# Patient Record
Sex: FEMALE | Race: WHITE | NOT HISPANIC OR LATINO | Employment: PART TIME | URBAN - METROPOLITAN AREA
[De-identification: names, ages, dates, MRNs, and addresses within clinical notes are randomized per-mention and may not be internally consistent; named-entity substitution may affect disease eponyms.]

---

## 2017-02-20 ENCOUNTER — MYC MEDICAL ADVICE (OUTPATIENT)
Dept: FAMILY MEDICINE | Facility: CLINIC | Age: 42
End: 2017-02-20

## 2017-02-21 ENCOUNTER — MYC MEDICAL ADVICE (OUTPATIENT)
Dept: FAMILY MEDICINE | Facility: CLINIC | Age: 42
End: 2017-02-21

## 2017-02-21 NOTE — TELEPHONE ENCOUNTER
Huddled with PN.  Would like patient to send as an E-visit or telephone visit.  Sent message to patient via "[employer] ([email or web address]".  Stephanie Winter RN

## 2017-02-22 ENCOUNTER — VIRTUAL VISIT (OUTPATIENT)
Dept: FAMILY MEDICINE | Facility: CLINIC | Age: 42
End: 2017-02-22
Payer: COMMERCIAL

## 2017-02-22 DIAGNOSIS — F41.1 ANXIETY STATE: Primary | ICD-10-CM

## 2017-02-22 DIAGNOSIS — F43.23 ADJUSTMENT DISORDER WITH MIXED ANXIETY AND DEPRESSED MOOD: ICD-10-CM

## 2017-02-22 PROCEDURE — 99443 ZZC PHYSICIAN TELEPHONE EVALUATION 21-30 MIN: CPT | Performed by: FAMILY MEDICINE

## 2017-02-22 RX ORDER — VENLAFAXINE HYDROCHLORIDE 75 MG/1
225 CAPSULE, EXTENDED RELEASE ORAL DAILY
Qty: 270 CAPSULE | Refills: 1 | Status: SHIPPED | OUTPATIENT
Start: 2017-02-22 | End: 2017-09-01

## 2017-02-22 NOTE — MR AVS SNAPSHOT
After Visit Summary   2/22/2017    Ellyn Mcgee    MRN: [id number]           Patient Information     Date Of Birth          1975        Visit Information        Provider Department      2/22/2017 10:30 AM Dorothy Guaman DO [facility]        Today's Diagnoses     Anxiety state    -  1    Adjustment disorder with mixed anxiety and depressed mood           Follow-ups after your visit        Your next 10 appointments already scheduled     Apr 07, 2017 10:00 AM CDT   MyChart Physical Adult with Dorothy Guaman DO   [facility] ([facility])    [address] [address] [phone]              Who to contact     If you have questions or need follow up information about today's clinic visit or your schedule please contact [facility] directly at [phone].  Normal or non-critical lab and imaging results will be communicated to you by Reviewspot[employer], letter or phone within 4 business days after the clinic has received the results. If you do not hear from us within 7 days, please contact the clinic through [employer] or phone. If you have a critical or abnormal lab result, we will notify you by phone as soon as possible.  Submit refill requests through [employer] or call your pharmacy and they will forward the refill request to us. Please allow 3 business days for your refill to be completed.          Additional Information About Your Visit        MyChart Information     [employer] gives you secure access to your electronic health record. If you see a primary care provider, you can also send messages to your care team and make appointments. If you have questions, please call your primary care clinic.  If you do not have a primary care provider, please call [phone] and they will assist you.        Care EveryWhere ID     This is your Care EveryWhere ID. This could be used by other organizations to access your [facility]  records  [id number]         Blood Pressure from Last 3 Encounters:   04/01/16 110/71   04/29/15 110/62   08/22/14 109/62    Weight from Last 3 Encounters:   04/01/16 176 lb (79.8 kg)   04/29/15 164 lb 3.2 oz (74.5 kg)   08/22/14 159 lb 12.8 oz (72.5 kg)              Today, you had the following     No orders found for display         Today's Medication Changes          These changes are accurate as of: 2/22/17  5:54 PM.  If you have any questions, ask your nurse or doctor.               These medicines have changed or have updated prescriptions.        Dose/Directions    venlafaxine 75 MG 24 hr capsule   Commonly known as:  EFFEXOR-XR   This may have changed:  See the new instructions.   Used for:  Adjustment disorder with mixed anxiety and depressed mood        Dose:  225 mg   Take 3 capsules (225 mg) by mouth daily   Quantity:  270 capsule   Refills:  1            Where to get your medicines      These medications were sent to [place] [address] [place] [address]     Phone:  [phone]     venlafaxine 75 MG 24 hr capsule                Primary Care Provider Office Phone # Fax #    Dorothy RODRÍGUEZ DO [place] [phone]       [place] [id number]        Thank you!     Thank you for choosing [facility]  for your care. Our goal is always to provide you with excellent care. Hearing back from our patients is one way we can continue to improve our services. Please take a few minutes to complete the written survey that you may receive in the mail after your visit with us. Thank you!             Your Updated Medication List - Protect others around you: Learn how to safely use, store and throw away your medicines at www.disposemymeds.org.          This list is accurate as of: 2/22/17  5:54 PM.  Always use your most recent med list.                   Brand Name Dispense Instructions for use     ALPRAZolam 0.5 MG tablet    XANAX    15 tablet    Take 1 tablet by mouth. As needed for anxiety       IBUPROFEN      as needed.       venlafaxine 75 MG 24 hr capsule    EFFEXOR-XR    270 capsule    Take 3 capsules (225 mg) by mouth daily

## 2017-02-22 NOTE — PROGRESS NOTES
"Ellyn Mcgee is a 41 year old female who is being evaluated via a telephone visit.      The patient has been notified of following:     \"This telephone visit will be conducted via a call between you and your physician/provider. We have found that certain health care needs can be provided without the need for a physical exam.  This service lets us provide the care you need with a short phone conversation.  If a prescription is necessary we can send it directly to your pharmacy.  If lab work is needed we can place an order for that and you can then stop by our lab to have the test done at a later time.    We will bill your insurance company for this service.  Please check with your medical insurance if this type of visit is covered. You may be responsible for the cost of this type of visit if insurance coverage is denied.  The typical cost is $30 (10min), $59 (11-20min) and $85 (21-30min).  Most often these visits are shorter than 10 minutes.    If during the course of the call the physician/provider feels a telephone visit is not appropriate, you will not be charged for this service.\"       Consent has been obtained for this service by 2 care team members: yes. See the scanned image in the medical record.    Ellyn Mcgee complains of  No chief complaint on file.      I have reviewed and updated the patient's Past Medical History, Social History, Family History and Medication List.    ALLERGIES  Codeine; Cyclogyl [cyclopentolate hcl]; Hydrocodone; and Seasonal allergies    Cally Rm (MA signature)    Additional provider notes:    Memory issue - trouble completing tasks -   Will forget she was going to do something and will remember a day later   Tried to write more things down but not helping - has big white boards and has to remind herself to do things  Seems easily distracted   Not sure how long this is going on -   Had hectic holidays -   Became more aware of it this past month  Taking effexor -   Dreams more " weird -   Has been snoring but has a cold - 2 months  Having trouble loosing weight -   Slipped and fallen a couple of times this past month   Having more pain and taking more ibuprofen - also taking tumeric - going to chiropractor      Assessment/Plan:  (F41.1) Anxiety state  (primary encounter diagnosis)  Comment: worsening anxiety and concentration - trouble with focus and unsure if this just focus -   Will attempt increasing effexor from 150mg up to 225mg -   Plan: if not improving consider trial of different medication vs more work up of cognitive issues - consider sleep evaluation    (F43.23) Adjustment disorder with mixed anxiety and depressed mood  Comment:    Plan: venlafaxine (EFFEXOR-XR) 75 MG 24 hr capsule             Contraception management -    Pt has question about birth control - does not want to get pregnant  Doesn't like hormones  Discussed options of IUD - including Mirena or Imelda -    Reviewed the risk/benefits of IUD-including but not limiting to bleeding, infection and uterine perforation.  Discussed the differences between the two types of IUDs - Mirena vs paraguard.  Advised to take 3-4 ibuprofen one hour prior to appointment.      I have reviewed the note as documented above.  This accurately captures the substance of my conversation with the patient,   Ellyn Mcgee     Total time of call between patient and provider was 25 minutes

## 2017-02-23 ASSESSMENT — PATIENT HEALTH QUESTIONNAIRE - PHQ9: SUM OF ALL RESPONSES TO PHQ QUESTIONS 1-9: 7

## 2017-04-07 ENCOUNTER — TELEPHONE (OUTPATIENT)
Dept: FAMILY MEDICINE | Facility: CLINIC | Age: 42
End: 2017-04-07

## 2017-04-07 DIAGNOSIS — E28.2 PCOS (POLYCYSTIC OVARIAN SYNDROME): ICD-10-CM

## 2017-04-07 DIAGNOSIS — F43.23 ADJUSTMENT DISORDER WITH MIXED ANXIETY AND DEPRESSED MOOD: ICD-10-CM

## 2017-04-07 DIAGNOSIS — E78.5 HYPERLIPIDEMIA LDL GOAL <130: Primary | ICD-10-CM

## 2017-04-07 LAB
ANION GAP SERPL CALCULATED.3IONS-SCNC: 5 MMOL/L (ref 3–14)
BUN SERPL-MCNC: 16 MG/DL (ref 7–30)
CALCIUM SERPL-MCNC: 9.1 MG/DL (ref 8.5–10.1)
CHLORIDE SERPL-SCNC: 106 MMOL/L (ref 94–109)
CHOLEST SERPL-MCNC: 231 MG/DL
CO2 SERPL-SCNC: 28 MMOL/L (ref 20–32)
CREAT SERPL-MCNC: 0.65 MG/DL (ref 0.52–1.04)
GFR SERPL CREATININE-BSD FRML MDRD: NORMAL ML/MIN/1.7M2
GLUCOSE SERPL-MCNC: 99 MG/DL (ref 70–99)
HDLC SERPL-MCNC: 52 MG/DL
LDLC SERPL CALC-MCNC: 159 MG/DL
NONHDLC SERPL-MCNC: 179 MG/DL
POTASSIUM SERPL-SCNC: 4.1 MMOL/L (ref 3.4–5.3)
SODIUM SERPL-SCNC: 139 MMOL/L (ref 133–144)
TRIGL SERPL-MCNC: 99 MG/DL

## 2017-04-07 PROCEDURE — 36415 COLL VENOUS BLD VENIPUNCTURE: CPT | Performed by: FAMILY MEDICINE

## 2017-04-07 PROCEDURE — 80061 LIPID PANEL: CPT | Performed by: FAMILY MEDICINE

## 2017-04-07 PROCEDURE — 80048 BASIC METABOLIC PNL TOTAL CA: CPT | Performed by: FAMILY MEDICINE

## 2017-04-07 NOTE — TELEPHONE ENCOUNTER
Pt here now for labs- fasting (CPE today cancelled due to PN's illness).  Will order BMP and Lipids.  CW

## 2017-04-19 ENCOUNTER — OFFICE VISIT (OUTPATIENT)
Dept: FAMILY MEDICINE | Facility: CLINIC | Age: 42
End: 2017-04-19
Payer: COMMERCIAL

## 2017-04-19 VITALS
HEART RATE: 85 BPM | DIASTOLIC BLOOD PRESSURE: 72 MMHG | TEMPERATURE: 98.6 F | HEIGHT: 64 IN | WEIGHT: 173.8 LBS | SYSTOLIC BLOOD PRESSURE: 114 MMHG | BODY MASS INDEX: 29.67 KG/M2 | OXYGEN SATURATION: 100 %

## 2017-04-19 DIAGNOSIS — F41.1 ANXIETY STATE: ICD-10-CM

## 2017-04-19 DIAGNOSIS — R23.4 SKIN TEXTURE CHANGES: ICD-10-CM

## 2017-04-19 DIAGNOSIS — R33.9 URINARY RETENTION: ICD-10-CM

## 2017-04-19 DIAGNOSIS — Z00.00 ENCOUNTER FOR ROUTINE ADULT HEALTH EXAMINATION WITHOUT ABNORMAL FINDINGS: Primary | ICD-10-CM

## 2017-04-19 DIAGNOSIS — F43.23 ADJUSTMENT DISORDER WITH MIXED ANXIETY AND DEPRESSED MOOD: ICD-10-CM

## 2017-04-19 LAB
ALBUMIN UR-MCNC: NEGATIVE MG/DL
APPEARANCE UR: CLEAR
BACTERIA #/AREA URNS HPF: ABNORMAL /HPF
BILIRUB UR QL STRIP: NEGATIVE
COLOR UR AUTO: YELLOW
GLUCOSE UR STRIP-MCNC: NEGATIVE MG/DL
HGB UR QL STRIP: ABNORMAL
KETONES UR STRIP-MCNC: NEGATIVE MG/DL
LEUKOCYTE ESTERASE UR QL STRIP: ABNORMAL
NITRATE UR QL: NEGATIVE
NON-SQ EPI CELLS #/AREA URNS LPF: ABNORMAL /LPF
PH UR STRIP: 7 PH (ref 5–7)
RBC #/AREA URNS AUTO: ABNORMAL /HPF (ref 0–2)
SP GR UR STRIP: 1.01 (ref 1–1.03)
URN SPEC COLLECT METH UR: ABNORMAL
UROBILINOGEN UR STRIP-ACNC: 0.2 EU/DL (ref 0.2–1)
WBC #/AREA URNS AUTO: ABNORMAL /HPF (ref 0–2)

## 2017-04-19 PROCEDURE — 81001 URINALYSIS AUTO W/SCOPE: CPT | Performed by: FAMILY MEDICINE

## 2017-04-19 PROCEDURE — 99396 PREV VISIT EST AGE 40-64: CPT | Performed by: FAMILY MEDICINE

## 2017-04-19 RX ORDER — PROPRANOLOL HYDROCHLORIDE 10 MG/1
TABLET ORAL
Qty: 20 TABLET | Refills: 3 | Status: SHIPPED | OUTPATIENT
Start: 2017-04-19 | End: 2019-01-16

## 2017-04-19 ASSESSMENT — ANXIETY QUESTIONNAIRES
IF YOU CHECKED OFF ANY PROBLEMS ON THIS QUESTIONNAIRE, HOW DIFFICULT HAVE THESE PROBLEMS MADE IT FOR YOU TO DO YOUR WORK, TAKE CARE OF THINGS AT HOME, OR GET ALONG WITH OTHER PEOPLE: SOMEWHAT DIFFICULT
3. WORRYING TOO MUCH ABOUT DIFFERENT THINGS: NOT AT ALL
GAD7 TOTAL SCORE: 4
6. BECOMING EASILY ANNOYED OR IRRITABLE: SEVERAL DAYS
5. BEING SO RESTLESS THAT IT IS HARD TO SIT STILL: SEVERAL DAYS
1. FEELING NERVOUS, ANXIOUS, OR ON EDGE: SEVERAL DAYS
7. FEELING AFRAID AS IF SOMETHING AWFUL MIGHT HAPPEN: NOT AT ALL
2. NOT BEING ABLE TO STOP OR CONTROL WORRYING: NOT AT ALL

## 2017-04-19 ASSESSMENT — PATIENT HEALTH QUESTIONNAIRE - PHQ9: 5. POOR APPETITE OR OVEREATING: SEVERAL DAYS

## 2017-04-19 NOTE — PROGRESS NOTES
SUBJECTIVE:     CC: Ellyn Mcgee is an 41 year old woman who presents for preventive health visit.     Healthy Habits:    Do you get at least three servings of calcium containing foods daily (dairy, green leafy vegetables, etc.)? no    Amount of exercise or daily activities, outside of work: working on increasing, nothing regular right now    Problems taking medications regularly No    Medication side effects: Yes dreams, constipation/hemorrhoids    Have you had an eye exam in the past two years? yes    Do you see a dentist twice per year? due    Do you have sleep apnea, excessive snoring or daytime drowsiness?no        -------------------------------------    Today's PHQ-2 Score:   PHQ-2 ( 1999 [employer]) 4/4/2017 4/1/2016   Q1: Little interest or pleasure in doing things - 1   Q2: Feeling down, depressed or hopeless - 1   PHQ-2 Score - 2   Little interest or pleasure in doing things Not at all -   Feeling down, depressed or hopeless Not at all -   PHQ-2 Score 0 -       Abuse: Current or Past(Physical, Sexual or Emotional)- NO  Do you feel safe in your environment - YES    Social History   Substance Use Topics     Smoking status: Never Smoker     Smokeless tobacco: Never Used     Alcohol use 0.5 oz/week     1 Standard drinks or equivalent per week     The patient does not drink >3 drinks per day nor >7 drinks per week.    Recent Labs   Lab Test  04/07/17   1021  08/22/14   1112  03/03/11   0814   CHOL  231*  226*  226*   HDL  52  60  40*   LDL  159*  151*  160*   TRIG  99  73  128   CHOLHDLRATIO   --   3.8  5.6*   NHDL  179*   --    --        Reviewed orders with patient.  Reviewed health maintenance and updated orders accordingly - Yes    Mammo Decision Support:  Patient under age 50, mutual decision reflected in health maintenance.      Pertinent mammograms are reviewed under the imaging tab.  History of abnormal Pap smear: NO - age 30-65 PAP every 5 years with negative HPV co-testing recommended    Reviewed  "and updated as needed this visit by clinical staff  Tobacco  Allergies  Meds  Problems  Med Hx         Reviewed and updated as needed this visit by Provider  Allergies  Meds  Problems  Med Hx            ROS:  C: NEGATIVE for fever, chills, change in weight  I: NEGATIVE for worrisome rashes, moles or lesions  E: NEGATIVE for vision changes or irritation  ENT: NEGATIVE for ear, mouth and throat problems  R: NEGATIVE for significant cough or SOB  B: NEGATIVE for masses, tenderness or discharge  CV: NEGATIVE for chest pain, palpitations or peripheral edema  GI: NEGATIVE for nausea, abdominal pain, heartburn, or change in bowel habits   female: irregular menses  Some urinary symptoms  M: NEGATIVE for significant arthralgias or myalgia  N: NEGATIVE for weakness, dizziness or paresthesias  P: NEGATIVE for changes in mood or affect    Problem list, Medication list, Allergies, and Medical/Social/Surgical histories reviewed in EPIC and updated as appropriate.  OBJECTIVE:     /72  Pulse 85  Temp 98.6  F (37  C) (Oral)  Ht 5' 3.75\" (1.619 m)  Wt 173 lb 12.8 oz (78.8 kg)  LMP 04/03/2017  SpO2 100%  BMI 30.07 kg/m2  EXAM:  GENERAL: healthy, alert and no distress  EYES: Eyes grossly normal to inspection, PERRL and conjunctivae and sclerae normal  HENT: ear canals and TM's normal, nose and mouth without ulcers or lesions  NECK: no adenopathy, no asymmetry, masses, or scars and thyroid normal to palpation  RESP: lungs clear to auscultation - no rales, rhonchi or wheezes  BREAST: normal without masses, tenderness or nipple discharge and no palpable axillary masses or adenopathy  CV: regular rate and rhythm, normal S1 S2, no S3 or S4, no murmur, click or rub, no peripheral edema and peripheral pulses strong  ABDOMEN: soft, nontender, no hepatosplenomegaly, no masses and bowel sounds normal  MS: no gross musculoskeletal defects noted, no edema  SKIN: right posterior arm skin lesion  NEURO: Normal strength and " "tone, mentation intact and speech normal  PSYCH: mentation appears normal, affect normal/bright    ASSESSMENT/PLAN:     1. Encounter for routine adult health examination without abnormal findings  Routine screening  - Urine Microscopic    2. Anxiety state  Will try prn propranolol  - propranolol (INDERAL) 10 MG tablet; 1-2 tablets every 12 hours as needed for anxiety  Dispense: 20 tablet; Refill: 3    3. Adjustment disorder with mixed anxiety and depressed mood     - propranolol (INDERAL) 10 MG tablet; 1-2 tablets every 12 hours as needed for anxiety  Dispense: 20 tablet; Refill: 3    4. Skin texture changes  Referral to derm for skin lesions  - DERMATOLOGY REFERRAL    5. Urinary retention  ua pending  - *UA reflex to Microscopic and Culture ([facility] (except [place] and [place])    COUNSELING:   Reviewed preventive health counseling, as reflected in patient instructions         reports that she has never smoked. She has never used smokeless tobacco.    Estimated body mass index is 30.07 kg/(m^2) as calculated from the following:    Height as of this encounter: 5' 3.75\" (1.619 m).    Weight as of this encounter: 173 lb 12.8 oz (78.8 kg).   Weight management plan: Discussed healthy diet and exercise guidelines and patient will follow up in 12 months in clinic to re-evaluate.    Counseling Resources:  ATP IV Guidelines  Pooled Cohorts Equation Calculator  Breast Cancer Risk Calculator  FRAX Risk Assessment  ICSI Preventive Guidelines  Dietary Guidelines for Americans, 2010  USDA's MyPlate  ASA Prophylaxis  Lung CA Screening    Dorothy Guaman, DO  [facility]  "

## 2017-04-19 NOTE — NURSING NOTE
"Chief Complaint   Patient presents with     Physical     /72  Pulse 85  Temp 98.6  F (37  C) (Oral)  Ht 5' 3.75\" (1.619 m)  Wt 173 lb 12.8 oz (78.8 kg)  LMP 04/03/2017  SpO2 100%  BMI 30.07 kg/m2 Estimated body mass index is 30.07 kg/(m^2) as calculated from the following:    Height as of this encounter: 5' 3.75\" (1.619 m).    Weight as of this encounter: 173 lb 12.8 oz (78.8 kg).  BP completed using cuff size: regular       Health Maintenance due pending provider review:  NONE    n/a    Cally Rm CMA  "

## 2017-04-19 NOTE — PATIENT INSTRUCTIONS
PLEASE CALL TO SCHEDULE YOUR MAMMOGRAM  [facility] [phone]  [facility] [phone]      Preventive Health Recommendations  Female Ages 40 to 49    Yearly exam:     See your health care provider every year in order to  1. Review health changes.   2. Discuss preventive care.    3. Review your medicines if your doctor prescribed any.      Get a Pap test every three years (unless you have an abnormal result and your provider advises testing more often).      If you get Pap tests with HPV test, you only need to test every 5 years, unless you have an abnormal result. You do not need a Pap test if your uterus was removed (hysterectomy) and you have not had cancer.      You should be tested each year for STDs (sexually transmitted diseases), if you're at risk.       Ask your doctor if you should have a mammogram.      Have a colonoscopy (test for colon cancer) if someone in your family has had colon cancer or polyps before age 50.       Have a cholesterol test every 5 years.       Have a diabetes test (fasting glucose) after age 45. If you are at risk for diabetes, you should have this test every 3 years.    Shots: Get a flu shot each year. Get a tetanus shot every 10 years.     Nutrition:     Eat at least 5 servings of fruits and vegetables each day.    Eat whole-grain bread, whole-wheat pasta and brown rice instead of white grains and rice.    Talk to your provider about Calcium and Vitamin D.     Lifestyle    Exercise at least 150 minutes a week (an average of 30 minutes a day, 5 days a week). This will help you control your weight and prevent disease.    Limit alcohol to one drink per day.    No smoking.     Wear sunscreen to prevent skin cancer.    See your dentist every six months for an exam and cleaning.

## 2017-04-19 NOTE — PROGRESS NOTES
Dear Ellyn,   Your test results are all back -   Urine test is normal - no signs of infection.  Let us know if you have any questions.  -Dorothy Guaman, DO

## 2017-04-19 NOTE — MR AVS SNAPSHOT
After Visit Summary   4/19/2017    Ellyn Mcgee    MRN: [id number]           Patient Information     Date Of Birth          1975        Visit Information        Provider Department      4/19/2017 9:30 AM Dorothy Guaman DO [facility]        Today's Diagnoses     Encounter for routine adult health examination without abnormal findings    -  1    Anxiety state        Adjustment disorder with mixed anxiety and depressed mood          Care Instructions    PLEASE CALL TO SCHEDULE YOUR MAMMOGRAM  [facility] [phone]  [facility] [phone]      Preventive Health Recommendations  Female Ages 40 to 49    Yearly exam:     See your health care provider every year in order to  1. Review health changes.   2. Discuss preventive care.    3. Review your medicines if your doctor prescribed any.      Get a Pap test every three years (unless you have an abnormal result and your provider advises testing more often).      If you get Pap tests with HPV test, you only need to test every 5 years, unless you have an abnormal result. You do not need a Pap test if your uterus was removed (hysterectomy) and you have not had cancer.      You should be tested each year for STDs (sexually transmitted diseases), if you're at risk.       Ask your doctor if you should have a mammogram.      Have a colonoscopy (test for colon cancer) if someone in your family has had colon cancer or polyps before age 50.       Have a cholesterol test every 5 years.       Have a diabetes test (fasting glucose) after age 45. If you are at risk for diabetes, you should have this test every 3 years.    Shots: Get a flu shot each year. Get a tetanus shot every 10 years.     Nutrition:     Eat at least 5 servings of fruits and vegetables each day.    Eat whole-grain bread, whole-wheat pasta and brown rice instead of white grains and rice.    Talk to your provider about Calcium and  "Vitamin D.     Lifestyle    Exercise at least 150 minutes a week (an average of 30 minutes a day, 5 days a week). This will help you control your weight and prevent disease.    Limit alcohol to one drink per day.    No smoking.     Wear sunscreen to prevent skin cancer.    See your dentist every six months for an exam and cleaning.        Follow-ups after your visit        Who to contact     If you have questions or need follow up information about today's clinic visit or your schedule please contact [facility] directly at [phone].  Normal or non-critical lab and imaging results will be communicated to you by [employer] Technologieshart, letter or phone within 4 business days after the clinic has received the results. If you do not hear from us within 7 days, please contact the clinic through [employer] or phone. If you have a critical or abnormal lab result, we will notify you by phone as soon as possible.  Submit refill requests through [employer] or call your pharmacy and they will forward the refill request to us. Please allow 3 business days for your refill to be completed.          Additional Information About Your Visit        [employer] Information     [employer] gives you secure access to your electronic health record. If you see a primary care provider, you can also send messages to your care team and make appointments. If you have questions, please call your primary care clinic.  If you do not have a primary care provider, please call [phone] and they will assist you.        Care EveryWhere ID     This is your Care EveryWhere ID. This could be used by other organizations to access your [facility] medical records  [id number]        Your Vitals Were     Pulse Temperature Height Last Period Pulse Oximetry BMI (Body Mass Index)    85 98.6  F (37  C) (Oral) 5' 3.75\" (1.619 m) 04/03/2017 100% 30.07 kg/m2       Blood Pressure from Last 3 Encounters:   04/19/17 114/72   04/01/16 110/71   04/29/15 110/62    Weight from Last " 3 Encounters:   04/19/17 173 lb 12.8 oz (78.8 kg)   04/01/16 176 lb (79.8 kg)   04/29/15 164 lb 3.2 oz (74.5 kg)              Today, you had the following     No orders found for display         Today's Medication Changes          These changes are accurate as of: 4/19/17 10:12 AM.  If you have any questions, ask your nurse or doctor.               Start taking these medicines.        Dose/Directions    propranolol 10 MG tablet   Commonly known as:  INDERAL   Used for:  Anxiety state, Adjustment disorder with mixed anxiety and depressed mood   Started by:  Dorothy Guaman DO        1-2 tablets every 12 hours as needed for anxiety   Quantity:  20 tablet   Refills:  3            Where to get your medicines      These medications were sent to [place] [address] [place] [address]     Phone:  [phone]     propranolol 10 MG tablet                Primary Care Provider Office Phone # Fax #    Dorothy Guaman [phone]       [facility] [address] [address]        Thank you!     Thank you for choosing [facility]  for your care. Our goal is always to provide you with excellent care. Hearing back from our patients is one way we can continue to improve our services. Please take a few minutes to complete the written survey that you may receive in the mail after your visit with us. Thank you!             Your Updated Medication List - Protect others around you: Learn how to safely use, store and throw away your medicines at www.disposemymeds.org.          This list is accurate as of: 4/19/17 10:12 AM.  Always use your most recent med list.                   Brand Name Dispense Instructions for use    ALPRAZolam 0.5 MG tablet    XANAX    15 tablet    Take 1 tablet by mouth. As needed for anxiety       IBUPROFEN      as needed.       propranolol 10 MG tablet    INDERAL    20 tablet    1-2 tablets  every 12 hours as needed for anxiety       venlafaxine 75 MG 24 hr capsule    EFFEXOR-XR    270 capsule    Take 3 capsules (225 mg) by mouth daily

## 2017-04-20 ASSESSMENT — PATIENT HEALTH QUESTIONNAIRE - PHQ9: SUM OF ALL RESPONSES TO PHQ QUESTIONS 1-9: 4

## 2017-04-20 ASSESSMENT — ANXIETY QUESTIONNAIRES: GAD7 TOTAL SCORE: 4

## 2017-07-09 ENCOUNTER — MYC MEDICAL ADVICE (OUTPATIENT)
Dept: FAMILY MEDICINE | Facility: CLINIC | Age: 42
End: 2017-07-09

## 2017-07-10 ENCOUNTER — MYC MEDICAL ADVICE (OUTPATIENT)
Dept: FAMILY MEDICINE | Facility: CLINIC | Age: 42
End: 2017-07-10

## 2017-07-10 ENCOUNTER — TELEPHONE (OUTPATIENT)
Dept: DERMATOLOGY | Facility: CLINIC | Age: 42
End: 2017-07-10

## 2017-07-10 NOTE — TELEPHONE ENCOUNTER
Has multiple freckles and moles on body. PCP stated to have them looked at. Large mole on abdomen; biopsied it previously, results benign. Last skin check 6 years ago. Reports father had melanoma at approximately her age (currently living) Denies scaling, itching, pain, bleeding, rapid growth or irregularity. Lesions are all similar. Patient has appt scheduled in October. Patient will monitor and report any changes or symptoms mentioned in note. Will follow up with patient if sooner appointment is available.

## 2017-07-10 NOTE — TELEPHONE ENCOUNTER
Left message for patient to call clinic. Keke Arcos PA-C has availablity in September if she would like to switch.

## 2017-07-10 NOTE — TELEPHONE ENCOUNTER
----- Message from Alexa Moore sent at 7/10/2017 11:44 AM CDT -----  Regarding: Pt requesting an appt, family hx of melanoma and basal cell, moles need to be ...  Contact: [phone]  Checked.  First avail appt was in October.  I schedule pt for 10/2/17, but pt would really like to get in much sooner.    Please call pt at [phone].    Thank you,  Mamadou YOUNGER  Please DO NOT send this message and/or reply back to sender.  Call Center Representatives DO NOT respond to messages.

## 2017-07-12 ENCOUNTER — MYC MEDICAL ADVICE (OUTPATIENT)
Dept: FAMILY MEDICINE | Facility: CLINIC | Age: 42
End: 2017-07-12

## 2017-08-17 ENCOUNTER — TRANSFERRED RECORDS (OUTPATIENT)
Dept: HEALTH INFORMATION MANAGEMENT | Facility: CLINIC | Age: 42
End: 2017-08-17

## 2017-08-31 ENCOUNTER — TRANSFERRED RECORDS (OUTPATIENT)
Dept: HEALTH INFORMATION MANAGEMENT | Facility: CLINIC | Age: 42
End: 2017-08-31

## 2017-09-01 DIAGNOSIS — F43.23 ADJUSTMENT DISORDER WITH MIXED ANXIETY AND DEPRESSED MOOD: ICD-10-CM

## 2017-09-01 RX ORDER — VENLAFAXINE HYDROCHLORIDE 75 MG/1
CAPSULE, EXTENDED RELEASE ORAL
Qty: 180 CAPSULE | Refills: 0 | Status: SHIPPED | OUTPATIENT
Start: 2017-09-01 | End: 2017-11-12

## 2017-09-01 NOTE — TELEPHONE ENCOUNTER
Effexor     Last Written Prescription Date: 02/22/2017  Last Fill Quantity: 270, # refills: 1  Last Office Visit with [facility], [facility] or  Health prescribing provider: 04/19/2017        BP Readings from Last 3 Encounters:   04/19/17 114/72   04/01/16 110/71   04/29/15 110/62     Pulse: (for Fetzima)  Creatinine   Date Value Ref Range Status   04/07/2017 0.65 0.52 - 1.04 mg/dL Final   ]    Last PHQ-9 score on record=   PHQ-9 SCORE 4/19/2017   Total Score -   Total Score 4

## 2017-09-01 NOTE — TELEPHONE ENCOUNTER
Medication is being filled for 1 time refill only due to:  due for pHQ9 in October. Refill sent for 2 month supply   Stephanie Winter RN

## 2017-09-11 ENCOUNTER — OFFICE VISIT (OUTPATIENT)
Dept: DERMATOLOGY | Facility: CLINIC | Age: 42
End: 2017-09-11

## 2017-09-11 DIAGNOSIS — Z12.83 SKIN CANCER SCREENING: ICD-10-CM

## 2017-09-11 DIAGNOSIS — L81.4 SOLAR LENTIGINOSIS: ICD-10-CM

## 2017-09-11 DIAGNOSIS — D22.9 MULTIPLE PIGMENTED NEVI: ICD-10-CM

## 2017-09-11 DIAGNOSIS — Z80.8 FAMILY HISTORY OF MELANOMA: ICD-10-CM

## 2017-09-11 DIAGNOSIS — D48.5 NEOPLASM OF UNCERTAIN BEHAVIOR OF SKIN: Primary | ICD-10-CM

## 2017-09-11 ASSESSMENT — PAIN SCALES - GENERAL
PAINLEVEL: NO PAIN (0)
PAINLEVEL: NO PAIN (0)

## 2017-09-11 NOTE — MR AVS SNAPSHOT
After Visit Summary   9/11/2017    Ellyn Mcgee    MRN: [id number]           Patient Information     Date Of Birth          1975        Visit Information        Provider Department      9/11/2017 1:30 PM Keke Arcos PA-C [facility]        Today's Diagnoses     Neoplasm of uncertain behavior of skin    -  1      Care Instructions    Wound Care After a Biopsy    What is a skin biopsy?  A skin biopsy allows the doctor to examine a very small piece of tissue under the microscope to determine the diagnosis and the best treatment for the skin condition. A local anesthetic (numbing medicine)  is injected with a very small needle into the skin area to be tested. A small piece of skin is taken from the area. Sometimes a suture (stitch) is used.     What are the risks of a skin biopsy?  I will experience scar, bleeding, swelling, pain, crusting and redness. I may experience incomplete removal or recurrence. Risks of this procedure are excessive bleeding, bruising, infection, nerve damage, numbness, thick (hypertrophic or keloidal) scar and non-diagnostic biopsy.    How should I care for my wound for the first 24 hours?    Keep the wound dry and covered for 24 hours    If it bleeds, hold direct pressure on the area for 15 minutes. If bleeding does not stop then go to the emergency room    Avoid strenuous exercise the first 1-2 days or as your doctor instructs you    How should I care for the wound after 24 hours?    After 24 hours, remove the bandage    You may bathe or shower as normal    If you had a scalp biopsy, you can shampoo as usual and can use shower water to clean the biopsy site daily    Clean the wound twice a day with gentle soap and water    Do not scrub, be gentle    Apply white petroleum/Vaseline after cleaning the wound with a cotton swab or a clean finger, and keep the site covered with a Bandaid /bandage. Bandages are not necessary with a scalp biopsy    If you are  unable to cover the site with a Bandaid /bandage, re-apply ointment 2-3 times a day to keep the site moist. Moisture will help with healing    Avoid strenuous activity for first 1-2 days    Avoid lakes, rivers, pools, and oceans until the stitches are removed or the site is healed    How do I clean my wound?    Wash hands thoroughly with soap or use hand  before all wound care    Clean the wound with gentle soap and water    Apply white petroleum/Vaseline  to wound after it is clean    Replace the Bandaid /bandage to keep the wound covered for the first few days or as instructed by your doctor    If you had a scalp biopsy, warm shower water to the area on a daily basis should suffice    What should I use to clean my wound?     Cotton-tipped applicators (Qtips )    White petroleum jelly (Vaseline ). Use a clean new container and use Q-tips to apply.    Bandaids   as needed    Gentle soap     How should I care for my wound long term?    Do not get your wound dirty    Keep up with wound care for one week or until the area is healed.    A small scab will form and fall off by itself when the area is completely healed. The area will be red and will become pink in color as it heals. Sun protection is very important for how your scar will turn out. Sunscreen with an SPF 30 or greater is recommended once the area is healed.    You should have some soreness but it should be mild and slowly go away over several days. Talk to your doctor about using tylenol for pain,    When should I call my doctor?  If you have increased:     Pain or swelling    Pus or drainage (clear or slightly yellow drainage is ok)    Temperature over 100F    Spreading redness or warmth around wound    When will I hear about my results?  The biopsy results can take 2-3 weeks to come back. The clinic will call you with the results, send you a Meddle message, or have you schedule a follow-up clinic or phone time to discuss the results. Contact our  clinics if you do not hear from us in 3 weeks.     Who should I call with questions?    [facility]: [phone]     [facility]: [phone]    For urgent needs outside of business hours call the [facility] at [phone] and ask for the dermatology resident on call              Follow-ups after your visit        Who to contact     Please call your clinic at [phone] to:    Ask questions about your health    Make or cancel appointments    Discuss your medicines    Learn about your test results    Speak to your doctor   If you have compliments or concerns about an experience at your clinic, or if you wish to file a complaint, please contact [facility] Patient Relations at [phone] or email us at [email or web address]         Additional Information About Your Visit        MyChart Information     [employer] gives you secure access to your electronic health record. If you see a primary care provider, you can also send messages to your care team and make appointments. If you have questions, please call your primary care clinic.  If you do not have a primary care provider, please call [phone] and they will assist you.      [employer] is an electronic gateway that provides easy, online access to your medical records. With [employer], you can request a clinic appointment, read your test results, renew a prescription or communicate with your care team.     To access your existing account, please contact your [facility] or call [phone] for assistance.        Care EveryWhere ID     This is your Care EveryWhere ID. This could be used by other organizations to access your [facility] medical records  [id number]         Blood Pressure from Last 3 Encounters:   04/19/17 114/72   04/01/16 110/71   04/29/15 110/62    Weight from Last 3 Encounters:   04/19/17 78.8 kg (173 lb 12.8 oz)    04/01/16 79.8 kg (176 lb)   04/29/15 74.5 kg (164 lb 3.2 oz)              We Performed the Following     BIOPSY SKIN/SUBQ/MUC MEM, SINGLE LESION     Surgical pathology exam        Primary Care Provider Office Phone # Fax #    Dorothy Guaman [phone] [address] [facility]  [address]  [facility] [address]        Equal Access to Services     [facility] Center[facility]ENOBIA : Hadii aad ku hadasho Soomaali, waaxda luqadaha, qaybta kaalmada adeegyada, waxay idiin hayaan adeeg khtalishash la'aan . So [facility] [phone].    ATENCIÓN: Si tram flores, tiene a vivas disposición servicios gratuitos de asistencia lingüística. Llame al [phone].    We comply with applicable federal civil rights laws and [place] laws. We do not discriminate on the basis of race, color, national origin, age, disability sex, sexual orientation or gender identity.            Thank you!     Thank you for choosing [facility]  for your care. Our goal is always to provide you with excellent care. Hearing back from our patients is one way we can continue to improve our services. Please take a few minutes to complete the written survey that you may receive in the mail after your visit with us. Thank you!             Your Updated Medication List - Protect others around you: Learn how to safely use, store and throw away your medicines at www.disposemymeds.org.          This list is accurate as of: 9/11/17  2:01 PM.  Always use your most recent med list.                   Brand Name Dispense Instructions for use Diagnosis    ALPRAZolam 0.5 MG tablet    XANAX    15 tablet    Take 1 tablet by mouth. As needed for anxiety    Anxiety state, unspecified       IBUPROFEN      as needed.        propranolol 10 MG tablet    INDERAL    20 tablet    1-2 tablets every 12 hours as needed for anxiety    Anxiety state, Adjustment disorder with mixed anxiety and depressed mood       venlafaxine 75 MG 24 hr capsule    EFFEXOR-XR    180 capsule    TAKE 3 CAPSULES  (225 MG) BY MOUTH DAILY    Adjustment disorder with mixed anxiety and depressed mood

## 2017-09-11 NOTE — PROGRESS NOTES
[facility] Dermatology Note    Dermatology Problem List:  1. Family history of melanoma in the patient's father.  2. Ink spot lentigines - right shoulder, left lower back  3. Hx of nonscarring alopecia  4. Hx of rosacea, patient uses otc treatments  5. NUB, left mid abdomen s/p partial biopsy 9/11/17  6. Skin cancer screening 9/11/17    CC:   Chief Complaint   Patient presents with     Skin Check     Ellyn is here for a skin check. There are multiple moles of concern.      Date of Service: Sep 11, 2017    History of Present Illness:  Ms. Ellyn Mcgee is a 42 year old female who presents for a skin check. She was last seen by Dr Walls on 7/30/2010. Today the patient reports that she has a few moles of concern. She notes that she had a mole on her abdomen biopsied when she was younger. It is large and many colors, but it was benign. She reports that her rosacea is very mild and never used the MetroCream she was given by Dr. Walls. A nurse recommended some over the counter products and these have been helping a lot. The patient reports no other lesions of concern at this time.    Otherwise, the patient reports no painful, bleeding, nonhealing, or pruritic lesions, and denies new or changing moles.    Past Medical History:   Patient Active Problem List   Diagnosis     Hyperlipidemia LDL goal <130     Anxiety state     PCOS (polycystic ovarian syndrome)     Adjustment disorder with mixed anxiety and depressed mood     External hemorrhoids     Past Medical History:   Diagnosis Date     Abnormal Pap smear     Colposcopy     Anemia     In Past     Chlamydia trachomatis infection of other specified site 1993     Fertility problem      Lyme disease 9/8/2010    ?false positive vs positve CMV - resolved     Moderate dysplasia of cervix 1995     Other and unspecified adverse effect of drug, medicinal and biological substance     insulin resistent     Varicosities      Wounds and injuries     fall down stairs, PT  for back, pain meds     Past Surgical History:   Procedure Laterality Date     CONIZATION CERVIX,KNIFE/LASER  1995     SURGICAL HISTORY OF -       [place] teeth     Social History:  Patient is  and is a mother. She works in [employer]   She is outside when she gardens. Wears sunscreen when she remembers.  Has used a tanning bed when she was 20 years ago.    Family History:  Family history of melanoma in the patient's father. First diagnosed in his early 30s. Had various lymphomas throughout the next years. Passed away from a heart attack.    Medications:  Current Outpatient Prescriptions   Medication Sig Dispense Refill     venlafaxine (EFFEXOR-XR) 75 MG 24 hr capsule TAKE 3 CAPSULES (225 MG) BY MOUTH DAILY 180 capsule 0     ALPRAZolam (XANAX) 0.5 MG tablet Take 1 tablet by mouth. As needed for anxiety 15 tablet 1     IBUPROFEN as needed.       propranolol (INDERAL) 10 MG tablet 1-2 tablets every 12 hours as needed for anxiety 20 tablet 3     Allergies:  Allergies   Allergen Reactions     Codeine Itching     Cyclogyl [Cyclopentolate Hcl] Nausea and Vomiting     Hydrocodone      anxiety     Seasonal Allergies      Review of Systems:  - Skin: As above in HPI. No additional skin concerns.  - Const: No fevers, chills, night sweats, chronic cough, unexpected weight loss    Physical exam:  Vitals: There were no vitals taken for this visit.  GEN: This is a well developed, well-nourished female in no acute distress, in a pleasant mood.      SKIN: Total skin excluding the perineum areas was performed. The exam included the head/face, neck, both arms, chest, back, abdomen, buttocks, both legs, digits and/or nails.   - Regular brown pigmented macules and papules are identified on the trunk, extremities - including right posterior arm.  - Scattered brown macules on sun exposed areas.  - There is a firm tan/flesh colored papule that dimples with lateral pressure on the left upper calf, right thigh, right buttock.  -  1.5 x 1 cm slightly variegated patch on left mid abdomen  - No other lesions of concern on areas examined.     Impression/Plan:  1. Family history of melanoma in the patient's father.  - Discussion that she is at a higher risk.  - Recommend regular skin checks and good sunscreen use.     2. Clinically benign nevi - trunk, extremities - including right posterior arm.  - No further intervention required. Patient to report changes.   - Sun precaution was advised including the use of sun screens of SPF 30 or higher, sun protective clothing, and avoidance of tanning beds.    3. Solar lentigines- sun exposed areas  - No further intervention required. Patient to report changes.     4. Dermatofibromas - left upper calf, right thigh, right buttock  - No further intervention required. Patient to report changes.     5. NUB - left mid abdomen. Neoplasm of uncertain behavior. Differential diagnosis includes recurrent congenital nevus vs dysplastic nevus vs other  - Partial shave biopsy: After discussion of benefits and risks including but not limited to bleeding/bruising, pain/swelling, infection, scar, incomplete removal, nerve damage/numbness, recurrence, and non-diagnostic biopsy, written consent, verbal consent and photographs were obtained. Time-out was performed. The area was cleaned with isopropyl alcohol and was injected to obtain adequate anesthesia of the lesion on the left mid abdomen. 1.5 ml of 1% lidocaine was injected to obtain adequate anesthesia. A shave biopsy was performed. Hemostasis was achieved with aluminium chloride. Vaseline and a sterile dressing were applied. The patient tolerated the procedure and no complications were noted. The patient was provided with verbal and written post care instructions.    Follow-up in 1 year, earlier for new or changing lesions.    Staff Involved:  Staff Only    Scribe Disclosure:   Zia GONZALEZ, am serving as a scribe to document services personally performed by Keke  Josselyn FOSS, based on data collection and the provider's statements to me.    Provider Disclosure:   The documentation recorded by the scribe accurately reflects the services I personally performed and the decisions made by me.    All risks, benefits and alternatives were discussed with patient.  Patient is in agreement and understands the assessment and plan.  All questions were answered.  Sun Screen Education was given.   Return to Clinic 1 yr or sooner as needed.   Keke Arcos PA-C   [facility]

## 2017-09-11 NOTE — LETTER
9/11/2017       RE: Ellyn Mcgee  [address]  [facility] [place] [address]     Dear Colleague,    Thank you for referring your patient, Ellyn Mcgee, to the [facility] at [facility]. Please see a copy of my visit note below.    [facility] Dermat[facility] Note    Dermatology Problem List:  1. Family history of melanoma in the patient's father.  2. Ink spot lentigines - right shoulder, left lower back  3. Hx of nonscarring alopecia  4. Hx of rosacea, patient uses otc treatments  5. NUB, left mid abdomen s/p partial biopsy 9/11/17  6. Skin cancer screening 9/11/17    CC:   Chief Complaint   Patient presents with     Skin Check     Ellyn is here for a skin check. There are multiple moles of concern.      Date of Service: Sep 11, 2017    History of Present Illness:  Ms. Ellyn Mcgee is a 42 year old female who presents for a skin check. She was last seen by Dr Walls on 7/30/2010. Today the patient reports that she has a few moles of concern. She notes that she had a mole on her abdomen biopsied when she was younger. It is large and many colors, but it was benign. She reports that her rosacea is very mild and never used the MetroCream she was given by Dr. Walls. A nurse recommended some over the counter products and these have been helping a lot. The patient reports no other lesions of concern at this time.    Otherwise, the patient reports no painful, bleeding, nonhealing, or pruritic lesions, and denies new or changing moles.    Past Medical History:   Patient Active Problem List   Diagnosis     Hyperlipidemia LDL goal <130     Anxiety state     PCOS (polycystic ovarian syndrome)     Adjustment disorder with mixed anxiety and depressed mood     External hemorrhoids     Past Medical History:   Diagnosis Date     Abnormal Pap smear     Colposcopy     Anemia     In Past     Chlamydia trachomatis infection of other specified site 1993     Fertility problem       Lyme disease 9/8/2010    ?false positive vs positve CMV - resolved     Moderate dysplasia of cervix 1995     Other and unspecified adverse effect of drug, medicinal and biological substance     insulin resistent     Varicosities      Wounds and injuries     fall down stairs, PT for back, pain meds     Past Surgical History:   Procedure Laterality Date     CONIZATION CERVIX,KNIFE/LASER  1995     SURGICAL HISTORY OF -       [place] teeth     Social History:  Patient is  and is a mother. She works in [employer].   She is outside when she gardens. Wears sunscreen when she remembers.  Has used a tanning bed when she was 20 years ago.    Family History:  Family history of melanoma in the patient's father. First diagnosed in his early 30s. Had various lymphomas throughout the next years. Passed away from a heart attack.    Medications:  Current Outpatient Prescriptions   Medication Sig Dispense Refill     venlafaxine (EFFEXOR-XR) 75 MG 24 hr capsule TAKE 3 CAPSULES (225 MG) BY MOUTH DAILY 180 capsule 0     ALPRAZolam (XANAX) 0.5 MG tablet Take 1 tablet by mouth. As needed for anxiety 15 tablet 1     IBUPROFEN as needed.       propranolol (INDERAL) 10 MG tablet 1-2 tablets every 12 hours as needed for anxiety 20 tablet 3     Allergies:  Allergies   Allergen Reactions     Codeine Itching     Cyclogyl [Cyclopentolate Hcl] Nausea and Vomiting     Hydrocodone      anxiety     Seasonal Allergies      Review of Systems:  - Skin: As above in HPI. No additional skin concerns.  - Const: No fevers, chills, night sweats, chronic cough, unexpected weight loss    Physical exam:  Vitals: There were no vitals taken for this visit.  GEN: This is a well developed, well-nourished female in no acute distress, in a pleasant mood.      SKIN: Total skin excluding the perineum areas was performed. The exam included the head/face, neck, both arms, chest, back, abdomen, buttocks, both legs, digits and/or nails.   - Regular brown  pigmented macules and papules are identified on the trunk, extremities - including right posterior arm.  - Scattered brown macules on sun exposed areas.  - There is a firm tan/flesh colored papule that dimples with lateral pressure on the left upper calf, right thigh, right buttock.  - 1.5 x 1 cm slightly variegated patch on left mid abdomen  - No other lesions of concern on areas examined.     Impression/Plan:  1. Family history of melanoma in the patient's father.  - Discussion that she is at a higher risk.  - Recommend regular skin checks and good sunscreen use.     2. Clinically benign nevi - trunk, extremities - including right posterior arm.  - No further intervention required. Patient to report changes.   - Sun precaution was advised including the use of sun screens of SPF 30 or higher, sun protective clothing, and avoidance of tanning beds.    3. Solar lentigines- sun exposed areas  - No further intervention required. Patient to report changes.     4. Dermatofibromas - left upper calf, right thigh, right buttock  - No further intervention required. Patient to report changes.     5. NUB - left mid abdomen. Neoplasm of uncertain behavior. Differential diagnosis includes recurrent congenital nevus vs dysplastic nevus vs other  - Partial shave biopsy: After discussion of benefits and risks including but not limited to bleeding/bruising, pain/swelling, infection, scar, incomplete removal, nerve damage/numbness, recurrence, and non-diagnostic biopsy, written consent, verbal consent and photographs were obtained. Time-out was performed. The area was cleaned with isopropyl alcohol and was injected to obtain adequate anesthesia of the lesion on the left mid abdomen. 1.5 ml of 1% lidocaine was injected to obtain adequate anesthesia. A shave biopsy was performed. Hemostasis was achieved with aluminium chloride. Vaseline and a sterile dressing were applied. The patient tolerated the procedure and no complications were  noted. The patient was provided with verbal and written post care instructions.    Follow-up in 1 year, earlier for new or changing lesions.    Staff Involved:  Staff Only    Scribe Disclosure:   I, Zia Resendiz, am serving as a scribe to document services personally performed by Keke Arcos PA-C, based on data collection and the provider's statements to me.    Provider Disclosure:   The documentation recorded by the scribe accurately reflects the services I personally performed and the decisions made by me.    All risks, benefits and alternatives were discussed with patient.  Patient is in agreement and understands the assessment and plan.  All questions were answered.  Sun Screen Education was given.   Return to Clinic 1 yr or sooner as needed.   Keke Arcos PA-C   [facility]

## 2017-09-11 NOTE — PATIENT INSTRUCTIONS

## 2017-09-11 NOTE — NURSING NOTE
Dermatology Rooming Note    Ellyn Mcgee's goals for this visit include:   Chief Complaint   Patient presents with     Skin Check     Ellyn is here for a skin check. There are multiple moles of concern.      Amy Nino CMA

## 2017-09-14 ENCOUNTER — MYC MEDICAL ADVICE (OUTPATIENT)
Dept: FAMILY MEDICINE | Facility: CLINIC | Age: 42
End: 2017-09-14

## 2017-09-14 LAB — COPATH REPORT: NORMAL

## 2017-09-14 NOTE — TELEPHONE ENCOUNTER
PN,  Please see below MyChart message  Do you want to see pt and daughter?  Not sure that you need to see daughter if mom not wanting to start meds.  Please advise.  Keeley BROWNING RN

## 2017-10-04 ENCOUNTER — OFFICE VISIT (OUTPATIENT)
Dept: FAMILY MEDICINE | Facility: CLINIC | Age: 42
End: 2017-10-04
Payer: COMMERCIAL

## 2017-10-04 VITALS
TEMPERATURE: 97.9 F | BODY MASS INDEX: 29.71 KG/M2 | OXYGEN SATURATION: 100 % | HEIGHT: 64 IN | DIASTOLIC BLOOD PRESSURE: 60 MMHG | RESPIRATION RATE: 16 BRPM | WEIGHT: 174 LBS | HEART RATE: 93 BPM | SYSTOLIC BLOOD PRESSURE: 100 MMHG

## 2017-10-04 DIAGNOSIS — F90.0 ATTENTION DEFICIT HYPERACTIVITY DISORDER (ADHD), PREDOMINANTLY INATTENTIVE TYPE: Primary | ICD-10-CM

## 2017-10-04 PROCEDURE — 99214 OFFICE O/P EST MOD 30 MIN: CPT | Performed by: FAMILY MEDICINE

## 2017-10-04 RX ORDER — DEXTROAMPHETAMINE SACCHARATE, AMPHETAMINE ASPARTATE MONOHYDRATE, DEXTROAMPHETAMINE SULFATE AND AMPHETAMINE SULFATE 2.5; 2.5; 2.5; 2.5 MG/1; MG/1; MG/1; MG/1
10 CAPSULE, EXTENDED RELEASE ORAL DAILY
Qty: 30 CAPSULE | Refills: 0 | Status: SHIPPED | OUTPATIENT
Start: 2017-12-04 | End: 2017-11-14

## 2017-10-04 RX ORDER — DEXTROAMPHETAMINE SACCHARATE, AMPHETAMINE ASPARTATE MONOHYDRATE, DEXTROAMPHETAMINE SULFATE AND AMPHETAMINE SULFATE 2.5; 2.5; 2.5; 2.5 MG/1; MG/1; MG/1; MG/1
10 CAPSULE, EXTENDED RELEASE ORAL DAILY
Qty: 30 CAPSULE | Refills: 0 | Status: SHIPPED | OUTPATIENT
Start: 2017-11-04 | End: 2017-10-04

## 2017-10-04 RX ORDER — DEXTROAMPHETAMINE SACCHARATE, AMPHETAMINE ASPARTATE MONOHYDRATE, DEXTROAMPHETAMINE SULFATE AND AMPHETAMINE SULFATE 2.5; 2.5; 2.5; 2.5 MG/1; MG/1; MG/1; MG/1
10 CAPSULE, EXTENDED RELEASE ORAL DAILY
Qty: 30 CAPSULE | Refills: 0 | Status: SHIPPED | OUTPATIENT
Start: 2017-10-04 | End: 2017-11-14

## 2017-10-04 ASSESSMENT — PATIENT HEALTH QUESTIONNAIRE - PHQ9: SUM OF ALL RESPONSES TO PHQ QUESTIONS 1-9: 7

## 2017-10-04 NOTE — PROGRESS NOTES
SUBJECTIVE:   Ellyn Mcgee is a 42 year old female who presents to clinic today for the following health issues:      ADHD    ADHD - inattentive predominant -      Duration: recently dx - had evaluation done at [facility] - she brings in copy of the records    Description (location/character/radiation): ongoing issue    Intensity:  moderate    Accompanying signs and symptoms: coexisting anxiety and some derpession    History (similar episodes/previous evaluation): yes    Precipitating or alleviating factors: None    Therapies tried and outcome: behavior modifications         Problem list and histories reviewed & adjusted, as indicated.  Additional history: as documented    Patient Active Problem List   Diagnosis     Hyperlipidemia LDL goal <130     Anxiety state     PCOS (polycystic ovarian syndrome)     Adjustment disorder with mixed anxiety and depressed mood     External hemorrhoids     Attention deficit hyperactivity disorder (ADHD), predominantly inattentive type     Past Surgical History:   Procedure Laterality Date     CONIZATION CERVIX,KNIFE/LASER  1995     SURGICAL HISTORY OF -       [place] teeth       Social History   Substance Use Topics     Smoking status: Never Smoker     Smokeless tobacco: Never Used     Alcohol use 0.5 oz/week     1 Standard drinks or equivalent per week     Family History   Problem Relation Age of Onset     CANCER Mother      esophageal      Lipids Mother      CANCER Father      B-cell lymphoma, melanoma     Lipids Father      HEART DISEASE Father      open heart surgery     DIABETES Paternal Grandmother      DIABETES Paternal Uncle      Alcohol/Drug Maternal Grandfather              Reviewed and updated as needed this visit by clinical staffTobacco  Allergies  Meds       Reviewed and updated as needed this visit by Provider         ROS:  Constitutional, HEENT, cardiovascular, pulmonary, gi and gu systems are negative, except as otherwise noted.      OBJECTIVE:  "  /60 (BP Location: Right arm, Cuff Size: Adult Regular)  Pulse 93  Temp 97.9  F (36.6  C) (Oral)  Resp 16  Ht 5' 3.75\" (1.619 m)  Wt 174 lb (78.9 kg)  SpO2 100%  BMI 30.1 kg/m2  Body mass index is 30.1 kg/(m^2).  GENERAL: healthy, alert and no distress  CV: regular rate and rhythm, normal S1 S2, no S3 or S4, no murmur, click or rub, no peripheral edema and peripheral pulses strong    Diagnostic Test Results:  none     ASSESSMENT/PLAN:     1. Attention deficit hyperactivity disorder (ADHD), predominantly inattentive type  New onset dx of ADHD -   Discussed medication options and risk benefits of each  She prefers to start low dose -   Will try 10mg   F/u if not working  Signed her controlled substance agreement  - amphetamine-dextroamphetamine (ADDERALL XR) 10 MG per 24 hr capsule; Take 1 capsule (10 mg) by mouth daily  Dispense: 30 capsule; Refill: 0  - amphetamine-dextroamphetamine (ADDERALL XR) 10 MG per 24 hr capsule; Take 1 capsule (10 mg) by mouth daily  Dispense: 30 capsule; Refill: 0    Pt will call or RTC if symptoms worsen or do not improve.  I spent over 25 minutes with patient and over 50% time in counseling regarding above issues.     Dorothy Guaman, DO  [facility]    "

## 2017-10-04 NOTE — LETTER
[facility]    10/04/17    Patient: Ellyn Mcgee  YOB: 1975  Medical Record Number: [id number]                                                                  Controlled Substance Agreement  I understand that my care provider has prescribed controlled substances (narcotics, tranquilizers, and/or stimulants) to help manage my condition(s).  I am taking this medicine to help me function or work.  I know that this is strong medicine.  It could have serious side effects and even cause a dependency on the drug.  If I stop these medicines suddenly, I could have severe withdrawal symptoms.    The risks, benefits, and side effects of these medication(s) were explained to me.  I agree that:  1. I will take part in other treatments as advised by my provider.  This may be psychiatry or counseling, physical therapy, behavioral therapy, group treatment, or a referral to a pain clinic.  I will reduce or stop my medicine when my provider tells me to do so.   2. I will take my medicines as prescribed.  I will not change the dose or schedule unless my provider tells me to.  There will be no refills if I  run out early.   I may be contacted at any time without warning and asked to complete a drug test or pill count.   3. I will keep all my appointments at the clinic.  If I miss appointments or fail to follow instructions, my provider may stop my medicine.  4. I will not ask other providers to prescribe controlled substances. And I will not accept controlled substances from other people. If I need another prescribed controlled substance for a new reason, I will notify my provider within one business day.  5. If I enroll in the [place] Medical Marijuana program, I will tell my provider.  I will also sign an agreement to share my medical records with my provider.  6. I will use one pharmacy to fill all of my controlled substance prescriptions.  If my prescription is mailed to my pharmacy, it may take 5 to  7 days for my medicine to be ready.  7. I understand that my provider, clinic care team, and pharmacy can track controlled substance prescriptions from other providers through a central database (prescription monitoring program).  8. I will bring in my list of medications (or my medicine bottles) each time I come to the clinic.  REV- 04/2016                                                                                                                                            Page 1 of 2      [facility]    10/04/17    Patient: Ellyn Mcgee  YOB: 1975  Medical Record Number: [id number]    9. Refills of controlled substances will be made only during office hours.  It is up to me to make sure that I do not run out of my medicines on weekends or holidays.    10. I am responsible for my prescriptions.  If the medicine is lost or stolen, it will not be replaced.   I also agree not to share these medicines with anyone.  11. I agree to not use ANY illegal or recreational drugs.  This includes marijuana, cocaine, bath salts or other drugs.  I agree not to use alcohol unless my provider says I may.  I agree to give urine samples whenever asked.  If I fail to give a urine sample, the provider may stop my medicine.     12. I will tell my nurse or provider right away if I become pregnant or have a new medical problem treated outside of [facility].  13. I understand that this medicine can affect my thinking and judgment.  It may be unsafe for me to drive, use machinery and do dangerous tasks.  I will not do any of these things until I know how the medicine affects me.  If my dose changes, I will wait to see how it affects me.  I will contact my provider if I have concerns about medicine side effects.  I understand that if I do not follow any of the conditions above, my prescriptions or treatment may be stopped.    I agree that my provider, clinic care team, and pharmacy may work with any city,  state or federal law enforcement agency that investigates the misuse, sale, or other diversion of my controlled medicine. I will allow my provider to discuss my care with or share a copy of this agreement with any other treating provider, pharmacy or emergency room where I receive care.  I agree to give up (waive) any right of privacy or confidentiality with respect to these authorizations.   I have read this agreement and have asked questions about anything I did not understand.   ___________________________________    ___________________________  Patient Signature                                                           Date and Time  ___________________________________     ____________________________  Witness                                                                            Date and Time  ___________________________________  Dorothy Guaman DO  REV-  04/2016                                                                                                                                                                 Page 2 of 2

## 2017-10-04 NOTE — MR AVS SNAPSHOT
"              After Visit Summary   10/4/2017    Ellyn Mcgee    MRN: [id number]           Patient Information     Date Of Birth          1975        Visit Information        Provider Department      10/4/2017 11:30 AM Dorothy Guaman DO [facility]        Today's Diagnoses     Attention deficit hyperactivity disorder (ADHD), predominantly inattentive type    -  1       Follow-ups after your visit        Who to contact     If you have questions or need follow up information about today's clinic visit or your schedule please contact [facility] directly at [phone].  Normal or non-critical lab and imaging results will be communicated to you by Band[employer], letter or phone within 4 business days after the clinic has received the results. If you do not hear from us within 7 days, please contact the clinic through [employer] or phone. If you have a critical or abnormal lab result, we will notify you by phone as soon as possible.  Submit refill requests through [employer]cloud or call your pharmacy and they will forward the refill request to us. Please allow 3 business days for your refill to be completed.          Additional Information About Your Visit        MyChart Information     [employer]cloud gives you secure access to your electronic health record. If you see a primary care provider, you can also send messages to your care team and make appointments. If you have questions, please call your primary care clinic.  If you do not have a primary care provider, please call [phone] and they will assist you.        Care EveryWhere ID     This is your Care EveryWhere ID. This could be used by other organizations to access your [place] medical records  [id number]        Your Vitals Were     Pulse Temperature Respirations Height Pulse Oximetry BMI (Body Mass Index)    93 97.9  F (36.6  C) (Oral) 16 5' 3.75\" (1.619 m) 100% 30.1 kg/m2       Blood Pressure from Last 3 Encounters:   10/04/17 100/60   04/19/17 " 114/72   04/01/16 110/71    Weight from Last 3 Encounters:   10/04/17 174 lb (78.9 kg)   04/19/17 173 lb 12.8 oz (78.8 kg)   04/01/16 176 lb (79.8 kg)              Today, you had the following     No orders found for display         Today's Medication Changes          These changes are accurate as of: 10/4/17  4:33 PM.  If you have any questions, ask your nurse or doctor.               Start taking these medicines.        Dose/Directions    * amphetamine-dextroamphetamine 10 MG per 24 hr capsule   Commonly known as:  ADDERALL XR   Used for:  Attention deficit hyperactivity disorder (ADHD), predominantly inattentive type   Started by:  Dorothy Guaman DO        Dose:  10 mg   Take 1 capsule (10 mg) by mouth daily   Quantity:  30 capsule   Refills:  0       * amphetamine-dextroamphetamine 10 MG per 24 hr capsule   Commonly known as:  ADDERALL XR   Used for:  Attention deficit hyperactivity disorder (ADHD), predominantly inattentive type   Started by:  Dorothy Guaman DO        Dose:  10 mg   Start taking on:  12/4/2017   Take 1 capsule (10 mg) by mouth daily   Quantity:  30 capsule   Refills:  0       * Notice:  This list has 2 medication(s) that are the same as other medications prescribed for you. Read the directions carefully, and ask your doctor or other care provider to review them with you.         Where to get your medicines      Some of these will need a paper prescription and others can be bought over the counter.  Ask your nurse if you have questions.     Bring a paper prescription for each of these medications     amphetamine-dextroamphetamine 10 MG per 24 hr capsule    amphetamine-dextroamphetamine 10 MG per 24 hr capsule                Primary Care Provider Office Phone # Fax #    Dorothy Guaman [phone] [address] [address]        Equal Access to Services     LISA PATTERSON AH: lior Frank qaybta kaalmada adeegyada, waxay  david davislewis lennon'aan ah. So [facility] [phone].    ATENCIÓN: Si tram flores, tiene a vivas disposición servicios gratuitos de asistencia lingüística. Fred al [phone].    We comply with applicable federal civil rights laws and [place] laws. We do not discriminate on the basis of race, color, national origin, age, disability, sex, sexual orientation, or gender identity.            Thank you!     Thank you for choosing [facility]  for your care. Our goal is always to provide you with excellent care. Hearing back from our patients is one way we can continue to improve our services. Please take a few minutes to complete the written survey that you may receive in the mail after your visit with us. Thank you!             Your Updated Medication List - Protect others around you: Learn how to safely use, store and throw away your medicines at www.disposemymeds.org.          This list is accurate as of: 10/4/17  4:33 PM.  Always use your most recent med list.                   Brand Name Dispense Instructions for use Diagnosis    ALPRAZolam 0.5 MG tablet    XANAX    15 tablet    Take 1 tablet by mouth. As needed for anxiety    Anxiety state, unspecified       * amphetamine-dextroamphetamine 10 MG per 24 hr capsule    ADDERALL XR    30 capsule    Take 1 capsule (10 mg) by mouth daily    Attention deficit hyperactivity disorder (ADHD), predominantly inattentive type       * amphetamine-dextroamphetamine 10 MG per 24 hr capsule   Start taking on:  12/4/2017    ADDERALL XR    30 capsule    Take 1 capsule (10 mg) by mouth daily    Attention deficit hyperactivity disorder (ADHD), predominantly inattentive type       IBUPROFEN      as needed.        propranolol 10 MG tablet    INDERAL    20 tablet    1-2 tablets every 12 hours as needed for anxiety    Anxiety state, Adjustment disorder with mixed anxiety and depressed mood       venlafaxine 75 MG 24 hr capsule    EFFEXOR-XR    180 capsule    TAKE 3  CAPSULES (225 MG) BY MOUTH DAILY    Adjustment disorder with mixed anxiety and depressed mood       * Notice:  This list has 2 medication(s) that are the same as other medications prescribed for you. Read the directions carefully, and ask your doctor or other care provider to review them with you.

## 2017-10-04 NOTE — NURSING NOTE
"Chief Complaint   Patient presents with     Consult     ADHD     initial /60 (BP Location: Right arm, Cuff Size: Adult Regular)  Pulse 93  Temp 97.9  F (36.6  C) (Oral)  Resp 16  Ht 5' 3.75\" (1.619 m)  Wt 174 lb (78.9 kg)  SpO2 100%  BMI 30.1 kg/m2 Estimated body mass index is 30.1 kg/(m^2) as calculated from the following:    Height as of this encounter: 5' 3.75\" (1.619 m).    Weight as of this encounter: 174 lb (78.9 kg).  BP completed using cuff size: regular.   R arm      Health Maintenance that is potentially due pending provider review:  NONE    n/a    Floyd Matias ma    "

## 2017-11-03 ENCOUNTER — MYC MEDICAL ADVICE (OUTPATIENT)
Dept: FAMILY MEDICINE | Facility: CLINIC | Age: 42
End: 2017-11-03

## 2017-11-03 NOTE — TELEPHONE ENCOUNTER
Per PN ok to decrease Effexor to 150 mg.  Should have follow up OV, maybe same day she brings her daughter in. 15 minute would be ok.    Patient informed via [employer]

## 2017-11-03 NOTE — TELEPHONE ENCOUNTER
Huddled with PN.  Will talk to patient more when she brings in her daughter on Tuesday 11/14.  Stephanie Winter RN

## 2017-11-12 DIAGNOSIS — F43.23 ADJUSTMENT DISORDER WITH MIXED ANXIETY AND DEPRESSED MOOD: ICD-10-CM

## 2017-11-14 ENCOUNTER — TELEPHONE (OUTPATIENT)
Dept: FAMILY MEDICINE | Facility: CLINIC | Age: 42
End: 2017-11-14

## 2017-11-14 DIAGNOSIS — F90.0 ATTENTION DEFICIT HYPERACTIVITY DISORDER (ADHD), PREDOMINANTLY INATTENTIVE TYPE: Primary | ICD-10-CM

## 2017-11-14 RX ORDER — DEXTROAMPHETAMINE SACCHARATE, AMPHETAMINE ASPARTATE MONOHYDRATE, DEXTROAMPHETAMINE SULFATE AND AMPHETAMINE SULFATE 3.75; 3.75; 3.75; 3.75 MG/1; MG/1; MG/1; MG/1
15 CAPSULE, EXTENDED RELEASE ORAL DAILY
Qty: 30 CAPSULE | Refills: 0 | Status: SHIPPED | OUTPATIENT
Start: 2017-12-15 | End: 2017-12-19

## 2017-11-14 RX ORDER — DEXTROAMPHETAMINE SACCHARATE, AMPHETAMINE ASPARTATE MONOHYDRATE, DEXTROAMPHETAMINE SULFATE AND AMPHETAMINE SULFATE 3.75; 3.75; 3.75; 3.75 MG/1; MG/1; MG/1; MG/1
15 CAPSULE, EXTENDED RELEASE ORAL DAILY
Qty: 30 CAPSULE | Refills: 0 | Status: SHIPPED | OUTPATIENT
Start: 2018-01-15 | End: 2017-12-19

## 2017-11-14 RX ORDER — DEXTROAMPHETAMINE SACCHARATE, AMPHETAMINE ASPARTATE MONOHYDRATE, DEXTROAMPHETAMINE SULFATE AND AMPHETAMINE SULFATE 3.75; 3.75; 3.75; 3.75 MG/1; MG/1; MG/1; MG/1
15 CAPSULE, EXTENDED RELEASE ORAL DAILY
Qty: 30 CAPSULE | Refills: 0 | Status: SHIPPED | OUTPATIENT
Start: 2017-11-14 | End: 2017-12-14

## 2017-11-14 RX ORDER — VENLAFAXINE HYDROCHLORIDE 75 MG/1
CAPSULE, EXTENDED RELEASE ORAL
Qty: 90 CAPSULE | Refills: 0 | Status: SHIPPED | OUTPATIENT
Start: 2017-11-14 | End: 2017-12-12

## 2017-11-14 NOTE — TELEPHONE ENCOUNTER
Medication is being filled for 1 time refill only due to:  See 11/3 Tevin encounter - pt. will discuss medication/side effects when she brings in her daughter today   Stephanie Winter RN    Effexor 75 mg    Last Written Prescription Date: 9/1/2017  Last Fill Quantity: 180; # refills: 0  Last Office Visit with [facility], P or [facility] prescribing provider:  10/4/2017       Last PHQ-9 score on record=   PHQ-9 SCORE 10/4/2017   Total Score -   Total Score 7       Lab Results   Component Value Date    AST 15 08/22/2014     Lab Results   Component Value Date    ALT 22 08/22/2014

## 2017-12-12 DIAGNOSIS — F43.23 ADJUSTMENT DISORDER WITH MIXED ANXIETY AND DEPRESSED MOOD: ICD-10-CM

## 2017-12-12 NOTE — TELEPHONE ENCOUNTER
Routing refill request to provider for review/approval because:  Per recent Isi/MyChart, pt was going to talk to you about Effexor when she brought daughter in.  Did she? Ok to refill?  Keeley BROWNING RN    Requested Prescriptions   Pending Prescriptions Disp Refills     venlafaxine (EFFEXOR-XR) 75 MG 24 hr capsule [Pharmacy Med Name: VENLAFAXINE HCL ER 75 MG CAP] 90 capsule 0     Sig: TAKE 3 CAPSULES BY MOUTH DAILY    Serotonin-Norepinephrine Reuptake Inhibitors  Passed    12/12/2017  4:42 AM       Passed - Blood pressure under 140/90    BP Readings from Last 3 Encounters:   10/04/17 100/60   04/19/17 114/72   04/01/16 110/71                Passed - Recent or future visit with authorizing provider's specialty    Patient had office visit in the last year or has a visit in the next 30 days with authorizing provider.  See chart review.              Passed - Patient is age 18 or older       Passed - No active pregnancy on record       Passed - Normal serum creatinine on file in past 12 months    Recent Labs   Lab Test  04/07/17   1021   CR  0.65            Passed - No positive pregnancy test in past 12 months

## 2017-12-13 ENCOUNTER — MYC MEDICAL ADVICE (OUTPATIENT)
Dept: FAMILY MEDICINE | Facility: CLINIC | Age: 42
End: 2017-12-13

## 2017-12-13 RX ORDER — VENLAFAXINE HYDROCHLORIDE 75 MG/1
CAPSULE, EXTENDED RELEASE ORAL
Qty: 90 CAPSULE | Refills: 0 | Status: SHIPPED | OUTPATIENT
Start: 2017-12-13 | End: 2017-12-13

## 2017-12-13 RX ORDER — VENLAFAXINE HYDROCHLORIDE 75 MG/1
CAPSULE, EXTENDED RELEASE ORAL
Qty: 90 CAPSULE | Refills: 5 | Status: SHIPPED | OUTPATIENT
Start: 2017-12-13 | End: 2017-12-19

## 2017-12-13 NOTE — TELEPHONE ENCOUNTER
PN,  Please see below MyChart message  Daughter scheduled to see you Tuesday 12/19/2017 at 315pm  You do have a 3pm 15 min opening if you want to see this pt for appt  Please advise  Keeley BROWNING RN

## 2017-12-14 NOTE — TELEPHONE ENCOUNTER
PN  Pt can't come in for a short appointment, will have to pay out of pocket  Ok to do a phone visit instead?   Thank you,  Brittany Rausch RN

## 2017-12-15 ENCOUNTER — MYC MEDICAL ADVICE (OUTPATIENT)
Dept: FAMILY MEDICINE | Facility: CLINIC | Age: 42
End: 2017-12-15

## 2017-12-19 ENCOUNTER — VIRTUAL VISIT (OUTPATIENT)
Dept: FAMILY MEDICINE | Facility: CLINIC | Age: 42
End: 2017-12-19
Payer: COMMERCIAL

## 2017-12-19 DIAGNOSIS — F90.0 ATTENTION DEFICIT HYPERACTIVITY DISORDER (ADHD), PREDOMINANTLY INATTENTIVE TYPE: Primary | ICD-10-CM

## 2017-12-19 DIAGNOSIS — F43.23 ADJUSTMENT DISORDER WITH MIXED ANXIETY AND DEPRESSED MOOD: ICD-10-CM

## 2017-12-19 PROCEDURE — 99442 ZZC PHYSICIAN TELEPHONE EVALUATION 11-20 MIN: CPT | Performed by: FAMILY MEDICINE

## 2017-12-19 RX ORDER — DEXTROAMPHETAMINE SACCHARATE, AMPHETAMINE ASPARTATE MONOHYDRATE, DEXTROAMPHETAMINE SULFATE AND AMPHETAMINE SULFATE 6.25; 6.25; 6.25; 6.25 MG/1; MG/1; MG/1; MG/1
25 CAPSULE, EXTENDED RELEASE ORAL DAILY
Qty: 30 CAPSULE | Refills: 0 | Status: SHIPPED | OUTPATIENT
Start: 2018-01-19 | End: 2018-01-23

## 2017-12-19 RX ORDER — ESCITALOPRAM OXALATE 10 MG/1
15 TABLET ORAL DAILY
Qty: 45 TABLET | Refills: 5 | Status: SHIPPED | OUTPATIENT
Start: 2017-12-19 | End: 2017-12-19

## 2017-12-19 RX ORDER — ESCITALOPRAM OXALATE 10 MG/1
10 TABLET ORAL DAILY
Qty: 90 TABLET | Refills: 1 | Status: SHIPPED | OUTPATIENT
Start: 2017-12-19 | End: 2018-01-23

## 2017-12-19 RX ORDER — ESCITALOPRAM OXALATE 5 MG/1
5 TABLET ORAL DAILY
Qty: 90 TABLET | Refills: 1 | Status: SHIPPED | OUTPATIENT
Start: 2017-12-19 | End: 2018-04-17

## 2017-12-19 RX ORDER — DEXTROAMPHETAMINE SACCHARATE, AMPHETAMINE ASPARTATE MONOHYDRATE, DEXTROAMPHETAMINE SULFATE AND AMPHETAMINE SULFATE 6.25; 6.25; 6.25; 6.25 MG/1; MG/1; MG/1; MG/1
25 CAPSULE, EXTENDED RELEASE ORAL DAILY
Qty: 30 CAPSULE | Refills: 0 | Status: SHIPPED | OUTPATIENT
Start: 2018-02-19 | End: 2018-01-23

## 2017-12-19 RX ORDER — DEXTROAMPHETAMINE SACCHARATE, AMPHETAMINE ASPARTATE MONOHYDRATE, DEXTROAMPHETAMINE SULFATE AND AMPHETAMINE SULFATE 6.25; 6.25; 6.25; 6.25 MG/1; MG/1; MG/1; MG/1
25 CAPSULE, EXTENDED RELEASE ORAL DAILY
Qty: 30 CAPSULE | Refills: 0 | Status: SHIPPED | OUTPATIENT
Start: 2017-12-19 | End: 2018-01-18

## 2017-12-19 NOTE — MR AVS SNAPSHOT
After Visit Summary   12/19/2017    Ellyn Mcgee    MRN: [id number]           Patient Information     Date Of Birth          1975        Visit Information        Provider Department      12/19/2017 7:45 AM Dorothy Guaman DO [facility]        Today's Diagnoses     Attention deficit hyperactivity disorder (ADHD), predominantly inattentive type    -  1    Adjustment disorder with mixed anxiety and depressed mood           Follow-ups after your visit        Who to contact     If you have questions or need follow up information about today's clinic visit or your schedule please contact [facility] directly at [phone].  Normal or non-critical lab and imaging results will be communicated to you by [employer], letter or phone within 4 business days after the clinic has received the results. If you do not hear from us within 7 days, please contact the clinic through [employer] or phone. If you have a critical or abnormal lab result, we will notify you by phone as soon as possible.  Submit refill requests through [employer] or call your pharmacy and they will forward the refill request to us. Please allow 3 business days for your refill to be completed.          Additional Information About Your Visit        MyChart Information     [employer] gives you secure access to your electronic health record. If you see a primary care provider, you can also send messages to your care team and make appointments. If you have questions, please call your primary care clinic.  If you do not have a primary care provider, please call [phone] and they will assist you.        Care EveryWhere ID     This is your Care EveryWhere ID. This could be used by other organizations to access your [place] medical records  [id number]         Blood Pressure from Last 3 Encounters:   10/04/17 100/60   04/19/17 114/72   04/01/16 110/71    Weight from Last 3 Encounters:   10/04/17 174 lb (78.9 kg)   04/19/17 173 lb  12.8 oz (78.8 kg)   04/01/16 176 lb (79.8 kg)              Today, you had the following     No orders found for display         Today's Medication Changes          These changes are accurate as of: 12/19/17  8:09 AM.  If you have any questions, ask your nurse or doctor.               Start taking these medicines.        Dose/Directions    * amphetamine-dextroamphetamine 25 MG 24 hr capsule   Commonly known as:  ADDERALL XR   Used for:  Attention deficit hyperactivity disorder (ADHD), predominantly inattentive type        Dose:  25 mg   Take 1 capsule (25 mg) by mouth daily   Quantity:  30 capsule   Refills:  0       * amphetamine-dextroamphetamine 25 MG 24 hr capsule   Commonly known as:  ADDERALL XR   Used for:  Attention deficit hyperactivity disorder (ADHD), predominantly inattentive type        Dose:  25 mg   Start taking on:  1/19/2018   Take 1 capsule (25 mg) by mouth daily   Quantity:  30 capsule   Refills:  0       * amphetamine-dextroamphetamine 25 MG 24 hr capsule   Commonly known as:  ADDERALL XR   Used for:  Attention deficit hyperactivity disorder (ADHD), predominantly inattentive type        Dose:  25 mg   Start taking on:  2/19/2018   Take 1 capsule (25 mg) by mouth daily   Quantity:  30 capsule   Refills:  0       escitalopram 10 MG tablet   Commonly known as:  LEXAPRO   Used for:  Adjustment disorder with mixed anxiety and depressed mood        Dose:  15 mg   Take 1.5 tablets (15 mg) by mouth daily   Quantity:  45 tablet   Refills:  5       * Notice:  This list has 3 medication(s) that are the same as other medications prescribed for you. Read the directions carefully, and ask your doctor or other care provider to review them with you.         Where to get your medicines      These medications were sent to [facility] [address] [place] [address] [address]     Phone:  [phone]     escitalopram 10 MG tablet         Some of these will need a paper  prescription and others can be bought over the counter.  Ask your nurse if you have questions.     Bring a paper prescription for each of these medications     amphetamine-dextroamphetamine 25 MG 24 hr capsule    amphetamine-dextroamphetamine 25 MG 24 hr capsule    amphetamine-dextroamphetamine 25 MG 24 hr capsule                Primary Care Provider Office Phone # Fax #    Dorothy Guaman [phone] [address] [address]        Equal Access to Services     LISA PATTERSON : Hadii aad ku hadasho Soomaali, waaxda luqadaha, qaybta kaalmada adeegyada, waxay idiin hayaan adeeg kharash la'aan . So [facility] [phone].    ATENCIÓN: Si habla español, tiene a vivas disposición servicios gratuitos de asistencia lingüística. [facility] [phone].    We comply with applicable federal civil rights laws and [place] laws. We do not discriminate on the basis of race, color, national origin, age, disability, sex, sexual orientation, or gender identity.            Thank you!     Thank you for choosing [facility]  for your care. Our goal is always to provide you with excellent care. Hearing back from our patients is one way we can continue to improve our services. Please take a few minutes to complete the written survey that you may receive in the mail after your visit with us. Thank you!             Your Updated Medication List - Protect others around you: Learn how to safely use, store and throw away your medicines at www.disposemymeds.org.          This list is accurate as of: 12/19/17  8:09 AM.  Always use your most recent med list.                   Brand Name Dispense Instructions for use Diagnosis    ALPRAZolam 0.5 MG tablet    XANAX    15 tablet    Take 1 tablet by mouth. As needed for anxiety    Anxiety state, unspecified       * amphetamine-dextroamphetamine 25 MG 24 hr capsule    ADDERALL XR    30 capsule    Take 1 capsule (25 mg) by mouth daily    Attention deficit  hyperactivity disorder (ADHD), predominantly inattentive type       * amphetamine-dextroamphetamine 25 MG 24 hr capsule   Start taking on:  1/19/2018    ADDERALL XR    30 capsule    Take 1 capsule (25 mg) by mouth daily    Attention deficit hyperactivity disorder (ADHD), predominantly inattentive type       * amphetamine-dextroamphetamine 25 MG 24 hr capsule   Start taking on:  2/19/2018    ADDERALL XR    30 capsule    Take 1 capsule (25 mg) by mouth daily    Attention deficit hyperactivity disorder (ADHD), predominantly inattentive type       escitalopram 10 MG tablet    LEXAPRO    45 tablet    Take 1.5 tablets (15 mg) by mouth daily    Adjustment disorder with mixed anxiety and depressed mood       IBUPROFEN      as needed.        propranolol 10 MG tablet    INDERAL    20 tablet    1-2 tablets every 12 hours as needed for anxiety    Anxiety state, Adjustment disorder with mixed anxiety and depressed mood       * Notice:  This list has 3 medication(s) that are the same as other medications prescribed for you. Read the directions carefully, and ask your doctor or other care provider to review them with you.

## 2017-12-19 NOTE — PROGRESS NOTES
"Ellyn Mcgee is a 42 year old female who is being evaluated via a telephone visit.      The patient has been notified of following:     \"This telephone visit will be conducted via a call between you and your physician/provider. We have found that certain health care needs can be provided without the need for a physical exam.  This service lets us provide the care you need with a short phone conversation.  If a prescription is necessary we can send it directly to your pharmacy.  If lab work is needed we can place an order for that and you can then stop by our lab to have the test done at a later time.    We will bill your insurance company for this service.  Please check with your medical insurance if this type of visit is covered. You may be responsible for the cost of this type of visit if insurance coverage is denied.  The typical cost is $30 (10min), $59 (11-20min) and $85 (21-30min).  Most often these visits are shorter than 10 minutes.    If during the course of the call the physician/provider feels a telephone visit is not appropriate, you will not be charged for this service.\"       Consent has been obtained for this service by 2 care team members: yes. See the scanned image in the medical record.    Ellyn Mcgee complains of  Recheck Medication      I have reviewed and updated the patient's Past Medical History, Social History, Family History and Medication List.    ALLERGIES  Codeine; Cyclogyl [cyclopentolate hcl]; Hydrocodone; and Seasonal allergies    Cally Rm CMA   (MA signature)    Additional provider notes:    Did some research about her medications - currently thinking she would like to go back to lexapro and just the adderall -   Had 20mg lexapro   Weaned down on the effexor from 225mg - down to 150mg and then down to the 75mg in the past few weeks.  Started to notice some increase irritability   Added lexapro 5mg (quarter of a 20mg) and then increased to the 10mg for the past 1.5 weeks.  Last " few days the irritability is better  Also increased her adderall to 20mg - not enough   At 25mg of adderall is better      Assessment/Plan:  (F90.0) Attention deficit hyperactivity disorder (ADHD), predominantly inattentive type  (primary encounter diagnosis)  Comment: agree with plan to increase the adderall slightly to 25 when she goes off the effexor to help with focus  Plan: amphetamine-dextroamphetamine (ADDERALL XR) 25         MG 24 hr capsule, amphetamine-dextroamphetamine        (ADDERALL XR) 25 MG 24 hr capsule,         amphetamine-dextroamphetamine (ADDERALL XR) 25         MG 24 hr capsule             (F43.23) Adjustment disorder with mixed anxiety and depressed mood  Comment: will increase lexapro to 15mg daily to help with anxiety and mood -  Plan: escitalopram (LEXAPRO) 10 MG tablet                 I have reviewed the note as documented above.  This accurately captures the substance of my conversation with the patient,  Ellyn Mcgee     Total time of call between patient and provider was 12 minutes

## 2017-12-20 ENCOUNTER — MYC MEDICAL ADVICE (OUTPATIENT)
Dept: FAMILY MEDICINE | Facility: CLINIC | Age: 42
End: 2017-12-20

## 2018-01-03 ENCOUNTER — MYC MEDICAL ADVICE (OUTPATIENT)
Dept: FAMILY MEDICINE | Facility: CLINIC | Age: 43
End: 2018-01-03

## 2018-01-03 ENCOUNTER — MYC REFILL (OUTPATIENT)
Dept: FAMILY MEDICINE | Facility: CLINIC | Age: 43
End: 2018-01-03

## 2018-01-03 DIAGNOSIS — F41.1 ANXIETY STATE: ICD-10-CM

## 2018-01-03 NOTE — TELEPHONE ENCOUNTER
Message from [employer]:  Original authorizing provider: DO Ellyn Garces ANUPMaciel Cohenen would like a refill of the following medications:  ALPRAZolam (XANAX) 0.5 MG tablet [Dorothy Guaman DO]    Preferred pharmacy: [place] [id number] [place] [address]    Comment:  Hello. I have finally got down to my last pill (only took a couple of years) and would appreciate a renewal to have on hand. This works better than the propranolol for the extreme cases of anxiety, including flying (which I will be doing soon-chris). thanks and happy New Year!

## 2018-01-05 RX ORDER — ALPRAZOLAM 0.5 MG
TABLET ORAL
Qty: 15 TABLET | Refills: 1 | Status: SHIPPED | OUTPATIENT
Start: 2018-01-05 | End: 2018-12-13

## 2018-01-23 ENCOUNTER — MYC MEDICAL ADVICE (OUTPATIENT)
Dept: FAMILY MEDICINE | Facility: CLINIC | Age: 43
End: 2018-01-23

## 2018-01-23 DIAGNOSIS — F90.0 ATTENTION DEFICIT HYPERACTIVITY DISORDER (ADHD), PREDOMINANTLY INATTENTIVE TYPE: ICD-10-CM

## 2018-01-23 DIAGNOSIS — F43.23 ADJUSTMENT DISORDER WITH MIXED ANXIETY AND DEPRESSED MOOD: ICD-10-CM

## 2018-01-23 RX ORDER — ESCITALOPRAM OXALATE 20 MG/1
20 TABLET ORAL DAILY
Qty: 90 TABLET | Refills: 1 | Status: SHIPPED | OUTPATIENT
Start: 2018-01-23 | End: 2018-01-23

## 2018-01-23 RX ORDER — DEXTROAMPHETAMINE SACCHARATE, AMPHETAMINE ASPARTATE MONOHYDRATE, DEXTROAMPHETAMINE SULFATE AND AMPHETAMINE SULFATE 5; 5; 5; 5 MG/1; MG/1; MG/1; MG/1
20 CAPSULE, EXTENDED RELEASE ORAL DAILY
Qty: 30 CAPSULE | Refills: 0 | Status: SHIPPED | OUTPATIENT
Start: 2018-02-23 | End: 2018-04-17

## 2018-01-23 RX ORDER — DEXTROAMPHETAMINE SACCHARATE, AMPHETAMINE ASPARTATE MONOHYDRATE, DEXTROAMPHETAMINE SULFATE AND AMPHETAMINE SULFATE 5; 5; 5; 5 MG/1; MG/1; MG/1; MG/1
20 CAPSULE, EXTENDED RELEASE ORAL DAILY
Qty: 30 CAPSULE | Refills: 0 | Status: SHIPPED | OUTPATIENT
Start: 2018-01-23 | End: 2018-04-17

## 2018-01-23 RX ORDER — ESCITALOPRAM OXALATE 20 MG/1
20 TABLET ORAL DAILY
Qty: 90 TABLET | Refills: 1 | Status: SHIPPED | OUTPATIENT
Start: 2018-01-23 | End: 2018-04-23

## 2018-01-23 NOTE — TELEPHONE ENCOUNTER
PN  See MyChart message  Last and only Rx for Adderall XR 25mg was 12/21/17 #30  Pended Adderall 20mg for January and February for Lexapro 20mg for future fills  Please approve if appropriate  Brittany Rausch RN

## 2018-01-23 NOTE — TELEPHONE ENCOUNTER
Printed Rx   Please let her know so she can exchange old Rx for new one and let  know she should bring in 2 months worth  Thanks  PN

## 2018-01-23 NOTE — TELEPHONE ENCOUNTER
[employer] message sent to pt  2 Rx's at [facility] for pt to exchange with 2 Rx's she has  Keeley BROWNING RN

## 2018-01-24 ENCOUNTER — MYC MEDICAL ADVICE (OUTPATIENT)
Dept: FAMILY MEDICINE | Facility: CLINIC | Age: 43
End: 2018-01-24

## 2018-01-24 NOTE — TELEPHONE ENCOUNTER
Pt exchanged two Rx for January and February of the 25mg XR for the new January and February XR 20 mg.     25mg Rxs destroyed and witnessed by ABY Solis RN

## 2018-02-26 ENCOUNTER — MYC MEDICAL ADVICE (OUTPATIENT)
Dept: FAMILY MEDICINE | Facility: CLINIC | Age: 43
End: 2018-02-26

## 2018-03-05 ENCOUNTER — MYC MEDICAL ADVICE (OUTPATIENT)
Dept: FAMILY MEDICINE | Facility: CLINIC | Age: 43
End: 2018-03-05

## 2018-03-06 NOTE — TELEPHONE ENCOUNTER
Brooks-  Have you seen this PA or should Triage route these inquiries to the new centralized Pa team?    Thanks,   Brittany

## 2018-04-13 ENCOUNTER — HOSPITAL ENCOUNTER (EMERGENCY)
Facility: CLINIC | Age: 43
Discharge: HOME | End: 2018-04-13
Attending: EMERGENCY MEDICINE | Admitting: EMERGENCY MEDICINE
Payer: COMMERCIAL

## 2018-04-13 ENCOUNTER — APPOINTMENT (OUTPATIENT)
Dept: GENERAL RADIOLOGY | Facility: CLINIC | Age: 43
End: 2018-04-13
Attending: EMERGENCY MEDICINE
Payer: COMMERCIAL

## 2018-04-13 VITALS
SYSTOLIC BLOOD PRESSURE: 118 MMHG | BODY MASS INDEX: 29.69 KG/M2 | HEART RATE: 85 BPM | TEMPERATURE: 97.9 F | DIASTOLIC BLOOD PRESSURE: 76 MMHG | WEIGHT: 173.9 LBS | HEIGHT: 64 IN | OXYGEN SATURATION: 100 % | RESPIRATION RATE: 18 BRPM

## 2018-04-13 DIAGNOSIS — S13.9XXA NECK SPRAIN, INITIAL ENCOUNTER: ICD-10-CM

## 2018-04-13 PROCEDURE — 25000132 ZZH RX MED GY IP 250 OP 250 PS 637: Performed by: EMERGENCY MEDICINE

## 2018-04-13 PROCEDURE — 99283 EMERGENCY DEPT VISIT LOW MDM: CPT | Mod: Z6 | Performed by: EMERGENCY MEDICINE

## 2018-04-13 PROCEDURE — 99284 EMERGENCY DEPT VISIT MOD MDM: CPT | Mod: 25 | Performed by: EMERGENCY MEDICINE

## 2018-04-13 PROCEDURE — 71046 X-RAY EXAM CHEST 2 VIEWS: CPT

## 2018-04-13 PROCEDURE — 72040 X-RAY EXAM NECK SPINE 2-3 VW: CPT

## 2018-04-13 RX ORDER — IBUPROFEN 600 MG/1
600 TABLET, FILM COATED ORAL ONCE
Status: COMPLETED | OUTPATIENT
Start: 2018-04-13 | End: 2018-04-13

## 2018-04-13 RX ADMIN — IBUPROFEN 600 MG: 600 TABLET ORAL at 18:27

## 2018-04-13 ASSESSMENT — ENCOUNTER SYMPTOMS
CHEST TIGHTNESS: 0
NUMBNESS: 0
NECK PAIN: 1
DYSURIA: 0
ABDOMINAL PAIN: 0
VOMITING: 0
BACK PAIN: 0
NECK STIFFNESS: 0
LIGHT-HEADEDNESS: 0
NAUSEA: 0
SHORTNESS OF BREATH: 0
WEAKNESS: 0
ARTHRALGIAS: 0
HEADACHES: 0
HEMATURIA: 0
DIARRHEA: 0
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
## 2018-04-13 NOTE — ED AVS SNAPSHOT
[facility], Emergency Department    [address] [place] [address]    Phone:  [phone]    Fax:  [phone]                                       Ellyn Mcgee   MRN: [id number]    Department:  [facility], Emergency Department   Date of Visit:  4/13/2018           Patient Information     Date Of Birth          1975        Your diagnoses for this visit were:     Neck sprain, initial encounter        You were seen by Raghav Barnes MD.      Follow-up Information     Follow up with Dorothy Guaman DO In 1 week.    Specialty:  Family Practice    Contact information:    [address]  [facility] [id number] [phone]          Discharge Instructions         Treating Strains and Sprains  Strains and sprains happen when muscles or other soft tissues near your bones stretch or tear. These injuries can cause bruising, swelling, and pain. To ease your discomfort and speed the healing of your strain or sprain, follow the tips below. Remember, a strain or sprain can take 6 to 8 weeks to heal.     Important Note: Do not give aspirin to children or teens without discussing it with your healthcare provider first.        Ice first, heat later    Use ice for the first 24 to 48 hours after injury. Ice helps prevent swelling and reduce pain. Ice the injury for no more than 20 minutes at a time and allow at least  20 minutes between icing sessions.    Apply heat after the first 72 hours, once the swelling has gone down. Heat relaxes muscles and increases blood flow. Soak the injured area in warm water or use a heating pad set on low for no more than 15 minutes at a time.  Wrap and elevate    Wrap an injured limb firmly with an elastic bandage. This provides support and helps prevent swelling. Don t wear an elastic bandage overnight. Watch for tingling, numbness, or increased pain, and remove the bandage immediately if any of these occurs.    Elevate the injured area to help reduce  swelling and throbbing. It s best to raise an injured limb above the level of your heart.     Medicines    Over-the-counter medicines such as acetaminophen or ibuprofen can help reduce pain. Some also help reduce swelling.    Take medicine only as directed.    Rest the area even if medicines are controlling the pain.  Rest    Rest the injured area by not using it for 24 hours.    When you re ready, return slowly to your normal activities. Rest the injured area often.    Don t use or walk on an injured limb if it hurts.  Date Last Reviewed: 9/3/2015    3099-2180 [employer]. [address] [place] [address]. All rights reserved. This information is not intended as a substitute for professional medical care. Always follow your healthcare professional's instructions.          Your next 10 appointments already scheduled     Apr 17, 2018  8:00 AM CDT   [facility] with Dorothy Guaman,    [facility] ([facility])    [address] [place] [address] [phone]              24 Hour Appointment Hotline       To make an appointment at any [facility], call [phone] ([phone]). If you don't have a family doctor or clinic, we will help you find one. [facility] are conveniently located to serve the needs of you and your family.             Review of your medicines      Our records show that you are taking the medicines listed below. If these are incorrect, please call your family doctor or clinic.        Dose / Directions Last dose taken    ALPRAZolam 0.5 MG tablet   Commonly known as:  XANAX   Quantity:  15 tablet        Take 1 tablet by mouth. As needed for anxiety   Refills:  1        * amphetamine-dextroamphetamine 20 MG per 24 hr capsule   Commonly known as:  ADDERALL XR   Dose:  20 mg   Quantity:  30 capsule        Take 1 capsule (20 mg) by mouth daily   Refills:  0        * amphetamine-dextroamphetamine 20 MG per 24 hr  capsule   Commonly known as:  ADDERALL XR   Dose:  20 mg   Quantity:  30 capsule        Take 1 capsule (20 mg) by mouth daily   Refills:  0        * escitalopram 5 MG tablet   Commonly known as:  LEXAPRO   Dose:  5 mg   Quantity:  90 tablet        Take 1 tablet (5 mg) by mouth daily In addition to the 10mg for total dose of 15mg daily   Refills:  1        * escitalopram 20 MG tablet   Commonly known as:  LEXAPRO   Dose:  20 mg   Quantity:  90 tablet        Take 1 tablet (20 mg) by mouth daily   Refills:  1        IBUPROFEN        as needed.   Refills:  0        propranolol 10 MG tablet   Commonly known as:  INDERAL   Quantity:  20 tablet        1-2 tablets every 12 hours as needed for anxiety   Refills:  3        * Notice:  This list has 4 medication(s) that are the same as other medications prescribed for you. Read the directions carefully, and ask your doctor or other care provider to review them with you.            Procedures and tests performed during your visit     Cervical spine XR, 2-3 views    XR Chest 2 Views      Orders Needing Specimen Collection     None      Pending Results     Date and Time Order Name Status Description    4/13/2018 1815 Cervical spine XR, 2-3 views Preliminary     4/13/2018 1815 XR Chest 2 Views Preliminary             Pending Culture Results     No orders found from 4/11/2018 to 4/14/2018.            Pending Results Instructions     If you had any lab results that were not finalized at the time of your Discharge, you can call the ED Lab Result RN at [phone]. You will be contacted by this team for any positive Lab results or changes in treatment. The nurses are available 7 days a week from 10A to 6:30P.  You can leave a message 24 hours per day and they will return your call.        Thank you for choosing [facility]       Thank you for choosing [facility] for your care. Our goal is always to provide you with excellent care. Hearing back from our patients is one way we can continue  to improve our services. Please take a few minutes to complete the written survey that you may receive in the mail after you visit with us. Thank you!        [employer] Information     [employer] gives you secure access to your electronic health record. If you see a primary care provider, you can also send messages to your care team and make appointments. If you have questions, please call your primary care clinic.  If you do not have a primary care provider, please call [phone] and they will assist you.        Care EveryWhere ID     This is your Care EveryWhere ID. This could be used by other organizations to access your [place] medical records  [id number]        Equal Access to Services     LISA PATTERSON : Kavin Murphy, loir lopez, tammy morales, kat dunlap. So [facility] [phone].    ATENCIÓN: Si habla español, tiene a vivas disposición servicios gratuitos de asistencia lingüística. Fred al [phone].    We comply with applicable federal civil rights laws and [place] laws. We do not discriminate on the basis of race, color, national origin, age, disability, sex, sexual orientation, or gender identity.            After Visit Summary       This is your record. Keep this with you and show to your community pharmacist(s) and doctor(s) at your next visit.

## 2018-04-13 NOTE — ED AVS SNAPSHOT
[facility], [place], Emergency Department    [address]    [facility] [address]    Phone:  [phone]    Fax:  [phone]                                       Ellyn Mcgee   MRN: [id number]    Department:  [facility], Emergency Department   Date of Visit:  4/13/2018           After Visit Summary Signature Page     I have received my discharge instructions, and my questions have been answered. I have discussed any challenges I see with this plan with the nurse or doctor.    ..........................................................................................................................................  Patient/Patient Representative Signature      ..........................................................................................................................................  Patient Representative Print Name and Relationship to Patient    ..................................................               ................................................  Date                                            Time    ..........................................................................................................................................  Reviewed by Signature/Title    ...................................................              ..............................................  Date                                                            Time

## 2018-04-14 NOTE — DISCHARGE INSTRUCTIONS
Treating Strains and Sprains  Strains and sprains happen when muscles or other soft tissues near your bones stretch or tear. These injuries can cause bruising, swelling, and pain. To ease your discomfort and speed the healing of your strain or sprain, follow the tips below. Remember, a strain or sprain can take 6 to 8 weeks to heal.     Important Note: Do not give aspirin to children or teens without discussing it with your healthcare provider first.        Ice first, heat later    Use ice for the first 24 to 48 hours after injury. Ice helps prevent swelling and reduce pain. Ice the injury for no more than 20 minutes at a time and allow at least  20 minutes between icing sessions.    Apply heat after the first 72 hours, once the swelling has gone down. Heat relaxes muscles and increases blood flow. Soak the injured area in warm water or use a heating pad set on low for no more than 15 minutes at a time.  Wrap and elevate    Wrap an injured limb firmly with an elastic bandage. This provides support and helps prevent swelling. Don t wear an elastic bandage overnight. Watch for tingling, numbness, or increased pain, and remove the bandage immediately if any of these occurs.    Elevate the injured area to help reduce swelling and throbbing. It s best to raise an injured limb above the level of your heart.     Medicines    Over-the-counter medicines such as acetaminophen or ibuprofen can help reduce pain. Some also help reduce swelling.    Take medicine only as directed.    Rest the area even if medicines are controlling the pain.  Rest    Rest the injured area by not using it for 24 hours.    When you re ready, return slowly to your normal activities. Rest the injured area often.    Don t use or walk on an injured limb if it hurts.  Date Last Reviewed: 9/3/2015    2847-9742 [employer]. [address], [place] [address]. All rights reserved. This information is not intended as a substitute for  professional medical care. Always follow your healthcare professional's instructions.

## 2018-04-17 ENCOUNTER — OFFICE VISIT (OUTPATIENT)
Dept: FAMILY MEDICINE | Facility: CLINIC | Age: 43
End: 2018-04-17
Payer: COMMERCIAL

## 2018-04-17 VITALS
BODY MASS INDEX: 29.74 KG/M2 | HEART RATE: 83 BPM | TEMPERATURE: 97.2 F | HEIGHT: 64 IN | OXYGEN SATURATION: 100 % | SYSTOLIC BLOOD PRESSURE: 102 MMHG | DIASTOLIC BLOOD PRESSURE: 69 MMHG | WEIGHT: 174.2 LBS | RESPIRATION RATE: 16 BRPM

## 2018-04-17 DIAGNOSIS — F90.0 ATTENTION DEFICIT HYPERACTIVITY DISORDER (ADHD), PREDOMINANTLY INATTENTIVE TYPE: ICD-10-CM

## 2018-04-17 DIAGNOSIS — E78.5 HYPERLIPIDEMIA LDL GOAL <130: ICD-10-CM

## 2018-04-17 DIAGNOSIS — E28.2 PCOS (POLYCYSTIC OVARIAN SYNDROME): ICD-10-CM

## 2018-04-17 DIAGNOSIS — Z00.00 ENCOUNTER FOR ROUTINE ADULT HEALTH EXAMINATION WITHOUT ABNORMAL FINDINGS: Primary | ICD-10-CM

## 2018-04-17 DIAGNOSIS — F43.23 ADJUSTMENT DISORDER WITH MIXED ANXIETY AND DEPRESSED MOOD: ICD-10-CM

## 2018-04-17 LAB
ALBUMIN SERPL-MCNC: 4.2 G/DL (ref 3.4–5)
ALP SERPL-CCNC: 58 U/L (ref 40–150)
ALT SERPL W P-5'-P-CCNC: 36 U/L (ref 0–50)
ANION GAP SERPL CALCULATED.3IONS-SCNC: 10 MMOL/L (ref 3–14)
AST SERPL W P-5'-P-CCNC: 20 U/L (ref 0–45)
BILIRUB SERPL-MCNC: 0.3 MG/DL (ref 0.2–1.3)
BUN SERPL-MCNC: 16 MG/DL (ref 7–30)
CALCIUM SERPL-MCNC: 8.9 MG/DL (ref 8.5–10.1)
CHLORIDE SERPL-SCNC: 108 MMOL/L (ref 94–109)
CHOLEST SERPL-MCNC: 283 MG/DL
CO2 SERPL-SCNC: 23 MMOL/L (ref 20–32)
CREAT SERPL-MCNC: 0.63 MG/DL (ref 0.52–1.04)
GFR SERPL CREATININE-BSD FRML MDRD: >90 ML/MIN/1.7M2
GLUCOSE SERPL-MCNC: 94 MG/DL (ref 70–99)
HBA1C MFR BLD: 5.2 % (ref 0–5.6)
HDLC SERPL-MCNC: 56 MG/DL
LDLC SERPL CALC-MCNC: 201 MG/DL
NONHDLC SERPL-MCNC: 227 MG/DL
POTASSIUM SERPL-SCNC: 4.2 MMOL/L (ref 3.4–5.3)
PROT SERPL-MCNC: 7.2 G/DL (ref 6.8–8.8)
SODIUM SERPL-SCNC: 141 MMOL/L (ref 133–144)
TRIGL SERPL-MCNC: 129 MG/DL

## 2018-04-17 PROCEDURE — 83036 HEMOGLOBIN GLYCOSYLATED A1C: CPT | Performed by: FAMILY MEDICINE

## 2018-04-17 PROCEDURE — 80053 COMPREHEN METABOLIC PANEL: CPT | Performed by: FAMILY MEDICINE

## 2018-04-17 PROCEDURE — 80061 LIPID PANEL: CPT | Performed by: FAMILY MEDICINE

## 2018-04-17 PROCEDURE — 36415 COLL VENOUS BLD VENIPUNCTURE: CPT | Performed by: FAMILY MEDICINE

## 2018-04-17 PROCEDURE — 99396 PREV VISIT EST AGE 40-64: CPT | Performed by: FAMILY MEDICINE

## 2018-04-17 RX ORDER — DEXTROAMPHETAMINE SACCHARATE, AMPHETAMINE ASPARTATE MONOHYDRATE, DEXTROAMPHETAMINE SULFATE AND AMPHETAMINE SULFATE 5; 5; 5; 5 MG/1; MG/1; MG/1; MG/1
20 CAPSULE, EXTENDED RELEASE ORAL DAILY
Qty: 30 CAPSULE | Refills: 0 | Status: SHIPPED | OUTPATIENT
Start: 2018-05-17 | End: 2018-04-17

## 2018-04-17 RX ORDER — DEXTROAMPHETAMINE SACCHARATE, AMPHETAMINE ASPARTATE MONOHYDRATE, DEXTROAMPHETAMINE SULFATE AND AMPHETAMINE SULFATE 5; 5; 5; 5 MG/1; MG/1; MG/1; MG/1
20 CAPSULE, EXTENDED RELEASE ORAL DAILY
Qty: 30 CAPSULE | Refills: 0 | Status: SHIPPED | OUTPATIENT
Start: 2018-04-17 | End: 2018-04-17

## 2018-04-17 RX ORDER — DEXTROAMPHETAMINE SACCHARATE, AMPHETAMINE ASPARTATE MONOHYDRATE, DEXTROAMPHETAMINE SULFATE AND AMPHETAMINE SULFATE 5; 5; 5; 5 MG/1; MG/1; MG/1; MG/1
20 CAPSULE, EXTENDED RELEASE ORAL DAILY
Qty: 30 CAPSULE | Refills: 0 | Status: SHIPPED | OUTPATIENT
Start: 2018-06-17 | End: 2018-06-18

## 2018-04-17 ASSESSMENT — ANXIETY QUESTIONNAIRES
1. FEELING NERVOUS, ANXIOUS, OR ON EDGE: NEARLY EVERY DAY
6. BECOMING EASILY ANNOYED OR IRRITABLE: MORE THAN HALF THE DAYS
5. BEING SO RESTLESS THAT IT IS HARD TO SIT STILL: NEARLY EVERY DAY
7. FEELING AFRAID AS IF SOMETHING AWFUL MIGHT HAPPEN: NOT AT ALL
2. NOT BEING ABLE TO STOP OR CONTROL WORRYING: NOT AT ALL
GAD7 TOTAL SCORE: 9
IF YOU CHECKED OFF ANY PROBLEMS ON THIS QUESTIONNAIRE, HOW DIFFICULT HAVE THESE PROBLEMS MADE IT FOR YOU TO DO YOUR WORK, TAKE CARE OF THINGS AT HOME, OR GET ALONG WITH OTHER PEOPLE: NOT DIFFICULT AT ALL
3. WORRYING TOO MUCH ABOUT DIFFERENT THINGS: NOT AT ALL

## 2018-04-17 ASSESSMENT — PATIENT HEALTH QUESTIONNAIRE - PHQ9: 5. POOR APPETITE OR OVEREATING: SEVERAL DAYS

## 2018-04-17 NOTE — NURSING NOTE
"Chief Complaint   Patient presents with     Physical     Initial /69  Pulse 83  Temp 97.2  F (36.2  C) (Tympanic)  Resp 16  Ht 5' 3.78\" (1.62 m)  Wt 174 lb 3.2 oz (79 kg)  LMP  (LMP Unknown)  SpO2 100%  BMI 30.11 kg/m2 Estimated body mass index is 30.11 kg/(m^2) as calculated from the following:    Height as of this encounter: 5' 3.78\" (1.62 m).    Weight as of this encounter: 174 lb 3.2 oz (79 kg).  BP completed using cuff size: regular. L  arm       Health Maintenance that is potentially due pending provider review:   PHQ9    PHQ/ACT/TEE--Gave pt questionnare       Pauly Robledo CMA     "

## 2018-04-17 NOTE — MR AVS SNAPSHOT
After Visit Summary   4/17/2018    Ellyn Mcgee    MRN: [id number]           Patient Information     Date Of Birth          1975        Visit Information        Provider Department      4/17/2018 8:00 AM Dorothy Guaman DO [facility]        Today's Diagnoses     Encounter for routine adult health examination without abnormal findings    -  1    PCOS (polycystic ovarian syndrome)        Hyperlipidemia LDL goal <130        Adjustment disorder with mixed anxiety and depressed mood        Attention deficit hyperactivity disorder (ADHD), predominantly inattentive type          Care Instructions      Preventive Health Recommendations  Female Ages 40 to 49    Yearly exam:     See your health care provider every year in order to  1. Review health changes.   2. Discuss preventive care.    3. Review your medicines if your doctor prescribed any.      Get a Pap test every three years (unless you have an abnormal result and your provider advises testing more often).      If you get Pap tests with HPV test, you only need to test every 5 years, unless you have an abnormal result. You do not need a Pap test if your uterus was removed (hysterectomy) and you have not had cancer.      You should be tested each year for STDs (sexually transmitted diseases), if you're at risk.       Ask your doctor if you should have a mammogram.      Have a colonoscopy (test for colon cancer) if someone in your family has had colon cancer or polyps before age 50.       Have a cholesterol test every 5 years.       Have a diabetes test (fasting glucose) after age 45. If you are at risk for diabetes, you should have this test every 3 years.    Shots: Get a flu shot each year. Get a tetanus shot every 10 years.     Nutrition:     Eat at least 5 servings of fruits and vegetables each day.    Eat whole-grain bread, whole-wheat pasta and brown rice instead of white grains and rice.    Talk to your provider about Calcium and  "Vitamin D.     Lifestyle    Exercise at least 150 minutes a week (an average of 30 minutes a day, 5 days a week). This will help you control your weight and prevent disease.    Limit alcohol to one drink per day.    No smoking.     Wear sunscreen to prevent skin cancer.    See your dentist every six months for an exam and cleaning.          Follow-ups after your visit        Who to contact     If you have questions or need follow up information about today's clinic visit or your schedule please contact [facility] directly at [phone].  Normal or non-critical lab and imaging results will be communicated to you by Caringohart, letter or phone within 4 business days after the clinic has received the results. If you do not hear from us within 7 days, please contact the clinic through Marqui or phone. If you have a critical or abnormal lab result, we will notify you by phone as soon as possible.  Submit refill requests through Marqui or call your pharmacy and they will forward the refill request to us. Please allow 3 business days for your refill to be completed.          Additional Information About Your Visit        CaringoharAzingo Information     Marqui gives you secure access to your electronic health record. If you see a primary care provider, you can also send messages to your care team and make appointments. If you have questions, please call your primary care clinic.  If you do not have a primary care provider, please call [phone] and they will assist you.        Care EveryWhere ID     This is your Care EveryWhere ID. This could be used by other organizations to access your [place] medical records  [id number]        Your Vitals Were     Pulse Temperature Respirations Height Last Period Pulse Oximetry    83 97.2  F (36.2  C) (Tympanic) 16 5' 3.78\" (1.62 m) (LMP Unknown) 100%    BMI (Body Mass Index)                   30.11 kg/m2            Blood Pressure from Last 3 Encounters:   04/17/18 102/69 "   04/13/18 118/76   10/04/17 100/60    Weight from Last 3 Encounters:   04/17/18 174 lb 3.2 oz (79 kg)   04/13/18 173 lb 14.4 oz (78.9 kg)   10/04/17 174 lb (78.9 kg)              We Performed the Following     Comprehensive metabolic panel (BMP + Alb, Alk Phos, ALT, AST, Total. Bili, TP)     Hemoglobin A1c     Lipid panel reflex to direct LDL Fasting          Today's Medication Changes          These changes are accurate as of 4/17/18  5:34 PM.  If you have any questions, ask your nurse or doctor.               Start taking these medicines.        Dose/Directions    amphetamine-dextroamphetamine 20 MG per 24 hr capsule   Commonly known as:  ADDERALL XR   Used for:  Attention deficit hyperactivity disorder (ADHD), predominantly inattentive type   Started by:  Dorothy Guaman DO        Dose:  20 mg   Start taking on:  6/17/2018   Take 1 capsule (20 mg) by mouth daily   Quantity:  30 capsule   Refills:  0            Where to get your medicines      Some of these will need a paper prescription and others can be bought over the counter.  Ask your nurse if you have questions.     Bring a paper prescription for each of these medications     amphetamine-dextroamphetamine 20 MG per 24 hr capsule                Primary Care Provider Office Phone # Fax #    Dorothy Guaman [phone]       [address] [address]        Equal Access to Services     LISA PATTERSON AH: Hadii francy vinson hadasho Soomaali, waaxda luqadaha, qaybta kaalmada adeegyada, kat dunlap. So [facility] [phone].    ATENCIÓN: Si habla español, tiene a vivas disposición servicios gratuitos de asistencia lingüística. Llame al [phone].    We comply with applicable federal civil rights laws and [place] laws. We do not discriminate on the basis of race, color, national origin, age, disability, sex, sexual orientation, or gender identity.            Thank you!     Thank you for choosing [facility]  for your care. Our goal is always to provide you with excellent care. Hearing back from our patients is one way we can continue to improve our services. Please take a few minutes to complete the written survey that you may receive in the mail after your visit with us. Thank you!             Your Updated Medication List - Protect others around you: Learn how to safely use, store and throw away your medicines at www.disposemymeds.org.          This list is accurate as of 4/17/18  5:34 PM.  Always use your most recent med list.                   Brand Name Dispense Instructions for use Diagnosis    ALPRAZolam 0.5 MG tablet    XANAX    15 tablet    Take 1 tablet by mouth. As needed for anxiety    Anxiety state       amphetamine-dextroamphetamine 20 MG per 24 hr capsule   Start taking on:  6/17/2018    ADDERALL XR    30 capsule    Take 1 capsule (20 mg) by mouth daily    Attention deficit hyperactivity disorder (ADHD), predominantly inattentive type       escitalopram 20 MG tablet    LEXAPRO    90 tablet    Take 1 tablet (20 mg) by mouth daily    Adjustment disorder with mixed anxiety and depressed mood       IBUPROFEN      as needed.        propranolol 10 MG tablet    INDERAL    20 tablet    1-2 tablets every 12 hours as needed for anxiety    Anxiety state, Adjustment disorder with mixed anxiety and depressed mood

## 2018-04-17 NOTE — PROGRESS NOTES
SUBJECTIVE:   CC: Ellyn Mcgee is an 42 year old woman who presents for preventive health visit.     Physical   Annual:     Getting at least 3 servings of Calcium per day::  Yes    Bi-annual eye exam::  Yes    Dental care twice a year::  NO    Sleep apnea or symptoms of sleep apnea::  Daytime drowsiness    Diet::  Diabetic    Frequency of exercise::  None    Taking medications regularly::  Yes    Medication side effects::  Other    Additional concerns today::  No                -------------------------------------    Today's PHQ-2 Score:   PHQ-2 ( 1999 [employer]) 4/17/2018   Q1: Little interest or pleasure in doing things 0   Q2: Feeling down, depressed or hopeless 1   PHQ-2 Score 1   Q1: Little interest or pleasure in doing things Not at all   Q2: Feeling down, depressed or hopeless Several days   PHQ-2 Score 1       Abuse: Current or Past(Physical, Sexual or Emotional)- No  Do you feel safe in your environment - Yes    Social History   Substance Use Topics     Smoking status: Never Smoker     Smokeless tobacco: Never Used     Alcohol use 0.5 oz/week     1 Standard drinks or equivalent per week      Comment: social     Alcohol Use 4/17/2018   If you drink alcohol do you typically have greater than 3 drinks per day OR greater than 7 drinks per week? No       Reviewed orders with patient.  Reviewed health maintenance and updated orders accordingly - Yes  Patient Active Problem List   Diagnosis     Hyperlipidemia LDL goal <130     Anxiety state     PCOS (polycystic ovarian syndrome)     Adjustment disorder with mixed anxiety and depressed mood     External hemorrhoids     Attention deficit hyperactivity disorder (ADHD), predominantly inattentive type     Past Surgical History:   Procedure Laterality Date     CONIZATION CERVIX,KNIFE/LASER  1995     SURGICAL HISTORY OF -       [place] teeth       Social History   Substance Use Topics     Smoking status: Never Smoker     Smokeless tobacco: Never Used     Alcohol use  "0.5 oz/week     1 Standard drinks or equivalent per week      Comment: social     Family History   Problem Relation Age of Onset     CANCER Mother      vocal cord cancer     Lipids Mother      CANCER Father      B-cell lymphoma, melanoma     Lipids Father      HEART DISEASE Father      open heart surgery     DIABETES Paternal Grandmother      DIABETES Paternal Uncle      Alcohol/Drug Maternal Grandfather            Patient under age 50, mutual decision reflected in health maintenance.      Pertinent mammograms are reviewed under the imaging tab.  History of abnormal Pap smear: NO - age 30-65 PAP every 5 years with negative HPV co-testing recommended    Reviewed and updated as needed this visit by clinical staff  Tobacco  Allergies  Meds  Problems  Med Hx  Surg Hx  Fam Hx  Soc Hx          Reviewed and updated as needed this visit by Provider  Allergies  Meds  Problems            Review of Systems  C: NEGATIVE for fever, chills, change in weight  I: NEGATIVE for worrisome rashes, moles or lesions  E: NEGATIVE for vision changes or irritation  ENT: NEGATIVE for ear, mouth and throat problems  R: NEGATIVE for significant cough or SOB  B: NEGATIVE for masses, tenderness or discharge  CV: NEGATIVE for chest pain, palpitations or peripheral edema  GI: NEGATIVE for nausea, abdominal pain, heartburn, or change in bowel habits  : NEGATIVE for unusual urinary or vaginal symptoms. Periods are regular.  M: NEGATIVE for significant arthralgias or myalgia  N: NEGATIVE for weakness, dizziness or paresthesias  P: NEGATIVE for changes in mood or affect     OBJECTIVE:   /69  Pulse 83  Temp 97.2  F (36.2  C) (Tympanic)  Resp 16  Ht 5' 3.78\" (1.62 m)  Wt 174 lb 3.2 oz (79 kg)  LMP  (LMP Unknown)  SpO2 100%  BMI 30.11 kg/m2  Physical Exam  GENERAL:, alert and no distress  EYES: Eyes grossly normal to inspection, PERRL and conjunctivae and sclerae normal  HENT: ear canals and TM's normal, nose and mouth without " "ulcers or lesions  NECK: no adenopathy, no asymmetry, masses, or scars and thyroid normal to palpation  RESP: lungs clear to auscultation - no rales, rhonchi or wheezes  BREAST: normal without masses, tenderness or nipple discharge and no palpable axillary masses or adenopathy  CV: regular rate and rhythm, normal S1 S2, no S3 or S4, no murmur, click or rub, no peripheral edema and peripheral pulses strong  ABDOMEN: soft, nontender, no hepatosplenomegaly, no masses and bowel sounds normal  MS: no gross musculoskeletal defects noted, no edema  SKIN: no suspicious lesions or rashes  NEURO: Normal strength and tone, mentation intact and speech normal  PSYCH: mentation appears normal, affect normal/bright    ASSESSMENT/PLAN:   1. Encounter for routine adult health examination without abnormal findings  Routine screening    2. PCOS (polycystic ovarian syndrome)     - Comprehensive metabolic panel (BMP + Alb, Alk Phos, ALT, AST, Total. Bili, TP)  - Hemoglobin A1c    3. Hyperlipidemia LDL goal <130     - Lipid panel reflex to direct LDL Fasting  - Comprehensive metabolic panel (BMP + Alb, Alk Phos, ALT, AST, Total. Bili, TP)    4. Adjustment disorder with mixed anxiety and depressed mood   taking lexapro - currently on 20mg daily    5. Attention deficit hyperactivity disorder (ADHD), predominantly inattentive type     - Comprehensive metabolic panel (BMP + Alb, Alk Phos, ALT, AST, Total. Bili, TP)  - amphetamine-dextroamphetamine (ADDERALL XR) 20 MG per 24 hr capsule; Take 1 capsule (20 mg) by mouth daily  Dispense: 30 capsule; Refill: 0    COUNSELING:  Reviewed preventive health counseling, as reflected in patient instructions         reports that she has never smoked. She has never used smokeless tobacco.    Estimated body mass index is 30.11 kg/(m^2) as calculated from the following:    Height as of this encounter: 5' 3.78\" (1.62 m).    Weight as of this encounter: 174 lb 3.2 oz (79 kg).   Weight management plan: " Discussed healthy diet and exercise guidelines and patient will follow up in 12 months in clinic to re-evaluate.    Counseling Resources:  ATP IV Guidelines  Pooled Cohorts Equation Calculator  Breast Cancer Risk Calculator  FRAX Risk Assessment  ICSI Preventive Guidelines  Dietary Guidelines for Americans, 2010  USDA's MyPlate  ASA Prophylaxis  Lung CA Screening    Dorothy Guaman DO  [facility] CLINICAnswers for HPI/ROS submitted by the patient on 4/17/2018   PHQ-2 Score: 1

## 2018-04-18 ENCOUNTER — MYC MEDICAL ADVICE (OUTPATIENT)
Dept: FAMILY MEDICINE | Facility: CLINIC | Age: 43
End: 2018-04-18

## 2018-04-18 DIAGNOSIS — E28.2 PCOS (POLYCYSTIC OVARIAN SYNDROME): Primary | ICD-10-CM

## 2018-04-18 ASSESSMENT — ANXIETY QUESTIONNAIRES: GAD7 TOTAL SCORE: 9

## 2018-04-18 ASSESSMENT — PATIENT HEALTH QUESTIONNAIRE - PHQ9: SUM OF ALL RESPONSES TO PHQ QUESTIONS 1-9: 5

## 2018-04-18 NOTE — PROGRESS NOTES
Dear Ellyn,   Your test results are all back -   -Liver and gallbladder tests are normal. (ALT,AST, Alk phos, bilirubin), kidney function is normal (Cr, GFR), Sodium is normal, Potassium is normal, Calcium is normal, Glucose is normal (diabetes screening test).   The cholesterol is up quite a bit.  Lets have you work on diet and exercise and plan to recheck in one year.  Let us know if you have any questions.  -Dorothy Guaman, DO

## 2018-04-18 NOTE — TELEPHONE ENCOUNTER
PN  Please see [employer] message below.  Gave patient Consumer Price Line number to call and check on how much Magnesium test would cost.  Thanks, Stephanie Winter RN

## 2018-04-23 ENCOUNTER — MYC MEDICAL ADVICE (OUTPATIENT)
Dept: FAMILY MEDICINE | Facility: CLINIC | Age: 43
End: 2018-04-23

## 2018-04-23 DIAGNOSIS — F43.23 ADJUSTMENT DISORDER WITH MIXED ANXIETY AND DEPRESSED MOOD: ICD-10-CM

## 2018-04-23 NOTE — TELEPHONE ENCOUNTER
PN,  Please see below MyChart message and advise  Don't see documentation of this in recent OV notes  Pended 15mg dose Rx   Please authorize if appropriate.  Thanks,  Keeley BROWNING RN

## 2018-04-24 ENCOUNTER — MYC MEDICAL ADVICE (OUTPATIENT)
Dept: FAMILY MEDICINE | Facility: CLINIC | Age: 43
End: 2018-04-24

## 2018-04-24 RX ORDER — ESCITALOPRAM OXALATE 5 MG/1
15 TABLET ORAL DAILY
Qty: 270 TABLET | Refills: 1 | Status: SHIPPED | OUTPATIENT
Start: 2018-04-24 | End: 2018-05-30

## 2018-04-25 ENCOUNTER — MYC MEDICAL ADVICE (OUTPATIENT)
Dept: FAMILY MEDICINE | Facility: CLINIC | Age: 43
End: 2018-04-25

## 2018-05-30 ENCOUNTER — MYC MEDICAL ADVICE (OUTPATIENT)
Dept: FAMILY MEDICINE | Facility: CLINIC | Age: 43
End: 2018-05-30

## 2018-05-30 DIAGNOSIS — F43.23 ADJUSTMENT DISORDER WITH MIXED ANXIETY AND DEPRESSED MOOD: ICD-10-CM

## 2018-05-30 RX ORDER — ESCITALOPRAM OXALATE 20 MG/1
20 TABLET ORAL DAILY
Qty: 90 TABLET | Refills: 1 | Status: SHIPPED | OUTPATIENT
Start: 2018-05-30 | End: 2018-09-05

## 2018-05-30 NOTE — TELEPHONE ENCOUNTER
PN,  Please see below and advise.  New order and pharm pended.  Please review/auth if appropriate.  Thanks,  Diane Ortega RN

## 2018-06-18 ENCOUNTER — MYC MEDICAL ADVICE (OUTPATIENT)
Dept: FAMILY MEDICINE | Facility: CLINIC | Age: 43
End: 2018-06-18

## 2018-06-18 DIAGNOSIS — F90.0 ATTENTION DEFICIT HYPERACTIVITY DISORDER (ADHD), PREDOMINANTLY INATTENTIVE TYPE: ICD-10-CM

## 2018-06-19 RX ORDER — DEXTROAMPHETAMINE SACCHARATE, AMPHETAMINE ASPARTATE MONOHYDRATE, DEXTROAMPHETAMINE SULFATE AND AMPHETAMINE SULFATE 5; 5; 5; 5 MG/1; MG/1; MG/1; MG/1
20 CAPSULE, EXTENDED RELEASE ORAL DAILY
Qty: 30 CAPSULE | Refills: 0 | Status: SHIPPED | OUTPATIENT
Start: 2018-08-17 | End: 2018-10-10

## 2018-06-19 RX ORDER — DEXTROAMPHETAMINE SACCHARATE, AMPHETAMINE ASPARTATE MONOHYDRATE, DEXTROAMPHETAMINE SULFATE AND AMPHETAMINE SULFATE 5; 5; 5; 5 MG/1; MG/1; MG/1; MG/1
20 CAPSULE, EXTENDED RELEASE ORAL DAILY
Qty: 30 CAPSULE | Refills: 0 | Status: SHIPPED | OUTPATIENT
Start: 2018-07-17 | End: 2018-10-10

## 2018-06-19 RX ORDER — DEXTROAMPHETAMINE SACCHARATE, AMPHETAMINE ASPARTATE MONOHYDRATE, DEXTROAMPHETAMINE SULFATE AND AMPHETAMINE SULFATE 5; 5; 5; 5 MG/1; MG/1; MG/1; MG/1
20 CAPSULE, EXTENDED RELEASE ORAL DAILY
Qty: 30 CAPSULE | Refills: 0 | Status: SHIPPED | OUTPATIENT
Start: 2018-09-17 | End: 2018-10-10

## 2018-06-19 NOTE — TELEPHONE ENCOUNTER
PN,     Controlled Substance Refill Request for Adderall    Last refill: 6/17/2018 #30     Last clinic visit: 4/17/2018     Clinic visit frequency required: Q 6  months  Next appt: none    Controlled substance agreement on file: Yes:  Date 10/4/2017.    Documentation in problem list reviewed:  Yes    Processing:  Patient will  in clinic    [place] reviewed:  reviewed- no concerns  [place]  [email or web address]    Please authorize if appropriate.  Thanks,  Keeley BROWNING RN

## 2018-08-10 ENCOUNTER — MYC MEDICAL ADVICE (OUTPATIENT)
Dept: FAMILY MEDICINE | Facility: CLINIC | Age: 43
End: 2018-08-10

## 2018-08-13 NOTE — TELEPHONE ENCOUNTER
PN  Please see [email or web address] message below.  Do you want to see patient or do a telephone visit to discuss Lexapro issue?  Stephanie Winter RN

## 2018-09-04 ENCOUNTER — MYC MEDICAL ADVICE (OUTPATIENT)
Dept: FAMILY MEDICINE | Facility: CLINIC | Age: 43
End: 2018-09-04

## 2018-09-05 ENCOUNTER — VIRTUAL VISIT (OUTPATIENT)
Dept: FAMILY MEDICINE | Facility: CLINIC | Age: 43
End: 2018-09-05
Payer: COMMERCIAL

## 2018-09-05 DIAGNOSIS — F41.1 ANXIETY STATE: Primary | ICD-10-CM

## 2018-09-05 PROCEDURE — 99442 ZZC PHYSICIAN TELEPHONE EVALUATION 11-20 MIN: CPT | Performed by: FAMILY MEDICINE

## 2018-09-05 RX ORDER — PAROXETINE 10 MG/1
TABLET, FILM COATED ORAL
Qty: 60 TABLET | Refills: 1 | Status: SHIPPED | OUTPATIENT
Start: 2018-09-05 | End: 2018-10-10

## 2018-09-05 ASSESSMENT — ANXIETY QUESTIONNAIRES
3. WORRYING TOO MUCH ABOUT DIFFERENT THINGS: NOT AT ALL
1. FEELING NERVOUS, ANXIOUS, OR ON EDGE: NEARLY EVERY DAY
7. FEELING AFRAID AS IF SOMETHING AWFUL MIGHT HAPPEN: MORE THAN HALF THE DAYS
2. NOT BEING ABLE TO STOP OR CONTROL WORRYING: MORE THAN HALF THE DAYS
IF YOU CHECKED OFF ANY PROBLEMS ON THIS QUESTIONNAIRE, HOW DIFFICULT HAVE THESE PROBLEMS MADE IT FOR YOU TO DO YOUR WORK, TAKE CARE OF THINGS AT HOME, OR GET ALONG WITH OTHER PEOPLE: VERY DIFFICULT
6. BECOMING EASILY ANNOYED OR IRRITABLE: NEARLY EVERY DAY
5. BEING SO RESTLESS THAT IT IS HARD TO SIT STILL: SEVERAL DAYS
GAD7 TOTAL SCORE: 14

## 2018-09-05 ASSESSMENT — PATIENT HEALTH QUESTIONNAIRE - PHQ9: 5. POOR APPETITE OR OVEREATING: NEARLY EVERY DAY

## 2018-09-05 NOTE — MR AVS SNAPSHOT
After Visit Summary   9/5/2018    Ellyn Mcgee    MRN: [id number]           Patient Information     Date Of Birth          1975        Visit Information        Provider Department      9/5/2018 4:00 PM Dorothy Guaman DO [facility]        Today's Diagnoses     Anxiety state    -  1       Follow-ups after your visit        Your next 10 appointments already scheduled     Oct 10, 2018 12:15 PM CDT   Shahram Muro with Dorothy Guaman DO   [facility] ([facility])    [address] [facility] [id number] [phone]              Who to contact     If you have questions or need follow up information about today's clinic visit or your schedule please contact [facility] directly at [phone].  Normal or non-critical lab and imaging results will be communicated to you by MyChart, letter or phone within 4 business days after the clinic has received the results. If you do not hear from us within 7 days, please contact the clinic through [email or web address] or phone. If you have a critical or abnormal lab result, we will notify you by phone as soon as possible.  Submit refill requests through Wevebob or call your pharmacy and they will forward the refill request to us. Please allow 3 business days for your refill to be completed.          Additional Information About Your Visit        MyChart Information     Wevebob gives you secure access to your electronic health record. If you see a primary care provider, you can also send messages to your care team and make appointments. If you have questions, please call your primary care clinic.  If you do not have a primary care provider, please call [phone] and they will assist you.        Care EveryWhere ID     This is your Care EveryWhere ID. This could be used by other organizations to access your [facility] medical records  [id number]         Blood Pressure from Last 3 Encounters:   04/17/18 102/69    04/13/18 118/76   10/04/17 100/60    Weight from Last 3 Encounters:   04/17/18 174 lb 3.2 oz (79 kg)   04/13/18 173 lb 14.4 oz (78.9 kg)   10/04/17 174 lb (78.9 kg)              Today, you had the following     No orders found for display         Today's Medication Changes          These changes are accurate as of 9/5/18  4:34 PM.  If you have any questions, ask your nurse or doctor.               Start taking these medicines.        Dose/Directions    PARoxetine 10 MG tablet   Commonly known as:  PAXIL   Used for:  Anxiety state   Started by:  Dorothy Guaman DO        Start one tablet daily for 1-2 weeks then increase to 2 tablets daily   Quantity:  60 tablet   Refills:  1            Where to get your medicines      These medications were sent to [facility] [address] [place] [address] [address] AT [place] [address] [address], [facility] [address]     Phone:  [phone]     PARoxetine 10 MG tablet                Primary Care Provider Office Phone # Fax #    Dorothy Guaman [phone] [address] [address]        Equal Access to Services     LISA PATTERSON AH: Hadii francy ku hadasho Soomaali, waaxda luqadaha, qaybta kaalmada adeegyada, waxay idiin haygrace dunlap. So [facility] [phone].    ATENCIÓN: Si habla español, tiene a vivas disposición servicios gratuitos de asistencia lingüística. Llame al [phone].    We comply with applicable federal civil rights laws and [place] laws. We do not discriminate on the basis of race, color, national origin, age, disability, sex, sexual orientation, or gender identity.            Thank you!     Thank you for choosing [facility]  for your care. Our goal is always to provide you with excellent care. Hearing back from our patients is one way we can continue to improve our services. Please take a few minutes to complete the written survey that you may receive in the mail  after your visit with us. Thank you!             Your Updated Medication List - Protect others around you: Learn how to safely use, store and throw away your medicines at www.disposemymeds.org.          This list is accurate as of 9/5/18  4:34 PM.  Always use your most recent med list.                   Brand Name Dispense Instructions for use Diagnosis    ALPRAZolam 0.5 MG tablet    XANAX    15 tablet    Take 1 tablet by mouth. As needed for anxiety    Anxiety state       * amphetamine-dextroamphetamine 20 MG per 24 hr capsule    ADDERALL XR    30 capsule    Take 1 capsule (20 mg) by mouth daily    Attention deficit hyperactivity disorder (ADHD), predominantly inattentive type       * amphetamine-dextroamphetamine 20 MG per 24 hr capsule    ADDERALL XR    30 capsule    Take 1 capsule (20 mg) by mouth daily    Attention deficit hyperactivity disorder (ADHD), predominantly inattentive type       * amphetamine-dextroamphetamine 20 MG per 24 hr capsule   Start taking on:  9/17/2018    ADDERALL XR    30 capsule    Take 1 capsule (20 mg) by mouth daily    Attention deficit hyperactivity disorder (ADHD), predominantly inattentive type       IBUPROFEN      as needed.        PARoxetine 10 MG tablet    PAXIL    60 tablet    Start one tablet daily for 1-2 weeks then increase to 2 tablets daily    Anxiety state       propranolol 10 MG tablet    INDERAL    20 tablet    1-2 tablets every 12 hours as needed for anxiety    Anxiety state, Adjustment disorder with mixed anxiety and depressed mood       * Notice:  This list has 3 medication(s) that are the same as other medications prescribed for you. Read the directions carefully, and ask your doctor or other care provider to review them with you.

## 2018-09-05 NOTE — PROGRESS NOTES
"Ellyn Mcgee is a 43 year old female who is being evaluated via a telephone visit.      The patient has been notified of following:     \"This telephone visit will be conducted via a call between you and your physician/provider. We have found that certain health care needs can be provided without the need for a physical exam.  This service lets us provide the care you need with a short phone conversation.  If a prescription is necessary we can send it directly to your pharmacy.  If lab work is needed we can place an order for that and you can then stop by our lab to have the test done at a later time.    We will bill your insurance company for this service.  Please check with your medical insurance if this type of visit is covered. You may be responsible for the cost of this type of visit if insurance coverage is denied.  The typical cost is $30 (10min), $59 (11-20min) and $85 (21-30min).  Most often these visits are shorter than 10 minutes.    If during the course of the call the physician/provider feels a telephone visit is not appropriate, you will not be charged for this service.\"       Consent has been obtained for this service by care team member: yes.   See the scanned image in the medical record.    Ellyn Mcgee complains of No Follow-up on file. up anxiety  I have reviewed and updated the patient's Past Medical History, Social History, Family History and Medication List.    ALLERGIES  Codeine; Cyclogyl [cyclopentolate hcl]; Hydrocodone; and Seasonal allergies    FAN Lomas CMA   (MA signature)    Additional provider notes:      Over the summer - got behind on activities   By July was feeling so overwhelmed   Sleeping now but wasn't before    Taking the lexapro -   Had some left over 5mg tablets and was taking with the 20mg tablets  Worried because the dose was higher than the FDA recommended dose  Is taking the adderall currently - not sure that it is helping    Assessment/Plan:  (F41.1) Anxiety state  " (primary encounter diagnosis)  Comment: plan to wean off the lexapro and try paxil - will titrate dose up over the next few weeks and see back in 4-6 weeks  Has appt already scheduled  Plan: PARoxetine (PAXIL) 10 MG tablet        Pt will call or RTC if symptoms worsen or do not improve.         I have reviewed the note as documented above.  This accurately captures the substance of my conversation with the patient,  Ellyn Mcgee     Total time of call between patient and provider was 19 minutes

## 2018-09-06 ASSESSMENT — ANXIETY QUESTIONNAIRES: GAD7 TOTAL SCORE: 14

## 2018-09-19 ENCOUNTER — MYC MEDICAL ADVICE (OUTPATIENT)
Dept: FAMILY MEDICINE | Facility: CLINIC | Age: 43
End: 2018-09-19

## 2018-09-27 ENCOUNTER — MYC MEDICAL ADVICE (OUTPATIENT)
Dept: FAMILY MEDICINE | Facility: CLINIC | Age: 43
End: 2018-09-27

## 2018-09-27 DIAGNOSIS — F41.1 ANXIETY STATE: ICD-10-CM

## 2018-10-02 RX ORDER — PAROXETINE 10 MG/1
20 TABLET, FILM COATED ORAL EVERY MORNING
Qty: 60 TABLET | Refills: 0 | Status: CANCELLED | OUTPATIENT
Start: 2018-10-02

## 2018-10-10 ENCOUNTER — OFFICE VISIT (OUTPATIENT)
Dept: FAMILY MEDICINE | Facility: CLINIC | Age: 43
End: 2018-10-10
Payer: COMMERCIAL

## 2018-10-10 VITALS
DIASTOLIC BLOOD PRESSURE: 79 MMHG | HEIGHT: 64 IN | SYSTOLIC BLOOD PRESSURE: 119 MMHG | BODY MASS INDEX: 28.34 KG/M2 | OXYGEN SATURATION: 99 % | HEART RATE: 72 BPM | WEIGHT: 166 LBS

## 2018-10-10 DIAGNOSIS — F41.1 GAD (GENERALIZED ANXIETY DISORDER): ICD-10-CM

## 2018-10-10 DIAGNOSIS — F33.41 RECURRENT MAJOR DEPRESSIVE DISORDER, IN PARTIAL REMISSION (H): Primary | ICD-10-CM

## 2018-10-10 DIAGNOSIS — F90.0 ATTENTION DEFICIT HYPERACTIVITY DISORDER (ADHD), PREDOMINANTLY INATTENTIVE TYPE: ICD-10-CM

## 2018-10-10 PROCEDURE — 99213 OFFICE O/P EST LOW 20 MIN: CPT | Performed by: FAMILY MEDICINE

## 2018-10-10 RX ORDER — DEXTROAMPHETAMINE SACCHARATE, AMPHETAMINE ASPARTATE MONOHYDRATE, DEXTROAMPHETAMINE SULFATE AND AMPHETAMINE SULFATE 5; 5; 5; 5 MG/1; MG/1; MG/1; MG/1
20 CAPSULE, EXTENDED RELEASE ORAL DAILY
Qty: 30 CAPSULE | Refills: 0 | Status: SHIPPED | OUTPATIENT
Start: 2018-12-15 | End: 2019-01-08

## 2018-10-10 RX ORDER — DEXTROAMPHETAMINE SACCHARATE, AMPHETAMINE ASPARTATE MONOHYDRATE, DEXTROAMPHETAMINE SULFATE AND AMPHETAMINE SULFATE 5; 5; 5; 5 MG/1; MG/1; MG/1; MG/1
20 CAPSULE, EXTENDED RELEASE ORAL DAILY
Qty: 30 CAPSULE | Refills: 0 | Status: SHIPPED | OUTPATIENT
Start: 2018-11-15 | End: 2018-10-10

## 2018-10-10 RX ORDER — DEXTROAMPHETAMINE SACCHARATE, AMPHETAMINE ASPARTATE MONOHYDRATE, DEXTROAMPHETAMINE SULFATE AND AMPHETAMINE SULFATE 5; 5; 5; 5 MG/1; MG/1; MG/1; MG/1
20 CAPSULE, EXTENDED RELEASE ORAL DAILY
Qty: 30 CAPSULE | Refills: 0 | Status: SHIPPED | OUTPATIENT
Start: 2018-10-15 | End: 2018-10-10

## 2018-10-10 RX ORDER — PAROXETINE 20 MG/1
20 TABLET, FILM COATED ORAL EVERY MORNING
Qty: 90 TABLET | Refills: 1 | Status: SHIPPED | OUTPATIENT
Start: 2018-10-10 | End: 2019-01-16 | Stop reason: DRUGHIGH

## 2018-10-10 ASSESSMENT — ANXIETY QUESTIONNAIRES
5. BEING SO RESTLESS THAT IT IS HARD TO SIT STILL: NOT AT ALL
3. WORRYING TOO MUCH ABOUT DIFFERENT THINGS: NOT AT ALL
2. NOT BEING ABLE TO STOP OR CONTROL WORRYING: NOT AT ALL
6. BECOMING EASILY ANNOYED OR IRRITABLE: SEVERAL DAYS
1. FEELING NERVOUS, ANXIOUS, OR ON EDGE: SEVERAL DAYS
GAD7 TOTAL SCORE: 3
7. FEELING AFRAID AS IF SOMETHING AWFUL MIGHT HAPPEN: NOT AT ALL
IF YOU CHECKED OFF ANY PROBLEMS ON THIS QUESTIONNAIRE, HOW DIFFICULT HAVE THESE PROBLEMS MADE IT FOR YOU TO DO YOUR WORK, TAKE CARE OF THINGS AT HOME, OR GET ALONG WITH OTHER PEOPLE: SOMEWHAT DIFFICULT

## 2018-10-10 ASSESSMENT — PATIENT HEALTH QUESTIONNAIRE - PHQ9: 5. POOR APPETITE OR OVEREATING: SEVERAL DAYS

## 2018-10-10 NOTE — NURSING NOTE
"Chief Complaint   Patient presents with     Recheck Medication       Initial /79  Pulse 72  Ht 5' 3.78\" (1.62 m)  Wt 166 lb (75.3 kg)  SpO2 99%  Breastfeeding? No  BMI 28.69 kg/m2 Estimated body mass index is 28.69 kg/(m^2) as calculated from the following:    Height as of this encounter: 5' 3.78\" (1.62 m).    Weight as of this encounter: 166 lb (75.3 kg).  BP completed using cuff size: large    Health Maintenance Due   Topic Date Due     DEPRESSION ACTION PLAN Q1 YR  06/17/1992     INFLUENZA VACCINE (1) 09/01/2018     PHQ-9 Q6 MONTHS  10/17/2018         Mari Diaz MA 10/10/18        "

## 2018-10-10 NOTE — PROGRESS NOTES
"  SUBJECTIVE:   Ellyn Mcgee is a 43 year old female who presents to clinic today for the following health issues:      Medication Followup of Adderall    Taking Medication as prescribed: yes    Side Effects:  None    Medication Helping Symptoms:  yes    General anxiety and depression  Over phone visit last month she was switched from lexapro to paxil   Started paroxetine and titrated dose up to 20mg   Seemed like lexapro had stopped working -   No side effects  Feels so much better.  No weight gain.    adderall - taking 20mg daily  Doesn't notice it \"kick in\" like when down on the lexapro (prior to going up on paxil)  Feels like medication is very helpful  Helps mood and feel less depressed when taking      -------------------------------------    Problem list and histories reviewed & adjusted, as indicated.  Additional history: as documented    Patient Active Problem List   Diagnosis     Hyperlipidemia LDL goal <130     Anxiety state     PCOS (polycystic ovarian syndrome)     Adjustment disorder with mixed anxiety and depressed mood     External hemorrhoids     Attention deficit hyperactivity disorder (ADHD), predominantly inattentive type     TEE (generalized anxiety disorder)     Past Surgical History:   Procedure Laterality Date     CONIZATION CERVIX,KNIFE/LASER  1995     SURGICAL HISTORY OF -       [place] teeth       Social History   Substance Use Topics     Smoking status: Never Smoker     Smokeless tobacco: Never Used     Alcohol use 0.5 oz/week     1 Standard drinks or equivalent per week      Comment: social     Family History   Problem Relation Age of Onset     Cancer Mother      vocal cord cancer     Lipids Mother      Cancer Father      B-cell lymphoma, melanoma     Lipids Father      HEART DISEASE Father      open heart surgery     Diabetes Paternal Grandmother      Diabetes Paternal Uncle      Alcohol/Drug Maternal Grandfather            Reviewed and updated as needed this visit by clinical " "staff  Tobacco  Allergies  Meds  Problems  Med Hx  Surg Hx  Fam Hx  Soc Hx        Reviewed and updated as needed this visit by Provider  Allergies  Meds  Problems         ROS:  Constitutional, HEENT, cardiovascular, pulmonary, gi and gu systems are negative, except as otherwise noted.    OBJECTIVE:     /79  Pulse 72  Ht 5' 3.78\" (1.62 m)  Wt 166 lb (75.3 kg)  SpO2 99%  Breastfeeding? No  BMI 28.69 kg/m2  Body mass index is 28.69 kg/(m^2).  GENERAL: healthy, alert and no distress  CV: regular rate and rhythm, normal S1 S2, no S3 or S4, no murmur, click or rub, no peripheral edema and peripheral pulses strong  PSYCH: mentation appears normal, affect normal/bright    Diagnostic Test Results:  none     ASSESSMENT/PLAN:       1. Attention deficit hyperactivity disorder (ADHD), predominantly inattentive type  Printed 3 months of medication and given to patient today  Will have her call in 3 months for 3 additional scripts and needs a visit in 6 months  - amphetamine-dextroamphetamine (ADDERALL XR) 20 MG per 24 hr capsule; Take 1 capsule (20 mg) by mouth daily  Dispense: 30 capsule; Refill: 0    2. Recurrent major depressive disorder, in partial remission (H)  improved    3. TEE (generalized anxiety disorder)  Improved on paxil -   Refilled the 20m tablets for 6 months  - PARoxetine (PAXIL) 20 MG tablet; Take 1 tablet (20 mg) by mouth every morning  Dispense: 90 tablet; Refill: 1    Pt will call or RTC if symptoms worsen or do not improve.      Dorothy Guaman, DO  [facility]  "

## 2018-10-10 NOTE — MR AVS SNAPSHOT
"              After Visit Summary   10/10/2018    Ellyn Mcgee    MRN: [id number]           Patient Information     Date Of Birth          1975        Visit Information        Provider Department      10/10/2018 12:15 PM Dorothy Guaman DO [facility] [place] [facility]        Today's Diagnoses     Recurrent major depressive disorder, in partial remission (H)    -  1    Attention deficit hyperactivity disorder (ADHD), predominantly inattentive type        TEE (generalized anxiety disorder)           Follow-ups after your visit        Who to contact     If you have questions or need follow up information about today's clinic visit or your schedule please contact [facility] directly at [phone].  Normal or non-critical lab and imaging results will be communicated to you by Z[employer] Bancorporationhart, letter or phone within 4 business days after the clinic has received the results. If you do not hear from us within 7 days, please contact the clinic through [employer]hart or phone. If you have a critical or abnormal lab result, we will notify you by phone as soon as possible.  Submit refill requests through Gigalo or call your pharmacy and they will forward the refill request to us. Please allow 3 business days for your refill to be completed.          Additional Information About Your Visit        MyChart Information     Gigalo gives you secure access to your electronic health record. If you see a primary care provider, you can also send messages to your care team and make appointments. If you have questions, please call your primary care clinic.  If you do not have a primary care provider, please call [phone] and they will assist you.        Care EveryWhere ID     This is your Care EveryWhere ID. This could be used by other organizations to access your [facility] medical records  [id number]        Your Vitals Were     Pulse Height Pulse Oximetry Breastfeeding? BMI (Body Mass Index)       72 5' 3.78\" (1.62 m) 99% No 28.69 " kg/m2        Blood Pressure from Last 3 Encounters:   10/10/18 119/79   04/17/18 102/69   04/13/18 118/76    Weight from Last 3 Encounters:   10/10/18 166 lb (75.3 kg)   04/17/18 174 lb 3.2 oz (79 kg)   04/13/18 173 lb 14.4 oz (78.9 kg)              Today, you had the following     No orders found for display         Today's Medication Changes          These changes are accurate as of 10/10/18  1:36 PM.  If you have any questions, ask your nurse or doctor.               Start taking these medicines.        Dose/Directions    amphetamine-dextroamphetamine 20 MG per 24 hr capsule   Commonly known as:  ADDERALL XR   Used for:  Attention deficit hyperactivity disorder (ADHD), predominantly inattentive type   Started by:  Dorothy Guaman DO        Dose:  20 mg   Start taking on:  12/15/2018   Take 1 capsule (20 mg) by mouth daily   Quantity:  30 capsule   Refills:  0         These medicines have changed or have updated prescriptions.        Dose/Directions    PARoxetine 20 MG tablet   Commonly known as:  PAXIL   This may have changed:    - medication strength  - how much to take  - how to take this  - when to take this  - additional instructions   Used for:  TEE (generalized anxiety disorder)   Changed by:  Dorothy Guaman DO        Dose:  20 mg   Take 1 tablet (20 mg) by mouth every morning   Quantity:  90 tablet   Refills:  1            Where to get your medicines      These medications were sent to [facility] [place] [address] [facility] [address]     Phone:  [phone]     PARoxetine 20 MG tablet         Some of these will need a paper prescription and others can be bought over the counter.  Ask your nurse if you have questions.     Bring a paper prescription for each of these medications     amphetamine-dextroamphetamine 20 MG per 24 hr capsule                Primary Care Provider Office Phone # Fax #    Dorothy Guaman -[phone]        [address] [facility]  [address]  [facility] [address]        Equal Access to Services     DONNYISREAL YESENIA : Hadii francy ku hadrowano Soalyssaali, waaxda luqadaha, qaybta kaalmada tamikoangelessanaz, kat hernandez francoismike saeedtalishajorge dunlap. So [facility] [phone].    ATENCIÓN: Si habla español, tiene a vivas disposición servicios gratuitos de asistencia lingüística. Llame al [phone].    We comply with applicable federal civil rights laws and [place] laws. We do not discriminate on the basis of race, color, national origin, age, disability, sex, sexual orientation, or gender identity.            Thank you!     Thank you for choosing [facility]  for your care. Our goal is always to provide you with excellent care. Hearing back from our patients is one way we can continue to improve our services. Please take a few minutes to complete the written survey that you may receive in the mail after your visit with us. Thank you!             Your Updated Medication List - Protect others around you: Learn how to safely use, store and throw away your medicines at www.disposemymeds.org.          This list is accurate as of 10/10/18  1:36 PM.  Always use your most recent med list.                   Brand Name Dispense Instructions for use Diagnosis    ALPRAZolam 0.5 MG tablet    XANAX    15 tablet    Take 1 tablet by mouth. As needed for anxiety    Anxiety state       amphetamine-dextroamphetamine 20 MG per 24 hr capsule   Start taking on:  12/15/2018    ADDERALL XR    30 capsule    Take 1 capsule (20 mg) by mouth daily    Attention deficit hyperactivity disorder (ADHD), predominantly inattentive type       IBUPROFEN      as needed.        PARoxetine 20 MG tablet    PAXIL    90 tablet    Take 1 tablet (20 mg) by mouth every morning    TEE (generalized anxiety disorder)       propranolol 10 MG tablet    INDERAL    20 tablet    1-2 tablets every 12 hours as needed for anxiety    Anxiety state, Adjustment disorder with mixed anxiety and  depressed mood

## 2018-10-11 ASSESSMENT — PATIENT HEALTH QUESTIONNAIRE - PHQ9: SUM OF ALL RESPONSES TO PHQ QUESTIONS 1-9: 6

## 2018-10-11 ASSESSMENT — ANXIETY QUESTIONNAIRES: GAD7 TOTAL SCORE: 3

## 2018-11-26 ENCOUNTER — MYC MEDICAL ADVICE (OUTPATIENT)
Dept: FAMILY MEDICINE | Facility: CLINIC | Age: 43
End: 2018-11-26

## 2018-12-13 ENCOUNTER — MYC REFILL (OUTPATIENT)
Dept: FAMILY MEDICINE | Facility: CLINIC | Age: 43
End: 2018-12-13

## 2018-12-13 DIAGNOSIS — F41.1 ANXIETY STATE: ICD-10-CM

## 2018-12-17 NOTE — TELEPHONE ENCOUNTER
PN,     Controlled Substance Refill Request for Xanax    Last refill: 1/5/2018 #15    Last clinic visit: 10/10/2018     Clinic visit frequency required: not documented  Next appt: none    Controlled substance agreement on file: Yes:  Date 10/4/2017.    Documentation in problem list reviewed:  started, needs to be completed    Processing:  Fax Rx to [facility] pharmacy    [place] reviewed:  reviewed- no concerns  [place]  [email or web address]    Please authorize if appropriate.  Thanks,  Keeley BROWNING RN

## 2018-12-18 RX ORDER — ALPRAZOLAM 0.5 MG
TABLET ORAL
Qty: 15 TABLET | Refills: 1 | Status: SHIPPED | OUTPATIENT
Start: 2018-12-18 | End: 2020-05-15

## 2019-01-08 ENCOUNTER — MYC MEDICAL ADVICE (OUTPATIENT)
Dept: FAMILY MEDICINE | Facility: CLINIC | Age: 44
End: 2019-01-08

## 2019-01-08 DIAGNOSIS — F90.0 ATTENTION DEFICIT HYPERACTIVITY DISORDER (ADHD), PREDOMINANTLY INATTENTIVE TYPE: ICD-10-CM

## 2019-01-08 RX ORDER — DEXTROAMPHETAMINE SACCHARATE, AMPHETAMINE ASPARTATE MONOHYDRATE, DEXTROAMPHETAMINE SULFATE AND AMPHETAMINE SULFATE 5; 5; 5; 5 MG/1; MG/1; MG/1; MG/1
20 CAPSULE, EXTENDED RELEASE ORAL DAILY
Qty: 90 CAPSULE | Refills: 0 | Status: SHIPPED | OUTPATIENT
Start: 2019-01-11 | End: 2019-01-11

## 2019-01-08 NOTE — TELEPHONE ENCOUNTER
PN,   See below   Pt wants 90 day to mail order  Just recently did this for  too    Controlled Substance Refill Request for Adderall     Last refill: 12/21/2018 #30     Last clinic visit: 10/10/2018     Clinic visit frequency required: Q 6  months  Next appt: none    Controlled substance agreement on file: Yes:  Date 10/4/2017.    Documentation in problem list reviewed:  Yes    Processing:  Mail to [facility] pharmacy    [place] reviewed:  reviewed- no concerns  [place]  [email or web address]    Please authorize if appropriate.  Thanks,  Keeley BROWNING RN

## 2019-01-16 ENCOUNTER — VIRTUAL VISIT (OUTPATIENT)
Dept: FAMILY MEDICINE | Facility: CLINIC | Age: 44
End: 2019-01-16
Payer: COMMERCIAL

## 2019-01-16 DIAGNOSIS — F41.1 ANXIETY STATE: Primary | ICD-10-CM

## 2019-01-16 PROCEDURE — 99442 ZZC PHYSICIAN TELEPHONE EVALUATION 11-20 MIN: CPT | Performed by: FAMILY MEDICINE

## 2019-01-16 RX ORDER — PAROXETINE HYDROCHLORIDE HEMIHYDRATE 25 MG/1
25 TABLET, FILM COATED, EXTENDED RELEASE ORAL EVERY MORNING
Qty: 30 TABLET | Refills: 1 | Status: SHIPPED | OUTPATIENT
Start: 2019-01-16 | End: 2019-01-30

## 2019-01-16 ASSESSMENT — ANXIETY QUESTIONNAIRES
6. BECOMING EASILY ANNOYED OR IRRITABLE: NEARLY EVERY DAY
IF YOU CHECKED OFF ANY PROBLEMS ON THIS QUESTIONNAIRE, HOW DIFFICULT HAVE THESE PROBLEMS MADE IT FOR YOU TO DO YOUR WORK, TAKE CARE OF THINGS AT HOME, OR GET ALONG WITH OTHER PEOPLE: VERY DIFFICULT
1. FEELING NERVOUS, ANXIOUS, OR ON EDGE: NEARLY EVERY DAY
7. FEELING AFRAID AS IF SOMETHING AWFUL MIGHT HAPPEN: SEVERAL DAYS
2. NOT BEING ABLE TO STOP OR CONTROL WORRYING: SEVERAL DAYS
3. WORRYING TOO MUCH ABOUT DIFFERENT THINGS: SEVERAL DAYS
GAD7 TOTAL SCORE: 14
5. BEING SO RESTLESS THAT IT IS HARD TO SIT STILL: MORE THAN HALF THE DAYS

## 2019-01-16 ASSESSMENT — PATIENT HEALTH QUESTIONNAIRE - PHQ9: 5. POOR APPETITE OR OVEREATING: NEARLY EVERY DAY

## 2019-01-16 NOTE — PROGRESS NOTES
"Ellyn Mcgee is a 43 year old female who is being evaluated via a telephone visit.      The patient has been notified of following (by JALIL Smith     \"We have found that certain health care needs can be provided without the need for a physical exam.  This service lets us provide the care you need with a short phone conversation.  If a prescription is necessary we can send it directly to your pharmacy.  If lab work is needed we can place an order for that and you can then stop by our lab to have the test done at a later time.    This telephone visit will be conducted via 3 way call with the you (the patient) , the physician/provider, and a me all on the line at the same time.  This allows your physician/provider to have the phone conversation with you while I will be taking notes for your medical record.  We will have full access to your [place] medical record during this entire phone call.    Since this is like an office visit,  will bill your insurance company for this service.  Please check with your medical insurance if this type of telephone/virtual is covered . You may be responsible for the cost of this service if insurance coverage is denied.  The typical cost is $30 (10min), $59(11-20min) and $85 (21-30min)     If during the course of the call the physician/provider feels a telephone visit is not appropriate, you will not be charged for this service\"    Consent has been obtained for this service by care team member: yes.  See the scanned image in the medical record.    S:     Total time of call between patient and provider was 14 minutes     Paxil - called about med  Was taking the 20mg daily - taking serotonin specific reuptake inhibitor with tumeric - had run out of the tumeric.  Feels like the tumeric helps the paxil and works better when taking together so she restarted both medications.  Possible medication side effect each afternoon - about 3-5pm would be irritable and anxious.    Feeling " physical anxiety - associated with trouble breathing, head feels different, increase heart rate.  Everything feels intense.   Tried adjusting adderall but would get even if she didn't take the med yet  Feels like it is the paxil - for the past 3-4 days is taking part of dose in AM   Taking 10mg in AM and 10mg 3-4 hours later.  Thinks maybe it could be helping but not sure     Dorothy Guaman (MD signature)  ===================================================    I have reviewed the note as documented above.  This accurately captures the substance of my conversation with the patient,    Additional provider notes:see above    Assessment/Plan:  (F41.1) Anxiety state  (primary encounter diagnosis)  Comment: worsening anxiety on the paxil vs possible side effect  Feel like it could be related to pharmocokinetics of the drug.  Will try the CR formula and see if she does any better.   Plan: PARoxetine (PAXIL-CR) 25 MG 24 hr tablet        Call for refills if doing well.  Pt will call or RTC if symptoms worsen or do not improve.

## 2019-01-17 ASSESSMENT — ANXIETY QUESTIONNAIRES: GAD7 TOTAL SCORE: 14

## 2019-01-29 ENCOUNTER — MYC MEDICAL ADVICE (OUTPATIENT)
Dept: FAMILY MEDICINE | Facility: CLINIC | Age: 44
End: 2019-01-29

## 2019-01-29 DIAGNOSIS — F41.1 ANXIETY STATE: ICD-10-CM

## 2019-01-30 NOTE — TELEPHONE ENCOUNTER
PN  Please see Fantom message below.  Do you want to do telephone visit?  OV?  Stephanie Winter RN

## 2019-01-30 NOTE — TELEPHONE ENCOUNTER
PN  Pt would like to try higher dose of paroxetine, pended 37.5 mg and has phone visit scheduled.    Next 5 appointments (look out 90 days)    Feb 05, 2019  2:30 PM CST  Telephone Visit with Dorothy Guaman DO  [facility] ([facility] [address]  [facility] [id number] [phone]        Please approve if appropriate  Brittany Rausch RN

## 2019-02-01 RX ORDER — PAROXETINE HYDROCHLORIDE HEMIHYDRATE 37.5 MG/1
37.5 TABLET, FILM COATED, EXTENDED RELEASE ORAL EVERY MORNING
Qty: 30 TABLET | Refills: 1 | Status: SHIPPED | OUTPATIENT
Start: 2019-02-01 | End: 2019-04-06

## 2019-02-05 ENCOUNTER — VIRTUAL VISIT (OUTPATIENT)
Dept: FAMILY MEDICINE | Facility: CLINIC | Age: 44
End: 2019-02-05
Payer: COMMERCIAL

## 2019-02-05 DIAGNOSIS — F41.1 GAD (GENERALIZED ANXIETY DISORDER): Primary | ICD-10-CM

## 2019-02-05 PROCEDURE — 99442 ZZC PHYSICIAN TELEPHONE EVALUATION 11-20 MIN: CPT | Performed by: FAMILY MEDICINE

## 2019-02-05 ASSESSMENT — ANXIETY QUESTIONNAIRES
2. NOT BEING ABLE TO STOP OR CONTROL WORRYING: NEARLY EVERY DAY
GAD7 TOTAL SCORE: 10
3. WORRYING TOO MUCH ABOUT DIFFERENT THINGS: NOT AT ALL
7. FEELING AFRAID AS IF SOMETHING AWFUL MIGHT HAPPEN: SEVERAL DAYS
6. BECOMING EASILY ANNOYED OR IRRITABLE: SEVERAL DAYS
1. FEELING NERVOUS, ANXIOUS, OR ON EDGE: MORE THAN HALF THE DAYS
IF YOU CHECKED OFF ANY PROBLEMS ON THIS QUESTIONNAIRE, HOW DIFFICULT HAVE THESE PROBLEMS MADE IT FOR YOU TO DO YOUR WORK, TAKE CARE OF THINGS AT HOME, OR GET ALONG WITH OTHER PEOPLE: SOMEWHAT DIFFICULT
5. BEING SO RESTLESS THAT IT IS HARD TO SIT STILL: SEVERAL DAYS

## 2019-02-05 ASSESSMENT — PATIENT HEALTH QUESTIONNAIRE - PHQ9: 5. POOR APPETITE OR OVEREATING: MORE THAN HALF THE DAYS

## 2019-02-05 NOTE — PROGRESS NOTES
"Ellyn Mcgee is a 43 year old female who is being evaluated via a telephone visit.      The patient has been notified of following (by JALIL Regan MA      \"We have found that certain health care needs can be provided without the need for a physical exam.  This service lets us provide the care you need with a short phone conversation.  If a prescription is necessary we can send it directly to your pharmacy.  If lab work is needed we can place an order for that and you can then stop by our lab to have the test done at a later time.    This telephone visit will be conducted via 3 way call with the you (the patient) , the physician/provider, and a me all on the line at the same time.  This allows your physician/provider to have the phone conversation with you while I will be taking notes for your medical record.  We will have full access to your [facility] medical record during this entire phone call.    Since this is like an office visit,  will bill your insurance company for this service.  Please check with your medical insurance if this type of telephone/virtual is covered . You may be responsible for the cost of this service if insurance coverage is denied.  The typical cost is $30 (10min), $59(11-20min) and $85 (21-30min)     If during the course of the call the physician/provider feels a telephone visit is not appropriate, you will not be charged for this service\"    Consent has been obtained for this service by care team member: yes.  See the scanned image in the medical record.    S: The history as provided by the patient to the provider during this  call include :    Pertinent parts of the the patient's medical history reviewed and confirmed by the provider included : PMH and Med list      Total time of call between patient and provider was 14 minutes     Dorothy blackwell (MD signature)  ===================================================    I have reviewed the note as documented above.  This accurately " captures the substance of my conversation with the patient,    Additional provider notes:     Feels like some of they typical symptoms have come back a little more -   Worrying more about some things that she hasn't had in the past   Panic attacks - notices she will have to breath through them     Some issue with norepinephrine she wonder ? Possible imbalance because now having more leg bouncing.  Noticed and thinks started around 1/26/18    Switched her to the CR formula and feels like the ups and downs are better.      buspar - never tried in the past    Assessment/Plan:  (F41.1) TEE (generalized anxiety disorder)  (primary encounter diagnosis)  Comment: will increase her Paxil CR from 25mg up to 37.5mg  Plan: if doing well in 3-4 weeks can stay on this dose or possibly go up to 50mg   Can call for refills  Pt will call or RTC if symptoms worsen or do not improve.

## 2019-02-06 ASSESSMENT — ANXIETY QUESTIONNAIRES: GAD7 TOTAL SCORE: 10

## 2019-03-18 ENCOUNTER — TRANSFERRED RECORDS (OUTPATIENT)
Dept: HEALTH INFORMATION MANAGEMENT | Facility: CLINIC | Age: 44
End: 2019-03-18

## 2019-04-04 ENCOUNTER — MYC MEDICAL ADVICE (OUTPATIENT)
Dept: FAMILY MEDICINE | Facility: CLINIC | Age: 44
End: 2019-04-04

## 2019-04-04 DIAGNOSIS — F41.1 ANXIETY STATE: Primary | ICD-10-CM

## 2019-04-06 DIAGNOSIS — F41.1 ANXIETY STATE: ICD-10-CM

## 2019-04-08 RX ORDER — PAROXETINE HYDROCHLORIDE HEMIHYDRATE 37.5 MG/1
TABLET, FILM COATED, EXTENDED RELEASE ORAL
Qty: 30 TABLET | Refills: 0 | Status: SHIPPED | OUTPATIENT
Start: 2019-04-08 | End: 2019-04-30

## 2019-04-08 NOTE — TELEPHONE ENCOUNTER
PN,  Please see below and advise.  Did you want OV or Evisit?  Please advise.  Thanks,  Diane Ortega RN

## 2019-04-08 NOTE — TELEPHONE ENCOUNTER
"Prescription approved per [facility] Refill Protocol.  Keeley BROWNING RN    Last Written Prescription Date:  2/1/2019  Last Fill Quantity: 30,  # refills: 1   Last office visit: 2/5/2019 with prescribing provider:     Future Office Visit:   Next 5 appointments (look out 90 days)    Apr 30, 2019  1:30 PM CDT  MyChart Physical Adult with Dorothy Guaman DO  [facility] ([facility]) [address] [facility] [address] [phone]         Requested Prescriptions   Pending Prescriptions Disp Refills     PARoxetine (PAXIL-CR) 37.5 MG 24 hr tablet [Pharmacy Med Name: PAROXETINE ER 37.5MG TABLETS] 30 tablet 0     Sig: TAKE 1 TABLET(37.5 MG) BY MOUTH EVERY MORNING       SSRIs Protocol Passed - 4/6/2019  7:34 PM        Passed - Recent (12 mo) or future (30 days) visit within the authorizing provider's specialty     Patient had office visit in the last 12 months or has a visit in the next 30 days with authorizing provider or within the authorizing provider's specialty.  See \"Patient Info\" tab in inbasket, or \"Choose Columns\" in Meds & Orders section of the refill encounter.              Passed - Medication is active on med list        Passed - Patient is age 18 or older        Passed - No active pregnancy on record        Passed - No positive pregnancy test in last 12 months          "

## 2019-04-10 RX ORDER — PAROXETINE HYDROCHLORIDE HEMIHYDRATE 12.5 MG/1
12.5 TABLET, FILM COATED, EXTENDED RELEASE ORAL EVERY MORNING
Qty: 30 TABLET | Refills: 1 | Status: SHIPPED | OUTPATIENT
Start: 2019-04-10 | End: 2019-04-30

## 2019-04-10 NOTE — TELEPHONE ENCOUNTER
PN  Please see MyChart message  Not sure if just sending in 15 of the 20 mg Paroxetine to prevent withdrawal until her appointment on the 30th would be a cheaper option??  Thank you,  Brittany Rausch RN

## 2019-04-18 ENCOUNTER — MYC REFILL (OUTPATIENT)
Dept: FAMILY MEDICINE | Facility: CLINIC | Age: 44
End: 2019-04-18

## 2019-04-18 DIAGNOSIS — F90.0 ATTENTION DEFICIT HYPERACTIVITY DISORDER (ADHD), PREDOMINANTLY INATTENTIVE TYPE: ICD-10-CM

## 2019-04-18 NOTE — TELEPHONE ENCOUNTER
PN  Did you want to send in 30 day supply and give rest of Rx at OV or ok to send in 90 day supply?     Controlled Substance Refill Request for Adderall 20 mg     Last refill: 1/18/19 #90    Last clinic visit: 10/10/18     Clinic visit frequency required: Q 6  months  Next appt:   Next 5 appointments (look out 90 days)    Apr 30, 2019  1:30 PM CDT  MyChart Physical Adult with Dorothy Guaman DO  [facility] ([facility]) [address]  [facility] [address]  [phone]        Controlled substance agreement on file: Yes:  Date 10/4/17.    Documentation in problem list reviewed:  Yes    Processing:  Mail to [facility] pharmacy    [place] reviewed:  reviewed- no concerns  [place]  [email or web address]    Thank you,  Brittany Rausch RN

## 2019-04-19 ENCOUNTER — MYC MEDICAL ADVICE (OUTPATIENT)
Dept: FAMILY MEDICINE | Facility: CLINIC | Age: 44
End: 2019-04-19

## 2019-04-19 DIAGNOSIS — F90.0 ATTENTION DEFICIT HYPERACTIVITY DISORDER (ADHD), PREDOMINANTLY INATTENTIVE TYPE: Primary | ICD-10-CM

## 2019-04-19 DIAGNOSIS — F43.23 ADJUSTMENT DISORDER WITH MIXED ANXIETY AND DEPRESSED MOOD: ICD-10-CM

## 2019-04-19 RX ORDER — DEXTROAMPHETAMINE SACCHARATE, AMPHETAMINE ASPARTATE MONOHYDRATE, DEXTROAMPHETAMINE SULFATE AND AMPHETAMINE SULFATE 5; 5; 5; 5 MG/1; MG/1; MG/1; MG/1
20 CAPSULE, EXTENDED RELEASE ORAL DAILY
Qty: 90 CAPSULE | Refills: 0 | Status: SHIPPED | OUTPATIENT
Start: 2019-04-19 | End: 2019-04-30

## 2019-04-22 ENCOUNTER — MYC MEDICAL ADVICE (OUTPATIENT)
Dept: FAMILY MEDICINE | Facility: CLINIC | Age: 44
End: 2019-04-22

## 2019-04-23 NOTE — TELEPHONE ENCOUNTER
Can you see if [facility] can get fax copy of the Rx to fill so they can send out, letting them know the written Rx is in the mail?  Thanks  PN

## 2019-04-23 NOTE — TELEPHONE ENCOUNTER
PN  Called [facility]. ([facility])  States they would expect to receive Rx on Thursday and patient will received next Monday or Tuesday.  Can only mail or E-prescribe Adderall.    Per patient Friday 4/26 is last pill  States patient can get a short-term refill for up to 14 days at  Local pharmacy.  Do you want to sign Rx for #4 which will get her through Tuesday 4/30    Thanks,  Stephanie Winter, RN

## 2019-04-24 ENCOUNTER — MYC MEDICAL ADVICE (OUTPATIENT)
Dept: FAMILY MEDICINE | Facility: CLINIC | Age: 44
End: 2019-04-24

## 2019-04-24 RX ORDER — DEXTROAMPHETAMINE SACCHARATE, AMPHETAMINE ASPARTATE MONOHYDRATE, DEXTROAMPHETAMINE SULFATE AND AMPHETAMINE SULFATE 5; 5; 5; 5 MG/1; MG/1; MG/1; MG/1
20 CAPSULE, EXTENDED RELEASE ORAL DAILY
Qty: 5 CAPSULE | Refills: 0 | Status: SHIPPED | OUTPATIENT
Start: 2019-04-26 | End: 2019-10-16

## 2019-04-25 ENCOUNTER — MYC MEDICAL ADVICE (OUTPATIENT)
Dept: FAMILY MEDICINE | Facility: CLINIC | Age: 44
End: 2019-04-25

## 2019-04-26 ENCOUNTER — MYC MEDICAL ADVICE (OUTPATIENT)
Dept: FAMILY MEDICINE | Facility: CLINIC | Age: 44
End: 2019-04-26

## 2019-04-30 ENCOUNTER — OFFICE VISIT (OUTPATIENT)
Dept: FAMILY MEDICINE | Facility: CLINIC | Age: 44
End: 2019-04-30
Payer: COMMERCIAL

## 2019-04-30 VITALS
BODY MASS INDEX: 29.65 KG/M2 | TEMPERATURE: 97.8 F | HEIGHT: 64 IN | HEART RATE: 90 BPM | WEIGHT: 173.7 LBS | OXYGEN SATURATION: 99 % | DIASTOLIC BLOOD PRESSURE: 85 MMHG | SYSTOLIC BLOOD PRESSURE: 124 MMHG

## 2019-04-30 DIAGNOSIS — F41.1 ANXIETY STATE: ICD-10-CM

## 2019-04-30 DIAGNOSIS — F90.0 ATTENTION DEFICIT HYPERACTIVITY DISORDER (ADHD), PREDOMINANTLY INATTENTIVE TYPE: ICD-10-CM

## 2019-04-30 DIAGNOSIS — E78.5 HYPERLIPIDEMIA LDL GOAL <130: ICD-10-CM

## 2019-04-30 DIAGNOSIS — F41.1 GAD (GENERALIZED ANXIETY DISORDER): ICD-10-CM

## 2019-04-30 DIAGNOSIS — Z00.00 ENCOUNTER FOR ROUTINE ADULT HEALTH EXAMINATION WITHOUT ABNORMAL FINDINGS: Primary | ICD-10-CM

## 2019-04-30 LAB — HBA1C MFR BLD: 5.3 % (ref 0–5.6)

## 2019-04-30 PROCEDURE — 80061 LIPID PANEL: CPT | Performed by: FAMILY MEDICINE

## 2019-04-30 PROCEDURE — 99213 OFFICE O/P EST LOW 20 MIN: CPT | Mod: 25 | Performed by: FAMILY MEDICINE

## 2019-04-30 PROCEDURE — 83036 HEMOGLOBIN GLYCOSYLATED A1C: CPT | Performed by: FAMILY MEDICINE

## 2019-04-30 PROCEDURE — 99396 PREV VISIT EST AGE 40-64: CPT | Performed by: FAMILY MEDICINE

## 2019-04-30 PROCEDURE — 80053 COMPREHEN METABOLIC PANEL: CPT | Performed by: FAMILY MEDICINE

## 2019-04-30 PROCEDURE — 36415 COLL VENOUS BLD VENIPUNCTURE: CPT | Performed by: FAMILY MEDICINE

## 2019-04-30 RX ORDER — PAROXETINE HYDROCHLORIDE HEMIHYDRATE 25 MG/1
TABLET, FILM COATED, EXTENDED RELEASE ORAL
Qty: 180 TABLET | Refills: 1 | Status: SHIPPED | OUTPATIENT
Start: 2019-04-30 | End: 2019-10-16

## 2019-04-30 RX ORDER — METHYLPHENIDATE HYDROCHLORIDE 27 MG/1
27 TABLET ORAL DAILY
Qty: 30 TABLET | Refills: 0 | Status: SHIPPED | OUTPATIENT
Start: 2019-04-30 | End: 2019-10-16

## 2019-04-30 RX ORDER — PROPRANOLOL HYDROCHLORIDE 10 MG/1
TABLET ORAL
COMMUNITY
End: 2019-06-11

## 2019-04-30 ASSESSMENT — ANXIETY QUESTIONNAIRES
3. WORRYING TOO MUCH ABOUT DIFFERENT THINGS: SEVERAL DAYS
6. BECOMING EASILY ANNOYED OR IRRITABLE: NEARLY EVERY DAY
1. FEELING NERVOUS, ANXIOUS, OR ON EDGE: MORE THAN HALF THE DAYS
IF YOU CHECKED OFF ANY PROBLEMS ON THIS QUESTIONNAIRE, HOW DIFFICULT HAVE THESE PROBLEMS MADE IT FOR YOU TO DO YOUR WORK, TAKE CARE OF THINGS AT HOME, OR GET ALONG WITH OTHER PEOPLE: SOMEWHAT DIFFICULT
GAD7 TOTAL SCORE: 11
2. NOT BEING ABLE TO STOP OR CONTROL WORRYING: SEVERAL DAYS
7. FEELING AFRAID AS IF SOMETHING AWFUL MIGHT HAPPEN: SEVERAL DAYS
5. BEING SO RESTLESS THAT IT IS HARD TO SIT STILL: SEVERAL DAYS

## 2019-04-30 ASSESSMENT — PATIENT HEALTH QUESTIONNAIRE - PHQ9
SUM OF ALL RESPONSES TO PHQ QUESTIONS 1-9: 9
5. POOR APPETITE OR OVEREATING: MORE THAN HALF THE DAYS

## 2019-04-30 ASSESSMENT — MIFFLIN-ST. JEOR: SCORE: 1414.96

## 2019-04-30 NOTE — PROGRESS NOTES
SUBJECTIVE:   CC: Ellyn Mcgee is an 43 year old woman who presents for preventive health visit.     Healthy Habits:     Getting at least 3 servings of Calcium per day:  NO    Bi-annual eye exam:  Yes    Dental care twice a year:  NO    Sleep apnea or symptoms of sleep apnea:  Daytime drowsiness    Diet:  Diabetic and Gluten-free/reduced    Frequency of exercise:  None    Taking medications regularly:  Yes    Medication side effects:  Other    PHQ-2 Total Score: 2    Additional concerns today:  Yes          PROBLEMS TO ADD ON...    Pt has been struggling with more anxiety.  paxil taking 37.5mg tablet   Anxiety has been through the roof  Started couples therapy with spouse  Less panic attacks but worried that why is she having panic attacks now when she didn't in the past.  States she went to apply for a job but that caused her to have a panic attack.    Unclear if the adderall is contributing      Today's PHQ-2 Score:   PHQ-2 ( 1999 [employer]) 4/29/2019   Q1: Little interest or pleasure in doing things 1   Q2: Feeling down, depressed or hopeless 1   PHQ-2 Score 2   Q1: Little interest or pleasure in doing things Several days   Q2: Feeling down, depressed or hopeless Several days   PHQ-2 Score 2       Abuse: Current or Past(Physical, Sexual or Emotional)- NO  Do you feel safe in your environment? YES    Social History     Tobacco Use     Smoking status: Never Smoker     Smokeless tobacco: Never Used   Substance Use Topics     Alcohol use: Yes     Alcohol/week: 0.5 oz     Types: 1 Standard drinks or equivalent per week     Comment: social         No flowsheet data found.    Reviewed orders with patient.  Reviewed health maintenance and updated orders accordingly - Yes  Patient Active Problem List   Diagnosis     Hyperlipidemia LDL goal <130     Anxiety state     PCOS (polycystic ovarian syndrome)     Adjustment disorder with mixed anxiety and depressed mood     External hemorrhoids     Attention deficit  hyperactivity disorder (ADHD), predominantly inattentive type     TEE (generalized anxiety disorder)     Past Surgical History:   Procedure Laterality Date     CONIZATION CERVIX,KNIFE/LASER  1995     SURGICAL HISTORY OF -       [place] teeth       Social History     Tobacco Use     Smoking status: Never Smoker     Smokeless tobacco: Never Used   Substance Use Topics     Alcohol use: Yes     Alcohol/week: 0.5 oz     Types: 1 Standard drinks or equivalent per week     Comment: social     Family History   Problem Relation Age of Onset     Cancer Mother         vocal cord cancer     Lipids Mother      Cancer Father         B-cell lymphoma, melanoma     Lipids Father      Heart Disease Father         open heart surgery     Diabetes Paternal Grandmother      Diabetes Paternal Uncle      Alcohol/Drug Maternal Grandfather            Mammogram Screening: Patient under age 50, mutual decision reflected in health maintenance.      Pertinent mammograms are reviewed under the imaging tab.  History of abnormal Pap smear: NO - age 30-65 PAP every 5 years with negative HPV co-testing recommended  PAP / HPV Latest Ref Rng & Units 4/1/2016 6/25/2013 6/22/2012   PAP - NIL NIL NIL   HPV 16 DNA NEG Negative - -   HPV 18 DNA NEG Negative - -   OTHER HR HPV NEG Negative - -     Reviewed and updated as needed this visit by clinical staff  Tobacco  Allergies  Meds  Problems  Med Hx  Surg Hx  Fam Hx         Reviewed and updated as needed this visit by Provider  Tobacco  Allergies  Meds  Problems  Med Hx  Surg Hx  Fam Hx            Review of Systems  CONSTITUTIONAL: NEGATIVE for fever, chills, change in weight  INTEGUMENTARU/SKIN: NEGATIVE for worrisome rashes, moles or lesions  EYES: NEGATIVE for vision changes or irritation  ENT: NEGATIVE for ear, mouth and throat problems  RESP: NEGATIVE for significant cough or SOB  BREAST: NEGATIVE for masses, tenderness or discharge  CV: NEGATIVE for chest pain, palpitations or peripheral  "edema  GI: NEGATIVE for nausea, abdominal pain, heartburn, or change in bowel habits  : NEGATIVE for unusual urinary or vaginal symptoms. Periods are regular.  MUSCULOSKELETAL: NEGATIVE for significant arthralgias or myalgia  NEURO: NEGATIVE for weakness, dizziness or paresthesias  PSYCHIATRIC: as above     OBJECTIVE:   /85   Pulse 90   Temp 97.8  F (36.6  C) (Oral)   Ht 1.613 m (5' 3.5\")   Wt 78.8 kg (173 lb 11.2 oz)   LMP 04/26/2019   SpO2 99%   BMI 30.29 kg/m    Physical Exam  GENERAL: alert and no distress  EYES: Eyes grossly normal to inspection, PERRL and conjunctivae and sclerae normal  HENT: ear canals and TM's normal, nose and mouth without ulcers or lesions  NECK: no adenopathy, no asymmetry, masses, or scars and thyroid normal to palpation  RESP: lungs clear to auscultation - no rales, rhonchi or wheezes  BREAST: normal without masses, tenderness or nipple discharge and no palpable axillary masses or adenopathy  CV: regular rate and rhythm, normal S1 S2, no S3 or S4, no murmur, click or rub, no peripheral edema and peripheral pulses strong  ABDOMEN: soft, nontender, no hepatosplenomegaly, no masses and bowel sounds normal  MS: no gross musculoskeletal defects noted, no edema  SKIN: no suspicious lesions or rashes  NEURO: Normal strength and tone, mentation intact and speech normal  PSYCH: mentation appears normal, affect flat, tearful, anxious and judgement and insight intact    Diagnostic Test Results:  Results for orders placed or performed in visit on 04/30/19 (from the past 24 hour(s))   Hemoglobin A1c   Result Value Ref Range    Hemoglobin A1C 5.3 0 - 5.6 %     Additional labs pending    ASSESSMENT/PLAN:   1. Encounter for routine adult health examination without abnormal findings  Routine screening   - Hemoglobin A1c  - Comprehensive metabolic panel (BMP + Alb, Alk Phos, ALT, AST, Total. Bili, TP)    2. Anxiety state  Worsening anxiety with panic attacks  Unclear underlying etiology " "but will increase dose of her paxil from 47.5mg daily up to 50mg daily   - PARoxetine (PAXIL-CR) 25 MG 24 hr tablet; Take 2 tablets daily  Dispense: 180 tablet; Refill: 1    3. TEE (generalized anxiety disorder)     - PARoxetine (PAXIL-CR) 25 MG 24 hr tablet; Take 2 tablets daily  Dispense: 180 tablet; Refill: 1  - Comprehensive metabolic panel (BMP + Alb, Alk Phos, ALT, AST, Total. Bili, TP)    4. Hyperlipidemia LDL goal <130  Pending labs  - Lipid panel reflex to direct LDL Fasting  - Comprehensive metabolic panel (BMP + Alb, Alk Phos, ALT, AST, Total. Bili, TP)    5. Attention deficit hyperactivity disorder (ADHD), predominantly inattentive type  Pt taking adderall and has not tried any other meds   Wonder if this is contributing to her anxiety so will print one month of the Concerta and see how she does on that.  If better then we will switch  - methylphenidate (CONCERTA) 27 MG CR tablet; Take 1 tablet (27 mg) by mouth daily  Dispense: 30 tablet; Refill: 0    COUNSELING:  Reviewed preventive health counseling, as reflected in patient instructions    BP Readings from Last 1 Encounters:   04/30/19 124/85     Estimated body mass index is 30.29 kg/m  as calculated from the following:    Height as of this encounter: 1.613 m (5' 3.5\").    Weight as of this encounter: 78.8 kg (173 lb 11.2 oz).    BP Screening:   Last 3 BP Readings:    BP Readings from Last 3 Encounters:   04/30/19 124/85   10/10/18 119/79   04/17/18 102/69       The following was recommended to the patient:  Re-screen BP within a year and recommended lifestyle modifications  Weight management plan: Discussed healthy diet and exercise guidelines     reports that she has never smoked. She has never used smokeless tobacco.      Counseling Resources:  ATP IV Guidelines  Pooled Cohorts Equation Calculator  Breast Cancer Risk Calculator  FRAX Risk Assessment  ICSI Preventive Guidelines  Dietary Guidelines for Americans, 2010  USDA's MyPlate  ASA " Prophylaxis  Lung CA Screening    Dorothy Guaman, [facility]

## 2019-04-30 NOTE — NURSING NOTE
"Chief Complaint   Patient presents with     Physical     /85   Pulse 90   Temp 97.8  F (36.6  C) (Oral)   Ht 1.613 m (5' 3.5\")   Wt 78.8 kg (173 lb 11.2 oz)   LMP 04/26/2019   SpO2 99%   BMI 30.29 kg/m   Estimated body mass index is 30.29 kg/m  as calculated from the following:    Height as of this encounter: 1.613 m (5' 3.5\").    Weight as of this encounter: 78.8 kg (173 lb 11.2 oz).        Health Maintenance due pending provider review:  PHQ9    PHQ/ACT/TEE--Gave pt questionnare    Cally Rm CMA  "

## 2019-05-01 LAB
ALBUMIN SERPL-MCNC: 4.4 G/DL (ref 3.4–5)
ALP SERPL-CCNC: 54 U/L (ref 40–150)
ALT SERPL W P-5'-P-CCNC: 41 U/L (ref 0–50)
ANION GAP SERPL CALCULATED.3IONS-SCNC: 6 MMOL/L (ref 3–14)
AST SERPL W P-5'-P-CCNC: 32 U/L (ref 0–45)
BILIRUB SERPL-MCNC: 0.3 MG/DL (ref 0.2–1.3)
BUN SERPL-MCNC: 9 MG/DL (ref 7–30)
CALCIUM SERPL-MCNC: 9.2 MG/DL (ref 8.5–10.1)
CHLORIDE SERPL-SCNC: 106 MMOL/L (ref 94–109)
CHOLEST SERPL-MCNC: 301 MG/DL
CO2 SERPL-SCNC: 28 MMOL/L (ref 20–32)
CREAT SERPL-MCNC: 0.7 MG/DL (ref 0.52–1.04)
GFR SERPL CREATININE-BSD FRML MDRD: >90 ML/MIN/{1.73_M2}
GLUCOSE SERPL-MCNC: 89 MG/DL (ref 70–99)
HDLC SERPL-MCNC: 70 MG/DL
LDLC SERPL CALC-MCNC: 216 MG/DL
NONHDLC SERPL-MCNC: 231 MG/DL
POTASSIUM SERPL-SCNC: 3.7 MMOL/L (ref 3.4–5.3)
PROT SERPL-MCNC: 7.7 G/DL (ref 6.8–8.8)
SODIUM SERPL-SCNC: 140 MMOL/L (ref 133–144)
TRIGL SERPL-MCNC: 75 MG/DL

## 2019-05-01 ASSESSMENT — ANXIETY QUESTIONNAIRES: GAD7 TOTAL SCORE: 11

## 2019-05-01 NOTE — RESULT ENCOUNTER NOTE
Dear Ellyn,   Your test results are all back -   Your kidney, liver and glucose are all normal.  The cholesterol is very high.  It is up from last year and you are in the range we recommend a medication like lipitor or other statin.  I always prefer to have patients work on healthy diet and exercise but let me know if you prefer to try the medication.  Let us know if you have any questions.  -Dorothy Guaman, DO

## 2019-05-02 ENCOUNTER — MYC MEDICAL ADVICE (OUTPATIENT)
Dept: FAMILY MEDICINE | Facility: CLINIC | Age: 44
End: 2019-05-02

## 2019-05-10 ENCOUNTER — OFFICE VISIT (OUTPATIENT)
Dept: FAMILY MEDICINE | Facility: CLINIC | Age: 44
End: 2019-05-10
Payer: COMMERCIAL

## 2019-05-10 VITALS
WEIGHT: 172.7 LBS | OXYGEN SATURATION: 99 % | HEIGHT: 64 IN | BODY MASS INDEX: 29.49 KG/M2 | HEART RATE: 101 BPM | SYSTOLIC BLOOD PRESSURE: 125 MMHG | DIASTOLIC BLOOD PRESSURE: 82 MMHG

## 2019-05-10 DIAGNOSIS — M54.12 CERVICAL RADICULOPATHY: Primary | ICD-10-CM

## 2019-05-10 PROCEDURE — 99214 OFFICE O/P EST MOD 30 MIN: CPT | Performed by: FAMILY MEDICINE

## 2019-05-10 ASSESSMENT — MIFFLIN-ST. JEOR: SCORE: 1410.42
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
## 2019-05-10 NOTE — NURSING NOTE
"Chief Complaint   Patient presents with     Referral     per independent medical examiner for auto ins recommended that pt gets MRI      /82   Pulse 101   Ht 1.613 m (5' 3.5\")   Wt 78.3 kg (172 lb 11.2 oz)   LMP 04/26/2019   SpO2 99%   BMI 30.11 kg/m   Estimated body mass index is 30.11 kg/m  as calculated from the following:    Height as of this encounter: 1.613 m (5' 3.5\").    Weight as of this encounter: 78.3 kg (172 lb 11.2 oz).  Medication Reconciliation: complete      Health Maintenance that is potentially due pending provider review:  NONE    n/a    TD Irizarry  "

## 2019-05-28 ENCOUNTER — ANCILLARY PROCEDURE (OUTPATIENT)
Dept: MRI IMAGING | Facility: CLINIC | Age: 44
End: 2019-05-28
Attending: FAMILY MEDICINE
Payer: COMMERCIAL

## 2019-05-28 DIAGNOSIS — M54.12 CERVICAL RADICULOPATHY: ICD-10-CM

## 2019-06-02 ENCOUNTER — MYC MEDICAL ADVICE (OUTPATIENT)
Dept: FAMILY MEDICINE | Facility: CLINIC | Age: 44
End: 2019-06-02

## 2019-06-03 ENCOUNTER — OFFICE VISIT (OUTPATIENT)
Dept: FAMILY MEDICINE | Facility: CLINIC | Age: 44
End: 2019-06-03
Payer: COMMERCIAL

## 2019-06-03 VITALS
WEIGHT: 171.1 LBS | HEIGHT: 64 IN | BODY MASS INDEX: 29.21 KG/M2 | HEART RATE: 93 BPM | TEMPERATURE: 98.9 F | SYSTOLIC BLOOD PRESSURE: 115 MMHG | OXYGEN SATURATION: 100 % | DIASTOLIC BLOOD PRESSURE: 79 MMHG

## 2019-06-03 DIAGNOSIS — R07.9 CHEST PAIN, UNSPECIFIED TYPE: Primary | ICD-10-CM

## 2019-06-03 PROCEDURE — 99213 OFFICE O/P EST LOW 20 MIN: CPT | Performed by: PHYSICIAN ASSISTANT

## 2019-06-03 ASSESSMENT — MIFFLIN-ST. JEOR: SCORE: 1403.16

## 2019-06-03 NOTE — TELEPHONE ENCOUNTER
Discussed with pt    Next 5 appointments (look out 90 days)    Jun 03, 2019  3:20 PM CDT  Office Visit with Giuliano Rico PA-C  [facility] ([facility]) [address]  [facility] [address]  [phone]        Keeley BROWNING RN

## 2019-06-03 NOTE — TELEPHONE ENCOUNTER
Huddled with PN on this pt   Should be seen  Put 320pm spot with JS on hold  Will discuss with pt  Left message for patient to callback triage  Keeley BROWNING RN

## 2019-06-03 NOTE — PROGRESS NOTES
"Subjective     Ellyn Heaven Mcgee is a 43 year old female who presents to clinic today for the following health issues:    HPI   ED/UC Followup:    Facility:  [facility]   Date of visit: 5/31  Reason for visit: chest pain   Current Status: chest pain has subsided, stress level is still up and having anxiety/SOB        43 y/o new to me female here for  follow up.  On 5/31 she was having acute onset chest pain.  She presented to .  The pain had improved by the time she got there.  Did have normal EKG.  They had discussion about possible causes.  Sounds like both muscular and anxiety were most likely.  Since then, the chest pain has pretty much gone away.  She does admit her stress and anxiety have been very high.  She does wonder about further testing to fully rule out cardiac as the cause of her symptoms.  She does have a family history of heart disease.    BP Readings from Last 3 Encounters:   06/03/19 115/79   05/10/19 125/82   04/30/19 124/85    Wt Readings from Last 3 Encounters:   06/03/19 77.6 kg (171 lb 1.6 oz)   05/10/19 78.3 kg (172 lb 11.2 oz)   04/30/19 78.8 kg (173 lb 11.2 oz)                      Reviewed and updated as needed this visit by Provider         Review of Systems   ROS COMP: Constitutional, HEENT, cardiovascular, pulmonary, gi and gu systems are negative, except as otherwise noted.      Objective    /79   Pulse 93   Temp 98.9  F (37.2  C) (Oral)   Ht 1.613 m (5' 3.5\")   Wt 77.6 kg (171 lb 1.6 oz)   LMP 05/28/2019 (Approximate)   SpO2 100%   BMI 29.83 kg/m    Body mass index is 29.83 kg/m .  Physical Exam   GENERAL: alert and no distress  EYES: Eyes grossly normal to inspection  RESP: lungs clear to auscultation - no rales, rhonchi or wheezes  CV: regular rate and rhythm, normal S1 S2, no S3 or S4, no murmur, click or rub, no peripheral edema and peripheral pulses strong  PSYCH: mentation appears normal, affect normal/bright    Diagnostic Test " "Results:  Labs reviewed in Epic        Assessment & Plan     1. Chest pain, unspecified type  I think it does make sense to further investigate to rule out cardiac as a cause.  She plans to follow up with PCP to further discuss her anxiety and stress.  - Echocardiogram Exercise Stress; Future     BMI:   Estimated body mass index is 29.83 kg/m  as calculated from the following:    Height as of this encounter: 1.613 m (5' 3.5\").    Weight as of this encounter: 77.6 kg (171 lb 1.6 oz).               No follow-ups on file.    Giuliano Rico PA-C  [facility]      "

## 2019-06-03 NOTE — NURSING NOTE
"Chief Complaint   Patient presents with     Urgent Care     Follow Up     /79   Pulse 93   Temp 98.9  F (37.2  C) (Oral)   Ht 1.613 m (5' 3.5\")   Wt 77.6 kg (171 lb 1.6 oz)   LMP 05/28/2019 (Approximate)   SpO2 100%   BMI 29.83 kg/m   Estimated body mass index is 29.83 kg/m  as calculated from the following:    Height as of this encounter: 1.613 m (5' 3.5\").    Weight as of this encounter: 77.6 kg (171 lb 1.6 oz).  Medication Reconciliation: complete      Health Maintenance that is potentially due pending provider review:  NONE    n/a    TD Irizarry  "

## 2019-06-06 ENCOUNTER — MYC MEDICAL ADVICE (OUTPATIENT)
Dept: FAMILY MEDICINE | Facility: CLINIC | Age: 44
End: 2019-06-06

## 2019-06-06 DIAGNOSIS — F41.1 ANXIETY STATE: Primary | ICD-10-CM

## 2019-06-06 NOTE — TELEPHONE ENCOUNTER
PN  Please see [employer]ant message below.  Do you want to see patient or telephone visit after your return? Evisit?  Stephanie Winter RN

## 2019-06-11 RX ORDER — PROPRANOLOL HYDROCHLORIDE 10 MG/1
10 TABLET ORAL 2 TIMES DAILY
Qty: 60 TABLET | Refills: 0 | Status: SHIPPED | OUTPATIENT
Start: 2019-06-11 | End: 2019-10-16

## 2019-06-11 NOTE — TELEPHONE ENCOUNTER
Sent Rx for qty 60 so she can take one twice a day or even 2 tablets at once.   She should schedule a visit with me in 3-4 weeks so we can discuss this an other medications.  Thanks  PN

## 2019-06-26 ENCOUNTER — MYC MEDICAL ADVICE (OUTPATIENT)
Dept: FAMILY MEDICINE | Facility: CLINIC | Age: 44
End: 2019-06-26

## 2019-07-20 ENCOUNTER — MYC REFILL (OUTPATIENT)
Dept: FAMILY MEDICINE | Facility: CLINIC | Age: 44
End: 2019-07-20

## 2019-07-20 DIAGNOSIS — F90.0 ATTENTION DEFICIT HYPERACTIVITY DISORDER (ADHD), PREDOMINANTLY INATTENTIVE TYPE: ICD-10-CM

## 2019-07-20 DIAGNOSIS — F43.23 ADJUSTMENT DISORDER WITH MIXED ANXIETY AND DEPRESSED MOOD: ICD-10-CM

## 2019-07-20 NOTE — TELEPHONE ENCOUNTER
"Lexapro 5MG       Last Written Prescription Date:  04/24/2018  Last Fill Quantity: 270,   # refills: 1  Last Office Visit: 05/10/2019  Future Office visit:       Routing refill request to provider for review/approval because:  Drug not active on patient's medication list     Requested Prescriptions   Pending Prescriptions Disp Refills     escitalopram (LEXAPRO) 5 MG tablet [Pharmacy Med Name: ESCITALOPRAM 5MG TABLETS] 270 tablet 0     Sig: TAKE 3 TABLETS(15 MG) BY MOUTH DAILY       SSRIs Protocol Failed - 7/20/2019  1:33 PM        Failed - PHQ-9 score less than 5 in past 6 months     Please review last PHQ-9 score.           Failed - Medication is active on med list        Passed - Patient is age 18 or older        Passed - No active pregnancy on record        Passed - No positive pregnancy test in last 12 months        Passed - Recent (6 mo) or future (30 days) visit within the authorizing provider's specialty     Patient had office visit in the last 6 months or has a visit in the next 30 days with authorizing provider or within the authorizing provider's specialty.  See \"Patient Info\" tab in inbasket, or \"Choose Columns\" in Meds & Orders section of the refill encounter.              "

## 2019-07-22 RX ORDER — DEXTROAMPHETAMINE SACCHARATE, AMPHETAMINE ASPARTATE MONOHYDRATE, DEXTROAMPHETAMINE SULFATE AND AMPHETAMINE SULFATE 5; 5; 5; 5 MG/1; MG/1; MG/1; MG/1
20 CAPSULE, EXTENDED RELEASE ORAL DAILY
Qty: 5 CAPSULE | Refills: 0 | OUTPATIENT
Start: 2019-07-22

## 2019-07-22 RX ORDER — ESCITALOPRAM OXALATE 5 MG/1
TABLET ORAL
Qty: 270 TABLET | Refills: 0 | Status: SHIPPED | OUTPATIENT
Start: 2019-07-22 | End: 2019-10-16

## 2019-07-22 NOTE — TELEPHONE ENCOUNTER
JF.  Please advise as PN is out of the office.  Routing refill request to provider for review/approval because:  Drug not active on patient's medication list  Please authorize if appropriate.  Thanks,  Diane Ortega RN

## 2019-07-22 NOTE — TELEPHONE ENCOUNTER
There was a change to concerta, and the plan of going back to adderall is not clear, and this was a while ago.  Probably needs Rowena's approval

## 2019-07-22 NOTE — TELEPHONE ENCOUNTER
JF,  Please advise/authorize if appropriate in PN's absence  Thanks,  Diane Ortega RN        Controlled Substance Refill Request for adderall    Last refill: 4/19/19 #90 Adderall Xr  20 mg. 4/24/19  #5 Dextroamp-Amphet Er 20 Mg     Last clinic visit: 4/30/19 for this     Clinic visit frequency required: Q 6  months  Next appt:    Controlled substance agreement on file: Yes:  Date 10/14/17.    Documentation in problem list reviewed:  Yes    Processing:  Mail to   pharmacy    [place] reviewed:  reviewed- no concerns  [place] profile:  [email or web address]

## 2019-07-26 ENCOUNTER — MYC MEDICAL ADVICE (OUTPATIENT)
Dept: FAMILY MEDICINE | Facility: CLINIC | Age: 44
End: 2019-07-26

## 2019-07-26 DIAGNOSIS — F90.0 ATTENTION DEFICIT HYPERACTIVITY DISORDER (ADHD), PREDOMINANTLY INATTENTIVE TYPE: ICD-10-CM

## 2019-07-29 RX ORDER — DEXTROAMPHETAMINE SACCHARATE, AMPHETAMINE ASPARTATE MONOHYDRATE, DEXTROAMPHETAMINE SULFATE AND AMPHETAMINE SULFATE 5; 5; 5; 5 MG/1; MG/1; MG/1; MG/1
20 CAPSULE, EXTENDED RELEASE ORAL DAILY
Qty: 90 CAPSULE | Refills: 0 | Status: SHIPPED | OUTPATIENT
Start: 2019-07-29 | End: 2019-10-16

## 2019-10-16 ENCOUNTER — OFFICE VISIT (OUTPATIENT)
Dept: FAMILY MEDICINE | Facility: CLINIC | Age: 44
End: 2019-10-16
Payer: COMMERCIAL

## 2019-10-16 VITALS
WEIGHT: 169.5 LBS | TEMPERATURE: 98.2 F | SYSTOLIC BLOOD PRESSURE: 119 MMHG | OXYGEN SATURATION: 99 % | BODY MASS INDEX: 28.94 KG/M2 | RESPIRATION RATE: 16 BRPM | HEIGHT: 64 IN | DIASTOLIC BLOOD PRESSURE: 82 MMHG | HEART RATE: 89 BPM

## 2019-10-16 DIAGNOSIS — F41.1 GAD (GENERALIZED ANXIETY DISORDER): ICD-10-CM

## 2019-10-16 DIAGNOSIS — F41.1 ANXIETY STATE: ICD-10-CM

## 2019-10-16 DIAGNOSIS — N39.498 OTHER URINARY INCONTINENCE: Primary | ICD-10-CM

## 2019-10-16 DIAGNOSIS — F90.0 ATTENTION DEFICIT HYPERACTIVITY DISORDER (ADHD), PREDOMINANTLY INATTENTIVE TYPE: ICD-10-CM

## 2019-10-16 LAB
ALBUMIN UR-MCNC: NEGATIVE MG/DL
APPEARANCE UR: CLEAR
BILIRUB UR QL STRIP: NEGATIVE
COLOR UR AUTO: YELLOW
GLUCOSE UR STRIP-MCNC: NEGATIVE MG/DL
HGB UR QL STRIP: ABNORMAL
KETONES UR STRIP-MCNC: NEGATIVE MG/DL
LEUKOCYTE ESTERASE UR QL STRIP: ABNORMAL
NITRATE UR QL: NEGATIVE
PH UR STRIP: 7 PH (ref 5–7)
RBC #/AREA URNS AUTO: NORMAL /HPF
SOURCE: ABNORMAL
SP GR UR STRIP: 1.01 (ref 1–1.03)
UROBILINOGEN UR STRIP-ACNC: 0.2 EU/DL (ref 0.2–1)
WBC #/AREA URNS AUTO: NORMAL /HPF

## 2019-10-16 PROCEDURE — 81001 URINALYSIS AUTO W/SCOPE: CPT | Performed by: FAMILY MEDICINE

## 2019-10-16 PROCEDURE — 99214 OFFICE O/P EST MOD 30 MIN: CPT | Performed by: FAMILY MEDICINE

## 2019-10-16 RX ORDER — METHYLPHENIDATE HYDROCHLORIDE 27 MG/1
27 TABLET ORAL EVERY MORNING
Qty: 30 TABLET | Refills: 0 | Status: SHIPPED | OUTPATIENT
Start: 2019-10-16 | End: 2019-11-30

## 2019-10-16 RX ORDER — PROPRANOLOL HYDROCHLORIDE 10 MG/1
10 TABLET ORAL 2 TIMES DAILY
Qty: 60 TABLET | Refills: 0 | Status: SHIPPED | OUTPATIENT
Start: 2019-10-16 | End: 2020-02-26

## 2019-10-16 RX ORDER — PAROXETINE HYDROCHLORIDE HEMIHYDRATE 25 MG/1
TABLET, FILM COATED, EXTENDED RELEASE ORAL
Qty: 180 TABLET | Refills: 1 | Status: SHIPPED | OUTPATIENT
Start: 2019-10-16 | End: 2020-05-15

## 2019-10-16 ASSESSMENT — MIFFLIN-ST. JEOR: SCORE: 1390.91

## 2019-10-16 ASSESSMENT — PAIN SCALES - GENERAL: PAINLEVEL: NO PAIN (0)

## 2019-10-16 NOTE — RESULT ENCOUNTER NOTE
Dear Ellyn,   Your test results are all back -   -Urine is normal.  Let us know if you have any questions.  -Dorothy Guaman, DO

## 2019-10-16 NOTE — PROGRESS NOTES
Subjective     Ellyn Mcgee is a 44 year old female who presents to clinic today for the following health issues:    HPI   URINARY TRACT SYMPTOMS      Duration: on going issue    Description: patient has noticed leaking of urine more since last time discussed   incontinence    Intensity:  moderate    Accompanying signs and symptoms:  Fever/chills: no   Flank pain no   Nausea and vomiting: no   Vaginal symptoms: none  Abdominal/Pelvic Pain: no     History  History of frequent UTI's: no   History of kidney stones: no   Sexually Active: no   Possibility of pregnancy: No    Precipitating or alleviating factors: None    Therapies tried and outcome: NOne   Outcome: none    ADD  Taking the adderall XR 20mg   Feels the adderall kick in - gets hyper-focus for 1-2 hours.  Some irritability of that focus got disrupted.    Has not tried the methylphenidate -    Urine -   Incontinence -   Not emptying the bladder completely and also has stress  - with cough or sneezing  Getting worse - will have to go through multiple underwear      Cardiac symptoms -   Had chest pain this past summer  EKG was normal   No troponins drawn  Mom had similar symptoms -   Pt had ordered for the stress test but never completed it.      -------------------------------------    Patient Active Problem List   Diagnosis     Hyperlipidemia LDL goal <130     Anxiety state     PCOS (polycystic ovarian syndrome)     Adjustment disorder with mixed anxiety and depressed mood     External hemorrhoids     Attention deficit hyperactivity disorder (ADHD), predominantly inattentive type     TEE (generalized anxiety disorder)     Past Surgical History:   Procedure Laterality Date     CONIZATION CERVIX,KNIFE/LASER  1995     SURGICAL HISTORY OF -       [place] teeth       Social History     Tobacco Use     Smoking status: Never Smoker     Smokeless tobacco: Never Used   Substance Use Topics     Alcohol use: Yes     Alcohol/week: 0.8 standard drinks     Types: 1  "Standard drinks or equivalent per week     Comment: social     Family History   Problem Relation Age of Onset     Cancer Mother         vocal cord cancer     Lipids Mother      Cancer Father         B-cell lymphoma, melanoma     Lipids Father      Heart Disease Father         open heart surgery     Diabetes Paternal Grandmother      Diabetes Paternal Uncle      Alcohol/Drug Maternal Grandfather            -------------------------------------  Reviewed and updated as needed this visit by Provider         Review of Systems   ROS COMP: Constitutional, HEENT, cardiovascular, pulmonary, GI, , musculoskeletal, neuro, skin, endocrine and psych systems are negative, except as otherwise noted.      Objective    Ht 1.613 m (5' 3.5\")   Wt 76.9 kg (169 lb 8 oz)   LMP  (Approximate)   Breastfeeding? No   BMI 29.55 kg/m    Body mass index is 29.55 kg/m .  Physical Exam   GENERAL: healthy, alert and no distress  CV: regular rate and rhythm, normal S1 S2, no S3 or S4, no murmur, click or rub, no peripheral edema and peripheral pulses strong    Diagnostic Test Results:  Labs reviewed in Epic  Results for orders placed or performed in visit on 10/16/19 (from the past 24 hour(s))   *UA reflex to Microscopic and Culture ([place] [facility] (except [place] and [place])   Result Value Ref Range    Color Urine Yellow     Appearance Urine Clear     Glucose Urine Negative NEG^Negative mg/dL    Bilirubin Urine Negative NEG^Negative    Ketones Urine Negative NEG^Negative mg/dL    Specific Gravity Urine 1.010 1.003 - 1.035    Blood Urine Trace (A) NEG^Negative    pH Urine 7.0 5.0 - 7.0 pH    Protein Albumin Urine Negative NEG^Negative mg/dL    Urobilinogen Urine 0.2 0.2 - 1.0 EU/dL    Nitrite Urine Negative NEG^Negative    Leukocyte Esterase Urine Trace (A) NEG^Negative    Source Midstream Urine    Urine Microscopic   Result Value Ref Range    WBC Urine 0 - 5 OTO5^0 - 5 /HPF    RBC Urine O - 2 OTO2^O - 2 /HPF       " "    Assessment & Plan     1. Anxiety state  Anxiety and depression -   Controlled with paxil and the propranolol prn   Did refill meds   - propranolol (INDERAL) 10 MG tablet; Take 1 tablet (10 mg) by mouth 2 times daily  Dispense: 60 tablet; Refill: 0  - PARoxetine (PAXIL-CR) 25 MG 24 hr tablet; Take 2 tablets daily  Dispense: 180 tablet; Refill: 1    2. TEE (generalized anxiety disorder)  As olga  - PARoxetine (PAXIL-CR) 25 MG 24 hr tablet; Take 2 tablets daily  Dispense: 180 tablet; Refill: 1    3. Attention deficit hyperactivity disorder (ADHD), predominantly inattentive type  ADD no well controlled with adderall - getting boost but wonders if making her irritable  Will try concerta to see if she tolerates it any better  Call for refills if this dose is working  - methylphenidate (CONCERTA) 27 MG CR tablet; Take 1 tablet (27 mg) by mouth every morning  Dispense: 30 tablet; Refill: 0    4. Other urinary incontinence  UA negative for infection  Suspect both overflow and stress incontinence   Wonder about having her see urology for further evaluation  - *UA reflex to Microscopic and Culture ([facility] [facility] (except [place] and [place])  - UROLOGY ADULT REFERRAL  - Urine Microscopic     BMI:   Estimated body mass index is 29.55 kg/m  as calculated from the following:    Height as of this encounter: 1.613 m (5' 3.5\").    Weight as of this encounter: 76.9 kg (169 lb 8 oz).           Pt will call or RTC if symptoms worsen or do not improve.      No follow-ups on file.    Dorothy Guaman, DO  [facility]      "

## 2019-10-17 ENCOUNTER — TELEPHONE (OUTPATIENT)
Dept: FAMILY MEDICINE | Facility: CLINIC | Age: 44
End: 2019-10-17

## 2019-10-17 NOTE — TELEPHONE ENCOUNTER
Prior Authorization Retail Medication Request    Medication/Dose: Concerta 27MG ER tabs   ICD code (if different than what is on RX):  F90.0  Previously Tried and Failed:    Rationale:      Insurance Name:  [facility]  Insurance ID:  [id number]

## 2019-10-23 ENCOUNTER — MYC MEDICAL ADVICE (OUTPATIENT)
Dept: FAMILY MEDICINE | Facility: CLINIC | Age: 44
End: 2019-10-23

## 2019-10-23 DIAGNOSIS — F41.1 ANXIETY STATE: ICD-10-CM

## 2019-10-23 DIAGNOSIS — F41.1 GAD (GENERALIZED ANXIETY DISORDER): ICD-10-CM

## 2019-10-24 RX ORDER — PAROXETINE HYDROCHLORIDE HEMIHYDRATE 25 MG/1
TABLET, FILM COATED, EXTENDED RELEASE ORAL
Qty: 180 TABLET | Refills: 1 | Status: SHIPPED | OUTPATIENT
Start: 2019-10-24 | End: 2020-05-15

## 2019-10-24 NOTE — TELEPHONE ENCOUNTER
MEDICAL RECORDS REQUEST   [facility]  Urology Clinic  [address] [place] [address]  PHONE: [phone]  Fax: [phone]        FUTURE VISIT INFORMATION                                                   Ellyn Mcgee, : 1975 scheduled for future visit at [facility]    APPOINTMENT INFORMATION:    Date: 19 8:30AM     Provider:  Gissel Rebollar MD     Reason for Visit/Diagnosis: Urinary incontinence     REFERRAL INFORMATION:    Referring provider:  PREMA MARIEE    Specialty: DO    Referring providers clinic:  [facility]     Clinic contact number:  [phone]    RECORDS REQUESTED FOR VISIT                                                     NOTES  STATUS/DETAILS   OFFICE NOTE from referring provider  yes   OFFICE NOTE from other specialist  no   DISCHARGE SUMMARY from hospital  no   DISCHARGE REPORT from the ER  no   OPERATIVE REPORT  no   MEDICATION LIST  no   LABS     URINALYSIS (UA)  yes     PRE-VISIT CHECKLIST      Record collection complete Yes- All recs in epic    Appointment appropriately scheduled           (right time/right provider) Yes   MyChart activation Yes   Questionnaire complete If no, please explain: In process      Completed by: Giovanna Astorga

## 2019-10-24 NOTE — TELEPHONE ENCOUNTER
Note sent by patient via [facility] :  Hi!   When I was in there last week, all of my RX's were sent to [facility], but the Paroxetine and propanolol should have been sent [facility] for 3 month supply .  Propanolol co pay was only $7 so i picked that up, but the Paroxetine....well, I didn't pick that up.    I have submitted a request to [facility] to renew the Paroxetine 25mg RX (2 tabs per day) so your office should be receiving that soon.  Please renew that one so I can have my meds when I return to [place].  I have enough for 11 days...  Thank you!    Stephanie Winter RN

## 2019-10-24 NOTE — TELEPHONE ENCOUNTER
"Resent Rx to mail order instead  Keeley BROWNING RN    Last Written Prescription Date:  10/16/2019  Last Fill Quantity: 180,  # refills: 1   Last office visit: 10/16/2019 with prescribing provider:     Future Office Visit:    Requested Prescriptions   Pending Prescriptions Disp Refills     PARoxetine (PAXIL-CR) 25 MG 24 hr tablet [Pharmacy Med Name: PAROXETINE HCL CR TABS 25MG] 180 tablet 4     Sig: TAKE 2 TABLETS DAILY       SSRIs Protocol Passed - 10/24/2019  7:52 AM        Passed - Recent (12 mo) or future (30 days) visit within the authorizing provider's specialty     Patient has had an office visit with the authorizing provider or a provider within the authorizing providers department within the previous 12 mos or has a future within next 30 days. See \"Patient Info\" tab in inbasket, or \"Choose Columns\" in Meds & Orders section of the refill encounter.              Passed - Medication is active on med list        Passed - Patient is age 18 or older        Passed - No active pregnancy on record        Passed - No positive pregnancy test in last 12 months        "

## 2019-10-28 ENCOUNTER — MYC MEDICAL ADVICE (OUTPATIENT)
Dept: FAMILY MEDICINE | Facility: CLINIC | Age: 44
End: 2019-10-28

## 2019-10-31 ENCOUNTER — MYC MEDICAL ADVICE (OUTPATIENT)
Dept: FAMILY MEDICINE | Facility: CLINIC | Age: 44
End: 2019-10-31

## 2019-11-05 ENCOUNTER — HEALTH MAINTENANCE LETTER (OUTPATIENT)
Age: 44
End: 2019-11-05

## 2019-11-05 ENCOUNTER — ANCILLARY PROCEDURE (OUTPATIENT)
Dept: CARDIOLOGY | Facility: CLINIC | Age: 44
End: 2019-11-05
Attending: PHYSICIAN ASSISTANT
Payer: COMMERCIAL

## 2019-11-05 DIAGNOSIS — R07.9 CHEST PAIN, UNSPECIFIED TYPE: ICD-10-CM

## 2019-11-05 RX ADMIN — Medication 5 ML: at 10:15

## 2019-11-05 NOTE — RESULT ENCOUNTER NOTE
Dear Ellyn    Your test results are attached, feel free to contact me via [employer]     Everything looks good on your stress echo.  Let me or Dr. Guaman know if you continue to have any chest pain.    Jesus Manuel Rico PA-C

## 2019-11-12 ENCOUNTER — PRE VISIT (OUTPATIENT)
Dept: UROLOGY | Facility: CLINIC | Age: 44
End: 2019-11-12

## 2019-11-12 NOTE — TELEPHONE ENCOUNTER
Reason for Visit: Consult    Diagnosis: urinary incontinence    Orders/Procedures/Records: in system    Contact Patient: n/a    Rooming Requirements: Assess which type of incontinence patient is experiencing.   If urge or mixed incontinence- UA dip / PVR.  If stress incontinence- No urine.        Nayeli Sims LPN  11/12/19  10:11 AM

## 2019-11-20 ENCOUNTER — PRE VISIT (OUTPATIENT)
Dept: UROLOGY | Facility: CLINIC | Age: 44
End: 2019-11-20

## 2019-11-20 ENCOUNTER — OFFICE VISIT (OUTPATIENT)
Dept: UROLOGY | Facility: CLINIC | Age: 44
End: 2019-11-20
Payer: COMMERCIAL

## 2019-11-20 VITALS
HEART RATE: 101 BPM | DIASTOLIC BLOOD PRESSURE: 63 MMHG | HEIGHT: 64 IN | SYSTOLIC BLOOD PRESSURE: 97 MMHG | WEIGHT: 169 LBS | BODY MASS INDEX: 28.85 KG/M2

## 2019-11-20 DIAGNOSIS — N39.3 STRESS INCONTINENCE: ICD-10-CM

## 2019-11-20 DIAGNOSIS — N39.46 MIXED STRESS AND URGE URINARY INCONTINENCE: Primary | ICD-10-CM

## 2019-11-20 PROBLEM — M54.12 CERVICAL RADICULOPATHY: Status: ACTIVE | Noted: 2018-04-16

## 2019-11-20 LAB
APPEARANCE UR: CLEAR
BILIRUB UR QL: NORMAL
COLOR UR: YELLOW
GLUCOSE URINE: NORMAL MG/DL
HGB UR QL: NORMAL
KETONES UR QL: NORMAL MG/DL
LEUKOCYTE ESTERASE URINE: NORMAL
NITRITE UR QL STRIP: NORMAL
PH UR STRIP: 6.5 PH (ref 5–7)
PROTEIN ALBUMIN URINE: 30 MG/DL
SOURCE: NORMAL
SP GR UR STRIP: 1.02 (ref 1–1.03)
UROBILINOGEN UR QL STRIP: 0.2 EU/DL (ref 0.2–1)

## 2019-11-20 RX ORDER — DEXTROAMPHETAMINE SULFATE, DEXTROAMPHETAMINE SACCHARATE, AMPHETAMINE SULFATE AND AMPHETAMINE ASPARTATE 5; 5; 5; 5 MG/1; MG/1; MG/1; MG/1
CAPSULE, EXTENDED RELEASE ORAL
COMMUNITY
Start: 2019-08-02 | End: 2020-02-26 | Stop reason: SINTOL

## 2019-11-20 ASSESSMENT — ENCOUNTER SYMPTOMS
ABDOMINAL PAIN: 1
PANIC: 1
NERVOUS/ANXIOUS: 1
NECK PAIN: 1
MUSCLE WEAKNESS: 0
DEPRESSION: 1
DYSURIA: 0
JAUNDICE: 0
DIARRHEA: 0
DIFFICULTY URINATING: 1
INSOMNIA: 1
JOINT SWELLING: 0
RECTAL PAIN: 0
BACK PAIN: 1
BOWEL INCONTINENCE: 0
STIFFNESS: 1
NAUSEA: 1
MUSCLE CRAMPS: 1
ARTHRALGIAS: 0
VOMITING: 0
FLANK PAIN: 0
CONSTIPATION: 1
BLOOD IN STOOL: 0
HEARTBURN: 1
MYALGIAS: 1
HEMATURIA: 0
DECREASED CONCENTRATION: 1
BLOATING: 0

## 2019-11-20 ASSESSMENT — PAIN SCALES - GENERAL: PAINLEVEL: NO PAIN (0)

## 2019-11-20 ASSESSMENT — PATIENT HEALTH QUESTIONNAIRE - PHQ9
SUM OF ALL RESPONSES TO PHQ QUESTIONS 1-9: 3
10. IF YOU CHECKED OFF ANY PROBLEMS, HOW DIFFICULT HAVE THESE PROBLEMS MADE IT FOR YOU TO DO YOUR WORK, TAKE CARE OF THINGS AT HOME, OR GET ALONG WITH OTHER PEOPLE: NOT DIFFICULT AT ALL
SUM OF ALL RESPONSES TO PHQ QUESTIONS 1-9: 3

## 2019-11-20 ASSESSMENT — MIFFLIN-ST. JEOR: SCORE: 1388.64

## 2019-11-20 NOTE — NURSING NOTE
"Chief Complaint   Patient presents with     Consult     Urinary incontinence       Blood pressure 97/63, pulse 101, height 1.613 m (5' 3.5\"), weight 76.7 kg (169 lb), not currently breastfeeding. Body mass index is 29.47 kg/m .    Patient Active Problem List   Diagnosis     Hyperlipidemia LDL goal <130     Anxiety state     PCOS (polycystic ovarian syndrome)     Adjustment disorder with mixed anxiety and depressed mood     External hemorrhoids     Attention deficit hyperactivity disorder (ADHD), predominantly inattentive type     TEE (generalized anxiety disorder)     Cervical radiculopathy       Allergies   Allergen Reactions     Codeine Itching     Codeine      itching       Cyclogyl [Cyclopentolate Hcl] Nausea and Vomiting     Cyclopentolate      Hydrocodone      anxiety  itching     Hydrocodone-Acetaminophen      Seasonal Allergies        Current Outpatient Medications   Medication Sig Dispense Refill     IBUPROFEN as needed.       methylphenidate (CONCERTA) 27 MG CR tablet Take 1 tablet (27 mg) by mouth every morning 30 tablet 0     PARoxetine (PAXIL-CR) 25 MG 24 hr tablet Take 2 tablets daily 180 tablet 1     propranolol (INDERAL) 10 MG tablet Take 1 tablet (10 mg) by mouth 2 times daily 60 tablet 0     ADDERALL XR 20 MG 24 hr capsule        ALPRAZolam (XANAX) 0.5 MG tablet Take 1 tablet by mouth. As needed for anxiety (Patient not taking: Reported on 11/20/2019) 15 tablet 1     PARoxetine (PAXIL-CR) 25 MG 24 hr tablet TAKE 2 TABLETS DAILY (Patient not taking: Reported on 11/20/2019) 180 tablet 1       Social History     Tobacco Use     Smoking status: Never Smoker     Smokeless tobacco: Never Used   Substance Use Topics     Alcohol use: Yes     Alcohol/week: 0.8 standard drinks     Types: 1 Standard drinks or equivalent per week     Comment: social     Drug use: No       Dayan Klein, EMT  11/20/2019  8:37 AM     "

## 2019-11-20 NOTE — PROGRESS NOTES
CC:  Urinary incontinence and feeling of incomplete emptying.     HPI:  Ellyn Mcgee is a 44 year old female asked to be seen in consultation by Dr. Guaman for the above.  This problem has been going on for many years since giving birth to her children and has been getting worse.  It is associated with stress maneuvers.  She has a couple episodes of incontinence per day and has been using some panty liners, but when she screams and has some incontinence.  She has  had no previous treatment for her condition except for some kegel exercises.  The patient voids qfew hours, nocturia X 0.  She drinks mainly water.  She denies any dysuria,  hematuria, hesitancy, intermittency, feeling of incomplete emptying, or any recent hx of UTI's or stones.  When she is under stress she has some urinary hesitancy.    The patient has no constipation or splinting.  She is sexually active and denies any dyspareunia or pelvic pain.   She denies any vaginal bulge.  She has no neurological or balance problems.     Obstetric Hx:  She is .  The weight of her largest baby was 7 lbs 8oz.  Babies were delivered vaginally.  Periods are regular.  She does have hx of PCOS.    Past Medical History:   Diagnosis Date     Abnormal Pap smear     Colposcopy     Anemia     In Past     Chlamydia trachomatis infection of other specified site      Fertility problem      Lyme disease 2010    ?false positive vs positve CMV - resolved     Moderate dysplasia of cervix      Other and unspecified adverse effect of drug, medicinal and biological substance     insulin resistent     Varicosities      Wounds and injuries     fall down stairs, PT for back, pain meds       Past Surgical History:   Procedure Laterality Date     CONIZATION CERVIX,KNIFE/LASER       SURGICAL HISTORY OF -       [place] teeth       Meds, Allergies, FHx and SHx reviewed per nurse's intake note.    ROS is negative on a 14 point scale except for some fatigue.  All other  "positive and pertinent information is mentioned in the HPI.    PEx:   Blood pressure 97/63, pulse 101, height 1.613 m (5' 3.5\"), weight 76.7 kg (169 lb), not currently breastfeeding.  5' 3.5\", Body mass index is 29.47 kg/m ., 169 lbs 0 oz  Gen appearance:  Well groomed  HEENT:  EOMI, AT NC  Psych:  Normal Affect  Neuro:  A/O X 3  Skin:  Warm to touch  Resp:  No increased respiratory effort  Vasc:  RRR  lymph:  No LE edema  Abd:  Soft/NT, ND, no palpable masses  Musk:  Full ROM in extremities  :  deferred    RU: 0cc on bladder scan by nursing.    UA: UA RESULTS:  Recent Labs   Lab Test 10/16/19  1204   COLOR Yellow   APPEARANCE Clear   URINEGLC Negative   URINEBILI Negative   URINEKETONE Negative   SG 1.010   UBLD Trace*   URINEPH 7.0   PROTEIN Negative   UROBILINOGEN 0.2   NITRITE Negative   LEUKEST Trace*   RBCU O - 2   WBCU 0 - 5       Office Visit on 10/16/2019   Component Date Value Ref Range Status     Color Urine 10/16/2019 Yellow   Final     Appearance Urine 10/16/2019 Clear   Final     Glucose Urine 10/16/2019 Negative  NEG^Negative mg/dL Final     Bilirubin Urine 10/16/2019 Negative  NEG^Negative Final     Ketones Urine 10/16/2019 Negative  NEG^Negative mg/dL Final     Specific Gravity Urine 10/16/2019 1.010  1.003 - 1.035 Final     Blood Urine 10/16/2019 Trace* NEG^Negative Final     pH Urine 10/16/2019 7.0  5.0 - 7.0 pH Final     Protein Albumin Urine 10/16/2019 Negative  NEG^Negative mg/dL Final     Urobilinogen Urine 10/16/2019 0.2  0.2 - 1.0 EU/dL Final     Nitrite Urine 10/16/2019 Negative  NEG^Negative Final     Leukocyte Esterase Urine 10/16/2019 Trace* NEG^Negative Final     Source 10/16/2019 Midstream Urine   Final     WBC Urine 10/16/2019 0 - 5  OTO5^0 - 5 /HPF Final     RBC Urine 10/16/2019 O - 2  OTO2^O - 2 /HPF Final     ASSESSMENT and PLAN:  This is a 44 year old female with stress urinary incontinence.  Different management options were discussed with the patient including observation, PT, " and surgery.  Pt. Would like to try pelvic floor PT as a first option.  And if that doesn't work she will think about surgery.    -referral to PT  -f/u in 3-4 months    Thank you for allowing me to participate in Ms. Mcgee's care.  I will keep you updated on her progress.    Gissel Rebollar MD

## 2019-11-20 NOTE — LETTER
2019       RE: Ellyn Mcgee  [address]  [facility] [address]     Dear Colleague,    Thank you for referring your patient, Ellyn Mcgee, to the [facility] UROLOGY AND INST FOR PROSTATE AND UROLOGIC CANCERS at [facility]. Please see a copy of my visit note below.    CC:  Urinary incontinence and feeling of incomplete emptying.     HPI:  Ellyn Mcgee is a 44 year old female asked to be seen in consultation by Dr. Guaman for the above.  This problem has been going on for many years since giving birth to her children and has been getting worse.  It is associated with stress maneuvers.  She has a couple episodes of incontinence per day and has been using some panty liners, but when she screams and has some incontinence.  She has  had no previous treatment for her condition except for some kegel exercises.  The patient voids qfew hours, nocturia X 0.  She drinks mainly water.  She denies any dysuria,  hematuria, hesitancy, intermittency, feeling of incomplete emptying, or any recent hx of UTI's or stones.  When she is under stress she has some urinary hesitancy.    The patient has no constipation or splinting.  She is sexually active and denies any dyspareunia or pelvic pain.   She denies any vaginal bulge.  She has no neurological or balance problems.     Obstetric Hx:  She is .  The weight of her largest baby was 7 lbs 8oz.  Babies were delivered vaginally.  Periods are regular.  She does have hx of PCOS.    Past Medical History:   Diagnosis Date     Abnormal Pap smear     Colposcopy     Anemia     In Past     Chlamydia trachomatis infection of other specified site      Fertility problem      Lyme disease 2010    ?false positive vs positve CMV - resolved     Moderate dysplasia of cervix      Other and unspecified adverse effect of drug, medicinal and biological substance     insulin resistent     Varicosities      Wounds and injuries     fall down  "stairs, PT for back, pain meds       Past Surgical History:   Procedure Laterality Date     CONIZATION CERVIX,KNIFE/LASER  1995     SURGICAL HISTORY OF -       [place] teeth       Meds, Allergies, FHx and SHx reviewed per nurse's intake note.    ROS is negative on a 14 point scale except for some fatigue.  All other positive and pertinent information is mentioned in the HPI.    PEx:   Blood pressure 97/63, pulse 101, height 1.613 m (5' 3.5\"), weight 76.7 kg (169 lb), not currently breastfeeding.  5' 3.5\", Body mass index is 29.47 kg/m ., 169 lbs 0 oz  Gen appearance:  Well groomed  HEENT:  EOMI, AT NC  Psych:  Normal Affect  Neuro:  A/O X 3  Skin:  Warm to touch  Resp:  No increased respiratory effort  Vasc:  RRR  lymph:  No LE edema  Abd:  Soft/NT, ND, no palpable masses  Musk:  Full ROM in extremities  :  deferred    RU: 0cc on bladder scan by nursing.    UA: UA RESULTS:  Recent Labs   Lab Test 10/16/19  1204   COLOR Yellow   APPEARANCE Clear   URINEGLC Negative   URINEBILI Negative   URINEKETONE Negative   SG 1.010   UBLD Trace*   URINEPH 7.0   PROTEIN Negative   UROBILINOGEN 0.2   NITRITE Negative   LEUKEST Trace*   RBCU O - 2   WBCU 0 - 5       Office Visit on 10/16/2019   Component Date Value Ref Range Status     Color Urine 10/16/2019 Yellow   Final     Appearance Urine 10/16/2019 Clear   Final     Glucose Urine 10/16/2019 Negative  NEG^Negative mg/dL Final     Bilirubin Urine 10/16/2019 Negative  NEG^Negative Final     Ketones Urine 10/16/2019 Negative  NEG^Negative mg/dL Final     Specific Gravity Urine 10/16/2019 1.010  1.003 - 1.035 Final     Blood Urine 10/16/2019 Trace* NEG^Negative Final     pH Urine 10/16/2019 7.0  5.0 - 7.0 pH Final     Protein Albumin Urine 10/16/2019 Negative  NEG^Negative mg/dL Final     Urobilinogen Urine 10/16/2019 0.2  0.2 - 1.0 EU/dL Final     Nitrite Urine 10/16/2019 Negative  NEG^Negative Final     Leukocyte Esterase Urine 10/16/2019 Trace* NEG^Negative Final     Source " 10/16/2019 Midstream Urine   Final     WBC Urine 10/16/2019 0 - 5  OTO5^0 - 5 /HPF Final     RBC Urine 10/16/2019 O - 2  OTO2^O - 2 /HPF Final     ASSESSMENT and PLAN:  This is a 44 year old female with stress urinary incontinence.  Different management options were discussed with the patient including observation, PT, and surgery.  Pt. Would like to try pelvic floor PT as a first option.  And if that doesn't work she will think about surgery.    -referral to PT  -f/u in 3-4 months    Thank you for allowing me to participate in Ms. Mcgee's care.  I will keep you updated on her progress.    Gissel Rebollar MD

## 2019-11-30 ENCOUNTER — MYC REFILL (OUTPATIENT)
Dept: FAMILY MEDICINE | Facility: CLINIC | Age: 44
End: 2019-11-30

## 2019-11-30 DIAGNOSIS — F41.1 ANXIETY STATE: ICD-10-CM

## 2019-11-30 DIAGNOSIS — F90.0 ATTENTION DEFICIT HYPERACTIVITY DISORDER (ADHD), PREDOMINANTLY INATTENTIVE TYPE: ICD-10-CM

## 2019-11-30 RX ORDER — PROPRANOLOL HYDROCHLORIDE 10 MG/1
10 TABLET ORAL 2 TIMES DAILY
Qty: 60 TABLET | Refills: 0 | Status: CANCELLED | OUTPATIENT
Start: 2019-11-30

## 2019-12-02 NOTE — TELEPHONE ENCOUNTER
Note from 10/16 OV:  3. Attention deficit hyperactivity disorder (ADHD), predominantly inattentive type  ADD no well controlled with adderall - getting boost but wonders if making her irritable  Will try concerta to see if she tolerates it any better  Call for refills if this dose is working  - methylphenidate (CONCERTA) 27 MG CR tablet; Take 1 tablet (27 mg) by mouth every morning  Dispense: 30 tablet; Refill: 0    M[employer]ule message sent to patient via [employer].  Stephanie Winter RN

## 2019-12-03 RX ORDER — METHYLPHENIDATE HYDROCHLORIDE 27 MG/1
27 TABLET ORAL EVERY MORNING
Qty: 30 TABLET | Refills: 0 | Status: SHIPPED | OUTPATIENT
Start: 2019-12-03 | End: 2020-02-26

## 2019-12-03 NOTE — TELEPHONE ENCOUNTER
PN - Please see [employer] message below    Controlled Substance Refill Request for Concerta 27 mg    Last refill: 10/21/2019 - #30    Last clinic visit: 10/16/2019     Clinic visit frequency required: Q 6  months  Next appt: None    Controlled substance agreement on file: Yes:  Date 10/4/2017.    Documentation in problem list reviewed:  Yes    Processing:  Rx to be electronically transmitted to pharmacy by provider     [place] reviewed:  reviewed- no concerns  [place]:  [email or web address]    Thanks,  Stephanie Winter RN

## 2019-12-07 ENCOUNTER — MYC MEDICAL ADVICE (OUTPATIENT)
Dept: FAMILY MEDICINE | Facility: CLINIC | Age: 44
End: 2019-12-07

## 2019-12-16 ENCOUNTER — OFFICE VISIT (OUTPATIENT)
Dept: DERMATOLOGY | Facility: CLINIC | Age: 44
End: 2019-12-16
Payer: COMMERCIAL

## 2019-12-16 DIAGNOSIS — Z80.8 FAMILY HISTORY OF MELANOMA: ICD-10-CM

## 2019-12-16 DIAGNOSIS — D22.9 MULTIPLE BENIGN NEVI: ICD-10-CM

## 2019-12-16 DIAGNOSIS — D18.01 CHERRY ANGIOMA: ICD-10-CM

## 2019-12-16 DIAGNOSIS — L81.4 SOLAR LENTIGINOSIS: ICD-10-CM

## 2019-12-16 DIAGNOSIS — Z12.83 SKIN CANCER SCREENING: Primary | ICD-10-CM

## 2019-12-16 ASSESSMENT — PAIN SCALES - GENERAL: PAINLEVEL: NO PAIN (0)

## 2019-12-16 NOTE — LETTER
"12/16/2019       RE: Ellyn Mcgee  [address]  [facility] [address]     Dear Colleague,    Thank you for referring your patient, Ellyn Mcgee, to the [facility] DERMATOLOGY at [facility]. Please see a copy of my visit note below.    [facility] Dermatology Note      Dermatology Problem List:  1. Family history of melanoma in the patient's father.  2. Ink spot lentigines - right shoulder, left lower back  3. Hx of nonscarring alopecia  4. Hx of rosacea, patient uses otc treatments  5. 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 Skin cancer screening 12/16/19, unremarkable    Encounter Date: Dec 16, 2019    CC:  Chief Complaint   Patient presents with     Skin Check     Skin check, Ellyn states \" I have some moles that are new in the past six months.\"         History of Present Illness:  Ms. Ellyn Mcgee is a 44 year old female with a family history of melanoma in her father who presents for a skin cancer screening. She was last seen in the dermatology clinic on 9/11/17 for a skin cancer screening when a neoplasm of uncertain behavior was biopsied from the left mid abdomen that later returned as a compound nevus with congenital features and scar    Today she reports that there are a few new lesions that she would like to have evaluated on her face.    Otherwise she is feeling well, without additional skin concerns at this time.      Past Medical History:   Patient Active Problem List   Diagnosis     Hyperlipidemia LDL goal <130     Anxiety state     PCOS (polycystic ovarian syndrome)     Adjustment disorder with mixed anxiety and depressed mood     External hemorrhoids     Attention deficit hyperactivity disorder (ADHD), predominantly inattentive type     TEE (generalized anxiety disorder)     Cervical radiculopathy     Past Medical History:   Diagnosis Date     Abnormal Pap smear     Colposcopy     Anemia     In Past     " Chlamydia trachomatis infection of other specified site 1993     Fertility problem      Lyme disease 9/8/2010    ?false positive vs positve CMV - resolved     Moderate dysplasia of cervix 1995     Other and unspecified adverse effect of drug, medicinal and biological substance     insulin resistent     Varicosities      Wounds and injuries     fall down stairs, PT for back, pain meds     Past Surgical History:   Procedure Laterality Date     CONIZATION CERVIX,KNIFE/LASER  1995     SURGICAL HISTORY OF -       [place] teeth       Social History:  Patient is  and is a mother. She works in [employer].   She is outside when she gardens. Wears sunscreen when she remembers.  Has used a tanning bed when she was 20 years ago.   reports that she has never smoked. She has never used smokeless tobacco. She reports current alcohol use of about 0.8 standard drinks of alcohol per week. She reports that she does not use drugs.    Family History:  Family history of melanoma in the patient's father. First diagnosed in his early 30s. Had various lymphomas throughout the next years. Passed away from a heart attack.  Family History   Problem Relation Age of Onset     Cancer Mother         vocal cord cancer     Lipids Mother      Cancer Father         B-cell lymphoma, melanoma     Lipids Father      Heart Disease Father         open heart surgery     Diabetes Paternal Grandmother      Diabetes Paternal Uncle      Alcohol/Drug Maternal Grandfather        Medications:  Current Outpatient Medications   Medication Sig Dispense Refill     ADDERALL XR 20 MG 24 hr capsule        ALPRAZolam (XANAX) 0.5 MG tablet Take 1 tablet by mouth. As needed for anxiety (Patient not taking: Reported on 11/20/2019) 15 tablet 1     IBUPROFEN as needed.       methylphenidate (CONCERTA) 27 MG CR tablet Take 1 tablet (27 mg) by mouth every morning 30 tablet 0     PARoxetine (PAXIL-CR) 25 MG 24 hr tablet TAKE 2 TABLETS DAILY (Patient not taking:  Reported on 11/20/2019) 180 tablet 1     PARoxetine (PAXIL-CR) 25 MG 24 hr tablet Take 2 tablets daily 180 tablet 1     propranolol (INDERAL) 10 MG tablet Take 1 tablet (10 mg) by mouth 2 times daily 60 tablet 0       Allergies   Allergen Reactions     Codeine Itching     Codeine      itching       Cyclogyl [Cyclopentolate Hcl] Nausea and Vomiting     Cyclopentolate      Hydrocodone      anxiety  itching     Hydrocodone-Acetaminophen      Seasonal Allergies        Review of Systems:  -As per HPI  -Constitutional: The patient denies fatigue, fevers, chills, unintended weight loss, and night sweats.  -HEENT: Patient denies nonhealing oral sores.  -Skin: As above in HPI. No additional skin concerns.    Physical exam:  Vitals: There were no vitals taken for this visit.  GEN: This is a well developed, well-nourished female in no acute distress, in a pleasant mood.    SKIN: Full skin, which includes the head/face, both arms, chest, back, abdomen,both legs, genitalia and/or groin buttocks, digits and/or nails, was examined.    - Firm tan/flesh colored papule that dimples with lateral pressure on the right upper thigh.  - Multiple regular brown pigmented macules and papules are identified on the face, trunk, and extremities.   - Scattered brown macules on sun exposed areas including the face.   - Dome shaped bright red papules on the trunk.   - No other lesions of concern on areas examined.       Impression/Plan:  1. Family history of melanoma in the patient's father    Continue regular skin checks and good sun protection practices    2. Dermatofibroma, right upper thigh    Benign nature reassured, no further intervention required at this time.     3. Multiple clinically benign nevi on the face, trunk, and extremities, including the congenital nevus on the left mid abdomen    Recommend sunscreens SPF #30 or greater, protective clothing and avoidance of tanning beds.    ABCD's of melanoma were reviewed with patient and  handout provided.     Benign nature reassured, no further intervention required at this time.     4. Solar lentigines, sun exposed skin    Benign nature reassured, no further intervention required at this time.     5. Cherry angiomas, trunk    Benign nature reassured, no further intervention required at this time.       Follow-up in 1 year, earlier for new or changing lesions.       Staff Involved:  Staff/Scribe    Scribe Disclosure:  IBarry am serving as a scribe to document services personally performed by Keke Arcos PA-C based on data collection and the provider's statements to me.     Provider Disclosure:   The documentation recorded by the scribe accurately reflects the services I personally performed and the decisions made by me.    All risks, benefits and alternatives were discussed with patient.  Patient is in agreement and understands the assessment and plan.  All questions were answered.  Sun Screen Education was given.   Return to Clinic annually or sooner as needed.   Keke Arcos PA-C   [facility]

## 2019-12-17 NOTE — PROGRESS NOTES
"[facility] Dermatology Note      Dermatology Problem List:  1. Family history of melanoma in the patient's father.  2. Ink spot lentigines - right shoulder, left lower back  3. Hx of nonscarring alopecia  4. Hx of rosacea, patient uses otc treatments  5. 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 Skin cancer screening 12/16/19, unremarkable    Encounter Date: Dec 16, 2019    CC:  Chief Complaint   Patient presents with     Skin Check     Skin check, Ellyn states \" I have some moles that are new in the past six months.\"         History of Present Illness:  Ms. Ellyn Mcgee is a 44 year old female with a family history of melanoma in her father who presents for a skin cancer screening. She was last seen in the dermatology clinic on 9/11/17 for a skin cancer screening when a neoplasm of uncertain behavior was biopsied from the left mid abdomen that later returned as a compound nevus with congenital features and scar    Today she reports that there are a few new lesions that she would like to have evaluated on her face.    Otherwise she is feeling well, without additional skin concerns at this time.      Past Medical History:   Patient Active Problem List   Diagnosis     Hyperlipidemia LDL goal <130     Anxiety state     PCOS (polycystic ovarian syndrome)     Adjustment disorder with mixed anxiety and depressed mood     External hemorrhoids     Attention deficit hyperactivity disorder (ADHD), predominantly inattentive type     TEE (generalized anxiety disorder)     Cervical radiculopathy     Past Medical History:   Diagnosis Date     Abnormal Pap smear     Colposcopy     Anemia     In Past     Chlamydia trachomatis infection of other specified site 1993     Fertility problem      Lyme disease 9/8/2010    ?false positive vs positve CMV - resolved     Moderate dysplasia of cervix 1995     Other and unspecified adverse effect of drug, medicinal and biological substance     insulin " resistent     Varicosities      Wounds and injuries     fall down stairs, PT for back, pain meds     Past Surgical History:   Procedure Laterality Date     CONIZATION CERVIX,KNIFE/LASER  1995     SURGICAL HISTORY OF -       [place] teeth       Social History:  Patient is  and is a mother. She works in [employer].   She is outside when she gardens. Wears sunscreen when she remembers.  Has used a tanning bed when she was 20 years ago.   reports that she has never smoked. She has never used smokeless tobacco. She reports current alcohol use of about 0.8 standard drinks of alcohol per week. She reports that she does not use drugs.    Family History:  Family history of melanoma in the patient's father. First diagnosed in his early 30s. Had various lymphomas throughout the next years. Passed away from a heart attack.  Family History   Problem Relation Age of Onset     Cancer Mother         vocal cord cancer     Lipids Mother      Cancer Father         B-cell lymphoma, melanoma     Lipids Father      Heart Disease Father         open heart surgery     Diabetes Paternal Grandmother      Diabetes Paternal Uncle      Alcohol/Drug Maternal Grandfather        Medications:  Current Outpatient Medications   Medication Sig Dispense Refill     ADDERALL XR 20 MG 24 hr capsule        ALPRAZolam (XANAX) 0.5 MG tablet Take 1 tablet by mouth. As needed for anxiety (Patient not taking: Reported on 11/20/2019) 15 tablet 1     IBUPROFEN as needed.       methylphenidate (CONCERTA) 27 MG CR tablet Take 1 tablet (27 mg) by mouth every morning 30 tablet 0     PARoxetine (PAXIL-CR) 25 MG 24 hr tablet TAKE 2 TABLETS DAILY (Patient not taking: Reported on 11/20/2019) 180 tablet 1     PARoxetine (PAXIL-CR) 25 MG 24 hr tablet Take 2 tablets daily 180 tablet 1     propranolol (INDERAL) 10 MG tablet Take 1 tablet (10 mg) by mouth 2 times daily 60 tablet 0       Allergies   Allergen Reactions     Codeine Itching     Codeine      itching        Cyclogyl [Cyclopentolate Hcl] Nausea and Vomiting     Cyclopentolate      Hydrocodone      anxiety  itching     Hydrocodone-Acetaminophen      Seasonal Allergies        Review of Systems:  -As per HPI  -Constitutional: The patient denies fatigue, fevers, chills, unintended weight loss, and night sweats.  -HEENT: Patient denies nonhealing oral sores.  -Skin: As above in HPI. No additional skin concerns.    Physical exam:  Vitals: There were no vitals taken for this visit.  GEN: This is a well developed, well-nourished female in no acute distress, in a pleasant mood.    SKIN: Full skin, which includes the head/face, both arms, chest, back, abdomen,both legs, genitalia and/or groin buttocks, digits and/or nails, was examined.    - Firm tan/flesh colored papule that dimples with lateral pressure on the right upper thigh.  - Multiple regular brown pigmented macules and papules are identified on the face, trunk, and extremities.   - Scattered brown macules on sun exposed areas including the face.   - Dome shaped bright red papules on the trunk.   - No other lesions of concern on areas examined.       Impression/Plan:  1. Family history of melanoma in the patient's father    Continue regular skin checks and good sun protection practices    2. Dermatofibroma, right upper thigh    Benign nature reassured, no further intervention required at this time.     3. Multiple clinically benign nevi on the face, trunk, and extremities, including the congenital nevus on the left mid abdomen    Recommend sunscreens SPF #30 or greater, protective clothing and avoidance of tanning beds.    ABCD's of melanoma were reviewed with patient and handout provided.     Benign nature reassured, no further intervention required at this time.     4. Solar lentigines, sun exposed skin    Benign nature reassured, no further intervention required at this time.     5. Cherry angiomas, trunk    Benign nature reassured, no further intervention required  at this time.       Follow-up in 1 year, earlier for new or changing lesions.       Staff Involved:  Staff/Scribe    Scribe Disclosure:  I, Barry Bryan am serving as a scribe to document services personally performed by Kkee Arcos PA-C based on data collection and the provider's statements to me.     Provider Disclosure:   The documentation recorded by the scribe accurately reflects the services I personally performed and the decisions made by me.    All risks, benefits and alternatives were discussed with patient.  Patient is in agreement and understands the assessment and plan.  All questions were answered.  Sun Screen Education was given.   Return to Clinic annually or sooner as needed.   Keke Arcos PA-C   [facility]

## 2019-12-17 NOTE — NURSING NOTE
"Dermatology Rooming Note    Ellyn Mcgee's goals for this visit include:   Chief Complaint   Patient presents with     Skin Check     Skin check, Ellyn states \" I have some moles that are new in the past six months.\"     Debby Noland LPN     "

## 2019-12-18 ENCOUNTER — THERAPY VISIT (OUTPATIENT)
Dept: PHYSICAL THERAPY | Facility: CLINIC | Age: 44
End: 2019-12-18
Payer: COMMERCIAL

## 2019-12-18 DIAGNOSIS — N39.3 FEMALE STRESS INCONTINENCE: ICD-10-CM

## 2019-12-18 DIAGNOSIS — N39.3 STRESS INCONTINENCE: ICD-10-CM

## 2019-12-18 DIAGNOSIS — M99.05 SOMATIC DYSFUNCTION OF PELVIC REGION: ICD-10-CM

## 2019-12-18 PROCEDURE — 97530 THERAPEUTIC ACTIVITIES: CPT | Mod: GP | Performed by: PHYSICAL THERAPIST

## 2019-12-18 PROCEDURE — 97110 THERAPEUTIC EXERCISES: CPT | Mod: GP | Performed by: PHYSICAL THERAPIST

## 2019-12-18 PROCEDURE — 97161 PT EVAL LOW COMPLEX 20 MIN: CPT | Mod: GP | Performed by: PHYSICAL THERAPIST

## 2019-12-18 PROCEDURE — 97112 NEUROMUSCULAR REEDUCATION: CPT | Mod: GP | Performed by: PHYSICAL THERAPIST

## 2019-12-18 NOTE — PROGRESS NOTES
[facility]  System  Physical Exam  General   ROS     Physical Therapy Initial Examination/Evaluation December 18, 2019   Ellyn Mcgee is a 44 year old female referred to physical therapy by Dr. Rebollar for treatment of Stress incontinence  with Precautions/Restrictions/MD instructions E&T   Therapist Assessment:   Clinical Impression: Pt presents to [facility]  with primary complaint of urinary incontinence.  Per clinical examination, pt with low tone and decreased control and awareness of pelvic floor muscles. Pt will benefit from skilled physical therapy for strengthening program as well as education on proper voiding technique and bladder health to address the above impairments and functional limitations.      Subjective: Pt noticed that she had a hard time urinating in public places in her late 20s. After the birth of her first daughter 14 years ago,  started with stress incontinence and has remained present.  She also feels decrease control with gas. She has had a lot of stress in her life recently with marriage issues and deaths and health complications in parents.    DOI/onset: MD 11/20/2019   Mechanism of injury: unsure; worse after childbirth    DOS NA   Related PMH: inability to empty bladder, stress incontinence  Previous treatment: NA   Imaging: US to make sure she empties bladder-normal   Chief Complaint: Stress incontinence    Pain: rest 0 /10, activity 3-4/10  Described as: sharp, muscle throb/ache Alleviated by: sometimes with intercourse Exacerbated by: unsure  Progression of symptoms since initial onset: improving  Time of day when pain is worse: none noted    Sleeping: typically does  Not get up   Social history: 14 year old daughter, 7 year old daughter; mom had a heart attack in the past year  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 Yes    Do you leak with cough,sneeze, jumping, running?Yes   Any other activities that cause leaking? Yes ; lifting, yelling, laughing  Do you have triggers that make you feel you can't wait to go to the bathroom? No    Type of pad and number used per day? 1-3 pantyliners; pad if she knows she will be lifting  When you leak what is the amount? Small-Medium    How long can you delay the need to urinate? 15-20 minutes.   How many times do you get up to urinate at night? 0    Can you stop the flow of urine when on the toilet? Usually   Is the volume of urine passed usually: Usually medium but if she gets the urge to urinate soon after, will go small amount. (8sec rule=  250ml with average bladder storing  400-600ml)    Do you strain to pass urine? Sometimes when fully trying to empty bladder  Do you have a slow or hesitant urinary stream? sometimes  Do you have difficulty initiating the urine stream? sometimes  Is urination painful?  No    How many bladder infections have you had in last 12 months? 0    Fluid intake(one glass is 8oz or one cup) 8-12 glasses/day,  1-3 cups caffinated glasses/day  Occasional  alcohol glasses/day.    Bowel habits:  Frequency of bowel movements? 0-3 times a day   Consistancy of stool? Soft formed-hard  Do you ignore the urge to defecate? No  Do you strain to pass stool? Sometimes    Pelvic Pain:  Do you have any pelvic pain with intercourse, exams, use of tampons? Not usually   Is initial penetration during intercourse painful? No  Is deeper penetration painful? Occasionally   Do you use lubricant?   No      Given birth? Yes Any complications? Umbilical cord around  neck, water breaking with no contraction ; second daughter came very fast   # of vaginal delieveries? 2   # of C-sections? 0  # of episiotomies? Possibly 1.  Are you sexually active?Yes  Have you ever been worried for your physical safety? No  Any abdominal or pelvic surgeries?  no  Are you having any regular exercise? Not formal since August   Have you practiced the PF(kegel) exercises for 4 or more weeks? No      OBSERVATION  Decreased lumbar lordosis     LUMBAR ROM  Flexion: Hyper  Extension: WNL  Sidebending: WNL  Rotation: 75% bilat   Single leg standins bilat; functional weakness noted on the L     MUSCLE PERFORMANCE    Baseline PF tone:hypo  PF Tone with cough: Slight bulge  Valsalva: not tested  PF Response quality: sluggish  PF Power: Center: 1-2/5   Endurance: Maximum contraction in seconds: < 3 seconds  # of endurance contractions before fatigue: NT  Quick contraction repetitions prior to fatigue: Unable to coordinate consecutive contractions .  Specificity/accessory muscles: Abdominals, adductors  Prolapse: Grade I cystocele     Hip MMT 2019 Left Right   Hip Flexion  5/5 5/5   Hip Abduction  4/5 4+/5   Hip Extension  4/5 4+/5         PALPATION: Non-tender all superficial structures with digital evaluation  Small cystocele present with cough     NMR (15 mins)  Biofeedback unit used to provide visualization of PF activation in supine position.   Education provided on tone of pelvic floor and how this can affect symptoms. Pt having difficulty isolating and controlling pelvic floor muscles. Discussed activation of TA and performed contraction in both supine and sitting working up to 20-30s holds.  Advanced exercises as able.  Pt provided with HEP.    Therapeutic Activity (20 min): Today's session consisted of education regarding pelvic floor muscle anatomy, normal bladder function, urge suppression techniques and/or relaxation techniques as indicated.   Pt was instructed in the pathoanatomy of the  pelvic floor utilizing pelvic model.  We discussed what pelvic floor physical therapy is, components of exam, and typical patient progression.  Pt was told that she was in control of exam progression, and if at any time was uncomfortable and wished to discontinue we could.       Assessment/Plan:    Patient is a 44 year old female with pelvic complaints.    Patient has the following significant findings with corresponding treatment plan.                Diagnosis 1:  Stress Incontinence  Decreased strength - therapeutic exercise, therapeutic activities and home program  Impaired muscle performance - biofeedback, electric stimulation, neuro re-education and home program  Decreased function - therapeutic activities and home program    Therapy Evaluation Codes:   1) History comprised of:   Personal factors that impact the plan of care:      Past/current experiences and Time since onset of symptoms.    Comorbidity factors that impact the plan of care are:      None noted.     Medications impacting care: None.  2) Examination of Body Systems comprised of:   Body structures and functions that impact the plan of care:      Pelvis.   Activity limitations that impact the plan of care are:      Stress incontinence, Urge incontinence and Urinary incontinence.  3) Clinical presentation characteristics are:   Stable/Uncomplicated.  4) Decision-Making    Low complexity using standardized patient assessment instrument and/or measureable assessment of functional outcome.  Cumulative Therapy Evaluation is: Low complexity.    Previous and current functional limitations:  (See Goal Flow Sheet for this information)    Short term and Long term goals: (See Goal Flow Sheet for this information)     Communication ability:  Patient appears to be able to clearly communicate and understand verbal and written communication and follow directions correctly.  Treatment Explanation - The following has been discussed with the patient:   RX  ordered/plan of care  Anticipated outcomes  Possible risks and side effects  This patient would benefit from PT intervention to resume normal activities.   Rehab potential is good.    Frequency:  1 X week, once daily  Duration:  for 6 weeks  Discharge Plan:  Achieve all LTG.  Independent in home treatment program.  Reach maximal therapeutic benefit.    Please refer to the daily flowsheet for treatment today, total treatment time and time spent performing 1:1 timed codes.

## 2019-12-23 ENCOUNTER — THERAPY VISIT (OUTPATIENT)
Dept: PHYSICAL THERAPY | Facility: CLINIC | Age: 44
End: 2019-12-23
Payer: COMMERCIAL

## 2019-12-23 DIAGNOSIS — N39.3 FEMALE STRESS INCONTINENCE: ICD-10-CM

## 2019-12-23 DIAGNOSIS — M99.05 SOMATIC DYSFUNCTION OF PELVIC REGION: ICD-10-CM

## 2019-12-23 PROCEDURE — 97112 NEUROMUSCULAR REEDUCATION: CPT | Mod: GP | Performed by: PHYSICAL THERAPIST

## 2019-12-23 PROCEDURE — 97530 THERAPEUTIC ACTIVITIES: CPT | Mod: GP | Performed by: PHYSICAL THERAPIST

## 2019-12-30 ENCOUNTER — TRANSFERRED RECORDS (OUTPATIENT)
Dept: HEALTH INFORMATION MANAGEMENT | Facility: CLINIC | Age: 44
End: 2019-12-30

## 2020-01-10 ENCOUNTER — MYC MEDICAL ADVICE (OUTPATIENT)
Dept: FAMILY MEDICINE | Facility: CLINIC | Age: 45
End: 2020-01-10

## 2020-01-10 DIAGNOSIS — F90.0 ATTENTION DEFICIT HYPERACTIVITY DISORDER (ADHD), PREDOMINANTLY INATTENTIVE TYPE: Primary | ICD-10-CM

## 2020-01-10 RX ORDER — METHYLPHENIDATE HYDROCHLORIDE 36 MG/1
36 TABLET ORAL EVERY MORNING
Qty: 30 TABLET | Refills: 0 | Status: SHIPPED | OUTPATIENT
Start: 2020-01-10 | End: 2020-02-26

## 2020-01-10 NOTE — TELEPHONE ENCOUNTER
PN    Please see [employer] message below.  Last OV 10/16/19    Do you want to see patient?  Telephone visit?    Stephanie Winter RN

## 2020-01-16 ENCOUNTER — TELEPHONE (OUTPATIENT)
Dept: FAMILY MEDICINE | Facility: CLINIC | Age: 45
End: 2020-01-16

## 2020-01-16 NOTE — TELEPHONE ENCOUNTER
Prior Authorization Retail Medication Request    Medication/Dose: methylphenidate (CONCERTA) 36 MG CR tablet  ICD code (if different than what is on RX):    Previously Tried and Failed:  methylphenidate (CONCERTA) 27 MG CR tablet, Adderall XR 10mg cap, Adderall XR 20mg cap, Adderall Xr 25mg cap  Rationale:      Insurance Name:  [phone]  Insurance ID:  [id number]      Pharmacy Information (if different than what is on RX)  Name:  [address]  Phone:  [phone]

## 2020-01-17 NOTE — TELEPHONE ENCOUNTER
Central Prior Authorization Team   Phone: [phone]    PA Initiation    Medication: methylphenidate (CONCERTA) 36 MG CR tablet  Insurance Company: [employer] - Phone [phone] Fax [phone]  [id number] [address] [facility]  Filling Pharmacy Phone: [phone]  Filling Pharmacy Fax: [phone]  Start Date: 1/17/2020

## 2020-01-21 NOTE — TELEPHONE ENCOUNTER
PA not needed.  There is an approval on file good through 10/17/2020.  Pharmacy notified.  Pharmacy processed and patient picked up.

## 2020-02-05 ENCOUNTER — MYC MEDICAL ADVICE (OUTPATIENT)
Dept: FAMILY MEDICINE | Facility: CLINIC | Age: 45
End: 2020-02-05

## 2020-02-05 NOTE — TELEPHONE ENCOUNTER
PN    Please see Mychart message below.  Patient informed you are out of the office    Thanks,  Stephanie Winter RN

## 2020-02-26 ENCOUNTER — OFFICE VISIT (OUTPATIENT)
Dept: FAMILY MEDICINE | Facility: CLINIC | Age: 45
End: 2020-02-26

## 2020-02-26 ENCOUNTER — MYC REFILL (OUTPATIENT)
Dept: FAMILY MEDICINE | Facility: CLINIC | Age: 45
End: 2020-02-26

## 2020-02-26 VITALS
HEIGHT: 64 IN | BODY MASS INDEX: 30.29 KG/M2 | HEART RATE: 77 BPM | DIASTOLIC BLOOD PRESSURE: 88 MMHG | WEIGHT: 177.44 LBS | RESPIRATION RATE: 16 BRPM | OXYGEN SATURATION: 99 % | SYSTOLIC BLOOD PRESSURE: 135 MMHG

## 2020-02-26 DIAGNOSIS — F41.1 ANXIETY STATE: ICD-10-CM

## 2020-02-26 DIAGNOSIS — F90.0 ATTENTION DEFICIT HYPERACTIVITY DISORDER (ADHD), PREDOMINANTLY INATTENTIVE TYPE: ICD-10-CM

## 2020-02-26 PROCEDURE — 99214 OFFICE O/P EST MOD 30 MIN: CPT | Performed by: FAMILY MEDICINE

## 2020-02-26 RX ORDER — METHYLPHENIDATE HYDROCHLORIDE 36 MG/1
TABLET ORAL
Qty: 65 TABLET | Refills: 0 | Status: SHIPPED | OUTPATIENT
Start: 2020-02-26 | End: 2020-05-15

## 2020-02-26 RX ORDER — METHYLPHENIDATE HYDROCHLORIDE 27 MG/1
TABLET ORAL
Qty: 25 TABLET | Refills: 0 | Status: SHIPPED | OUTPATIENT
Start: 2020-02-26 | End: 2020-05-15

## 2020-02-26 ASSESSMENT — MIFFLIN-ST. JEOR: SCORE: 1426.91

## 2020-02-26 ASSESSMENT — PAIN SCALES - GENERAL: PAINLEVEL: NO PAIN (0)

## 2020-02-26 NOTE — PROGRESS NOTES
Subjective     Ellyn Mcgee is a 44 year old female who presents to clinic today for the following health issues:    HPI     ADD  Switched to the concerta from the adderall  Takes the 36 and feels like some days it is too much   Family is all on the concerta - getting 27mg    If getting too much feels like agitated - more reactive  Working part-time at [employer] - needs higher dose when she is at work  At home needs lower dose so she doesn't     paxil - taking 25mg - two a day    -------------------------------------    Patient Active Problem List   Diagnosis     Hyperlipidemia LDL goal <130     Anxiety state     PCOS (polycystic ovarian syndrome)     Adjustment disorder with mixed anxiety and depressed mood     External hemorrhoids     Attention deficit hyperactivity disorder (ADHD), predominantly inattentive type     TEE (generalized anxiety disorder)     Cervical radiculopathy     Somatic dysfunction of pelvic region     Female stress incontinence     Past Surgical History:   Procedure Laterality Date     CONIZATION CERVIX,KNIFE/LASER  1995     SURGICAL HISTORY OF -       [place] teeth       Social History     Tobacco Use     Smoking status: Never Smoker     Smokeless tobacco: Never Used   Substance Use Topics     Alcohol use: Yes     Alcohol/week: 0.8 standard drinks     Types: 1 Standard drinks or equivalent per week     Comment: social     Family History   Problem Relation Age of Onset     Cancer Mother         vocal cord cancer     Lipids Mother      Cancer Father         B-cell lymphoma, melanoma     Lipids Father      Heart Disease Father         open heart surgery     Diabetes Paternal Grandmother      Diabetes Paternal Uncle      Alcohol/Drug Maternal Grandfather            -------------------------------------  Reviewed and updated as needed this visit by Provider  Tobacco  Allergies  Meds  Problems  Med Hx  Surg Hx  Fam Hx         Review of Systems   ROS COMP: Constitutional, HEENT, cardiovascular,  "pulmonary, gi and gu systems are negative, except as otherwise noted.      Objective    /88 (BP Location: Right arm, Patient Position: Sitting, Cuff Size: Adult Regular)   Pulse 77   Resp 16   Ht 1.613 m (5' 3.5\")   Wt 80.5 kg (177 lb 7 oz)   LMP 02/25/2020 (Exact Date)   SpO2 99%   Breastfeeding No   BMI 30.94 kg/m    Body mass index is 30.94 kg/m .  Physical Exam   GENERAL: healthy, alert and no distress  CV: regular rate and rhythm, normal S1 S2, no S3 or S4, no murmur, click or rub, no peripheral edema and peripheral pulses strong    Diagnostic Test Results:  Labs reviewed in Epic        Assessment & Plan     1. Attention deficit hyperactivity disorder (ADHD), predominantly inattentive type  ADD -   Not well controlled while at work  She did try the higher dose of concerta and helps but too much on days she is at home - just getting more irritable  Will write for two doses so she can take less on weekends and more during the week.  Call for refills for next 6 months  - methylphenidate (CONCERTA) 36 MG CR tablet; Take one tablet daily on M, Tue, Wed, Thur, Friday (other dose rest of week)  Dispense: 65 tablet; Refill: 0  - methylphenidate (CONCERTA) 27 MG CR tablet; Take one tablet on Sat & Sun (other dose rest of week)  Dispense: 25 tablet; Refill: 0     BMI:   Estimated body mass index is 30.94 kg/m  as calculated from the following:    Height as of this encounter: 1.613 m (5' 3.5\").    Weight as of this encounter: 80.5 kg (177 lb 7 oz).           Return in about 6 months (around 8/26/2020) for ADD.    Dorothy Guaman, DO  [facility]        "

## 2020-02-27 RX ORDER — PROPRANOLOL HYDROCHLORIDE 10 MG/1
10 TABLET ORAL 2 TIMES DAILY
Qty: 180 TABLET | Refills: 1 | Status: SHIPPED | OUTPATIENT
Start: 2020-02-27 | End: 2020-07-15

## 2020-02-27 NOTE — TELEPHONE ENCOUNTER
"Prescription approved per [facility] Refill Protocol.  MyChart sent to pt  Keeley BROWNING RN    Last Written Prescription Date:  10/16/2019  Last Fill Quantity: 60,  # refills: 0   Last office visit: No previous visit found with prescribing provider:     Future Office Visit:   Next 5 appointments (look out 90 days)    Apr 15, 2020  1:30 PM CDT  Office Visit with Dorothy Guaman DO  [facility] ([facility]) [address] [address] [phone]          Requested Prescriptions   Pending Prescriptions Disp Refills     propranolol (INDERAL) 10 MG tablet 60 tablet 0     Sig: Take 1 tablet (10 mg) by mouth 2 times daily       Beta-Blockers Protocol Passed - 2/26/2020  9:18 PM        Passed - Blood pressure under 140/90 in past 12 months     BP Readings from Last 3 Encounters:   02/26/20 135/88   11/20/19 97/63   10/16/19 119/82                 Passed - Patient is age 6 or older        Passed - Recent (12 mo) or future (30 days) visit within the authorizing provider's specialty     Patient has had an office visit with the authorizing provider or a provider within the authorizing providers department within the previous 12 mos or has a future within next 30 days. See \"Patient Info\" tab in inbasket, or \"Choose Columns\" in Meds & Orders section of the refill encounter.              Passed - Medication is active on med list          "

## 2020-04-02 ENCOUNTER — VIRTUAL VISIT (OUTPATIENT)
Dept: FAMILY MEDICINE | Facility: OTHER | Age: 45
End: 2020-04-02

## 2020-04-02 NOTE — PROGRESS NOTES
"Date: 2020 16:33:07  Clinician: Nasra Hall  Clinician NPI: [id number]  Patient: Ellyn Mcgee  Patient : 1975  Patient Address: [address] [place] [address]  Patient Phone: [phone]  Visit Protocol: URI  Patient Summary:  Ellyn is a 44 year old ( : 1975 ) female who initiated a Visit for COVID-19 (Coronavirus) evaluation and screening. When asked the question \"Please sign me up to receive news, health information and promotions. \", Ellyn responded \"No\".    Ellyn states her symptoms started suddenly 10-13 days ago. After her symptoms started, they improved and then got worse again.   Her symptoms consist of a headache, myalgia, a cough, malaise, and ear pain. She is experiencing mild difficulty breathing with activities but can speak normally in full sentences. Ellyn also feels feverish.   Symptom details     Cough: Ellyn coughs a few times an hour and her cough is more bothersome at night. Phlegm does not come into her throat when she coughs. She does not believe her cough is caused by post-nasal drip.     Temperature: Her current temperature is 98.5 degrees Fahrenheit.     Headache: She states the headache is mild (1-3 on a 10 point pain scale).      Ellyn denies having teeth pain, facial pain or pressure, chills, wheezing, sore throat, nasal congestion, and rhinitis. She also denies having a sinus infection within the past year, taking antibiotic medication for the symptoms, and having recent facial or sinus surgery in the past 60 days.   Precipitating events  She has recently been exposed to someone with influenza. Ellyn has been in close contact with the following high risk individuals: people with asthma, heart disease or diabetes.   Pertinent COVID-19 (Coronavirus) information  Ellyn has not traveled internationally or to the areas where COVID-19 (Coronavirus) is widespread, including cruise ship travel in the last 14 days before the start of her symptoms.   Ellyn has not " "had a close contact with a laboratory-confirmed COVID-19 patient within 14 days of symptom onset. She has had a close contact with a suspected COVID-19 patient within 14 days of symptom onset. 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  I worked both jobs within 5 days of start of symptoms, this was as social distancing was just starting/wasn't being taken seriously by many yet.  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   Ellyn is not a healthcare worker or a  and does not work in a healthcare facility. She does not live with a healthcare worker.   Pertinent medical history  Ellyn typically gets a yeast infection when she takes antibiotics. She is not sure if she has used fluconazole (Diflucan) to treat previous yeast infections.   Ellyn needs a return to work/school note.   Weight: 175 lbs   Ellyn does not smoke or use smokeless tobacco.   She denies pregnancy and denies breastfeeding. She has menstruated in the past month.   Additional information as reported by the patient (free text): in last week, my temp has ranged 96.5 to 98.6(my \"normal\" is 97.6). No official fever, but enough of variation to make me feel worse when it gets up to 98.  Main symptom is exhaustion, especially if on feet for 60 minutes+, 2nd main sympt is lungs hurt (worse when tired). I mostly cough when I take a deep breath. also experiencing some allergy sympts (mostly sneezing) so may have combo diagnosis... Have gotten all work shifts covered since 3/20, HR wondering if self-quarantine is dr. recommended   Weight: 175 lbs    MEDICATIONS: ibuprofen oral, " propranolol oral, Concerta oral, paroxetine oral, ALLERGIES: codeine, Cyclogyl, hydrocodone  Clinician Response:  Dear Ellyn,   Based on the information you have provided, you do have symptoms that are consistent with Coronavirus (COVID-19).  The coronavirus causes mild to severe respiratory illness with the most common symptoms including fever, cough and difficulty breathing. Unfortunately, many viruses cause similar symptoms and it can be difficult to distinguish between viruses, especially in mild cases, so we are presuming that anyone with cough or fever has coronavirus at this time.  Coronavirus/COVID-19 has reached the point of community spread in [place], meaning that we are finding the virus in people with no known exposure risk for khushbu the virus. Given the increasing commonness of coronavirus in the community we are no longer testing patients who are not critically ill.  If you are a health care worker, you should refer to your employee health office for instructions about testing and returning to work.  For everyone else who has cough or fever, you should assume you are infected with coronavirus. Since you will not be tested but have symptoms that may be consistent with coronavirus, the CDC recommends you stay in self-isolation until these three things have happened:    You have had no fever for at least 72 hours (that is three full days of no fever without the use of medicine that reduces fevers)    AND   Other symptoms have improved (for example, when your cough or shortness of breath have improved)   AND   At least 7 days have passed since your symptoms first appeared.   How to Isolate:   Isolate yourself at home.  Do Not allow any visitors  Do Not go to work or school  Do Not go to Y[employer],  centers, shopping, or other public places.  Do Not shake hands.  Avoid close contact with others (hugging, kissing).   Protect Others:   Cover Your Mouth and Nose with a mask, disposable tissue or  wash cloth to avoid spreading germs to others.  Wash your hands and face frequently with soap and water.   We know it can be scary to hear that you might have COVID-19. Our team can help track your symptoms and make sure you are doing ok over the next two weeks using a program called [employer] to keep in touch. When you receive an email from [employer], please consider enrolling in our monitoring program. There is no cost to you for monitoring. Here is a URL where you can learn more: [email or web address]  Managing Symptoms:   At this time, we primarily recommend Tylenol (Acetaminophen) for fever or pain. If you have liver or kidney problems, contact your primary care provider for instructions on use of tylenol. Adults can take 650 mg (two 325 mg pills) by mouth every 4-6 hours as needed OR 1,000 mg (two 500 mg pills) every 8 hours as needed. MAXIMUM DAILY DOSE: 3,000mg. For children, refer to dosing on bottle based on age or weight.   If you develop significant shortness of breath that prevents you from doing normal activities, please call 911 or proceed to the nearest emergency room and alert them immediately that you have been in self-isolation for possible coronavirus.  If you have a higher risk medical condition such as cancer, heart failure, end stage renal disease on dialysis or have a transplant, please reach out to your specialist's clinic to advise them of your [facility] visit should you not improve within the next two days.   For more information about COVID19 and options for caring for yourself at home, please visit the CDC website at https://www.cdc.gov/coronavirus/2019-ncov/about/steps-when-sick.htmlFor more options for care at [facility], please visit our website at [email or web address]    Diagnosis: Cough  Diagnosis ICD: R05

## 2020-04-16 PROBLEM — N39.3 FEMALE STRESS INCONTINENCE: Status: RESOLVED | Noted: 2019-12-18 | Resolved: 2020-04-16

## 2020-04-16 PROBLEM — M99.05 SOMATIC DYSFUNCTION OF PELVIC REGION: Status: RESOLVED | Noted: 2019-12-18 | Resolved: 2020-04-16

## 2020-04-16 NOTE — PROGRESS NOTES
Discharge Note    Progress reporting period is from Dec 18, 2019 to Dec 23, 2019.     Ellyn failed to return for next follow up visit and current status is unknown.  Please see information below for last relevant information on current status.  Patient seen for Rxs Used: 2 visits.  SUBJECTIVE  Subjective changes noted by patient:  Subjective: Pt reports that she was only able to get to exericses 1x and she lost her exercise sheet so was unsure if she was completing them correctly.   .  Current pain level is  .     Previous pain level was   .   Changes in function:  Yes (See Goal flowsheet attached for changes in current functional level)  Adverse reaction to treatment or activity: None    OBJECTIVE  Changes noted in objective findings: Objective: Assisted pt with getting exercises on phone. Reviewed HEP. Discussed bladder diary and instructed pt to bring to next session.      ASSESSMENT/PLAN  Diagnosis: Stress incontinence    Updated problem list and treatment plan:   Decreased function - HEP  STG/LTGs have been met or progress has been made towards goals:  Yes, please see goal flowsheet for most current information  Assessment of Progress: current status is unknown.  Last current status:     Self Management Plans:  HEP  I have re-evaluated this patient and find that the nature, scope, duration and intensity of the therapy is appropriate for the medical condition of the patient.  Ellyn continues to require the following intervention to meet STG and LTG's:  HEP.    Recommendations:  Discharge with current home program.  Patient to follow up with MD as needed.    Please refer to the daily flowsheet for treatment today, total treatment time and time spent performing 1:1 timed codes.

## 2020-04-17 ENCOUNTER — VIRTUAL VISIT (OUTPATIENT)
Dept: FAMILY MEDICINE | Facility: CLINIC | Age: 45
End: 2020-04-17
Payer: COMMERCIAL

## 2020-04-17 DIAGNOSIS — Z20.822 SUSPECTED COVID-19 VIRUS INFECTION: Primary | ICD-10-CM

## 2020-04-17 PROCEDURE — 99213 OFFICE O/P EST LOW 20 MIN: CPT | Mod: 95 | Performed by: FAMILY MEDICINE

## 2020-04-17 RX ORDER — ALBUTEROL SULFATE 90 UG/1
2 AEROSOL, METERED RESPIRATORY (INHALATION) EVERY 6 HOURS
Qty: 1 INHALER | Refills: 0 | Status: SHIPPED | OUTPATIENT
Start: 2020-04-17 | End: 2020-10-19

## 2020-04-17 NOTE — PROGRESS NOTES
"Ellyn Mcgee is a 44 year old female who is being evaluated via a billable video visit.      The patient has been notified of following:     \"This video visit will be conducted via a call between you and your physician/provider. We have found that certain health care needs can be provided without the need for an in-person physical exam.  This service lets us provide the care you need with a video conversation.  If a prescription is necessary we can send it directly to your pharmacy.  If lab work is needed we can place an order for that and you can then stop by our lab to have the test done at a later time.    Video visits are billed at different rates depending on your insurance coverage.  Please reach out to your insurance provider with any questions.    If during the course of the call the physician/provider feels a video visit is not appropriate, you will not be charged for this service.\"    Patient has given verbal consent for Video visit? Yes    How would you like to obtain your AVS? Shahram    Patient would like the video invitation sent by: Text to cell phone: [phone]     Subjective     Ellyn Mcgee is a 44 year old female who presents to clinic today for the following health issues:    RESPIRATORY SYMPTOMS      Duration: 1 month    Description  fever    Severity: moderate    Accompanying signs and symptoms: None    History (predisposing factors):  none    Precipitating or alleviating factors: None    Therapies tried and outcome:  rest and fluids    Has had feelings of fatigue and lungs hurting  Symptoms actually started about 3-4 weeks ago   Did oncare visit - see note - she was told it could be COVID suspected.  Lungs are hurting -   Tried daughters Albuterol inhaler - thinks it did help after a little.  Fever not in the >100.4F range- temp 98.5F in the afternoon (pt states she runs low)  Entire body hurts now      Looking for a note that still struggling with symptoms     email  Video Start Time: " "11:05am       -------------------------------------    Patient Active Problem List   Diagnosis     Hyperlipidemia LDL goal <130     Anxiety state     PCOS (polycystic ovarian syndrome)     Adjustment disorder with mixed anxiety and depressed mood     External hemorrhoids     Attention deficit hyperactivity disorder (ADHD), predominantly inattentive type     TEE (generalized anxiety disorder)     Cervical radiculopathy     Past Surgical History:   Procedure Laterality Date     CONIZATION CERVIX,KNIFE/LASER  1995     SURGICAL HISTORY OF -       [place] teeth       Social History     Tobacco Use     Smoking status: Never Smoker     Smokeless tobacco: Never Used   Substance Use Topics     Alcohol use: Yes     Alcohol/week: 0.8 standard drinks     Types: 1 Standard drinks or equivalent per week     Comment: social     Family History   Problem Relation Age of Onset     Cancer Mother         vocal cord cancer     Lipids Mother      Cancer Father         B-cell lymphoma, melanoma     Lipids Father      Heart Disease Father         open heart surgery     Diabetes Paternal Grandmother      Diabetes Paternal Uncle      Alcohol/Drug Maternal Grandfather            Reviewed and updated as needed this visit by Provider  Tobacco  Allergies  Meds  Problems  Med Hx  Surg Hx  Fam Hx         Review of Systems   ROS COMP: Constitutional, HEENT, cardiovascular, pulmonary, GI, , musculoskeletal, neuro, skin, endocrine and psych systems are negative, except as otherwise noted.      Objective    There were no vitals taken for this visit.  Estimated body mass index is 30.94 kg/m  as calculated from the following:    Height as of 2/26/20: 1.613 m (5' 3.5\").    Weight as of 2/26/20: 80.5 kg (177 lb 7 oz).  Physical Exam   GENERAL: healthy, alert and no distress    Diagnostic Test Results:  Labs reviewed in Epic        Assessment & Plan     1. Suspected Covid-19 Virus Infection  Suspected COVID just based on symptoms but do not have " "test to verify  Discussed using albuterol inhaler prn chest tightness  Will also have her start an allergy med - wonder about spring allergies triggering reactive airway symptoms?  Will have her continue to rest and isolate.  Pt will call or RTC if symptoms worsen or do not improve.        BMI:   Estimated body mass index is 30.94 kg/m  as calculated from the following:    Height as of 2/26/20: 1.613 m (5' 3.5\").    Weight as of 2/26/20: 80.5 kg (177 lb 7 oz).           Advised of warning signs and symptoms.    Note give for patient for work that she will be out another week.    Return in about 1 week (around 4/24/2020) for If symptoms worsen or do not improve.    Dorothy Guaman DO  [facility]      Video-Visit Details    Type of service:  Video Visit    Video End Time (time video stopped): 11:24am    Originating Location (pt. Location): Home    Distant Location (provider location):  [facility]     Mode of Communication:  Video Conference via [employer]    Return in about 1 week (around 4/24/2020) for If symptoms worsen or do not improve.       Dorothy Guaman,     "

## 2020-04-17 NOTE — LETTER
April 17, 2020      RE:  Ellyn Mcgee  [address]  [place] [address]    ATTN:  [employer]  Summer Richard        To Whom It May Concern,      Ellyn Mcgee is under my professional medical care.  She has continued to struggle with fatigue and chest pain associated with suspected COVID-19 infection.  Please excuse her from work until Thursday, April 23.      Please let us know if you have any questions.          Sincerely,        Dorothy Guaman, DO

## 2020-05-07 ENCOUNTER — MYC MEDICAL ADVICE (OUTPATIENT)
Dept: FAMILY MEDICINE | Facility: CLINIC | Age: 45
End: 2020-05-07

## 2020-05-10 ASSESSMENT — ANXIETY QUESTIONNAIRES
4. TROUBLE RELAXING: SEVERAL DAYS
6. BECOMING EASILY ANNOYED OR IRRITABLE: SEVERAL DAYS
7. FEELING AFRAID AS IF SOMETHING AWFUL MIGHT HAPPEN: SEVERAL DAYS
1. FEELING NERVOUS, ANXIOUS, OR ON EDGE: MORE THAN HALF THE DAYS
2. NOT BEING ABLE TO STOP OR CONTROL WORRYING: NOT AT ALL
5. BEING SO RESTLESS THAT IT IS HARD TO SIT STILL: NOT AT ALL
3. WORRYING TOO MUCH ABOUT DIFFERENT THINGS: NOT AT ALL
7. FEELING AFRAID AS IF SOMETHING AWFUL MIGHT HAPPEN: SEVERAL DAYS
GAD7 TOTAL SCORE: 5
GAD7 TOTAL SCORE: 5

## 2020-05-10 ASSESSMENT — PATIENT HEALTH QUESTIONNAIRE - PHQ9
10. IF YOU CHECKED OFF ANY PROBLEMS, HOW DIFFICULT HAVE THESE PROBLEMS MADE IT FOR YOU TO DO YOUR WORK, TAKE CARE OF THINGS AT HOME, OR GET ALONG WITH OTHER PEOPLE: SOMEWHAT DIFFICULT
SUM OF ALL RESPONSES TO PHQ QUESTIONS 1-9: 4
SUM OF ALL RESPONSES TO PHQ QUESTIONS 1-9: 4

## 2020-05-11 ASSESSMENT — PATIENT HEALTH QUESTIONNAIRE - PHQ9: SUM OF ALL RESPONSES TO PHQ QUESTIONS 1-9: 4

## 2020-05-11 ASSESSMENT — ANXIETY QUESTIONNAIRES: GAD7 TOTAL SCORE: 5

## 2020-05-12 ENCOUNTER — MYC MEDICAL ADVICE (OUTPATIENT)
Dept: FAMILY MEDICINE | Facility: CLINIC | Age: 45
End: 2020-05-12

## 2020-05-12 NOTE — TELEPHONE ENCOUNTER
PN,    Please see [employer] message below.  Patient has video visit with you Friday 5/15 at 10:00 am.    Do you want to do a videovisit tomorrow? See patient?  You have 15 minute slot at 10:30 and 3:00    Stephanie Winter RN

## 2020-05-15 ENCOUNTER — VIRTUAL VISIT (OUTPATIENT)
Dept: FAMILY MEDICINE | Facility: CLINIC | Age: 45
End: 2020-05-15
Payer: COMMERCIAL

## 2020-05-15 ENCOUNTER — TRANSCRIBE ORDERS (OUTPATIENT)
Dept: MATERNAL FETAL MEDICINE | Facility: CLINIC | Age: 45
End: 2020-05-15

## 2020-05-15 DIAGNOSIS — F41.1 GAD (GENERALIZED ANXIETY DISORDER): ICD-10-CM

## 2020-05-15 DIAGNOSIS — Z33.1 PREGNANCY, INCIDENTAL: Primary | ICD-10-CM

## 2020-05-15 DIAGNOSIS — O26.90 PREGNANCY RELATED CONDITION, ANTEPARTUM: Primary | ICD-10-CM

## 2020-05-15 PROCEDURE — 99213 OFFICE O/P EST LOW 20 MIN: CPT | Mod: 95 | Performed by: FAMILY MEDICINE

## 2020-05-15 RX ORDER — ESCITALOPRAM OXALATE 10 MG/1
10 TABLET ORAL DAILY
Qty: 90 TABLET | Refills: 1 | Status: SHIPPED | OUTPATIENT
Start: 2020-05-15 | End: 2020-05-19

## 2020-05-15 NOTE — PROGRESS NOTES
"Ellyn Mcgee is a 44 year old female who is being evaluated via a billable video visit.      The patient has been notified of following:     \"This video visit will be conducted via a call between you and your physician/provider. We have found that certain health care needs can be provided without the need for an in-person physical exam.  This service lets us provide the care you need with a video conversation.  If a prescription is necessary we can send it directly to your pharmacy.  If lab work is needed we can place an order for that and you can then stop by our lab to have the test done at a later time.    Video visits are billed at different rates depending on your insurance coverage.  Please reach out to your insurance provider with any questions.    If during the course of the call the physician/provider feels a video visit is not appropriate, you will not be charged for this service.\"    Patient has given verbal consent for Video visit? Yes    How would you like to obtain your AVS? [facility]    Patient would like the video invitation sent by: Text to cell phone: [phone]    Will anyone else be joining your video visit? No     Subjective     Ellyn Mcgee is a 44 year old female who presents today via video visit for the following health issues:    HPI  Patient has positive pregnancy test    Just had a feeling - took a pregnancy test -  Periods -Last 4-5 months every 32 days   Thinks her LMP - March 23rd -   Pregnancy test taken May 7th     Started teen vitamin - has folic acid 75% daily amount - diet getting additional  Adding vitamin B6    Medications -   Stopped the adderall/concerta  Weaned off the paroxitine -   Had some old escitalopram - has been taking 10mg daily           Video Start Time: 9:54 AM    -------------------------------------    Patient Active Problem List   Diagnosis     Hyperlipidemia LDL goal <130     Anxiety state     PCOS (polycystic ovarian syndrome)     Adjustment disorder with " "mixed anxiety and depressed mood     External hemorrhoids     Attention deficit hyperactivity disorder (ADHD), predominantly inattentive type     TEE (generalized anxiety disorder)     Cervical radiculopathy     Past Surgical History:   Procedure Laterality Date     CONIZATION CERVIX,KNIFE/LASER  1995     SURGICAL HISTORY OF -       Montreal teeth       Social History     Tobacco Use     Smoking status: Never Smoker     Smokeless tobacco: Never Used   Substance Use Topics     Alcohol use: Yes     Alcohol/week: 0.8 standard drinks     Types: 1 Standard drinks or equivalent per week     Comment: social     Family History   Problem Relation Age of Onset     Cancer Mother         vocal cord cancer     Lipids Mother      Cancer Father         B-cell lymphoma, melanoma     Lipids Father      Heart Disease Father         open heart surgery     Diabetes Paternal Grandmother      Diabetes Paternal Uncle      Alcohol/Drug Maternal Grandfather            Reviewed and updated as needed this visit by Provider         Review of Systems   Constitutional, HEENT, cardiovascular, pulmonary, gi and gu systems are negative, except as otherwise noted.      Objective    There were no vitals taken for this visit.  Estimated body mass index is 30.94 kg/m  as calculated from the following:    Height as of 2/26/20: 1.613 m (5' 3.5\").    Weight as of 2/26/20: 80.5 kg (177 lb 7 oz).  Physical Exam     GENERAL: Healthy, alert and no distress  EYES: Eyes grossly normal to inspection.  No discharge or erythema, or obvious scleral/conjunctival abnormalities.  RESP: No audible wheeze, cough, or visible cyanosis.  No visible retractions or increased work of breathing.    SKIN: Visible skin clear. No significant rash, abnormal pigmentation or lesions.  NEURO: Cranial nerves grossly intact.  Mentation and speech appropriate for age.  PSYCH: Mentation appears normal, affect normal/bright, judgement and insight intact, normal speech and appearance " well-groomed.      Diagnostic Test Results:  Labs reviewed in Epic        Assessment & Plan     1. Pregnancy, incidental  Pt has had positive pregnancy test at home -   Dating is a little unclear at this point due to some irregularity of her menses  Pt surprised but excited  Discussed checking early ultrasound to look for sure dating  Once we figure out dates - will schedule for 10 week first OB visit  Also discussed her advanced maternal age and high risk pregnancy - will have her meet with MFM around 11 weeks to discuss options for screening for trisomy, etc  - US OB < 14 Weeks Single; Future  - [facility] REFERRAL-PREGNANCY    2. TEE (generalized anxiety disorder)  Pt was on paxil - weaned off but did have some escitalopram around  Started and is feeling good  Will keep her on the 10mg lower dose  Pt will call or RTC if symptoms worsen or do not improve.   - escitalopram (LEXAPRO) 10 MG tablet; Take 1 tablet (10 mg) by mouth daily  Dispense: 90 tablet; Refill: 1       No follow-ups on file.    Dorothy Guaman DO  [facility]      Video-Visit Details    Type of service:  Video Visit    Video End Time:10:23 AM    Originating Location (pt. Location): Home    Distant Location (provider location):  [facility]     Platform used for Video Visit: [employer]    No follow-ups on file.       Dorothy Guaman DO

## 2020-05-18 ENCOUNTER — MYC MEDICAL ADVICE (OUTPATIENT)
Dept: FAMILY MEDICINE | Facility: CLINIC | Age: 45
End: 2020-05-18

## 2020-05-18 DIAGNOSIS — F41.1 GAD (GENERALIZED ANXIETY DISORDER): ICD-10-CM

## 2020-05-18 NOTE — TELEPHONE ENCOUNTER
PN,    Please see [employer] message below.  Patient did virtual visit with you Friday 5/15.    Thanks,  Stephanie Winter RN

## 2020-05-19 RX ORDER — ESCITALOPRAM OXALATE 20 MG/1
20 TABLET ORAL DAILY
Qty: 90 TABLET | Refills: 0 | Status: SHIPPED | OUTPATIENT
Start: 2020-05-19 | End: 2020-09-08 | Stop reason: DRUGHIGH

## 2020-05-19 NOTE — TELEPHONE ENCOUNTER
Let her know we can increase to 20mg  I will send off higher dose to her [facility] for the 20mg tablet.  Thanks  PN

## 2020-05-20 ENCOUNTER — ANCILLARY PROCEDURE (OUTPATIENT)
Dept: ULTRASOUND IMAGING | Facility: CLINIC | Age: 45
End: 2020-05-20
Attending: FAMILY MEDICINE
Payer: COMMERCIAL

## 2020-05-20 DIAGNOSIS — Z33.1 PREGNANCY, INCIDENTAL: ICD-10-CM

## 2020-05-20 PROCEDURE — 76801 OB US < 14 WKS SINGLE FETUS: CPT | Performed by: OBSTETRICS & GYNECOLOGY

## 2020-05-22 ENCOUNTER — MYC MEDICAL ADVICE (OUTPATIENT)
Dept: FAMILY MEDICINE | Facility: CLINIC | Age: 45
End: 2020-05-22

## 2020-05-22 NOTE — RESULT ENCOUNTER NOTE
Dear Ellyn,   Your test results are all back -   Looks like dates correlate with your last period.  We typically use the LMP so your due date will be 12/28/20. Currently you are 8 weeks and a few days.  Your first OB appointment should be in 2 weeks and your perinatology appointment should be between 11-13 weeks.  Let us know if you have any questions.  -Dorothy Guaman, DO

## 2020-06-03 ENCOUNTER — MYC MEDICAL ADVICE (OUTPATIENT)
Dept: FAMILY MEDICINE | Facility: CLINIC | Age: 45
End: 2020-06-03

## 2020-06-04 NOTE — TELEPHONE ENCOUNTER
PN,    Please see [employer] message below.  Patient has face to face visit scheduled with you tomorrow.    Stephanie Winter RN

## 2020-06-05 ENCOUNTER — PRENATAL OFFICE VISIT (OUTPATIENT)
Dept: FAMILY MEDICINE | Facility: CLINIC | Age: 45
End: 2020-06-05
Payer: COMMERCIAL

## 2020-06-05 ENCOUNTER — VIRTUAL VISIT (OUTPATIENT)
Dept: MATERNAL FETAL MEDICINE | Facility: CLINIC | Age: 45
End: 2020-06-05
Attending: FAMILY MEDICINE
Payer: COMMERCIAL

## 2020-06-05 VITALS
SYSTOLIC BLOOD PRESSURE: 124 MMHG | TEMPERATURE: 98.4 F | WEIGHT: 184 LBS | RESPIRATION RATE: 16 BRPM | HEIGHT: 64 IN | HEART RATE: 64 BPM | BODY MASS INDEX: 31.41 KG/M2 | DIASTOLIC BLOOD PRESSURE: 75 MMHG | OXYGEN SATURATION: 99 %

## 2020-06-05 DIAGNOSIS — O09.529 AMA (ADVANCED MATERNAL AGE) MULTIGRAVIDA 35+: ICD-10-CM

## 2020-06-05 DIAGNOSIS — O09.529 HIGH-RISK PREGNANCY, ELDERLY MULTIGRAVIDA, UNSPECIFIED TRIMESTER: Primary | ICD-10-CM

## 2020-06-05 DIAGNOSIS — Z36.9 ENCOUNTER FOR ANTENATAL SCREENING OF MOTHER: Primary | ICD-10-CM

## 2020-06-05 DIAGNOSIS — O26.90 PREGNANCY RELATED CONDITION, ANTEPARTUM: ICD-10-CM

## 2020-06-05 LAB
ABO + RH BLD: NORMAL
ABO + RH BLD: NORMAL
ALBUMIN UR-MCNC: NEGATIVE MG/DL
APPEARANCE UR: CLEAR
BACTERIA #/AREA URNS HPF: ABNORMAL /HPF
BILIRUB UR QL STRIP: NEGATIVE
BLD GP AB SCN SERPL QL: NORMAL
BLOOD BANK CMNT PATIENT-IMP: NORMAL
COLOR UR AUTO: YELLOW
ERYTHROCYTE [DISTWIDTH] IN BLOOD BY AUTOMATED COUNT: 12.7 % (ref 10–15)
GLUCOSE UR STRIP-MCNC: NEGATIVE MG/DL
HCT VFR BLD AUTO: 37.6 % (ref 35–47)
HGB BLD-MCNC: 12.7 G/DL (ref 11.7–15.7)
HGB UR QL STRIP: ABNORMAL
KETONES UR STRIP-MCNC: NEGATIVE MG/DL
LEUKOCYTE ESTERASE UR QL STRIP: ABNORMAL
MCH RBC QN AUTO: 30 PG (ref 26.5–33)
MCHC RBC AUTO-ENTMCNC: 33.8 G/DL (ref 31.5–36.5)
MCV RBC AUTO: 89 FL (ref 78–100)
NITRATE UR QL: NEGATIVE
NON-SQ EPI CELLS #/AREA URNS LPF: ABNORMAL /LPF
PH UR STRIP: 7 PH (ref 5–7)
PLATELET # BLD AUTO: 287 10E9/L (ref 150–450)
RBC # BLD AUTO: 4.23 10E12/L (ref 3.8–5.2)
RBC #/AREA URNS AUTO: ABNORMAL /HPF
SOURCE: ABNORMAL
SP GR UR STRIP: 1.01 (ref 1–1.03)
SPECIMEN EXP DATE BLD: NORMAL
UROBILINOGEN UR STRIP-ACNC: 0.2 EU/DL (ref 0.2–1)
WBC # BLD AUTO: 7 10E9/L (ref 4–11)
WBC #/AREA URNS AUTO: ABNORMAL /HPF

## 2020-06-05 PROCEDURE — 87591 N.GONORRHOEAE DNA AMP PROB: CPT | Performed by: FAMILY MEDICINE

## 2020-06-05 PROCEDURE — 87389 HIV-1 AG W/HIV-1&-2 AB AG IA: CPT | Performed by: FAMILY MEDICINE

## 2020-06-05 PROCEDURE — 36415 COLL VENOUS BLD VENIPUNCTURE: CPT | Performed by: FAMILY MEDICINE

## 2020-06-05 PROCEDURE — 86780 TREPONEMA PALLIDUM: CPT | Performed by: FAMILY MEDICINE

## 2020-06-05 PROCEDURE — 87086 URINE CULTURE/COLONY COUNT: CPT | Performed by: FAMILY MEDICINE

## 2020-06-05 PROCEDURE — 96040 ZZH GENETIC COUNSELING, EACH 30 MINUTES: CPT | Mod: 95,ZF | Performed by: GENETIC COUNSELOR, MS

## 2020-06-05 PROCEDURE — 86850 RBC ANTIBODY SCREEN: CPT | Performed by: FAMILY MEDICINE

## 2020-06-05 PROCEDURE — 86900 BLOOD TYPING SEROLOGIC ABO: CPT | Performed by: FAMILY MEDICINE

## 2020-06-05 PROCEDURE — 86762 RUBELLA ANTIBODY: CPT | Performed by: FAMILY MEDICINE

## 2020-06-05 PROCEDURE — 87491 CHLMYD TRACH DNA AMP PROBE: CPT | Performed by: FAMILY MEDICINE

## 2020-06-05 PROCEDURE — 99207 ZZC FIRST OB VISIT: CPT | Performed by: FAMILY MEDICINE

## 2020-06-05 PROCEDURE — 87340 HEPATITIS B SURFACE AG IA: CPT | Performed by: FAMILY MEDICINE

## 2020-06-05 PROCEDURE — 86901 BLOOD TYPING SEROLOGIC RH(D): CPT | Performed by: FAMILY MEDICINE

## 2020-06-05 PROCEDURE — 81001 URINALYSIS AUTO W/SCOPE: CPT | Performed by: FAMILY MEDICINE

## 2020-06-05 PROCEDURE — 85027 COMPLETE CBC AUTOMATED: CPT | Performed by: FAMILY MEDICINE

## 2020-06-05 ASSESSMENT — MIFFLIN-ST. JEOR: SCORE: 1460.65

## 2020-06-05 NOTE — PROGRESS NOTES
SUBJECTIVE:     HPI:    This is a 44 year old female patient,  who presents for her first obstetrical visit.    RAJENDRA: Not found..  She is Unknown weeks.  Her cycles are slightly iregular.  Her last menstrual period was normal.   Since her LMP, she has experienced  nausea, fatigue, headache and pelvic pain).   She denies vaginal discharge, dysuria, urinary urgency and vaginal bleeding.    Additional History: pt is concerned about her age    Have you travelled during the pregnancy?No  Have your sexual partner(s) travelled during the pregnancy?No       HISTORY:   Planned Pregnancy: No  Marital Status:   Occupation: [employer]  Living in Household: Spouse and Children    Past History:  Her past medical history   Past Medical History:   Diagnosis Date     Abnormal Pap smear     Colposcopy     Anemia     In Past     Chlamydia trachomatis infection of other specified site      Fertility problem      Lyme disease 2010    ?false positive vs positve CMV - resolved     Moderate dysplasia of cervix      Other and unspecified adverse effect of drug, medicinal and biological substance     insulin resistent     Varicosities      Wounds and injuries     fall down stairs, PT for back, pain meds   .      She has a history of  none    Since her last LMP she admits to the use of ETOH but was initially .    Past medical, surgical, social and family history were reviewed and updated in [facility].  OB History    Para Term  AB Living   4 2 2 0 1 2   SAB TAB Ectopic Multiple Live Births   1 0 0 0 1      # Outcome Date GA Lbr Chris/2nd Weight Sex Delivery Anes PTL Lv   4 Current            3 Term 12 41w2d 03:15 / 00:19 3.487 kg (7 lb 11 oz) F Vag-Spont None N CRISTOFER      Name: DAYANA,G1 SAGE      Apgar1: 9  Apgar5: 9   2 SAB 05/13/10     AB, MISSED      1 Term 06 40w0d  3.459 kg (7 lb 10 oz) F          Name: Barb      Obstetric Comments   Required D&C            Current Outpatient Medications  "  Medication     albuterol (PROAIR HFA/PROVENTIL HFA/VENTOLIN HFA) 108 (90 Base) MCG/ACT inhaler     escitalopram (LEXAPRO) 20 MG tablet     IBUPROFEN     propranolol (INDERAL) 10 MG tablet     No current facility-administered medications for this visit.      Facility-Administered Medications Ordered in Other Visits   Medication     sodium chloride (PF) 0.9% PF flush 10 mL       ROS:   12 point review of systems negative other than symptoms noted below or in the HPI.      OBJECTIVE:     EXAM:  /75 (BP Location: Left arm, Cuff Size: Adult Regular)   Pulse 64   Temp 98.4  F (36.9  C) (Oral)   Resp 16   Ht 1.619 m (5' 3.75\")   Wt 83.5 kg (184 lb)   LMP 02/25/2020 (Exact Date)   SpO2 99%   BMI 31.83 kg/m   Body mass index is 31.83 kg/m .    GENERAL: healthy, alert and no distress  EYES: Eyes grossly normal to inspection, PERRL and conjunctivae and sclerae normal  HENT: ear canals and TM's normal, nose and mouth without ulcers or lesions  NECK: no adenopathy, no asymmetry, masses, or scars and thyroid normal to palpation  RESP: lungs clear to auscultation - no rales, rhonchi or wheezes  BREAST: normal without masses, tenderness or nipple discharge and no palpable axillary masses or adenopathy  CV: regular rate and rhythm, normal S1 S2, no S3 or S4, no murmur, click or rub, no peripheral edema and peripheral pulses strong  ABDOMEN: soft, nontender, no hepatosplenomegaly, no masses and bowel sounds normal  MS: no gross musculoskeletal defects noted, no edema  SKIN: no suspicious lesions or rashes  NEURO: Normal strength and tone, mentation intact and speech normal  PSYCH: mentation appears normal, affect normal/bright    ASSESSMENT/PLAN:       ICD-10-CM    1. High-risk pregnancy, elderly multigravida, unspecified trimester  O09.529 Hepatitis B surface antigen     ABO/Rh type and screen     CBC with platelets     HIV Antigen Antibody Combo     Rubella Antibody IgG Quantitative     Treponema Abs w Reflex to RPR " and Titer     Urine Culture Aerobic Bacterial     NEISSERIA GONORRHOEA PCR     CHLAMYDIA TRACHOMATIS PCR     UA reflex to Microscopic     Urine Microscopic       44 year old , Unknown weeks of pregnancy with RAJENDRA of Not found.    Discussed as follows: early screening with MFM    Counseling given:   - Follow up in 4-6 weeks for return OB visit.  - Recommended weight gain for pregnancy: < 15 lbs.         PLAN/PATIENT INSTRUCTIONS:         Dorothy Guaman DO

## 2020-06-05 NOTE — NURSING NOTE
"Chief Complaint   Patient presents with     Prenatal Care     initial /75 (BP Location: Left arm, Cuff Size: Adult Regular)   Pulse 64   Temp 98.4  F (36.9  C) (Oral)   Resp 16   Ht 1.619 m (5' 3.75\")   Wt 83.5 kg (184 lb)   LMP 02/25/2020 (Exact Date)   SpO2 99%   BMI 31.83 kg/m   Estimated body mass index is 31.83 kg/m  as calculated from the following:    Height as of this encounter: 1.619 m (5' 3.75\").    Weight as of this encounter: 83.5 kg (184 lb).  BP completed using cuff size: regular.  L  R arm      Health Maintenance that is potentially due pending provider review:  NONE    n/a    Floyd Matias ma  "

## 2020-06-05 NOTE — PROGRESS NOTES
[facility] [place]  Genetic Counseling Consult    Patient: Ellyn Mcgee YOB: 1975   Date of Service: 6/05/20      Ellyn Mcgee was evaluated via a billable telephone visit at [facility] for genetic consultation to discuss the options for screening and testing for fetal chromosome abnormalities. The indication for genetic counseling is advanced maternal age. The patient was unaccompanied for today's virtual visit.    The patient has been notified of following:    This telephone visit will be conducted via a call between you and your physician/provider. We have found that certain health care needs can be provided without the need for a physical exam. This service lets us provide the care you need with a short phone conversation. If a prescription is necessary we can send it directly to your pharmacy. If lab work is needed we can place an order for that and you can then stop by our lab to have the test done at a later time.     If during the course of the call the provider feels a telephone visit is not appropriate, you will not be charged for this service.    Impression/Plan:   1.  Ellyn was consented for a blood draw for NIPT (Innatal test through [facility]). She plans to have her blood drawn at our outpatient laboratory at our [place] location on 6/9/2020. Results are expected within 7-10 days, and will be available in [employer]. We will contact her to discuss the results, and a copy will be forwarded to the office of the referring OB provider.  In the event we are unable to contact the patient once results become available, she has requested we leave a detailed voicemail fully disclosing the results on her cell phone. The patient wishes to know the predicted genetic sex of the pregnancy.    2.  Maternal serum AFP (single marker screen) is recommended after 15 weeks to screen for open neural tube defects. A quad screen should  not be performed.    3.  An 18-20 week comprehensive ultrasound is standard of care for all women 35 or older at delivery.    Pregnancy History:   /Parity:      Age at Delivery: 45 year old  RAJENDRA: 2020, by Last Menstrual Period  Gestational Age: 10w2d    No significant complications or exposures were reported in the current pregnancy.    Ellyn landry pregnancy history is significant for:   o : MAB at approximately 10 weeks gestation. The patient underwent a D&C.   o : term, female, . Alive and well today.  o : term, female, . Alive and well today.    Medical History:   Ellyn landry reported medical history is not expected to impact pregnancy management or risks to fetal development.       Family History:   A three-generation pedigree was obtained, and is scanned under the  Media  tab.     The following significant findings were reported by Ellyn:    Father of the baby: Jimmie is 49 and in good health. He has taken up cigarette smoking again in the past few months.     Father: the patient reports he was diagnosed with 5-6 different cancers, all before he passed away. He also had a history of multiple strokes and heart disease. He is of full Ashkenazi Denominational descent.     Mother: vocal cord cancer    Maternal uncle: diagnosed with an unknown cancer in his late 60s, he is alive today in his early 70s.     Paternal aunts and uncles: most are reported to have a personal history of multiple different kinds of cancer. Some are reported to be .     Jimmie's half brother and half sister both have schizophrenia. His half brother passed away from complications of the condition.     Otherwise, the reported family history is negative for multiple miscarriages, stillbirths, birth defects, intellectual disabilities, known genetic conditions, and consanguinity.    We briefly discussed the family history of cancer. Cancer most often occurs by chance, however some families seem to develop cancer more  "frequently than expected. Everyone has a risk to develop cancer, but individuals may be at an increased risk to develop cancer based on their family history. We discussed that individuals with multiple primary cancer diagnoses can be associated with inherited cancer predisposition syndromes. Genetic counseling is available for cancer syndromes. Cancer family history, even without genetic testing, can change cancer screening recommendations for family members and aid in insurance coverage for access to them as well. The most informative individuals to complete cancer genetic counseling and genetic testing are those with a personal history of cancer or those closely related to the affected individuals. We reviewed that if the family wants more information they can contact the [facility] Cancer Risk Management Program ([phone]). Physicians can also make referrals at [email or web address] or, if within the [employer] system, through [facility] referral for \"Cancer Risk Mgmt/Cancer Genetic Counseling\".     We reviewed the multifactorial inheritance associated with mental health conditions and that schizophrenia is likely caused by the combination of several genetic factors. The risk for mental health disorders is highest among first degree relatives of an individual with schizophrenia. These risks decrease for second and third degree relatives.       Carrier Screening:   The patient reports that she and the father of the pregnancy have  ancestry:        Cystic fibrosis is an autosomal recessive genetic condition that occurs with increased frequency in individuals of  ancestry and carrier screening for this condition is available.  In addition,  screening in the [facility] includes cystic fibrosis.    The patient also reports that she has Ashkenazi Advent ancestry from her father's side of the family.      Expanded carrier screening for " mutations in a large panel of genes associated with autosomal recessive conditions including cystic fibrosis, spinal muscular atrophy, and others, is now available. We also reviewed conditions that we can see more frequently in the Ashkenazi Sikhism population. The patient reports no known family history of known genetic conditions.      The patient has declined the carrier screening options reviewed today.       Risk Assessment for Chromosome Conditions:   We explained that the risk for fetal chromosome abnormalities increases with maternal age. We discussed specific features of common chromosome abnormalities, including Down syndrome, trisomy 13, trisomy 18, and sex chromosome trisomies.      - At age 44 at midtrimester, the risk to have a baby with Down syndrome is 1 in 19.     - At age 44 at midtrimester, the risk to have a baby with any chromosome abnormality is 1 in 12.     - At age 45 at delivery, the risk to have a baby with Down syndrome is 1 in 23.     - At age 45 at delivery, the risk to have a baby with any chromosome abnormality is 1 in 16.        Testing Options:   We discussed the following options:     First trimester screening    First trimester ultrasound with nuchal translucency and nasal bone assessments, maternal plasma hCG, FABIOLA-A, and AFP measurement    Screens for fetal trisomy 21, trisomy 13, and trisomy 18    Cannot screen for open neural tube defects; maternal serum AFP after 15 weeks is recommended       Non-invasive Prenatal Testing (NIPT)    Maternal plasma cell-free DNA testing; first trimester ultrasound with nuchal translucency and nasal bone assessment is recommended, when appropriate    Screens for fetal trisomy 21, trisomy 13, trisomy 18, and sex chromosome aneuploidy    Cannot screen for open neural tube defects; maternal serum AFP after 15 weeks is recommended       Chorionic villus sampling (CVS)    Invasive procedure typically performed in the first trimester by which placental  villi are obtained for the purpose of chromosome analysis and/or other prenatal genetic analysis    Diagnostic results; >99% sensitivity for fetal chromosome abnormalities    Cannot test for open neural tube defects; maternal serum AFP after 15 weeks is recommended       Genetic Amniocentesis    Invasive procedure typically performed in the second trimester by which amniotic fluid is obtained for the purpose of chromosome analysis and/or other prenatal genetic analysis    Diagnostic results; >99% sensitivity for fetal chromosome abnormalities    AFAFP measurement tests for open neural tube defects       Comprehensive (Level II) ultrasound: Detailed ultrasound performed between 18-22 weeks gestation to screen for major birth defects and markers for aneuploidy.        We reviewed the benefits and limitations of this testing.  Screening tests provide a risk assessment specific to the pregnancy for certain fetal chromosome abnormalities, but cannot definitively diagnose or exclude a fetal chromosome abnormality.  Follow-up genetic counseling and consideration of diagnostic testing is recommended with any abnormal screening result.     Diagnostic tests carry inherent risks- including risk of miscarriage- that require careful consideration.  These tests can detect fetal chromosome abnormalities with greater than 99% certainty.  Results can be compromised by maternal cell contamination or mosaicism, and are limited by the resolution of cytogenetic G-banding technology.  There is no screening nor diagnostic test that can detect all forms of birth defects or mental disability.     It was a pleasure to be involved with Ellyn landry [facility].    Total telephone call contact time  Call started at 2:30PM   Call ended at 3:41PM    Helena Rodriguez MS, [facility]  Genetic Counselor  [facility]  Maternal Fetal Medicine  [email or web address]  Ph: [phone]  Pager: [phone]

## 2020-06-06 ENCOUNTER — MYC MEDICAL ADVICE (OUTPATIENT)
Dept: FAMILY MEDICINE | Facility: CLINIC | Age: 45
End: 2020-06-06

## 2020-06-06 LAB
BACTERIA SPEC CULT: NORMAL
HBV SURFACE AG SERPL QL IA: NONREACTIVE
HIV 1+2 AB+HIV1 P24 AG SERPL QL IA: NONREACTIVE
RUBV IGG SERPL IA-ACNC: 38 IU/ML
SPECIMEN SOURCE: NORMAL
T PALLIDUM AB SER QL: NONREACTIVE

## 2020-06-07 LAB
C TRACH DNA SPEC QL NAA+PROBE: NEGATIVE
N GONORRHOEA DNA SPEC QL NAA+PROBE: NEGATIVE
SPECIMEN SOURCE: NORMAL
SPECIMEN SOURCE: NORMAL

## 2020-06-09 ENCOUNTER — TRANSFERRED RECORDS (OUTPATIENT)
Dept: HEALTH INFORMATION MANAGEMENT | Facility: CLINIC | Age: 45
End: 2020-06-09

## 2020-06-09 DIAGNOSIS — O09.529 AMA (ADVANCED MATERNAL AGE) MULTIGRAVIDA 35+: ICD-10-CM

## 2020-06-09 DIAGNOSIS — Z36.9 ENCOUNTER FOR ANTENATAL SCREENING OF MOTHER: ICD-10-CM

## 2020-06-09 PROCEDURE — 36415 COLL VENOUS BLD VENIPUNCTURE: CPT | Performed by: OBSTETRICS & GYNECOLOGY

## 2020-06-09 PROCEDURE — 40000791 ZZHCL STATISTIC VERIFI PRENATAL TRISOMY 21,18,13: Performed by: OBSTETRICS & GYNECOLOGY

## 2020-06-15 ENCOUNTER — TELEPHONE (OUTPATIENT)
Dept: MATERNAL FETAL MEDICINE | Facility: CLINIC | Age: 45
End: 2020-06-15

## 2020-06-15 LAB — LAB SCANNED RESULT: NORMAL

## 2020-06-16 ENCOUNTER — TELEPHONE (OUTPATIENT)
Dept: MATERNAL FETAL MEDICINE | Facility: CLINIC | Age: 45
End: 2020-06-16

## 2020-06-16 NOTE — TELEPHONE ENCOUNTER
June 16, 2020    I spoke with Ellyn regarding her NIPT results.     Innatal NIPT screening results indicate NO ANEUPLOIDY DETECTED for chromosomes 21, 18, 13, or sex chromosomes (XY). Per the patient's request, the predicted genetic sex of the pregnancy was shared during today's telephone call.    These results put the patient's current pregnancy at low risk for Down syndrome, trisomy 18, trisomy 13 and sex chromosome abnormalities.This test is reported to have the following sensitivities: Down syndrome- 99%, trisomy 18- 98%, and trisomy 13- 98%. Although these results are reassuring, this does not replace a standard chromosome analysis from a chorionic villus sampling or amniocentesis. The patient has declined proceeding with diagnostic genetic testing during today's conversation.    Level II ultrasound is recommended, given AMA.     MSAFP is the appropriate second trimester screening test for open neural tube defects; the maternal quad screen is not recommended.     Her results are available in her Epic chart for review and will be forwarded to her primary OB.       Helena Rodriguez MS, [facility]  Genetic Counselor  [facility]  Maternal Fetal Medicine  Ph: [phone]  [email or web address]

## 2020-06-19 ENCOUNTER — MYC MEDICAL ADVICE (OUTPATIENT)
Dept: FAMILY MEDICINE | Facility: CLINIC | Age: 45
End: 2020-06-19

## 2020-06-22 NOTE — TELEPHONE ENCOUNTER
PN,    Please see [employer] message below.  Result in chart.    Thanks,  Stephanie Winter RN

## 2020-06-24 ENCOUNTER — MYC MEDICAL ADVICE (OUTPATIENT)
Dept: FAMILY MEDICINE | Facility: CLINIC | Age: 45
End: 2020-06-24

## 2020-06-25 NOTE — TELEPHONE ENCOUNTER
PN,    Please see [employer] message below.  Do you want to do a virtual visit?  Looks like she sends lots of Mycharts      Stephanie Winter RN

## 2020-06-29 ENCOUNTER — TRANSFERRED RECORDS (OUTPATIENT)
Dept: HEALTH INFORMATION MANAGEMENT | Facility: CLINIC | Age: 45
End: 2020-06-29

## 2020-07-15 ENCOUNTER — PRENATAL OFFICE VISIT (OUTPATIENT)
Dept: FAMILY MEDICINE | Facility: CLINIC | Age: 45
End: 2020-07-15
Payer: COMMERCIAL

## 2020-07-15 VITALS
HEART RATE: 70 BPM | WEIGHT: 183.1 LBS | HEIGHT: 64 IN | OXYGEN SATURATION: 99 % | SYSTOLIC BLOOD PRESSURE: 98 MMHG | DIASTOLIC BLOOD PRESSURE: 67 MMHG | TEMPERATURE: 97.9 F | BODY MASS INDEX: 31.26 KG/M2

## 2020-07-15 DIAGNOSIS — O09.529 HIGH-RISK PREGNANCY, ELDERLY MULTIGRAVIDA, UNSPECIFIED TRIMESTER: Primary | ICD-10-CM

## 2020-07-15 PROCEDURE — 99000 SPECIMEN HANDLING OFFICE-LAB: CPT | Performed by: FAMILY MEDICINE

## 2020-07-15 PROCEDURE — 81511 FTL CGEN ABNOR FOUR ANAL: CPT | Mod: 90 | Performed by: FAMILY MEDICINE

## 2020-07-15 PROCEDURE — 36415 COLL VENOUS BLD VENIPUNCTURE: CPT | Performed by: FAMILY MEDICINE

## 2020-07-15 PROCEDURE — 99207 ZZC PRENATAL VISIT: CPT | Performed by: FAMILY MEDICINE

## 2020-07-15 ASSESSMENT — MIFFLIN-ST. JEOR: SCORE: 1451.57

## 2020-07-15 NOTE — PROGRESS NOTES
Concerns: none  Doing well.  No concerns today.  Will schedule anatomy ultrasound.  RTC 4 weeks.      Dorothy Guaman, DO

## 2020-07-17 LAB
# FETUSES US: NORMAL
# FETUSES: 1
AFP ADJ MOM AMN: 0.74
AFP SERPL-MCNC: 21 NG/ML
AGE - REPORTED: 46.5 YR
CURRENT SMOKER: NO
CURRENT SMOKER: NO
DIABETES STATUS PATIENT: NO
FAMILY MEMBER DISEASES HX: NO
FAMILY MEMBER DISEASES HX: NO
GA METHOD: NORMAL
GA METHOD: NORMAL
GA: NORMAL WK
HCG MOM SERPL: 1.41
HCG SERPL-ACNC: NORMAL IU/L
HX OF HEREDITARY DISORDERS: NO
IDDM PATIENT QL: NO
INHIBIN A MOM SERPL: 1.22
INHIBIN A SERPL-MCNC: 191 PG/ML
INTEGRATED SCN PATIENT-IMP: NORMAL
IVF PREGNANCY: NO
LMP START DATE: NORMAL
MONOCHORIONIC TWINS: NO
PATHOLOGY STUDY: NORMAL
PREV FETUS DEFECT: NO
SERVICE CMNT-IMP: NO
SPECIMEN DRAWN SERPL: NORMAL
U ESTRIOL MOM SERPL: 0.68
U ESTRIOL SERPL-MCNC: 0.62 NG/ML
VALPROIC/CARBAMAZEPINE STATUS: NO
WEIGHT UNITS: NORMAL

## 2020-07-21 NOTE — RESULT ENCOUNTER NOTE
Here are the results we discuss   Again sorry for confusion - ordered incorrect test.  Only needed the MS-AFP  Discussed with genetics at [facility] - No concerns since she had cell free DNA and negative for trisomy.  PN

## 2020-07-27 NOTE — PROGRESS NOTES
"Subjective     Ellyn Heaven Mcgee is a 44 year old female who presents to clinic today for the following health issues:    HPI   Patient is here today to review medications regarding concerta    {additonal problems for provider to add (Optional):213295}    {HIST REVIEW/ LINKS 2 (Optional):822791}    {Additional problems for the provider to add (optional):144757}  Reviewed and updated as needed this visit by Provider         Review of Systems   {ROS COMP (Optional):597119}      Objective    /88 (BP Location: Right arm, Patient Position: Sitting, Cuff Size: Adult Regular)   Pulse 77   Resp 16   Ht 1.613 m (5' 3.5\")   Wt 80.5 kg (177 lb 7 oz)   LMP 02/25/2020 (Exact Date)   SpO2 99%   Breastfeeding No   BMI 30.94 kg/m    Body mass index is 30.94 kg/m .  Physical Exam   {Exam List (Optional):289266}    {Diagnostic Test Results (Optional):348933::\"Diagnostic Test Results:\",\"Labs reviewed in Epic\"}        {PROVIDER CHARTING PREFERENCE:840299}      " May start hep B and IPV.

## 2020-08-03 ENCOUNTER — MYC MEDICAL ADVICE (OUTPATIENT)
Dept: FAMILY MEDICINE | Facility: CLINIC | Age: 45
End: 2020-08-03

## 2020-08-04 ENCOUNTER — PRE VISIT (OUTPATIENT)
Dept: MATERNAL FETAL MEDICINE | Facility: CLINIC | Age: 45
End: 2020-08-04

## 2020-08-05 ENCOUNTER — TELEPHONE (OUTPATIENT)
Dept: MIDWIFE SERVICES | Facility: CLINIC | Age: 45
End: 2020-08-05

## 2020-08-05 ASSESSMENT — PATIENT HEALTH QUESTIONNAIRE - PHQ9
SUM OF ALL RESPONSES TO PHQ QUESTIONS 1-9: 16
SUM OF ALL RESPONSES TO PHQ QUESTIONS 1-9: 16
10. IF YOU CHECKED OFF ANY PROBLEMS, HOW DIFFICULT HAVE THESE PROBLEMS MADE IT FOR YOU TO DO YOUR WORK, TAKE CARE OF THINGS AT HOME, OR GET ALONG WITH OTHER PEOPLE: VERY DIFFICULT

## 2020-08-06 ENCOUNTER — OFFICE VISIT (OUTPATIENT)
Dept: MATERNAL FETAL MEDICINE | Facility: CLINIC | Age: 45
End: 2020-08-06
Attending: FAMILY MEDICINE
Payer: COMMERCIAL

## 2020-08-06 ENCOUNTER — PRENATAL OFFICE VISIT (OUTPATIENT)
Dept: MIDWIFE SERVICES | Facility: CLINIC | Age: 45
End: 2020-08-06
Payer: COMMERCIAL

## 2020-08-06 ENCOUNTER — MYC MEDICAL ADVICE (OUTPATIENT)
Dept: FAMILY MEDICINE | Facility: CLINIC | Age: 45
End: 2020-08-06

## 2020-08-06 ENCOUNTER — HOSPITAL ENCOUNTER (OUTPATIENT)
Dept: ULTRASOUND IMAGING | Facility: CLINIC | Age: 45
End: 2020-08-06
Attending: FAMILY MEDICINE
Payer: COMMERCIAL

## 2020-08-06 VITALS
OXYGEN SATURATION: 97 % | BODY MASS INDEX: 31.57 KG/M2 | DIASTOLIC BLOOD PRESSURE: 67 MMHG | WEIGHT: 184.9 LBS | HEIGHT: 64 IN | SYSTOLIC BLOOD PRESSURE: 101 MMHG | HEART RATE: 74 BPM | TEMPERATURE: 99 F

## 2020-08-06 DIAGNOSIS — O99.342 DEPRESSION DURING PREGNANCY IN SECOND TRIMESTER: ICD-10-CM

## 2020-08-06 DIAGNOSIS — O09.529 ANTEPARTUM MULTIGRAVIDA OF ADVANCED MATERNAL AGE: Primary | ICD-10-CM

## 2020-08-06 DIAGNOSIS — O34.12 UTERINE FIBROIDS AFFECTING PREGNANCY IN SECOND TRIMESTER: ICD-10-CM

## 2020-08-06 DIAGNOSIS — F32.A DEPRESSION DURING PREGNANCY IN SECOND TRIMESTER: ICD-10-CM

## 2020-08-06 DIAGNOSIS — O09.529 HIGH-RISK PREGNANCY, ELDERLY MULTIGRAVIDA, UNSPECIFIED TRIMESTER: Primary | ICD-10-CM

## 2020-08-06 DIAGNOSIS — D25.9 UTERINE FIBROIDS AFFECTING PREGNANCY IN SECOND TRIMESTER: ICD-10-CM

## 2020-08-06 DIAGNOSIS — O26.90 PREGNANCY RELATED CONDITION, ANTEPARTUM: ICD-10-CM

## 2020-08-06 PROCEDURE — 76811 OB US DETAILED SNGL FETUS: CPT

## 2020-08-06 PROCEDURE — 99203 OFFICE O/P NEW LOW 30 MIN: CPT | Performed by: ADVANCED PRACTICE MIDWIFE

## 2020-08-06 RX ORDER — VENLAFAXINE HYDROCHLORIDE 37.5 MG/1
37.5 CAPSULE, EXTENDED RELEASE ORAL DAILY
Qty: 45 CAPSULE | Refills: 0 | Status: SHIPPED | OUTPATIENT
Start: 2020-08-06 | End: 2020-09-08 | Stop reason: DRUGHIGH

## 2020-08-06 SDOH — ECONOMIC STABILITY: FOOD INSECURITY: WITHIN THE PAST 12 MONTHS, THE FOOD YOU BOUGHT JUST DIDN'T LAST AND YOU DIDN'T HAVE MONEY TO GET MORE.: NEVER TRUE

## 2020-08-06 SDOH — ECONOMIC STABILITY: FOOD INSECURITY: WITHIN THE PAST 12 MONTHS, YOU WORRIED THAT YOUR FOOD WOULD RUN OUT BEFORE YOU GOT MONEY TO BUY MORE.: NEVER TRUE

## 2020-08-06 SDOH — ECONOMIC STABILITY: TRANSPORTATION INSECURITY
IN THE PAST 12 MONTHS, HAS THE LACK OF TRANSPORTATION KEPT YOU FROM MEDICAL APPOINTMENTS OR FROM GETTING MEDICATIONS?: NO

## 2020-08-06 SDOH — ECONOMIC STABILITY: TRANSPORTATION INSECURITY
IN THE PAST 12 MONTHS, HAS LACK OF TRANSPORTATION KEPT YOU FROM MEETINGS, WORK, OR FROM GETTING THINGS NEEDED FOR DAILY LIVING?: NO

## 2020-08-06 SDOH — ECONOMIC STABILITY: INCOME INSECURITY: HOW HARD IS IT FOR YOU TO PAY FOR THE VERY BASICS LIKE FOOD, HOUSING, MEDICAL CARE, AND HEATING?: HARD

## 2020-08-06 ASSESSMENT — ANXIETY QUESTIONNAIRES
7. FEELING AFRAID AS IF SOMETHING AWFUL MIGHT HAPPEN: NOT AT ALL
2. NOT BEING ABLE TO STOP OR CONTROL WORRYING: SEVERAL DAYS
GAD7 TOTAL SCORE: 13
6. BECOMING EASILY ANNOYED OR IRRITABLE: NEARLY EVERY DAY
1. FEELING NERVOUS, ANXIOUS, OR ON EDGE: NEARLY EVERY DAY
3. WORRYING TOO MUCH ABOUT DIFFERENT THINGS: MORE THAN HALF THE DAYS
5. BEING SO RESTLESS THAT IT IS HARD TO SIT STILL: SEVERAL DAYS

## 2020-08-06 ASSESSMENT — PATIENT HEALTH QUESTIONNAIRE - PHQ9
5. POOR APPETITE OR OVEREATING: NEARLY EVERY DAY
SUM OF ALL RESPONSES TO PHQ QUESTIONS 1-9: 16

## 2020-08-06 ASSESSMENT — MIFFLIN-ST. JEOR: SCORE: 1459.73

## 2020-08-06 NOTE — PROGRESS NOTES
"  S: Ellyn is  here for transfer visit, has been seeing Dr. Guaman at [facility]. She is 19w1d by unsure LMP, which correlated to 8w1d U/S. This was an accidental pregnancy, but she is accepting and excited now. Has a long history of mental health concerns. Was taking Paxil before pregnancy, stopped it when she found out she was pregnant around 5 weeks. Was also taking Concerta for her ADHD until this time. Under Dr. Guaman's supervision, she switched to Lexapro. Has taken Celexa and Zoloft in the past, and they did not have an effect on her symptoms. She describes significant history of anxiety and depression. Often feels \"trapped\" in her life. Endorses some thoughts of self harm, but states \"I would never want to leave my mother and my daughters\". States that she is safe, and has no plans of hurting herself or suicidal ideation. She describes feeling desperate for help with her mental health, she has no energy, fights often with her spouse (whom is also struggling with his mental health at this time), is irritable and exhausted. She states the Paxil was the first medication in years that she feels managed her anxiety/depression well. She understands the risks to her fetus, and therefore stopped taking it. She has taken Effexor XR 225mg in the past, and felt that she had good control of her symptoms on that medication, but stopped it when she started taking Adderall (before she was taking the Concerta). She has a couples therapist with her , and he has his own therapist, but she does not have someone to see for her own mental health concerns. She is interested in pursing therapy individually, and is hopeful that now that she has MA in pregnancy, that she can get services. She is open to whatever assistance we can provide. As far as her pregnancy goes, Ellyn just had her fetal survey with [facility], states that a fibroid was noted. Feels baby move some, not consistent yet. Denies bleeding, leaking of fluid, headaches, " "vision changes. Her previous pregnancies were normal/healthy and resulted in term vaginal births. Her second baby was induced for postdates.    Past Medical History:   Diagnosis Date     Abnormal Pap smear     Colposcopy     Anemia     In Past     Anxiety      Chlamydia trachomatis infection of other specified site 1993     Depression      Fertility problem      Lyme disease 9/8/2010    ?false positive vs positve CMV - resolved     Moderate dysplasia of cervix 1995     Other and unspecified adverse effect of drug, medicinal and biological substance     insulin resistent     Varicosities      Wounds and injuries     fall down stairs, PT for back, pain meds       Past Surgical History:   Procedure Laterality Date     CONIZATION CERVIX,KNIFE/LASER  1995     SURGICAL HISTORY OF -       [place] teeth       Family History   Problem Relation Age of Onset     Cancer Mother         vocal cord cancer     Lipids Mother      Myocardial Infarction Mother      Cancer Father         B-cell lymphoma, melanoma     Lipids Father      Heart Disease Father         open heart surgery     Cerebrovascular Disease Father      Crohn's Disease Sister      Diabetes Paternal Grandmother      Diabetes Paternal Uncle      Alcohol/Drug Maternal Grandfather        Social History     Tobacco Use     Smoking status: Never Smoker     Smokeless tobacco: Never Used   Substance Use Topics     Alcohol use: Not Currently     Alcohol/week: 0.8 standard drinks     Types: 1 Standard drinks or equivalent per week     Comment: social     O:/67   Pulse 74   Temp 99  F (37.2  C) (Oral)   Ht 1.619 m (5' 3.75\")   Wt 83.9 kg (184 lb 14.4 oz)   LMP 03/25/2020   SpO2 97%   BMI 31.99 kg/m    Exam:  Constitutional: healthy, alert and no distress  Psychiatric: mentation appears normal and occasionally teary   PHQ 11/20/2019 5/10/2020 8/5/2020   PHQ-9 Total Score 3 4 16   Q9: Thoughts of better off dead/self-harm past 2 weeks Not at all Not at all Several " days   F/U: Thoughts of suicide or self-harm - - No   F/U: Safety concerns - - No     TEE-7 SCORE 4/30/2019 5/10/2020 8/6/2020   Total Score - - -   Total Score - 5 (mild anxiety) -   Total Score 11 5 13     A/P:  (O09.529) High-risk pregnancy, elderly multigravida, unspecified trimester  (primary encounter diagnosis)  Plan: Glucose tolerance gest screen 1 hour, OB         hemoglobin    (O99.342,  F32.9) Depression during pregnancy in second trimester  Plan: MENTAL HEALTH REFERRAL  - Adult; Psychiatry;         Psychiatry and Psychotherapy         (Individual/Couple/Family Therapy);         [facility] Psychiatry Service and [facility] [phone]; Yes;        Chronic Mental Health without improvement;         Ye..., CARE COORDINATION REFERRAL, venlafaxine         (EFFEXOR-XR) 37.5 MG 24 hr capsule  Message sent to SHELLY Parker, CHOLO. She has an appt opening tomorrow at 11am, discussed this with Ellyn and her . Ellyn agrees to work with Elena. Discussed how important it is to get a good handle on her mental health during pregnancy, and to get a support system in place to help her get through pregnancy and postpartum. She agrees, and verbalizes relief.  Discussed restarting her Effexor-XR. Consulted with Dr. Richard about this. Will plan to taper down on her Lexapro over the next few weeks, take 15mg for 1 week, then 10mg, then 5mg, then stop Lexapro. At the same time, start 37.5mg Effexor x1 week, then 75mg, then 150mg. Will plan to have a phone visit in a week to see how she is doing. Also, would like psychiatry involved ASAP to help manage medications during this transition, as well as long term. She is nervous about slowly introducing the Effexor, as she feels that it will be the thing that helps manage her mood better, and she doesn't want to wait a long time. She would prefer to just start at 75mg and increase to 150mg. Discussed that she will need to taper off the Lexapro  as well, so best to start slow with the Effexor.     Discussed GCT/hgb at next in-person prenatal visit at 24 weeks. Recommend check-in phone visit in 1 week to see how she is doing with the medication transition.  Given phone numbers and information about how to contact de YOON for any concerns. Handout provided.     Loco Cheng CNM

## 2020-08-06 NOTE — TELEPHONE ENCOUNTER
Left VM for Ellyn to call back if she needs any assistance this evening, after getting her PHQ-9 result of 16, also indicating some risk of suicidal thoughts. Indicated that she could reach out this evening if she needs anything prior to her 1pm appointment tomorrow.   Loco Cheng CNM

## 2020-08-06 NOTE — PROGRESS NOTES
"Please see \"Imaging\" tab under \"Chart Review\" for details of today's visit.    Joshua Mcclure    "

## 2020-08-07 ENCOUNTER — PATIENT OUTREACH (OUTPATIENT)
Dept: CARE COORDINATION | Facility: CLINIC | Age: 45
End: 2020-08-07

## 2020-08-07 ENCOUNTER — VIRTUAL VISIT (OUTPATIENT)
Dept: PSYCHOLOGY | Facility: CLINIC | Age: 45
End: 2020-08-07
Payer: COMMERCIAL

## 2020-08-07 DIAGNOSIS — O99.342 DEPRESSION DURING PREGNANCY IN SECOND TRIMESTER: Primary | ICD-10-CM

## 2020-08-07 DIAGNOSIS — F32.A DEPRESSION DURING PREGNANCY IN SECOND TRIMESTER: Primary | ICD-10-CM

## 2020-08-07 DIAGNOSIS — F43.23 ADJUSTMENT DISORDER WITH MIXED ANXIETY AND DEPRESSED MOOD: Primary | ICD-10-CM

## 2020-08-07 PROCEDURE — 90834 PSYTX W PT 45 MINUTES: CPT | Mod: 95 | Performed by: SOCIAL WORKER

## 2020-08-07 ASSESSMENT — ANXIETY QUESTIONNAIRES: GAD7 TOTAL SCORE: 13

## 2020-08-07 NOTE — PROGRESS NOTES
Clinic Care Coordination Contact  Community Health Worker Initial Outreach    CHW Initial Information Gathering:  Referral Source: Other, specify(Midwife)       Patient accepts CC: No. Pt said she spoke with [facility] Elena Hill and is looking forward to working with her. Pt appreciated the outreach. I gave her examples of what CC assists with and encouraged her to reach out to me in the future. She asked me to send my contact information via [employer].

## 2020-08-09 ENCOUNTER — DOCUMENTATION ONLY (OUTPATIENT)
Dept: PSYCHOLOGY | Facility: CLINIC | Age: 45
End: 2020-08-09

## 2020-08-09 ASSESSMENT — COLUMBIA-SUICIDE SEVERITY RATING SCALE - C-SSRS
4. HAVE YOU HAD THESE THOUGHTS AND HAD SOME INTENTION OF ACTING ON THEM?: NO
1. IN THE PAST MONTH, HAVE YOU WISHED YOU WERE DEAD OR WISHED YOU COULD GO TO SLEEP AND NOT WAKE UP?: YES
TOTAL  NUMBER OF INTERRUPTED ATTEMPTS PAST 3 MONTHS: NO
2. HAVE YOU ACTUALLY HAD ANY THOUGHTS OF KILLING YOURSELF?: NO
5. HAVE YOU STARTED TO WORK OUT OR WORKED OUT THE DETAILS OF HOW TO KILL YOURSELF? DO YOU INTEND TO CARRY OUT THIS PLAN?: NO
TOTAL  NUMBER OF ABORTED OR SELF INTERRUPTED ATTEMPTS PAST LIFETIME: NO
TOTAL  NUMBER OF INTERRUPTED ATTEMPTS LIFETIME: NO
TOTAL  NUMBER OF ABORTED OR SELF INTERRUPTED ATTEMPTS PAST 3 MONTHS: NO
2. HAVE YOU ACTUALLY HAD ANY THOUGHTS OF KILLING YOURSELF LIFETIME?: NO
5. HAVE YOU STARTED TO WORK OUT OR WORKED OUT THE DETAILS OF HOW TO KILL YOURSELF? DO YOU INTEND TO CARRY OUT THIS PLAN?: NO
1. IN THE PAST MONTH, HAVE YOU WISHED YOU WERE DEAD OR WISHED YOU COULD GO TO SLEEP AND NOT WAKE UP?: NO
4. HAVE YOU HAD THESE THOUGHTS AND HAD SOME INTENTION OF ACTING ON THEM?: NO
6. HAVE YOU EVER DONE ANYTHING, STARTED TO DO ANYTHING, OR PREPARED TO DO ANYTHING TO END YOUR LIFE?: NO
ATTEMPT LIFETIME: NO
6. HAVE YOU EVER DONE ANYTHING, STARTED TO DO ANYTHING, OR PREPARED TO DO ANYTHING TO END YOUR LIFE?: NO
3. HAVE YOU BEEN THINKING ABOUT HOW YOU MIGHT KILL YOURSELF?: NO
ATTEMPT PAST THREE MONTHS: NO

## 2020-08-10 ENCOUNTER — VIRTUAL VISIT (OUTPATIENT)
Dept: PSYCHOLOGY | Facility: CLINIC | Age: 45
End: 2020-08-10
Payer: COMMERCIAL

## 2020-08-10 ENCOUNTER — TELEPHONE (OUTPATIENT)
Dept: MIDWIFE SERVICES | Facility: CLINIC | Age: 45
End: 2020-08-10

## 2020-08-10 DIAGNOSIS — F41.1 GAD (GENERALIZED ANXIETY DISORDER): Primary | ICD-10-CM

## 2020-08-10 DIAGNOSIS — F33.1 MODERATE EPISODE OF RECURRENT MAJOR DEPRESSIVE DISORDER (H): ICD-10-CM

## 2020-08-10 PROCEDURE — 90791 PSYCH DIAGNOSTIC EVALUATION: CPT | Mod: 95 | Performed by: SOCIAL WORKER

## 2020-08-10 NOTE — PROGRESS NOTES
[facility]  August 6, 2020    Behavioral Health Clinician Progress Note    Patient Name: Ellyn Mcgee      Telemedicine Visit: The patient's condition can be safely assessed and treated via synchronous audio and visual telemedicine encounter.      Reason for Telemedicine Visit: Services only offered telehealth due to COVID-19    Originating Site (Patient Location): Patient's home    Distant Site (Provider Location): Provider Remote Setting    Consent:  The patient/guardian has verbally consented to: the potential risks and benefits of telemedicine (video visit) versus in person care; bill my insurance or make self-payment for services provided; and responsibility for payment of non-covered services.     Mode of Communication:  Video Conference via [employer]    As the provider I attest to compliance with applicable laws and regulations related to telemedicine.         Service Type:  Individual     Session Start Time: 11:07 am  Session End Time: 11: 59 am      Session Length: 38 - 52      Attendees: Patient    Visit Activities (Refresh list every visit): [facility] and [facility] Only    Diagnostic Assessment Date: In process. Insufficient time to complete at end of first visit  Treatment Plan Review Date: To be completed after diagnostic assessment  See Flowsheets for today's PHQ-9 and TEE-7 results  Previous PHQ-9:   PHQ-9 SCORE 11/20/2019 5/10/2020 8/5/2020   PHQ-9 Total Score - - -   PHQ-9 Total Score MyChart 3 (Minimal depression) 4 (Minimal depression) 16 (Moderately severe depression)   PHQ-9 Total Score 3 4 16     Previous TEE-7:   TEE-7 SCORE 4/30/2019 5/10/2020 8/6/2020   Total Score - - -   Total Score - 5 (mild anxiety) -   Total Score 11 5 13       AMY LEVEL:  AMY Score (Last Two) 8/24/2010   AMY Raw Score 46   Activation Score 75.3   AMY Level 4       DATA  Extended Session (60+ minutes): No  Interactive Complexity: No  Crisis: No  [facility] Patient:  "No    Treatment Objective(s) Addressed in This Session:  Target Behavior(s): disease management/lifestyle changes , mental health management    Depressed Mood: Increase interest, engagement, and pleasure in doing things  Decrease frequency and intensity of feeling down, depressed, hopeless  Anxiety: will experience a reduction in anxiety and will develop more effective coping skills to manage anxiety symptoms    Current Stressors / Issues:  Patient reported that she has a history of anxiety, depression, and ADHD. She stated that her anxiety frequently causes her depressive symptoms. Patient stated that in the past 5 years, she noticed worsening symptoms, and has been tried on numerous medications. Patient stated that the last couple of months she has noticed a change in her mental health, and reported that due to pregnancy, she cannot be on the medication that she knows can be the most effective for her.     Patient stated that contributing factors for symptoms include: Patient stated that is currently pregnant. She stated that she has a history of miscarriage, which causes her to worry more. Patient stated that she worries if there is too much time between appointments, and can often worry about worst case scenarios happening with the pregnancy. Patient stated there is also additional financial stress. 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 Due to the pandemic and being pregnant, she is currently not working at [employer].     Patient reported thoughts of wanting to die as a child. She stated that she has never acted on thoughts. Patient stated that when it becomes \"too much\" she has thoughts of \"I want this to stop\" and \"I'm tired of feeling this way\". Patient denied thoughts of wanting to die, denied thoughts of wanting to kill herself. Patient stated that she could never act on thoughts because of her mother and her daughters.     Progress on Treatment Objective(s) / Homework:  New " Objective established this session - PREPARATION (Decided to change - considering how); Intervened by negotiating a change plan and determining options / strategies for behavior change, identifying triggers, exploring social supports, and working towards setting a date to begin behavior change    Motivational Interviewing    MI Intervention: Expressed Empathy/Understanding, Supported Autonomy, Collaboration, Evocation, Open-ended questions and Reframe     Change Talk Expressed by the Patient: Reasons to change Need to change Committment to change    Provider Response to Change Talk: E - Evoked more info from patient about behavior change, A - Affirmed patient's thoughts, decisions, or attempts at behavior change, R - Reflected patient's change talk and S - Summarized patient's change talk statements    Also provided psychoeducation about behavioral health condition, symptoms, and treatment options    Care Plan review completed: No    Medication Review:  No changes to current psychiatric medication(s)    Medication Compliance:  Yes    Changes in Health Issues:   None reported    Chemical Use Review:   Substance Use: Chemical use reviewed, no active concerns identified      Tobacco Use: No current tobacco use.      Assessment: Current Emotional / Mental Status (status of significant symptoms):  Risk status (Self / Other harm or suicidal ideation)  Patient has had a history of suicidal ideation: as a small child and denies a history of suicide attempts, self-injurious behavior, homicidal ideation, homicidal behavior and and other safety concerns  Patient denies current fears or concerns for personal safety.  Patient reports the following current or recent suicidal ideation or behaviors: denies SI, but has thoughts of wishing she could take a break/vacation and get away from stress and symptoms.  Patient denies current or recent homicidal ideation or behaviors.  Patient denies current or recent self injurious behavior  or ideation.  Patient denies other safety concerns.  A safety and risk management plan has not been developed at this time, however patient was encouraged to call [place] [phone] should there be a change in any of these risk factors.    Appearance:   Appropriate   Eye Contact:   Good   Psychomotor Behavior: Normal   Attitude:   Cooperative   Orientation:   All  Speech   Rate / Production: Normal    Volume:  Normal   Mood:    Normal  Affect:    Appropriate   Thought Content:  Clear   Thought Form:  Coherent  Logical   Insight:    Good     Diagnoses:  1. Adjustment disorder with mixed anxiety and depressed mood        Collateral Reports Completed:  Communicated with: Loco Finley CNM    Plan: (Homework, other):  Patient was given information about behavioral services and encouraged to schedule a follow up appointment with the clinic [facility] in 1 week to complete the diagnostic assessment.  She was also given given strategies to assist with increasing interest and motivation to engage in mental health care.  CD Recommendations: No indications of CD issues.  Elena Hill, CHOLO      ______________________________________________________________________      CHOLO Lewis  August 6, 2020

## 2020-08-10 NOTE — TELEPHONE ENCOUNTER
Responded to [employer] message x2 regarding this issue.  Waiting for pt's response on appt time.  Elvira Barger RN

## 2020-08-10 NOTE — TELEPHONE ENCOUNTER
Reason for call:  Other   Patient called regarding (reason for call): call back  Additional comments: pt was told to schedule a week from 8/6 with Loco but Loco isn't available until 8/20, she would like to know if that's okay as it is a check up on her medications.    Phone number to reach patient:  Home number on file [phone] (home)    Best Time:  n/a    Can we leave a detailed message on this number?  YES    Travel screening: Not Applicable
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
## 2020-08-12 ENCOUNTER — VIRTUAL VISIT (OUTPATIENT)
Dept: MIDWIFE SERVICES | Facility: CLINIC | Age: 45
End: 2020-08-12
Payer: COMMERCIAL

## 2020-08-12 DIAGNOSIS — O09.522 HIGH-RISK PREGNANCY, ELDERLY MULTIGRAVIDA, SECOND TRIMESTER: Primary | ICD-10-CM

## 2020-08-12 DIAGNOSIS — F32.A DEPRESSION DURING PREGNANCY IN SECOND TRIMESTER: ICD-10-CM

## 2020-08-12 DIAGNOSIS — O99.342 DEPRESSION DURING PREGNANCY IN SECOND TRIMESTER: ICD-10-CM

## 2020-08-12 PROCEDURE — 99213 OFFICE O/P EST LOW 20 MIN: CPT | Mod: 95 | Performed by: ADVANCED PRACTICE MIDWIFE
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
## 2020-08-14 ENCOUNTER — MYC MEDICAL ADVICE (OUTPATIENT)
Dept: FAMILY MEDICINE | Facility: CLINIC | Age: 45
End: 2020-08-14

## 2020-08-19 ENCOUNTER — VIRTUAL VISIT (OUTPATIENT)
Dept: PSYCHOLOGY | Facility: CLINIC | Age: 45
End: 2020-08-19
Payer: COMMERCIAL

## 2020-08-19 DIAGNOSIS — F33.1 MODERATE EPISODE OF RECURRENT MAJOR DEPRESSIVE DISORDER (H): ICD-10-CM

## 2020-08-19 DIAGNOSIS — F41.1 GAD (GENERALIZED ANXIETY DISORDER): Primary | ICD-10-CM

## 2020-08-19 PROCEDURE — 90834 PSYTX W PT 45 MINUTES: CPT | Mod: 95 | Performed by: SOCIAL WORKER

## 2020-08-20 ENCOUNTER — VIRTUAL VISIT (OUTPATIENT)
Dept: MIDWIFE SERVICES | Facility: CLINIC | Age: 45
End: 2020-08-20
Payer: COMMERCIAL

## 2020-08-20 DIAGNOSIS — O99.342 DEPRESSION DURING PREGNANCY IN SECOND TRIMESTER: ICD-10-CM

## 2020-08-20 DIAGNOSIS — O09.522 HIGH-RISK PREGNANCY, ELDERLY MULTIGRAVIDA, SECOND TRIMESTER: Primary | ICD-10-CM

## 2020-08-20 DIAGNOSIS — F32.A DEPRESSION DURING PREGNANCY IN SECOND TRIMESTER: ICD-10-CM

## 2020-08-20 PROCEDURE — 99207 ZZC PRENATAL VISIT: CPT | Performed by: ADVANCED PRACTICE MIDWIFE

## 2020-08-20 NOTE — PROGRESS NOTES
[facility]  August 19, 2020    Behavioral Health Clinician Progress Note    Patient Name: Ellyn Mcgee      Telemedicine Visit: The patient's condition can be safely assessed and treated via synchronous audio and visual telemedicine encounter.      Reason for Telemedicine Visit: Services only offered telehealth    Originating Site (Patient Location): Patient's home    Distant Site (Provider Location): Provider Remote Setting    Consent:  The patient/guardian has verbally consented to: the potential risks and benefits of telemedicine (video visit) versus in person care; bill my insurance or make self-payment for services provided; and responsibility for payment of non-covered services.     Mode of Communication:  Video Conference via [employer]    As the provider I attest to compliance with applicable laws and regulations related to telemedicine.         Service Type:  Individual     Session Start Time: 12:03 pm  Session End Time: 12:55 pm      Session Length: 38 - 52      Attendees: Patient    Visit Activities (Refresh list every visit): [facility] Only    Diagnostic Assessment Date: 8/10/20  Treatment Plan Review Date: 8/19/20  See Flowsheets for today's PHQ-9 and TEE-7 results  Previous PHQ-9:   PHQ-9 SCORE 5/10/2020 8/5/2020 8/12/2020   PHQ-9 Total Score - - -   PHQ-9 Total Score MyChart 4 (Minimal depression) 16 (Moderately severe depression) 7 (Mild depression)   PHQ-9 Total Score 4 16 7     Previous TEE-7:   TEE-7 SCORE 5/10/2020 8/6/2020 8/12/2020   Total Score - - -   Total Score 5 (mild anxiety) - 14 (moderate anxiety)   Total Score 5 13 14       AMY LEVEL:  AMY Score (Last Two) 8/24/2010   AMY Raw Score 46   Activation Score 75.3   AMY Level 4       DATA  Extended Session (60+ minutes): No  Interactive Complexity: No  Crisis: No  [facility] Patient: No    Treatment Objective(s) Addressed in This Session:  Target Behavior(s): depression and anxiety    Depressed Mood: Increase interest,  engagement, and pleasure in doing things  Decrease frequency and intensity of feeling down, depressed, hopeless  Anxiety: will experience a reduction in anxiety and will develop more effective coping skills to manage anxiety symptoms    Current Stressors / Issues:  Patient reported heightened stress associated with the friend/roommate that had been staying with them. She stated that she is currently in detention, but she is not sure when she will return. Patient stated that she calls everyday asking her to post bail, and then the friend's family members are encouraging everyone to not post bail. Patient shared feeling overwhelmed with the lack of knowing when she will get released and wanting her to friend move out, but due to the COVID eviction moratorium, feeling/knowing that she can't kick her out. She stated that she is trying to learn more about her rights as a landlord even though she does not have an official lease with this friend. Patient stated that she has some resources that she is pursuing. Patient expressed concern that this stress may be negatively impacting the baby, and she is wondering if it can be documented that stress is harmful to the baby in order to use that as evidence to support the need for her friend to move out.     Patient stated that due to the stress, she is more irritable with her  and her children. When this occurs, she expressed fear of being able to manage a third child, including if they had behaviors that may be difficult to handle from time to time. Patient recognized the feeling of stress often triggers subsequent anxiety, but that she is having a difficult time staying present on the here and now.     Patient and [facility] began to review and explore grounding and mindfulness techniques and practiced in session.     Progress on Treatment Objective(s) / Homework:  New Objective established this session - CONTEMPLATION (Considering change and yet undecided); Intervened by assessing  the negative and positive thinking (ambivalence) about behavior change    Motivational Interviewing    MI Intervention: Expressed Empathy/Understanding, Permission to raise concern or advise, Open-ended questions, Reflections: simple and complex, Change talk (evoked) and Reframe     Change Talk Expressed by the Patient: Reasons to change Need to change Committment to change    Provider Response to Change Talk: E - Evoked more info from patient about behavior change, A - Affirmed patient's thoughts, decisions, or attempts at behavior change, R - Reflected patient's change talk and S - Summarized patient's change talk statements     Mindfulness-Based Strategies: Discussed skills based on development and application of mindfulness    Also provided psychoeducation about behavioral health condition, symptoms, and treatment options    Care Plan review completed: Yes    Medication Review:  No changes to current psychiatric medication(s)    Medication Compliance:  Yes    Changes in Health Issues:   None reported    Chemical Use Review:   Substance Use: Chemical use reviewed, no active concerns identified      Tobacco Use: No current tobacco use.      Assessment: Current Emotional / Mental Status (status of significant symptoms):  Risk status (Self / Other harm or suicidal ideation)  Patient has had a history of suicidal ideation: as a child, unable to recall details of it and denies a history of suicide attempts, self-injurious behavior, homicidal ideation, homicidal behavior and and other safety concerns  Patient denies current fears or concerns for personal safety.  Patient denies current or recent suicidal ideation or behaviors.  Patient denies current or recent homicidal ideation or behaviors.  Patient denies current or recent self injurious behavior or ideation.  Patient denies other safety concerns.  A safety and risk management plan has not been developed at this time, however patient was encouraged to call [place] / 911 should there be a change in any of these risk factors.    Appearance:   Appropriate   Eye Contact:   Good   Psychomotor Behavior: Normal   Attitude:   Cooperative   Orientation:   All  Speech   Rate / Production: Normal    Volume:  Normal   Mood:    Anxious   Affect:    Appropriate   Thought Content:  Clear   Thought Form:  Coherent  Logical   Insight:    Good     Diagnoses:  1. TEE (generalized anxiety disorder)    2. Moderate episode of recurrent major depressive disorder (H)        Collateral Reports Completed:  Not Applicable    Plan: (Homework, other):  Patient was given information about behavioral services and encouraged to schedule a follow up appointment with the clinic [facility] in 1 week.  She was also given mindfulness and grounding techniques.  CD Recommendations: No indications of CD issues.  Elena Hill, [facility]      ______________________________________________________________________    Integrated Primary Care Behavioral Health Treatment Plan    Patient's Name: Ellyn Mcgee  YOB: 1975    Date: 8/19/20    DSM-V Diagnoses: 296.32 (F33.1) Major Depressive Disorder, Recurrent Episode, Moderate _ or 300.02 (F41.1) Generalized Anxiety Disorder  Psychosocial / Contextual Factors: currently pregnant, work impacted by COVID, stress related to roommate   WHODAS:   WHODAS 2.0 Total Score 8/12/2020   Total Score 23   Total Score MyChart 23         Referral / Collaboration:  Patient has been referred to [facility] by SANAZ    [facility] to begin with episode of care with [facility], with eventual transfer to [facility] for ongoing trauma therapy.     Anticipated number of session or this episode of care: 8-10      MeasurableTreatment Goal(s) related to diagnosis / functional impairment(s)  Goal 1: Patient will reduce symptoms of depression as evidenced by decreased score on PHQ9 from 16 to 10.    I will know I've met my goal when I have more interested and motivation to complete  daily activites.      Objective #A (Patient Action)    Patient will Decrease frequency and intensity of feeling down, depressed, hopeless.  Status: New - Date: 8/19/20     Intervention(s)  [facility] will assist patient process through stressors impacting mood.    Objective #B  Patient will Increase interest, engagement, and pleasure in doing things.  Status: New - Date: 8/19/20     Intervention(s)  [facility] will assign homework to assist with activity scheduling and behavioral activation.      Goal 2: Patient will reduce symptoms of anxiety as evidenced by decreased score on TEE 7 from 13 to 7.     I will know I've met my goal when I ruminate less and feel less irritable.      Objective #A (Patient Action)    Status: New - Date: 8/19/20     Patient will identify three distraction and diversion activities and use those activities to decrease level of anxiety  .    Intervention(s)  [facility] will teach distress tolerance, mindfulness, and relaxation techniques.    Objective #B  Patient will use cognitive strategies identified in therapy to challenge anxious thoughts.    Status: New - Date: 8/19/20     Intervention(s)  [facility] will assign homework to practice cognitive restructuring and defusion techniques.      Patient has reviewed and agreed to the above plan.      Elena Hill, [facility]  August 19, 2020

## 2020-08-20 NOTE — PROGRESS NOTES
"Ellyn Mcgee is a 45 year old female who is being evaluated via a billable telephone visit.      The patient has been notified of following:     \"This telephone visit will be conducted via a call between you and your physician/provider. We have found that certain health care needs can be provided without the need for a physical exam.  This service lets us provide the care you need with a short phone conversation.  If a prescription is necessary we can send it directly to your pharmacy.  If lab work is needed we can place an order for that and you can then stop by our lab to have the test done at a later time.    Telephone visits are billed at different rates depending on your insurance coverage. During this emergency period, for some insurers they may be billed the same as an in-person visit.  Please reach out to your insurance provider with any questions.    If during the course of the call the physician/provider feels a telephone visit is not appropriate, you will not be charged for this service.\"    Patient has given verbal consent for Telephone visit?     What phone number would you like to be contacted at? [phone]    How would you like to obtain your AVS? Shahram    Feels like her medication adjustment is going well. Currently taking 10mg lexapro and 75mg effexor. Felt a little \"twitchy\" for one day, so she went back to lower dose of effexor for one day, then increased again. Has been at this dose for about 3 days. Discussed staying on this dose for another two weeks, and not adjusting any further right now.     Working with Elena Hill. Has a lot of anxiety and crying spells. Working on focusing. The situation with her roommate is causing her a lot of stress. Asking if it is \"endangering the life of her unborn child\" by having all of this stress. Discussed that chronic stress can cause hypertension or  labor, but in the absence of symptoms, I would not feel comfortable writing a letter to the " court stating that it is an immediate risk. Discussed that her body will likely protect this pregnancy, and that my bigger concern relates to how this situation is interfering with her ability to find a better mental health state before her baby comes.    Has had some lower abdominal cramping last week, no bleeding. More often in the morning. Discussed good hydration and rest if this happens again. She talked with a friend who was a , who told her the same thing, so she didn't call. Doesn't think it was round ligament pain.    Ellyn's speech is very fast today. She sounds distressed and anxious on the phone. Encouraged her to continue the mindful habits discussed with Elena, and to call if she has any concerns about her medications. She has an appointment with psychiatry on Sept 8.    Return to clinic in-person Sept 3.    Phone call duration: 17 minutes    Loco Cheng CNM

## 2020-08-28 ENCOUNTER — VIRTUAL VISIT (OUTPATIENT)
Dept: PSYCHOLOGY | Facility: CLINIC | Age: 45
End: 2020-08-28
Payer: COMMERCIAL

## 2020-08-28 DIAGNOSIS — F33.1 MODERATE EPISODE OF RECURRENT MAJOR DEPRESSIVE DISORDER (H): ICD-10-CM

## 2020-08-28 DIAGNOSIS — F41.1 GAD (GENERALIZED ANXIETY DISORDER): Primary | ICD-10-CM

## 2020-08-28 PROCEDURE — 90834 PSYTX W PT 45 MINUTES: CPT | Mod: 95 | Performed by: SOCIAL WORKER

## 2020-08-28 PROCEDURE — 90785 PSYTX COMPLEX INTERACTIVE: CPT | Mod: 95 | Performed by: SOCIAL WORKER

## 2020-08-28 NOTE — PROGRESS NOTES
[facility]  August 28, 2020    Behavioral Health Clinician Progress Note    Patient Name: Ellyn Mcgee      Telemedicine Visit: The patient's condition can be safely assessed and treated via synchronous audio and visual telemedicine encounter.      Reason for Telemedicine Visit: Services only offered telehealth    Originating Site (Patient Location): Patient's home    Distant Site (Provider Location): Provider Remote Setting    Consent:  The patient/guardian has verbally consented to: the potential risks and benefits of telemedicine (video visit) versus in person care; bill my insurance or make self-payment for services provided; and responsibility for payment of non-covered services.     Mode of Communication:  Video Conference via [employer]    As the provider I attest to compliance with applicable laws and regulations related to telemedicine.         Service Type:  Individual     Session Start Time: 12:06 pm  Session End Time: 12:58 pm      Session Length: 38 - 52      Attendees: Patient    Visit Activities (Refresh list every visit): [facility] Only    Diagnostic Assessment Date: 8/10/20  Treatment Plan Review Date: 8/19/20  See Flowsheets for today's PHQ-9 and TEE-7 results  Previous PHQ-9:   PHQ-9 SCORE 5/10/2020 8/5/2020 8/12/2020   PHQ-9 Total Score - - -   PHQ-9 Total Score MyChart 4 (Minimal depression) 16 (Moderately severe depression) 7 (Mild depression)   PHQ-9 Total Score 4 16 7     Previous TEE-7:   TEE-7 SCORE 5/10/2020 8/6/2020 8/12/2020   Total Score - - -   Total Score 5 (mild anxiety) - 14 (moderate anxiety)   Total Score 5 13 14       AMY LEVEL:  AMY Score (Last Two) 8/24/2010   AMY Raw Score 46   Activation Score 75.3   AMY Level 4       DATA  Extended Session (60+ minutes): No  Interactive Complexity: Yes, visit entailed Interactive Complexity evidenced by:  -Caregiver emotions/behavior that interfere with implementation of the treatment plan  Crisis: No  [facility] Patient:  "No    Treatment Objective(s) Addressed in This Session:  Target Behavior(s): depression and anxiety    Depressed Mood: Increase interest, engagement, and pleasure in doing things  Decrease frequency and intensity of feeling down, depressed, hopeless  Anxiety: will experience a reduction in anxiety and will develop more effective coping skills to manage anxiety symptoms    Current Stressors / Issues:  Patient reported that this week has included an increase in relational stress with her . She stated that she is aware that the conflict is a result of her 's anxiety and his childhood experiences and the impact that her childhood experiences with her father impacts her sense of safety and security in her marriage. Patient stated that they trigger one another, and most recently, has felt that his anger and their arguments have intensified. Patient stated that she feels physically safe, but she wonders how much longer she will be able to take/cope with his behaviors. She feels frustrated by his lack of progress in therapy. Patient stated that she feels overwhelmed and dysregulated by him, which then in result makes it difficult for her to complete daily activities. She stated that she can feel like a failure and never enough, both for her  and her daughters. Patient requesting transfer to trauma therapist (as previously discussed) as she hopes to heal from her past, with the hopes that it will allow her to better support her . Reviewed strategies to help her with this coming weekend, including activity scheduling.     Patient denied SI, but stated that she has thoughts of wanting to \"leave\". She stated that she wants to leave her home and be away from her . Patient stated that she feels isolated and trapped because of COVID and how that impacts her ability to get out of her home.     Progress on Treatment Objective(s) / Homework:  Worsening - CONTEMPLATION (Considering change and yet " undecided); Intervened by assessing the negative and positive thinking (ambivalence) about behavior change    Motivational Interviewing    MI Intervention: Expressed Empathy/Understanding, Permission to raise concern or advise, Open-ended questions, Reflections: simple and complex, Change talk (evoked) and Reframe     Change Talk Expressed by the Patient: Reasons to change Need to change Committment to change    Provider Response to Change Talk: E - Evoked more info from patient about behavior change, A - Affirmed patient's thoughts, decisions, or attempts at behavior change, R - Reflected patient's change talk and S - Summarized patient's change talk statements     Mindfulness-Based Strategies: Discussed skills based on development and application of mindfulness    Also provided psychoeducation about behavioral health condition, symptoms, and treatment options    Care Plan review completed: Yes    Medication Review:  No changes to current psychiatric medication(s)    Medication Compliance:  Yes    Changes in Health Issues:   None reported    Chemical Use Review:   Substance Use: Chemical use reviewed, no active concerns identified      Tobacco Use: No current tobacco use.      Assessment: Current Emotional / Mental Status (status of significant symptoms):  Risk status (Self / Other harm or suicidal ideation)  Patient has had a history of suicidal ideation: as a child, unable to recall details of it and denies a history of suicide attempts, self-injurious behavior, homicidal ideation, homicidal behavior and and other safety concerns  Patient denies current fears or concerns for personal safety.  Patient denies current or recent suicidal ideation or behaviors.  Patient denies current or recent homicidal ideation or behaviors.  Patient denies current or recent self injurious behavior or ideation.  Patient denies other safety concerns.  A safety and risk management plan has not been developed at this time, however  patient was encouraged to call [place] [phone] should there be a change in any of these risk factors.    Appearance:   Appropriate   Eye Contact:   Good   Psychomotor Behavior: Normal   Attitude:   Cooperative   Orientation:   All  Speech   Rate / Production: Normal    Volume:  Normal   Mood:    Anxious  Depressed   Affect:    Tearful  Thought Content:  Clear   Thought Form:  Coherent  Logical   Insight:    Good     Diagnoses:  1. TEE (generalized anxiety disorder)    2. Moderate episode of recurrent major depressive disorder (H)        Collateral Reports Completed:  Not Applicable    Plan: (Homework, other):  Patient was given information about behavioral services and encouraged to schedule a follow up appointment with the clinic [facility] in 1 week.  She was also given behavioral activation strategies and referrals for [facility].  CD Recommendations: No indications of CD issues.  CHOLO Lewis      ______________________________________________________________________    Integrated Primary Care Behavioral Health Treatment Plan    Patient's Name: Ellyn Mcgee  YOB: 1975    Date: 8/19/20    DSM-V Diagnoses: 296.32 (F33.1) Major Depressive Disorder, Recurrent Episode, Moderate _ or 300.02 (F41.1) Generalized Anxiety Disorder  Psychosocial / Contextual Factors: currently pregnant, work impacted by COVID, stress related to roommate   WHODAS:   WHODAS 2.0 Total Score 8/12/2020   Total Score 23   Total Score MyChart 23         Referral / Collaboration:  Patient has been referred to [facility] by SANAZ    [facility] to begin with episode of care with [facility], with eventual transfer to [facility] for ongoing trauma therapy.     Anticipated number of session or this episode of care: 8-10      MeasurableTreatment Goal(s) related to diagnosis / functional impairment(s)  Goal 1: Patient will reduce symptoms of depression as evidenced by decreased score on PHQ9 from 16 to  10.    I will know I've met my goal when I have more interested and motivation to complete daily activites.      Objective #A (Patient Action)    Patient will Decrease frequency and intensity of feeling down, depressed, hopeless.  Status: New - Date: 8/19/20     Intervention(s)  [facility] will assist patient process through stressors impacting mood.    Objective #B  Patient will Increase interest, engagement, and pleasure in doing things.  Status: New - Date: 8/19/20     Intervention(s)  [facility] will assign homework to assist with activity scheduling and behavioral activation.      Goal 2: Patient will reduce symptoms of anxiety as evidenced by decreased score on TEE 7 from 13 to 7.     I will know I've met my goal when I ruminate less and feel less irritable.      Objective #A (Patient Action)    Status: New - Date: 8/19/20     Patient will identify three distraction and diversion activities and use those activities to decrease level of anxiety  .    Intervention(s)  [facility] will teach distress tolerance, mindfulness, and relaxation techniques.    Objective #B  Patient will use cognitive strategies identified in therapy to challenge anxious thoughts.    Status: New - Date: 8/19/20     Intervention(s)  [facility] will assign homework to practice cognitive restructuring and defusion techniques.      Patient has reviewed and agreed to the above plan.      CHOLO Lewis  August 19, 2020

## 2020-09-03 ENCOUNTER — VIRTUAL VISIT (OUTPATIENT)
Dept: PSYCHOLOGY | Facility: CLINIC | Age: 45
End: 2020-09-03
Payer: COMMERCIAL

## 2020-09-03 ENCOUNTER — PRENATAL OFFICE VISIT (OUTPATIENT)
Dept: MIDWIFE SERVICES | Facility: CLINIC | Age: 45
End: 2020-09-03
Payer: COMMERCIAL

## 2020-09-03 VITALS
DIASTOLIC BLOOD PRESSURE: 64 MMHG | HEART RATE: 83 BPM | BODY MASS INDEX: 32.96 KG/M2 | HEIGHT: 63 IN | WEIGHT: 186 LBS | SYSTOLIC BLOOD PRESSURE: 100 MMHG

## 2020-09-03 DIAGNOSIS — O09.522 HIGH-RISK PREGNANCY, ELDERLY MULTIGRAVIDA, SECOND TRIMESTER: Primary | ICD-10-CM

## 2020-09-03 DIAGNOSIS — F41.1 GAD (GENERALIZED ANXIETY DISORDER): Primary | ICD-10-CM

## 2020-09-03 DIAGNOSIS — F33.1 MODERATE EPISODE OF RECURRENT MAJOR DEPRESSIVE DISORDER (H): ICD-10-CM

## 2020-09-03 PROCEDURE — 99207 ZZC PRENATAL VISIT: CPT | Performed by: ADVANCED PRACTICE MIDWIFE

## 2020-09-03 PROCEDURE — 90834 PSYTX W PT 45 MINUTES: CPT | Mod: 95 | Performed by: SOCIAL WORKER

## 2020-09-03 ASSESSMENT — MIFFLIN-ST. JEOR: SCORE: 1456.78

## 2020-09-03 NOTE — PROGRESS NOTES
23w1d  Ellyn is here for prenatal visit. Her social situation has not changed, continues to be very stressful related to her unwanted roommate. She continues to take 10mg lexapro and 75mg effexor. She continues to be reluctant to decrease the lexapro any further, feels that this dosage has been helpful, and she doesn't want to change anything at this time. She is seeing Elena Hill today, and transitions to her trauma-focused therapist next week. She also has a video visit next week with psychiatry. Will send chart to Dr. Wang to review and make sure it is safe to stay at this dosage of these two medications, and see if she has any further recommendatios prior to her visit with Ellyn next week. Baby is active, denies bleeding, leaking of fluid, contractions. MFM U/S for growth due to AMA in two weeks. Suggested CNM appt that day as well for her GCT/hgb check.   Loco Cheng CNM

## 2020-09-03 NOTE — PROGRESS NOTES
[facility]  September 3, 2020    Behavioral Health Clinician Progress Note    Patient Name: Ellyn Mcgee      Telemedicine Visit: The patient's condition can be safely assessed and treated via synchronous audio and visual telemedicine encounter.      Reason for Telemedicine Visit: Services only offered telehealth    Originating Site (Patient Location): Patient's home    Distant Site (Provider Location): Provider Remote Setting    Consent:  The patient/guardian has verbally consented to: the potential risks and benefits of telemedicine (video visit) versus in person care; bill my insurance or make self-payment for services provided; and responsibility for payment of non-covered services.     Mode of Communication:  Video Conference via [employer]    As the provider I attest to compliance with applicable laws and regulations related to telemedicine.         Service Type:  Individual     Session Start Time: 4:34 pm  Session End Time: 5:23 pm      Session Length: 38 - 52      Attendees: Patient    Visit Activities (Refresh list every visit): [facility] Only    Diagnostic Assessment Date: 8/10/20  Treatment Plan Review Date: 8/19/20  See Flowsheets for today's PHQ-9 and TEE-7 results  Previous PHQ-9:   PHQ-9 SCORE 5/10/2020 8/5/2020 8/12/2020   PHQ-9 Total Score - - -   PHQ-9 Total Score MyChart 4 (Minimal depression) 16 (Moderately severe depression) 7 (Mild depression)   PHQ-9 Total Score 4 16 7     Previous TEE-7:   TEE-7 SCORE 5/10/2020 8/6/2020 8/12/2020   Total Score - - -   Total Score 5 (mild anxiety) - 14 (moderate anxiety)   Total Score 5 13 14       AMY LEVEL:  AMY Score (Last Two) 8/24/2010   AMY Raw Score 46   Activation Score 75.3   AMY Level 4       DATA  Extended Session (60+ minutes): No  Interactive Complexity: No  Crisis: No  State mental health facility Patient: No    Treatment Objective(s) Addressed in This Session:  Target Behavior(s): depression and anxiety    Depressed Mood: Increase interest,  engagement, and pleasure in doing things  Decrease frequency and intensity of feeling down, depressed, hopeless  Anxiety: will experience a reduction in anxiety and will develop more effective coping skills to manage anxiety symptoms    Current Stressors / Issues:  Patient reported that her anxiety has improved in comparison to the previous week. She stated that she and her  attended a couples therapy appointment and have had a couple of productive but difficult conversations. Patient stated that she does note how he will make a comment (which she identifies as a comment where there is nothing wrong), but because of how it triggers thoughts/feelings from her past, she starts to notice her anxiety and racing heart.  Patient stated that she is eager to begin to work on her past trauma and experiences with her father to try to reduce this initial reaction.     Patient discussed recent stress with her roommate. She stated that she continues to feel like she is walking on egg shells now that she is home from prison. Patient voiced frustration with her attempts to go out of her way to support this roommate/friend, but her friend having minimal appreciation and gratitude. Patient stated that she is able to empathize with her friend's situation, but also is feeling the need to care for herself. Patient stated that her roommate has called her selfish, which she knows logically is not true, but because of her emotions, she can sometimes question how she feels about herself and her decisions.     Reviewed patient's mental health plan of care. Patient confirmed upcoming appointment with psychiatry for next week and transfer to [facility] therapist, Keesha Eller.  Patient expressed appreciation for [facility] visits, and acknowledged ongoing availability of [facility] as needed in the future, including if there are potential concerns about her mental health specific to the pregnancy.       Progress on Treatment Objective(s) /  Homework:  Satisfactory progress - ACTION (Actively working towards change); Intervened by reinforcing change plan / affirming steps taken    Motivational Interviewing    MI Intervention: Expressed Empathy/Understanding, Permission to raise concern or advise, Open-ended questions, Reflections: simple and complex, Change talk (evoked) and Reframe     Change Talk Expressed by the Patient: Reasons to change Need to change Committment to change    Provider Response to Change Talk: E - Evoked more info from patient about behavior change, A - Affirmed patient's thoughts, decisions, or attempts at behavior change, R - Reflected patient's change talk and S - Summarized patient's change talk statements     Mindfulness-Based Strategies: Discussed skills based on development and application of mindfulness    Also provided psychoeducation about behavioral health condition, symptoms, and treatment options    Care Plan review completed: Yes    Medication Review:  No changes to current psychiatric medication(s)    Medication Compliance:  Yes    Changes in Health Issues:   None reported    Chemical Use Review:   Substance Use: Chemical use reviewed, no active concerns identified      Tobacco Use: No current tobacco use.      Assessment: Current Emotional / Mental Status (status of significant symptoms):  Risk status (Self / Other harm or suicidal ideation)  Patient has had a history of suicidal ideation: as a child, unable to recall details of it and denies a history of suicide attempts, self-injurious behavior, homicidal ideation, homicidal behavior and and other safety concerns  Patient denies current fears or concerns for personal safety.  Patient denies current or recent suicidal ideation or behaviors.  Patient denies current or recent homicidal ideation or behaviors.  Patient denies current or recent self injurious behavior or ideation.  Patient denies other safety concerns.  A safety and risk management plan has not been  developed at this time, however patient was encouraged to call [place] [phone] should there be a change in any of these risk factors.    Appearance:   Appropriate   Eye Contact:   Good   Psychomotor Behavior: Normal   Attitude:   Cooperative   Orientation:   All  Speech   Rate / Production: Normal    Volume:  Normal   Mood:    Anxious  Depressed   Affect:    Tearful  Thought Content:  Clear   Thought Form:  Coherent  Logical   Insight:    Good     Diagnoses:  1. TEE (generalized anxiety disorder)    2. Moderate episode of recurrent major depressive disorder (H)        Collateral Reports Completed:  Communicated with: Keesha Eller [facility] therapist    Plan: (Homework, other):  Patient was given information about behavioral services and encouraged to schedule a follow up appointment with the clinic [facility] as needed. Patient transferring to Keesha Englishby on 9/11.     CD Recommendations: No indications of CD issues.  CHOLO Lewis      ______________________________________________________________________    Integrated Primary Care Behavioral Health Treatment Plan    Patient's Name: Ellyn Mcgee  YOB: 1975    Date: 8/19/20    DSM-V Diagnoses: 296.32 (F33.1) Major Depressive Disorder, Recurrent Episode, Moderate _ or 300.02 (F41.1) Generalized Anxiety Disorder  Psychosocial / Contextual Factors: currently pregnant, work impacted by COVID, stress related to roommate   WHODAS:   WHODAS 2.0 Total Score 8/12/2020   Total Score 23   Total Score MyChart 23         Referral / Collaboration:  Patient has been referred to [facility] by [facility] to begin with episode of care with [facility], with eventual transfer to [facility] for ongoing trauma therapy.     Anticipated number of session or this episode of care: 8-10      MeasurableTreatment Goal(s) related to diagnosis / functional impairment(s)  Goal 1: Patient will reduce symptoms of depression as evidenced by decreased score on PHQ9 from  16 to 10.    I will know I've met my goal when I have more interested and motivation to complete daily activites.      Objective #A (Patient Action)    Patient will Decrease frequency and intensity of feeling down, depressed, hopeless.  Status: New - Date: 8/19/20     Intervention(s)  [facility] will assist patient process through stressors impacting mood.    Objective #B  Patient will Increase interest, engagement, and pleasure in doing things.  Status: New - Date: 8/19/20     Intervention(s)  [facility] will assign homework to assist with activity scheduling and behavioral activation.      Goal 2: Patient will reduce symptoms of anxiety as evidenced by decreased score on TEE 7 from 13 to 7.     I will know I've met my goal when I ruminate less and feel less irritable.      Objective #A (Patient Action)    Status: New - Date: 8/19/20     Patient will identify three distraction and diversion activities and use those activities to decrease level of anxiety  .    Intervention(s)  [facility] will teach distress tolerance, mindfulness, and relaxation techniques.    Objective #B  Patient will use cognitive strategies identified in therapy to challenge anxious thoughts.    Status: New - Date: 8/19/20     Intervention(s)  [facility] will assign homework to practice cognitive restructuring and defusion techniques.      Patient has reviewed and agreed to the above plan.      Elena Hill, [facility]  August 19, 2020

## 2020-09-08 ENCOUNTER — VIRTUAL VISIT (OUTPATIENT)
Dept: PSYCHIATRY | Facility: CLINIC | Age: 45
End: 2020-09-08
Attending: ADVANCED PRACTICE MIDWIFE
Payer: COMMERCIAL

## 2020-09-08 ENCOUNTER — VIRTUAL VISIT (OUTPATIENT)
Dept: PSYCHOLOGY | Facility: CLINIC | Age: 45
End: 2020-09-08
Payer: COMMERCIAL

## 2020-09-08 DIAGNOSIS — F33.1 MODERATE RECURRENT MAJOR DEPRESSION (H): ICD-10-CM

## 2020-09-08 DIAGNOSIS — F41.1 GAD (GENERALIZED ANXIETY DISORDER): Primary | ICD-10-CM

## 2020-09-08 DIAGNOSIS — F41.1 GAD (GENERALIZED ANXIETY DISORDER): ICD-10-CM

## 2020-09-08 DIAGNOSIS — F33.1 MODERATE EPISODE OF RECURRENT MAJOR DEPRESSIVE DISORDER (H): Primary | ICD-10-CM

## 2020-09-08 DIAGNOSIS — F90.0 ATTENTION DEFICIT HYPERACTIVITY DISORDER (ADHD), PREDOMINANTLY INATTENTIVE TYPE: ICD-10-CM

## 2020-09-08 PROCEDURE — 90832 PSYTX W PT 30 MINUTES: CPT | Mod: 95 | Performed by: PSYCHOLOGIST

## 2020-09-08 PROCEDURE — 90792 PSYCH DIAG EVAL W/MED SRVCS: CPT | Mod: 95 | Performed by: PSYCHIATRY & NEUROLOGY

## 2020-09-08 RX ORDER — ESCITALOPRAM OXALATE 10 MG/1
10 TABLET ORAL DAILY
COMMUNITY
Start: 2020-08-25 | End: 2020-12-02

## 2020-09-08 RX ORDER — VENLAFAXINE HYDROCHLORIDE 37.5 MG/1
37.5 CAPSULE, EXTENDED RELEASE ORAL DAILY
Qty: 90 CAPSULE | Refills: 0 | Status: SHIPPED | OUTPATIENT
Start: 2020-09-08 | End: 2020-12-09 | Stop reason: DRUGHIGH

## 2020-09-08 RX ORDER — VENLAFAXINE HYDROCHLORIDE 75 MG/1
75 CAPSULE, EXTENDED RELEASE ORAL DAILY
Qty: 90 CAPSULE | Refills: 0 | Status: SHIPPED | OUTPATIENT
Start: 2020-09-08 | End: 2020-09-08

## 2020-09-08 RX ORDER — VENLAFAXINE HYDROCHLORIDE 37.5 MG/1
37.5 CAPSULE, EXTENDED RELEASE ORAL DAILY
Qty: 90 CAPSULE | Refills: 0 | Status: SHIPPED | OUTPATIENT
Start: 2020-09-08 | End: 2020-09-08

## 2020-09-08 RX ORDER — VENLAFAXINE HYDROCHLORIDE 75 MG/1
75 CAPSULE, EXTENDED RELEASE ORAL DAILY
Qty: 90 CAPSULE | Refills: 0 | Status: SHIPPED | OUTPATIENT
Start: 2020-09-08 | End: 2020-12-09 | Stop reason: DRUGHIGH

## 2020-09-08 ASSESSMENT — ANXIETY QUESTIONNAIRES
GAD7 TOTAL SCORE: 12
6. BECOMING EASILY ANNOYED OR IRRITABLE: MORE THAN HALF THE DAYS
1. FEELING NERVOUS, ANXIOUS, OR ON EDGE: NEARLY EVERY DAY
7. FEELING AFRAID AS IF SOMETHING AWFUL MIGHT HAPPEN: MORE THAN HALF THE DAYS
3. WORRYING TOO MUCH ABOUT DIFFERENT THINGS: SEVERAL DAYS
GAD7 TOTAL SCORE: 12
GAD7 TOTAL SCORE: 12
5. BEING SO RESTLESS THAT IT IS HARD TO SIT STILL: NOT AT ALL
4. TROUBLE RELAXING: MORE THAN HALF THE DAYS
2. NOT BEING ABLE TO STOP OR CONTROL WORRYING: MORE THAN HALF THE DAYS
7. FEELING AFRAID AS IF SOMETHING AWFUL MIGHT HAPPEN: MORE THAN HALF THE DAYS

## 2020-09-08 ASSESSMENT — PATIENT HEALTH QUESTIONNAIRE - PHQ9
SUM OF ALL RESPONSES TO PHQ QUESTIONS 1-9: 8
SUM OF ALL RESPONSES TO PHQ QUESTIONS 1-9: 8
10. IF YOU CHECKED OFF ANY PROBLEMS, HOW DIFFICULT HAVE THESE PROBLEMS MADE IT FOR YOU TO DO YOUR WORK, TAKE CARE OF THINGS AT HOME, OR GET ALONG WITH OTHER PEOPLE: SOMEWHAT DIFFICULT

## 2020-09-08 NOTE — PROGRESS NOTES
[facility] ([facility])  September 8, 2020    Behavioral Health Clinician Progress Note    Patient Name: Ellyn Mcgee      Telemedicine Visit: The patient's condition can be safely assessed and treated via synchronous audio and visual telemedicine encounter.      Reason for Telemedicine Visit: Services only offered telehealth    Originating Site (Patient Location): Patient's home    Distant Site (Provider Location): Provider Remote Setting    Consent:  The patient/guardian has verbally consented to: the potential risks and benefits of telemedicine (video visit) versus in person care; bill my insurance or make self-payment for services provided; and responsibility for payment of non-covered services.     Mode of Communication:  Video Conference via [facility]    As the provider I attest to compliance with applicable laws and regulations related to telemedicine.         Service Type:  Individual      Session Start Time: 09:22am  Session End Time: 09:52am      Session Length: 16 - 37      Attendees: Patient    Visit Activities (Refresh list every visit): [facility] Only    Diagnostic Assessment Date: 08/10/2020 by CHOLO Lewis  See Flowsheets for today's PHQ-9 and TEE-7 results  Previous PHQ-9:   PHQ-9 SCORE 8/5/2020 8/12/2020 9/8/2020   PHQ-9 Total Score - - -   PHQ-9 Total Score MyChart 16 (Moderately severe depression) 7 (Mild depression) 8 (Mild depression)   PHQ-9 Total Score 16 7 8       Previous TEE-7:   TEE-7 SCORE 8/6/2020 8/12/2020 9/8/2020   Total Score - - -   Total Score - 14 (moderate anxiety) 12 (moderate anxiety)   Total Score 13 14 12       WHODAS 2.0 Total Score 8/12/2020 9/8/2020   Total Score 23 30   Total Score MyChart 23 30        DATA  Extended Session (60+ minutes): No  Interactive Complexity: No  Crisis: No    Medication Compliance:  Yes      Chemical Use Review:   Substance Use: Chemical use reviewed, no active concerns identified      Tobacco Use: No current  "tobacco use.      Current Stressors / Issues:  Patient reported she has had difficulty with anxiety and depression for many years. She has been on a number of medications but is now pregnant and wants to ensure she's is on a health combination of medication. She spoke of her anxiety \"triggering my depression.\" She feels that depression is better controlled when her anxiety is well controlled. She spoke of some external stressors including COVID-19, being partially self-employed, and subletting a room in her home to someone who she would like to leave. Patient has a therapist and is being transferred to someone who specializes in trauma.       Review of Symptoms per patient report:  Depression: Lack of interest, Feelings of hopelessness, Feelings of helplessness, Low self-worth, Irritability, Feeling sad, down, or depressed and Anger outbursts  Isabel:  No Symptoms  Psychosis: No Symptoms  Anxiety: Excessive worry, Nervousness, Physical complaints, such as headaches, stomachaches, muscle tension, Ruminations, Poor concentration, Irritability and Anger outbursts  Panic:  No symptoms  Post Traumatic Stress Disorder:  No Symptoms  \"Emotional trauma\" no symptoms  Eating Disorder: No Symptoms  ADD / ADHD:  Inattentive, Difficulties listening, Poor task completion, Poor organizational skills, Distractibility and Forgetful  Conduct Disorder: No symptoms  Autism Spectrum Disorder: No symptoms  Obsessive Compulsive Disorder: No Symptoms      Changes in Health Issues:   None reported    Assessment: Current Emotional / Mental Status (status of significant symptoms):  Risk status (Self / Other harm or suicidal ideation)  Patient has had a history of suicidal ideation: at 14yo; thought/wondered what it would be like but no attempt/plan/intent  Patient denies current fears or concerns for personal safety.  Patient denies current or recent suicidal ideation or behaviors.  Patient denies current or recent homicidal ideation or " behaviors.  Patient denies current or recent self injurious behavior or ideation.  Patient denies other safety concerns.  A safety and risk management plan has not been developed at this time, however patient was encouraged to call [place] [phone] should there be a change in any of these risk factors.    Appearance:   Appropriate   Eye Contact:   Good   Psychomotor Behavior: Normal   Attitude:   Cooperative   Orientation:   All  Speech   Rate / Production: Normal    Volume:  Normal   Mood:    Anxious  Normal  Affect:    Appropriate   Thought Content:  Clear   Thought Form:  Coherent  Logical   Insight:    Good     Diagnoses:  1. TEE (generalized anxiety disorder)    2. Moderate recurrent major depression (H)      WHODAS 2.0 Total Score 8/12/2020 9/8/2020   Total Score 23 30   Total Score MyChart 23 30     TEE-7 SCORE 8/6/2020 8/12/2020 9/8/2020   Total Score - - -   Total Score - 14 (moderate anxiety) 12 (moderate anxiety)   Total Score 13 14 12     PHQ 8/5/2020 8/12/2020 9/8/2020   PHQ-9 Total Score 16 7 8   Q9: Thoughts of better off dead/self-harm past 2 weeks Several days Not at all Not at all   F/U: Thoughts of suicide or self-harm No - -   F/U: Safety concerns No - -     CAGE-AID Total Score 9/8/2020   Total Score 0   Total Score MyChart 0 (A total score of 2 or greater is considered clinically significant)       CAGE-AID score  > 1 is a positive screen, suggesting further discussion is needed to determine if evaluation for alcohol or substance abuse is appropriate.  A score > 2 is considered clinically significant, suggesting further evaluation of alcohol or substance-related problems is indicated.      Collateral Reports Completed:  Communicated with: Dr. Wang    Plan: (Homework, other):  Patient was given information about behavioral services and encouraged to schedule a follow up appointment with the clinic [facility] in conjunction with next [facility] appointment.  She was also given information about mental  health symptoms and treatment options .  CD Recommendations: No indications of CD issues.      Vincenzo Edward PsyD, LP      Kendall Edward PsyD  September 8, 2020

## 2020-09-08 NOTE — PROGRESS NOTES
"Ellyn Mcgee is a 45 year old female who is being evaluated via a billable video visit.      The patient has been notified of following:     \"This video visit will be conducted via a call between you and your physician/provider. We have found that certain health care needs can be provided without the need for an in-person physical exam.  This service lets us provide the care you need with a video conversation.  If a prescription is necessary we can send it directly to your pharmacy.  If lab work is needed we can place an order for that and you can then stop by our lab to have the test done at a later time.    Video visits are billed at different rates depending on your insurance coverage.  Please reach out to your insurance provider with any questions.    If during the course of the call the physician/provider feels a video visit is not appropriate, you will not be charged for this service.\"    Patient has given verbal consent for Video visit? Yes  How would you like to obtain your AVS? MyChart  If you are dropped from the video visit, the video invite should be resent to: Text to cell phone: see Epic  Will anyone else be joining your video visit? No      Telemedicine Visit: The patient's condition can be safely assessed and treated via synchronous audio and visual telemedicine encounter.      Reason for Telemedicine Visit: Covid-19 Pandemic    Originating Site (Patient Location): Patient's home     Distant Site (Provider Location): Provider Remote Setting    Consent:  The patient/guardian has verbally consented to: the potential risks and benefits of telemedicine (video visit) versus in person care; bill my insurance or make self-payment for services provided; and responsibility for payment of non-covered services.     Mode of Communication:  Video Conference via Rezolve    As the provider I attest to compliance with applicable laws and regulations related to telemedicine.                                     " "                        Outpatient Psychiatric Evaluation- Standard  Adult    Name:  Ellyn Mcgee  : 1975    Source of Referral:  Primary Care Provider: Dorothy Guaman DO   Current Psychotherapist: CHOLO Lewis    Identifying Data:  Patient is a 45 year old,   White American female  who presents for initial visit with me.  Patient is currently employed part time. Patient attended the phone/viseo session alone. Patient prefers to be called: \"Ellyn\"    Chief Complaint:  Patient presents with:  Consult      HPI:  Ellyn Mcgee is a 45 year old female with past history including depression, anxiety, and ADHD who presents today for psychiatric evaluation. She is currently 23 weeks pregnant.     Pt has a long hx of depression and anxiety. She has had postpartum depression once (after her now 7 yo was born) but not with her first (now 15 yo). She had been effectively maintained on Paxil-CR but started to wean herself off when she found out she was pregnant. She also stopped Concerta. In their place, she was started on Lexapro and Effexor-XR. Started lexapro first but knew after full titration \"this is isn't going to do it.\" Effexor was added and she is currently taking Lexapro 10 mg daily with Effexor-XR 75 mg daily. Wanted to go back on Paxil at beg of 2nd trimester, but pcp preferred she was not on it due to higher risk of birth defects when compared to other SSRIs. Since being on her current regimen she has felt a lot better but feels there is still room for improvement. Still struggling some with mood and anxiety. Anxiety is biggest complaint currently. Anxiety often triggers her depression and worsening mood.     Currently under a lot of stress. Subletting their home to someone they would like to have move out. Working part time. Pregnant and having a lot of random pains. ADHD under poor control although she thoughts she would be struggling even more than she is. She was diagnosed a couple " "years ago. 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 She has been able to do a lot online but things remain a little stressful. Also trying to help her kids with school some.     Currently 3-4/10 (10 being worst) regarding her mental health status. The change in medication has been pretty good except for when stressors are very bad or situations get worse. The past 7 days have been better/easier than prior two weeks.     Pt's daughter Barb is currently 13 yo. The pt had no postpartum mood/anxiety sxs with Barb (thnings were really good for first couple years), however, she did get depressed 2-3 years later and started meds. Her Mom was \"falsely\" dx with lung cancer which added to her anxiety and depressed mood at that time.     After her second child, Tiffanie, was born, she did experience significant postpartum MH sxs. She also had a lot of psychosocial stressors. She started sertraline during her pregnancy.  When she 3-4 months pregnant she moved in to her current house with her father. There was a lot of past trauma related to her father that surfaced and a lot of arguing with her father. He kept getting sick and did end up dying two weeks after she gave birth. She reports she struggled for awhile after that. Tiffanie was a difficult baby and didn't sleep very well adding to her worsening MH symptoms.       Per Elena Hill, [facility] on 8/10/20:   Patient reports that these problem(s) began: Patient reported diagnosis of depression and anxiety in 2004. She stated that she experienced worsening of symptoms 5 years ago, and most recently noticed worsening symptoms in the past 2-3 months. Patient stated that she has a history of multiple medication trials, and most recently had to switch medications that were known to be effective due to pregnancy.  Patient stated that she feels like most recent medication change may already be helping, as she has " "noticed how the medication may feel like the edge of her anxiety has lessened.  Patient has attempted to resolve these concerns in the past through: counseling and medication(s) from physician / PCP. Patient reports that other professional(s) are involved in providing support / services.     Per [facility], Dr. Vincenzo Edward, during today's team-based visit:  Patient reported she has had difficulty with anxiety and depression for many years. She has been on a number of medications but is now pregnant and wants to ensure she's is on a health combination of medication. She spoke of her anxiety \"triggering my depression.\" She feels that depression is better controlled when her anxiety is well controlled. She spoke of some external stressors including COVID-19, being partially self-employed, and subletting a room in her home to someone who she would like to leave. Patient has a therapist and is being transferred to someone who specializes in trauma.     Past diagnoses include: Depression, Anxiety, ADHD  Current medications include: has a current medication list which includes the following prescription(s): albuterol, escitalopram, ibuprofen, and venlafaxine.   Medication side effects: Highest dose of Effexor-XR-->leg shaking  Current stressors include: see HPI  Coping mechanisms and supports include: Family, Hobbies and Friends    Psychiatric Review of Symptoms Per [facility], Dr. Vincenzo Edward, during today's team-based visit:  Depression:     Lack of interest, Feelings of hopelessness, Feelings of helplessness, Low self-worth, Irritability, Feeling sad, down, or depressed and Anger outbursts  Isabel:             No Symptoms  Psychosis:       No Symptoms  Anxiety:           Excessive worry, Nervousness, Physical complaints, such as headaches, stomachaches, muscle tension, Ruminations, Poor concentration, Irritability and Anger outbursts  Panic:              No symptoms  Post Traumatic Stress Disorder:  \"Emotional trauma\" no symptoms  Eating " Disorder:          No Symptoms  ADD / ADHD:              Inattentive, Difficulties listening, Poor task completion, Poor organizational skills, Distractibility and Forgetful  Conduct Disorder:       No symptoms  Autism Spectrum Disorder:     No symptoms  Obsessive Compulsive Disorder:       No Symptoms    All other ROS negative.     PHQ 8/5/2020 8/12/2020 9/8/2020   PHQ-9 Total Score 16 7 8   Q9: Thoughts of better off dead/self-harm past 2 weeks Several days Not at all Not at all   F/U: Thoughts of suicide or self-harm No - -   F/U: Safety concerns No - -     TEE-7 SCORE 8/6/2020 8/12/2020 9/8/2020   Total Score - - -   Total Score - 14 (moderate anxiety) 12 (moderate anxiety)   Total Score 13 14 12     Medical Review of Systems:  10 systems (general, cardiovascular, respiratory, eyes, ENT, endocrine, GI, , M/S, neurological) were reviewed. Most pertinent finding(s) is/are: aches and pains of pregnancy. The remaining systems are all unremarkable.    A 12-item WHODAS 2.0 assessment was not completed.    Psychiatric History:   Hospitalizations: None  History of Commitment? No   Past Treatment: counseling and medication(s) from physician / PCP  Suicide Attempts: No   Current Suicide Risk: Suicide Assessment Completed Today.  Self-injurious Behavior: Denies  Electroconvulsive Therapy (ECT) or Transcranial Magnetic Stimulation (TMS): No   GeneSight Genetic Testing: No     Past medication trials include but are not limited to:   Tried citalopram, escitalopram, sertraline, venlafaxine (caused leg bouncing), paroxetine (best she had been on for years).     Xanax  Adderall XR  Wellbutrin XL  Concerta    Substance Use History:  Current Use of Drugs/Alcohol: Denies   Past Use of Drugs/Alcohol: Denies hx of problematic drug and alcohol use  Patient reports no problems as a result of their drinking / drug use.   Patient has not received chemical dependency treatment in the past  Recovery Programming Involvement: Not  Applicable    Tobacco use: No    Based on the clinical interview, there  are not indications of drug or alcohol abuse. Continue to monitor.   Discussed effect of substance use on overall health.     Past Medical History:  Past Medical History:   Diagnosis Date     Abnormal Pap smear     Colposcopy     Anemia     In Past     Anxiety      Chlamydia trachomatis infection of other specified site 1993     Depression      Fertility problem      Lyme disease 9/8/2010    ?false positive vs positve CMV - resolved     Moderate dysplasia of cervix 1995     Other and unspecified adverse effect of drug, medicinal and biological substance     insulin resistent     Varicosities      Wounds and injuries     fall down stairs, PT for back, pain meds      Surgery:   Past Surgical History:   Procedure Laterality Date     CONIZATION CERVIX,KNIFE/LASER  1995     SURGICAL HISTORY OF -       [place] teeth     Food and Medicine Allergies:     Allergies   Allergen Reactions     Codeine Itching     Codeine      itching       Cyclogyl [Cyclopentolate Hcl] Nausea and Vomiting     Cyclopentolate      Hydrocodone      anxiety  itching     Hydrocodone-Acetaminophen      Seasonal Allergies      Seizures or Head Injury: No  Diet: No Restrictions  Exercise: not discussed   Supplements: Reviewed per Electronic Medical Record Today    Current Medications:    Current Outpatient Medications:      albuterol (PROAIR HFA/PROVENTIL HFA/VENTOLIN HFA) 108 (90 Base) MCG/ACT inhaler, Inhale 2 puffs into the lungs every 6 hours, Disp: 1 Inhaler, Rfl: 0     escitalopram (LEXAPRO) 20 MG tablet, Take 1 tablet (20 mg) by mouth daily, Disp: 90 tablet, Rfl: 0     IBUPROFEN, as needed., Disp: , Rfl:      venlafaxine (EFFEXOR-XR) 37.5 MG 24 hr capsule, Take 1 capsule (37.5 mg) by mouth daily, Disp: 45 capsule, Rfl: 0    Vital Signs:  None since this is a phone/video visit.     Labs:  Most recent laboratory results reviewed and the pertinent results include:   Prenatal  Office Visit on 07/15/2020   Component Date Value Ref Range Status     Pt Date of Birth 07/15/2020 6,171,974   Final     Patient Weight 07/15/2020 183   Final     Weight Units 07/15/2020 Pounds   Final     Due Date 07/15/2020 12,302,020   Final     Dating Method 07/15/2020 LMP   Final     Last Mens Period 07/15/2020 3,252,020   Final     Number of Fetuses 07/15/2020 1   Final     Monochorionic Twins 07/15/2020 No   Final     Race of Mother 07/15/2020    Final     Diabetic at Conception 07/15/2020 No   Final     Smoking Status 07/15/2020 No   Final     Valproic/Carbamazepine Status 07/15/2020 No   Final     Previous Trisomy Pregnancy 07/15/2020 No   Final     Fam History Of NTD 07/15/2020 No   Final     In Vitro Fertilization Egg Donor A* 07/15/2020 No   Final     Repeat Specimen 07/15/2020 No   Final     Alpha Feto Protein 07/15/2020 21  ng/mL Final     MoM for AFP 07/15/2020 0.74   Final     Estriol 07/15/2020 0.62  ng/mL Final     MoM uE3 07/15/2020 0.68   Final     hCG 2nd Trimester 07/15/2020 48,566  IU/L Final     MoM hCG 2nd Trimester 07/15/2020 1.41   Final     Dimeric Inhibin A 07/15/2020 191  pg/mL Final     MoM Dimeric Inhibin A 07/15/2020 1.22   Final     AFP Interpretation 07/15/2020 Screen Pos   Final    Comment: (Note)  INTERPRETATION: SCREEN POSITIVE   Follow-up for risk of  Down syndrome is suggested                                Neural Tube Defects (NTD)   Negative       Down syndrome (DS)          Positive       Trisomy 18 (T18)            Negative                                                               Pre-Test     Post-Test      Cutoff                                      Neural Tube Defects Risks   1:1030       < 1:18678    1:250           Down Syndrome Risks         1:25         1:18         1:150           Trisomy 18 Risks            1:97         1:631        1:100                                        Comments:                                                  The risk of an  open neural tube defect is less than the  screening cut-off.                                         The risk of Down syndrome is greater than the screening  cut-off. Other outcomes of positive screens include normal  pregnancy, over-estimated gestational age, and fetal demise.  Genetic counseling regardin                           g the risks and benefits of  cell-free DNA (NIPT) and fetal diagnostic testing is  suggested. If you have questions regarding this screen,  please call Genetics at [phone] ext [phone].                               The risk of trisomy 18 is less than the screening cut-off.  Test developed and characteristics determined by [facility]. See Compliance Statement B: Post-i/CS       Maternal Age at Delivery 07/15/2020 46.5  yr Final     Patient Weight 07/15/2020 183.0 lbs.   Final     Estimated Due Date 07/15/2020 SEE NOTE   Final    Comment: (Note)  Results for Estimated Due Date: 12-30-20        Gestational Age Calculated 07/15/2020 16 wks, 0 days   Final     Dating 07/15/2020 Other   Final     Num of Fetuses 07/15/2020 Tripathi   Final     Maternal Race 07/15/2020 Nonblack   Final     Insulin Diabetic 07/15/2020 No   Final     Smoking Status 07/15/2020 No   Final     Fam History of NTD 07/15/2020 No   Final     Family History of Aneuploidy 07/15/2020 No   Final     Specimen Iteration 07/15/2020 SEE NOTE   Final    Comment: (Note)  Initial sample       Electronic Enhanced Report 07/15/2020 SEE NOTE   Final    Comment: (Note)  Access [employer] Enhanced Report using either link below:  -Direct access:   [email or web address]  -Enter Username, Password: [email or web address]  Username: [email or web address]  Password: [email or web address]  Performed By: [facility]  [address] [place] [id number]  : Joss Jha MD, MS     Orders Only on 06/09/2020   Component Date Value Ref Range Status     Lab Scanned Result 06/09/2020 NON INVAS PRENATAL  DNA-Scanned   Final   Prenatal Office Visit on 06/05/2020   Component Date Value Ref Range Status     Hep B Surface Agn 06/05/2020 Nonreactive  NR^Nonreactive Final     ABO 06/05/2020 O   Final     RH(D) 06/05/2020 Pos   Final     Antibody Screen 06/05/2020 Neg   Final     Test Valid Only At 06/05/2020 [facility]      Final     Specimen Expires 06/05/2020 06/08/2020   Final     WBC 06/05/2020 7.0  4.0 - 11.0 10e9/L Final     RBC Count 06/05/2020 4.23  3.8 - 5.2 10e12/L Final     Hemoglobin 06/05/2020 12.7  11.7 - 15.7 g/dL Final     Hematocrit 06/05/2020 37.6  35.0 - 47.0 % Final     MCV 06/05/2020 89  78 - 100 fl Final     MCH 06/05/2020 30.0  26.5 - 33.0 pg Final     MCHC 06/05/2020 33.8  31.5 - 36.5 g/dL Final     RDW 06/05/2020 12.7  10.0 - 15.0 % Final     Platelet Count 06/05/2020 287  150 - 450 10e9/L Final     HIV Antigen Antibody Combo 06/05/2020 Nonreactive  NR^Nonreactive     Final    HIV-1 p24 Ag & HIV-1/HIV-2 Ab Not Detected     Rubella Antibody IgG Quantitative 06/05/2020 38  IU/mL Final    Comment: Positive.  Suggests previous exposure or immunization and probable immunity  Reference Range:    Unvaccinated Negative 0-7 IU/mL  Vaccinated or previous exposure Positive 10 IU/ml or greater       Treponema Antibodies 06/05/2020 Nonreactive  NR^Nonreactive Final    Comment: Methodology Change: Test performed on the [employer]iaison XL by Treponema   pallidum Total Antibodies Assay as of 3.17.2020.       Specimen Description 06/05/2020 Midstream Urine   Final     Culture Micro 06/05/2020    Final                    Value:10,000 to 50,000 colonies/mL  mixed urogenital nell  Susceptibility testing not routinely done       Specimen Descrip 06/05/2020 Cervix   Final     N Gonorrhea PCR 06/05/2020 Negative  NEG^Negative Final    Comment: Negative for N. gonorrhoeae rRNA by transcription mediated amplification.  A negative result by transcription mediated amplification does not preclude   the  presence of N. gonorrhoeae infection because results are dependent on   proper and adequate collection, absence of inhibitors, and sufficient rRNA to   be detected.       Specimen Description 06/05/2020 Cervix   Final     Chlamydia Trachomatis PCR 06/05/2020 Negative  NEG^Negative Final    Comment: Negative for C. trachomatis rRNA by transcription mediated amplification.  A negative result by transcription mediated amplification does not preclude   the presence of C. trachomatis infection because results are dependent on   proper and adequate collection, absence of inhibitors, and sufficient rRNA to   be detected.       Color Urine 06/05/2020 Yellow   Final     Appearance Urine 06/05/2020 Clear   Final     Glucose Urine 06/05/2020 Negative  NEG^Negative mg/dL Final     Bilirubin Urine 06/05/2020 Negative  NEG^Negative Final     Ketones Urine 06/05/2020 Negative  NEG^Negative mg/dL Final     Specific Gravity Urine 06/05/2020 1.015  1.003 - 1.035 Final     Blood Urine 06/05/2020 Trace* NEG^Negative Final     pH Urine 06/05/2020 7.0  5.0 - 7.0 pH Final     Protein Albumin Urine 06/05/2020 Negative  NEG^Negative mg/dL Final     Urobilinogen Urine 06/05/2020 0.2  0.2 - 1.0 EU/dL Final     Nitrite Urine 06/05/2020 Negative  NEG^Negative Final     Leukocyte Esterase Urine 06/05/2020 Moderate* NEG^Negative Final     Source 06/05/2020 Midstream Urine   Final     WBC Urine 06/05/2020 5-10* OTO5^0 - 5 /HPF Final     RBC Urine 06/05/2020 O - 2  OTO2^O - 2 /HPF Final     Squamous Epithelial /LPF Urine 06/05/2020 Few  FEW^Few /LPF Final     Bacteria Urine 06/05/2020 Moderate* NEG^Negative /HPF Final   Office Visit on 11/20/2019   Component Date Value Ref Range Status     Color Urine 11/20/2019 Yellow   Final     Appearance Urine 11/20/2019 Clear   Final     Glucose Urine 11/20/2019 Neg  neg mg/dL Final     Bilirubin Urine 11/20/2019 Small  neg Final     Ketones Urine 11/20/2019 Neg  neg mg/dL Final     Specific Gravity Urine  11/20/2019 1.025  1.003 - 1.035 Final     Blood Urine 11/20/2019 Trace  neg Final     pH Urine 11/20/2019 6.5  5.0 - 7.0 pH Final     Protein Albumin Urine 11/20/2019 30   neg mg/dL Final     Urobilinogen Urine 11/20/2019 0.2  0.2 - 1.0 EU/dL Final     Nitrite Urine 11/20/2019 Neg  NEG Final     Leukocyte Esterase Urine 11/20/2019 Small  NEG Final     Source 11/20/2019 Mid Stream   Final   Office Visit on 10/16/2019   Component Date Value Ref Range Status     Color Urine 10/16/2019 Yellow   Final     Appearance Urine 10/16/2019 Clear   Final     Glucose Urine 10/16/2019 Negative  NEG^Negative mg/dL Final     Bilirubin Urine 10/16/2019 Negative  NEG^Negative Final     Ketones Urine 10/16/2019 Negative  NEG^Negative mg/dL Final     Specific Gravity Urine 10/16/2019 1.010  1.003 - 1.035 Final     Blood Urine 10/16/2019 Trace* NEG^Negative Final     pH Urine 10/16/2019 7.0  5.0 - 7.0 pH Final     Protein Albumin Urine 10/16/2019 Negative  NEG^Negative mg/dL Final     Urobilinogen Urine 10/16/2019 0.2  0.2 - 1.0 EU/dL Final     Nitrite Urine 10/16/2019 Negative  NEG^Negative Final     Leukocyte Esterase Urine 10/16/2019 Trace* NEG^Negative Final     Source 10/16/2019 Midstream Urine   Final     WBC Urine 10/16/2019 0 - 5  OTO5^0 - 5 /HPF Final     RBC Urine 10/16/2019 O - 2  OTO2^O - 2 /HPF Final      Most recent EKG report from 10/06/2012 reviewed. QTc interval 383.      Family History:   Patient reported family history includes:   Family History   Problem Relation Age of Onset     Cancer Mother         vocal cord cancer     Lipids Mother      Myocardial Infarction Mother      Cancer Father         B-cell lymphoma, melanoma     Lipids Father      Heart Disease Father         open heart surgery     Cerebrovascular Disease Father      Crohn's Disease Sister      Diabetes Paternal Grandmother      Diabetes Paternal Uncle      Alcohol/Drug Maternal Grandfather      Mental Illness History: Yes: Father-anxiety/depression;  "Mother-anxiety/depression; daughter-anxiety  Substance Abuse History: Yes: MGF-alcohol  Suicide History: Unknown  Medications: Unknown     Social History per Elena Hill [facility] on 8/10/20:   Patient reported she grew up in [place]. Patient stated that she has zero full siblings, and has two half brothers, two half sisters, and 1 step brother. Patient reported that \"there were a lot of issues with my dad\". She stated that she felt neglected by him and never felt loved by him. She stated that her parents  when she was 8 years old, but recalls their fighting starting when she was 4 years old. Patient reported a history of 1-2 committed relationships or marriages. Patient has been  for 16 years. Patient stated that their marriage is like a \"rollercoaster\". She stated that their mental health often is the contributing factor, and have a history of being in couples therapy which has helped. Patient reported having 2 daughters, ages 14 and 8. Patient is currently pregnant, and is currently 19 weeks pregnant. Patient identified few stable and meaningful social connections.  Patient reported having many people that she knows, but few that are meaningful and close friendships.      Patient lives with her  and daughters.  Patient is currently employed part time and reports they are able to function appropriately at work..  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 Patient stated that she used to work part time at [employer], but has not been during the pandemic due to pregnancy.      Patient reported that she has not been involved with the legal system.  Patient's highest education level was some college. Patient did not identify any learning problems. There are no ethnic, cultural or Muslim factors that may be relevant for therapy.  Patient did not serve in the .    Legal History:  No: Patient denies any legal history    Significant Losses / Trauma / Abuse / " Neglect Issues:  There are indications or report of significant loss, trauma, abuse or neglect issues related to: see HPI and social hx.   Issues of possible neglect are not present.   Recommended that patient call 911 or go to the local ED should there be a change in any of these risk factors.    Comprehensive Examination (limited due to virtual visit format, phone/video):  Vital Signs:  Vitals: There were no vitals taken for this visit.  General/Constitutional:  Appearance: awake, alert, adequately groomed, appeared stated age and no apparent distress  Attitude:  Cooperative, pleasant   Eye Contact:  good  Musculoskeletal:  Muscle Strength and Tone: no gross abnormalities by observation  Psychomotor Behavior:  no evidence of tardive dyskinesia, dystonia, or tics  Gait and Station: normal, no gross abnormalities noted by observation  Psychiatric:  Speech:  clear, coherent, regular rate, rhythm, and volume  Associations:  no loose associations  Thought Process:  logical, linear and goal oriented  Thought Content:  no evidence of suicidal ideation or homicidal ideation, no evidence of psychotic thought, no auditory hallucinations present and no visual hallucinations present  Mood:  anxious  Affect:  appropriate and in normal range and mood congruent  Insight:  good  Judgment:  intact, adequate for safety  Impulse Control:  intact  Neurological:  Oriented to:  person, place, time, and situation  Attention Span and Concentration:  normal  Language: intact  Recent and Remote Memory:  Intact to interview. Not formally assessed. No amnesia.  Fund of Knowledge: appropriate    Strengths and Opportunities:   Ellyn Mcgee identified the following strengths or resources that may help she succeed in counseling: commitment to health and well being, friends / good social support, family support, insight and motivation. Things that may interfere with the patient's success include:  none noted at this time.    There are no  language or communication issues or need for modification in treatment.   There are no ethnic, cultural or Jehovah's witness factors that may be relevant for therapy.  Client identified their preferred language to be English.  Client does not need the assistance of an  or other support involved in therapy.    Suicide Risk Assessment:  Today Ellyn Mcgee reports no suicidal ideation. Based on all available evidence including the factors cited above, Ellyn Mcgee does not appear to be at imminent risk for self-harm, does not meet criteria for a 72-hr hold, and therefore remains appropriate for ongoing outpatient level of care.  A thorough assessment of risk factors related to suicide and self-harm have been reviewed and are noted above. The patient convincingly denies acute suicidality on several occasions. Local community safety resources reviewed and printed for patient to use if needed. There was no deceit detected, and the patient presented in a manner that was believable.     DSM5  Diagnosis:  296.32 (F33.1) Major Depressive Disorder, Recurrent Episode, Moderate _  300.02 (F41.1) Generalized Anxiety Disorder   ADHD, Unspecified    Medical Comorbidities Include:   Patient Active Problem List    Diagnosis Date Noted     High-risk pregnancy, elderly multigravida, unspecified trimester 06/05/2020     Priority: Medium     TEE (generalized anxiety disorder) 10/10/2018     Priority: Medium     Cervical radiculopathy 04/16/2018     Priority: Medium     Attention deficit hyperactivity disorder (ADHD), predominantly inattentive type 10/04/2017     Priority: Medium     Patient is followed by [facility] for ongoing prescription of stimulants.  All refills should be approved by this provider, or covering partner.    Medication(s): Concerta 27mg on weekend days and 36mg other days of the week  Maximum quantity per month: 30  Clinic visit frequency required: Q 6  months     Controlled substance agreement on file:  Yes       Date(s): 10/4/17  Neuropsych evaluation for ADD completed:  Yes, completed 8/17/17, on file and diagnosis confirmed    Last [facility] website verification: 12/3/2019   [email or web address]           External hemorrhoids 04/25/2012     Priority: Medium     Adjustment disorder with mixed anxiety and depressed mood 04/04/2012     Priority: Medium     PCOS (polycystic ovarian syndrome) 02/22/2011     Priority: Medium     Hyperlipidemia LDL goal <130 09/05/2008     Priority: Medium     Anxiety state 09/05/2008     Priority: Medium     Problem list name updated by automated process. Provider to review    Patient is followed by [facility] for ongoing prescription of benzodiazepines.  All refills should be approved by this provider, or covering partner.    Medication(s): Xanax.   Maximum quantity per month:   Clinic visit frequency required:      Controlled substance agreement on file: Yes       Date(s): 10/4/2017  Benzodiazepine use reviewed by psychiatry:      Last [facility] website verification:  done on 12/17/2018   [email or web address]             Impression:  Ellyn Mcgee is a 45-year-old female who is 23 weeks pregnant with a past psychiatric history including depression, anxiety, and ADHD who presents today for psychiatric evaluation.  Her depression and anxiety date back several years and she has also had postpartum depression/anxiety with her now 8-year-old (she also has a 14-year-old but did not experience postpartum mental health issues at that time).  She started sertraline during her second pregnancy and then ended up being maintained on Paxil-CR for several years.  She is also more recently diagnosed with ADHD and maintained on Concerta.  She weaned off both Paxil and Concerta when she found out she was pregnant and replaced these medications with her current regimen of Lexapro and Effexor-XR to decrease risk of fetal defects.  For the most part, she is feeling a little bit better on  her current doses of Lexapro 10 mg and Effexor-XR 75 mg.  It is an unconventional combination as we discussed, but since she is doing quite well, I am hesitant to make many changes.  She feels as though her anxiety could be a little bit better controlled and so we will further increase Effexor-XR to 112.5 mg daily to be taken with her Lexapro 10 mg daily.  If she does a lot better, we can consider decreasing Lexapro to 5 mg daily. We discussed the risk of serotonin syndrome and she will continue to be vigilant for any signs or symptoms of this condition.  We did discuss the possibility of restarting a stimulant medication if necessary.  She does not feel as though her ADHD symptoms are too severe at this time.    See below regarding some of our discussion of risks and benefits of various medication choices during pregnancy (primarily risks to developing fetus and also risks during lactation/breast-feeding).    Medication side effects and alternatives reviewed. Health promotion activities recommended and reviewed today. All questions addressed. Education and counseling completed regarding risks and benefits of medications and psychotherapy options. Recommend continued therapy for additional support.     Treatment Plan:    Continue Lexapro 10 mg daily for mood and anxiety    Increase Effexor-XR to 112.5 mg daily for mood and anxiety    Recommend therapy for additional support.     Continue all other medications as reviewed per electronic medical record today.     Safety plan reviewed. To the Emergency Department as needed or call after hours crisis line at [phone] or [phone]. [place] Crisis Text Line: Text [place] to 503597  or  Suicide LifeLine Chat: suicidepreventionlifeline.org/chat    Schedule an appointment with me in 4-6 weeks or sooner as needed.  Call [facility] at [phone] to schedule.    Follow up with primary care provider as planned or sooner if needed for acute medical  concerns.    Call the psychiatric nurse line with medication questions or concerns at [phone].    MyChart may be used to communicate with your provider, but this is not intended to be used for emergencies.    Patient Education:  Serotonin syndrome (SS) has occurred with serotonergic antidepressants (eg, SSRIs, SNRIs), particularly when used in combination with other serotonergic agents (eg, triptans, TCAs, fentanyl, lithium, tramadol, buspirone, Liudmila's wort, tryptophan) or agents that impair metabolism of serotonin (eg, MAO inhibitors intended to treat psychiatric disorders, other MAO inhibitors [ie, linezolid and intravenous methylene blue]). Antipsychotic Agents may also enhance the serotonergic effect of Serotonin Modulators.Signs and symptoms include: agitation or restlessness, confusion, rapid heart rate and high blood pressure, dilated pupils, loss of muscle coordination or twitching muscles, heavy sweating, diarrhea, headaches, shivering and/or goose bumps.  Patient was educated on signs and symptoms of serotonin syndrome.  Symptoms typically occur within several hours of taking a new drug or increasing the dose.  Patient was notified to report symptoms immediately.    [facility]- Psychiatric Disorders During Pregnancy  https://womensmentalhealth.org/specialty-clinics/psychiatric-disorders-during-pregnancy    MothertoBaby  [email or web address]    Risks and Benefits of use of medications during pregnancy and breastfeeding were reviewed.   Most common adverse effects of medications that were discussed include but are not limited to: fetal malformations, persistent pulmonary hypertension (PPHN), low birth weight, and poor  adaptation syndrome (PNAS).     Community Resources:    National Suicide Prevention Lifeline: 417.236.6381 (TTY: 645.574.6775). Call anytime for help.  (www.suicidepreventionlifeline.org)  National [place] on Mental Illness (www.errol.org):  401-875-9752 or 137-796-6244.   Mental Health Association (www.mentalhealth.org): 929.808.9426 or 191-608-6511.  [place] Crisis Text Line: Text [place] to 720189  Suicide LifeLine Chat: suicidepreMT DIGITAL MEDIAline.org/chat    Administrative Billing:   Phone Call/Video Duration: 56 Minutes  Start: 10:28a  Stop: 11:24a    Patient Status:  Patient will continue to be seen for ongoing consultation and stabilization.    Signed:   Sherlyn Wang DO  [facility]

## 2020-09-08 NOTE — Clinical Note
Just FYI. No action needed. See my impression for further details. It's a pleasure working with Ellyn! Thanks for the referral. I really enjoy women's psychiatry and reproductive psychiatry.    [facility]

## 2020-09-08 NOTE — PATIENT INSTRUCTIONS
Treatment Plan:    Continue Lexapro 10 mg daily for mood and anxiety    Increase Effexor-XR to 112.5 mg daily for mood and anxiety    Recommend therapy for additional support.     Continue all other medications as reviewed per electronic medical record today.     Safety plan reviewed. To the Emergency Department as needed or call after hours crisis line at [phone] or [phone]. [place] Crisis Text [place]: Text [place] to 317809  or  Suicide LifeLine Chat: suicidepreventionNMT Medicalline.org/chat    Schedule an appointment with me in 4-6 weeks or sooner as needed.  Call [facility] at [phone] to schedule.    Follow up with primary care provider as planned or sooner if needed for acute medical concerns.    Call the psychiatric nurse line with medication questions or concerns at [phone].    [employer] may be used to communicate with your provider, but this is not intended to be used for emergencies.    Patient Education:  Serotonin syndrome (SS) has occurred with serotonergic antidepressants (eg, SSRIs, SNRIs), particularly when used in combination with other serotonergic agents (eg, triptans, TCAs, fentanyl, lithium, tramadol, buspirone, Liudmila's wort, tryptophan) or agents that impair metabolism of serotonin (eg, MAO inhibitors intended to treat psychiatric disorders, other MAO inhibitors [ie, linezolid and intravenous methylene blue]). Antipsychotic Agents may also enhance the serotonergic effect of Serotonin Modulators.Signs and symptoms include: agitation or restlessness, confusion, rapid heart rate and high blood pressure, dilated pupils, loss of muscle coordination or twitching muscles, heavy sweating, diarrhea, headaches, shivering and/or goose bumps.  Patient was educated on signs and symptoms of serotonin syndrome.  Symptoms typically occur within several hours of taking a new drug or increasing the dose.  Patient was notified to report symptoms immediately.    [facility]  [facility]- Psychiatric Disorders During Pregnancy  https://womensmentalhealth.org/specialty-clinics/psychiatric-disorders-during-pregnancy    MothertoBaby  [email or web address]    Risks and Benefits of use of medications during pregnancy and breastfeeding were reviewed.   Most common adverse effects of medications that were discussed include but are not limited to: fetal malformations, persistent pulmonary hypertension (PPHN), low birth weight, and poor  adaptation syndrome (PNAS).     Community Resources:    National Suicide Prevention Lifeline: 502.164.2328 (TTY: 255.145.6261). Call anytime for help.  (www.suicidepreventionlifeline.org)  National [place] on Mental Illness (www.errol.org): 634.220.4646 or 049-222-1576.   Mental Health Association (www.mentalhealth.org): 337.321.9010 or 048-398-2936.  [place] Crisis Text [place]: Text [place] to 996214  Suicide LifeLine Chat: suicideprePhthisis Diagnosticsline.org/chat

## 2020-09-08 NOTE — Clinical Note
Please call this patient to get them scheduled for a follow-up visit in 5 weeks. Please schedule with me and the [facility]. Thanks!

## 2020-09-09 ASSESSMENT — ANXIETY QUESTIONNAIRES: GAD7 TOTAL SCORE: 12

## 2020-09-11 ENCOUNTER — VIRTUAL VISIT (OUTPATIENT)
Dept: PSYCHOLOGY | Facility: CLINIC | Age: 45
End: 2020-09-11
Payer: COMMERCIAL

## 2020-09-11 DIAGNOSIS — F41.1 GAD (GENERALIZED ANXIETY DISORDER): Primary | ICD-10-CM

## 2020-09-11 DIAGNOSIS — F33.1 MAJOR DEPRESSIVE DISORDER, RECURRENT EPISODE, MODERATE (H): ICD-10-CM

## 2020-09-11 PROCEDURE — 90837 PSYTX W PT 60 MINUTES: CPT | Mod: 95 | Performed by: MARRIAGE & FAMILY THERAPIST

## 2020-09-14 NOTE — PATIENT INSTRUCTIONS
Patient states that her goals for the next week and a half include:    1. Continue to connect with people in support system  2. Focus on good/positive moments in relationship with

## 2020-09-14 NOTE — PROGRESS NOTES
Progress Note    Patient Name: Ellyn Mcgee  Date: 9/11/2020         Service Type: Individual      Session Start Time: 4:05 p.m.  Session End Time: 5:03 p.m.     Session Length: 58 min.    Session #: 1 (with this writer; patient met previously for DA with [facility] and psychiatry)    Attendees: Client attended alone    Service Modality:  Video Visit:    Telemedicine Visit: The patient's condition can be safely assessed and treated via synchronous audio and visual telemedicine encounter.      Reason for Telemedicine Visit: Services only offered telehealth    Originating Site (Patient Location): Patient's home    Distant Site (Provider Location): [facility]    Consent:  The patient/guardian has verbally consented to: the potential risks and benefits of telemedicine (video visit) versus in person care; bill my insurance or make self-payment for services provided; and responsibility for payment of non-covered services.     Patient would like the video invitation sent by: Send to e-mail at: [email or web address]    Mode of Communication:  Video Conference via well    As the provider I attest to compliance with applicable laws and regulations related to telemedicine.     Treatment Plan Last Reviewed: in development; will review 9/22/2020  PHQ-9 / TEE-7: 9/8/2020     DATA  Interactive Complexity: No  Crisis: No       Progress Since Last Session (Related to Symptoms / Goals / Homework):   Symptoms: No change as patient reports continued triggering due to past trauma (primarily related to her relationship with her father)    Homework: Achieved / completed to satisfaction - patient transitioned to this writer from [facility] and reviewed trauma handout provided by [facility]      Episode of Care Goals: To be determined, as treatment plan is in development     Current / Ongoing Stressors and Concerns:  History of anxious attachment stemming from relationship with  father (he  8 years ago); is currently pregnant (high-risk due to advanced maternal age); is in couples therapy with  due to frequent arguments and his emotional affair earlier in the year; financial stress; history of ADHD (hyperactive type).     (from [facility]'s report): Patient reported that her anxiety has improved in comparison to the previous week. She stated that she and her  attended a couples therapy appointment and have had a couple of productive but difficult conversations. Patient stated that she does note how he will make a comment (which she identifies as a comment where there is nothing wrong), but because of how it triggers thoughts/feelings from her past, she starts to notice her anxiety and racing heart.  Patient stated that she is eager to begin to work on her past trauma and experiences with her father to try to reduce this initial reaction.     Patient discussed recent stress with her roommate. She stated that she continues to feel like she is walking on egg shells now that she is home from shelter. Patient voiced frustration with her attempts to go out of her way to support this roommate/friend, but her friend having minimal appreciation and gratitude. Patient stated that she is able to empathize with her friend's situation, but also is feeling the need to care for herself. Patient stated that her roommate has called her selfish, which she knows logically is not true, but because of her emotions, she can sometimes question how she feels about herself and her decisions.     Treatment Objective(s) Addressed in This Session:   use cognitive strategies identified in therapy to challenge anxious thoughts  tell full story of past trauma related to her relationship with her father     Intervention:   Psychodynamic: Family systems and attachment style  Narrative Therapy: separate problem from person and find the bright spots        ASSESSMENT: Current Emotional / Mental Status  (status of significant symptoms):   Risk status (Self / Other harm or suicidal ideation)   Patient denies current fears or concerns for personal safety.   Patient denies current or recent suicidal ideation or behaviors.   Patient denies current or recent homicidal ideation or behaviors.   Patient denies current or recent self injurious behavior or ideation.   Patient denies other safety concerns.   Patient reports there has been no change in risk factors since their last session.     Patient reports there has been no change in protective factors since their last session.     Recommended that patient call 911 or go to the local ED should there be a change in any of these risk factors.     Appearance:   Appropriate    Eye Contact:   Good    Psychomotor Behavior: Hyperactive    Attitude:   Cooperative  Interested Friendly   Orientation:   All   Speech    Rate / Production: Hyperverbal     Volume:  Normal    Mood:    Anxious  Fearful Agitated   Affect:    Appropriate  Expansive    Thought Content:  Paranoia  Rumination    Thought Form:  Flight of Ideas    Insight:    Good  and Fair      Medication Review:   No changes to current psychiatric medication(s)     Medication Compliance:   Yes     Changes in Health Issues:   None reported - patient is currently 25 weeks pregnant     Chemical Use Review:   Substance Use: Chemical use reviewed, no active concerns identified      Tobacco Use: No current tobacco use.      Diagnosis:  1. TEE (generalized anxiety disorder)    2. Major depressive disorder, recurrent episode, moderate (H)        Collateral Reports Completed:   Communicated with: [facility] (referrant)    PLAN: (Patient Tasks / Therapist Tasks / Other)  Patient states that her goals for the next week and a half include:    1. Continue to connect with people in support system  2. Focus on good/positive moments in relationship with       Keesha Eller                                                          ______________________________________________________________________    Treatment Plan    Patient's Name: Ellyn Mcgee  YOB: 1975    Date: 2020    DSM5 Diagnoses: 296.32 (F33.1) Major Depressive Disorder, Recurrent Episode, Moderate _ or 300.02 (F41.1) Generalized Anxiety Disorder (patient has previously been diagnosed with ADHD, hyperactive type)  Psychosocial / Contextual Factors: History of anxious attachment stemming from relationship with father (he  8 years ago); is currently pregnant (high-risk due to advanced maternal age); is in couples therapy with  due to frequent arguments and his emotional affair earlier in the year; financial stress; history of ADHD (hyperactive type).    WHODAS 2.0 Total Score 2020   Total Score 23 30   Total Score MyChart 23 30       PHQ 2020   PHQ-9 Total Score 16 7 8   Q9: Thoughts of better off dead/self-harm past 2 weeks Several days Not at all Not at all   F/U: Thoughts of suicide or self-harm No - -   F/U: Safety concerns No - -     TEE-7 SCORE 2020   Total Score - - -   Total Score - 14 (moderate anxiety) 12 (moderate anxiety)   Total Score 13 14 12       Referral / Collaboration:  Was/were discussed and client will pursue: [facility] if mental health concerns around maternity/pregnancy develop    Anticipated number of sessions for this episode of care: 20-24    Measurable Treatment Goal(s) related to diagnosis / functional impairment(s)    Patient is asking to focus treatment on her relationship with her father and past trauma.    Goal 1: Patient will tell full story of past trauma related to her relationship with her father and will identify how past feelings are impacting current relationships.    Objective #A (Patient Action)    Patient will tell full story of past trauma related to her relationship with her father.  Status: New - Date: 2020  "    Intervention(s)  Therapist will collaborate in session to determine attachment wounds and inner child work.    Objective #B  Patient will will identify how past feelings are impacting current relationships.  Status: New - Date: 9/22/2020     Intervention(s)  Therapist will role-play conflict management.    Objective #C  Patient will identify strengths and weaknesses within her family system of origin.  Status: New - Date: 9/22/2020     Intervention(s)  Therapist will assign homework for patient to create list.      Goal 2: Patient will learn about resilience, trauma responses, and Javon Servin's \"the story I'm making up\" (cognitive strategy to manage anxious thoughts).    Objective #A (Patient Action)    Status: New - Date: 9/22/2020     Patient will will learn about resilience.    Intervention(s)  Therapist will teach about protective factors.    Objective #B  Patient will learn about trauma responses.    Status: New - Date: 9/22/2020     Intervention(s)  Therapist will provide educational materials on trauma responses.    Objective #C  Patient will use cognitive strategies identified in therapy to challenge anxious thoughts.  Status: New - Date: 9/22/2020     Intervention(s)  Therapist will teach about Javon Servin's power of vulnerability and \"the story I'm making up\".      Goal 3: Patient will learn about ACE scores and attachment styles and will identify how her attachment style drives her behavior.     Objective #A (Patient Action)    Status: New - Date: 9/22/2020     Patient will learn about ACE scores and will take assessment.    Intervention(s)  Therapist will complete with patient in-session.    Objective #B  Patient will learn about attachment styles and will take Attachment Style assessment.    Status: New - Date: 9/22/2020     Intervention(s)  Therapist will complete with patient in-session.    Objective #C  Patient will identify how her attachment style drives her behavior.  Status: New - Date: " 9/22/2020     Intervention(s)  Therapist will teach emotional regulation skills.        Patient has not reviewed nor agreed to the above plan.      Keesha Eller  September 14, 2020

## 2020-09-17 ENCOUNTER — PRENATAL OFFICE VISIT (OUTPATIENT)
Dept: MIDWIFE SERVICES | Facility: CLINIC | Age: 45
End: 2020-09-17
Payer: COMMERCIAL

## 2020-09-17 ENCOUNTER — HOSPITAL ENCOUNTER (OUTPATIENT)
Dept: ULTRASOUND IMAGING | Facility: CLINIC | Age: 45
End: 2020-09-17
Attending: OBSTETRICS & GYNECOLOGY
Payer: COMMERCIAL

## 2020-09-17 ENCOUNTER — OFFICE VISIT (OUTPATIENT)
Dept: MATERNAL FETAL MEDICINE | Facility: CLINIC | Age: 45
End: 2020-09-17
Attending: OBSTETRICS & GYNECOLOGY
Payer: COMMERCIAL

## 2020-09-17 ENCOUNTER — TRANSFERRED RECORDS (OUTPATIENT)
Dept: HEALTH INFORMATION MANAGEMENT | Facility: CLINIC | Age: 45
End: 2020-09-17

## 2020-09-17 VITALS
TEMPERATURE: 98.1 F | HEIGHT: 63 IN | BODY MASS INDEX: 32.96 KG/M2 | WEIGHT: 186 LBS | HEART RATE: 76 BPM | SYSTOLIC BLOOD PRESSURE: 109 MMHG | DIASTOLIC BLOOD PRESSURE: 75 MMHG

## 2020-09-17 DIAGNOSIS — O09.529 ANTEPARTUM MULTIGRAVIDA OF ADVANCED MATERNAL AGE: ICD-10-CM

## 2020-09-17 DIAGNOSIS — O09.522 MULTIGRAVIDA OF ADVANCED MATERNAL AGE IN SECOND TRIMESTER: Primary | ICD-10-CM

## 2020-09-17 DIAGNOSIS — O09.529 ANTEPARTUM MULTIGRAVIDA OF ADVANCED MATERNAL AGE: Primary | ICD-10-CM

## 2020-09-17 LAB
GLUCOSE 1H P 50 G GLC PO SERPL-MCNC: 105 MG/DL (ref 60–129)
HGB BLD-MCNC: 11.7 G/DL (ref 11.7–15.7)

## 2020-09-17 PROCEDURE — 82950 GLUCOSE TEST: CPT | Performed by: ADVANCED PRACTICE MIDWIFE

## 2020-09-17 PROCEDURE — 36415 COLL VENOUS BLD VENIPUNCTURE: CPT | Performed by: ADVANCED PRACTICE MIDWIFE

## 2020-09-17 PROCEDURE — 76816 OB US FOLLOW-UP PER FETUS: CPT

## 2020-09-17 PROCEDURE — 00000218 ZZHCL STATISTIC OBHBG - HEMOGLOBIN: Performed by: ADVANCED PRACTICE MIDWIFE

## 2020-09-17 PROCEDURE — 99207 ZZC PRENATAL VISIT: CPT | Performed by: ADVANCED PRACTICE MIDWIFE

## 2020-09-17 ASSESSMENT — MIFFLIN-ST. JEOR: SCORE: 1456.78

## 2020-09-17 NOTE — PROGRESS NOTES
25w1d  Patient feeling well. Positive fetal movement. Denies water leaking, vaginal bleeding, decreased fetal movement, contraction pain, or headaches.   Doing really well. Was able to meet with the psychiatrist. Was happy to see them and confirm her medications. She increased her Effexor by 37.5 mg daily and staying on Lexapro 10 mg. She also is seeing a therapist that Elena referred her to. Elena is there if she needs anything further but for now she is working with her therapist. She had her MFM appointment today which went well. She is taking her GCT and HGB. We discussed IOL at 39 weeks. She otherwise has no questions or concerns today.   Danger signs reviewed, pre-eclampsia signs and symptoms discussed.   Knows when to call triage and has phone numbers.   Follow up in 4 weeks.   BENJY Avendaño CNM

## 2020-09-17 NOTE — PROGRESS NOTES
Please see the imaging tab for details of the ultrasound performed today.    Carmen Arciniega MD  Specialist in Maternal-Fetal Medicine

## 2020-09-22 ENCOUNTER — VIRTUAL VISIT (OUTPATIENT)
Dept: PSYCHOLOGY | Facility: CLINIC | Age: 45
End: 2020-09-22
Payer: COMMERCIAL

## 2020-09-22 DIAGNOSIS — F41.1 GAD (GENERALIZED ANXIETY DISORDER): Primary | ICD-10-CM

## 2020-09-22 DIAGNOSIS — F33.1 MAJOR DEPRESSIVE DISORDER, RECURRENT EPISODE, MODERATE (H): ICD-10-CM

## 2020-09-22 PROCEDURE — 99207 ZZC CDG-CODE INCORRECT PER BILLING BASED ON TIME: CPT | Performed by: MARRIAGE & FAMILY THERAPIST

## 2020-09-22 PROCEDURE — 90837 PSYTX W PT 60 MINUTES: CPT | Mod: 95 | Performed by: MARRIAGE & FAMILY THERAPIST

## 2020-09-23 NOTE — PATIENT INSTRUCTIONS
"Patient states that her goals for the next week and a half include:    1. Think more about reason for decline in need for physical affection  2. \"Break through things\" (don't get totally shut down and allow anxiety to fill in information gaps)  3. Watch Javon Servin's Netflix special \"A Call to Courage\" with .  "

## 2020-09-23 NOTE — PROGRESS NOTES
Progress Note    Patient Name: Ellyn Mcgee  Date: 9/22/2020         Service Type: Individual      Session Start Time: 2:30 p.m.  Session End Time: 3:27 p.m.     Session Length: 57 min.    Session #: 2 (with this writer; patient met previously for DA with [facility] Elena [facility] and psychiatry)    Attendees: Client attended alone    Service Modality:  Video Visit:    Telemedicine Visit: The patient's condition can be safely assessed and treated via synchronous audio and visual telemedicine encounter.      Reason for Telemedicine Visit: Services only offered telehealth    Originating Site (Patient Location): Patient's home    Distant Site (Provider Location): [facility]    Consent:  The patient/guardian has verbally consented to: the potential risks and benefits of telemedicine (video visit) versus in person care; bill my insurance or make self-payment for services provided; and responsibility for payment of non-covered services.     Patient would like the video invitation sent by: Send to e-mail at: [email or web address]    Mode of Communication:  Video Conference via well    As the provider I attest to compliance with applicable laws and regulations related to telemedicine.     Treatment Plan Last Reviewed: 9/22/2020  PHQ-9 / TEE-7: 9/8/2020     DATA  Interactive Complexity: No  Crisis: No       Progress Since Last Session (Related to Symptoms / Goals / Homework):   Symptoms: Improving as patient states that she has been more aware of her emotional state and has been identifying why she is feeling triggered in arguments with her      Homework: Achieved / completed to satisfaction  Completed in session - patient took Attachment Style Assessment in session on this date (Anxious Attachment Style)      Episode of Care Goals: Satisfactory progress - ACTION (Actively working towards change); Intervened by reinforcing change plan / affirming steps  taken     Current / Ongoing Stressors and Concerns:  History of anxious attachment stemming from relationship with father (he  8 years ago); is currently pregnant (high-risk due to advanced maternal age); is in couples therapy with  due to frequent arguments and his emotional affair earlier in the year; financial stress; history of ADHD (hyperactive type); has roommate who was in jail.     Treatment Objective(s) Addressed in This Session:   use cognitive strategies identified in therapy to challenge anxious thoughts  tell full story of past trauma related to her relationship with her father     Intervention:   Psychodynamic: Family systems and attachment style  Narrative Therapy: separate problem from person and find the bright spots        ASSESSMENT: Current Emotional / Mental Status (status of significant symptoms):   Risk status (Self / Other harm or suicidal ideation)   Patient denies current fears or concerns for personal safety.   Patient denies current or recent suicidal ideation or behaviors.   Patient denies current or recent homicidal ideation or behaviors.   Patient denies current or recent self injurious behavior or ideation.   Patient denies other safety concerns.   Patient reports there has been no change in risk factors since their last session.     Patient reports there has been no change in protective factors since their last session.     Recommended that patient call 911 or go to the local ED should there be a change in any of these risk factors.     Appearance:   Appropriate    Eye Contact:   Good    Psychomotor Behavior: Restless    Attitude:   Cooperative  Interested Friendly   Orientation:   All   Speech    Rate / Production: Hyperverbal     Volume:  Normal    Mood:    Anxious  Expansive   Affect:    Appropriate  Expansive    Thought Content:  Paranoia  Rumination    Thought Form:  Coherent  Flight of Ideas    Insight:    Good  and Fair      Medication Review:   No changes to  "current psychiatric medication(s)     Medication Compliance:   Yes     Changes in Health Issues:   None reported - patient is currently 26 weeks pregnant     Chemical Use Review:   Substance Use: Chemical use reviewed, no active concerns identified      Tobacco Use: No current tobacco use.      Diagnosis:  1. TEE (generalized anxiety disorder)    2. Major depressive disorder, recurrent episode, moderate (H)        Collateral Reports Completed:   Not Applicable    PLAN: (Patient Tasks / Therapist Tasks / Other)    Patient states that her goals for the next week and a half include:    1. Think more about reason for decline in need for physical affection  2. \"Break through things\" (don't get totally shut down and allow anxiety to fill in information gaps)  3. Watch wutabout's [employer] special \"A Call to Courage\" with .    Keesha Eller                                                         ______________________________________________________________________    Treatment Plan    Patient's Name: Ellyn Mcgee  YOB: 1975    Date: 2020    DSM5 Diagnoses: 296.32 (F33.1) Major Depressive Disorder, Recurrent Episode, Moderate _ or 300.02 (F41.1) Generalized Anxiety Disorder (patient has previously been diagnosed with ADHD, hyperactive type)  Psychosocial / Contextual Factors: History of anxious attachment stemming from relationship with father (he  8 years ago); is currently pregnant (high-risk due to advanced maternal age); is in couples therapy with  due to frequent arguments and his emotional affair earlier in the year; financial stress; history of ADHD (hyperactive type).    WHODAS 2.0 Total Score 2020   Total Score 23 30   Total Score MyChart 23 30       PHQ 2020   PHQ-9 Total Score 16 7 8   Q9: Thoughts of better off dead/self-harm past 2 weeks Several days Not at all Not at all   F/U: Thoughts of suicide or self-harm No - -   F/U: " "Safety concerns No - -     TEE-7 SCORE 8/6/2020 8/12/2020 9/8/2020   Total Score - - -   Total Score - 14 (moderate anxiety) 12 (moderate anxiety)   Total Score 13 14 12       Referral / Collaboration:  Was/were discussed and client will pursue: [facility] if mental health concerns around maternity/pregnancy develop    Anticipated number of sessions for this episode of care: 20-24    Measurable Treatment Goal(s) related to diagnosis / functional impairment(s)    Patient is asking to focus treatment on her relationship with her father and past trauma.    Goal 1: Patient will tell full story of past trauma related to her relationship with her father and will identify how past feelings are impacting current relationships.    Objective #A (Patient Action)    Patient will tell full story of past trauma related to her relationship with her father.  Status: New - Date: 9/22/2020     Intervention(s)  Therapist will collaborate in session to determine attachment wounds and inner child work.    Objective #B  Patient will will identify how past feelings are impacting current relationships.  Status: New - Date: 9/22/2020     Intervention(s)  Therapist will role-play conflict management.    Objective #C  Patient will identify strengths and weaknesses within her family system of origin.  Status: New - Date: 9/22/2020     Intervention(s)  Therapist will assign homework for patient to create list.      Goal 2: Patient will learn about resilience, trauma responses, and Javon Servin's \"the story I'm making up\" (cognitive strategy to manage anxious thoughts).    Objective #A (Patient Action)    Status: New - Date: 9/22/2020     Patient will will learn about resilience.    Intervention(s)  Therapist will teach about protective factors.    Objective #B  Patient will learn about trauma responses.    Status: New - Date: 9/22/2020     Intervention(s)  Therapist will provide educational materials on trauma responses.    Objective " "#C  Patient will use cognitive strategies identified in therapy to challenge anxious thoughts.  Status: New - Date: 9/22/2020     Intervention(s)  Therapist will teach about Javon Servin's power of vulnerability and \"the story I'm making up\".      Goal 3: Patient will learn about ACE scores and attachment styles and will identify how her attachment style drives her behavior.     Objective #A (Patient Action)    Status: New - Date: 9/22/2020     Patient will learn about ACE scores and will take assessment.    Intervention(s)  Therapist will complete with patient in-session.    Objective #B  Patient will learn about attachment styles and will take Attachment Style assessment.    Status: New - Date: 9/22/2020     Intervention(s)  Therapist will complete with patient in-session.    Objective #C  Patient will identify how her attachment style drives her behavior.  Status: New - Date: 9/22/2020     Intervention(s)  Therapist will teach emotional regulation skills.        Patient has reviewed and agreed to the above plan.      Keesha Eller  September 14, 2020  "

## 2020-09-24 ENCOUNTER — MYC MEDICAL ADVICE (OUTPATIENT)
Dept: MIDWIFE SERVICES | Facility: CLINIC | Age: 45
End: 2020-09-24

## 2020-10-01 ENCOUNTER — MYC MEDICAL ADVICE (OUTPATIENT)
Dept: MIDWIFE SERVICES | Facility: CLINIC | Age: 45
End: 2020-10-01

## 2020-10-01 NOTE — TELEPHONE ENCOUNTER
TC to Ellyn. Left VM to call clinic with concerns of cramping, bleeding, decreased fetal movement or LOF.  Loco Cheng CNM

## 2020-10-02 ENCOUNTER — VIRTUAL VISIT (OUTPATIENT)
Dept: PSYCHOLOGY | Facility: CLINIC | Age: 45
End: 2020-10-02
Payer: COMMERCIAL

## 2020-10-02 DIAGNOSIS — F41.1 GAD (GENERALIZED ANXIETY DISORDER): Primary | ICD-10-CM

## 2020-10-02 DIAGNOSIS — F33.1 MAJOR DEPRESSIVE DISORDER, RECURRENT EPISODE, MODERATE (H): ICD-10-CM

## 2020-10-02 PROCEDURE — 90837 PSYTX W PT 60 MINUTES: CPT | Mod: 95 | Performed by: MARRIAGE & FAMILY THERAPIST

## 2020-10-05 NOTE — PROGRESS NOTES
"                                             Progress Note    Patient Name: Ellyn Mcgee  Date: 10/2/2020         Service Type: Individual      Session Start Time: 11:03 a.m.  Session End Time: 11:56 a.m.     Session Length: 53 min.    Session #: 3 (with this writer; patient met previously for DA with [facility] and psychiatry)    Attendees: Client attended alone    Service Modality:  Video Visit:    Telemedicine Visit: The patient's condition can be safely assessed and treated via synchronous audio and visual telemedicine encounter.      Reason for Telemedicine Visit: Services only offered telehealth    Originating Site (Patient Location): Patient's home    Distant Site (Provider Location): [facility]    Consent:  The patient/guardian has verbally consented to: the potential risks and benefits of telemedicine (video visit) versus in person care; bill my insurance or make self-payment for services provided; and responsibility for payment of non-covered services.     Patient would like the video invitation sent by: Send to e-mail at: [email or web address]    Mode of Communication:  Video Conference via well    As the provider I attest to compliance with applicable laws and regulations related to telemedicine.     Treatment Plan Last Reviewed: 9/22/2020  PHQ-9 / TEE-7: 9/8/2020     DATA  Interactive Complexity: No  Crisis: No       Progress Since Last Session (Related to Symptoms / Goals / Homework):   Symptoms: Improving slightly per patient's report that she has been better at montoring her emotional responses to her  this week; patient also asked this writer for more frequent sessions, as she feels better able to manage her emotions when she has a consistent way to work on them.    Homework: Partially completed - patient did not say whether she watched the Spyra special but states that she has been identifying her needs and has tried to \"break through\" instead of " shutting down      Episode of Care Goals: Satisfactory progress - ACTION (Actively working towards change); Intervened by reinforcing change plan / affirming steps taken     Current / Ongoing Stressors and Concerns:  Daughter (age 10) just started ADHD medication; history of anxious attachment stemming from relationship with father (he  8 years ago); is currently pregnant (high-risk due to advanced maternal age); is in couples therapy with  due to frequent arguments and his emotional affair earlier in the year; financial stress; history of ADHD (hyperactive type); has roommate who was in group home.     Treatment Objective(s) Addressed in This Session:   use cognitive strategies identified in therapy to challenge anxious thoughts  will identify how her attachment style drives her behavior  will identify how past feelings are impacting current relationships.    Past: tell full story of past trauma related to her relationship with her father     Intervention:   Psychodynamic: Family systems and attachment style  Narrative Therapy: separate problem from person and find the bright spots        ASSESSMENT: Current Emotional / Mental Status (status of significant symptoms):   Risk status (Self / Other harm or suicidal ideation)   Patient denies current fears or concerns for personal safety.   Patient denies current or recent suicidal ideation or behaviors. Patient last reported feeling hopeless/passive ideation on 2020.   Patient denies current or recent homicidal ideation or behaviors.   Patient denies current or recent self injurious behavior or ideation.   Patient denies other safety concerns.   Patient reports there has been no change in risk factors since their last session.     Patient reports there has been no change in protective factors since their last session.     Recommended that patient call 911 or go to the local ED should there be a change in any of these risk  factors.     Appearance:   Appropriate    Eye Contact:   Good    Psychomotor Behavior: Restless    Attitude:   Cooperative  Interested Friendly   Orientation:   All   Speech    Rate / Production: Hyperverbal     Volume:  Normal    Mood:    Anxious  Expansive   Affect:    Appropriate  Expansive  Animated    Thought Content:  Paranoia  Rumination    Thought Form:  Coherent  Flight of Ideas    Insight:    Good  and Fair      Medication Review:   No changes to current psychiatric medication(s)     Medication Compliance:   Yes     Changes in Health Issues:   None reported - patient is currently 28 weeks pregnant     Chemical Use Review:   Substance Use: Chemical use reviewed, no active concerns identified      Tobacco Use: No current tobacco use.      Diagnosis:  1. TEE (generalized anxiety disorder)    2. Major depressive disorder, recurrent episode, moderate (H)        Collateral Reports Completed:   Not Applicable    PLAN: (Patient Tasks / Therapist Tasks / Other)    Patient states that her goals for the next week  include:    1. Continue to fight the urge to shut down when in conflict (especially w/)  2. Try to identify moments of insecurity and the root cause of those feelings  3. Continue to savor moments with daughters      Keesha Eller                                                         ______________________________________________________________________    Treatment Plan    Patient's Name: Ellyn Mcgee  YOB: 1975    Date: 2020    DSM5 Diagnoses: 296.32 (F33.1) Major Depressive Disorder, Recurrent Episode, Moderate _ or 300.02 (F41.1) Generalized Anxiety Disorder (patient has previously been diagnosed with ADHD, hyperactive type)  Psychosocial / Contextual Factors: History of anxious attachment stemming from relationship with father (he  8 years ago); is currently pregnant (high-risk due to advanced maternal age); is in couples therapy with  due to frequent  "arguments and his emotional affair earlier in the year; financial stress; history of ADHD (hyperactive type).    WHODAS 2.0 Total Score 8/12/2020 9/8/2020   Total Score 23 30   Total Score MyChart 23 30       PHQ 8/5/2020 8/12/2020 9/8/2020   PHQ-9 Total Score 16 7 8   Q9: Thoughts of better off dead/self-harm past 2 weeks Several days Not at all Not at all   F/U: Thoughts of suicide or self-harm No - -   F/U: Safety concerns No - -     TEE-7 SCORE 8/6/2020 8/12/2020 9/8/2020   Total Score - - -   Total Score - 14 (moderate anxiety) 12 (moderate anxiety)   Total Score 13 14 12       Referral / Collaboration:  Was/were discussed and client will pursue: [facility] if mental health concerns around maternity/pregnancy develop    Anticipated number of sessions for this episode of care: 20-24    Measurable Treatment Goal(s) related to diagnosis / functional impairment(s)    Patient is asking to focus treatment on her relationship with her father and past trauma.    Goal 1: Patient will tell full story of past trauma related to her relationship with her father and will identify how past feelings are impacting current relationships.    Objective #A (Patient Action)    Patient will tell full story of past trauma related to her relationship with her father.  Status: New - Date: 9/22/2020     Intervention(s)  Therapist will collaborate in session to determine attachment wounds and inner child work.    Objective #B  Patient will identify how past feelings are impacting current relationships.  Status: New - Date: 9/22/2020     Intervention(s)  Therapist will role-play conflict management.    Objective #C  Patient will identify strengths and weaknesses within her family system of origin.  Status: New - Date: 9/22/2020     Intervention(s)  Therapist will assign homework for patient to create list.      Goal 2: Patient will learn about resilience, trauma responses, and Javon Servin's \"the story I'm making up\" (cognitive " "strategy to manage anxious thoughts).    Objective #A (Patient Action)    Status: New - Date: 9/22/2020     Patient will will learn about resilience.    Intervention(s)  Therapist will teach about protective factors.    Objective #B  Patient will learn about trauma responses.    Status: New - Date: 9/22/2020     Intervention(s)  Therapist will provide educational materials on trauma responses.    Objective #C  Patient will use cognitive strategies identified in therapy to challenge anxious thoughts.  Status: New - Date: 9/22/2020     Intervention(s)  Therapist will teach about Javon Servin's power of vulnerability and \"the story I'm making up\".      Goal 3: Patient will learn about ACE scores and attachment styles and will identify how her attachment style drives her behavior.     Objective #A (Patient Action)    Status: New - Date: 9/22/2020     Patient will learn about ACE scores and will take assessment.    Intervention(s)  Therapist will complete with patient in-session.    Objective #B  Patient will learn about attachment styles and will take Attachment Style assessment.    Status: Completed - Date: 9/2020     Intervention(s)  Therapist will complete with patient in-session.    Objective #C  Patient will identify how her attachment style drives her behavior.  Status: New - Date: 9/22/2020     Intervention(s)  Therapist will teach emotional regulation skills.        Patient has reviewed and agreed to the above plan.      Keesha Eller  September 14, 2020  "

## 2020-10-05 NOTE — PATIENT INSTRUCTIONS
Patient states that her goals for the next week  include:    1. Continue to fight the urge to shut down when in conflict (especially w/)  2. Try to identify moments of insecurity and the root cause of those feelings  3. Continue to savor moments with daughters

## 2020-10-06 ENCOUNTER — PRENATAL OFFICE VISIT (OUTPATIENT)
Dept: MIDWIFE SERVICES | Facility: CLINIC | Age: 45
End: 2020-10-06
Payer: COMMERCIAL

## 2020-10-06 VITALS
HEIGHT: 63 IN | WEIGHT: 187.6 LBS | DIASTOLIC BLOOD PRESSURE: 70 MMHG | BODY MASS INDEX: 33.24 KG/M2 | HEART RATE: 76 BPM | SYSTOLIC BLOOD PRESSURE: 122 MMHG

## 2020-10-06 DIAGNOSIS — N76.0 BACTERIAL VAGINOSIS: ICD-10-CM

## 2020-10-06 DIAGNOSIS — O09.522 MULTIGRAVIDA OF ADVANCED MATERNAL AGE IN SECOND TRIMESTER: Primary | ICD-10-CM

## 2020-10-06 DIAGNOSIS — N94.9 VAGINAL SYMPTOM: ICD-10-CM

## 2020-10-06 DIAGNOSIS — Z23 NEED FOR PROPHYLACTIC VACCINATION AND INOCULATION AGAINST INFLUENZA: ICD-10-CM

## 2020-10-06 DIAGNOSIS — B37.31 YEAST INFECTION OF THE VAGINA: ICD-10-CM

## 2020-10-06 DIAGNOSIS — B96.89 BACTERIAL VAGINOSIS: ICD-10-CM

## 2020-10-06 DIAGNOSIS — O36.8121 DECREASED FETAL MOVEMENTS IN SECOND TRIMESTER, FETUS 1 OF MULTIPLE GESTATION: ICD-10-CM

## 2020-10-06 LAB
ALBUMIN UR-MCNC: NEGATIVE MG/DL
APPEARANCE UR: CLEAR
BILIRUB UR QL STRIP: NEGATIVE
COLOR UR AUTO: YELLOW
GLUCOSE UR STRIP-MCNC: NEGATIVE MG/DL
HGB UR QL STRIP: NEGATIVE
KETONES UR STRIP-MCNC: NEGATIVE MG/DL
LEUKOCYTE ESTERASE UR QL STRIP: NEGATIVE
NITRATE UR QL: NEGATIVE
PH UR STRIP: 6.5 PH (ref 5–7)
RBC #/AREA URNS AUTO: NORMAL /HPF
SOURCE: NORMAL
SP GR UR STRIP: <=1.005 (ref 1–1.03)
SPECIMEN SOURCE: ABNORMAL
UROBILINOGEN UR STRIP-ACNC: 0.2 EU/DL (ref 0.2–1)
WBC #/AREA URNS AUTO: NORMAL /HPF
WET PREP SPEC: ABNORMAL

## 2020-10-06 PROCEDURE — 99213 OFFICE O/P EST LOW 20 MIN: CPT | Mod: 25 | Performed by: ADVANCED PRACTICE MIDWIFE

## 2020-10-06 PROCEDURE — 90471 IMMUNIZATION ADMIN: CPT | Performed by: ADVANCED PRACTICE MIDWIFE

## 2020-10-06 PROCEDURE — 81001 URINALYSIS AUTO W/SCOPE: CPT | Performed by: ADVANCED PRACTICE MIDWIFE

## 2020-10-06 PROCEDURE — 87210 SMEAR WET MOUNT SALINE/INK: CPT | Performed by: ADVANCED PRACTICE MIDWIFE

## 2020-10-06 PROCEDURE — 90686 IIV4 VACC NO PRSV 0.5 ML IM: CPT | Performed by: ADVANCED PRACTICE MIDWIFE

## 2020-10-06 PROCEDURE — 59025 FETAL NON-STRESS TEST: CPT | Performed by: ADVANCED PRACTICE MIDWIFE

## 2020-10-06 RX ORDER — METRONIDAZOLE 500 MG/1
500 TABLET ORAL 2 TIMES DAILY
Qty: 14 TABLET | Refills: 0 | Status: SHIPPED | OUTPATIENT
Start: 2020-10-06 | End: 2020-11-10

## 2020-10-06 RX ORDER — METRONIDAZOLE 500 MG/1
500 TABLET ORAL 2 TIMES DAILY
Qty: 14 TABLET | Refills: 0 | Status: SHIPPED | OUTPATIENT
Start: 2020-10-06 | End: 2020-10-06

## 2020-10-06 RX ORDER — MICONAZOLE NITRATE 2 %
1 CREAM WITH APPLICATOR VAGINAL AT BEDTIME
Qty: 0.7 G | Refills: 0 | Status: SHIPPED | OUTPATIENT
Start: 2020-10-06 | End: 2020-11-10

## 2020-10-06 RX ORDER — MICONAZOLE NITRATE 2 %
1 CREAM WITH APPLICATOR VAGINAL AT BEDTIME
Qty: 0.7 G | Refills: 0 | Status: SHIPPED | OUTPATIENT
Start: 2020-10-06 | End: 2020-10-06

## 2020-10-06 ASSESSMENT — PAIN SCALES - GENERAL: PAINLEVEL: NO PAIN (0)

## 2020-10-06 ASSESSMENT — MIFFLIN-ST. JEOR: SCORE: 1464.04

## 2020-10-06 NOTE — PROGRESS NOTES
27w6d  Ellyn is here in clinic for routine PNC. She immediately states that she is very anxious because baby has not been moving much for the last few days. Dopplers show -180's when listening for several minutes. She also had some bloody mucous discharge on Thursday last week, after IC, and has also noted some low abdominal cramping/heaviness. UA and wet prep collected. Recommend NST today. Will ask de YOON to see her in clinic with possible prolonged monitoring in L&D if needed.  Loco Cheng CNM

## 2020-10-06 NOTE — NURSING NOTE
Clinic Administered Medication Documentation      Injectable Medication Documentation    Patient was given Fly shot. Prior to medication administration, verified patients identity using patient s name and date of birth. Please see MAR and medication order for additional information. Patient instructed to remain in clinic for 15 minutes and report any adverse reaction to staff immediately .      Was entire vial of medication used? Yes  Vial/Syringe: Syringe  Expiration Date:  [id number]  Was this medication supplied by the patient? No     Carrie TAVERA

## 2020-10-06 NOTE — PROGRESS NOTES
Asked by ANUP Cheng, KARRIE asked me to do an NST for Ellyn Mcgee after FHTs ausculated by leon showed a baseline between 160 and 180. The patient had been reporting decreased fetal movement today. A Nst was done and initially the baseline was 160 with prolonged accels to 180. During this time there was a lot of fetal movement felt by Ellyn and also heard on the EFM.  After 30 minutes the baseline settled to 150s with some accels. No uterine activity was seen during the NST. No decels. NST interpretation was reactive. Rx was given for treatment for bacterial vaginosis and vaginal yeast infection. WEt prep was collected by Loco for increased discharge after intercourse.  Patient was instructed about course of treatment.   Judith Lowe, KARRIE, BENJY YOON

## 2020-10-06 NOTE — PROGRESS NOTES
"Chief Complaint   Patient presents with     Prenatal Care     27+6       Initial LMP 2020  Estimated body mass index is 32.69 kg/m  as calculated from the following:    Height as of 20: 1.607 m (5' 3.25\").    Weight as of 20: 84.4 kg (186 lb).  BP completed using cuff size: large    Questioned patient about current smoking habits.  Pt. has never smoked.          The following HM Due: Vaccinations: Flu shot      The following patient reported/Care Every where data was sent to:  P ABSTRACT QUALITY INITIATIVES [[id number]]  n/a      patient has appointment for today and orders have been placed              "

## 2020-10-08 ENCOUNTER — VIRTUAL VISIT (OUTPATIENT)
Dept: PSYCHOLOGY | Facility: CLINIC | Age: 45
End: 2020-10-08
Payer: COMMERCIAL

## 2020-10-08 DIAGNOSIS — F41.1 GAD (GENERALIZED ANXIETY DISORDER): Primary | ICD-10-CM

## 2020-10-08 DIAGNOSIS — F33.1 MAJOR DEPRESSIVE DISORDER, RECURRENT EPISODE, MODERATE (H): ICD-10-CM

## 2020-10-08 PROCEDURE — 99207 PR CDG-CODE INCORRECT PER BILLING BASED ON TIME: CPT | Performed by: MARRIAGE & FAMILY THERAPIST

## 2020-10-08 PROCEDURE — 90837 PSYTX W PT 60 MINUTES: CPT | Mod: 95 | Performed by: MARRIAGE & FAMILY THERAPIST

## 2020-10-08 NOTE — PROGRESS NOTES
Progress Note    Patient Name: Ellyn Mcgee  Date: 10/8/2020         Service Type: Individual      Session Start Time: 2:30 p.m.  Session End Time: 3:33 p.m.     Session Length: 63 min.    Session #: 4 (with this writer; patient met previously for DA with [facility] and psychiatry)    Attendees: Client attended alone    Service Modality:  Video Visit:    Telemedicine Visit: The patient's condition can be safely assessed and treated via synchronous audio and visual telemedicine encounter.      Reason for Telemedicine Visit: Services only offered telehealth    Originating Site (Patient Location): Patient's home    Distant Site (Provider Location): [facility]    Consent:  The patient/guardian has verbally consented to: the potential risks and benefits of telemedicine (video visit) versus in person care; bill my insurance or make self-payment for services provided; and responsibility for payment of non-covered services.     Patient would like the video invitation sent by: Send to e-mail at: [email or web address]    Mode of Communication:  Video Conference via [employer]well    As the provider I attest to compliance with applicable laws and regulations related to telemedicine.     Treatment Plan Last Reviewed: 9/22/2020  PHQ-9 / TEE-7: 9/8/2020     DATA  Interactive Complexity: No  Crisis: No       Progress Since Last Session (Related to Symptoms / Goals / Homework):   Symptoms: Improving slightly (continued) per patient's report that she has been better at montoring her emotional responses to her  this week.    Homework: Partially completed - patient has been savoring moments and has been engaging with  instead of shutting down      Episode of Care Goals: Satisfactory progress - ACTION (Actively working towards change); Intervened by reinforcing change plan / affirming steps taken     Current / Ongoing Stressors and Concerns:  Daughter (age  10) just started ADHD medication; history of anxious attachment stemming from relationship with father (he  8 years ago); is currently pregnant (high-risk due to advanced maternal age); is in couples therapy with  due to frequent arguments and his emotional affair earlier in the year; financial stress; history of ADHD (hyperactive type); has roommate who was in [employer].     Treatment Objective(s) Addressed in This Session:   use cognitive strategies identified in therapy to challenge anxious thoughts  will identify how past feelings are impacting current relationships.  tell full story of past trauma related to her relationship with her father    Past:  will identify how her attachment style drives her behavior     Intervention:   Psychodynamic: Family systems and attachment style  Narrative Therapy: separate problem from person and find the bright spots        ASSESSMENT: Current Emotional / Mental Status (status of significant symptoms):   Risk status (Self / Other harm or suicidal ideation)   Patient denies current fears or concerns for personal safety.   Patient denies current or recent suicidal ideation or behaviors. Patient last reported feeling hopeless/passive ideation on 2020.   Patient denies current or recent homicidal ideation or behaviors.   Patient denies current or recent self injurious behavior or ideation.   Patient denies other safety concerns.   Patient reports there has been no change in risk factors since their last session.     Patient reports there has been no change in protective factors since their last session.     Recommended that patient call 911 or go to the local ED should there be a change in any of these risk factors.     Appearance:   Appropriate    Eye Contact:   Good    Psychomotor Behavior: Restless    Attitude:   Cooperative  Interested Friendly   Orientation:   All   Speech    Rate / Production: Hyperverbal     Volume:  Normal    Mood:    Anxious   "Expansive   Affect:    Appropriate  Expansive  Animated    Thought Content:  Paranoia  Rumination    Thought Form:  Coherent  Flight of Ideas    Insight:    Good  and Fair      Medication Review:   No changes to current psychiatric medication(s)     Medication Compliance:   Yes     Changes in Health Issues:   None reported - patient is currently 28 weeks pregnant     Chemical Use Review:   Substance Use: Chemical use reviewed, no active concerns identified      Tobacco Use: No current tobacco use.      Diagnosis:  1. TEE (generalized anxiety disorder)        Collateral Reports Completed:   Not Applicable    PLAN: (Patient Tasks / Therapist Tasks / Other)    Patient states that her goals for the next week include:    1. Continue to fight the urge to shut down when in conflict (especially w/) (continued)  2. Purge household items and clothes    Homework: Write a letter to father with two parts: \"thank you for\": ________ and \"here's what I would like to say to you\": ___________.      Keesha SON Rafita                                                         ______________________________________________________________________    Treatment Plan    Patient's Name: Ellyn Mcgee  YOB: 1975    Date: 2020    DSM5 Diagnoses: 296.32 (F33.1) Major Depressive Disorder, Recurrent Episode, Moderate _ or 300.02 (F41.1) Generalized Anxiety Disorder (patient has previously been diagnosed with ADHD, hyperactive type)  Psychosocial / Contextual Factors: History of anxious attachment stemming from relationship with father (he  8 years ago); is currently pregnant (high-risk due to advanced maternal age); is in couples therapy with  due to frequent arguments and his emotional affair earlier in the year; financial stress; history of ADHD (hyperactive type).    WHODAS 2.0 Total Score 2020   Total Score 23 30   Total Score MyChart 23 30       PHQ 2020   PHQ-9 Total " "Score 16 7 8   Q9: Thoughts of better off dead/self-harm past 2 weeks Several days Not at all Not at all   F/U: Thoughts of suicide or self-harm No - -   F/U: Safety concerns No - -     TEE-7 SCORE 8/6/2020 8/12/2020 9/8/2020   Total Score - - -   Total Score - 14 (moderate anxiety) 12 (moderate anxiety)   Total Score 13 14 12       Referral / Collaboration:  Was/were discussed and client will pursue: [facility] if mental health concerns around maternity/pregnancy develop    Anticipated number of sessions for this episode of care: 20-24    Measurable Treatment Goal(s) related to diagnosis / functional impairment(s)    Patient is asking to focus treatment on her relationship with her father and past trauma.    Goal 1: Patient will tell full story of past trauma related to her relationship with her father and will identify how past feelings are impacting current relationships.    Objective #A (Patient Action)    Patient will tell full story of past trauma related to her relationship with her father.  Status: New - Date: 9/22/2020     Intervention(s)  Therapist will collaborate in session to determine attachment wounds and inner child work.    Objective #B  Patient will identify how past feelings are impacting current relationships.  Status: New - Date: 9/22/2020     Intervention(s)  Therapist will role-play conflict management.    Objective #C  Patient will identify strengths and weaknesses within her family system of origin.  Status: New - Date: 9/22/2020     Intervention(s)  Therapist will assign homework for patient to create list.      Goal 2: Patient will learn about resilience, trauma responses, and Javon Servin's \"the story I'm making up\" (cognitive strategy to manage anxious thoughts).    Objective #A (Patient Action)    Status: New - Date: 9/22/2020     Patient will will learn about resilience.    Intervention(s)  Therapist will teach about protective factors.    Objective #B  Patient will learn about " "trauma responses.    Status: New - Date: 9/22/2020     Intervention(s)  Therapist will provide educational materials on trauma responses.    Objective #C  Patient will use cognitive strategies identified in therapy to challenge anxious thoughts.  Status: New - Date: 9/22/2020     Intervention(s)  Therapist will teach about Javon Servin's power of vulnerability and \"the story I'm making up\".      Goal 3: Patient will learn about ACE scores and attachment styles and will identify how her attachment style drives her behavior.     Objective #A (Patient Action)    Status: New - Date: 9/22/2020     Patient will learn about ACE scores and will take assessment.    Intervention(s)  Therapist will complete with patient in-session.    Objective #B  Patient will learn about attachment styles and will take Attachment Style assessment.    Status: Completed - Date: 9/2020     Intervention(s)  Therapist will complete with patient in-session.    Objective #C  Patient will identify how her attachment style drives her behavior.  Status: New - Date: 9/22/2020     Intervention(s)  Therapist will teach emotional regulation skills.        Patient has reviewed and agreed to the above plan.      Keesha Eller  September 14, 2020  "

## 2020-10-08 NOTE — PATIENT INSTRUCTIONS
"Patient states that her goals for the next week include:    1. Continue to fight the urge to shut down when in conflict (especially w/) (continued)  2. Purge household items and clothes    Homework: Write a letter to father with two parts: \"thank you for\": ________ and \"here's what I would like to say to you\": ___________.  "

## 2020-10-14 ENCOUNTER — VIRTUAL VISIT (OUTPATIENT)
Dept: PSYCHOLOGY | Facility: CLINIC | Age: 45
End: 2020-10-14
Payer: COMMERCIAL

## 2020-10-14 DIAGNOSIS — F33.1 MAJOR DEPRESSIVE DISORDER, RECURRENT EPISODE, MODERATE (H): ICD-10-CM

## 2020-10-14 DIAGNOSIS — F41.1 GAD (GENERALIZED ANXIETY DISORDER): Primary | ICD-10-CM

## 2020-10-14 PROCEDURE — 90837 PSYTX W PT 60 MINUTES: CPT | Mod: 95 | Performed by: MARRIAGE & FAMILY THERAPIST

## 2020-10-14 NOTE — PROGRESS NOTES
Progress Note    Patient Name: Ellyn Mcgee  Date: 10/14/2020         Service Type: Individual      Session Start Time: 1 p.m.  Session End Time: 2:11 p.m.     Session Length: 71 min.    Session #: 5 (with this writer; patient met previously for DA with [facility] and psychiatry)    Attendees: Client attended alone    Service Modality:  Video Visit:    Telemedicine Visit: The patient's condition can be safely assessed and treated via synchronous audio and visual telemedicine encounter.      Reason for Telemedicine Visit: Services only offered telehealth    Originating Site (Patient Location): Patient's home    Distant Site (Provider Location): [facility]    Consent:  The patient/guardian has verbally consented to: the potential risks and benefits of telemedicine (video visit) versus in person care; bill my insurance or make self-payment for services provided; and responsibility for payment of non-covered services.     Patient would like the video invitation sent by: Send to e-mail at: [email or web address]    Mode of Communication:  Video Conference via Amwell    As the provider I attest to compliance with applicable laws and regulations related to telemedicine.    Audio cut out after 40 minutes; remainder of session continued by phone.     Treatment Plan Last Reviewed: 9/22/2020  PHQ-9 / TEE-7: 9/8/2020     DATA  Interactive Complexity: No  Crisis: No       Progress Since Last Session (Related to Symptoms / Goals / Homework):   Symptoms: Improving slightly (continued) per patient's report that she has been trying to pay attention to the present moment instead of always living in the past or future    Homework: Achieved / completed to satisfaction - patient has been caring for new chickens and states that she has been thinking about the letter she's planning to write to her father      Episode of Care Goals: Satisfactory progress - ACTION  (Actively working towards change); Intervened by reinforcing change plan / affirming steps taken     Current / Ongoing Stressors and Concerns:  Daughter (age 10) just started ADHD medication; history of anxious attachment stemming from relationship with father (he  8 years ago); is currently pregnant (high-risk due to advanced maternal age); is in couples therapy with  due to frequent arguments and his emotional affair earlier in the year; financial stress; history of ADHD (hyperactive type); has roommate who was in [employer] (per documentation from [facility]).     Treatment Objective(s) Addressed in This Session:   use cognitive strategies identified in therapy to challenge anxious thoughts  will identify how past feelings are impacting current relationships.  tell full story of past trauma related to her relationship with her father  identify how her attachment style drives her behavior     Intervention:   Psychodynamic: Family systems and attachment style  Narrative Therapy: separate problem from person and find the bright spots        ASSESSMENT: Current Emotional / Mental Status (status of significant symptoms):   Risk status (Self / Other harm or suicidal ideation)   Patient denies current fears or concerns for personal safety.   Patient denies current or recent suicidal ideation or behaviors. Patient last reported feeling hopeless/passive ideation on 2020.   Patient denies current or recent homicidal ideation or behaviors.   Patient denies current or recent self injurious behavior or ideation.   Patient denies other safety concerns.   Patient reports there has been no change in risk factors since their last session.     Patient reports there has been no change in protective factors since their last session.     Recommended that patient call 911 or go to the local ED should there be a change in any of these risk factors.     Appearance:   Appropriate    Eye Contact:   Fair to Good    Psychomotor  "Behavior: Normal    Attitude:   Cooperative  Interested Friendly Engaged    Orientation:   All   Speech    Rate / Production: Talkative    Volume:  Normal    Mood:    Anxious  Expansive   Affect:    Appropriate  Expansive  Animated    Thought Content:  Paranoia  Rumination    Thought Form:  Coherent  Flight of Ideas    Insight:    Good  and Fair      Medication Review:   No changes to current psychiatric medication(s)     Medication Compliance:   Yes     Changes in Health Issues:   None reported - patient is currently 29 weeks pregnant     Chemical Use Review:   Substance Use: Chemical use reviewed, no active concerns identified      Tobacco Use: No current tobacco use.      Diagnosis:  1. TEE (generalized anxiety disorder)    2. Major depressive disorder, recurrent episode, moderate (H)        Collateral Reports Completed:   Not Applicable    PLAN: (Patient Tasks / Therapist Tasks / Other)    Patient states that her goals for the next week include:    1. Purge household items and clothes  2. Think more about and discuss insecurity related to body image and moments of self-loathing    (Continued)  Homework: Write a letter to father with two parts: \"thank you for\": ________ and \"here's what I would like to say to you\": ___________.      Keesha Eller                                                         ______________________________________________________________________    Treatment Plan    Patient's Name: Ellyn Mcgee  YOB: 1975    Date: 2020    DSM5 Diagnoses: 296.32 (F33.1) Major Depressive Disorder, Recurrent Episode, Moderate _ or 300.02 (F41.1) Generalized Anxiety Disorder (patient has previously been diagnosed with ADHD, hyperactive type)  Psychosocial / Contextual Factors: History of anxious attachment stemming from relationship with father (he  8 years ago); is currently pregnant (high-risk due to advanced maternal age); is in couples therapy with  due to frequent " "arguments and his emotional affair earlier in the year; financial stress; history of ADHD (hyperactive type).    WHODAS 2.0 Total Score 8/12/2020 9/8/2020   Total Score 23 30   Total Score MyChart 23 30       PHQ 8/5/2020 8/12/2020 9/8/2020   PHQ-9 Total Score 16 7 8   Q9: Thoughts of better off dead/self-harm past 2 weeks Several days Not at all Not at all   F/U: Thoughts of suicide or self-harm No - -   F/U: Safety concerns No - -     TEE-7 SCORE 8/6/2020 8/12/2020 9/8/2020   Total Score - - -   Total Score - 14 (moderate anxiety) 12 (moderate anxiety)   Total Score 13 14 12       Referral / Collaboration:  Was/were discussed and client will pursue: [facility] if mental health concerns around maternity/pregnancy develop    Anticipated number of sessions for this episode of care: 20-24    Measurable Treatment Goal(s) related to diagnosis / functional impairment(s)    Patient is asking to focus treatment on her relationship with her father and past trauma.    Goal 1: Patient will tell full story of past trauma related to her relationship with her father and will identify how past feelings are impacting current relationships.    Objective #A (Patient Action)    Patient will tell full story of past trauma related to her relationship with her father.  Status: New - Date: 9/22/2020     Intervention(s)  Therapist will collaborate in session to determine attachment wounds and inner child work.    Objective #B  Patient will identify how past feelings are impacting current relationships.  Status: New - Date: 9/22/2020     Intervention(s)  Therapist will role-play conflict management.    Objective #C  Patient will identify strengths and weaknesses within her family system of origin.  Status: New - Date: 9/22/2020     Intervention(s)  Therapist will assign homework for patient to create list.      Goal 2: Patient will learn about resilience, trauma responses, and aJvon Servin's \"the story I'm making up\" (cognitive " "strategy to manage anxious thoughts).    Objective #A (Patient Action)    Status: New - Date: 9/22/2020     Patient will will learn about resilience.    Intervention(s)  Therapist will teach about protective factors.    Objective #B  Patient will learn about trauma responses.    Status: New - Date: 9/22/2020     Intervention(s)  Therapist will provide educational materials on trauma responses.    Objective #C  Patient will use cognitive strategies identified in therapy to challenge anxious thoughts.  Status: New - Date: 9/22/2020     Intervention(s)  Therapist will teach about Javon Servin's power of vulnerability and \"the story I'm making up\".      Goal 3: Patient will learn about ACE scores and attachment styles and will identify how her attachment style drives her behavior.     Objective #A (Patient Action)    Status: New - Date: 9/22/2020     Patient will learn about ACE scores and will take assessment.    Intervention(s)  Therapist will complete with patient in-session.    Objective #B  Patient will learn about attachment styles and will take Attachment Style assessment.    Status: Completed - Date: 9/2020     Intervention(s)  Therapist will complete with patient in-session.    Objective #C  Patient will identify how her attachment style drives her behavior.  Status: New - Date: 9/22/2020     Intervention(s)  Therapist will teach emotional regulation skills.        Patient has reviewed and agreed to the above plan.      Keesha Eller  September 14, 2020  "

## 2020-10-14 NOTE — PATIENT INSTRUCTIONS
"Patient states that her goals for the next week include:    1. Purge household items and clothes  2. Think more about and discuss insecurity related to body image and moments of self-loathing    (Continued)  Homework: Write a letter to father with two parts: \"thank you for\": ________ and \"here's what I would like to say to you\": ___________.  "

## 2020-10-19 ENCOUNTER — VIRTUAL VISIT (OUTPATIENT)
Dept: PSYCHOLOGY | Facility: CLINIC | Age: 45
End: 2020-10-19
Payer: COMMERCIAL

## 2020-10-19 ENCOUNTER — VIRTUAL VISIT (OUTPATIENT)
Dept: PSYCHIATRY | Facility: OTHER | Age: 45
End: 2020-10-19
Payer: COMMERCIAL

## 2020-10-19 DIAGNOSIS — F90.0 ATTENTION DEFICIT HYPERACTIVITY DISORDER (ADHD), PREDOMINANTLY INATTENTIVE TYPE: ICD-10-CM

## 2020-10-19 DIAGNOSIS — F33.1 MAJOR DEPRESSIVE DISORDER, RECURRENT EPISODE, MODERATE (H): ICD-10-CM

## 2020-10-19 DIAGNOSIS — F41.1 GAD (GENERALIZED ANXIETY DISORDER): Primary | ICD-10-CM

## 2020-10-19 DIAGNOSIS — F33.41 RECURRENT MAJOR DEPRESSIVE DISORDER, IN PARTIAL REMISSION (H): Primary | ICD-10-CM

## 2020-10-19 DIAGNOSIS — F41.1 GAD (GENERALIZED ANXIETY DISORDER): ICD-10-CM

## 2020-10-19 PROCEDURE — 90832 PSYTX W PT 30 MINUTES: CPT | Mod: 95 | Performed by: PSYCHOLOGIST

## 2020-10-19 PROCEDURE — 99214 OFFICE O/P EST MOD 30 MIN: CPT | Mod: 95 | Performed by: PSYCHIATRY & NEUROLOGY

## 2020-10-19 NOTE — PROGRESS NOTES
[facility] ([facility])  October 19, 2020      Behavioral Health Clinician Progress Note    Patient Name: Ellyn Mcgee      Telemedicine Visit: The patient's condition can be safely assessed and treated via synchronous audio and visual telemedicine encounter.      Reason for Telemedicine Visit: Services only offered telehealth    Originating Site (Patient Location): Patient's home    Distant Site (Provider Location): Provider Remote Setting    Consent:  The patient/guardian has verbally consented to: the potential risks and benefits of telemedicine (video visit) versus in person care; bill my insurance or make self-payment for services provided; and responsibility for payment of non-covered services.     Mode of Communication:  Video Conference via Shustir    As the provider I attest to compliance with applicable laws and regulations related to telemedicine.         Service Type:  Individual      Session Start Time: 12:45pm  Session End Time: 01:05pm      Session Length: 16 - 37      Attendees: Patient    Visit Activities (Refresh list every visit): [facility] and [facility] [place] Only    Diagnostic Assessment Date: 08/10/2020 by CHOLO Lewis  See Flowsheets for today's PHQ-9 and TEE-7 results  Previous PHQ-9:   PHQ-9 SCORE 8/5/2020 8/12/2020 9/8/2020   PHQ-9 Total Score - - -   PHQ-9 Total Score MyChart 16 (Moderately severe depression) 7 (Mild depression) 8 (Mild depression)   PHQ-9 Total Score 16 7 8       Previous TEE-7:   TEE-7 SCORE 8/6/2020 8/12/2020 9/8/2020   Total Score - - -   Total Score - 14 (moderate anxiety) 12 (moderate anxiety)   Total Score 13 14 12       WHODAS 2.0 Total Score 8/12/2020 9/8/2020   Total Score 23 30   Total Score MyChart 23 30        DATA  Extended Session (60+ minutes): No  Interactive Complexity: No  Crisis: No    Medication Compliance:  Yes      Chemical Use Review:   Substance Use: Chemical use reviewed, no active concerns identified      Tobacco Use: No current  "tobacco use.      Current Stressors / Issues:  Patient reported that she is feeling \"fine\" overall. She noted continuing to have moments of feeling reactive to situations. She said that she is able to maintain for a little longer than in the past but still has these moments. She has been working with a new therapist and noted that \"it's still early\" to determine how helpful it will be. She described these moments as smaller moments of \"panic\" but they are less frequent and less intense. \"I have more tolerance toward them now.\" She added that she does not believe more medications will help her depression and is putting her efforts into therapy to obtain some help with those symptoms. She spoke about the challenges with medication for anxiety while pregnant as she wants to be able to safely breastfeed.       Review of Symptoms per patient report from 09/08/2020:  Depression: Lack of interest, Feelings of hopelessness, Feelings of helplessness, Low self-worth, Irritability, Feeling sad, down, or depressed and Anger outbursts  Isabel:  No Symptoms  Psychosis: No Symptoms  Anxiety: Excessive worry, Nervousness, Physical complaints, such as headaches, stomachaches, muscle tension, Ruminations, Poor concentration, Irritability and Anger outbursts  Panic:  No symptoms  Post Traumatic Stress Disorder:  No Symptoms  \"Emotional trauma\" no symptoms  Eating Disorder: No Symptoms  ADD / ADHD:  Inattentive, Difficulties listening, Poor task completion, Poor organizational skills, Distractibility and Forgetful  Conduct Disorder: No symptoms  Autism Spectrum Disorder: No symptoms  Obsessive Compulsive Disorder: No Symptoms      Changes in Health Issues:   None reported    Assessment: Current Emotional / Mental Status (status of significant symptoms):  Risk status (Self / Other harm or suicidal ideation)  Patient has had a history of suicidal ideation: at 16yo; thought/wondered what it would be like but no attempt/plan/intent  Patient " denies current fears or concerns for personal safety.  Patient denies current or recent suicidal ideation or behaviors.  Patient denies current or recent homicidal ideation or behaviors.  Patient denies current or recent self injurious behavior or ideation.  Patient denies other safety concerns.  A safety and risk management plan has not been developed at this time, however patient was encouraged to call [place] [phone] should there be a change in any of these risk factors.    Appearance:   Appropriate   Eye Contact:   Good   Psychomotor Behavior: Normal   Attitude:   Cooperative   Orientation:   All  Speech   Rate / Production: Normal    Volume:  Normal   Mood:    Anxious  Normal  Affect:    Appropriate   Thought Content:  Clear   Thought Form:  Coherent  Logical   Insight:    Good     Diagnoses:  1. TEE (generalized anxiety disorder)    2. Major depressive disorder, recurrent episode, moderate (H)      WHODAS 2.0 Total Score 8/12/2020 9/8/2020   Total Score 23 30   Total Score MyChart 23 30     TEE-7 SCORE 8/6/2020 8/12/2020 9/8/2020   Total Score - - -   Total Score - 14 (moderate anxiety) 12 (moderate anxiety)   Total Score 13 14 12     PHQ 8/5/2020 8/12/2020 9/8/2020   PHQ-9 Total Score 16 7 8   Q9: Thoughts of better off dead/self-harm past 2 weeks Several days Not at all Not at all   F/U: Thoughts of suicide or self-harm No - -   F/U: Safety concerns No - -     CAGE-AID Total Score 9/8/2020   Total Score 0   Total Score MyChart 0 (A total score of 2 or greater is considered clinically significant)       CAGE-AID score  > 1 is a positive screen, suggesting further discussion is needed to determine if evaluation for alcohol or substance abuse is appropriate.  A score > 2 is considered clinically significant, suggesting further evaluation of alcohol or substance-related problems is indicated.      Collateral Reports Completed:  Communicated with: Dr. Wang    Plan: (Homework, other):  Patient was given  information about behavioral services and encouraged to schedule a follow up appointment with the clinic [facility] in conjunction with next [facility] appointment.  She was also given information about mental health symptoms and treatment options .  CD Recommendations: No indications of CD issues.      Vincenzo Edward PsyD, LP      Kendall Edward PsyD  October 19, 2020

## 2020-10-19 NOTE — Clinical Note
Please call this patient to get them scheduled for a follow-up visit in 8 weeks. Please schedule with me and the [facility]. Thanks!

## 2020-10-19 NOTE — PATIENT INSTRUCTIONS
Treatment Plan:    Continue Lexapro/escitalopram 10 mg daily for mood and anxiety    Continue venlafaxine 112.5 mg daily for mood and anxiety.  You can trial just 75 mg on some days to see if that helps with some of the increased anxiety and irritability after you take your dose.  Could also try taking 37.5 mg at one time of the day and 75 mg at another time.    Continue all other medications as reviewed per electronic medical record today.     Safety plan reviewed. To the Emergency Department as needed or call after hours crisis line at [phone] or [phone]. [place] Crisis Text Line. Text [place] to 745267 or Suicide LifeLine Chat: suicidepreAuthorBeeline.org/chat    Continue therapy as planned.     Schedule an appointment with me in 8 weeks or sooner as needed. Call [facility] at [phone] to schedule if someone does not reach out to you within 2-3 days.     Follow up with primary care provider as planned or for acute medical concerns.    Call the psychiatric nurse line with medication questions or concerns at [phone].    [email or web address] may be used to communicate with your provider, but this is not intended to be used for emergencies.    [facility]- Psychiatric Disorders During Pregnancy  [email or web address]/[email or web address]-[email or web address]-[email or web address]-[email or web address]    [facility]  [email or web address]

## 2020-10-23 ENCOUNTER — VIRTUAL VISIT (OUTPATIENT)
Dept: PSYCHOLOGY | Facility: CLINIC | Age: 45
End: 2020-10-23
Payer: COMMERCIAL

## 2020-10-23 DIAGNOSIS — F33.1 MAJOR DEPRESSIVE DISORDER, RECURRENT EPISODE, MODERATE (H): ICD-10-CM

## 2020-10-23 DIAGNOSIS — F41.1 GAD (GENERALIZED ANXIETY DISORDER): Primary | ICD-10-CM

## 2020-10-23 PROCEDURE — 90837 PSYTX W PT 60 MINUTES: CPT | Mod: 95 | Performed by: MARRIAGE & FAMILY THERAPIST

## 2020-10-24 NOTE — PROGRESS NOTES
Progress Note    Patient Name: Ellyn Mcgee  Date: 10/23/2020         Service Type: Individual      Session Start Time: 1 p.m.  Session End Time: 2:04 p.m.     Session Length: 64 min.    Session #: 6 (with this writer; patient met previously for DA with [facility] and psychiatry)    Attendees: Client attended alone    Service Modality:  Video Visit:    Telemedicine Visit: The patient's condition can be safely assessed and treated via synchronous audio and visual telemedicine encounter.      Reason for Telemedicine Visit: Services only offered telehealth    Originating Site (Patient Location): Patient's home    Distant Site (Provider Location): [facility]    Consent:  The patient/guardian has verbally consented to: the potential risks and benefits of telemedicine (video visit) versus in person care; bill my insurance or make self-payment for services provided; and responsibility for payment of non-covered services.     Patient would like the video invitation sent by: Send to e-mail at: [email or web address]    Mode of Communication:  Video Conference via Amwell    As the provider I attest to compliance with applicable laws and regulations related to telemedicine.     Treatment Plan Last Reviewed: 9/22/2020  PHQ-9 / TEE-7: 9/8/2020     DATA  Interactive Complexity: No  Crisis: No       Progress Since Last Session (Related to Symptoms / Goals / Homework):   Symptoms: Improving slightly (continued) per patient's report that she has been trying to pay attention to her triggers and responses    Homework: Partially completed - patient has been caring for new chickens and has been cleaning (but admits to feeling overwhelmed by some tasks)      Episode of Care Goals: Satisfactory progress - ACTION (Actively working towards change); Intervened by reinforcing change plan / affirming steps taken     Current / Ongoing Stressors and Concerns:  Daughter (age  10) just started ADHD medication; history of anxious attachment stemming from relationship with father (he  8 years ago); is currently pregnant (high-risk due to advanced maternal age); is in couples therapy with  due to frequent arguments and his emotional affair earlier in the year; financial stress; history of ADHD (hyperactive type); has roommate who was in custodial (per [facility]).     Treatment Objective(s) Addressed in This Session:   use cognitive strategies identified in therapy to challenge anxious thoughts  will identify how past feelings are impacting current relationships.  tell full story of past trauma related to her relationship with her father  identify how her attachment style drives her behavior  tell full story of past relational issues/trust/infidelity      Intervention:   Psychodynamic: Family systems and attachment style  Narrative Therapy: separate problem from person and find the bright spots        ASSESSMENT: Current Emotional / Mental Status (status of significant symptoms):   Risk status (Self / Other harm or suicidal ideation)   Patient denies current fears or concerns for personal safety.   Patient denies current or recent suicidal ideation or behaviors. Patient last reported feeling hopeless/passive ideation on 2020.   Patient denies current or recent homicidal ideation or behaviors.   Patient denies current or recent self injurious behavior or ideation.   Patient denies other safety concerns.   Patient reports there has been no change in risk factors since their last session.     Patient reports there has been no change in protective factors since their last session.     Recommended that patient call 911 or go to the local ED should there be a change in any of these risk factors.     Appearance:   Appropriate    Eye Contact:   Good    Psychomotor Behavior: Normal  Restless    Attitude:   Cooperative  Interested Friendly Engaged  "   Orientation:   All   Speech    Rate / Production: Talkative    Volume:  Normal    Mood:    Anxious  Expansive Agitated   Affect:    Appropriate  Expansive  Animated    Thought Content:  Paranoia  Rumination    Thought Form:  Coherent  Neurosis   Insight:    Good      Medication Review:   No changes to current psychiatric medication(s)     Medication Compliance:   Yes     Changes in Health Issues:   None reported - patient is currently 31 weeks pregnant     Chemical Use Review:   Substance Use: Chemical use reviewed, no active concerns identified      Tobacco Use: No current tobacco use.      Diagnosis:  1. TEE (generalized anxiety disorder)    2. Major depressive disorder, recurrent episode, moderate (H)        Collateral Reports Completed:   Not Applicable - pt had psychiatry appt. 10/19/20    PLAN: (Patient Tasks / Therapist Tasks / Other)    Patient states that her goals for the next week include:    1. Purge household items and clothes (continued)  2. Work on letter to father  3. Make fried chicken    (Continued)  Homework: Write a letter to father with two parts: \"thank you for\": ________ and \"here's what I would like to say to you\": ___________.          Keesha Eller                                                         ______________________________________________________________________    Treatment Plan    Patient's Name: Ellyn Mcgee  YOB: 1975    Date: 2020    DSM5 Diagnoses: 296.32 (F33.1) Major Depressive Disorder, Recurrent Episode, Moderate _ or 300.02 (F41.1) Generalized Anxiety Disorder (patient has previously been diagnosed with ADHD, hyperactive type)  Psychosocial / Contextual Factors: History of anxious attachment stemming from relationship with father (he  8 years ago); is currently pregnant (high-risk due to advanced maternal age); is in couples therapy with  due to frequent arguments and his emotional affair earlier in the year; financial stress; " "history of ADHD (hyperactive type).    WHODAS 2.0 Total Score 8/12/2020 9/8/2020   Total Score 23 30   Total Score MyChart 23 30       PHQ 8/5/2020 8/12/2020 9/8/2020   PHQ-9 Total Score 16 7 8   Q9: Thoughts of better off dead/self-harm past 2 weeks Several days Not at all Not at all   F/U: Thoughts of suicide or self-harm No - -   F/U: Safety concerns No - -     TEE-7 SCORE 8/6/2020 8/12/2020 9/8/2020   Total Score - - -   Total Score - 14 (moderate anxiety) 12 (moderate anxiety)   Total Score 13 14 12       Referral / Collaboration:  Was/were discussed and client will pursue: [facility] if mental health concerns around maternity/pregnancy develop    Anticipated number of sessions for this episode of care: 20-24    Measurable Treatment Goal(s) related to diagnosis / functional impairment(s)    Patient is asking to focus treatment on her relationship with her father and past trauma.    Goal 1: Patient will tell full story of past trauma related to her relationship with her father and will identify how past feelings are impacting current relationships.    Objective #A (Patient Action)    Patient will tell full story of past trauma related to her relationship with her father.  Status: New - Date: 9/22/2020     Intervention(s)  Therapist will collaborate in session to determine attachment wounds and inner child work.    Objective #B  Patient will identify how past feelings are impacting current relationships.  Status: New - Date: 9/22/2020     Intervention(s)  Therapist will role-play conflict management.    Objective #C  Patient will identify strengths and weaknesses within her family system of origin.  Status: New - Date: 9/22/2020     Intervention(s)  Therapist will assign homework for patient to create list.      Goal 2: Patient will learn about resilience, trauma responses, and Javon Servin's \"the story I'm making up\" (cognitive strategy to manage anxious thoughts).    Objective #A (Patient " "Action)    Status: New - Date: 9/22/2020     Patient will will learn about resilience.    Intervention(s)  Therapist will teach about protective factors.    Objective #B  Patient will learn about trauma responses.    Status: New - Date: 9/22/2020     Intervention(s)  Therapist will provide educational materials on trauma responses.    Objective #C  Patient will use cognitive strategies identified in therapy to challenge anxious thoughts.  Status: New - Date: 9/22/2020     Intervention(s)  Therapist will teach about Javon Servin's power of vulnerability and \"the story I'm making up\".      Goal 3: Patient will learn about ACE scores and attachment styles and will identify how her attachment style drives her behavior.     Objective #A (Patient Action)    Status: New - Date: 9/22/2020     Patient will learn about ACE scores and will take assessment.    Intervention(s)  Therapist will complete with patient in-session.    Objective #B  Patient will learn about attachment styles and will take Attachment Style assessment.    Status: Completed - Date: 9/2020     Intervention(s)  Therapist will complete with patient in-session.    Objective #C  Patient will identify how her attachment style drives her behavior.  Status: New - Date: 9/22/2020     Intervention(s)  Therapist will teach emotional regulation skills.        Patient has reviewed and agreed to the above plan.      Keesha Eller  September 14, 2020  "

## 2020-10-24 NOTE — PATIENT INSTRUCTIONS
"Patient states that her goals for the next week include:    1. Purge household items and clothes (continued)  2. Work on letter to father  3. Make fried chicken    (Continued)  Homework: Write a letter to father with two parts: \"thank you for\": ________ and \"here's what I would like to say to you\": ___________.  "

## 2020-10-30 ENCOUNTER — VIRTUAL VISIT (OUTPATIENT)
Dept: PSYCHOLOGY | Facility: CLINIC | Age: 45
End: 2020-10-30
Payer: COMMERCIAL

## 2020-10-30 DIAGNOSIS — F41.1 GAD (GENERALIZED ANXIETY DISORDER): ICD-10-CM

## 2020-10-30 DIAGNOSIS — F33.1 MAJOR DEPRESSIVE DISORDER, RECURRENT EPISODE, MODERATE (H): Primary | ICD-10-CM

## 2020-10-30 PROCEDURE — 90837 PSYTX W PT 60 MINUTES: CPT | Mod: 95 | Performed by: MARRIAGE & FAMILY THERAPIST

## 2020-10-31 NOTE — PATIENT INSTRUCTIONS
"Patient states that her goals for the next week include:    1. Create plan to transition roommate out and baby items in  2. Focus on self-care - consider taking a bath    (Continued)  Homework: Write a letter to father with two parts: \"thank you for\": ________ and \"here's what I would like to say to you\": ___________.    "

## 2020-10-31 NOTE — PROGRESS NOTES
Progress Note    Patient Name: Ellyn Mcgee  Date: 10/30/2020         Service Type: Individual      Session Start Time: 10:02 a.m.  Session End Time: 10:56 a.m.     Session Length: 54 min.    Session #: 7 (with this writer; patient met previously for DA with [facility] and psychiatry)    Attendees: Client attended alone    Service Modality:  Video Visit:    Telemedicine Visit: The patient's condition can be safely assessed and treated via synchronous audio and visual telemedicine encounter.      Reason for Telemedicine Visit: Services only offered telehealth    Originating Site (Patient Location): Patient's home    Distant Site (Provider Location): [facility]    Consent:  The patient/guardian has verbally consented to: the potential risks and benefits of telemedicine (video visit) versus in person care; bill my insurance or make self-payment for services provided; and responsibility for payment of non-covered services.     Patient would like the video invitation sent by: Send to e-mail at: [email or web address]    Mode of Communication:  Video Conference via [employer]    As the provider I attest to compliance with applicable laws and regulations related to telemedicine.     Treatment Plan Last Reviewed: 9/22/2020  PHQ-9 / TEE-7: 9/8/2020     DATA  Interactive Complexity: No  Crisis: No       Progress Since Last Session (Related to Symptoms / Goals / Homework):   Symptoms: Improving slightly (continued) per patient's report that she has been trying to pay attention to her triggers and responses and recently resolved some conflict with spouse    Homework: Partially completed - patient admits to feeling overwhelmed by some tasks      Episode of Care Goals: Satisfactory progress - ACTION (Actively working towards change); Intervened by reinforcing change plan / affirming steps taken     Current / Ongoing Stressors and Concerns:  Daughter (age 10) just  started ADHD medication; history of anxious attachment stemming from relationship with father (he  8 years ago); is currently pregnant (high-risk due to advanced maternal age); is in couples therapy with  due to frequent arguments and his emotional affair earlier in the year; financial stress; history of ADHD (hyperactive type); has roommate who was in [facility] (per documentation from [facility]).     Treatment Objective(s) Addressed in This Session:   use cognitive strategies identified in therapy to challenge anxious thoughts  will identify how past feelings are impacting current relationships.  Discussed ambiguous loss related to relationship with father  tell full story of past trauma related to her relationship with her father  identify how her attachment style drives her behavior  tell full story of past relational issues/trust/infidelity      Intervention:   Psychodynamic: Family systems and attachment style  Narrative Therapy: separate problem from person and find the bright spots   CBT: connect thoughts, feelings, and actions        ASSESSMENT: Current Emotional / Mental Status (status of significant symptoms):   Risk status (Self / Other harm or suicidal ideation)   Patient denies current fears or concerns for personal safety.   Patient denies current or recent suicidal ideation or behaviors. Patient last reported feeling hopeless/passive ideation on 2020.   Patient denies current or recent homicidal ideation or behaviors.   Patient denies current or recent self injurious behavior or ideation.   Patient denies other safety concerns.   Patient reports there has been no change in risk factors since their last session.     Patient reports there has been no change in protective factors since their last session.     Recommended that patient call 911 or go to the local ED should there be a change in any of these risk factors.     Appearance:   Appropriate    Eye Contact:   Good    Psychomotor  "Behavior: Normal  Restless    Attitude:   Cooperative  Interested Friendly Engaged    Orientation:   All   Speech    Rate / Production: Talkative    Volume:  Normal    Mood:    Anxious  Expansive Agitated   Affect:    Appropriate  Expansive  Animated    Thought Content:  Paranoia  Rumination    Thought Form:  Coherent  Neurosis   Insight:    Good      Medication Review:   No changes to current psychiatric medication(s)     Medication Compliance:   Yes     Changes in Health Issues:   None reported - patient is currently 32 weeks pregnant     Chemical Use Review:   Substance Use: Chemical use reviewed, no active concerns identified      Tobacco Use: No current tobacco use.      Diagnosis:  1. Major depressive disorder, recurrent episode, moderate (H)    2. TEE (generalized anxiety disorder)        Collateral Reports Completed:   Not Applicable - pt had psychiatry appt. 10/19/20    PLAN: (Patient Tasks / Therapist Tasks / Other)    Patient states that her goals for the next week include:    1. Create plan to transition roommate out and baby items in  2. Focus on self-care - consider taking a bath    (Continued)  Homework: Write a letter to father with two parts: \"thank you for\": ________ and \"here's what I would like to say to you\": ___________.          Keesha Eller                                                         ______________________________________________________________________    Treatment Plan    Patient's Name: Ellyn Mcgee  YOB: 1975    Date: 2020    DSM5 Diagnoses: 296.32 (F33.1) Major Depressive Disorder, Recurrent Episode, Moderate _ or 300.02 (F41.1) Generalized Anxiety Disorder (patient has previously been diagnosed with ADHD, hyperactive type)  Psychosocial / Contextual Factors: History of anxious attachment stemming from relationship with father (he  8 years ago); is currently pregnant (high-risk due to advanced maternal age); is in couples therapy with  due " "to frequent arguments and his emotional affair earlier in the year; financial stress; history of ADHD (hyperactive type).    WHODAS 2.0 Total Score 8/12/2020 9/8/2020   Total Score 23 30   Total Score MyChart 23 30       PHQ 8/5/2020 8/12/2020 9/8/2020   PHQ-9 Total Score 16 7 8   Q9: Thoughts of better off dead/self-harm past 2 weeks Several days Not at all Not at all   F/U: Thoughts of suicide or self-harm No - -   F/U: Safety concerns No - -     TEE-7 SCORE 8/6/2020 8/12/2020 9/8/2020   Total Score - - -   Total Score - 14 (moderate anxiety) 12 (moderate anxiety)   Total Score 13 14 12       Referral / Collaboration:  Was/were discussed and client will pursue: [facility] if mental health concerns around maternity/pregnancy develop    Anticipated number of sessions for this episode of care: 20-24    Measurable Treatment Goal(s) related to diagnosis / functional impairment(s)    Patient is asking to focus treatment on her relationship with her father and past trauma.    Goal 1: Patient will tell full story of past trauma related to her relationship with her father and will identify how past feelings are impacting current relationships.    Objective #A (Patient Action)    Patient will tell full story of past trauma related to her relationship with her father.  Status: New - Date: 9/22/2020     Intervention(s)  Therapist will collaborate in session to determine attachment wounds and inner child work.    Objective #B  Patient will identify how past feelings are impacting current relationships.  Status: New - Date: 9/22/2020     Intervention(s)  Therapist will role-play conflict management.    Objective #C  Patient will identify strengths and weaknesses within her family system of origin.  Status: New - Date: 9/22/2020     Intervention(s)  Therapist will assign homework for patient to create list.      Goal 2: Patient will learn about resilience, trauma responses, and Javon Servin's \"the story I'm making up\" " "(cognitive strategy to manage anxious thoughts).    Objective #A (Patient Action)    Status: New - Date: 9/22/2020     Patient will will learn about resilience.    Intervention(s)  Therapist will teach about protective factors.    Objective #B  Patient will learn about trauma responses.    Status: New - Date: 9/22/2020     Intervention(s)  Therapist will provide educational materials on trauma responses.    Objective #C  Patient will use cognitive strategies identified in therapy to challenge anxious thoughts.  Status: New - Date: 9/22/2020     Intervention(s)  Therapist will teach about Javon Servin's power of vulnerability and \"the story I'm making up\".      Goal 3: Patient will learn about ACE scores and attachment styles and will identify how her attachment style drives her behavior.     Objective #A (Patient Action)    Status: New - Date: 9/22/2020     Patient will learn about ACE scores and will take assessment.    Intervention(s)  Therapist will complete with patient in-session.    Objective #B  Patient will learn about attachment styles and will take Attachment Style assessment.    Status: Completed - Date: 9/2020     Intervention(s)  Therapist will complete with patient in-session.    Objective #C  Patient will identify how her attachment style drives her behavior.  Status: New - Date: 9/22/2020     Intervention(s)  Therapist will teach emotional regulation skills.        Patient has reviewed and agreed to the above plan.      Keesha Eller  September 14, 2020  "

## 2020-11-04 ENCOUNTER — HOSPITAL ENCOUNTER (OUTPATIENT)
Dept: ULTRASOUND IMAGING | Facility: CLINIC | Age: 45
End: 2020-11-04
Attending: OBSTETRICS & GYNECOLOGY
Payer: COMMERCIAL

## 2020-11-04 ENCOUNTER — OFFICE VISIT (OUTPATIENT)
Dept: MATERNAL FETAL MEDICINE | Facility: CLINIC | Age: 45
End: 2020-11-04
Attending: OBSTETRICS & GYNECOLOGY
Payer: COMMERCIAL

## 2020-11-04 ENCOUNTER — PRENATAL OFFICE VISIT (OUTPATIENT)
Dept: MIDWIFE SERVICES | Facility: CLINIC | Age: 45
End: 2020-11-04
Payer: COMMERCIAL

## 2020-11-04 VITALS
TEMPERATURE: 97.9 F | SYSTOLIC BLOOD PRESSURE: 128 MMHG | HEART RATE: 75 BPM | WEIGHT: 188 LBS | BODY MASS INDEX: 33.04 KG/M2 | DIASTOLIC BLOOD PRESSURE: 79 MMHG

## 2020-11-04 DIAGNOSIS — O36.8390 FETAL TACHYCARDIA AFFECTING MANAGEMENT OF MOTHER: ICD-10-CM

## 2020-11-04 DIAGNOSIS — O09.523 MULTIGRAVIDA OF ADVANCED MATERNAL AGE IN THIRD TRIMESTER: Primary | ICD-10-CM

## 2020-11-04 DIAGNOSIS — Z23 NEED FOR TDAP VACCINATION: ICD-10-CM

## 2020-11-04 DIAGNOSIS — O09.529 ANTEPARTUM MULTIGRAVIDA OF ADVANCED MATERNAL AGE: ICD-10-CM

## 2020-11-04 DIAGNOSIS — O09.529 ANTEPARTUM MULTIGRAVIDA OF ADVANCED MATERNAL AGE: Primary | ICD-10-CM

## 2020-11-04 PROCEDURE — 59025 FETAL NON-STRESS TEST: CPT | Mod: 26 | Performed by: OBSTETRICS & GYNECOLOGY

## 2020-11-04 PROCEDURE — 90471 IMMUNIZATION ADMIN: CPT | Performed by: ADVANCED PRACTICE MIDWIFE

## 2020-11-04 PROCEDURE — 76816 OB US FOLLOW-UP PER FETUS: CPT

## 2020-11-04 PROCEDURE — 99207 PR PRENATAL VISIT: CPT | Performed by: ADVANCED PRACTICE MIDWIFE

## 2020-11-04 PROCEDURE — 59025 FETAL NON-STRESS TEST: CPT | Performed by: OBSTETRICS & GYNECOLOGY

## 2020-11-04 PROCEDURE — 59025 FETAL NON-STRESS TEST: CPT | Mod: 59

## 2020-11-04 PROCEDURE — 90715 TDAP VACCINE 7 YRS/> IM: CPT | Performed by: ADVANCED PRACTICE MIDWIFE

## 2020-11-04 PROCEDURE — 76816 OB US FOLLOW-UP PER FETUS: CPT | Mod: 26 | Performed by: OBSTETRICS & GYNECOLOGY

## 2020-11-04 NOTE — NURSING NOTE
NST Performed due to fetal tachycardia on ultrasound.   reviewed efm tracing. See NST/BPP Doc Flowsheet tab.

## 2020-11-04 NOTE — PROGRESS NOTES
32w0d  Patient feeling well. Positive fetal movement. Denies water leaking, vaginal bleeding, decreased fetal movement, contraction pain, or headaches.   Doing well. Had MFM growth today which was WNL. They did an NST because heart beat was over 160 during a good portion of the ultrasound. NST was normal. Feeling normal movements, she feels like he moves a lot. Has hemorrhoids, going to the store today so will get witch hazel, steroid cream, and stool softener. Has had them in the past so knows how to care for them. Having a harder time sleeping, recommended unisom, will try that. She otherwise has normal discomforts, feeling ready to be done. Planning IOL at 39 weeks. Hoping a little sooner though if she is able bc she wants to be home for [place]. Plans to come at midnight at 39 weeks. Knows we cannot induce sooner unless medically indicated. Thinking epidural for labor but open to not having it. Plans to breastfeed.   Tdap vaccination today.    Danger signs reviewed, pre-eclampsia signs and symptoms discussed.   Knows when to call triage and has phone numbers.   Follow up in 2 weeks.   BENJY Avendaño CNM

## 2020-11-04 NOTE — PROGRESS NOTES
The patient was seen for an ultrasound in the [facility] at the [facility] today.  For a detailed report of the ultrasound examination, please see the ultrasound report which can be found under the imaging tab.      Estela Dominguez MD  , OB/GYN  Maternal-Fetal Medicine  [phone] (Pager)

## 2020-11-06 ENCOUNTER — VIRTUAL VISIT (OUTPATIENT)
Dept: PSYCHOLOGY | Facility: CLINIC | Age: 45
End: 2020-11-06
Payer: COMMERCIAL

## 2020-11-06 DIAGNOSIS — F41.1 GAD (GENERALIZED ANXIETY DISORDER): Primary | ICD-10-CM

## 2020-11-06 DIAGNOSIS — F33.1 MAJOR DEPRESSIVE DISORDER, RECURRENT EPISODE, MODERATE (H): ICD-10-CM

## 2020-11-06 PROCEDURE — 90834 PSYTX W PT 45 MINUTES: CPT | Mod: 95 | Performed by: MARRIAGE & FAMILY THERAPIST

## 2020-11-06 NOTE — PATIENT INSTRUCTIONS
"Patient states that her goals for the next week include:    1. Check on chickens/chicken coop  2. Make some forward motion to complete tasks (even small ones)    (Continued) - will discuss letter 11/20/2020  Homework: Write a letter to father with two parts: \"thank you for\": ________ and \"here's what I would like to say to you\": ___________.    "

## 2020-11-06 NOTE — PROGRESS NOTES
Progress Note    Patient Name: Ellyn Mcgee  Date: 11/6/2020         Service Type: Individual      Session Start Time: 10 a.m.  Session End Time: 10:55 a.m.     Session Length: 55 min.    Session #: 8 (with this writer; patient met previously for DA with [facility] and psychiatry)    Attendees: Client attended alone    Service Modality:  Video Visit:    Telemedicine Visit: The patient's condition can be safely assessed and treated via synchronous audio and visual telemedicine encounter.      Reason for Telemedicine Visit: Services only offered telehealth    Originating Site (Patient Location): Patient's home    Distant Site (Provider Location): [facility]    Consent:  The patient/guardian has verbally consented to: the potential risks and benefits of telemedicine (video visit) versus in person care; bill my insurance or make self-payment for services provided; and responsibility for payment of non-covered services.     Patient would like the video invitation sent by: Send to e-mail at: [email or web address]    Mode of Communication:  Video Conference via [employer]    As the provider I attest to compliance with applicable laws and regulations related to telemedicine.     Treatment Plan Last Reviewed: 9/22/2020  PHQ-9 / TEE-7: 9/8/2020     DATA  Interactive Complexity: No  Crisis: No       Progress Since Last Session (Related to Symptoms / Goals / Homework):   Symptoms: patient reports that she did not feel physically well this week, which had some affect on her mental health; patient states that she tried to be kind to herself but has also felt moments of panic.    Homework: Deferred - patient was not able to complete tasks due to her health this week      Episode of Care Goals: Satisfactory progress - ACTION (Actively working towards change); Intervened by reinforcing change plan / affirming steps taken     Current / Ongoing Stressors and  Concerns:  Has been experiencing mild panic attacks; daughter (age 10) just started ADHD medication; history of anxious attachment stemming from relationship with father (he  8 years ago); is currently pregnant (high-risk due to advanced maternal age); is in couples therapy with  due to frequent arguments and his emotional affair earlier in the year; financial stress; history of ADHD (hyperactive type); has roommate who was in long term (per documentation from [facility]).     Treatment Objective(s) Addressed in This Session:   use cognitive strategies identified in therapy to challenge anxious thoughts   identify how attachment style drives patient's and 's behaviors  tell full story of past relational issues/trust/infidelity  will identify how past feelings are impacting current relationships.    Past/future:  Discussed ambiguous loss related to relationship with father  tell full story of past trauma related to her relationship with her father        Intervention:   Psychodynamic: Family systems and attachment style  Narrative Therapy: separate problem from person and find the bright spots   CBT: connect thoughts, feelings, and actions        ASSESSMENT: Current Emotional / Mental Status (status of significant symptoms):   Risk status (Self / Other harm or suicidal ideation)   Patient denies current fears or concerns for personal safety.   Patient denies current or recent suicidal ideation or behaviors. Patient last reported feeling hopeless/passive ideation on 2020.   Patient denies current or recent homicidal ideation or behaviors.   Patient denies current or recent self injurious behavior or ideation.   Patient denies other safety concerns.   Patient reports there has been no change in risk factors since their last session.     Patient reports there has been no change in protective factors since their last session.     Recommended that patient call 911 or go to the local ED should there be a change  "in any of these risk factors.     Appearance:   Appropriate    Eye Contact:   Good    Psychomotor Behavior: Normal  Restless    Attitude:   Cooperative Exhausted    Orientation:   All   Speech    Rate / Production: Normal/ Responsive Talkative    Volume:  Normal    Mood:    Anxious  Expansive Tired   Affect:    Appropriate  Expansive  Animated    Thought Content:  Paranoia  Rumination    Thought Form:  Coherent  Neurosis   Insight:    Good      Medication Review:   No changes to current psychiatric medication(s)     Medication Compliance:   Yes     Changes in Health Issues:   None reported - patient is currently 33 weeks pregnant     Chemical Use Review:   Substance Use: Chemical use reviewed, no active concerns identified      Tobacco Use: No current tobacco use.      Diagnosis:  1. TEE (generalized anxiety disorder)    2. Major depressive disorder, recurrent episode, moderate (H)        Collateral Reports Completed:   Not Applicable - pt had psychiatry appt. 10/19/20    PLAN: (Patient Tasks / Therapist Tasks / Other)    Patient states that her goals for the next week include:    1. Check on chickens/chicken coop  2. Make some forward motion to complete tasks (even small ones)    (Continued) - will discuss letter 2020  Homework: Write a letter to father with two parts: \"thank you for\": ________ and \"here's what I would like to say to you\": ___________.        Keesha Eller                                                         ______________________________________________________________________    Treatment Plan    Patient's Name: Ellyn Mcgee  YOB: 1975    Date: 2020    DSM5 Diagnoses: 296.32 (F33.1) Major Depressive Disorder, Recurrent Episode, Moderate _ or 300.02 (F41.1) Generalized Anxiety Disorder (patient has previously been diagnosed with ADHD, hyperactive type)  Psychosocial / Contextual Factors: History of anxious attachment stemming from relationship with father (he  " 8 years ago); is currently pregnant (high-risk due to advanced maternal age); is in couples therapy with  due to frequent arguments and his emotional affair earlier in the year; financial stress; history of ADHD (hyperactive type).    WHODAS 2.0 Total Score 8/12/2020 9/8/2020   Total Score 23 30   Total Score MyChart 23 30       PHQ 8/5/2020 8/12/2020 9/8/2020   PHQ-9 Total Score 16 7 8   Q9: Thoughts of better off dead/self-harm past 2 weeks Several days Not at all Not at all   F/U: Thoughts of suicide or self-harm No - -   F/U: Safety concerns No - -     TEE-7 SCORE 8/6/2020 8/12/2020 9/8/2020   Total Score - - -   Total Score - 14 (moderate anxiety) 12 (moderate anxiety)   Total Score 13 14 12       Referral / Collaboration:  Was/were discussed and client will pursue: [facility] if mental health concerns around maternity/pregnancy develop    Anticipated number of sessions for this episode of care: 20-24    Measurable Treatment Goal(s) related to diagnosis / functional impairment(s)    Patient is asking to focus treatment on her relationship with her father and past trauma.    Goal 1: Patient will tell full story of past trauma related to her relationship with her father and will identify how past feelings are impacting current relationships.    Objective #A (Patient Action)    Patient will tell full story of past trauma related to her relationship with her father.  Status: New - Date: 9/22/2020     Intervention(s)  Therapist will collaborate in session to determine attachment wounds and inner child work.    Objective #B  Patient will identify how past feelings are impacting current relationships.  Status: New - Date: 9/22/2020     Intervention(s)  Therapist will role-play conflict management.    Objective #C  Patient will identify strengths and weaknesses within her family system of origin.  Status: New - Date: 9/22/2020     Intervention(s)  Therapist will assign homework for patient to create  "list.      Goal 2: Patient will learn about resilience, trauma responses, and Javon Servin's \"the story I'm making up\" (cognitive strategy to manage anxious thoughts).    Objective #A (Patient Action)    Status: New - Date: 9/22/2020     Patient will will learn about resilience.    Intervention(s)  Therapist will teach about protective factors.    Objective #B  Patient will learn about trauma responses.    Status: New - Date: 9/22/2020     Intervention(s)  Therapist will provide educational materials on trauma responses.    Objective #C  Patient will use cognitive strategies identified in therapy to challenge anxious thoughts.  Status: New - Date: 9/22/2020     Intervention(s)  Therapist will teach about Javon Servin's power of vulnerability and \"the story I'm making up\".      Goal 3: Patient will learn about ACE scores and attachment styles and will identify how her attachment style drives her behavior.     Objective #A (Patient Action)    Status: New - Date: 9/22/2020     Patient will learn about ACE scores and will take assessment.    Intervention(s)  Therapist will complete with patient in-session.    Objective #B  Patient will learn about attachment styles and will take Attachment Style assessment.    Status: Completed - Date: 9/2020     Intervention(s)  Therapist will complete with patient in-session.    Objective #C  Patient will identify how her attachment style drives her behavior.  Status: New - Date: 9/22/2020     Intervention(s)  Therapist will teach emotional regulation skills.        Patient has reviewed and agreed to the above plan.      Keesha Eller  September 14, 2020  "

## 2020-11-09 ENCOUNTER — MYC MEDICAL ADVICE (OUTPATIENT)
Dept: FAMILY MEDICINE | Facility: CLINIC | Age: 45
End: 2020-11-09

## 2020-11-13 ENCOUNTER — MYC MEDICAL ADVICE (OUTPATIENT)
Dept: MIDWIFE SERVICES | Facility: CLINIC | Age: 45
End: 2020-11-13

## 2020-11-13 ENCOUNTER — MYC MEDICAL ADVICE (OUTPATIENT)
Dept: FAMILY MEDICINE | Facility: CLINIC | Age: 45
End: 2020-11-13

## 2020-11-13 ENCOUNTER — VIRTUAL VISIT (OUTPATIENT)
Dept: PSYCHOLOGY | Facility: CLINIC | Age: 45
End: 2020-11-13
Payer: COMMERCIAL

## 2020-11-13 DIAGNOSIS — F41.1 GAD (GENERALIZED ANXIETY DISORDER): Primary | ICD-10-CM

## 2020-11-13 DIAGNOSIS — F33.1 MAJOR DEPRESSIVE DISORDER, RECURRENT EPISODE, MODERATE (H): ICD-10-CM

## 2020-11-13 PROCEDURE — 90834 PSYTX W PT 45 MINUTES: CPT | Mod: 95 | Performed by: MARRIAGE & FAMILY THERAPIST

## 2020-11-13 NOTE — TELEPHONE ENCOUNTER
Rita, If Dr. Guaman is okay removing them I would recommend that. Otherwise we can send her to GI to see if they are okay removing them. She is already doing all the other comfort measures so unfortunately I have no further recommendations for her at this time. Let me know if she wants the GI referral and I can put that in! Thanks, Carmen Morejon, BENJY YOON

## 2020-11-13 NOTE — TELEPHONE ENCOUNTER
Patient had bad hemorrhoids with her last pregnancy, so bad that she had to begin her maternity leave 2 weeks early and her FP was considering having them removed prior to delivery (which they were not). Now, she is has them again. She is trying tub soaks, cold compresses, witch hazel pads, a steroid cream, and colace. They are very painful. Works at [employer] so is on her feet all shift and is wants to avoid them getting any worse. She is looking for any recommendations. Marisabel Marshall RN

## 2020-11-14 NOTE — PROGRESS NOTES
Progress Note    Patient Name: Ellyn Mcgee  Date: 11/13/2020         Service Type: Individual      Session Start Time: 11 a.m.  Session End Time: 10:58 a.m.     Session Length: 58 min.    Session #: 9 (with this writer; patient met previously for DA with [facility] and psychiatry)    Attendees: Client attended alone    Service Modality:  Video Visit:    Telemedicine Visit: The patient's condition can be safely assessed and treated via synchronous audio and visual telemedicine encounter.      Reason for Telemedicine Visit: Services only offered telehealth    Originating Site (Patient Location): Patient's home    Distant Site (Provider Location): [facility]    Consent:  The patient/guardian has verbally consented to: the potential risks and benefits of telemedicine (video visit) versus in person care; bill my insurance or make self-payment for services provided; and responsibility for payment of non-covered services.     Patient would like the video invitation sent by: Send to e-mail at: [email or web address]    Mode of Communication:  Video Conference via Amwell    As the provider I attest to compliance with applicable laws and regulations related to telemedicine.     Treatment Plan Last Reviewed: 9/22/2020  PHQ-9 / TEE-7: 9/8/2020     DATA  Interactive Complexity: No  Crisis: No       Progress Since Last Session (Related to Symptoms / Goals / Homework):  Symptoms: patient reports some ongoing concerns about her pregnancy, worsening hemorrhoids, and work duties; patient reports that she has been trying to seek answers and advocate for herself.  Patient also reports the family will be adopting a new kitten soon.    Homework: Partially completed - patient is trying to pay attention to her health and reduce activities as needed      Episode of Care Goals: Satisfactory progress - ACTION (Actively working towards change); Intervened by reinforcing  change plan / affirming steps taken     Current / Ongoing Stressors and Concerns:  Has been experiencing mild panic attacks; daughter (age 10) just started ADHD medication; history of anxious attachment stemming from relationship with father (he  8 years ago); is currently pregnant (high-risk due to advanced maternal age); is in couples therapy with  due to frequent arguments and his emotional affair earlier in the year; financial stress; history of ADHD (hyperactive type); has roommate who was in nursing home (per documentation from [facility]).     Treatment Objective(s) Addressed in This Session:   use cognitive strategies identified in therapy to challenge anxious thoughts   identify how attachment style drives patient's and 's behaviors  tell full story of past relational issues/trust/infidelity  will identify how past feelings are impacting current relationships.    Past/future:  Discussed ambiguous loss related to relationship with father  tell full story of past trauma related to her relationship with her father        Intervention:   Psychodynamic: Family systems and attachment style  Narrative Therapy: separate problem from person and find the bright spots   CBT: connect thoughts, feelings, and actions        ASSESSMENT: Current Emotional / Mental Status (status of significant symptoms):   Risk status (Self / Other harm or suicidal ideation)   Patient denies current fears or concerns for personal safety.   Patient denies current or recent suicidal ideation or behaviors. Patient last reported feeling hopeless/passive ideation on 2020.   Patient denies current or recent homicidal ideation or behaviors.   Patient denies current or recent self injurious behavior or ideation.   Patient denies other safety concerns.   Patient reports there has been no change in risk factors since their last session.     Patient reports there has been no change in protective factors since their last session.   "   Recommended that patient call 911 or go to the local ED should there be a change in any of these risk factors.     Appearance:   Appropriate    Eye Contact:   Good    Psychomotor Behavior: Normal  Restless    Attitude:   Cooperative Exhausted    Orientation:   All   Speech    Rate / Production: Normal/ Responsive Talkative    Volume:  Normal    Mood:    Anxious  Expansive   Affect:    Appropriate  Expansive  Animated    Thought Content:  Paranoia  Rumination    Thought Form:  Coherent  Neurosis   Insight:    Good      Medication Review:   No changes to current psychiatric medication(s)     Medication Compliance:   Yes     Changes in Health Issues:   None reported - patient is currently 33/34 weeks pregnant     Chemical Use Review:   Substance Use: Chemical use reviewed, no active concerns identified      Tobacco Use: No current tobacco use.      Diagnosis:  1. TEE (generalized anxiety disorder)    2. Major depressive disorder, recurrent episode, moderate (H)        Collateral Reports Completed:   Not Applicable - pt had psychiatry appt. 10/19/20    PLAN: (Patient Tasks / Therapist Tasks / Other)    Patient states that her goals for the next week include:    1. Be kind to self even when not feeling well  2. Continue to ask questions of medical professionals related to pregnancy and work restrictions (if any)  3. Visit new kitten    (Continued) - will discuss letter 11/20/2020  Homework: Write a letter to father with two parts: \"thank you for\": ________ and \"here's what I would like to say to you\": ___________.      Keesha Eller                                                         ______________________________________________________________________    Treatment Plan    Patient's Name: Ellyn Mcgee  YOB: 1975    Date: 9/11/2020    DSM5 Diagnoses: 296.32 (F33.1) Major Depressive Disorder, Recurrent Episode, Moderate _ or 300.02 (F41.1) Generalized Anxiety Disorder (patient has previously " been diagnosed with ADHD, hyperactive type)  Psychosocial / Contextual Factors: History of anxious attachment stemming from relationship with father (he  8 years ago); is currently pregnant (high-risk due to advanced maternal age); is in couples therapy with  due to frequent arguments and his emotional affair earlier in the year; financial stress; history of ADHD (hyperactive type).    WHODAS 2.0 Total Score 2020   Total Score 23 30   Total Score MyChart 23 30       PHQ 2020   PHQ-9 Total Score 16 7 8   Q9: Thoughts of better off dead/self-harm past 2 weeks Several days Not at all Not at all   F/U: Thoughts of suicide or self-harm No - -   F/U: Safety concerns No - -     TEE-7 SCORE 2020   Total Score - - -   Total Score - 14 (moderate anxiety) 12 (moderate anxiety)   Total Score 13 14 12       Referral / Collaboration:  Was/were discussed and client will pursue: [facility] if mental health concerns around maternity/pregnancy develop    Anticipated number of sessions for this episode of care: 20-24    Measurable Treatment Goal(s) related to diagnosis / functional impairment(s)    Patient is asking to focus treatment on her relationship with her father and past trauma.    Goal 1: Patient will tell full story of past trauma related to her relationship with her father and will identify how past feelings are impacting current relationships.    Objective #A (Patient Action)    Patient will tell full story of past trauma related to her relationship with her father.  Status: New - Date: 2020     Intervention(s)  Therapist will collaborate in session to determine attachment wounds and inner child work.    Objective #B  Patient will identify how past feelings are impacting current relationships.  Status: New - Date: 2020     Intervention(s)  Therapist will role-play conflict management.    Objective #C  Patient will identify strengths and  "weaknesses within her family system of origin.  Status: New - Date: 9/22/2020     Intervention(s)  Therapist will assign homework for patient to create list.      Goal 2: Patient will learn about resilience, trauma responses, and Javon Servin's \"the story I'm making up\" (cognitive strategy to manage anxious thoughts).    Objective #A (Patient Action)    Status: New - Date: 9/22/2020     Patient will will learn about resilience.    Intervention(s)  Therapist will teach about protective factors.    Objective #B  Patient will learn about trauma responses.    Status: New - Date: 9/22/2020     Intervention(s)  Therapist will provide educational materials on trauma responses.    Objective #C  Patient will use cognitive strategies identified in therapy to challenge anxious thoughts.  Status: New - Date: 9/22/2020     Intervention(s)  Therapist will teach about Javon Servin's power of vulnerability and \"the story I'm making up\".      Goal 3: Patient will learn about ACE scores and attachment styles and will identify how her attachment style drives her behavior.     Objective #A (Patient Action)    Status: New - Date: 9/22/2020     Patient will learn about ACE scores and will take assessment.    Intervention(s)  Therapist will complete with patient in-session.    Objective #B  Patient will learn about attachment styles and will take Attachment Style assessment.    Status: Completed - Date: 9/2020     Intervention(s)  Therapist will complete with patient in-session.    Objective #C  Patient will identify how her attachment style drives her behavior.  Status: New - Date: 9/22/2020     Intervention(s)  Therapist will teach emotional regulation skills.        Patient has reviewed and agreed to the above plan.      Keesha Eller  September 14, 2020  "

## 2020-11-14 NOTE — PATIENT INSTRUCTIONS
"Patient states that her goals for the next week include:    1. Be kind to self even when not feeling well  2. Continue to ask questions of medical professionals related to pregnancy and work restrictions (if any)  3. Visit new kitten    (Continued) - will discuss letter 11/20/2020  Homework: Write a letter to father with two parts: \"thank you for\": ________ and \"here's what I would like to say to you\": ___________.    "

## 2020-11-16 ENCOUNTER — PRENATAL OFFICE VISIT (OUTPATIENT)
Dept: MIDWIFE SERVICES | Facility: CLINIC | Age: 45
End: 2020-11-16
Payer: COMMERCIAL

## 2020-11-16 VITALS
HEART RATE: 84 BPM | DIASTOLIC BLOOD PRESSURE: 80 MMHG | WEIGHT: 191.4 LBS | BODY MASS INDEX: 33.64 KG/M2 | SYSTOLIC BLOOD PRESSURE: 123 MMHG

## 2020-11-16 DIAGNOSIS — O09.523 MULTIGRAVIDA OF ADVANCED MATERNAL AGE IN THIRD TRIMESTER: Primary | ICD-10-CM

## 2020-11-16 DIAGNOSIS — K64.4 EXTERNAL HEMORRHOIDS: ICD-10-CM

## 2020-11-16 PROCEDURE — 99207 PR PRENATAL VISIT: CPT | Performed by: ADVANCED PRACTICE MIDWIFE

## 2020-11-16 NOTE — PROGRESS NOTES
33w5d  S: Ellyn is struggling with her hemorrhoids. Difficult to sit or stand for long periods of time. A bit better today, but still cannot sit directly on them. Has tried witch hazel, hemorrhoid cream, warm baths with baking soda or epsom salt. Thinks they are thrombosed, as they are hard and painful. Also struggling with bilateral sciatica, which can be an issue for her outside of pregnancy as well. She is debating when to stop working her job at [employer], as she feels that her hemorrhoids and sciatica are worse when she is on her feet for prolonged periods. Baby is active, denies vaginal bleeding, leaking of fluid, headaches. Has growth with [facility] 12/2. Wondering about how certain we are about recommendation for induction of labor at 39w.    O:/80   Pulse 84   Wt 86.8 kg (191 lb 6.4 oz)   LMP 03/25/2020   BMI 33.64 kg/m    Exam:  Constitutional: healthy, alert and no distress  Psychiatric: mentation appears normal  Rectal: hemorrhoid around anus, with firm, purple area 3cmx1.5cm on left of anus, tender to touch    A/P:  (O09.523) Multigravida of advanced maternal age in third trimester  (primary encounter diagnosis)    (K64.4) External hemorrhoids  Plan: [facility]OR[facility] SURGERY REFERRAL  Will send chart to RN's to facilitate scheduling.  Discussed that recommendation for 39w induction of labor will not change unless there becomes an indication to induce sooner.   Offered physical therapy referral. She is not sure if she wants another appointment to go to at this time. Will let us know if she would like physical therapy referral in the future.    Loco Cheng CNM

## 2020-11-17 ENCOUNTER — MYC MEDICAL ADVICE (OUTPATIENT)
Dept: FAMILY MEDICINE | Facility: CLINIC | Age: 45
End: 2020-11-17

## 2020-11-17 NOTE — TELEPHONE ENCOUNTER
PN  Please see mychart message regarding mychart from 11/13/20    Thank you,  Brittany Rausch RN

## 2020-11-18 ENCOUNTER — MYC MEDICAL ADVICE (OUTPATIENT)
Dept: MIDWIFE SERVICES | Facility: CLINIC | Age: 45
End: 2020-11-18

## 2020-11-18 ENCOUNTER — TELEPHONE (OUTPATIENT)
Dept: SURGERY | Facility: CLINIC | Age: 45
End: 2020-11-18

## 2020-11-18 NOTE — TELEPHONE ENCOUNTER
LVM x1 stated that we do not do any hemorrhoid procedures when patients are pregnant as internal hemorrhoid procedures can induce labor and external hemorrhoid procedures are only done in a 72 hour window of the external hemorrhoid procedure but we still do not do this on pregnant women as the hemorrhoid can continue to fill do to the pressure. Left call back number for questions

## 2020-11-18 NOTE — TELEPHONE ENCOUNTER
Patient called me back for follow up questions. She wanted to know if being on her feet most of the day is causing the hemorrhoids to become worse and contributing to her pain. Discussed with Edie Mcarthur NP. Told pt that both the pregnancy and being on her feet are probably contributing to increased issues with the hemorrhoids. She stated understanding and said that she would most likely be taking her leave early so she is not on her feet as much.

## 2020-11-18 NOTE — TELEPHONE ENCOUNTER
[facility] Call Center    Phone Message    May a detailed message be left on voicemail: yes     Reason for Call: Other: NP referred to [facility] for External [facility]oidsr, Referred by CECILLE ARECHIGA. Needs clinic review per protocols     Action Taken: Message routed to:  [facility]     Travel Screening: Not Applicable

## 2020-11-20 ENCOUNTER — VIRTUAL VISIT (OUTPATIENT)
Dept: PSYCHOLOGY | Facility: CLINIC | Age: 45
End: 2020-11-20
Payer: COMMERCIAL

## 2020-11-20 DIAGNOSIS — F41.1 GAD (GENERALIZED ANXIETY DISORDER): Primary | ICD-10-CM

## 2020-11-20 DIAGNOSIS — F33.1 MAJOR DEPRESSIVE DISORDER, RECURRENT EPISODE, MODERATE (H): ICD-10-CM

## 2020-11-20 PROCEDURE — 90837 PSYTX W PT 60 MINUTES: CPT | Mod: 95 | Performed by: MARRIAGE & FAMILY THERAPIST

## 2020-11-22 ENCOUNTER — HEALTH MAINTENANCE LETTER (OUTPATIENT)
Age: 45
End: 2020-11-22

## 2020-11-22 NOTE — PROGRESS NOTES
"                                           Progress Note    Patient Name: Ellyn Mcgee  Date: 11/20/2020         Service Type: Individual      Session Start Time: 1:02 p.m.  Session End Time: 2:02 p.m.     Session Length: 60 min.    Session #: 10 (with this writer; patient met previously for DA with [facility] and psychiatry)    Attendees: Client attended alone    Service Modality:  Video Visit:    Telemedicine Visit: The patient's condition can be safely assessed and treated via synchronous audio and visual telemedicine encounter.      Reason for Telemedicine Visit: Services only offered telehealth    Originating Site (Patient Location): Patient's home    Distant Site (Provider Location): [facility]    Consent:  The patient/guardian has verbally consented to: the potential risks and benefits of telemedicine (video visit) versus in person care; bill my insurance or make self-payment for services provided; and responsibility for payment of non-covered services.     Patient would like the video invitation sent by: Send to e-mail at: [email or web address]    Mode of Communication:  Video Conference via Amwell    As the provider I attest to compliance with applicable laws and regulations related to telemedicine.     Treatment Plan Last Reviewed: 9/22/2020  PHQ-9 / TEE-7: 9/8/2020     DATA  Interactive Complexity: No  Crisis: No       Progress Since Last Session (Related to Symptoms / Goals / Homework):  Symptoms: patient reports some ongoing physical discomfort (3rd tri pregnancy and hemorrhoids); patient reports some worries about her former friend who is refusing to move out of their home (because baby will be \"moving in\" they are able to evict the person in Dec. 2020).    Homework: Achieved / completed to satisfaction - patient is trying to pay attention to her health and reduce activities as needed; patient's family adopted the kitten      Episode of Care Goals: Satisfactory progress - " ACTION (Actively working towards change); Intervened by reinforcing change plan / affirming steps taken     Current / Ongoing Stressors and Concerns:  Roommate/former friend is refusing to move out of their home (has lived there for three years and has not paid any rent for over a year); pt has been experiencing mild panic attacks; daughter (age 10) just started ADHD medication; history of anxious attachment stemming from relationship with father (he  8 years ago); is currently pregnant (high-risk due to advanced maternal age); is in couples therapy with  due to frequent arguments and his emotional affair earlier in the year; financial stress; history of ADHD (hyperactive type); has roommate who was in [facility] (per documentation from [facility]).     Treatment Objective(s) Addressed in This Session:   use cognitive strategies identified in therapy to challenge anxious thoughts   identify how attachment style drives patient's and 's behaviors  Discussed healthy boundaries and enforcement  tell full story of past relational issues/trust/infidelity  will identify how past feelings are impacting current relationships.    Past/future:  Discussed ambiguous loss related to relationship with father  tell full story of past trauma related to her relationship with her father        Intervention:   Psychodynamic: Family systems and attachment style  Narrative Therapy: separate problem from person and find the bright spots   CBT: connect thoughts, feelings, and actions        ASSESSMENT: Current Emotional / Mental Status (status of significant symptoms):   Risk status (Self / Other harm or suicidal ideation)   Patient denies current fears or concerns for personal safety.   Patient denies current or recent suicidal ideation or behaviors. Patient last reported feeling hopeless/passive ideation on 2020.   Patient denies current or recent homicidal ideation or behaviors.   Patient denies current or recent self  "injurious behavior or ideation.   Patient denies other safety concerns.   Patient reports there has been no change in risk factors since their last session.     Patient reports there has been no change in protective factors since their last session.     Recommended that patient call 911 or go to the local ED should there be a change in any of these risk factors.     Appearance:   Appropriate    Eye Contact:   Good    Psychomotor Behavior: Normal  Restless    Attitude:   Cooperative Exhausted    Orientation:   All   Speech    Rate / Production: Normal/ Responsive Talkative    Volume:  Normal    Mood:    Anxious  Expansive   Affect:    Appropriate  Expansive  Animated    Thought Content:  Paranoia  Rumination    Thought Form:  Coherent  Neurosis   Insight:    Good      Medication Review:   No changes to current psychiatric medication(s)     Medication Compliance:   Yes     Changes in Health Issues:   None reported - patient is currently 34/35 weeks pregnant     Chemical Use Review:   Substance Use: Chemical use reviewed, no active concerns identified      Tobacco Use: No current tobacco use.      Diagnosis:  1. TEE (generalized anxiety disorder)    2. Major depressive disorder, recurrent episode, moderate (H)        Collateral Reports Completed:   Not Applicable - pt had psychiatry appt. 10/19/20    PLAN: (Patient Tasks / Therapist Tasks / Other)    Patient states that her goals for the next week include:    1. Take more naps  2. Try to stay physically comfortable  3. Check some items off list and focus on what is controllable at this point in time    (Continued)  Homework: Write a letter to father with two parts: \"thank you for\": ________ and \"here's what I would like to say to you\": ___________.        Keesha Eller                                                         ______________________________________________________________________    Treatment Plan    Patient's Name: Ellyn Collado Everardo  Date Of " Birth: 1975    Date: 2020    DSM5 Diagnoses: 296.32 (F33.1) Major Depressive Disorder, Recurrent Episode, Moderate _ or 300.02 (F41.1) Generalized Anxiety Disorder (patient has previously been diagnosed with ADHD, hyperactive type)  Psychosocial / Contextual Factors: History of anxious attachment stemming from relationship with father (he  8 years ago); is currently pregnant (high-risk due to advanced maternal age); is in couples therapy with  due to frequent arguments and his emotional affair earlier in the year; financial stress; history of ADHD (hyperactive type).    WHODAS 2.0 Total Score 2020   Total Score 23 30   Total Score MyChart 23 30       PHQ 2020   PHQ-9 Total Score 16 7 8   Q9: Thoughts of better off dead/self-harm past 2 weeks Several days Not at all Not at all   F/U: Thoughts of suicide or self-harm No - -   F/U: Safety concerns No - -     TEE-7 SCORE 2020   Total Score - - -   Total Score - 14 (moderate anxiety) 12 (moderate anxiety)   Total Score 13 14 12       Referral / Collaboration:  Was/were discussed and client will pursue: [facility] if mental health concerns around maternity/pregnancy develop    Anticipated number of sessions for this episode of care: 20-24    Measurable Treatment Goal(s) related to diagnosis / functional impairment(s)    Patient is asking to focus treatment on her relationship with her father and past trauma.    Goal 1: Patient will tell full story of past trauma related to her relationship with her father and will identify how past feelings are impacting current relationships.    Objective #A (Patient Action)    Patient will tell full story of past trauma related to her relationship with her father.  Status: New - Date: 2020     Intervention(s)  Therapist will collaborate in session to determine attachment wounds and inner child work.    Objective #B  Patient will identify how  "past feelings are impacting current relationships.  Status: New - Date: 9/22/2020     Intervention(s)  Therapist will role-play conflict management.    Objective #C  Patient will identify strengths and weaknesses within her family system of origin.  Status: New - Date: 9/22/2020     Intervention(s)  Therapist will assign homework for patient to create list.      Goal 2: Patient will learn about resilience, trauma responses, and Javon Servin's \"the story I'm making up\" (cognitive strategy to manage anxious thoughts).    Objective #A (Patient Action)    Status: New - Date: 9/22/2020     Patient will will learn about resilience.    Intervention(s)  Therapist will teach about protective factors.    Objective #B  Patient will learn about trauma responses.    Status: New - Date: 9/22/2020     Intervention(s)  Therapist will provide educational materials on trauma responses.    Objective #C  Patient will use cognitive strategies identified in therapy to challenge anxious thoughts.  Status: New - Date: 9/22/2020     Intervention(s)  Therapist will teach about Javon Servin's power of vulnerability and \"the story I'm making up\".      Goal 3: Patient will learn about ACE scores and attachment styles and will identify how her attachment style drives her behavior.     Objective #A (Patient Action)    Status: New - Date: 9/22/2020     Patient will learn about ACE scores and will take assessment.    Intervention(s)  Therapist will complete with patient in-session.    Objective #B  Patient will learn about attachment styles and will take Attachment Style assessment.    Status: Completed - Date: 9/2020     Intervention(s)  Therapist will complete with patient in-session.    Objective #C  Patient will identify how her attachment style drives her behavior.  Status: New - Date: 9/22/2020     Intervention(s)  Therapist will teach emotional regulation skills.        Patient has reviewed and agreed to the above plan.      Keesha SON" Rafita  September 14, 2020

## 2020-11-22 NOTE — PATIENT INSTRUCTIONS
"Patient states that her goals for the next week include:    1. Take more naps  2. Try to stay physically comfortable  3. Check some items off list and focus on what is controllable at this point in time    (Continued)  Homework: Write a letter to father with two parts: \"thank you for\": ________ and \"here's what I would like to say to you\": ___________.    "

## 2020-12-01 ENCOUNTER — MYC MEDICAL ADVICE (OUTPATIENT)
Dept: PSYCHIATRY | Facility: OTHER | Age: 45
End: 2020-12-01

## 2020-12-01 DIAGNOSIS — F33.41 RECURRENT MAJOR DEPRESSIVE DISORDER, IN PARTIAL REMISSION (H): Primary | ICD-10-CM

## 2020-12-01 DIAGNOSIS — F41.1 GAD (GENERALIZED ANXIETY DISORDER): ICD-10-CM

## 2020-12-02 ENCOUNTER — PRENATAL OFFICE VISIT (OUTPATIENT)
Dept: MIDWIFE SERVICES | Facility: CLINIC | Age: 45
End: 2020-12-02
Attending: OBSTETRICS & GYNECOLOGY
Payer: COMMERCIAL

## 2020-12-02 ENCOUNTER — OFFICE VISIT (OUTPATIENT)
Dept: MATERNAL FETAL MEDICINE | Facility: CLINIC | Age: 45
End: 2020-12-02
Attending: OBSTETRICS & GYNECOLOGY
Payer: COMMERCIAL

## 2020-12-02 ENCOUNTER — HOSPITAL ENCOUNTER (OUTPATIENT)
Dept: ULTRASOUND IMAGING | Facility: CLINIC | Age: 45
End: 2020-12-02
Attending: OBSTETRICS & GYNECOLOGY
Payer: COMMERCIAL

## 2020-12-02 VITALS
DIASTOLIC BLOOD PRESSURE: 70 MMHG | BODY MASS INDEX: 33.39 KG/M2 | WEIGHT: 190 LBS | HEART RATE: 69 BPM | TEMPERATURE: 97.6 F | SYSTOLIC BLOOD PRESSURE: 114 MMHG

## 2020-12-02 DIAGNOSIS — O09.529 HIGH-RISK PREGNANCY, ELDERLY MULTIGRAVIDA, UNSPECIFIED TRIMESTER: Primary | ICD-10-CM

## 2020-12-02 DIAGNOSIS — O09.529 ANTEPARTUM MULTIGRAVIDA OF ADVANCED MATERNAL AGE: Primary | ICD-10-CM

## 2020-12-02 DIAGNOSIS — O09.529 ANTEPARTUM MULTIGRAVIDA OF ADVANCED MATERNAL AGE: ICD-10-CM

## 2020-12-02 LAB — HGB BLD-MCNC: 12.7 G/DL (ref 11.7–15.7)

## 2020-12-02 PROCEDURE — 87653 STREP B DNA AMP PROBE: CPT | Performed by: ADVANCED PRACTICE MIDWIFE

## 2020-12-02 PROCEDURE — 99N1025 PR STATISTIC OBHBG - HEMOGLOBIN: Performed by: ADVANCED PRACTICE MIDWIFE

## 2020-12-02 PROCEDURE — 76816 OB US FOLLOW-UP PER FETUS: CPT

## 2020-12-02 PROCEDURE — 99207 PR PRENATAL VISIT: CPT | Performed by: ADVANCED PRACTICE MIDWIFE

## 2020-12-02 PROCEDURE — 76816 OB US FOLLOW-UP PER FETUS: CPT | Mod: 26 | Performed by: OBSTETRICS & GYNECOLOGY

## 2020-12-02 PROCEDURE — 36415 COLL VENOUS BLD VENIPUNCTURE: CPT | Performed by: ADVANCED PRACTICE MIDWIFE

## 2020-12-02 RX ORDER — ESCITALOPRAM OXALATE 10 MG/1
10 TABLET ORAL DAILY
Qty: 90 TABLET | Refills: 0 | Status: SHIPPED | OUTPATIENT
Start: 2020-12-02 | End: 2021-02-23

## 2020-12-02 NOTE — TELEPHONE ENCOUNTER
Refill for:       Last Appointment: 10/19/20    Next Appointment: none    No Shows/Cancellations since last appointment: none    Last office visit note reviewed and summarized below:  Treatment Plan:    Continue Lexapro/escitalopram 10 mg daily for mood and anxiety    Continue venlafaxine 112.5 mg daily for mood and anxiety.  You can trial just 75 mg on some days to see if that helps with some of the increased anxiety and irritability after you take your dose.  Could also try taking 37.5 mg at one time of the day and 75 mg at another time.    Continue all other medications as reviewed per electronic medical record today.     Safety plan reviewed. To the Emergency Department as needed or call after hours crisis line at [phone] or [phone]. [place] Crisis Text Line. Text [place] to 809952 or Suicide LifeLine Chat: suicidepreventionEpicPledgeline.org/chat    Continue therapy as planned.     Schedule an appointment with me in 8 weeks or sooner as needed.       Medication pended.  Routing to provider for review.    Routing to [facility] to reach out to patient to assist in scheduling follow up with -- if patient does not wish to schedule a follow up with provider, please inform to follow up with PCP and note account. Please notify nursing of outcome.    Kaylynn Gallo, RN   Nurse Liaison  [facility]

## 2020-12-02 NOTE — PROGRESS NOTES
36w0d  Ellyn is here after appointment with [facility] for BPP due to AMA. BPP is normal and baby is cephalic by US. Ellyn feels like this baby is growing big and hopes baby doesn't grow too much bigger. She is still feeling comfortable with recommendation for IOL at 39w. Initial BP in [facility] is elevated today but when taken with correct cuff in room at the end of appointment it is normal. Ellyn says that her blood pressure has only been elevated once in her life, when she presented to the ED in summer 2019. She believes that she had an MI, but she was diagnosed with a panic attack. She is not convinced that this diagnosis is correct. She had follow up with primary care after this encounter with no additional cardiac concerns and a normal stress echo. No chest pain since this episode. Reports good fetal movement. Denies leaking of fluid, vaginal bleeding, regular uterine contractions, headache, visual changes, or other concerns. GBS and Hgb today. RTC in 1 week.    BENJY Goldsmith CNM

## 2020-12-03 LAB
GP B STREP DNA SPEC QL NAA+PROBE: POSITIVE
SPECIMEN SOURCE: ABNORMAL

## 2020-12-04 ENCOUNTER — VIRTUAL VISIT (OUTPATIENT)
Dept: PSYCHOLOGY | Facility: CLINIC | Age: 45
End: 2020-12-04
Payer: COMMERCIAL

## 2020-12-04 DIAGNOSIS — F33.1 MAJOR DEPRESSIVE DISORDER, RECURRENT EPISODE, MODERATE (H): ICD-10-CM

## 2020-12-04 DIAGNOSIS — F41.1 GAD (GENERALIZED ANXIETY DISORDER): Primary | ICD-10-CM

## 2020-12-04 PROCEDURE — 90837 PSYTX W PT 60 MINUTES: CPT | Mod: 95 | Performed by: MARRIAGE & FAMILY THERAPIST

## 2020-12-04 NOTE — PATIENT INSTRUCTIONS
"Patient states that her goals for the next week include:    1. Continue to make progress on eviction paperwork and \"make a plan on our end\"  2. Finish errands by Wed.; purge and clear excess  cothes  3. Relax by sitting outside as the chickens roam    (Continued - deferred)  Homework: Write a letter to father with two parts: \"thank you for\": ________ and \"here's what I would like to say to you\": ___________.    "

## 2020-12-04 NOTE — PROGRESS NOTES
Progress Note    Patient Name: Ellyn Mcgee  Date: 12/4/2020         Service Type: Individual      Session Start Time: 11:06 a.m.  Session End Time: 12:05 p.m.     Session Length: 59 min.    Session #: 11 (with this writer; patient met previously for DA with [facility] and psychiatry)    Attendees: Client attended alone    Service Modality:  Video Visit:    Telemedicine Visit: The patient's condition can be safely assessed and treated via synchronous audio and visual telemedicine encounter.      Reason for Telemedicine Visit: Services only offered telehealth    Originating Site (Patient Location): Patient's home    Distant Site (Provider Location): [facility] [address]    Consent:  The patient/guardian has verbally consented to: the potential risks and benefits of telemedicine (video visit) versus in person care; bill my insurance or make self-payment for services provided; and responsibility for payment of non-covered services.     Patient would like the video invitation sent by: Send to e-mail at: [email or web address]    Mode of Communication:  Video Conference via [employer]    As the provider I attest to compliance with applicable laws and regulations related to telemedicine.     Treatment Plan Last Reviewed: 9/22/2020  PHQ-9 / TEE-7: 9/8/2020     DATA  Interactive Complexity: No  Crisis: No       Progress Since Last Session (Related to Symptoms / Goals / Homework):  Symptoms: heightened anxiety during past week but decrease in physical discomfort; patient reports anxiety about following through with eviction paperwork and fees in addition to preparing for their baby son's arrival    Homework: Achieved / completed to satisfaction - patient reports that she was able to prioritize her self-care as needed and accomplished some tasks on her list      Episode of Care Goals: Satisfactory progress - ACTION (Actively working towards change); Intervened by  reinforcing change plan / affirming steps taken     Current / Ongoing Stressors and Concerns:  Roommate/former friend is refusing to move out of their home (has lived there for three years and has not paid any rent for over a year); pt has been experiencing mild panic attacks; daughter (age 10) just started ADHD medication; history of anxious attachment stemming from relationship with father (he  8 years ago); is currently pregnant (high-risk due to advanced maternal age); is in couples therapy with  due to frequent arguments and his emotional affair earlier in the year; financial stress; history of ADHD (hyperactive type); has roommate who was in assisted (per documentation from [facility]).     Treatment Objective(s) Addressed in This Session:   use cognitive strategies identified in therapy to challenge anxious thoughts   Discussed healthy boundaries and enforcement  will identify how past feelings are impacting current relationships.    Past/future:  tell full story of past relational issues/trust/infidelity  identify how attachment style drives patient's and 's behaviors  Discussed ambiguous loss related to relationship with father  tell full story of past trauma related to her relationship with her father        Intervention:   Psychodynamic: Family systems and attachment style  Narrative Therapy: separate problem from person and find the bright spots   CBT: connect thoughts, feelings, and actions        ASSESSMENT: Current Emotional / Mental Status (status of significant symptoms):   Risk status (Self / Other harm or suicidal ideation)   Patient denies current fears or concerns for personal safety.   Patient denies current or recent suicidal ideation or behaviors. Patient last reported feeling hopeless/passive ideation on 2020.   Patient denies current or recent homicidal ideation or behaviors.   Patient denies current or recent self injurious behavior or ideation.   Patient denies other safety  "concerns.   Patient reports there has been no change in risk factors since their last session.     Patient reports there has been no change in protective factors since their last session.     Recommended that patient call 911 or go to the local ED should there be a change in any of these risk factors.     Appearance:   Appropriate    Eye Contact:   Good    Psychomotor Behavior: Normal  less restless    Attitude:   Cooperative Frustrated by roommate situation    Orientation:   All   Speech    Rate / Production: Normal/ Responsive Talkative    Volume:  Normal    Mood:    Anxious  Expansive   Affect:    Appropriate  Expansive  Animated    Thought Content:  Paranoia  Rumination    Thought Form:  Coherent  Neurosis   Insight:    Good      Medication Review:   No changes to current psychiatric medication(s)     Medication Compliance:   Yes     Changes in Health Issues:   None reported - patient is currently around 37 weeks pregnant     Chemical Use Review:   Substance Use: Chemical use reviewed, no active concerns identified ; no current alcohol use     Tobacco Use: No current tobacco use.      Diagnosis:  1. TEE (generalized anxiety disorder)    2. Major depressive disorder, recurrent episode, moderate (H)        Collateral Reports Completed:   Not Applicable - pt had psychiatry appt. 10/19/20    PLAN: (Patient Tasks / Therapist Tasks / Other)    Patient states that her goals for the next week include:    1. Continue to make progress on eviction paperwork and \"make a plan on our end\"  2. Finish errands by Wed.; purge and clear excess  cothes  3. Relax by sitting outside as the chickens delonte Eller                                                         ______________________________________________________________________    Treatment Plan    Patient's Name: Ellyn Mcgee  YOB: 1975    Date: 2020    DSM5 Diagnoses: 296.32 (F33.1) Major Depressive Disorder, Recurrent Episode, " Moderate _ or 300.02 (F41.1) Generalized Anxiety Disorder (patient has previously been diagnosed with ADHD, hyperactive type)  Psychosocial / Contextual Factors: History of anxious attachment stemming from relationship with father (he  8 years ago); is currently pregnant (high-risk due to advanced maternal age); is in couples therapy with  due to frequent arguments and his emotional affair earlier in the year; financial stress; history of ADHD (hyperactive type).    WHODAS 2.0 Total Score 2020   Total Score 23 30   Total Score MyChart 23 30       PHQ 2020   PHQ-9 Total Score 16 7 8   Q9: Thoughts of better off dead/self-harm past 2 weeks Several days Not at all Not at all   F/U: Thoughts of suicide or self-harm No - -   F/U: Safety concerns No - -     TEE-7 SCORE 2020   Total Score - - -   Total Score - 14 (moderate anxiety) 12 (moderate anxiety)   Total Score 13 14 12       Referral / Collaboration:  Was/were discussed and client will pursue: [facility] if mental health concerns around maternity/pregnancy develop    Anticipated number of sessions for this episode of care: 20-24    Measurable Treatment Goal(s) related to diagnosis / functional impairment(s)    Patient is asking to focus treatment on her relationship with her father and past trauma.    Goal 1: Patient will tell full story of past trauma related to her relationship with her father and will identify how past feelings are impacting current relationships.    Objective #A (Patient Action)    Patient will tell full story of past trauma related to her relationship with her father.  Status: New - Date: 2020     Intervention(s)  Therapist will collaborate in session to determine attachment wounds and inner child work.    Objective #B  Patient will identify how past feelings are impacting current relationships.  Status: New - Date: 2020     Intervention(s)  Therapist will  "role-play conflict management.    Objective #C  Patient will identify strengths and weaknesses within her family system of origin.  Status: New - Date: 9/22/2020     Intervention(s)  Therapist will assign homework for patient to create list.      Goal 2: Patient will learn about resilience, trauma responses, and Javon Servin's \"the story I'm making up\" (cognitive strategy to manage anxious thoughts).    Objective #A (Patient Action)    Status: New - Date: 9/22/2020     Patient will will learn about resilience.    Intervention(s)  Therapist will teach about protective factors.    Objective #B  Patient will learn about trauma responses.    Status: New - Date: 9/22/2020     Intervention(s)  Therapist will provide educational materials on trauma responses.    Objective #C  Patient will use cognitive strategies identified in therapy to challenge anxious thoughts.  Status: New - Date: 9/22/2020     Intervention(s)  Therapist will teach about Javon Servin's power of vulnerability and \"the story I'm making up\".      Goal 3: Patient will learn about ACE scores and attachment styles and will identify how her attachment style drives her behavior.     Objective #A (Patient Action)    Status: New - Date: 9/22/2020     Patient will learn about ACE scores and will take assessment.    Intervention(s)  Therapist will complete with patient in-session.    Objective #B  Patient will learn about attachment styles and will take Attachment Style assessment.    Status: Completed - Date: 9/2020     Intervention(s)  Therapist will complete with patient in-session.    Objective #C  Patient will identify how her attachment style drives her behavior.  Status: New - Date: 9/22/2020     Intervention(s)  Therapist will teach emotional regulation skills.        Patient has reviewed and agreed to the above plan.      Keesha Eller  September 14, 2020  "

## 2020-12-09 ENCOUNTER — HOSPITAL ENCOUNTER (OUTPATIENT)
Dept: ULTRASOUND IMAGING | Facility: CLINIC | Age: 45
End: 2020-12-09
Attending: OBSTETRICS & GYNECOLOGY
Payer: COMMERCIAL

## 2020-12-09 ENCOUNTER — MYC MEDICAL ADVICE (OUTPATIENT)
Dept: MIDWIFE SERVICES | Facility: CLINIC | Age: 45
End: 2020-12-09

## 2020-12-09 ENCOUNTER — TELEPHONE (OUTPATIENT)
Dept: BEHAVIORAL HEALTH | Facility: CLINIC | Age: 45
End: 2020-12-09

## 2020-12-09 ENCOUNTER — PRENATAL OFFICE VISIT (OUTPATIENT)
Dept: MIDWIFE SERVICES | Facility: CLINIC | Age: 45
End: 2020-12-09
Payer: COMMERCIAL

## 2020-12-09 ENCOUNTER — OFFICE VISIT (OUTPATIENT)
Dept: MATERNAL FETAL MEDICINE | Facility: CLINIC | Age: 45
End: 2020-12-09
Attending: OBSTETRICS & GYNECOLOGY
Payer: COMMERCIAL

## 2020-12-09 ENCOUNTER — MYC MEDICAL ADVICE (OUTPATIENT)
Dept: PSYCHIATRY | Facility: CLINIC | Age: 45
End: 2020-12-09

## 2020-12-09 ENCOUNTER — VIRTUAL VISIT (OUTPATIENT)
Dept: PSYCHIATRY | Facility: CLINIC | Age: 45
End: 2020-12-09
Payer: COMMERCIAL

## 2020-12-09 VITALS
DIASTOLIC BLOOD PRESSURE: 72 MMHG | TEMPERATURE: 97.6 F | SYSTOLIC BLOOD PRESSURE: 120 MMHG | WEIGHT: 189 LBS | BODY MASS INDEX: 33.22 KG/M2 | HEART RATE: 87 BPM

## 2020-12-09 DIAGNOSIS — O09.529 ANTEPARTUM MULTIGRAVIDA OF ADVANCED MATERNAL AGE: ICD-10-CM

## 2020-12-09 DIAGNOSIS — O09.523 AMA (ADVANCED MATERNAL AGE) MULTIGRAVIDA 35+, THIRD TRIMESTER: Primary | ICD-10-CM

## 2020-12-09 DIAGNOSIS — F33.41 RECURRENT MAJOR DEPRESSIVE DISORDER, IN PARTIAL REMISSION (H): Primary | ICD-10-CM

## 2020-12-09 DIAGNOSIS — F90.0 ATTENTION DEFICIT HYPERACTIVITY DISORDER (ADHD), PREDOMINANTLY INATTENTIVE TYPE: ICD-10-CM

## 2020-12-09 DIAGNOSIS — O09.529 ANTEPARTUM MULTIGRAVIDA OF ADVANCED MATERNAL AGE: Primary | ICD-10-CM

## 2020-12-09 DIAGNOSIS — F41.1 GAD (GENERALIZED ANXIETY DISORDER): ICD-10-CM

## 2020-12-09 PROCEDURE — 99207 PR PRENATAL VISIT: CPT | Performed by: ADVANCED PRACTICE MIDWIFE

## 2020-12-09 PROCEDURE — 76819 FETAL BIOPHYS PROFIL W/O NST: CPT

## 2020-12-09 PROCEDURE — 76819 FETAL BIOPHYS PROFIL W/O NST: CPT | Mod: 26 | Performed by: OBSTETRICS & GYNECOLOGY

## 2020-12-09 PROCEDURE — 99215 OFFICE O/P EST HI 40 MIN: CPT | Mod: 95 | Performed by: PSYCHIATRY & NEUROLOGY

## 2020-12-09 RX ORDER — VENLAFAXINE HYDROCHLORIDE 150 MG/1
150 CAPSULE, EXTENDED RELEASE ORAL DAILY
COMMUNITY
End: 2021-01-22

## 2020-12-09 NOTE — PATIENT INSTRUCTIONS
Your induction of labor is scheduled on 12/22/2020 at 11pm. Please call the birthplace 1 hour prior to your appointment at [phone] to confirm.

## 2020-12-09 NOTE — PROGRESS NOTES
Please refer to ultrasound report under 'Imaging' Studies of 'Chart Review' tabs.    Dave Tian M.D.

## 2020-12-09 NOTE — PROGRESS NOTES
"Ellyn Mcgee is a 45 year old female who is being evaluated via a billable video visit.      The patient has been notified of following:     \"This video visit will be conducted via a call between you and your physician/provider. We have found that certain health care needs can be provided without the need for an in-person physical exam.  This service lets us provide the care you need with a video conversation.  If a prescription is necessary we can send it directly to your pharmacy.  If lab work is needed we can place an order for that and you can then stop by our lab to have the test done at a later time.    Video visits are billed at different rates depending on your insurance coverage.  Please reach out to your insurance provider with any questions.    If during the course of the call the physician/provider feels a video visit is not appropriate, you will not be charged for this service.\"    Patient has given verbal consent for Video visit? Yes  How would you like to obtain your AVS? MyChart  If you are dropped from the video visit, the video invite should be resent to: Text to cell phone: see Epic  Will anyone else be joining your video visit? No      Telemedicine Visit: The patient's condition can be safely assessed and treated via synchronous audio and visual telemedicine encounter.      Reason for Telemedicine Visit: Covid-19 Pandemic    Originating Site (Patient Location): Patient's home    Distant Site (Provider Location): Provider Remote Setting    Consent:  The patient/guardian has verbally consented to: the potential risks and benefits of telemedicine (video visit) versus in person care; bill my insurance or make self-payment for services provided; and responsibility for payment of non-covered services.     Mode of Communication:  Video Conference via RUN    As the provider I attest to compliance with applicable laws and regulations related to telemedicine.        Outpatient Psychiatric " "Progress Note    Name: Ellyn Mcgee   : 1975                    Primary Care Provider: Dorothy Guaman DO   Therapist: CHOLO Lewis/Keesha Eller     PHQ-9 scores:  PHQ-9 SCORE 2020   PHQ-9 Total Score - - -   PHQ-9 Total Score MyChart 16 (Moderately severe depression) 7 (Mild depression) 8 (Mild depression)   PHQ-9 Total Score 16 7 8       TEE-7 scores:  TEE-7 SCORE 2020   Total Score - - -   Total Score - 14 (moderate anxiety) 12 (moderate anxiety)   Total Score 13 14 12       Patient Identification:  Patient is a 45 year old,   White American female  who presents for return visit with me.  Patient is currently employed part time. Patient attended the phone/video session alone. Patient prefers to be called: \"Ellyn\".    Interim History:  I last saw Ellyn Mcgee for outpatient psychiatry Return Visit on 10/19/2020. During that appointment, we:      Continue Lexapro/escitalopram 10 mg daily for mood and anxiety    Continue venlafaxine 112.5 mg daily for mood and anxiety.  You can trial just 75 mg on some days to see if that helps with some of the increased anxiety and irritability after you take your dose.  Could also try taking 37.5 mg at one time of the day and 75 mg at another time.    : Pt reports a lot of frustration today. She has been very easily affected by her 's moods. He has been quite labile/reactive. Her due date is about two weeks away. He has been having some med changes and mood has been affected. Was having problems with getting her venlafaxine down. Was gagging on her tab and tried three different times. Everything has felt harder and more difficult. Has pretty bad heartburn. Therapy is going \"ok.\" Denies any thoughts of suicide.  No drug or alcohol use.      Per [facility], CHOLO Barrera, during today's team-based visit:  There were difficulties and so she did not meet with [facility] today.    Psychiatric ROS:  Ellyn " Heaven Mcgee reports mood has been: Up and down  Anxiety has been: Worse  Sleep has been: Difficult due to pregnancy  Isabel sxs: None  Psychosis sxs: None  ADHD/ADD sxs: manageable without meds  PTSD sxs: None  PHQ9 and GAD7 scores were reviewed today if completed.   Medication side effects:  See HPI  Current stressors include: Pregnancy at age 45  Coping mechanisms and supports include: Therapy, Family, Hobbies and Friends    Current medications include:   Current Outpatient Medications   Medication Sig     Acetaminophen (TYLENOL PO) PRN     escitalopram (LEXAPRO) 10 MG tablet Take 1 tablet (10 mg) by mouth daily     Prenatal Vit-Fe Fumarate-FA (PRENATAL PO)      venlafaxine (EFFEXOR-XR) 37.5 MG 24 hr capsule Take 1 capsule (37.5 mg) by mouth daily Take with 75 mg cap for total daily dose 112.5 mg.     venlafaxine (EFFEXOR-XR) 75 MG 24 hr capsule Take 1 capsule (75 mg) by mouth daily Take with 37.5 mg cap for total daily dose 112.5 mg.     No current facility-administered medications for this visit.        Past Medical/Surgical History:  Past Medical History:   Diagnosis Date     Abnormal Pap smear     Colposcopy     Adjustment disorder with mixed anxiety and depressed mood 4/4/2012     Anemia     In Past     Anxiety      Chlamydia trachomatis infection of other specified site 1993     Depression      Fertility problem      Lyme disease 9/8/2010    ?false positive vs positve CMV - resolved     Moderate dysplasia of cervix 1995     Other and unspecified adverse effect of drug, medicinal and biological substance     insulin resistent     Varicosities      Wounds and injuries     fall down stairs, PT for back, pain meds      has a past medical history of Abnormal Pap smear, Adjustment disorder with mixed anxiety and depressed mood (4/4/2012), Anemia, Anxiety, Chlamydia trachomatis infection of other specified site (1993), Depression, Fertility problem, Lyme disease (9/8/2010), Moderate dysplasia of cervix (1995), Other  and unspecified adverse effect of drug, medicinal and biological substance, Varicosities, and Wounds and injuries. She also has no past medical history of Asthma, Asymptomatic human immunodeficiency virus (HIV) infection status (H), Breast disorder, Chronic kidney disease, Complication of anesthesia, Diabetes mellitus (H), Heart disease, Herpes simplex without mention of complication, Hypertension, Liver disease, Postpartum depression, Rh incompatibility, Seizures (H), Sickle cell anemia (H), Systemic lupus erythematosus (H), or Thyroid disease.    Social History:  Reviewed. No changes to social history.     Vital Signs:   None. This is phone/video visit.     Labs:  Most recent laboratory results reviewed and the pertinent results include:   Unremarkable UA 10/6/2020    Review of Systems:  10 systems (general, cardiovascular, respiratory, eyes, ENT, endocrine, GI, , M/S, neurological) were reviewed. Most pertinent finding(s) is/are: Aches and pains due to pregnancy, heartburn. The remaining systems are all unremarkable.    Mental Status Examination (limited as this is by phone/video):  Attitude:  cooperative, pleasant but frustrated  Oriented to:  person, place, time, and situation  Attention Span and Concentration:  normal  Speech:  clear, coherent, regular rate, rhythm, and volume  Language: intact  Mood: Not great  Affect: Anxious, frustrated  Associations:  no loose associations  Thought Process:  logical, linear and goal oriented  Thought Content:  no evidence of suicidal ideation or homicidal ideation, no evidence of psychotic thought, no auditory hallucinations present and no visual hallucinations present  Recent and Remote Memory:  Intact to interview. Not formally assessed. No amnesia.  Fund of Knowledge: appropriate  Insight:  good  Judgment:  intact, adequate for safety  Impulse Control:  intact    Suicide Risk Assessment:  Today Ellyn Mcgee reports no suicidal ideation. Based on all available  evidence including the factors cited above, Ellyn Mcgee does not appear to be at imminent risk for self-harm, does not meet criteria for a 72-hr hold, and therefore remains appropriate for ongoing outpatient level of care.  A thorough assessment of risk factors related to suicide and self-harm have been reviewed and are noted above. The patient convincingly denies suicidality on several occasions. Local community safety resources printed and reviewed for patient to use if needed. There was no deceit detected, and the patient presented in a manner that was believable.     DSM5 Diagnosis:  Major Depressive Disorder, Recurrent Episode, In Partial Remission  300.02 (F41.1) Generalized Anxiety Disorder   ADHD, Unspecified    Medical comorbidities include:   Patient Active Problem List    Diagnosis Date Noted     Major depressive disorder, recurrent episode, moderate (H) 10/08/2020     Priority: Medium     High-risk pregnancy, elderly multigravida, unspecified trimester 06/05/2020     Priority: Medium     TEE (generalized anxiety disorder) 10/10/2018     Priority: Medium     Cervical radiculopathy 04/16/2018     Priority: Medium     Attention deficit hyperactivity disorder (ADHD), predominantly inattentive type 10/04/2017     Priority: Medium     Patient is followed by PREMA MARIEE for ongoing prescription of stimulants.  All refills should be approved by this provider, or covering partner.    Medication(s): Concerta 27mg on weekend days and 36mg other days of the week  Maximum quantity per month: 30  Clinic visit frequency required: Q 6  months     Controlled substance agreement on file: Yes       Date(s): 10/4/17  Neuropsych evaluation for ADD completed:  Yes, completed 8/17/17, on file and diagnosis confirmed    Last [facility] website verification: 12/3/2019   [email or web address]           External hemorrhoids 04/25/2012     Priority: Medium     PCOS (polycystic ovarian syndrome) 02/22/2011     Priority:  Medium     Hyperlipidemia LDL goal <130 09/05/2008     Priority: Medium     Anxiety state 09/05/2008     Priority: Medium     Problem list name updated by automated process. Provider to review    Patient is followed by PREMA MARIEE for ongoing prescription of benzodiazepines.  All refills should be approved by this provider, or covering partner.    Medication(s): Xanax.   Maximum quantity per month:   Clinic visit frequency required:      Controlled substance agreement on file: Yes       Date(s): 10/4/2017  Benzodiazepine use reviewed by psychiatry:      Last [facility] website verification:  done on 12/17/2018   [email or web address]             Psychosocial & Contextual Factors: See HPI above    Assessment:  Per Intake Note with me 9/8/20:  Ellyn Mcgee is a 45-year-old female who is 23 weeks pregnant with a past psychiatric history including depression, anxiety, and ADHD who presents today for psychiatric evaluation.  Her depression and anxiety date back several years and she has also had postpartum depression/anxiety with her now 8-year-old (she also has a 14-year-old but did not experience postpartum mental health issues at that time).  She started sertraline during her second pregnancy and then ended up being maintained on Paxil-CR for several years.  She is also more recently diagnosed with ADHD and maintained on Concerta.  She weaned off both Paxil and Concerta when she found out she was pregnant and replaced these medications with her current regimen of Lexapro and Effexor-XR to decrease risk of fetal defects.  For the most part, she is feeling a little bit better on her current doses of Lexapro 10 mg and Effexor-XR 75 mg.  It is an unconventional combination as we discussed, but since she is doing quite well, I am hesitant to make many changes.  She feels as though her anxiety could be a little bit better controlled and so we will further increase Effexor-XR to 112.5 mg daily to be taken with her  Lexapro 10 mg daily.  If she does a lot better, we can consider decreasing Lexapro to 5 mg daily. We discussed the risk of serotonin syndrome and she will continue to be vigilant for any signs or symptoms of this condition.  We did discuss the possibility of restarting a stimulant medication if necessary.  She does not feel as though her ADHD symptoms are too severe at this time.    12/9:  Today the patient reports overall things have been more difficult.  Symptoms have been worsening in the context of increasing psychosocial stressors.  Some conflict with .  2 weeks away from delivery of her baby.  Hormones likely playing a role in her mood but she is agreeable to a dose increase of her Effexor-XR to see if this is further helpful with the increased anxiety.  Would like to try and get ahead of this in case things further worsen after she delivers.  No safety concerns at this time.  She continues to be engaged in regular individual psychotherapy.  She is also in couples therapy with her .  We could still consider going back to her combination of Paxil-CR and Concerta after her baby is born.    Medication side effects and alternatives were reviewed. Health promotion activities recommended and reviewed today. All questions addressed. Education and counseling completed regarding risks and benefits of medications and psychotherapy options. Recommend ongoing therapy for additional support.     Treatment Plan:    Continue Lexapro/escitalopram 10 mg daily for mood and anxiety    Increase venlafaxine to 150 mg daily for mood and anxiety.      If you continue to struggle after delivery, could consider returning to the combination of Paxil-CR and Concerta.    Continue all other medications as reviewed per electronic medical record today.     Safety plan reviewed. To the Emergency Department as needed or call after hours crisis line at [phone] or [phone]. [place] Crisis Text Line. Text [place] to 587452 or  Suicide LifeLine Chat: suicidepreventionlifeline.org/chat    Continue therapy as planned.     Schedule an appointment with me in 5-6 weeks or sooner as needed. Call [facility] at [phone] to schedule if someone does not reach out to you within 2-3 days.     Follow up with primary care provider as planned or for acute medical concerns.    Call the psychiatric nurse line with medication questions or concerns at [phone].    MyChart may be used to communicate with your provider, but this is not intended to be used for emergencies.    Therapy Resources:  Www.PsychologyToday.com  Www.NAMIMN.org    [facility]- Psychiatric Disorders During Pregnancy  [email or web address].org/specialty-clinics/psychiatric-disorders-during-pregnancy    MothertoBaby  [email or web address]    Administrative Billing:   Phone Call/Video Duration: 33 Minutes  Start: 9:51a  Stop: 10:24a    Time spent with patient was 33 minutes and greater than 50% of time or 17 minutes was spent in counseling and coordination of care regarding above diagnoses and treatment plan. Complexity high due to high risk pregnancy due to maternal age.  Patient on multiple psychiatric medications.  Also hx of postpartum depression.     Patient Status:  Patient will continue to be seen for ongoing consultation and stabilization.    Signed:   Sherlyn Wang DO  [facility]

## 2020-12-09 NOTE — Clinical Note
Just FYI. No action needed.  Increased her venlafaxine a tad.  May need to consider returning to Paxil-CR and Concerta after delivery if little improvement in her symptoms or if worsening.  I will continue to follow and postpartum period.    -Sherlyn  [facility]

## 2020-12-09 NOTE — Clinical Note
Please call this patient to get them scheduled for a follow-up visit in 5 weeks.  Patient can take an acute spot.  Please schedule with me and the [facility]. Thanks!

## 2020-12-09 NOTE — PATIENT INSTRUCTIONS
Treatment Plan:    Continue Lexapro/escitalopram 10 mg daily for mood and anxiety    Increase venlafaxine to 150 mg daily for mood and anxiety.      If you continue to struggle after delivery, could consider returning to the combination of Paxil-CR and Concerta.    Continue all other medications as reviewed per electronic medical record today.     Safety plan reviewed. To the Emergency Department as needed or call after hours crisis line at [phone] or [phone]. [place] Crisis Text Line. Text [place] to 871336 or Suicide LifeLine Chat: suicidepreventionlifeline.org/chat    Continue therapy as planned.     Schedule an appointment with me in 5-6 weeks or sooner as needed. Call [facility] at [phone] to schedule if someone does not reach out to you within 2-3 days.     Follow up with primary care provider as planned or for acute medical concerns.    Call the psychiatric nurse line with medication questions or concerns at [phone].    Platforahart may be used to communicate with your provider, but this is not intended to be used for emergencies.    Therapy Resources:  Www.PsychologyToday.com  Www.NAMIMN.org    [facility]- Psychiatric Disorders During Pregnancy  https://[facility]mentalhealth.org/specialty-clinics/psychiatric-disorders-during-pregnancy    MothertoBaby  [email or web address]

## 2020-12-09 NOTE — PROGRESS NOTES
37w0d  Feeling well thought tired. Discussed IOL at 30 wks which would be on 12/23/20. She would like IOL at 1am because she would like to be home for her kids reno. IOL scheduled for 112/22/20 at 11 pm. So that IOL can be started ASAP after midnight. Reports good fetal movement. Denies leaking of fluid, vaginal bleeding, regular uterine contractions, headache or other concerns.  RTC in 1 wk IVAN

## 2020-12-10 NOTE — TELEPHONE ENCOUNTER
Patient was able to connect with provider.   Closing encounter.    Kaylynn Gallo, RN    Nurse Liaison  [facility]

## 2020-12-10 NOTE — TELEPHONE ENCOUNTER
Patient did connect with provider per visit note.   Closing encounter.    Kaylynn Gallo, RN    Nurse Liaison  [facility]

## 2020-12-11 ENCOUNTER — VIRTUAL VISIT (OUTPATIENT)
Dept: PSYCHOLOGY | Facility: CLINIC | Age: 45
End: 2020-12-11
Payer: COMMERCIAL

## 2020-12-11 DIAGNOSIS — F41.1 GAD (GENERALIZED ANXIETY DISORDER): Primary | ICD-10-CM

## 2020-12-11 DIAGNOSIS — F33.1 MAJOR DEPRESSIVE DISORDER, RECURRENT EPISODE, MODERATE (H): ICD-10-CM

## 2020-12-11 PROCEDURE — 90834 PSYTX W PT 45 MINUTES: CPT | Mod: 95 | Performed by: MARRIAGE & FAMILY THERAPIST

## 2020-12-11 ASSESSMENT — ANXIETY QUESTIONNAIRES
4. TROUBLE RELAXING: SEVERAL DAYS
GAD7 TOTAL SCORE: 11
2. NOT BEING ABLE TO STOP OR CONTROL WORRYING: MORE THAN HALF THE DAYS
IF YOU CHECKED OFF ANY PROBLEMS ON THIS QUESTIONNAIRE, HOW DIFFICULT HAVE THESE PROBLEMS MADE IT FOR YOU TO DO YOUR WORK, TAKE CARE OF THINGS AT HOME, OR GET ALONG WITH OTHER PEOPLE: SOMEWHAT DIFFICULT
7. FEELING AFRAID AS IF SOMETHING AWFUL MIGHT HAPPEN: SEVERAL DAYS
3. WORRYING TOO MUCH ABOUT DIFFERENT THINGS: SEVERAL DAYS
6. BECOMING EASILY ANNOYED OR IRRITABLE: MORE THAN HALF THE DAYS
5. BEING SO RESTLESS THAT IT IS HARD TO SIT STILL: SEVERAL DAYS
1. FEELING NERVOUS, ANXIOUS, OR ON EDGE: NEARLY EVERY DAY

## 2020-12-11 ASSESSMENT — PATIENT HEALTH QUESTIONNAIRE - PHQ9: SUM OF ALL RESPONSES TO PHQ QUESTIONS 1-9: 10

## 2020-12-11 NOTE — PROGRESS NOTES
Progress Note    Patient Name: Ellyn Mcgee  Date: 12/11/2020         Service Type: Individual      Session Start Time: 1:01 p.m.  Session End Time: 2:19 p.m.     Session Length: 78 min.    Session #: 11 (with this writer; patient met previously for DA with [facility] and psychiatry)    Attendees: Client attended alone    Service Modality:  Video Visit:    Telemedicine Visit: The patient's condition can be safely assessed and treated via synchronous audio and visual telemedicine encounter.      Reason for Telemedicine Visit: Services only offered telehealth    Originating Site (Patient Location): Patient's home    Distant Site (Provider Location): [facility]: [place]    Consent:  The patient/guardian has verbally consented to: the potential risks and benefits of telemedicine (video visit) versus in person care; bill my insurance or make self-payment for services provided; and responsibility for payment of non-covered services.     Patient would like the video invitation sent by: Send to e-mail at: [email or web address]    Mode of Communication:  Video Conference via well    As the provider I attest to compliance with applicable laws and regulations related to telemedicine.     Treatment Plan Last Reviewed: 12/11/2020  PHQ-9 / TEE-7: 12/11/2020     DATA  Interactive Complexity: No  Crisis: No       Progress Since Last Session (Related to Symptoms / Goals / Homework):  Symptoms: heightened anxiety during past week but decrease in physical discomfort; patient reports anxiety about following through with eviction paperwork and fees in addition to preparing for their baby son's arrival    Homework: Achieved / completed to satisfaction - patient reports that she has been preparing for her baby's arrival and spent some time outside last week; patient is pausing moving forward with the eviction paperwork and plans to consult a family law  specialist      Episode of Care Goals: Satisfactory progress - ACTION (Actively working towards change); Intervened by reinforcing change plan / affirming steps taken     Current / Ongoing Stressors and Concerns:  Roommate/former friend is refusing to move out of their home (has lived there for three years and has not paid any rent for over a year); pt has been experiencing mild panic attacks; daughter (age 10) just started ADHD medication; history of anxious attachment stemming from relationship with father (he  8 years ago); is currently pregnant (high-risk due to advanced maternal age) and baby is due Dec. 2020; is in couples therapy with  due to frequent arguments and his emotional affair earlier in the year; financial stress; history of ADHD (hyperactive type).     Treatment Objective(s) Addressed in This Session:   use cognitive strategies identified in therapy to challenge anxious thoughts   Discussed healthy boundaries and enforcement  Discussed ambiguous loss related to relationship with father  tell full story of past trauma related to her relationship with her father (and current roommate issues)  will identify how past feelings are impacting current relationships    Past/future:  tell full story of past relational issues/trust/infidelity  identify how attachment style drives patient's and 's behaviors     Intervention:   Psychodynamic: Family systems and attachment style  Narrative Therapy: separate problem from person and find the bright spots   CBT: connect thoughts, feelings, and actions        ASSESSMENT: Current Emotional / Mental Status (status of significant symptoms):   Risk status (Self / Other harm or suicidal ideation)   Patient denies current fears or concerns for personal safety.   Patient denies current or recent suicidal ideation or behaviors. Patient last reported feeling hopeless/passive ideation on 2020.   Patient denies current or recent homicidal ideation or  behaviors.   Patient denies current or recent self injurious behavior or ideation.   Patient denies other safety concerns.   Patient reports there has been no change in risk factors since their last session.     Patient reports there has been no change in protective factors since their last session.     Recommended that patient call 911 or go to the local ED should there be a change in any of these risk factors.     Appearance:   Appropriate    Eye Contact:   Good    Psychomotor Behavior: Normal  (some discomfort due to late-stage pregnancy)   Attitude:   Cooperative, Frustrated by roommate situation    Orientation:   All   Speech    Rate / Production: Normal/ Responsive Talkative    Volume:  Normal    Mood:    Anxious  Expansive   Affect:    Appropriate  Expansive  Animated    Thought Content:  Paranoia  Rumination    Thought Form:  Coherent  Neurosis Empathetic   Insight:    Good      Medication Review:   No changes to current psychiatric medication(s)     Medication Compliance:   Yes     Changes in Health Issues:   None reported - patient is currently around 38 weeks pregnant     Chemical Use Review:   Substance Use: Chemical use reviewed, no active concerns identified ; no current alcohol use     Tobacco Use: No current tobacco use.      Diagnosis:  1. TEE (generalized anxiety disorder)    2. Major depressive disorder, recurrent episode, moderate (H)        Collateral Reports Completed:   Not Applicable - pt had psychiatry appt. 10/19/20    PLAN: (Patient Tasks / Therapist Tasks / Other)    Patient states that her goals for the next week include:    1. Get baby stuff together  2. Accomplish some household tasks (including cooking)  3. Prep things around the house to allow patient to sleep when baby sleeps      Keesha Eller                                                         ______________________________________________________________________    Treatment Plan    Patient's Name: Ellyn Mcgee  Date Of  Birth: 1975    Date: 2020    DSM5 Diagnoses: 296.32 (F33.1) Major Depressive Disorder, Recurrent Episode, Moderate _ or 300.02 (F41.1) Generalized Anxiety Disorder (patient has previously been diagnosed with ADHD, hyperactive type)  Psychosocial / Contextual Factors: History of anxious attachment stemming from relationship with father (he  8 years ago); is currently pregnant (high-risk due to advanced maternal age); is in couples therapy with  due to frequent arguments and his emotional affair earlier in the year; financial stress; history of ADHD (hyperactive type).    WHODAS 2.0 Total Score 2020   Total Score 23 30   Total Score MyChart 23 30       PHQ 2020   PHQ-9 Total Score 7 8 10   Q9: Thoughts of better off dead/self-harm past 2 weeks Not at all Not at all Not at all   F/U: Thoughts of suicide or self-harm - - -   F/U: Safety concerns - - -     TEE-7 SCORE 2020   Total Score - - -   Total Score 14 (moderate anxiety) 12 (moderate anxiety) -   Total Score 14 12 11       Referral / Collaboration:  Was/were discussed and client will pursue: [facility] if mental health concerns around maternity/pregnancy develop    Anticipated number of sessions for this episode of care: 20-24    Measurable Treatment Goal(s) related to diagnosis / functional impairment(s)    Patient is asking to focus treatment on her relationship with her father and past trauma.    Goal 1: Patient will tell full story of past trauma related to her relationship with her father and will identify how past feelings are impacting current relationships.    Objective #A (Patient Action)    Patient will tell full story of past trauma related to her relationship with her father.  Status: Continued - Date(s): 2020     Intervention(s)  Therapist will collaborate in session to determine attachment wounds and inner child work.    Objective #B  Patient will  "identify how past feelings are impacting current relationships.  Status: Continued - Date(s): 12/11/2020     Intervention(s)  Therapist will role-play conflict management.    Objective #C  Patient will identify strengths and weaknesses within her family system of origin.  Status: Completed - Date: 12/11/2020     Intervention(s)  Therapist will assign homework for patient to create list.      Goal 2: Patient will learn about resilience, trauma responses, and Javon Servin's \"the story I'm making up\" (cognitive strategy to manage anxious thoughts).    Objective #A (Patient Action)    Status: Completed - Date: 12/11/2020     Patient will will learn about resilience.    Intervention(s)  Therapist will teach about protective factors.    Objective #B  Patient will learn about trauma responses.    Status: Continued - Date(s): 12/11/2020     Intervention(s)  Therapist will provide educational materials on trauma responses.    Objective #C  Patient will use cognitive strategies identified in therapy to challenge anxious thoughts.  Status: Continued - Date(s): 12/11/2020     Intervention(s)  Therapist will teach about Javon Servin's power of vulnerability and \"the story I'm making up\".      Goal 3: Patient will learn about ACE scores and attachment styles and will identify how her attachment style drives her behavior.     Objective #A (Patient Action)    Status: Deferred - Date: 12/11/2020     Patient will learn about ACE scores and will take assessment.    Intervention(s)  Therapist will complete with patient in-session.    Objective #B  Patient will learn about attachment styles and will take Attachment Style assessment.    Status: Completed - Date: 9/2020     Intervention(s)  Therapist will complete with patient in-session.    Objective #C  Patient will identify how her attachment style drives her behavior.  Status: Continued - Date(s): 12/11/2020     Intervention(s)  Therapist will teach emotional regulation skills.  "       Patient has reviewed and agreed to the above plan.      Keesha Eller

## 2020-12-11 NOTE — PATIENT INSTRUCTIONS
"Patient states that her goals for the next week include:    1. Get baby stuff together  2. Accomplish some household tasks (including cooking)  3. Prep things around the house to allow patient to sleep when baby sleeps    (Continued - deferred)  Homework: Write a letter to father with two parts: \"thank you for\": ________ and \"here's what I would like to say to you\": ___________.    "

## 2020-12-12 ASSESSMENT — ANXIETY QUESTIONNAIRES: GAD7 TOTAL SCORE: 11

## 2020-12-14 ENCOUNTER — MYC MEDICAL ADVICE (OUTPATIENT)
Dept: PSYCHOLOGY | Facility: CLINIC | Age: 45
End: 2020-12-14

## 2020-12-16 ENCOUNTER — PRENATAL OFFICE VISIT (OUTPATIENT)
Dept: MIDWIFE SERVICES | Facility: CLINIC | Age: 45
End: 2020-12-16
Payer: COMMERCIAL

## 2020-12-16 ENCOUNTER — OFFICE VISIT (OUTPATIENT)
Dept: MATERNAL FETAL MEDICINE | Facility: CLINIC | Age: 45
End: 2020-12-16
Attending: OBSTETRICS & GYNECOLOGY
Payer: COMMERCIAL

## 2020-12-16 ENCOUNTER — HOSPITAL ENCOUNTER (OUTPATIENT)
Dept: ULTRASOUND IMAGING | Facility: CLINIC | Age: 45
End: 2020-12-16
Attending: OBSTETRICS & GYNECOLOGY
Payer: COMMERCIAL

## 2020-12-16 VITALS — SYSTOLIC BLOOD PRESSURE: 124 MMHG | WEIGHT: 191.5 LBS | BODY MASS INDEX: 33.66 KG/M2 | DIASTOLIC BLOOD PRESSURE: 70 MMHG

## 2020-12-16 DIAGNOSIS — O09.523 AMA (ADVANCED MATERNAL AGE) MULTIGRAVIDA 35+, THIRD TRIMESTER: Primary | ICD-10-CM

## 2020-12-16 DIAGNOSIS — O09.529 ANTEPARTUM MULTIGRAVIDA OF ADVANCED MATERNAL AGE: ICD-10-CM

## 2020-12-16 DIAGNOSIS — O09.529 ANTEPARTUM MULTIGRAVIDA OF ADVANCED MATERNAL AGE: Primary | ICD-10-CM

## 2020-12-16 PROCEDURE — 76819 FETAL BIOPHYS PROFIL W/O NST: CPT

## 2020-12-16 PROCEDURE — 99207 PR PRENATAL VISIT: CPT | Performed by: ADVANCED PRACTICE MIDWIFE

## 2020-12-16 PROCEDURE — 76819 FETAL BIOPHYS PROFIL W/O NST: CPT | Mod: 26 | Performed by: OBSTETRICS & GYNECOLOGY

## 2020-12-16 NOTE — PROGRESS NOTES
Pt here after MFM BPP. BPP 8/8. Baby is moving normally although feels like less than before. No regular contractions, LOF or bleeding. Induction scheduled for 12/22 at 2300. Discussed options for COVID testing - would prefer to come to clinic for test. Plans to come on Monday - ordered as stat test so should be resulted by time of admission. Encouraged to call with any questions or concerns otherwise will plan to see her for induction on 12/22. MML

## 2020-12-21 ENCOUNTER — VIRTUAL VISIT (OUTPATIENT)
Dept: PSYCHOLOGY | Facility: CLINIC | Age: 45
End: 2020-12-21
Payer: COMMERCIAL

## 2020-12-21 ENCOUNTER — HOSPITAL ENCOUNTER (OUTPATIENT)
Facility: CLINIC | Age: 45
End: 2020-12-21
Attending: ADVANCED PRACTICE MIDWIFE | Admitting: ADVANCED PRACTICE MIDWIFE
Payer: COMMERCIAL

## 2020-12-21 DIAGNOSIS — O09.523 AMA (ADVANCED MATERNAL AGE) MULTIGRAVIDA 35+, THIRD TRIMESTER: ICD-10-CM

## 2020-12-21 DIAGNOSIS — F33.1 MAJOR DEPRESSIVE DISORDER, RECURRENT EPISODE, MODERATE (H): ICD-10-CM

## 2020-12-21 DIAGNOSIS — F41.1 GAD (GENERALIZED ANXIETY DISORDER): Primary | ICD-10-CM

## 2020-12-21 LAB
LABORATORY COMMENT REPORT: NORMAL
SARS-COV-2 RNA SPEC QL NAA+PROBE: NEGATIVE
SARS-COV-2 RNA SPEC QL NAA+PROBE: NORMAL
SPECIMEN SOURCE: NORMAL
SPECIMEN SOURCE: NORMAL

## 2020-12-21 PROCEDURE — 90834 PSYTX W PT 45 MINUTES: CPT | Mod: 95 | Performed by: MARRIAGE & FAMILY THERAPIST

## 2020-12-21 PROCEDURE — U0003 INFECTIOUS AGENT DETECTION BY NUCLEIC ACID (DNA OR RNA); SEVERE ACUTE RESPIRATORY SYNDROME CORONAVIRUS 2 (SARS-COV-2) (CORONAVIRUS DISEASE [COVID-19]), AMPLIFIED PROBE TECHNIQUE, MAKING USE OF HIGH THROUGHPUT TECHNOLOGIES AS DESCRIBED BY CMS-2020-01-R: HCPCS | Performed by: ADVANCED PRACTICE MIDWIFE

## 2020-12-21 NOTE — PATIENT INSTRUCTIONS
"Patient states that her goals for the next three weeks include:    1. Work as team with spouse and take breaks from each other as needed  2. Try to manage sleep (sleep when baby sleeps)  3. Manage reactivity and try to respond instead of react      (Deferred)  Homework: Write a letter to father with two parts: \"thank you for\": ________ and \"here's what I would like to say to you\": ___________.    "

## 2020-12-21 NOTE — PROGRESS NOTES
Progress Note    Patient Name: Ellyn Mcgee  Date: 12/21/2020         Service Type: Individual      Session Start Time: 11:02 a.m.  Session End Time: 12:10 p.m.     Session Length: 68 min.    Session #: 12 (with this writer; patient met previously for DA with [facility] and psychiatry)    Attendees: Client attended alone    Service Modality:  Video Visit:    Telemedicine Visit: The patient's condition can be safely assessed and treated via synchronous audio and visual telemedicine encounter.      Reason for Telemedicine Visit: Services only offered telehealth    Originating Site (Patient Location): Patient's home    Distant Site (Provider Location): [facility]: [place]    Consent:  The patient/guardian has verbally consented to: the potential risks and benefits of telemedicine (video visit) versus in person care; bill my insurance or make self-payment for services provided; and responsibility for payment of non-covered services.     Patient would like the video invitation sent by: Send to e-mail at: [email or web address]    Mode of Communication:  Video Conference via well    As the provider I attest to compliance with applicable laws and regulations related to telemedicine.     Treatment Plan Last Reviewed: 12/11/2020  PHQ-9 / TEE-7: 12/11/2020     DATA  Interactive Complexity: No  Crisis: No       Progress Since Last Session (Related to Symptoms / Goals / Homework):  Symptoms: heightened anxiety (continued) during past week+ and increase in physical discomfort (nausea and acid reflux); patient reports a positive shift in her relationship with her  after his medication change    Homework: Achieved / completed to satisfaction - patient completed several baby-preparation tasks, as she is scheduled to be induced on 12/23/20.        Episode of Care Goals: Satisfactory progress - ACTION (Actively working towards change); Intervened by reinforcing  change plan / affirming steps taken     Current / Ongoing Stressors and Concerns:  Induction set for 20; roommate/former friend is refusing to move out of their home (has lived there for three years and has not paid any rent for over a year); pt has been experiencing mild panic attacks; daughter (age 10) just started ADHD medication; history of anxious attachment stemming from relationship with father (he  8 years ago); is currently pregnant (high-risk due to advanced maternal age) and baby is due Dec. 2020; is in couples therapy with  due to frequent arguments and his emotional affair earlier in the year; financial stress; history of ADHD (hyperactive type).     Treatment Objective(s) Addressed in This Session:   use cognitive strategies identified in therapy to challenge anxious thoughts   Discussed healthy boundaries and enforcement  will identify how past feelings are impacting current relationships    Past/future:  Discussed ambiguous loss related to relationship with father  tell full story of past trauma related to her relationship with her father (and current roommate issues)  tell full story of past relational issues/trust/infidelity  identify how attachment style drives patient's and 's behaviors     Intervention:   Psychodynamic: Family systems and attachment style  Narrative Therapy: separate problem from person and find the bright spots   CBT: connect thoughts, feelings, and actions        ASSESSMENT: Current Emotional / Mental Status (status of significant symptoms):   Risk status (Self / Other harm or suicidal ideation)   Patient denies current fears or concerns for personal safety.   Patient denies current or recent suicidal ideation or behaviors. Patient last reported feeling hopeless/passive ideation on 2020.   Patient denies current or recent homicidal ideation or behaviors.   Patient denies current or recent self injurious behavior or ideation.   Patient denies other  "safety concerns.   Patient reports there has been no change in risk factors since their last session.     Patient reports there has been no change in protective factors since their last session.     Recommended that patient call 911 or go to the local ED should there be a change in any of these risk factors.     Appearance:   Appropriate  (patient needed to lay on her side for the visit due to pregnancy discomfort )   Eye Contact:   Good    Psychomotor Behavior: Normal  (some discomfort due to late-stage pregnancy)   Attitude:   Cooperative, Frustrated by roommate situation    Orientation:   All   Speech    Rate / Production: Normal/ Responsive Talkative    Volume:  Normal    Mood:    Anxious  Expansive   Affect:    Appropriate  Expansive       Thought Content:  Paranoia  Rumination    Thought Form:  Coherent  Neurosis Empathetic   Insight:    Good      Medication Review:   Changes to psychiatric medications, see updated Medication List in EPIC.      Medication Compliance:   Yes     Changes in Health Issues:   None reported - patient is currently around 38-39 weeks pregnant     Chemical Use Review:   Substance Use: Chemical use reviewed, no active concerns identified ; no current alcohol use     Tobacco Use: No current tobacco use.      Diagnosis:  1. TEE (generalized anxiety disorder)    2. Major depressive disorder, recurrent episode, moderate (H)        Collateral Reports Completed:   Not Applicable - pt has follow-up psychiatry appt. In Jan. 2021    PLAN: (Patient Tasks / Therapist Tasks / Other)    Patient states that her goals for the next three weeks include:    1. Work as team with spouse and take breaks from each other as needed  2. Try to manage sleep (sleep when baby sleeps)  3. Manage reactivity and try to respond instead of react      (Deferred)  Homework: Write a letter to father with two parts: \"thank you for\": ________ and \"here's what I would like to say to you\": ___________.          Keesha SON" Rafita                                                         ______________________________________________________________________    Treatment Plan    Patient's Name: Ellyn Mcgee  YOB: 1975    Date: 2020    DSM5 Diagnoses: 296.32 (F33.1) Major Depressive Disorder, Recurrent Episode, Moderate _ or 300.02 (F41.1) Generalized Anxiety Disorder (patient has previously been diagnosed with ADHD, hyperactive type)  Psychosocial / Contextual Factors: History of anxious attachment stemming from relationship with father (he  8 years ago); is currently pregnant (high-risk due to advanced maternal age); is in couples therapy with  due to frequent arguments and his emotional affair earlier in the year; financial stress; history of ADHD (hyperactive type).    WHODAS 2.0 Total Score 2020   Total Score 23 30   Total Score MyChart 23 30       PHQ 2020   PHQ-9 Total Score 7 8 10   Q9: Thoughts of better off dead/self-harm past 2 weeks Not at all Not at all Not at all   F/U: Thoughts of suicide or self-harm - - -   F/U: Safety concerns - - -     TEE-7 SCORE 2020   Total Score - - -   Total Score 14 (moderate anxiety) 12 (moderate anxiety) -   Total Score 14 12 11       Referral / Collaboration:  Was/were discussed and client will pursue: [facility] if mental health concerns around maternity/pregnancy develop    Anticipated number of sessions for this episode of care: 20-24    Measurable Treatment Goal(s) related to diagnosis / functional impairment(s)    Patient is asking to focus treatment on her relationship with her father and past trauma.    Goal 1: Patient will tell full story of past trauma related to her relationship with her father and will identify how past feelings are impacting current relationships.    Objective #A (Patient Action)    Patient will tell full story of past trauma related to her relationship with her  "father.  Status: Continued - Date(s): 12/11/2020     Intervention(s)  Therapist will collaborate in session to determine attachment wounds and inner child work.    Objective #B  Patient will identify how past feelings are impacting current relationships.  Status: Continued - Date(s): 12/11/2020     Intervention(s)  Therapist will role-play conflict management.    Objective #C  Patient will identify strengths and weaknesses within her family system of origin.  Status: Completed - Date: 12/11/2020     Intervention(s)  Therapist will assign homework for patient to create list.      Goal 2: Patient will learn about resilience, trauma responses, and Javon Servin's \"the story I'm making up\" (cognitive strategy to manage anxious thoughts).    Objective #A (Patient Action)    Status: Completed - Date: 12/11/2020     Patient will will learn about resilience.    Intervention(s)  Therapist will teach about protective factors.    Objective #B  Patient will learn about trauma responses.    Status: Continued - Date(s): 12/11/2020     Intervention(s)  Therapist will provide educational materials on trauma responses.    Objective #C  Patient will use cognitive strategies identified in therapy to challenge anxious thoughts.  Status: Continued - Date(s): 12/11/2020     Intervention(s)  Therapist will teach about Javon Servin's power of vulnerability and \"the story I'm making up\".      Goal 3: Patient will learn about ACE scores and attachment styles and will identify how her attachment style drives her behavior.     Objective #A (Patient Action)    Status: Deferred - Date: 12/11/2020     Patient will learn about ACE scores and will take assessment.    Intervention(s)  Therapist will complete with patient in-session.    Objective #B  Patient will learn about attachment styles and will take Attachment Style assessment.    Status: Completed - Date: 9/2020     Intervention(s)  Therapist will complete with patient in-session.    Objective " #C  Patient will identify how her attachment style drives her behavior.  Status: Continued - Date(s): 12/11/2020     Intervention(s)  Therapist will teach emotional regulation skills.        Patient has reviewed and agreed to the above plan.      Keesha Eller

## 2020-12-23 ENCOUNTER — HOSPITAL ENCOUNTER (INPATIENT)
Facility: CLINIC | Age: 45
LOS: 2 days | Discharge: HOME HEALTH | End: 2020-12-25
Attending: ADVANCED PRACTICE MIDWIFE | Admitting: ADVANCED PRACTICE MIDWIFE
Payer: COMMERCIAL

## 2020-12-23 LAB
ABO + RH BLD: NORMAL
ABO + RH BLD: NORMAL
BASOPHILS # BLD AUTO: 0 10E9/L (ref 0–0.2)
BASOPHILS NFR BLD AUTO: 0.3 %
BLD GP AB SCN SERPL QL: NORMAL
BLOOD BANK CMNT PATIENT-IMP: NORMAL
DIFFERENTIAL METHOD BLD: NORMAL
EOSINOPHIL # BLD AUTO: 0.1 10E9/L (ref 0–0.7)
EOSINOPHIL NFR BLD AUTO: 1.3 %
ERYTHROCYTE [DISTWIDTH] IN BLOOD BY AUTOMATED COUNT: 13 % (ref 10–15)
HCT VFR BLD AUTO: 37.8 % (ref 35–47)
HGB BLD-MCNC: 12.7 G/DL (ref 11.7–15.7)
IMM GRANULOCYTES # BLD: 0 10E9/L (ref 0–0.4)
IMM GRANULOCYTES NFR BLD: 0.4 %
LYMPHOCYTES # BLD AUTO: 1.5 10E9/L (ref 0.8–5.3)
LYMPHOCYTES NFR BLD AUTO: 21.5 %
MCH RBC QN AUTO: 29.9 PG (ref 26.5–33)
MCHC RBC AUTO-ENTMCNC: 33.6 G/DL (ref 31.5–36.5)
MCV RBC AUTO: 89 FL (ref 78–100)
MONOCYTES # BLD AUTO: 0.7 10E9/L (ref 0–1.3)
MONOCYTES NFR BLD AUTO: 9.7 %
NEUTROPHILS # BLD AUTO: 4.8 10E9/L (ref 1.6–8.3)
NEUTROPHILS NFR BLD AUTO: 66.8 %
NRBC # BLD AUTO: 0 10*3/UL
NRBC BLD AUTO-RTO: 0 /100
PLATELET # BLD AUTO: 222 10E9/L (ref 150–450)
RBC # BLD AUTO: 4.25 10E12/L (ref 3.8–5.2)
SPECIMEN EXP DATE BLD: NORMAL
T PALLIDUM AB SER QL: NONREACTIVE
WBC # BLD AUTO: 7.2 10E9/L (ref 4–11)

## 2020-12-23 PROCEDURE — 250N000011 HC RX IP 250 OP 636: Performed by: ADVANCED PRACTICE MIDWIFE

## 2020-12-23 PROCEDURE — 258N000003 HC RX IP 258 OP 636: Performed by: ADVANCED PRACTICE MIDWIFE

## 2020-12-23 PROCEDURE — 120N000002 HC R&B MED SURG/OB UMMC

## 2020-12-23 PROCEDURE — 250N000009 HC RX 250: Performed by: ADVANCED PRACTICE MIDWIFE

## 2020-12-23 PROCEDURE — 250N000013 HC RX MED GY IP 250 OP 250 PS 637: Performed by: ADVANCED PRACTICE MIDWIFE

## 2020-12-23 PROCEDURE — 86901 BLOOD TYPING SEROLOGIC RH(D): CPT | Performed by: ADVANCED PRACTICE MIDWIFE

## 2020-12-23 PROCEDURE — 85025 COMPLETE CBC W/AUTO DIFF WBC: CPT | Performed by: ADVANCED PRACTICE MIDWIFE

## 2020-12-23 PROCEDURE — 3E0P7VZ INTRODUCTION OF HORMONE INTO FEMALE REPRODUCTIVE, VIA NATURAL OR ARTIFICIAL OPENING: ICD-10-PCS | Performed by: ADVANCED PRACTICE MIDWIFE

## 2020-12-23 PROCEDURE — 86900 BLOOD TYPING SEROLOGIC ABO: CPT | Performed by: ADVANCED PRACTICE MIDWIFE

## 2020-12-23 PROCEDURE — 86780 TREPONEMA PALLIDUM: CPT | Performed by: ADVANCED PRACTICE MIDWIFE

## 2020-12-23 PROCEDURE — 86850 RBC ANTIBODY SCREEN: CPT | Performed by: ADVANCED PRACTICE MIDWIFE

## 2020-12-23 RX ORDER — CALCIUM CARBONATE 500 MG/1
1000 TABLET, CHEWABLE ORAL 3 TIMES DAILY PRN
Status: DISCONTINUED | OUTPATIENT
Start: 2020-12-23 | End: 2020-12-24

## 2020-12-23 RX ORDER — ONDANSETRON 2 MG/ML
4 INJECTION INTRAMUSCULAR; INTRAVENOUS EVERY 6 HOURS PRN
Status: DISCONTINUED | OUTPATIENT
Start: 2020-12-23 | End: 2020-12-24

## 2020-12-23 RX ORDER — NALOXONE HYDROCHLORIDE 0.4 MG/ML
0.4 INJECTION, SOLUTION INTRAMUSCULAR; INTRAVENOUS; SUBCUTANEOUS
Status: DISCONTINUED | OUTPATIENT
Start: 2020-12-23 | End: 2020-12-24

## 2020-12-23 RX ORDER — LIDOCAINE 40 MG/G
CREAM TOPICAL
Status: DISCONTINUED | OUTPATIENT
Start: 2020-12-23 | End: 2020-12-24

## 2020-12-23 RX ORDER — SODIUM CHLORIDE, SODIUM LACTATE, POTASSIUM CHLORIDE, CALCIUM CHLORIDE 600; 310; 30; 20 MG/100ML; MG/100ML; MG/100ML; MG/100ML
INJECTION, SOLUTION INTRAVENOUS CONTINUOUS
Status: DISCONTINUED | OUTPATIENT
Start: 2020-12-23 | End: 2020-12-24

## 2020-12-23 RX ORDER — METHYLERGONOVINE MALEATE 0.2 MG/ML
200 INJECTION INTRAVENOUS
Status: DISCONTINUED | OUTPATIENT
Start: 2020-12-23 | End: 2020-12-24

## 2020-12-23 RX ORDER — ACETAMINOPHEN 325 MG/1
650 TABLET ORAL EVERY 4 HOURS PRN
Status: DISCONTINUED | OUTPATIENT
Start: 2020-12-23 | End: 2020-12-24

## 2020-12-23 RX ORDER — NALOXONE HYDROCHLORIDE 0.4 MG/ML
0.2 INJECTION, SOLUTION INTRAMUSCULAR; INTRAVENOUS; SUBCUTANEOUS
Status: DISCONTINUED | OUTPATIENT
Start: 2020-12-23 | End: 2020-12-24

## 2020-12-23 RX ORDER — CARBOPROST TROMETHAMINE 250 UG/ML
250 INJECTION, SOLUTION INTRAMUSCULAR
Status: DISCONTINUED | OUTPATIENT
Start: 2020-12-23 | End: 2020-12-24

## 2020-12-23 RX ORDER — FENTANYL CITRATE 50 UG/ML
50-100 INJECTION, SOLUTION INTRAMUSCULAR; INTRAVENOUS
Status: DISCONTINUED | OUTPATIENT
Start: 2020-12-23 | End: 2020-12-24

## 2020-12-23 RX ORDER — OXYTOCIN/0.9 % SODIUM CHLORIDE 30/500 ML
1-24 PLASTIC BAG, INJECTION (ML) INTRAVENOUS CONTINUOUS
Status: DISCONTINUED | OUTPATIENT
Start: 2020-12-23 | End: 2020-12-24

## 2020-12-23 RX ORDER — IBUPROFEN 800 MG/1
800 TABLET, FILM COATED ORAL
Status: COMPLETED | OUTPATIENT
Start: 2020-12-23 | End: 2020-12-24

## 2020-12-23 RX ORDER — PENICILLIN G POTASSIUM 5000000 [IU]/1
5 INJECTION, POWDER, FOR SOLUTION INTRAMUSCULAR; INTRAVENOUS ONCE
Status: COMPLETED | OUTPATIENT
Start: 2020-12-23 | End: 2020-12-23

## 2020-12-23 RX ORDER — MISOPROSTOL 100 UG/1
25 TABLET ORAL EVERY 4 HOURS PRN
Status: DISCONTINUED | OUTPATIENT
Start: 2020-12-23 | End: 2020-12-24

## 2020-12-23 RX ORDER — OXYTOCIN/0.9 % SODIUM CHLORIDE 30/500 ML
100-340 PLASTIC BAG, INJECTION (ML) INTRAVENOUS CONTINUOUS PRN
Status: DISCONTINUED | OUTPATIENT
Start: 2020-12-23 | End: 2020-12-24

## 2020-12-23 RX ORDER — OXYTOCIN 10 [USP'U]/ML
10 INJECTION, SOLUTION INTRAMUSCULAR; INTRAVENOUS
Status: DISCONTINUED | OUTPATIENT
Start: 2020-12-23 | End: 2020-12-24

## 2020-12-23 RX ADMIN — PENICILLIN G POTASSIUM 5 MILLION UNITS: 5000000 POWDER, FOR SOLUTION INTRAMUSCULAR; INTRAPLEURAL; INTRATHECAL; INTRAVENOUS at 15:04

## 2020-12-23 RX ADMIN — SODIUM CHLORIDE, POTASSIUM CHLORIDE, SODIUM LACTATE AND CALCIUM CHLORIDE: 600; 310; 30; 20 INJECTION, SOLUTION INTRAVENOUS at 15:04

## 2020-12-23 RX ADMIN — Medication 2.5 MILLION UNITS: at 22:47

## 2020-12-23 RX ADMIN — CALCIUM CARBONATE (ANTACID) CHEW TAB 500 MG 1000 MG: 500 CHEW TAB at 10:57

## 2020-12-23 RX ADMIN — Medication 25 MCG: at 09:57

## 2020-12-23 RX ADMIN — Medication 1 MILLI-UNITS/MIN: at 18:53

## 2020-12-23 RX ADMIN — Medication 2.5 MILLION UNITS: at 18:52

## 2020-12-23 RX ADMIN — Medication 25 MCG: at 14:00

## 2020-12-23 ASSESSMENT — ACTIVITIES OF DAILY LIVING (ADL)
TOILETING_ISSUES: NO
FALL_HISTORY_WITHIN_LAST_SIX_MONTHS: NO

## 2020-12-23 NOTE — H&P
Ellyn Mcgee is a 45 year old female,     Patient's last menstrual period was 2020., Estimated Date of Delivery: Dec 30, 2020 is calculated from lmp and verified with U/S     [facility] 2020 at 11:17 AM     for induction of labor.  Indication AMA.  Membranes are intact    HPI: Reports good fetal movement. Denies leaking of fluid, vaginal bleeding, regular uterine contractions, headache or other concerns.       PRENATAL COURSE  Prenatal care began at 10 wks gestation for a total of 11 prenatal visits.  Total wt gain 11  Prenatal vital signs WNL  Prenatal course was   complicated by    Patient Active Problem List    Diagnosis Date Noted     Labor and delivery, indication for care 2020     Priority: Medium     Major depressive disorder, recurrent episode, moderate (H) 10/08/2020     Priority: Medium     High-risk pregnancy, elderly multigravida, unspecified trimester 2020     Priority: Medium     TEE (generalized anxiety disorder) 10/10/2018     Priority: Medium     Cervical radiculopathy 2018     Priority: Medium     Attention deficit hyperactivity disorder (ADHD), predominantly inattentive type 10/04/2017     Priority: Medium     Patient is followed by PREMA MARIEE for ongoing prescription of stimulants.  All refills should be approved by this provider, or covering partner.    Medication(s): Concerta 27mg on weekend days and 36mg other days of the week  Maximum quantity per month: 30  Clinic visit frequency required: Q 6  months     Controlled substance agreement on file: Yes       Date(s): 10/4/17  Neuropsych evaluation for ADD completed:  Yes, completed 17, on file and diagnosis confirmed    Last [facility] website verification: 12/3/2019   [email or web address]           External hemorrhoids 2012     Priority: Medium     PCOS (polycystic ovarian syndrome) 2011     Priority: Medium     Hyperlipidemia LDL goal <130 2008      Priority: Medium     Anxiety state 09/05/2008     Priority: Medium     Problem list name updated by automated process. Provider to review    Patient is followed by PREMA MARIEE for ongoing prescription of benzodiazepines.  All refills should be approved by this provider, or covering partner.    Medication(s): Xanax.   Maximum quantity per month:   Clinic visit frequency required:      Controlled substance agreement on file: Yes       Date(s): 10/4/2017  Benzodiazepine use reviewed by psychiatry:      Last [facility] website verification:  done on 12/17/2018   [email or web address]             HISTORIES  Allergies   Allergen Reactions     Codeine Itching     Codeine      itching       Cyclogyl [Cyclopentolate Hcl] Nausea and Vomiting     Cyclopentolate      Hydrocodone      anxiety  itching     Hydrocodone-Acetaminophen      Seasonal Allergies      Past Medical History:   Diagnosis Date     Abnormal Pap smear     Colposcopy     Adjustment disorder with mixed anxiety and depressed mood 4/4/2012     Anemia     In Past     Anxiety      Chlamydia trachomatis infection of other specified site 1993     Depression      Fertility problem      Lyme disease 9/8/2010    ?false positive vs positve CMV - resolved     Moderate dysplasia of cervix 1995     Other and unspecified adverse effect of drug, medicinal and biological substance     insulin resistent     Varicosities      Wounds and injuries     fall down stairs, PT for back, pain meds     Past Surgical History:   Procedure Laterality Date     CONIZATION CERVIX,KNIFE/LASER  1995     SURGICAL HISTORY OF -       [place] teeth     Family History   Problem Relation Age of Onset     Cancer Mother         vocal cord cancer     Lipids Mother      Myocardial Infarction Mother      Cancer Father         B-cell lymphoma, melanoma     Lipids Father      Heart Disease Father         open heart surgery     Cerebrovascular Disease Father      Crohn's Disease Sister      Diabetes  Paternal Grandmother      Diabetes Paternal Uncle      Alcohol/Drug Maternal Grandfather      Social History     Tobacco Use     Smoking status: Never Smoker     Smokeless tobacco: Never Used   Substance Use Topics     Alcohol use: Not Currently     Alcohol/week: 0.8 standard drinks     Types: 1 Standard drinks or equivalent per week     Comment: social     OB History    Para Term  AB Living   4 2 2 0 1 2   SAB TAB Ectopic Multiple Live Births   1 0 0 0 1      # Outcome Date GA Lbr Chris/2nd Weight Sex Delivery Anes PTL Lv   4 Current            3 Term 12 41w2d 03:15 / 00:19 3.487 kg (7 lb 11 oz) F Vag-Spont None N CRISTOFER      Name: DAYANA,G1 SAGE      Apgar1: 9  Apgar5: 9   2 SAB 05/13/10     AB, MISSED      1 Term 06 40w0d  3.459 kg (7 lb 10 oz) F          Name: Barb      Obstetric Comments   Required D&C       LABS:       Lab Results   Component Value Date    ABO O 2020       Lab Results   Component Value Date    RH Pos 2020     Rhogam not indicated  Rubella immune   Treponema Pallidum Antibody  Negative    HIV    Non-reactive   Lab Results   Component Value Date    HGB 12.7 2020      Lab Results   Component Value Date    HEPBANG Nonreactive 2020     Lab Results   Component Value Date    GBS Positive 2020     other     ROS  Pt denies significant constitutional symptoms including fever and/or malaise.  Pt denies significant respiratory, cardiovacular, GI, or muscular/skeletal complaints.      PHYSICAL EXAM:  BP (!) 132/90   Temp 98.7  F (37.1  C) (Oral)   Resp 18   LMP 2020   General appearance healthy, alert, active and no distress   Neuro:  denies headache and visual disturbances  Psych: Mentation normal and bright   Legs: 2+/2+, no clonus, no edema       Abdomen: gravid, single vertex fetus, non-tender, EFW 7 lbs. Pt is not khushbu    FETAL HEART TONES: baseline 130 with moderate FHRV variability and accelerations.  No decelerations present.      PELVIC EXAM: 1.5/ 40%/ -3  BLOODY SHOW:: no  Membranes as listed above    ASSESSMENT:  IUP @ 39 wks gestation  for induction of labor.  Indication AMA  NST  reactive   Parity: Multip  GBS positive and membranes intact      PLAN:  Admit - see IP orders  Pain medication undecided. Reviewed options.   Prophylactic antibiotic for + GBS status  Anticipate   Pt is on medicare -      Judith Lowe, MATTIEM, APRN MATTIEM

## 2020-12-23 NOTE — PLAN OF CARE
Patient here for IOL for AMA. Here with spouse, Jimmie. Plan for vaginal misoprostol. EFM as charted; VSS. Patient agreeable with plan. Anticipate .

## 2020-12-23 NOTE — PROGRESS NOTES
S:Ellyn is doing well. Comfortable and feeling contractions. Would like to have an SVE in next Miso placement. She and Jimmie had lunch.    O:  Blood pressure (!) 132/90, temperature 98.7  F (37.1  C), temperature source Oral, resp. rate 18, last menstrual period 2020, not currently breastfeeding.  General appearance: comfortable  CONTACTIONS: irregular.  Palpate: moderate  FETAL HEART TONES: baseline 135 with moderate FHR variability and    accelerations yes. Decelerations no.    NST: REACTIVE  ROM: not ruptured  PELVIC EXAM:deferred  Fetal Position:  Cephalic  Bloody show: No  Pitocin- none,  Antibiotics- PCN  Cervical ripening: misoprostol  ASSESSMENT:  ==============  IUP @ 39w0d IOL for AMA   Fetal Heart rate tracing category one  GBS- positive  [place] - Negative    PLAN:  ===========  comfort measures prn   cervical ripening with misoprostol  Pain medication - interested in epidural  Prophylactic antibiotic for + GBS status  Anticipate   reevaluate in 1 hours/PRN     ELMER Montaño    I was present with the CNM student who participated in the service and in the documentation of the services provided. I have verified the history and personally performed the physical exam and medical decision making, as documented by the student and edited by me.     Judith Lowe CNM, APRN KARRIE YOON

## 2020-12-23 NOTE — PROGRESS NOTES
S:Ellyn is feeling well. Feels the contractions but not as painful. She reports her previous pregnancy she was not feeling the contractions as much and she went from first stage to second stage quickly. FOB at bedside.  O:  Blood pressure 125/72, temperature 98.4  F (36.9  C), temperature source Oral, resp. rate 18, last menstrual period 2020, not currently breastfeeding.  General appearance: comfortable  CONTACTIONS: irregular.  Palpate: moderate  FETAL HEART TONES: baseline 145 with moderate FHR variability and    accelerations yes. Decelerations no.    NST: REACTIVE  ROM: not ruptured  PELVIC EXAM:deferred  Fetal Position:  Cephalic  Bloody show: No  Pitocin- none,  Antibiotics- PCN  Cervical ripening: misoprostol x2  ASSESSMENT:  ==============  IUP @ 39w0d early labor   Fetal Heart rate tracing category one  GBS- positive  Covid -19 - Negative   PLAN:  ===========  comfort measures prn   cervical ripening with misoprostol  Pain medication - per patient's needs, she might ask for an epidural; she will let us know.  Prophylactic antibiotic for + GBS status  Anticipate   reevaluate in 2-4 hours/PRN     ELMER Montaño    I was present with the CNM student who participated in the service and in the documentation of the services provided. I have verified the history and personally performed the physical exam and medical decision making, as documented by the student and edited by me.     Judith Lowe, KARRIE, APRN KARRIE YOON

## 2020-12-24 ENCOUNTER — ANESTHESIA EVENT (OUTPATIENT)
Dept: OBGYN | Facility: CLINIC | Age: 45
End: 2020-12-24
Payer: COMMERCIAL

## 2020-12-24 ENCOUNTER — ANESTHESIA (OUTPATIENT)
Dept: OBGYN | Facility: CLINIC | Age: 45
End: 2020-12-24
Payer: COMMERCIAL

## 2020-12-24 LAB
ALT SERPL W P-5'-P-CCNC: 18 U/L (ref 0–50)
AST SERPL W P-5'-P-CCNC: 23 U/L (ref 0–45)
CREAT SERPL-MCNC: 0.4 MG/DL (ref 0.52–1.04)
CREAT UR-MCNC: 60 MG/DL
ERYTHROCYTE [DISTWIDTH] IN BLOOD BY AUTOMATED COUNT: 12.9 % (ref 10–15)
GFR SERPL CREATININE-BSD FRML MDRD: >90 ML/MIN/{1.73_M2}
HCT VFR BLD AUTO: 34.4 % (ref 35–47)
HGB BLD-MCNC: 11.6 G/DL (ref 11.7–15.7)
MCH RBC QN AUTO: 30.1 PG (ref 26.5–33)
MCHC RBC AUTO-ENTMCNC: 33.7 G/DL (ref 31.5–36.5)
MCV RBC AUTO: 89 FL (ref 78–100)
PLATELET # BLD AUTO: 196 10E9/L (ref 150–450)
PROT UR-MCNC: 0.19 G/L
PROT/CREAT 24H UR: 0.31 G/G CR (ref 0–0.2)
RBC # BLD AUTO: 3.86 10E12/L (ref 3.8–5.2)
WBC # BLD AUTO: 12 10E9/L (ref 4–11)

## 2020-12-24 PROCEDURE — 3E0R3BZ INTRODUCTION OF ANESTHETIC AGENT INTO SPINAL CANAL, PERCUTANEOUS APPROACH: ICD-10-PCS | Performed by: ANESTHESIOLOGY

## 2020-12-24 PROCEDURE — 250N000011 HC RX IP 250 OP 636: Performed by: ADVANCED PRACTICE MIDWIFE

## 2020-12-24 PROCEDURE — 84460 ALANINE AMINO (ALT) (SGPT): CPT | Performed by: ADVANCED PRACTICE MIDWIFE

## 2020-12-24 PROCEDURE — 250N000013 HC RX MED GY IP 250 OP 250 PS 637: Performed by: ADVANCED PRACTICE MIDWIFE

## 2020-12-24 PROCEDURE — 82565 ASSAY OF CREATININE: CPT | Performed by: ADVANCED PRACTICE MIDWIFE

## 2020-12-24 PROCEDURE — 10907ZC DRAINAGE OF AMNIOTIC FLUID, THERAPEUTIC FROM PRODUCTS OF CONCEPTION, VIA NATURAL OR ARTIFICIAL OPENING: ICD-10-PCS | Performed by: ADVANCED PRACTICE MIDWIFE

## 2020-12-24 PROCEDURE — 258N000003 HC RX IP 258 OP 636: Performed by: ADVANCED PRACTICE MIDWIFE

## 2020-12-24 PROCEDURE — 250N000009 HC RX 250: Performed by: ANESTHESIOLOGY

## 2020-12-24 PROCEDURE — 59400 OBSTETRICAL CARE: CPT | Performed by: ADVANCED PRACTICE MIDWIFE

## 2020-12-24 PROCEDURE — 722N000001 HC LABOR CARE VAGINAL DELIVERY SINGLE

## 2020-12-24 PROCEDURE — 250N000011 HC RX IP 250 OP 636: Performed by: STUDENT IN AN ORGANIZED HEALTH CARE EDUCATION/TRAINING PROGRAM

## 2020-12-24 PROCEDURE — 00HU33Z INSERTION OF INFUSION DEVICE INTO SPINAL CANAL, PERCUTANEOUS APPROACH: ICD-10-PCS | Performed by: ANESTHESIOLOGY

## 2020-12-24 PROCEDURE — 120N000002 HC R&B MED SURG/OB UMMC

## 2020-12-24 PROCEDURE — 85027 COMPLETE CBC AUTOMATED: CPT | Performed by: ADVANCED PRACTICE MIDWIFE

## 2020-12-24 PROCEDURE — 84450 TRANSFERASE (AST) (SGOT): CPT | Performed by: ADVANCED PRACTICE MIDWIFE

## 2020-12-24 PROCEDURE — 36415 COLL VENOUS BLD VENIPUNCTURE: CPT | Performed by: ADVANCED PRACTICE MIDWIFE

## 2020-12-24 PROCEDURE — 84156 ASSAY OF PROTEIN URINE: CPT | Performed by: ADVANCED PRACTICE MIDWIFE

## 2020-12-24 RX ORDER — CALCIUM CARBONATE 500 MG/1
1000 TABLET, CHEWABLE ORAL 3 TIMES DAILY PRN
Status: DISCONTINUED | OUTPATIENT
Start: 2020-12-24 | End: 2020-12-25 | Stop reason: HOSPADM

## 2020-12-24 RX ORDER — OXYTOCIN/0.9 % SODIUM CHLORIDE 30/500 ML
100 PLASTIC BAG, INJECTION (ML) INTRAVENOUS CONTINUOUS
Status: DISCONTINUED | OUTPATIENT
Start: 2020-12-24 | End: 2020-12-25 | Stop reason: HOSPADM

## 2020-12-24 RX ORDER — OXYTOCIN/0.9 % SODIUM CHLORIDE 30/500 ML
PLASTIC BAG, INJECTION (ML) INTRAVENOUS
Status: DISCONTINUED
Start: 2020-12-24 | End: 2020-12-24 | Stop reason: HOSPADM

## 2020-12-24 RX ORDER — ESCITALOPRAM OXALATE 10 MG/1
10 TABLET ORAL DAILY
Status: DISCONTINUED | OUTPATIENT
Start: 2020-12-24 | End: 2020-12-25 | Stop reason: HOSPADM

## 2020-12-24 RX ORDER — ACETAMINOPHEN 325 MG/1
650 TABLET ORAL EVERY 4 HOURS PRN
Status: DISCONTINUED | OUTPATIENT
Start: 2020-12-24 | End: 2020-12-25 | Stop reason: HOSPADM

## 2020-12-24 RX ORDER — OXYTOCIN/0.9 % SODIUM CHLORIDE 30/500 ML
340 PLASTIC BAG, INJECTION (ML) INTRAVENOUS CONTINUOUS PRN
Status: DISCONTINUED | OUTPATIENT
Start: 2020-12-24 | End: 2020-12-25 | Stop reason: HOSPADM

## 2020-12-24 RX ORDER — EPHEDRINE SULFATE 50 MG/ML
5 INJECTION, SOLUTION INTRAMUSCULAR; INTRAVENOUS; SUBCUTANEOUS
Status: DISCONTINUED | OUTPATIENT
Start: 2020-12-24 | End: 2020-12-24

## 2020-12-24 RX ORDER — MISOPROSTOL 200 UG/1
TABLET ORAL
Status: DISCONTINUED
Start: 2020-12-24 | End: 2020-12-24 | Stop reason: HOSPADM

## 2020-12-24 RX ORDER — AMOXICILLIN 250 MG
2 CAPSULE ORAL 2 TIMES DAILY
Status: DISCONTINUED | OUTPATIENT
Start: 2020-12-24 | End: 2020-12-25 | Stop reason: HOSPADM

## 2020-12-24 RX ORDER — OXYTOCIN 10 [USP'U]/ML
10 INJECTION, SOLUTION INTRAMUSCULAR; INTRAVENOUS
Status: DISCONTINUED | OUTPATIENT
Start: 2020-12-24 | End: 2020-12-25 | Stop reason: HOSPADM

## 2020-12-24 RX ORDER — OXYTOCIN 10 [USP'U]/ML
INJECTION, SOLUTION INTRAMUSCULAR; INTRAVENOUS
Status: DISCONTINUED
Start: 2020-12-24 | End: 2020-12-24 | Stop reason: HOSPADM

## 2020-12-24 RX ORDER — NALBUPHINE HYDROCHLORIDE 10 MG/ML
2.5-5 INJECTION, SOLUTION INTRAMUSCULAR; INTRAVENOUS; SUBCUTANEOUS EVERY 6 HOURS PRN
Status: DISCONTINUED | OUTPATIENT
Start: 2020-12-24 | End: 2020-12-24

## 2020-12-24 RX ORDER — AMOXICILLIN 250 MG
1 CAPSULE ORAL 2 TIMES DAILY
Status: DISCONTINUED | OUTPATIENT
Start: 2020-12-24 | End: 2020-12-25 | Stop reason: HOSPADM

## 2020-12-24 RX ORDER — CARBOPROST TROMETHAMINE 250 UG/ML
250 INJECTION, SOLUTION INTRAMUSCULAR
Status: DISCONTINUED | OUTPATIENT
Start: 2020-12-24 | End: 2020-12-25 | Stop reason: HOSPADM

## 2020-12-24 RX ORDER — LIDOCAINE HYDROCHLORIDE 10 MG/ML
INJECTION, SOLUTION EPIDURAL; INFILTRATION; INTRACAUDAL; PERINEURAL
Status: DISCONTINUED
Start: 2020-12-24 | End: 2020-12-24 | Stop reason: HOSPADM

## 2020-12-24 RX ORDER — VENLAFAXINE HYDROCHLORIDE 150 MG/1
150 CAPSULE, EXTENDED RELEASE ORAL DAILY
Status: DISCONTINUED | OUTPATIENT
Start: 2020-12-24 | End: 2020-12-25 | Stop reason: HOSPADM

## 2020-12-24 RX ORDER — BISACODYL 10 MG
10 SUPPOSITORY, RECTAL RECTAL DAILY PRN
Status: DISCONTINUED | OUTPATIENT
Start: 2020-12-26 | End: 2020-12-25 | Stop reason: HOSPADM

## 2020-12-24 RX ORDER — MODIFIED LANOLIN
OINTMENT (GRAM) TOPICAL
Status: DISCONTINUED | OUTPATIENT
Start: 2020-12-24 | End: 2020-12-25 | Stop reason: HOSPADM

## 2020-12-24 RX ORDER — LIDOCAINE HYDROCHLORIDE AND EPINEPHRINE 15; 5 MG/ML; UG/ML
INJECTION, SOLUTION EPIDURAL PRN
Status: DISCONTINUED | OUTPATIENT
Start: 2020-12-24 | End: 2020-12-26 | Stop reason: HOSPADM

## 2020-12-24 RX ORDER — HYDROCORTISONE 2.5 %
CREAM (GRAM) TOPICAL 3 TIMES DAILY PRN
Status: DISCONTINUED | OUTPATIENT
Start: 2020-12-24 | End: 2020-12-25 | Stop reason: HOSPADM

## 2020-12-24 RX ORDER — IBUPROFEN 800 MG/1
800 TABLET, FILM COATED ORAL EVERY 6 HOURS PRN
Status: DISCONTINUED | OUTPATIENT
Start: 2020-12-24 | End: 2020-12-25 | Stop reason: HOSPADM

## 2020-12-24 RX ADMIN — CALCIUM CARBONATE (ANTACID) CHEW TAB 500 MG 1000 MG: 500 CHEW TAB at 00:28

## 2020-12-24 RX ADMIN — SODIUM CHLORIDE, POTASSIUM CHLORIDE, SODIUM LACTATE AND CALCIUM CHLORIDE 1000 ML: 600; 310; 30; 20 INJECTION, SOLUTION INTRAVENOUS at 01:54

## 2020-12-24 RX ADMIN — ESCITALOPRAM OXALATE 10 MG: 10 TABLET ORAL at 11:00

## 2020-12-24 RX ADMIN — CALCIUM CARBONATE (ANTACID) CHEW TAB 500 MG 1000 MG: 500 CHEW TAB at 21:11

## 2020-12-24 RX ADMIN — DOCUSATE SODIUM AND SENNOSIDES 2 TABLET: 8.6; 5 TABLET ORAL at 20:09

## 2020-12-24 RX ADMIN — IBUPROFEN 800 MG: 800 TABLET, FILM COATED ORAL at 20:09

## 2020-12-24 RX ADMIN — Medication 2.5 MILLION UNITS: at 03:17

## 2020-12-24 RX ADMIN — VENLAFAXINE HYDROCHLORIDE 150 MG: 150 CAPSULE, EXTENDED RELEASE ORAL at 11:00

## 2020-12-24 RX ADMIN — LIDOCAINE HYDROCHLORIDE,EPINEPHRINE BITARTRATE 3 ML: 15; .005 INJECTION, SOLUTION EPIDURAL; INFILTRATION; INTRACAUDAL; PERINEURAL at 02:30

## 2020-12-24 RX ADMIN — Medication 2.5 MILLION UNITS: at 07:05

## 2020-12-24 RX ADMIN — IBUPROFEN 800 MG: 800 TABLET, FILM COATED ORAL at 14:19

## 2020-12-24 RX ADMIN — SODIUM CHLORIDE, POTASSIUM CHLORIDE, SODIUM LACTATE AND CALCIUM CHLORIDE 500 ML: 600; 310; 30; 20 INJECTION, SOLUTION INTRAVENOUS at 02:07

## 2020-12-24 RX ADMIN — Medication: at 02:37

## 2020-12-24 RX ADMIN — Medication 10 ML/HR: at 02:31

## 2020-12-24 RX ADMIN — IBUPROFEN 800 MG: 800 TABLET, FILM COATED ORAL at 07:52

## 2020-12-24 NOTE — ANESTHESIA PREPROCEDURE EVALUATION
"Anesthesia Pre-Procedure Evaluation    Patient: Ellyn Mcgee   MRN:     [id number] Gender:   female   Age:    45 year old :      1975        Preoperative Diagnosis: * No surgery found *        LABS:  CBC:   Lab Results   Component Value Date    WBC 7.2 2020    WBC 7.0 2020    HGB 12.7 2020    HGB 12.7 2020    HCT 37.8 2020    HCT 37.6 2020     2020     2020     BMP:   Lab Results   Component Value Date     2019     2018    POTASSIUM 3.7 2019    POTASSIUM 4.2 2018    CHLORIDE 106 2019    CHLORIDE 108 2018    CO2 28 2019    CO2 23 2018    BUN 9 2019    BUN 16 2018    CR 0.70 2019    CR 0.63 2018    GLC 89 2019    GLC 94 2018     COAGS: No results found for: PTT, INR, FIBR  POC:   Lab Results   Component Value Date     (H) 2010    HCG Negative 2011     OTHER:   Lab Results   Component Value Date    A1C 5.3 2019    RAJINDER 9.2 2019    ALBUMIN 4.4 2019    PROTTOTAL 7.7 2019    ALT 41 2019    AST 32 2019    ALKPHOS 54 2019    BILITOTAL 0.3 2019    LIPASE 69 2011    TSH 1.91 2011    T4 0.86 2010    CRP 9.6 (H) 2010    SED 22 (H) 2010        Preop Vitals    BP Readings from Last 3 Encounters:   20 135/87   20 124/70   20 120/72    Pulse Readings from Last 3 Encounters:   20 87   20 69   20 84      Resp Readings from Last 3 Encounters:   20 16   20 16   20 16    SpO2 Readings from Last 3 Encounters:   20 97%   07/15/20 99%   20 99%      Temp Readings from Last 1 Encounters:   20 36.5  C (97.7  F) (Oral)    Ht Readings from Last 1 Encounters:   10/06/20 1.607 m (5' 3.25\")      Wt Readings from Last 1 Encounters:   20 86.9 kg (191 lb 8 oz)    Estimated body mass index is 33.66 kg/m  as " "calculated from the following:    Height as of 10/6/20: 1.607 m (5' 3.25\").    Weight as of 12/16/20: 86.9 kg (191 lb 8 oz).     LDA:  Peripheral IV 12/23/20 Left Lower forearm (Active)   Site Assessment WDL 12/23/20 2323   Line Status Infusing 12/23/20 2323   Phlebitis Scale 0-->no symptoms 12/23/20 2323   Infiltration Scale 0 12/23/20 2323   Infiltration Site Treatment Method  None 12/23/20 2323   Number of days: 1        Past Medical History:   Diagnosis Date     Abnormal Pap smear     Colposcopy     Adjustment disorder with mixed anxiety and depressed mood 4/4/2012     Anemia     In Past     Anxiety      Chlamydia trachomatis infection of other specified site 1993     Depression      Fertility problem      Lyme disease 9/8/2010    ?false positive vs positve CMV - resolved     Moderate dysplasia of cervix 1995     Other and unspecified adverse effect of drug, medicinal and biological substance     insulin resistent     Varicosities      Wounds and injuries     fall down stairs, PT for back, pain meds      Past Surgical History:   Procedure Laterality Date     CONIZATION CERVIX,KNIFE/LASER  1995     SURGICAL HISTORY OF -       [facility] teeth      Allergies   Allergen Reactions     Codeine Itching     Codeine      itching       Cyclogyl [Cyclopentolate Hcl] Nausea and Vomiting     Cyclopentolate      Hydrocodone      anxiety  itching     Hydrocodone-Acetaminophen      Seasonal Allergies         Anesthesia Evaluation       history and physical reviewed .      No history of anesthetic complications          ROS/MED HX    ENT/Pulmonary:  - neg pulmonary ROS     Neurologic: Comment: H/O Anxiety - neg neurologic ROS     Cardiovascular:  - neg cardiovascular ROS       METS/Exercise Tolerance:     Hematologic:         Musculoskeletal:         GI/Hepatic:         Renal/Genitourinary:         Endo:         Psychiatric:         Infectious Disease:         Malignancy:         Other:                         PHYSICAL EXAM: "   Mental Status/Neuro: A/A/O   Airway: Facies: Feasible  Mallampati: I  Mouth/Opening: Full  TM distance: > 6 cm  Neck ROM: Full   Respiratory: Auscultation: CTAB     Resp. Rate: Normal     Resp. Effort: Normal      CV: Rhythm: Regular  Rate: Age appropriate  Heart: Normal Sounds  Edema: None   Comments:      Dental: Normal Dentition                Assessment:   ASA SCORE: 2            PONV Management: Adult Risk Factors: Female             neg OB VIDYA Orantes MD

## 2020-12-24 NOTE — PROGRESS NOTES
"  S:Ellyn is doing great, feeling the contractions more and describes as \"painful.\" Has questions about pitocin. FOB at bedside.    O:  Blood pressure 125/72, temperature 98.4  F (36.9  C), temperature source Oral, resp. rate 18, last menstrual period 2020, not currently breastfeeding.  General appearance: comfortable  CONTACTIONS: Contractions every 1-3 minutes.  Palpate: moderate  FETAL HEART TONES: baseline 135 with moderate FHR variability and    accelerations yes. Decelerations no.    NST: REACTIVE  ROM: not ruptured  PELVIC EXAM:PELVIC EXAM: 3/ 50%/ Mid/ average/ -2   Fetal Position:  Cephalic BSUS  Bloody show: No  Pitocin- none,  Antibiotics- PCN  Cervical ripening: misoprostol x2  ASSESSMENT:  ==============  IUP @ 39w0d early labor and IOL for AMA  Fetal Heart rate tracing category one  GBS- positive  Covid - Negative   PLAN:  ===========  comfort measures prn   Pain medication - is interested in epidural  Prophylactic antibiotic for + GBS status  Anticipate   She is hoping to avoid pitocin as augmentation as she didn't like how she felt 14 years ago, she is open to it. Patient agrees to start the pitocin now.  reevaluate in 2-4 hours/PRN     ELMER Montaño    I was present with the MATTIEM student who participated in the service and in the documentation of the services provided. I have verified the history and personally performed the physical exam and medical decision making, as documented by the student and edited by me.     Judith Lowe, KARRIE, APRN KARRIE PHELPSM      "

## 2020-12-24 NOTE — PLAN OF CARE
Patient is progressing with her IOL. Pitocin started at . Adequate GBS treatment as of . Will let nurse know when ready for epidural. Anticipate .

## 2020-12-24 NOTE — PROGRESS NOTES
S: Ellyn has had elevated BPs since delivery. Denies H/A, vision changes, RUQ pain.     O:/62   Temp 98.6  F (37  C) (Oral)   Resp 18   LMP 03/25/2020   SpO2 96%   Breastfeeding Unknown   Pre-e labs drawn: Pr/Cr 0.31, others WNL    A/P:  Pre-eclampsia without SF  Will continue to monitor postpartum as ordered.     Loco Cheng CNM

## 2020-12-24 NOTE — L&D DELIVERY NOTE
OB Vaginal Delivery Note    Ellyn Mcgee MRN# [id number]   Age: 45 year old YOB: 1975     Ellyn was admitted for induction of labor for AMA. She received 2 doses of misoprostol and then pitocin. This morning, CNM called to room because patient feeling pressure. Found to be complete except piece of cervix anteriorly that was stuck between pubic bone and baby's head. Cervix easily reduced behind pubic bone, and Ellyn pushed with the next contraction. Baby brought to a crown with one contraction, and delivered easily on the next. Male infant with spontaneous cry at delivery, and placed on Ellyn's abdomen. Delayed cord clamping done for 3 min, then clamped twice and cut by Jimmie. Pitocin started for active management of third stage. Placenta delivered easily via [place]. Perineum with small 1st degree lac at introitus, not repaired as it was hemostatic and easily reapproximated. Small right periurethral abrasion noted as well. Fundal massage done, minimal bleeding. Mom and baby were stable in the delivery room.    GA: 39w1d  GP:   Labor Complications: GBS   EBL:   mL  Delivery QBL: 111 mL  Delivery Type: Vaginal, Spontaneous   ROM to Delivery Time: (Delivered) Hours: 5 Minutes: 59   Weight:     1 Minute 5 Minute 10 Minute   Apgar Totals: 9   9        CECILLE ARECHIGA;NAMRATA PERDOMO;NEGAR LANDON     Delivery Details:  Ellyn Mcgee, a 45 year old  female delivered a viable infant with apgars of 9  and 9 . Patient was fully dilated and pushing after 5  hours 50  minutes in active labor. Delivery was via vaginal, spontaneous  to a sterile field under epidural  anesthesia. Infant delivered in vertex  left  occiput  anterior  position. Anterior and posterior shoulders delivered without difficulty. The cord was clamped, cut twice and 3 vessels  were noted. Cord blood was obtained in routine fashion with the following disposition: lab .      Cord complications: none   Placenta delivered  at 2020  7:35 AM . Placental disposition was Hospital disposal . Fundal massage performed and fundus found to be firm.     Episiotomy: none    Perineum, vagina, cervix were inspected, and the following lacerations were noted:   Perineal lacerations: 1st              Excellent hemostasis was noted. Needle count correct. Infant and patient in delivery room in good and stable condition.        Chencho Mcgee [[id number]]    Labor Event Times    Labor onset date: 20 Onset time:  1:26 AM   Dilation complete date: 20 Complete time:  7:16 AM   Start pushing date/time: 2020 0721      Labor Length    1st Stage (hrs): 5 (min): 50   2nd Stage (hrs): 0 (min): 9   3rd Stage (hrs): 0 (min): 10      Labor Events     labor?: No  Labor Type: Induction/Cervical ripening     Antibiotics received during labor?: Yes  Reason for Antibiotics: GBS  Antibiotics received for GBS: Penicillin     Rupture identifier: Sac 1  Rupture date/time: 20 0126   Rupture type: Artificial Rupture of Membranes  Fluid color: Clear  Fluid odor: Normal     Induction: Misoprostol  Induction date/time:     Cervical ripening date/time:        Delivery/Placenta Date and Time    Delivery Date: 20 Delivery Time:  7:25 AM   Placenta Date/Time: 2020  7:35 AM  Oxytocin given at the time of delivery: after delivery of baby     Vaginal Counts     Initial count performed by 2 team members:  Two Team Members   KARRIE Cortez RN       Needles Suture [place] Sponges Instruments   Initial counts 2  5    Added to count       Final counts 2  5    Placed during labor Accounted for at the end of labor   No    No    No     Final count performed by 2 team members:  Two Team Members   KARRIE Cortez RN      Final count correct?: Yes     Apgars    Living status: Living   1 Minute 5 Minute 10 Minute 15 Minute 20 Minute   Skin color: 1  1       Heart rate: 2  2       Reflex irritability: 2  2        Muscle tone: 2  2       Respiratory effort: 2  2       Total: 9  9       Apgars assigned by: ARLETTE HASSAN RN     Cord    Vessels: 3 Vessels Complications: None   Cord Blood Disposition: Lab Gases Sent?: No      [place] Resuscitation    Methods: None   Care at Delivery: Infant with spontaneous cry to mothers' abdomen where he was further dried and stimulated  Output in Delivery Room: Voided, Stool     Skin to Skin and Feeding Plan    Skin to skin initiation date/time: 1841    Skin to skin with: Mother  Skin to skin end date/time:     Breastfeeding initiated date/time: 2020 0800  How do you plan to feed your baby: Breastfeeding     Labor Events and Shoulder Dystocia    Fetal Tracing Prior to Delivery: Category 1     Delivery (Maternal) (Provider to Complete) ([id number])    Episiotomy: None  Perineal lacerations: 1st Repaired?: No      Blood Loss  Mother: Ellyn Mcgee Heaven #[id number]   Start of Mother's Information    IO Blood Loss  20 0126 - 20 0812    Delivery QBL (mL) Hospital Encounter 111 mL    Total  111 mL         End of Mother's Information  Mother: Ellyn Mcgee Heaven #[id number]          Delivery - Provider to Complete ([id number])    Delivering clinician: Engdahl, Loco L, CNM  CNM Care: Exclusive CNM care in labor  Attempted Delivery Types (Choose all that apply): Spontaneous Vaginal Delivery  Delivery Type (Choose the 1 that will go to the Birth History): Vaginal, Spontaneous                   Other personnel:  Provider Role   Loco Cheng CNM Certified Nurse Midwife   Dona Corbett RN Registered Nurse   Arlette Jacques RN Registered Nurse                Placenta    Delayed Cord Clamping: Done  Date/Time: 2020  7:35 AM  Removal: Spontaneous  Disposition: Hospital disposal           Anesthesia    Method: Epidural                Presentation and Position    Presentation: Vertex    Position: Left Occiput Anterior                 Loco Cheng CNM

## 2020-12-24 NOTE — PROGRESS NOTES
S:Ellyn is awake, doing well. She is not feeling the contractions that are happening every three minutes. Jimmie is sleeping.     O:  Blood pressure 133/89, temperature 98.5  F (36.9  C), temperature source Oral, resp. rate 16, last menstrual period 2020, not currently breastfeeding.  General appearance: comfortable  CONTACTIONS: Contractions every 3 minutes.  Palpate: moderate  FETAL HEART TONES: baseline 135 with moderate FHR variability and    accelerations yes. Decelerations no.    NST: REACTIVE  ROM: AROM, clear fluid  PELVIC EXAM:PELVIC EXAM: 5/ 60%/ Mid/ average/ -1   Fetal Position:  Cephalic BSUS  Bloody show: No  Pitocin- 8mL/hr,  Antibiotics- PCN  Cervical ripening: misoprostol    0957: Misoprostol placed Cx 1.5/40/-3  0100: 2nd dose misoprostol placed  1815: Cvx 3/50/-2. Pitocin started  1900: GBS is adequately treated with PCN  2115: comfortable, not khushbu on 4mu of pitocin continue to tritrate pitocin until adequate labor.   0126: AROM, clear fluid    ASSESSMENT:  ==============  IUP @ 39w1d active labor   Fetal Heart rate tracing category one  GBS- positive  Covid - negative    PLAN:  ===========  comfort measures prn   Pain medication - will to get an epidural as soon as feels painful contractions  Prophylactic antibiotic for + GBS status  Anticipate   Labor augmentation with Pitocin  reevaluate in 2-4 hours/PRN     ELMER Montaño    I was present with the MATTIEM student who participated in the service and in the documentation of the services provided. I have verified the history and personally performed the physical exam and medical decision making, as documented by the student and edited by me.     Judith Lowe, KARRIE, APRN KARRIE YOON

## 2020-12-24 NOTE — PLAN OF CARE
Pt. Transferred to [facility] after vaginal delivery. Oriented to room and unit. ID bands checked with second RNDona.

## 2020-12-24 NOTE — PLAN OF CARE
Labor Shift Note  Data: Contraction frequency q 2-3 minutes, palpate mild. Fetal assessment category one.   Vitals:    20 0400 20 0413 20 0452 20 0522   BP: 137/80 (!) 141/83 (!) 147/72 134/84   Resp: 16  16 16   Temp: 98.5  F (36.9  C)  98.1  F (36.7  C)    TempSrc: Oral  Oral    SpO2: 95% 93%     . No intake/output data recorded..  Leaking moderate amount of clear fluid.  Signs and symptoms of infection absent , elevated blood pressures noted after epidural placement no severe range, denied pre E symptoms  at bedside  Interventions: Continue uterine/fetal assessment continuously. Vital Signs per order set. Comfort measures provided. Epidural in place  Plan: Anticipate . Provide labor/coping assistance as needed by patient and support person.  Observe for and notify care provider of indications of progressing labor, need for pain medications,  or signs of fetal/maternal compromise.

## 2020-12-24 NOTE — PROVIDER NOTIFICATION
12/24/20 0710   Provider Notification   Provider Name/Title Engdahl   Method of Notification Electronic Page   Request Evaluate in Person   Notification Reason Labor Status;Uterine Activity   Engdahl CNM paged and notified of pt feeling pressure

## 2020-12-24 NOTE — PROGRESS NOTES
S:Ellyn is comfortable with an epidural and took a nap.  O:  Blood pressure (!) 141/85, temperature 97.7  F (36.5  C), temperature source Oral, resp. rate 16, last menstrual period 2020, SpO2 94 %, not currently breastfeeding.  General appearance: comfortable  CONTACTIONS: Contractions every 3 minutes.  Palpate: mild  FETAL HEART TONES: baseline 140 with moderate FHR variability and    accelerations yes. Decelerations no.    NST: REACTIVE  ROM: clear fluid  PELVIC EXAM:PELVIC EXAM: 5/ 70%/ Mid/ average/ -1   Fetal Position:  Cephalic BSUS  Bloody show: No  Pitocin- 8 mL/hr,  Antibiotics- PCN  Cervical ripening: misoprostol     0957: Misoprostol placed Cx 1.5/40/-3  0100: 2nd dose misoprostol placed  1815: Cvx 3/50/-2. Pitocin started  1900: GBS is adequately treated with PCN  2115: comfortable, not khushbu on 4mu of pitocin continue to tritrate pitocin until adequate labor.   0126: AROM, clear fluid  0231: Epidural placed      ASSESSMENT:  ==============  IUP @ 39w1d active labor   Fetal Heart rate tracing category one  GBS- positive  Covid - negative   PLAN:  ===========  comfort measures prn   Pain medication - epidural  Prophylactic antibiotic for + GBS status  Anticipate   Labor augmentation with Pitocin  reevaluate in 2-4 hours/PRN     ELMER Montaño    I was present with the CNM student who participated in the service and in the documentation of the services provided. I have verified the history and personally performed the physical exam and medical decision making, as documented by the student and edited by me.     Judith Lowe, KARRIE, APRN KARRIE PHELPSM

## 2020-12-24 NOTE — PLAN OF CARE
Data: Ellyn Mcgee transferred to 7136 via wheelchair at 0920. Baby transferred via parent's arms.  Action: Receiving unit notified of transfer: Yes. Patient and family notified of room change. Report given to Mague YOUNGER at 0935. Belongings sent to receiving unit. Accompanied by Registered Nurse. Oriented patient to surroundings. Call light within reach. ID bands double-checked with receiving RN.  Response: Patient tolerated transfer and is stable.

## 2020-12-24 NOTE — PLAN OF CARE
Vaginal Delivery Note   of viable Male with Loco Cheng CNM, in attendance.  NICU/Nursery RN Arlette Morrissey present.  Infant with spontaneous cry, to mother's abdomen, dried and stimulated.  Placenta delivered with out complication, 1st degree laceration, without repair, latisha cares provided.  Mother and baby in stable condition.

## 2020-12-24 NOTE — ANESTHESIA PROCEDURE NOTES
Epidural Procedure Note      Staff -   Anesthesiologist:  Luis Cerda MD  Performed By: anesthesiologist    Location: OB     Procedure start time:  12/24/2020 2:12 AM   Pre-procedure checklist:   patient identified, IV checked, site marked, risks and benefits discussed, informed consent, monitors and equipment checked, pre-op evaluation, at physician/surgeon's request and post-op pain management      Correct Patient: Yes      Correct Position: Yes      Correct Site: Yes      Correct Procedure: Yes      Correct Laterality:  Yes    Site Marked:  Yes  Procedure:     Procedure:  Epidural catheter    ASA:  2    Diagnosis:  Labor    Position:  Sitting    Sterile Prep: chloraprep      Insertion site:  L2-3    Local skin infiltration:  1% lidocaine    amount (mL):  2    Approach:  Midline    Needle gauge (G):  17    Needle Length (in):  3.5    Block Needle Type:  Krisuhmarilyn    Injection Technique:  LORT saline    TAYLOR at (cm):  4.2    Attempts:  1    Redirects:  0    Catheter gauge (G):  19    Catheter threaded easily: Yes      Threaded to cm at skin:  8    Threaded in epidural space (cm):  3.8    Paresthesias:  No    Aspiration negative for Heme or CSF: Yes      Test dose (mL):  3     Local anesthetic:  Lidocaine 1.5% w/ 1:200,000 epinephrine    Test dose time:  02:28    Test dose negative for signs of intravascular, subdural or intrathecal injection: Yes

## 2020-12-24 NOTE — PROGRESS NOTES
S:Elham is doing well, relaxing. Contractions spaced out. FOB at bedside sleeping on the recliner.    O:  Blood pressure 121/72, temperature 98  F (36.7  C), temperature source Axillary, resp. rate 18, last menstrual period 2020, not currently breastfeeding.  General appearance: comfortable  CONTACTIONS: Irregular.  Palpate: mild  FETAL HEART TONES: baseline 140 with moderate FHR variability and    accelerations yes. Decelerations no.    NST: REACTIVE  ROM: not ruptured  PELVIC EXAM:deferred  Fetal Position:  Cephalic BSUS  Bloody show: No  Pitocin- 4 mL/hr,  Antibiotics- PCN, adequately treated.  Cervical ripening: misoprostol x2    0957: Misoprostol placed Cx 1.5/40/-3  0100: 2nd dose misoprostol placed  : Cvx 3/50/-2. Pitocin started  : GBS is adequately treated with PCN  : comfortable, not khushbu on 4mu of pitocin continue to tritrate pitocin until adequate labor.         ASSESSMENT:  ==============  IUP @ 39w0d IOL for AMA.  Fetal Heart rate tracing category one  GBS- positive  Covid - negative   PLAN:  ===========  comfort measures prn   Pain medication - plans to get an epidural  Prophylactic antibiotic for + GBS status  Anticipate   Labor augmentation with Pitocin  reevaluate in 2-4 hours/PRN     ELMER Montaño    I was present with the CNM student who participated in the service and in the documentation of the services provided. I have verified the history and personally performed the physical exam and medical decision making, as documented by the student and edited by me.     Judith Lowe, KARRIE, APRN MATTIEM MATTIEM

## 2020-12-25 VITALS
DIASTOLIC BLOOD PRESSURE: 75 MMHG | SYSTOLIC BLOOD PRESSURE: 126 MMHG | TEMPERATURE: 98 F | HEART RATE: 72 BPM | OXYGEN SATURATION: 98 % | RESPIRATION RATE: 16 BRPM

## 2020-12-25 LAB — HGB BLD-MCNC: 11.4 G/DL (ref 11.7–15.7)

## 2020-12-25 PROCEDURE — 250N000013 HC RX MED GY IP 250 OP 250 PS 637: Performed by: ADVANCED PRACTICE MIDWIFE

## 2020-12-25 PROCEDURE — 85018 HEMOGLOBIN: CPT | Performed by: ADVANCED PRACTICE MIDWIFE

## 2020-12-25 PROCEDURE — 36415 COLL VENOUS BLD VENIPUNCTURE: CPT | Performed by: ADVANCED PRACTICE MIDWIFE

## 2020-12-25 RX ORDER — AMOXICILLIN 250 MG
1 CAPSULE ORAL DAILY
Qty: 100 TABLET | Refills: 0 | COMMUNITY
Start: 2020-12-25 | End: 2021-01-22

## 2020-12-25 RX ORDER — ACETAMINOPHEN 325 MG/1
650 TABLET ORAL EVERY 6 HOURS PRN
Qty: 100 TABLET | Refills: 0 | COMMUNITY
Start: 2020-12-25 | End: 2021-01-22

## 2020-12-25 RX ORDER — IBUPROFEN 600 MG/1
600 TABLET, FILM COATED ORAL EVERY 6 HOURS PRN
Qty: 60 TABLET | Refills: 0 | COMMUNITY
Start: 2020-12-25 | End: 2021-01-22

## 2020-12-25 RX ADMIN — ESCITALOPRAM OXALATE 10 MG: 10 TABLET ORAL at 08:09

## 2020-12-25 RX ADMIN — DOCUSATE SODIUM AND SENNOSIDES 1 TABLET: 8.6; 5 TABLET ORAL at 08:09

## 2020-12-25 RX ADMIN — IBUPROFEN 800 MG: 800 TABLET, FILM COATED ORAL at 01:57

## 2020-12-25 RX ADMIN — VENLAFAXINE HYDROCHLORIDE 150 MG: 150 CAPSULE, EXTENDED RELEASE ORAL at 08:09

## 2020-12-25 RX ADMIN — IBUPROFEN 800 MG: 800 TABLET, FILM COATED ORAL at 08:09

## 2020-12-25 NOTE — DISCHARGE SUMMARY
[facility]     Discharge Summary  Obstetrics    Date of Admission:  2020  Date of Discharge:  2020 10:29 AM  Discharging Provider: Carmen Morejon    Discharge Diagnoses       History of Present Illness   Ellyn Mcgee is a 45 year old female who presented with IOL secondary to AMA. She progressed well to  of baby boy. She has had no complications and is requesting to be discharged home. She has good questions about discharge, no concerns. She feels well. Bleeding is normal. Breastfeeding is getting well established. Pain is well controlled. She is thinking about a vasectomy versus IUD for birth control. Plans to speak with her primary care provider about it. Knows IUDs get placed at 8 weeks instead of 6 weeks. Plans a visit with her for baby on Monday. Discussed BP check at that appointment for her. Will let us know if BP is elevated. She has some elevated here but mostly normal and normal labs and no symptoms of postpartum pre-eclampsia. She is aware of the symptoms and will follow up if needed.     Hospital Course   The patient's hospital course was unremarkable outside of her few elevated blood pressures.  She recovered as anticipated and experienced no post-delivery complications.  On discharge, her pain was well controlled. Vaginal bleeding is similar to peak menstrual flow.  Voiding without difficulty.  Ambulating well and tolerating a normal diet.  No fevers.  Breastfeeding well.  Infant is stable.  She was discharged on post-partum day #1.    Post-partum hemoglobin:   Hemoglobin   Date Value Ref Range Status   2020 11.4 (L) 11.7 - 15.7 g/dL Final       [place] Depression Scale  Thoughts of Harming Self: 0-->never  Total Score: 7      Carmen Morejon, APRN CN    Discharge Disposition   Discharged to home   Condition at discharge: Stable    Primary Care Physician   Dorothy Guaman    Consultations This Hospital Stay   HOME CARE  POST PARTUM/ IP CONSULT  ANESTHESIOLOGY IP CONSULT  LACTATION IP CONSULT    Discharge Orders   No discharge procedures on file.  Discharge Medications   Discharge Medication List as of 2020 10:22 AM      START taking these medications    Details   !! acetaminophen (TYLENOL) 325 MG tablet Take 2 tablets (650 mg) by mouth every 6 hours as needed for mild pain Start after Delivery., Disp-100 tablet, R-0, OTC      ibuprofen (ADVIL/MOTRIN) 600 MG tablet Take 1 tablet (600 mg) by mouth every 6 hours as needed for moderate pain Start after delivery, Disp-60 tablet, R-0, OTC      senna-docusate (SENOKOT-S/PERICOLACE) 8.6-50 MG tablet Take 1 tablet by mouth daily Start after delivery., Disp-100 tablet, R-0, OTC       !! - Potential duplicate medications found. Please discuss with provider.      CONTINUE these medications which have NOT CHANGED    Details   !! Acetaminophen (TYLENOL PO) PRN, Historical      escitalopram (LEXAPRO) 10 MG tablet Take 1 tablet (10 mg) by mouth daily, Disp-90 tablet, R-0, E-Prescribe      Prenatal Vit-Fe Fumarate-FA (PRENATAL PO) Historical      venlafaxine (EFFEXOR-XR) 150 MG 24 hr capsule Take 150 mg by mouth daily, Historical       !! - Potential duplicate medications found. Please discuss with provider.        Allergies   Allergies   Allergen Reactions     Codeine Itching     Codeine      itching       Cyclogyl [Cyclopentolate Hcl] Nausea and Vomiting     Cyclopentolate      Hydrocodone      anxiety  itching     Hydrocodone-Acetaminophen      Seasonal Allergies      m

## 2020-12-25 NOTE — PROVIDER NOTIFICATION
12/24/20 2038   Provider Notification   Provider Name/Title Engdahl   Method of Notification Electronic Page   Request Evaluate-Remote   Notification Reason Medication Request   Pt want TUMS. Thanks

## 2020-12-25 NOTE — PLAN OF CARE
Patient discharged to home with baby and . All discharge instructions reveiwed and copy sent home with patient. NO discharge meds ordered here.

## 2020-12-25 NOTE — PROVIDER NOTIFICATION
12/24/20 2036   Provider Notification   Provider Name/Title Chin   Method of Notification Electronic Page   Request Evaluate-Remote   Notification Reason Medication Request   Pt want TUMS. Thanks

## 2020-12-25 NOTE — PLAN OF CARE
Data: Vital signs and postpartum checks WDL  Patient eating and drinking normally. Patient able to empty bladder independently and is up ambulating.  Patient performing self cares and is able to care for infant. Breastfeeding on demand. EDS=7. Submitted BC,   Action: Patient medicated during the shift for pain with Tylenol and Ibuprofen with relief after 1 hour. Patient education done see education record.  Response: Positive attachment behaviors observed with infant. Support persons  present.    Plan: Continue with the plan of care. Discharging today.

## 2020-12-28 ENCOUNTER — TELEPHONE (OUTPATIENT)
Dept: FAMILY MEDICINE | Facility: CLINIC | Age: 45
End: 2020-12-28

## 2020-12-28 NOTE — TELEPHONE ENCOUNTER
Received form(s) from [employer] for Rx for Breast Pump.  Placed form(s) in/on CW's desk.  Forms need to be signed and faxed to number listed on form..    Call pt to verify form was sent: No  Copy needs to be sent for scanning after completion: Yes

## 2021-01-12 ENCOUNTER — VIRTUAL VISIT (OUTPATIENT)
Dept: PSYCHOLOGY | Facility: CLINIC | Age: 46
End: 2021-01-12
Payer: COMMERCIAL

## 2021-01-12 DIAGNOSIS — F33.1 MAJOR DEPRESSIVE DISORDER, RECURRENT EPISODE, MODERATE (H): ICD-10-CM

## 2021-01-12 DIAGNOSIS — F41.1 GAD (GENERALIZED ANXIETY DISORDER): Primary | ICD-10-CM

## 2021-01-12 PROCEDURE — 90834 PSYTX W PT 45 MINUTES: CPT | Mod: 95 | Performed by: MARRIAGE & FAMILY THERAPIST

## 2021-01-12 NOTE — PATIENT INSTRUCTIONS
"Patient states that her goals for the next 2-3 weeks include:    1. \"Take things one step at a time, as they come\"  2. Try to stay in the present moment    (Deferred)  Homework: Write a letter to father with two parts: \"thank you for\": ________ and \"here's what I would like to say to you\": ___________.    "

## 2021-01-12 NOTE — PROGRESS NOTES
Progress Note    Patient Name: Ellyn Mcgee  Date: 2021         Service Type: Individual      Session Start Time: 1:36 p.m.  Session End Time: 1:51 p.m.     Session Length: 75 min.    Session #: 13 (with this writer; patient met previously for DA with [facility] Elena [facility] and psychiatry)    Attendees: Client and  son    Service Modality:  Video Visit:    Telemedicine Visit: The patient's condition can be safely assessed and treated via synchronous audio and visual telemedicine encounter.      Reason for Telemedicine Visit: Services only offered telehealth    Originating Site (Patient Location): Patient's home    Distant Site (Provider Location): [facility]: [place]    Consent:  The patient/guardian has verbally consented to: the potential risks and benefits of telemedicine (video visit) versus in person care; bill my insurance or make self-payment for services provided; and responsibility for payment of non-covered services.     Patient would like the video invitation sent by: Send to e-mail at: [email or web address]    Mode of Communication:  Video Conference via [facility]    As the provider I attest to compliance with applicable laws and regulations related to telemedicine.     Treatment Plan Last Reviewed: 2020  PHQ-9 / TEE-7: 2020     DATA  Interactive Complexity: No  Crisis: No       Progress Since Last Session (Related to Symptoms / Goals / Homework):  Symptoms: stable, as patient states that she has developed a schedule of sorts with her  and has been trying to stay in the present moments; patient has also been trying to include her daughters with new baby care, as her son was born 2020.    Homework: Achieved / completed to satisfaction - patient prepared for her baby and has been working to manage reactivity and sleep deprivation      Episode of Care Goals: Satisfactory progress - ACTION (Actively working towards  change); Intervened by reinforcing change plan / affirming steps taken     Current / Ongoing Stressors and Concerns:  New son born 2020 and initial concerns about his bilirubin levels; roommate/former friend is refusing to move out of their home (has lived there for three years and has not paid any rent for over a year); pt has been experiencing mild panic attacks; daughter (age 10) just started ADHD medication; history of anxious attachment stemming from relationship with father (he  8 years ago); is currently pregnant (high-risk due to advanced maternal age) and baby is due Dec. 2020; is in couples therapy with  due to frequent arguments and his emotional affair earlier in the year; financial stress; history of ADHD (hyperactive type).     Treatment Objective(s) Addressed in This Session:   use cognitive strategies identified in therapy to challenge anxious thoughts   Discussed healthy boundaries and enforcement  will identify how past feelings are impacting current relationships    Past/future:  Discussed ambiguous loss related to relationship with father  tell full story of past trauma related to her relationship with her father (and current roommate issues)  tell full story of past relational issues/trust/infidelity  identify how attachment style drives patient's and 's behaviors     Intervention:   Psychodynamic: Family systems and attachment style  Narrative Therapy: separate problem from person and find the bright spots   CBT: connect thoughts, feelings, and actions        ASSESSMENT: Current Emotional / Mental Status (status of significant symptoms):   Risk status (Self / Other harm or suicidal ideation)   Patient denies current fears or concerns for personal safety.   Patient denies current or recent suicidal ideation or behaviors. Patient last reported feeling hopeless/passive ideation on 2020.   Patient denies current or recent homicidal ideation or behaviors.   Patient denies  "current or recent self injurious behavior or ideation.   Patient denies other safety concerns.   Patient reports there has been no change in risk factors since their last session.     Patient reports there has been no change in protective factors since their last session.     Recommended that patient call 911 or go to the local ED should there be a change in any of these risk factors.     Appearance:   Appropriate    Eye Contact:   Good    Psychomotor Behavior: Normal     Attitude:   Cooperative, Expansive, Thoughtful   Orientation:   All   Speech    Rate / Production: Normal/ Responsive Talkative    Volume:  Normal    Mood:    Anxious  Expansive   Affect:    Appropriate  Bright    Thought Content:  Paranoia  Rumination    Thought Form:  Coherent  Neurosis Empathetic   Insight:    Good      Medication Review:   No changes to current psychiatric medication(s)     Medication Compliance:   Yes     Changes in Health Issues:   None reported      Chemical Use Review:   Substance Use: Chemical use reviewed, no active concerns identified ; no current alcohol use     Tobacco Use: No current tobacco use.      Diagnosis:  1. TEE (generalized anxiety disorder)    2. Major depressive disorder, recurrent episode, moderate (H)        Collateral Reports Completed:   Not Applicable - pt has follow-up psychiatry appt. In Jan. 2021    PLAN: (Patient Tasks / Therapist Tasks / Other)    Patient states that her goals for the next 2-3 weeks include:    1. \"Take things one step at a time, as they come\"  2. Try to stay in the present moment          (Deferred)  Homework: Write a letter to father with two parts: \"thank you for\": ________ and \"here's what I would like to say to you\": ___________.          Keesha Eller                                                         ______________________________________________________________________    Treatment Plan    Patient's Name: Ellyn Mcgee  YOB: 1975    Date: " 2020    DSM5 Diagnoses: 296.32 (F33.1) Major Depressive Disorder, Recurrent Episode, Moderate _ or 300.02 (F41.1) Generalized Anxiety Disorder (patient has previously been diagnosed with ADHD, hyperactive type)  Psychosocial / Contextual Factors: History of anxious attachment stemming from relationship with father (he  8 years ago); is currently pregnant (high-risk due to advanced maternal age); is in couples therapy with  due to frequent arguments and his emotional affair earlier in the year; financial stress; history of ADHD (hyperactive type).    WHODAS 2.0 Total Score 2020   Total Score 23 30   Total Score MyChart 23 30       PHQ 2020   PHQ-9 Total Score 7 8 10   Q9: Thoughts of better off dead/self-harm past 2 weeks Not at all Not at all Not at all   F/U: Thoughts of suicide or self-harm - - -   F/U: Safety concerns - - -     TEE-7 SCORE 2020   Total Score - - -   Total Score 14 (moderate anxiety) 12 (moderate anxiety) -   Total Score 14 12 11       Referral / Collaboration:  Was/were discussed and client will pursue: [facility] Elena [facility] if mental health concerns around maternity/pregnancy develop    Anticipated number of sessions for this episode of care: 20-24    Measurable Treatment Goal(s) related to diagnosis / functional impairment(s)    Patient is asking to focus treatment on her relationship with her father and past trauma.    Goal 1: Patient will tell full story of past trauma related to her relationship with her father and will identify how past feelings are impacting current relationships.    Objective #A (Patient Action)    Patient will tell full story of past trauma related to her relationship with her father.  Status: Continued - Date(s): 2020     Intervention(s)  Therapist will collaborate in session to determine attachment wounds and inner child work.    Objective #B  Patient will identify how past feelings are  "impacting current relationships.  Status: Continued - Date(s): 12/11/2020     Intervention(s)  Therapist will role-play conflict management.    Objective #C  Patient will identify strengths and weaknesses within her family system of origin.  Status: Completed - Date: 12/11/2020     Intervention(s)  Therapist will assign homework for patient to create list.      Goal 2: Patient will learn about resilience, trauma responses, and Javon Servin's \"the story I'm making up\" (cognitive strategy to manage anxious thoughts).    Objective #A (Patient Action)    Status: Completed - Date: 12/11/2020     Patient will will learn about resilience.    Intervention(s)  Therapist will teach about protective factors.    Objective #B  Patient will learn about trauma responses.    Status: Continued - Date(s): 12/11/2020     Intervention(s)  Therapist will provide educational materials on trauma responses.    Objective #C  Patient will use cognitive strategies identified in therapy to challenge anxious thoughts.  Status: Continued - Date(s): 12/11/2020     Intervention(s)  Therapist will teach about Javon Servin's power of vulnerability and \"the story I'm making up\".      Goal 3: Patient will learn about ACE scores and attachment styles and will identify how her attachment style drives her behavior.     Objective #A (Patient Action)    Status: Deferred - Date: 12/11/2020     Patient will learn about ACE scores and will take assessment.    Intervention(s)  Therapist will complete with patient in-session.    Objective #B  Patient will learn about attachment styles and will take Attachment Style assessment.    Status: Completed - Date: 9/2020     Intervention(s)  Therapist will complete with patient in-session.    Objective #C  Patient will identify how her attachment style drives her behavior.  Status: Continued - Date(s): 12/11/2020     Intervention(s)  Therapist will teach emotional regulation skills.        Patient has reviewed and agreed " to the above plan.      Keesha Eller

## 2021-01-21 ENCOUNTER — MYC MEDICAL ADVICE (OUTPATIENT)
Dept: PSYCHOLOGY | Facility: CLINIC | Age: 46
End: 2021-01-21

## 2021-01-26 ENCOUNTER — TELEPHONE (OUTPATIENT)
Dept: PSYCHOLOGY | Facility: CLINIC | Age: 46
End: 2021-01-26

## 2021-01-27 NOTE — TELEPHONE ENCOUNTER
Reached out to patient at request of Dr. Wang to discuss treatment options. She spoke about the limitations of the effectiveness of traditional CBT from her history of treatment. She was informed of other options available to her. She was encouraged to speak with her current therapist as well to discuss alternative treatments.

## 2021-01-29 ENCOUNTER — VIRTUAL VISIT (OUTPATIENT)
Dept: PSYCHOLOGY | Facility: CLINIC | Age: 46
End: 2021-01-29
Payer: COMMERCIAL

## 2021-01-29 DIAGNOSIS — F33.41 RECURRENT MAJOR DEPRESSIVE DISORDER, IN PARTIAL REMISSION (H): ICD-10-CM

## 2021-01-29 DIAGNOSIS — F41.1 GAD (GENERALIZED ANXIETY DISORDER): Primary | ICD-10-CM

## 2021-01-29 PROCEDURE — 90834 PSYTX W PT 45 MINUTES: CPT | Mod: 95 | Performed by: MARRIAGE & FAMILY THERAPIST

## 2021-01-29 NOTE — PROGRESS NOTES
Progress Note    Patient Name: Ellyn Mcgee  Date: 2021         Service Type: Individual      Session Start Time: 1:02 p.m.  Session End Time: 2:17 p.m.     Session Length: 75 min.    Session #: 14 (with this writer; patient met previously for DA with [facility] and psychiatry)    Attendees: Client and  son    Service Modality:  Video Visit:    Telemedicine Visit: The patient's condition can be safely assessed and treated via synchronous audio and visual telemedicine encounter.      Reason for Telemedicine Visit: Services only offered telehealth    Originating Site (Patient Location): Patient's home    Distant Site (Provider Location): [facility]: [place]    Consent:  The patient/guardian has verbally consented to: the potential risks and benefits of telemedicine (video visit) versus in person care; bill my insurance or make self-payment for services provided; and responsibility for payment of non-covered services.     Patient would like the video invitation sent by: Send to e-mail at: [email or web address]    Mode of Communication:  Video Conference via [facility]    As the provider I attest to compliance with applicable laws and regulations related to telemedicine.     Treatment Plan Last Reviewed: 2020  PHQ-9 / TEE-7: 2020     DATA  Interactive Complexity: No  Crisis: No       Progress Since Last Session (Related to Symptoms / Goals / Homework):  Symptoms: fluctuating, as patient states that she has been sleep-deprived which has negatively affected her coping skills and has increased her emotional reactivity; patient denies being especially anxious or depressed.    Homework: Partially completed - patient reports some difficulty fully enjoying the present moment as she is always waiting for someone to let her down or for something bad to happen      Episode of Care Goals: Satisfactory progress - ACTION (Actively working towards  change); Intervened by reinforcing change plan / affirming steps taken     Current / Ongoing Stressors and Concerns:  Is interested in a deeper dive into emotional state and core beliefs; new son born 2020 and initial concerns about his bilirubin levels; roommate/former friend is refusing to move out of their home (has lived there for three years and has not paid any rent for over a year); pt has been experiencing mild panic attacks; daughter (age 10) just started ADHD medication; history of anxious attachment stemming from relationship with father (he  8 years ago); is currently pregnant (high-risk due to advanced maternal age) and baby is due Dec. 2020; is in couples therapy with  due to frequent arguments and his emotional affair earlier in the year; financial stress; history of ADHD (hyperactive type).     Treatment Objective(s) Addressed in This Session:   use cognitive strategies identified in therapy to challenge anxious thoughts   Discussed healthy boundaries and enforcement  tell full story of past relational issues/trust/infidelity  identify how attachment style drives patient's and 's behaviors  will identify how past feelings are impacting current relationships    Past/future:  Discussed ambiguous loss related to relationship with father  tell full story of past trauma related to her relationship with her father (and current roommate issues)     Intervention:   Psychodynamic: Family systems and attachment style  Narrative Therapy: separate problem from person and find the bright spots   CBT: connect thoughts, feelings, and actions        ASSESSMENT: Current Emotional / Mental Status (status of significant symptoms):   Risk status (Self / Other harm or suicidal ideation)   Patient denies current fears or concerns for personal safety.   Patient denies current or recent suicidal ideation or behaviors. Patient last reported feeling hopeless/passive ideation on 2020.   Patient  denies current or recent homicidal ideation or behaviors.   Patient denies current or recent self injurious behavior or ideation.   Patient denies other safety concerns.   Patient reports there has been no change in risk factors since their last session.     Patient reports there has been no change in protective factors since their last session.     Recommended that patient call 911 or go to the local ED should there be a change in any of these risk factors.     Appearance:   Appropriate    Eye Contact:   Good    Psychomotor Behavior: Normal     Attitude:   Cooperative, Expansive, Thoughtful   Orientation:   All   Speech    Rate / Production: Normal/ Responsive Talkative    Volume:  Normal    Mood:    Anxious  Expansive   Affect:    Appropriate  Bright    Thought Content:  Paranoia  Rumination    Thought Form:  Coherent  Neurosis Empathetic   Insight:    Good      Medication Review:   Changes to psychiatric medications, see updated Medication List in EPIC.      Medication Compliance:   Yes     Changes in Health Issues:   None reported      Chemical Use Review:   Substance Use: Chemical use reviewed, no active concerns identified ; no current alcohol use     Tobacco Use: No current tobacco use.      Diagnosis:  1. TEE (generalized anxiety disorder)    2. Recurrent major depressive disorder, in partial remission (H)        Collateral Reports Completed:  Discussed referral for ACT clinician or IFS clinician: referred to Luiza Perkins at [facility] for IFS and will message patient with options for ACT clinician within [facility]      Pt had follow-up psychiatry appt. last week    PLAN: (Patient Tasks / Therapist Tasks / Other)    Patient states that her goals for the next 2 weeks include:    1. Pay attention to emotional state, sleep needs, and reactivity  2. Research differences between ACT and IFS therapy and clinician availability for each    IFS Clinician Referral:  Luiza Cage  "[email or web address]  [address]  [address]  [place] [address]  [phone]      (Deferred)  Homework: Write a letter to father with two parts: \"thank you for\": ________ and \"here's what I would like to say to you\": ___________.          Keesha FRANKMaciel Rafita                                                         ______________________________________________________________________    Treatment Plan    Patient's Name: Ellyn Mcgee  YOB: 1975    Date: 2020    DSM5 Diagnoses: 296.32 (F33.1) Major Depressive Disorder, Recurrent Episode, Moderate _ or 300.02 (F41.1) Generalized Anxiety Disorder (patient has previously been diagnosed with ADHD, hyperactive type)  Psychosocial / Contextual Factors: History of anxious attachment stemming from relationship with father (he  8 years ago); is currently pregnant (high-risk due to advanced maternal age); is in couples therapy with  due to frequent arguments and his emotional affair earlier in the year; financial stress; history of ADHD (hyperactive type).    WHODAS 2.0 Total Score 2020   Total Score 23 30   Total Score MyChart 23 30       PHQ 2020   PHQ-9 Total Score 7 8 10   Q9: Thoughts of better off dead/self-harm past 2 weeks Not at all Not at all Not at all   F/U: Thoughts of suicide or self-harm - - -   F/U: Safety concerns - - -     TEE-7 SCORE 2020   Total Score - - -   Total Score 14 (moderate anxiety) 12 (moderate anxiety) -   Total Score 14 12 11       Referral / Collaboration:  Was/were discussed and client will pursue: [facility] if mental health concerns around maternity/pregnancy develop    Anticipated number of sessions for this episode of care: 20-24    Measurable Treatment Goal(s) related to diagnosis / functional impairment(s)    Patient is asking to focus treatment on her relationship with her father and past trauma.    Goal 1: " "Patient will tell full story of past trauma related to her relationship with her father and will identify how past feelings are impacting current relationships.    Objective #A (Patient Action)    Patient will tell full story of past trauma related to her relationship with her father.  Status: Continued - Date(s): 12/11/2020     Intervention(s)  Therapist will collaborate in session to determine attachment wounds and inner child work.    Objective #B  Patient will identify how past feelings are impacting current relationships.  Status: Continued - Date(s): 12/11/2020     Intervention(s)  Therapist will role-play conflict management.    Objective #C  Patient will identify strengths and weaknesses within her family system of origin.  Status: Completed - Date: 12/11/2020     Intervention(s)  Therapist will assign homework for patient to create list.      Goal 2: Patient will learn about resilience, trauma responses, and Javon Servin's \"the story I'm making up\" (cognitive strategy to manage anxious thoughts).    Objective #A (Patient Action)    Status: Completed - Date: 12/11/2020     Patient will will learn about resilience.    Intervention(s)  Therapist will teach about protective factors.    Objective #B  Patient will learn about trauma responses.    Status: Continued - Date(s): 12/11/2020     Intervention(s)  Therapist will provide educational materials on trauma responses.    Objective #C  Patient will use cognitive strategies identified in therapy to challenge anxious thoughts.  Status: Continued - Date(s): 12/11/2020     Intervention(s)  Therapist will teach about Javon Servin's power of vulnerability and \"the story I'm making up\".      Goal 3: Patient will learn about ACE scores and attachment styles and will identify how her attachment style drives her behavior.     Objective #A (Patient Action)    Status: Deferred - Date: 12/11/2020     Patient will learn about ACE scores and will take " assessment.    Intervention(s)  Therapist will complete with patient in-session.    Objective #B  Patient will learn about attachment styles and will take Attachment Style assessment.    Status: Completed - Date: 9/2020     Intervention(s)  Therapist will complete with patient in-session.    Objective #C  Patient will identify how her attachment style drives her behavior.  Status: Continued - Date(s): 12/11/2020     Intervention(s)  Therapist will teach emotional regulation skills.        Patient has reviewed and agreed to the above plan.      Keesha Eller

## 2021-01-29 NOTE — PATIENT INSTRUCTIONS
Patient states that her goals for the next 2 weeks include:    1. Pay attention to emotional state, sleep needs, and reactivity  2. Research differences between ACT and IFS therapy and clinician availability for each    IFS Clinician Referral:  [email or web address]  [address] [address] [phone]

## 2021-02-09 ENCOUNTER — PRENATAL OFFICE VISIT (OUTPATIENT)
Dept: FAMILY MEDICINE | Facility: CLINIC | Age: 46
End: 2021-02-09
Payer: COMMERCIAL

## 2021-02-09 VITALS
TEMPERATURE: 97.6 F | DIASTOLIC BLOOD PRESSURE: 80 MMHG | SYSTOLIC BLOOD PRESSURE: 133 MMHG | HEIGHT: 63 IN | WEIGHT: 181 LBS | BODY MASS INDEX: 32.07 KG/M2 | HEART RATE: 90 BPM

## 2021-02-09 DIAGNOSIS — R32 URINARY INCONTINENCE, UNSPECIFIED TYPE: ICD-10-CM

## 2021-02-09 DIAGNOSIS — Z12.4 SCREENING FOR CERVICAL CANCER: ICD-10-CM

## 2021-02-09 LAB — HGB BLD-MCNC: 12.9 G/DL (ref 11.7–15.7)

## 2021-02-09 PROCEDURE — G0145 SCR C/V CYTO,THINLAYER,RESCR: HCPCS | Performed by: FAMILY MEDICINE

## 2021-02-09 PROCEDURE — 36415 COLL VENOUS BLD VENIPUNCTURE: CPT | Performed by: FAMILY MEDICINE

## 2021-02-09 PROCEDURE — 99N1025 PR STATISTIC OBHBG - HEMOGLOBIN: Performed by: FAMILY MEDICINE

## 2021-02-09 PROCEDURE — 99207 PR POST PARTUM EXAM: CPT | Performed by: FAMILY MEDICINE

## 2021-02-09 PROCEDURE — G0124 SCREEN C/V THIN LAYER BY MD: HCPCS | Performed by: PATHOLOGY

## 2021-02-09 PROCEDURE — 87624 HPV HI-RISK TYP POOLED RSLT: CPT | Performed by: FAMILY MEDICINE

## 2021-02-09 ASSESSMENT — MIFFLIN-ST. JEOR: SCORE: 1434.1

## 2021-02-09 NOTE — PROGRESS NOTES
SUBJECTIVE: Ellyn is here for a 6-week postpartum checkup.    Date of Last Pap:  2016    Delivery date was 20. She had a  of a viable boy, weight 7 pounds 6 oz., with none complications.  Since delivery, she has been breast feeding.  She has no signs of infection, bleeding or other complications.  We discussed contraceptions and she has chosen vasectomy.  She  has not had intercourse since delivery and complains of No discomfort. Patient screened for postpartum depression and complaints are NEGATIVE. Screening has also been completed for intimate partner violence.      EXAM:    GENERAL APPEARANCE: healthy, alert and no distress     EYES: EOMI, PERRL     HENT: ear canals and TM's normal and nose and mouth without ulcers or lesions     NECK: no adenopathy, no asymmetry, masses, or scars and thyroid normal to palpation     RESP: lungs clear to auscultation - no rales, rhonchi or wheezes     CV: regular rates and rhythm, normal S1 S2, no S3 or S4 and no murmur, click or rub     ABDOMEN:  soft, nontender, no HSM or masses and bowel sounds normal     : normal cervix, adnexae, and uterus without masses or discharge and rectal exam normal without masses-guaiac negative stool     MS: extremities normal- no gross deformities noted, no evidence of inflammation in joints, FROM in all extremities.     SKIN: no suspicious lesions or rashes     NEURO: Normal strength and tone, sensory exam grossly normal, mentation intact and speech normal     PSYCH: mentation appears normal. and affect normal/bright     LYMPHATICS: No cervical adenopathy    ASSESSMENT:   Normal postpartum exam after   (Z39.2) Routine postpartum follow-up  (primary encounter diagnosis)  Comment:    Plan: OB HEMOGLOBIN             (R32) Urinary incontinence, unspecified type  Comment:    Plan: [facility] PT, HAND, AND CHIROPRACTIC REFERRAL             (Z12.4) Screening for cervical cancer  Comment:    Plan: Pap imaged thin layer screen with HPV -          recommended age 30 - 65, HPV High Risk Types         DNA Cervical             .    PLAN:  Return as needed or at time of next expected pap, pelvic, or breast exam.

## 2021-02-12 ENCOUNTER — VIRTUAL VISIT (OUTPATIENT)
Dept: PSYCHOLOGY | Facility: CLINIC | Age: 46
End: 2021-02-12
Payer: COMMERCIAL

## 2021-02-12 DIAGNOSIS — F41.1 GAD (GENERALIZED ANXIETY DISORDER): Primary | ICD-10-CM

## 2021-02-12 DIAGNOSIS — F33.41 RECURRENT MAJOR DEPRESSIVE DISORDER, IN PARTIAL REMISSION (H): ICD-10-CM

## 2021-02-12 PROCEDURE — 90837 PSYTX W PT 60 MINUTES: CPT | Mod: 95 | Performed by: MARRIAGE & FAMILY THERAPIST

## 2021-02-13 ENCOUNTER — HEALTH MAINTENANCE LETTER (OUTPATIENT)
Age: 46
End: 2021-02-13

## 2021-02-13 NOTE — PATIENT INSTRUCTIONS
Patient states that her goals for the next 3 weeks include:    1. Research and choose an ACT therapist  2. Call and schedule an appointment with ACT therapist

## 2021-02-13 NOTE — PROGRESS NOTES
Progress Note    Patient Name: Ellyn Mcgee  Date: 2/12/2021         Service Type: Individual      Session Start Time: 1:05 p.m.  Session End Time: 2:07 p.m.     Session Length: 62 min.    Session #: 15 (with this writer; patient met previously for DA with [facility] and psychiatry)    Attendees: Client    Service Modality:  Video Visit:    Telemedicine Visit: The patient's condition can be safely assessed and treated via synchronous audio and visual telemedicine encounter.      Reason for Telemedicine Visit: Services only offered telehealth    Originating Site (Patient Location): Patient's home    Distant Site (Provider Location): [facility]: [place]    Consent:  The patient/guardian has verbally consented to: the potential risks and benefits of telemedicine (video visit) versus in person care; bill my insurance or make self-payment for services provided; and responsibility for payment of non-covered services.     Patient would like the video invitation sent by: Send to e-mail at: [email or web address]    Mode of Communication:  Video Conference via [facility]    As the provider I attest to compliance with applicable laws and regulations related to telemedicine.     Treatment Plan Last Reviewed: 12/11/2020  PHQ-9 / TEE-7: 12/11/2020     DATA  Interactive Complexity: No  Crisis: No       Progress Since Last Session (Related to Symptoms / Goals / Homework):  Symptoms: fluctuating (continued), as patient states that she has been sleep-deprived which has negatively affected her coping skills and has increased her emotional reactivity; patient reports more anxiety (especially related to interactions with her ) and less overall depression    Homework: Partially completed - patient discussed ACT with her couples therapist but did not have a chance to research or schedule; patient indicated that she has been more aware of her triggers and  reactivity.      Episode of Care Goals: Satisfactory progress - PREPARATION (Decided to change - considering how); Intervened by negotiating a change plan and determining options / strategies for behavior change, identifying triggers, exploring social supports, and working towards setting a date to begin behavior change     Current / Ongoing Stressors and Concerns:  Is interested in a deeper dive into emotional state and core beliefs through ACT; new son born 2020 and initial concerns about his bilirubin levels; roommate/former friend is refusing to move out of their home (has lived there for three years and has not paid any rent for over a year); pt has been experiencing mild panic attacks; daughter (age 10) just started ADHD medication; history of anxious attachment stemming from relationship with father (he  8 years ago); is currently pregnant (high-risk due to advanced maternal age) and baby is due Dec. 2020; is in couples therapy with  due to frequent arguments and his emotional affair earlier in the year; financial stress; history of ADHD (hyperactive type).     Treatment Objective(s) Addressed in This Session:   use cognitive strategies identified in therapy to challenge anxious thoughts   Discussed healthy boundaries and enforcement  will identify how past feelings are impacting current relationships  Discussed ambiguous loss related to relationship with father    Past/future:  tell full story of past relational issues/trust/infidelity  identify how attachment style drives patient's and 's behaviors  tell full story of past trauma related to her relationship with her father (and current roommate issues)     Intervention:   Psychodynamic: Family systems and attachment style  Narrative Therapy: separate problem from person and find the bright spots   CBT: connect thoughts, feelings, and actions        ASSESSMENT: Current Emotional / Mental Status (status of significant symptoms):   Risk  status (Self / Other harm or suicidal ideation)   Patient denies current fears or concerns for personal safety.   Patient denies current or recent suicidal ideation or behaviors. Patient last reported feeling hopeless/passive ideation on 8/6/2020.   Patient denies current or recent homicidal ideation or behaviors.   Patient denies current or recent self injurious behavior or ideation.   Patient denies other safety concerns.   Patient reports there has been no change in risk factors since their last session.     Patient reports there has been no change in protective factors since their last session.     Recommended that patient call 911 or go to the local ED should there be a change in any of these risk factors.     Appearance:   Appropriate    Eye Contact:   Good    Psychomotor Behavior: Normal     Attitude:   Cooperative, Expansive, Thoughtful   Orientation:   All   Speech    Rate / Production: Normal/ Responsive Talkative    Volume:  Normal    Mood:    Anxious  Expansive   Affect:    Appropriate    Thought Content:  Paranoia  Rumination    Thought Form:  Coherent  Neurosis   Insight:    Good      Medication Review:   No changes to current psychiatric medication(s)     Medication Compliance:   Yes     Changes in Health Issues:   None reported      Chemical Use Review:   Substance Use: Chemical use reviewed, no active concerns identified ; no current alcohol use     Tobacco Use: No current tobacco use.      Diagnosis:  1. TEE (generalized anxiety disorder)    2. Recurrent major depressive disorder, in partial remission (H)        Collateral Reports Completed:  Offered patient scheduling with male therapist through [facility] who specializes in ACT; patient states that she would like to do some research on her own before scheduling.      PLAN: (Patient Tasks / Therapist Tasks / Other)    Patient states that her goals for the next 3 weeks include:    1. Research and choose an ACT therapist  2. Call and schedule an  "appointment with ACT therapist      (Deferred)  Homework: Write a letter to father with two parts: \"thank you for\": ________ and \"here's what I would like to say to you\": ___________.          Keesha Eller                                                         ______________________________________________________________________    Treatment Plan    Patient's Name: Ellyn Mcgee  YOB: 1975    Date: 2020    DSM5 Diagnoses: 296.32 (F33.1) Major Depressive Disorder, Recurrent Episode, Moderate _ or 300.02 (F41.1) Generalized Anxiety Disorder (patient has previously been diagnosed with ADHD, hyperactive type)  Psychosocial / Contextual Factors: History of anxious attachment stemming from relationship with father (he  8 years ago); is currently pregnant (high-risk due to advanced maternal age); is in couples therapy with  due to frequent arguments and his emotional affair earlier in the year; financial stress; history of ADHD (hyperactive type).    WHODAS 2.0 Total Score 2020   Total Score 23 30   Total Score MyChart 23 30       PHQ 2020   PHQ-9 Total Score 7 8 10   Q9: Thoughts of better off dead/self-harm past 2 weeks Not at all Not at all Not at all   F/U: Thoughts of suicide or self-harm - - -   F/U: Safety concerns - - -     TEE-7 SCORE 2020   Total Score - - -   Total Score 14 (moderate anxiety) 12 (moderate anxiety) -   Total Score 14 12 11       Referral / Collaboration:  Was/were discussed and client will pursue: [facility] if mental health concerns around maternity/pregnancy develop    Anticipated number of sessions for this episode of care: 20-24    Measurable Treatment Goal(s) related to diagnosis / functional impairment(s)    Patient is asking to focus treatment on her relationship with her father and past trauma.    Goal 1: Patient will tell full story of past trauma related to her relationship with " "her father and will identify how past feelings are impacting current relationships.    Objective #A (Patient Action)    Patient will tell full story of past trauma related to her relationship with her father.  Status: Continued - Date(s): 12/11/2020     Intervention(s)  Therapist will collaborate in session to determine attachment wounds and inner child work.    Objective #B  Patient will identify how past feelings are impacting current relationships.  Status: Continued - Date(s): 12/11/2020     Intervention(s)  Therapist will role-play conflict management.    Objective #C  Patient will identify strengths and weaknesses within her family system of origin.  Status: Completed - Date: 12/11/2020     Intervention(s)  Therapist will assign homework for patient to create list.      Goal 2: Patient will learn about resilience, trauma responses, and Javon Servin's \"the story I'm making up\" (cognitive strategy to manage anxious thoughts).    Objective #A (Patient Action)    Status: Completed - Date: 12/11/2020     Patient will will learn about resilience.    Intervention(s)  Therapist will teach about protective factors.    Objective #B  Patient will learn about trauma responses.    Status: Continued - Date(s): 12/11/2020     Intervention(s)  Therapist will provide educational materials on trauma responses.    Objective #C  Patient will use cognitive strategies identified in therapy to challenge anxious thoughts.  Status: Continued - Date(s): 12/11/2020     Intervention(s)  Therapist will teach about Javon Servin's power of vulnerability and \"the story I'm making up\".      Goal 3: Patient will learn about ACE scores and attachment styles and will identify how her attachment style drives her behavior.     Objective #A (Patient Action)    Status: Deferred - Date: 12/11/2020     Patient will learn about ACE scores and will take assessment.    Intervention(s)  Therapist will complete with patient in-session.    Objective " #B  Patient will learn about attachment styles and will take Attachment Style assessment.    Status: Completed - Date: 9/2020     Intervention(s)  Therapist will complete with patient in-session.    Objective #C  Patient will identify how her attachment style drives her behavior.  Status: Continued - Date(s): 12/11/2020     Intervention(s)  Therapist will teach emotional regulation skills.        Patient has reviewed and agreed to the above plan.      Keesha Eller

## 2021-02-15 LAB
FINAL DIAGNOSIS: NORMAL
HPV HR 12 DNA CVX QL NAA+PROBE: NEGATIVE
HPV16 DNA SPEC QL NAA+PROBE: NEGATIVE
HPV18 DNA SPEC QL NAA+PROBE: NEGATIVE
SPECIMEN DESCRIPTION: NORMAL
SPECIMEN SOURCE CVX/VAG CYTO: NORMAL

## 2021-02-19 LAB
COPATH REPORT: NORMAL
PAP: NORMAL

## 2021-02-23 ENCOUNTER — MYC MEDICAL ADVICE (OUTPATIENT)
Dept: PSYCHIATRY | Facility: CLINIC | Age: 46
End: 2021-02-23

## 2021-02-23 ENCOUNTER — MYC REFILL (OUTPATIENT)
Dept: PSYCHIATRY | Facility: OTHER | Age: 46
End: 2021-02-23

## 2021-02-23 DIAGNOSIS — F41.1 GAD (GENERALIZED ANXIETY DISORDER): ICD-10-CM

## 2021-02-23 DIAGNOSIS — F33.41 RECURRENT MAJOR DEPRESSIVE DISORDER, IN PARTIAL REMISSION (H): ICD-10-CM

## 2021-02-23 RX ORDER — ESCITALOPRAM OXALATE 10 MG/1
10 TABLET ORAL DAILY
Qty: 90 TABLET | Refills: 0 | Status: SHIPPED | OUTPATIENT
Start: 2021-02-23 | End: 2021-04-20

## 2021-02-23 NOTE — TELEPHONE ENCOUNTER
Date of Last Office Visit: 1/22/2021  Date of Next Office Visit: 3/9/2021  No shows since last visit: none  Cancellations since last visit: 1    Medication requested: Lexapro 10 mg tablet Date last ordered: 12/2/2020 Qty: 90 Refills: 0     Review of [place] ?: no    Lapse in medication adherence greater than 5 days?: no  If yes, call patient and gather details: no  Medication refill request verified as identical to current order?: yes  Result of Last DAM, VPA, Li+ Level, CBC, or Carbamazepine Level (at or since last visit): n/a    []Medication refilled per  Medication Refill in Ambulatory Care  policy.  [x]Medication unable to be refilled by RN due to criteria not met as indicated below:    []Eligibility - not seen in the last year   []Supervision - no future appointment   []Compliance - no shows, cancellations or lapse in therapy   []Verification - order discrepancy   []Controlled medication   []Medication not included in policy   []90-day supply request   [x]Other

## 2021-03-03 ENCOUNTER — VIRTUAL VISIT (OUTPATIENT)
Dept: PSYCHOLOGY | Facility: CLINIC | Age: 46
End: 2021-03-03
Payer: COMMERCIAL

## 2021-03-03 DIAGNOSIS — F33.41 RECURRENT MAJOR DEPRESSIVE DISORDER, IN PARTIAL REMISSION (H): ICD-10-CM

## 2021-03-03 DIAGNOSIS — F41.1 GAD (GENERALIZED ANXIETY DISORDER): Primary | ICD-10-CM

## 2021-03-03 PROCEDURE — 90834 PSYTX W PT 45 MINUTES: CPT | Mod: 95 | Performed by: MARRIAGE & FAMILY THERAPIST

## 2021-03-04 ENCOUNTER — MYC MEDICAL ADVICE (OUTPATIENT)
Dept: PSYCHIATRY | Facility: CLINIC | Age: 46
End: 2021-03-04

## 2021-03-04 NOTE — PATIENT INSTRUCTIONS
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 Attend couples therapy next week  3. Take baby on a walk    Discussed Laureano Technique of using structured time and breaks to complete tasks.

## 2021-03-04 NOTE — PROGRESS NOTES
Progress Note    Patient Name: Ellyn Mcgee  Date: 3/3/2021         Service Type: Individual      Session Start Time: 11:28 a.m.  Session End Time: 12:21 p.m.     Session Length: 53 min.    Session #: 16 (with this writer; patient met previously for DA with [facility] and psychiatry)    Attendees: Client    Service Modality:  Video Visit:    Telemedicine Visit: The patient's condition can be safely assessed and treated via synchronous audio and visual telemedicine encounter.      Reason for Telemedicine Visit: Services only offered telehealth    Originating Site (Patient Location): Patient's home    Distant Site (Provider Location): [facility]: [place]    Consent:  The patient/guardian has verbally consented to: the potential risks and benefits of telemedicine (video visit) versus in person care; bill my insurance or make self-payment for services provided; and responsibility for payment of non-covered services.     Patient would like the video invitation sent by: Send to e-mail at: [email or web address]    Mode of Communication:  Video Conference via well    As the provider I attest to compliance with applicable laws and regulations related to telemedicine.    Session started late due to problems with MyChart access.     Treatment Plan Last Reviewed: 12/11/2020  PHQ-9 / TEE-7: 12/11/2020     DATA  Interactive Complexity: No  Crisis: No       Progress Since Last Session (Related to Symptoms / Goals / Homework):  Symptoms: worsening anxiety related to her relationship, as patient admits that she and her  are both sleep-deprived and are arguing more frequently    Homework: Partially completed - patient researched ACT therapists but would like to do more research before choosing one      Episode of Care Goals: Satisfactory progress - PREPARATION (Decided to change - considering how); Intervened by negotiating a change plan and determining options  / strategies for behavior change, identifying triggers, exploring social supports, and working towards setting a date to begin behavior change     Current / Ongoing Stressors and Concerns:  Is interested in a deeper dive into emotional state and core beliefs through ACT; new son born 2020 and initial concerns about his bilirubin levels; roommate/former friend is refusing to move out of their home (has lived there for three years and has not paid any rent for over a year); pt has been experiencing mild panic attacks; daughter (age 10) just started ADHD medication; history of anxious attachment stemming from relationship with father (he  8 years ago); is currently pregnant (high-risk due to advanced maternal age) and baby is due Dec. 2020; is in couples therapy with  due to frequent arguments and his emotional affair earlier in the year; financial stress; history of ADHD (hyperactive type).     Treatment Objective(s) Addressed in This Session:   use cognitive strategies identified in therapy to challenge anxious thoughts   Discussed healthy boundaries and enforcement  tell full story of past relational issues/trust/infidelity  identify how attachment style drives patient's and 's behaviors  will identify how past feelings are impacting current relationships  Discussed ambiguous loss related to relationship with father    Past/future:  tell full story of past trauma related to her relationship with her father (and current roommate issues)     Intervention:   Psychodynamic: Family systems and attachment style  Narrative Therapy: separate problem from person and find the bright spots   CBT: connect thoughts, feelings, and actions        ASSESSMENT: Current Emotional / Mental Status (status of significant symptoms):   Risk status (Self / Other harm or suicidal ideation)   Patient denies current fears or concerns for personal safety.   Patient denies current or recent suicidal ideation or behaviors.  "Patient last reported feeling hopeless/passive ideation on 8/6/2020.   Patient denies current or recent homicidal ideation or behaviors.   Patient denies current or recent self injurious behavior or ideation.   Patient denies other safety concerns.   Patient reports there has been a change in risk factors since their last session.  increased relational distress and sleep-deprivation   Patient reports there has been no change in protective factors since their last session.     Recommended that patient call 911 or go to the local ED should there be a change in any of these risk factors.     Appearance:   Appropriate    Eye Contact:   Good    Psychomotor Behavior: Normal     Attitude:   Cooperative, Expansive, Thoughtful   Orientation:   All   Speech    Rate / Production: Normal/ Responsive Talkative    Volume:  Normal    Mood:    Anxious  Expansive   Affect:    Appropriate    Thought Content:  Paranoia  Rumination    Thought Form:  Coherent  Neurosis   Insight:    Good      Medication Review:   No changes to current psychiatric medication(s)     Medication Compliance:   Yes     Changes in Health Issues:   None reported      Chemical Use Review:   Substance Use: Chemical use reviewed, no active concerns identified ; no current alcohol use     Tobacco Use: No current tobacco use.      Diagnosis:  1. TEE (generalized anxiety disorder)    2. 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 Attend couples therapy next week  3. Take baby on a walk    Discussed Laureano Technique of using structured time and breaks to complete tasks.      (Deferred)  Homework: Write a letter to father with two parts: \"thank you for\": ________ and \"here's what I would like to say to you\": " ___________.          Keesha Eller                                                         ______________________________________________________________________    Treatment Plan    Patient's Name: Ellyn Mcgee  YOB: 1975    Date: 2020    DSM5 Diagnoses: 296.32 (F33.1) Major Depressive Disorder, Recurrent Episode, Moderate _ or 300.02 (F41.1) Generalized Anxiety Disorder (patient has previously been diagnosed with ADHD, hyperactive type)    Psychosocial / Contextual Factors: History of anxious attachment stemming from relationship with father (he  8 years ago); is currently pregnant (high-risk due to advanced maternal age); is in couples therapy with  due to frequent arguments and his emotional affair earlier in the year; financial stress; history of ADHD (hyperactive type).    WHODAS 2.0 Total Score 2020   Total Score 23 30   Total Score MyChart 23 30       PHQ 2020   PHQ-9 Total Score 7 8 10   Q9: Thoughts of better off dead/self-harm past 2 weeks Not at all Not at all Not at all   F/U: Thoughts of suicide or self-harm - - -   F/U: Safety concerns - - -     TEE-7 SCORE 2020   Total Score - - -   Total Score 14 (moderate anxiety) 12 (moderate anxiety) -   Total Score 14 12 11       Referral / Collaboration:  Was/were discussed and client will pursue: [facility] if mental health concerns around maternity/pregnancy develop    Anticipated number of sessions for this episode of care: 20-24    Measurable Treatment Goal(s) related to diagnosis / functional impairment(s)    Patient is asking to focus treatment on her relationship with her father and past trauma.    Goal 1: Patient will tell full story of past trauma related to her relationship with her father and will identify how past feelings are impacting current relationships.    Objective #A (Patient Action)    Patient will tell full story of past trauma  "related to her relationship with her father.  Status: Continued - Date(s): 12/11/2020     Intervention(s)  Therapist will collaborate in session to determine attachment wounds and inner child work.    Objective #B  Patient will identify how past feelings are impacting current relationships.  Status: Continued - Date(s): 12/11/2020     Intervention(s)  Therapist will role-play conflict management.    Objective #C  Patient will identify strengths and weaknesses within her family system of origin.  Status: Completed - Date: 12/11/2020     Intervention(s)  Therapist will assign homework for patient to create list.      Goal 2: Patient will learn about resilience, trauma responses, and Javon Servin's \"the story I'm making up\" (cognitive strategy to manage anxious thoughts).    Objective #A (Patient Action)    Status: Completed - Date: 12/11/2020     Patient will will learn about resilience.    Intervention(s)  Therapist will teach about protective factors.    Objective #B  Patient will learn about trauma responses.    Status: Continued - Date(s): 12/11/2020     Intervention(s)  Therapist will provide educational materials on trauma responses.    Objective #C  Patient will use cognitive strategies identified in therapy to challenge anxious thoughts.  Status: Continued - Date(s): 12/11/2020     Intervention(s)  Therapist will teach about Javon Servin's power of vulnerability and \"the story I'm making up\".      Goal 3: Patient will learn about ACE scores and attachment styles and will identify how her attachment style drives her behavior.     Objective #A (Patient Action)    Status: Deferred - Date: 12/11/2020     Patient will learn about ACE scores and will take assessment.    Intervention(s)  Therapist will complete with patient in-session.    Objective #B  Patient will learn about attachment styles and will take Attachment Style assessment.    Status: Completed - Date: 9/2020     Intervention(s)  Therapist will complete " with patient in-session.    Objective #C  Patient will identify how her attachment style drives her behavior.  Status: Continued - Date(s): 12/11/2020     Intervention(s)  Therapist will teach emotional regulation skills.        Patient has reviewed and agreed to the above plan.      Keesha Eller

## 2021-03-09 ENCOUNTER — VIRTUAL VISIT (OUTPATIENT)
Dept: PSYCHIATRY | Facility: CLINIC | Age: 46
End: 2021-03-09
Payer: COMMERCIAL

## 2021-03-09 ENCOUNTER — MYC MEDICAL ADVICE (OUTPATIENT)
Dept: PSYCHIATRY | Facility: CLINIC | Age: 46
End: 2021-03-09

## 2021-03-09 DIAGNOSIS — F90.0 ATTENTION DEFICIT HYPERACTIVITY DISORDER (ADHD), PREDOMINANTLY INATTENTIVE TYPE: ICD-10-CM

## 2021-03-09 DIAGNOSIS — F41.1 GAD (GENERALIZED ANXIETY DISORDER): ICD-10-CM

## 2021-03-09 DIAGNOSIS — F33.41 RECURRENT MAJOR DEPRESSIVE DISORDER, IN PARTIAL REMISSION (H): Primary | ICD-10-CM

## 2021-03-09 PROCEDURE — 99214 OFFICE O/P EST MOD 30 MIN: CPT | Mod: 95 | Performed by: PSYCHIATRY & NEUROLOGY

## 2021-03-09 RX ORDER — VENLAFAXINE HYDROCHLORIDE 150 MG/1
150 CAPSULE, EXTENDED RELEASE ORAL DAILY
Qty: 90 CAPSULE | Refills: 0 | Status: SHIPPED | OUTPATIENT
Start: 2021-03-09 | End: 2021-04-20

## 2021-03-09 NOTE — PROGRESS NOTES
"Ellyn Mcgee is a 45 year old female who is being evaluated via a billable video visit.      How would you like to obtain your AVS? MyChart  If you are dropped from the video visit, the video invite should be resent to: Text to cell phone: see Epic  Will anyone else be joining your video visit? No      Telemedicine Visit: The patient's condition can be safely assessed and treated via synchronous audio and visual telemedicine encounter.      Reason for Telemedicine Visit: Covid-19 Pandemic    Originating Site (Patient Location): Patient's home    Distant Site (Provider Location): Provider Remote Setting    Consent:  The patient/guardian has verbally consented to: the potential risks and benefits of telemedicine (video visit) versus in person care; bill my insurance or make self-payment for services provided; and responsibility for payment of non-covered services.     Mode of Communication:  Video Conference via [employer]    As the provider I attest to compliance with applicable laws and regulations related to telemedicine.        Outpatient Psychiatric Progress Note    Name: Ellyn Mcgee   : 1975                    Primary Care Provider: Dorothy Guaman DO   Therapist: CHOLO Lewis/Keesha Eller     PHQ-9 scores:  PHQ-9 SCORE 2020   PHQ-9 Total Score - - -   PHQ-9 Total Score MyChart 7 (Mild depression) 8 (Mild depression) -   PHQ-9 Total Score 7 8 10       TEE-7 scores:  TEE-7 SCORE 2020   Total Score - - -   Total Score 14 (moderate anxiety) 12 (moderate anxiety) -   Total Score 14 12 11       Patient Identification:  Patient is a 45 year old,   White American female  who presents for return visit with me.  Patient is currently employed part time but on maternity leave. Patient attended the phone/video session alone. Patient prefers to be called: \"Ellyn\".    Interim History:  I last saw Ellyn Mcgee for outpatient psychiatry Return " Visit on 2020. During that appointment, we:      Continue Lexapro/escitalopram 10 mg daily for mood and anxiety    Continue venlafaxine 150 mg daily for mood and anxiety.      3/9: Pt reports that things have been a bit challenging still.  The sleep deprivation has been tough at times.  It sometimes affects her mood and anxiety levels.  More conflict between her and .  They continue in couples therapy.  She reports she will often react when her  is reacting about something.  Does try to get out of the house or do something for herself from time to time again.  Baby doing well.  He is now 2-1/2 months old.  Overall continuing to adapt okay to life with a  again.  No thoughts of suicide or safety concerns.  No problematic drug or alcohol use.    Per [facility], [facility], during today's team-based visit:  Scheduling error and pt not booked with [facility]    Psychiatric ROS:  Ellyn Heaven Everardo reports mood has been: A little up and down with a new baby, but overall stable/manageable  Anxiety has been: Up and down, manageable  Sleep has been: Difficult due to  baby/infant, hoping the baby will sleep for a longer period of time soon  Isabel sxs: None  Psychosis sxs: None  ADHD/ADD sxs: manageable without meds at this time  PTSD sxs: None  PHQ9 and GAD7 scores were reviewed today if completed.   Medication side effects:  See HPI  Current stressors include: [place] baby/infant  Coping mechanisms and supports include: Therapy, Family, Hobbies and Friends    Current medications include:   Current Outpatient Medications   Medication Sig     escitalopram (LEXAPRO) 10 MG tablet Take 1 tablet (10 mg) by mouth daily     ibuprofen (ADVIL/MOTRIN) 200 MG tablet Take 200-400 mg by mouth every 4 hours as needed for pain OTC     Prenatal Vit-Fe Fumarate-FA (PRENATAL PO)      venlafaxine (EFFEXOR-XR) 150 MG 24 hr capsule Take 1 capsule (150 mg) by mouth daily     No current facility-administered  medications for this visit.        Past Medical/Surgical History:  Past Medical History:   Diagnosis Date     Abnormal Pap smear     Colposcopy     Adjustment disorder with mixed anxiety and depressed mood 4/4/2012     Anemia     In Past     Anxiety      Chlamydia trachomatis infection of other specified site 1993     Depression      Fertility problem      Lyme disease 9/8/2010    ?false positive vs positve CMV - resolved     Moderate dysplasia of cervix 1995     Other and unspecified adverse effect of drug, medicinal and biological substance     insulin resistent     Varicosities      Wounds and injuries     fall down stairs, PT for back, pain meds      has a past medical history of Abnormal Pap smear, Adjustment disorder with mixed anxiety and depressed mood (4/4/2012), Anemia, Anxiety, Chlamydia trachomatis infection of other specified site (1993), Depression, Fertility problem, Lyme disease (9/8/2010), Moderate dysplasia of cervix (1995), Other and unspecified adverse effect of drug, medicinal and biological substance, Varicosities, and Wounds and injuries. She also has no past medical history of Asthma, Asymptomatic human immunodeficiency virus (HIV) infection status (H), Breast disorder, Chronic kidney disease, Complication of anesthesia, Diabetes mellitus (H), Heart disease, Herpes simplex without mention of complication, Hypertension, Liver disease, Postpartum depression, Rh incompatibility, Seizures (H), Sickle cell anemia (H), Systemic lupus erythematosus (H), or Thyroid disease.    Social History:  Reviewed. No changes to social history except as noted in HPI above.     Vital Signs:   None. This is phone/video visit.     Labs:  Most recent laboratory results reviewed and the pertinent results include:   Recent hemoglobin 11.4 on 12/25/2020, AST and ALT within normal limits on 12/24/2020    Review of Systems:  10 systems (general, cardiovascular, respiratory, eyes, ENT, endocrine, GI, , M/S,  "neurological) were reviewed. Most pertinent finding(s) is/are: denies fever, cough, headaches, shortness of breath, chest pain, N/V, constipation/diarrhea, trouble urinating, aches and pains. The remaining systems are all unremarkable.    Mental Status Examination (limited as this is by phone/video):  Appearance: Awake, alert, appears stated age, no acute distress, appropriately interacting baby  Attitude:  Cooperative, pleasant  Motor: No obvious abnormalities observed via video, not formally tested  Oriented to:  person, place, time, and situation  Attention Span and Concentration:  normal  Speech:  clear, coherent, regular rate, rhythm, and volume  Language: intact  Mood: \"Okay\"  Affect: Overall appropriate and mood congruent  Associations:  no loose associations  Thought Process:  logical, linear and goal oriented  Thought Content:  no evidence of suicidal ideation or homicidal ideation, no evidence of psychotic thought, no auditory hallucinations present and no visual hallucinations present  Recent and Remote Memory:  Intact to interview. Not formally assessed. No amnesia.  Fund of Knowledge: appropriate  Insight:  good  Judgment:  intact, adequate for safety  Impulse Control:  intact    Suicide Risk Assessment:  Today Ellyn Mcgee reports no suicidal ideation. Based on all available evidence including the factors cited above, Ellyn Mcgee does not appear to be at imminent risk for self-harm, does not meet criteria for a 72-hr hold, and therefore remains appropriate for ongoing outpatient level of care.  A thorough assessment of risk factors related to suicide and self-harm have been reviewed and are noted above. The patient convincingly denies suicidality on several occasions. Local community safety resources printed and reviewed for patient to use if needed. There was no deceit detected, and the patient presented in a manner that was believable.     DSM5 Diagnosis:  Major Depressive Disorder, Recurrent " Episode, In Partial Remission  300.02 (F41.1) Generalized Anxiety Disorder   ADHD, Unspecified  Insomnia-unspecified (recent addition of  baby)      Medical comorbidities include:   Patient Active Problem List    Diagnosis Date Noted     Labor and delivery, indication for care 2020     Priority: Medium     Major depressive disorder, recurrent episode, moderate (H) 10/08/2020     Priority: Medium     High-risk pregnancy, elderly multigravida, unspecified trimester 2020     Priority: Medium     TEE (generalized anxiety disorder) 10/10/2018     Priority: Medium     Cervical radiculopathy 2018     Priority: Medium     Attention deficit hyperactivity disorder (ADHD), predominantly inattentive type 10/04/2017     Priority: Medium     Patient is followed by [facility] for ongoing prescription of stimulants.  All refills should be approved by this provider, or covering partner.    Medication(s): Concerta 27mg on weekend days and 36mg other days of the week  Maximum quantity per month: 30  Clinic visit frequency required: Q 6  months     Controlled substance agreement on file: Yes       Date(s): 10/4/17  Neuropsych evaluation for ADD completed:  Yes, completed 17, on file and diagnosis confirmed    Last [facility] website verification: 12/3/2019   [email or web address]           External hemorrhoids 2012     Priority: Medium     PCOS (polycystic ovarian syndrome) 2011     Priority: Medium     Hyperlipidemia LDL goal <130 2008     Priority: Medium     Anxiety state 2008     Priority: Medium     Problem list name updated by automated process. Provider to review    Patient is followed by [facility]MA MARIEE for ongoing prescription of benzodiazepines.  All refills should be approved by this provider, or covering partner.    Medication(s): Xanax.   Maximum quantity per month:   Clinic visit frequency required:      Controlled substance agreement on file: Yes       Date(s):  10/4/2017  Benzodiazepine use reviewed by psychiatry:      Last [facility] website verification:  done on 12/17/2018   [email or web address]             Psychosocial & Contextual Factors: See HPI above    Assessment:  Per Intake Note with me 9/8/20:  Ellyn Mcgee is a 45-year-old female who is 23 weeks pregnant with a past psychiatric history including depression, anxiety, and ADHD who presents today for psychiatric evaluation.  Her depression and anxiety date back several years and she has also had postpartum depression/anxiety with her now 8-year-old (she also has a 14-year-old but did not experience postpartum mental health issues at that time).  She started sertraline during her second pregnancy and then ended up being maintained on Paxil-CR for several years.  She is also more recently diagnosed with ADHD and maintained on Concerta.  She weaned off both Paxil and Concerta when she found out she was pregnant and replaced these medications with her current regimen of Lexapro and Effexor-XR to decrease risk of fetal defects.  For the most part, she is feeling a little bit better on her current doses of Lexapro 10 mg and Effexor-XR 75 mg.  It is an unconventional combination as we discussed, but since she is doing quite well, I am hesitant to make many changes.  She feels as though her anxiety could be a little bit better controlled and so we will further increase Effexor-XR to 112.5 mg daily to be taken with her Lexapro 10 mg daily.  If she does a lot better, we can consider decreasing Lexapro to 5 mg daily. We discussed the risk of serotonin syndrome and she will continue to be vigilant for any signs or symptoms of this condition.  We did discuss the possibility of restarting a stimulant medication if necessary.  She does not feel as though her ADHD symptoms are too severe at this time.    3/9/21:   Today patient reports overall some ups and downs regarding her symptoms since last visit.  Continues in couples  therapy with her .  Some challenges with understanding and communication between the two.  Things overall stable on current medication regimen.  Again encouraged her to sleep when she can.   supportive and tries to let her sleep extra some days.  Discussed using some over-the-counter Benadryl intermittently as needed for any sleep issues.  No safety concerns at this time, denies thoughts to harm baby, others, or herself.  She continues to be engaged in regular individual psychotherapy.  We could still consider going back to her combination of Paxil-CR and Concerta if clinically indicated/symptoms worsen.  I would like to wait on re-introduction of stimulant medication until she is no longer breast-feeding or intolerable the benefits would outweigh any possible risks.    Medication side effects and alternatives were reviewed. Health promotion activities recommended and reviewed today. All questions addressed. Education and counseling completed regarding risks and benefits of medications and psychotherapy options. Recommend ongoing therapy for additional support.     Treatment Plan:    Continue Lexapro/escitalopram 10 mg daily for mood and anxiety    Continue venlafaxine 150 mg daily for mood and anxiety.      Continue all other medications as reviewed per electronic medical record today.     Safety plan reviewed. To the Emergency Department as needed or call after hours crisis line at [phone] or [phone]. [place] Crisis Text Line. Text [place] to 942464 or Suicide LifeLine Chat: suicidepreventionlifeline.org/chat    Continue therapy as planned.     Schedule an appointment with me in 2 months or sooner as needed. Call [facility] at [phone] to schedule if someone does not reach out to you within 2-3 days.     Follow up with primary care provider as planned or for acute medical concerns.    Call the psychiatric nurse line with medication questions or concerns at  [phone].    MyChart may be used to communicate with your provider, but this is not intended to be used for emergencies.    Therapy Resources:  Www.PsychologyToday.[employer]  Www.NAMIMN.org    [facility]- Psychiatric Disorders During Pregnancy  [email or web address].org/specialty-clinics/psychiatric-disorders-[email or web address]-pregnancy    MothertoBaby  [email or web address]    Administrative Billing:   Phone Call/Video Duration: 28 Minutes  Start: 1:21p  Stop: 1:49p    Time spent with patient was 28 minutes and greater than 50% of time or 15 minutes was spent in counseling and coordination of care regarding above diagnoses and treatment plan. Complexity high due to recently postpartum, breastfeeding on psychotropics, and hx of postpartum mood sxs.      Patient Status:  Patient will continue to be seen for ongoing consultation and stabilization.    Signed:   Sherlyn Wang DO  [facility]

## 2021-03-09 NOTE — Clinical Note
Just FYI. No action needed.  Remains quite stable, but I will continue to follow for a bit of time due to postpartum status.  Let me know if you have any questions or concerns.    -Sherlyn  [facility]

## 2021-03-09 NOTE — Clinical Note
Please call this patient to get them scheduled for a follow-up visit in 2 months. Please schedule with me and the [facility], Dr. Edward. Thanks!

## 2021-03-09 NOTE — PATIENT INSTRUCTIONS
Treatment Plan:    Continue Lexapro/escitalopram 10 mg daily for mood and anxiety    Continue venlafaxine 150 mg daily for mood and anxiety.      Continue all other medications as reviewed per electronic medical record today.     Safety plan reviewed. To the Emergency Department as needed or call after hours crisis line at [phone] or [phone]. [place] Crisis Text Line. Text [place] to 560903 or Suicide LifeLine Chat: suicidepreMabayaline.org/chat    Continue therapy as planned.     Schedule an appointment with me in 2 months or sooner as needed. Call [facility] at [phone] to schedule if someone does not reach out to you within 2-3 days.     Follow up with primary care provider as planned or for acute medical concerns.    Call the psychiatric nurse line with medication questions or concerns at [phone].    [employer] may be used to communicate with your provider, but this is not intended to be used for emergencies.    Therapy Resources:  Www.PsychologyToday.com  Www.NAMIMN.org    [facility]- Psychiatric Disorders During Pregnancy  [email or web address]/specialty-clinics/psychiatric-disorders-[email or web address]    MothertoBaby  [email or web address]

## 2021-03-09 NOTE — PROGRESS NOTES
Ellyn Mcgee is a 45 year old female who is being evaluated via a billable video visit.      How would you like to obtain your AVS? MyChart  If you are dropped from the video visit, the video invite should be resent to: Text to cell phone: see Epic  Will anyone else be joining your video visit? No  {If patient encounters technical issues they should call [phone] :[phone]}    Telemedicine Visit: The patient's condition can be safely assessed and treated via synchronous audio and visual telemedicine encounter.      Reason for Telemedicine Visit: Covid-19 Pandemic    Originating Site (Patient Location): Patient's home    Distant Site (Provider Location): Provider Remote Setting    Consent:  The patient/guardian has verbally consented to: the potential risks and benefits of telemedicine (video visit) versus in person care; bill my insurance or make self-payment for services provided; and responsibility for payment of non-covered services.     Mode of Communication:  Video Conference via [employer]    As the provider I attest to compliance with applicable laws and regulations related to telemedicine.        Outpatient Psychiatric Progress Note    Name: Ellyn Mcgee   : 1975                    Primary Care Provider: Dorothy Guaman DO   Therapist: CHOOL Lewis/Keesha Eller     PHQ-9 scores:  PHQ-9 SCORE 2020   PHQ-9 Total Score - - -   PHQ-9 Total Score MyChart 7 (Mild depression) 8 (Mild depression) -   PHQ-9 Total Score 7 8 10       TEE-7 scores:  TEE-7 SCORE 2020   Total Score - - -   Total Score 14 (moderate anxiety) 12 (moderate anxiety) -   Total Score 14 12 11       Patient Identification:  Patient is a 45 year old,   White American female  who presents for return visit with me.  Patient is currently employed part time but on maternity leave. Patient attended the phone/video session alone. Patient prefers to be called:  "\"Ellyn\".    Interim History:  I last saw Ellyn Mcgee for outpatient psychiatry Return Visit on 2020. During that appointment, we:      Continue Lexapro/escitalopram 10 mg daily for mood and anxiety    Continue venlafaxine 150 mg daily for mood and anxiety.      3/9: ***    Per [facility], Kasandra Conklin, [facility], during today's team-based visit:  Scheduling error and pt not booked with [facility]    : Patient reports overall doing okay lately.  Delivered her new baby on .  Reports a fairly easy delivery/labor.  Had to wait a little longer to be induced due to a full hospital.  Baby boy, overall healthy except some high bilirubin numbers initially.  The last 5 days have been better with feeding.  Sleep is typical of  baby sleep.  Patient doing her best to get sleep when she can.  Falls asleep okay most of the time.  Supplementing with formula.  Is experiencing some dysphoric reactions with milk letdown.  Does feel as though the symptoms passed quickly and is not further worsening any overall mood or anxiety symptoms.  Feels as though her medications are still maintaining her mood and anxiety well.  Does not desire to make any changes today.  Denies any thoughts of suicide or harming the baby or anyone else.  Her children are incredibly protective against thoughts of self-harm.  No drug or alcohol abuse.  She has been thinking about switching to a new therapist.  Would like some advice on therapy options and is open to discussion with the [facility], Dr. Edward.    Psychiatric ROS:  Ellyn Mcgee reports mood has been: A little up and down with a new baby, but overall stable/manageable  Anxiety has been: Up and down, manageable  Sleep has been: Difficult due to  baby  Isabel sxs: None  Psychosis sxs: None  ADHD/ADD sxs: manageable without meds at this time  PTSD sxs: None  PHQ9 and GAD7 scores were reviewed today if completed.   Medication side effects:  See HPI  Current stressors include:  baby " at age 45  Coping mechanisms and supports include: Therapy, Family, Hobbies and Friends    Current medications include:   Current Outpatient Medications   Medication Sig     escitalopram (LEXAPRO) 10 MG tablet Take 1 tablet (10 mg) by mouth daily     ibuprofen (ADVIL/MOTRIN) 200 MG tablet Take 200-400 mg by mouth every 4 hours as needed for pain OTC     Prenatal Vit-Fe Fumarate-FA (PRENATAL PO)      venlafaxine (EFFEXOR-XR) 150 MG 24 hr capsule Take 1 capsule (150 mg) by mouth daily     No current facility-administered medications for this visit.        Past Medical/Surgical History:  Past Medical History:   Diagnosis Date     Abnormal Pap smear     Colposcopy     Adjustment disorder with mixed anxiety and depressed mood 4/4/2012     Anemia     In Past     Anxiety      Chlamydia trachomatis infection of other specified site 1993     Depression      Fertility problem      Lyme disease 9/8/2010    ?false positive vs positve CMV - resolved     Moderate dysplasia of cervix 1995     Other and unspecified adverse effect of drug, medicinal and biological substance     insulin resistent     Varicosities      Wounds and injuries     fall down stairs, PT for back, pain meds      has a past medical history of Abnormal Pap smear, Adjustment disorder with mixed anxiety and depressed mood (4/4/2012), Anemia, Anxiety, Chlamydia trachomatis infection of other specified site (1993), Depression, Fertility problem, Lyme disease (9/8/2010), Moderate dysplasia of cervix (1995), Other and unspecified adverse effect of drug, medicinal and biological substance, Varicosities, and Wounds and injuries. She also has no past medical history of Asthma, Asymptomatic human immunodeficiency virus (HIV) infection status (H), Breast disorder, Chronic kidney disease, Complication of anesthesia, Diabetes mellitus (H), Heart disease, Herpes simplex without mention of complication, Hypertension, Liver disease, Postpartum depression, Rh incompatibility,  "Seizures (H), Sickle cell anemia (H), Systemic lupus erythematosus (H), or Thyroid disease.    Social History:  Reviewed. No changes to social history except as noted in HPI above.     Vital Signs:   None. This is phone/video visit.     Labs:  Most recent laboratory results reviewed and the pertinent results include:   Recent hemoglobin 11.4 on 2020, AST and ALT within normal limits on 2020    Review of Systems:  10 systems (general, cardiovascular, respiratory, eyes, ENT, endocrine, GI, , M/S, neurological) were reviewed. Most pertinent finding(s) is/are: Some lingering pains related to delivery, recovering well. The remaining systems are all unremarkable.    Mental Status Examination (limited as this is by phone/video):  Appearance: Awake, alert, appears stated age, no acute distress, appropriately interacting with  baby  Attitude:  Cooperative  Motor: No obvious abnormalities observed via video, not formally tested  Oriented to:  person, place, time, and situation  Attention Span and Concentration:  normal  Speech:  clear, coherent, regular rate, rhythm, and volume  Language: intact  Mood: \"Okay\"  Affect: A little subdued, overall appropriate and mood congruent  Associations:  no loose associations  Thought Process:  logical, linear and goal oriented  Thought Content:  no evidence of suicidal ideation or homicidal ideation, no evidence of psychotic thought, no auditory hallucinations present and no visual hallucinations present  Recent and Remote Memory:  Intact to interview. Not formally assessed. No amnesia.  Fund of Knowledge: appropriate  Insight:  good  Judgment:  intact, adequate for safety  Impulse Control:  intact    Suicide Risk Assessment:  Today Ellyn Mcgee reports no suicidal ideation. Based on all available evidence including the factors cited above, Ellyn Mcgee does not appear to be at imminent risk for self-harm, does not meet criteria for a 72-hr hold, and therefore " remains appropriate for ongoing outpatient level of care.  A thorough assessment of risk factors related to suicide and self-harm have been reviewed and are noted above. The patient convincingly denies suicidality on several occasions. Local community safety resources printed and reviewed for patient to use if needed. There was no deceit detected, and the patient presented in a manner that was believable.     DSM5 Diagnosis:  Major Depressive Disorder, Recurrent Episode, In Partial Remission  300.02 (F41.1) Generalized Anxiety Disorder   ADHD, Unspecified  Insomnia-unspecified (recent addition of  baby)      Medical comorbidities include:   Patient Active Problem List    Diagnosis Date Noted     Labor and delivery, indication for care 2020     Priority: Medium     Major depressive disorder, recurrent episode, moderate (H) 10/08/2020     Priority: Medium     High-risk pregnancy, elderly multigravida, unspecified trimester 2020     Priority: Medium     TEE (generalized anxiety disorder) 10/10/2018     Priority: Medium     Cervical radiculopathy 2018     Priority: Medium     Attention deficit hyperactivity disorder (ADHD), predominantly inattentive type 10/04/2017     Priority: Medium     Patient is followed by [facility] MAR[facility]EE for ongoing prescription of stimulants.  All refills should be approved by this provider, or covering partner.    Medication(s): Concerta 27mg on weekend days and 36mg other days of the week  Maximum quantity per month: 30  Clinic visit frequency required: Q 6  months     Controlled substance agreement on file: Yes       Date(s): 10/4/17  Neuropsych evaluation for ADD completed:  Yes, completed 17, on file and diagnosis confirmed    Last [facility] website verification: 12/3/2019   [email or web address]           External hemorrhoids 2012     Priority: Medium     PCOS (polycystic ovarian syndrome) 2011     Priority: Medium     Hyperlipidemia LDL goal  <130 09/05/2008     Priority: Medium     Anxiety state 09/05/2008     Priority: Medium     Problem list name updated by automated process. Provider to review    Patient is followed by [facility] MARIEE for ongoing prescription of benzodiazepines.  All refills should be approved by this provider, or covering partner.    Medication(s): Xanax.   Maximum quantity per month:   Clinic visit frequency required:      Controlled substance agreement on file: Yes       Date(s): 10/4/2017  Benzodiazepine use reviewed by psychiatry:      Last [facility] website verification:  done on 12/17/2018   [email or web address]             Psychosocial & Contextual Factors: See HPI above    Assessment:  Per Intake Note with me 9/8/20:  Ellyn Mcgee is a 45-year-old female who is 23 weeks pregnant with a past psychiatric history including depression, anxiety, and ADHD who presents today for psychiatric evaluation.  Her depression and anxiety date back several years and she has also had postpartum depression/anxiety with her now 8-year-old (she also has a 14-year-old but did not experience postpartum mental health issues at that time).  She started sertraline during her second pregnancy and then ended up being maintained on Paxil-CR for several years.  She is also more recently diagnosed with ADHD and maintained on Concerta.  She weaned off both Paxil and Concerta when she found out she was pregnant and replaced these medications with her current regimen of Lexapro and Effexor-XR to decrease risk of fetal defects.  For the most part, she is feeling a little bit better on her current doses of Lexapro 10 mg and Effexor-XR 75 mg.  It is an unconventional combination as we discussed, but since she is doing quite well, I am hesitant to make many changes.  She feels as though her anxiety could be a little bit better controlled and so we will further increase Effexor-XR to 112.5 mg daily to be taken with her Lexapro 10 mg daily.  If she does a  lot better, we can consider decreasing Lexapro to 5 mg daily. We discussed the risk of serotonin syndrome and she will continue to be vigilant for any signs or symptoms of this condition.  We did discuss the possibility of restarting a stimulant medication if necessary.  She does not feel as though her ADHD symptoms are too severe at this time.    21:  Today patient reports overall some slight improvement in her symptoms with the dose increase at last visit.  Things overall stable.  Understandably, some ups and downs given the challenges she is experiencing with the  baby.  Overall though, things quite as expected and mood and anxiety manageable at this time.  Discussed using some over-the-counter Benadryl intermittently as needed for any sleep issues.  No safety concerns at this time, denies thoughts to harm baby, others, or herself.  She continues to be engaged in regular individual psychotherapy.  She is also in couples therapy with her .  We could still consider going back to her combination of Paxil-CR and Concerta if clinically indicated/symptoms worsen.  I would like to wait on re-introduction of stimulant medication until she is no longer breast-feeding.    Medication side effects and alternatives were reviewed. Health promotion activities recommended and reviewed today. All questions addressed. Education and counseling completed regarding risks and benefits of medications and psychotherapy options. Recommend ongoing therapy for additional support.     Treatment Plan:    Continue Lexapro/escitalopram 10 mg daily for mood and anxiety    Continue venlafaxine 150 mg daily for mood and anxiety.      Continue all other medications as reviewed per electronic medical record today.     Safety plan reviewed. To the Emergency Department as needed or call after hours crisis line at [phone] or [phone]. [place] Crisis Text Line. Text [place] to 692354 or Suicide LifeLine Chat:  suicidepreventionlifeline.org/chat    Continue therapy as planned.     Schedule an appointment with me in 4-6 weeks or sooner as needed. Call [facility] at [phone] to schedule if someone does not reach out to you within 2-3 days.     Follow up with primary care provider as planned or for acute medical concerns.    Call the psychiatric nurse line with medication questions or concerns at [phone].    Atoomahart may be used to communicate with your provider, but this is not intended to be used for emergencies.    Therapy Resources:  Www.PsychologyToday.com  Www.NAMIMN.org    [facility]- Psychiatric Disorders During Pregnancy  [email or web address].org/[email or web address]-disorders-[email or web address]-[email or web address]    [employer]toBaby  [email or web address]    Administrative Billing:   Phone Call/Video Duration: 28 Minutes  Start: 11:04a  Stop: 11:32a    Time spent with patient was 28 minutes and greater than 50% of time or 15 minutes was spent in counseling and coordination of care regarding above diagnoses and treatment plan. Complexity high due to recently postpartum, breastfeeding on psychotropics, and hx of postpartum mood sxs.      Patient Status:  Patient will continue to be seen for ongoing consultation and stabilization.    Signed:   Sherlyn Wang DO  [facility]

## 2021-03-12 ENCOUNTER — MYC MEDICAL ADVICE (OUTPATIENT)
Dept: FAMILY MEDICINE | Facility: CLINIC | Age: 46
End: 2021-03-12

## 2021-03-12 NOTE — TELEPHONE ENCOUNTER
PN,  Please see below MyChart message and advise on brast feeding/antibodies for baby question  Doesn't qualify for [place] with BMI  Thanks,  Keeley BROWNING RN

## 2021-03-15 ENCOUNTER — VIRTUAL VISIT (OUTPATIENT)
Dept: PSYCHOLOGY | Facility: CLINIC | Age: 46
End: 2021-03-15
Payer: COMMERCIAL

## 2021-03-15 DIAGNOSIS — F41.1 GAD (GENERALIZED ANXIETY DISORDER): Primary | ICD-10-CM

## 2021-03-15 DIAGNOSIS — F33.41 RECURRENT MAJOR DEPRESSIVE DISORDER, IN PARTIAL REMISSION (H): ICD-10-CM

## 2021-03-15 PROCEDURE — 90837 PSYTX W PT 60 MINUTES: CPT | Mod: 95 | Performed by: MARRIAGE & FAMILY THERAPIST

## 2021-03-15 NOTE — PATIENT INSTRUCTIONS
Patient states that her goals for the next week include:    1. Try not to argue with  (or have more constructive discussions even if disagreeing)  2. Do social media event to promote business tomorrow night  3. Reach out to people/network to fill business calendar  4. Continue getting outside and going for walks when weather allows

## 2021-03-15 NOTE — PROGRESS NOTES
Progress Note    Patient Name: Ellyn Mcgee  Date: 3/15/2021         Service Type: Individual      Session Start Time: 12:30 p.m.  Session End Time: 1:34 p.m.     Session Length: 64 min.    Session #: 17 (with this writer; patient met previously for DA with [facility] [place] Elena [facility] and psychiatry)    Attendees: Client and infant son    Service Modality:  Video Visit:    Telemedicine Visit: The patient's condition can be safely assessed and treated via synchronous audio and visual telemedicine encounter.      Reason for Telemedicine Visit: Services only offered telehealth    Originating Site (Patient Location): Patient's home    Distant Site (Provider Location): [facility]: [place]    Consent:  The patient/guardian has verbally consented to: the potential risks and benefits of telemedicine (video visit) versus in person care; bill my insurance or make self-payment for services provided; and responsibility for payment of non-covered services.     Patient would like the video invitation sent by: Send to e-mail at: [email or web address]    Mode of Communication:  Video Conference via [facility]    As the provider I attest to compliance with applicable laws and regulations related to telemedicine.    Session started late due to problems with MyChart access.     Treatment Plan Last Reviewed: 3/15/2021  PHQ-9 / TEE-7: 12/11/2020     DATA  Interactive Complexity: No  Crisis: No       Progress Since Last Session (Related to Symptoms / Goals / Homework):  Symptoms: patient reports that last week was difficult, as she reports more emotional reactivity between herself and her  but states that this week has been better.  Patient discussed her work history and plan to return to part-time work in the next few weeks.    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  Patient states that she is still interested in doing ACT therapy but wants to wait to find a new therapist.      Episode of Care Goals: Satisfactory progress - PREPARATION (Decided to change - considering how); Intervened by negotiating a change plan and determining options / strategies for behavior change, identifying triggers, exploring social supports, and working towards setting a date to begin behavior change     Current / Ongoing Stressors and Concerns:  Some financial stress (but stimulus money is pending); is interested in a deeper dive into emotional state and core beliefs through ACT; new son born 2020; roommate/former friend is refusing to move out of their home (has lived there for three years and has not paid any rent for over a year); pt has been experiencing mild panic attacks; daughter (age 10) just started ADHD medication; history of anxious attachment stemming from relationship with father (he  8 years ago); is currently pregnant (high-risk due to advanced maternal age) and baby is due Dec. 2020; is in couples therapy with  due to frequent arguments and his emotional affair earlier in the year; financial stress; history of ADHD (hyperactive type).     Treatment Objective(s) Addressed in This Session:   Identified qualities in past mentors  Discussed healthy boundaries and enforcement  Discussed work/life balance and current purpose    Past/future:  use cognitive strategies identified in therapy to challenge anxious thoughts   tell full story of past relational issues/trust/infidelity  identify how attachment style drives patient's and 's behaviors  will identify how past feelings are impacting current relationships  Discussed ambiguous loss related to relationship with father  tell full story of past trauma related to her relationship with her father (and current roommate issues)     Intervention:   Psychodynamic: Family  systems and attachment style  Narrative Therapy: separate problem from person and find the bright spots   CBT: connect thoughts, feelings, and actions        ASSESSMENT: Current Emotional / Mental Status (status of significant symptoms):   Risk status (Self / Other harm or suicidal ideation)   Patient denies current fears or concerns for personal safety.   Patient denies current or recent suicidal ideation or behaviors. Patient last reported feeling hopeless/passive ideation on 8/6/2020.   Patient denies current or recent homicidal ideation or behaviors.   Patient denies current or recent self injurious behavior or ideation.   Patient denies other safety concerns.   Patient reports there has been no change in risk factors since their last session.     Patient reports there has been no change in protective factors since their last session.     Recommended that patient call 911 or go to the local ED should there be a change in any of these risk factors.     Appearance:   Appropriate    Eye Contact:   Good    Psychomotor Behavior: Normal     Attitude:   Cooperative, Expansive, Thoughtful   Orientation:   All   Speech    Rate / Production: Normal/ Responsive Talkative    Volume:  Normal    Mood:    Anxious  Expansive   Affect:    Appropriate    Thought Content:  Paranoia  Rumination    Thought Form:  Coherent  Goal Directed  Neurosis   Insight:    Good      Medication Review:   No changes to current psychiatric medication(s)     Medication Compliance:   Yes     Changes in Health Issues:   None reported      Chemical Use Review:   Substance Use: Chemical use reviewed, no active concerns identified ; no current alcohol use     Tobacco Use: No current tobacco use.      Diagnosis:  1. TEE (generalized anxiety disorder)    2. Recurrent major depressive disorder, in partial remission (H)        Collateral Reports Completed:  N/A      PLAN: (Patient Tasks / Therapist Tasks / Other)    Patient states that her goals for the next  "week include:    1. Try not to argue with  (or have more constructive discussions even if disagreeing)  2. Do social media event to promote business tomorrow night  3. Reach out to people/network to fill business calendar  4. Continue getting outside and going for walks when weather allows          (Deferred)  Homework: Write a letter to father with two parts: \"thank you for\": ________ and \"here's what I would like to say to you\": ___________.          Keesha FRANKMaciel Rafita                                                         ______________________________________________________________________    Treatment Plan    Patient's Name: Ellyn Mcgee  YOB: 1975    Date: 3/15/2021    DSM5 Diagnoses: 296.32 (F33.1) Major Depressive Disorder, Recurrent Episode, Moderate _ or 300.02 (F41.1) Generalized Anxiety Disorder (patient has previously been diagnosed with ADHD, hyperactive type)    Psychosocial / Contextual Factors: History of anxious attachment stemming from relationship with father (he  8 years ago); is currently pregnant (high-risk due to advanced maternal age); is in couples therapy with  due to frequent arguments and his emotional affair earlier in the year; financial stress; history of ADHD (hyperactive type).    WHODAS 2.0 Total Score 2020   Total Score 23 30   Total Score MyChart 23 30       PHQ 2020   PHQ-9 Total Score 7 8 10   Q9: Thoughts of better off dead/self-harm past 2 weeks Not at all Not at all Not at all   F/U: Thoughts of suicide or self-harm - - -   F/U: Safety concerns - - -     TEE-7 SCORE 2020   Total Score - - -   Total Score 14 (moderate anxiety) 12 (moderate anxiety) -   Total Score 14 12 11       Referral / Collaboration:  Was/were discussed and client will pursue: ACT Therapist and [facility] if mental health concerns around maternity/pregnancy develop    Anticipated number of sessions " "for this episode of care: 20-24    Measurable Treatment Goal(s) related to diagnosis / functional impairment(s)    Patient is asking to focus treatment on her relationship with her father and past trauma.    Goal 1: Patient will tell full story of past trauma related to her relationship with her father and will identify how past feelings are impacting current relationships.    Objective #A (Patient Action)    Patient will tell full story of past trauma related to her relationship with her father.  Status: Restarted - Date: 3/15/2021      Intervention(s)  Therapist will collaborate in session to determine attachment wounds and inner child work.    Objective #B  Patient will identify how past feelings are impacting current relationships.  Status: Continued - Date(s): 3/15/2021     Intervention(s)  Therapist will role-play conflict management.    Objective #C  Patient will identify strengths and weaknesses within her family system of origin.  Status: Restarted - Date: 3/15/2021     Intervention(s)  Therapist will assign homework for patient to create list.      Goal 2: Patient will learn about resilience, trauma responses, and Javon Servin's \"the story I'm making up\" (cognitive strategy to manage anxious thoughts).    Objective #A (Patient Action)    Status: Completed - Date: 12/11/2020     Patient will learn about resilience.    Intervention(s)  Therapist will teach about protective factors.    Objective #B  Patient will learn about trauma responses.    Status: Continued - Date(s): 3/15/2021     Intervention(s)  Therapist will provide educational materials on trauma responses.    Objective #C  Patient will use cognitive strategies identified in therapy to challenge anxious thoughts.  Status: Continued - Date(s): 3/15/2021    Intervention(s)  Therapist will teach about Javon Servin's power of vulnerability and \"the story I'm making up\".      Goal 3: Patient will learn about ACE scores and attachment styles and will " identify how her attachment style drives her behavior.     Objective #A (Patient Action)    Status: Restarted - Date: 3/15/2021     Patient will learn about ACE scores and will take assessment.    Intervention(s)  Therapist will complete with patient in-session.    Objective #B  Patient will learn about attachment styles and will take Attachment Style assessment.    Status: Completed - Date: 9/2020     Intervention(s)  Therapist will complete with patient in-session.    Objective #C  Patient will identify how her attachment style drives her behavior.  Status: Continued - Date(s): 3/15/2021    Intervention(s)  Therapist will teach emotional regulation skills.        Patient has reviewed and agreed to the above plan.      Keesha Eller

## 2021-03-19 ENCOUNTER — THERAPY VISIT (OUTPATIENT)
Dept: PHYSICAL THERAPY | Facility: CLINIC | Age: 46
End: 2021-03-19
Attending: FAMILY MEDICINE
Payer: COMMERCIAL

## 2021-03-19 DIAGNOSIS — R32 URINARY INCONTINENCE: ICD-10-CM

## 2021-03-19 DIAGNOSIS — R32 URINARY INCONTINENCE, UNSPECIFIED TYPE: ICD-10-CM

## 2021-03-19 DIAGNOSIS — M62.89 PELVIC FLOOR DYSFUNCTION: ICD-10-CM

## 2021-03-19 PROCEDURE — 97112 NEUROMUSCULAR REEDUCATION: CPT | Mod: GP | Performed by: PHYSICAL THERAPIST

## 2021-03-19 PROCEDURE — 97530 THERAPEUTIC ACTIVITIES: CPT | Mod: GP | Performed by: PHYSICAL THERAPIST

## 2021-03-19 PROCEDURE — 97161 PT EVAL LOW COMPLEX 20 MIN: CPT | Mod: GP | Performed by: PHYSICAL THERAPIST

## 2021-03-19 NOTE — PROGRESS NOTES
Physical Therapy Initial Evaluation  Subjective:    Patient Health History  Ellyn Mcgee being seen for Post partum pelvic floor issues.     Problem began: 12/24/2020.   Problem occurred: Had a baby   Pain is reported as 0/10 on pain scale.  General health as reported by patient is good.  Pertinent medical history includes: depression, heart problems, mental illness, overweight, pain at night/rest and other. Other medical history details: Anxiety, insulin resistance, weak pelvic floor.     Medical allergies: none and other.   Surgeries include:  Other. Other surgery history details: Laser surgery for moderate dysplasia.    Current medications:  Anti-depressants and anti-inflammatory.    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   Primary job tasks include:  Computer work, lifting/carrying, prolonged sitting, prolonged standing, pushing/pulling and repetitive tasks.                                    Objective:  System    Physical Exam    General     ROS     Physical Therapy Initial Examination/Evaluation March 19, 2021   Ellyn Mcgee is a 45 year old female referred to physical therapy by Dr. Dorothy Guaman DO for treatment of pelvic dysfunction, urinary incontinence with Precautions/Restrictions/MD instructions E&T    Therapist Assessment:   Clinical Impression: Pt presents to PT with primary complaint of incontinence and weakness postpartum.  Per clinical examination, pt does have significant strength and control deficits in pelvic floor. Pt also demonstrates weakness in core.  Pt will benefit from skilled physical therapy for strength program and education on safe return to activity postpartum.      Subjective: Pt has history of incontinence before this pregnancy. She also feels very weak in core.  She has a lot going on in her life and doesn't have space to even lie on her floor to do exercises at home. Pt reports that incontinence has improved since giving birth in  December.   DOI/onset: MD order 2/9/21, Delivered son on 12/24/20  Mechanism of injury: childbirth and pregnancy   DOS NA   Related PMH: hx of incontinence  Previous treatment: 1 pelvic floor PT session    Imaging: NA   Chief Complaint: incontinence, weakness   Pain: rest 0 /10, activity 8/10  Described as: Back pain- achy, stabbing in the past Alleviated by: pressure, improving posture Exacerbated by: poor posture Progression of symptoms since initial onset: improving  Time of day when pain is worse: none noted   Sleeping: Wakes 1x/night to urinate   Social history: 14 year old and 9 year old, daughters 3 kids, 2 girls and 1 boy   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 Yes    Do you leak with cough,sneeze, jumping, running?Yes   Any other activities that cause leaking? Yes , yelling or laughing   Do you have triggers that make you feel you can't wait to go to the bathroom? No     Type of pad and number used per day? Liner; 1-3 liners if she is not going to be home  When you leak what is the amount?  Small-Large, small mostly     How long can you delay the need to urinate? Unsure.   How many times do you get up to urinate at night? 1    Can you stop the flow of urine when on the toilet? Sometimes   Is the volume of urine passed usually: Small-Medium . (8sec rule=  250ml with average bladder storing  400-600ml)    Do you strain to pass urine? No  Do you have a slow or hesitant urinary stream? Sometimes  Do you have difficulty initiating the urine  "stream? No  Is urination painful?  No    How many bladder infections have you had in last 12 months? 0-1    Fluid intake(one glass is 8oz or one cup)  \"Lots\" glasses/day, 1-2 caffinated glasses/day  NA alcohol glasses/day.    Bowel habits:  Frequency of bowel movements? 5 times a week  Consistancy of stool? soft formed  Do you ignore the urge to defecate? No  Do you strain to pass stool? Sometimes     Pelvic Pain:  Do you have any pelvic pain with intercourse, exams, use of tampons? No  Is initial penetration during intercourse painful? Slightly post-partum   Is deeper penetration painful? No  Do you use lubricant?   Yes What kind? Water-based       Given birth? Yes Any complications? No  # of vaginal delieveries? 3   # of C-sections? 0  # of episiotomies? 0  Are you sexually active?Yes  Have you ever been worried for your physical safety? No  Any abdominal or pelvic surgeries? no  Are you having any regular exercise? Not since before pregnancy about 1.5 years ago   Have you practiced the PF(kegel) exercises for 4 or more weeks? No, unsure I of what's correct       LUMBAR ROM  Hyper but WNL  Single leg standing: >20s bilat     MUSCLE PERFORMANCE    Baseline PF tone:hypo  PF Tone with cough: hypo, slight bulge  Valsalva: not tested  PF Response quality: sluggish  PF Power: Center: 1   Endurance: Maximum contraction in seconds: < 5 seconds  # of endurance contractions before fatigue: NT  Quick contraction repetitions prior to fatigue: Unable to coordinate quick contractions.  Specificity/accessory muscles: overall good isolation, some use of abdominals      MMT 3//19/21 Left Right    Hip Flexion 4+/5 4+/5   Hip Abduction  4+/5 4+/5   Hip Extension  4+/5 4+/5         PALPATION: Non-tender all superficial structures with digital evaluation  1.5 finger-width rectus diastasis above umbilicus       Assessment/Plan:    Patient is a 45 year old female with pelvic complaints.    Patient has the following significant findings " with corresponding treatment plan.                Diagnosis 1:  Pelvic Dysfunction, Urinary incontinence   Decreased strength - therapeutic exercise, therapeutic activities and home program  Impaired muscle performance - biofeedback, electric stimulation, neuro re-education and home program  Decreased function - therapeutic activities and home program    Therapy Evaluation Codes:   1) History comprised of:   Personal factors that impact the plan of care:      Living environment, Past/current experiences and Time since onset of symptoms.    Comorbidity factors that impact the plan of care are:      Depression, Overweight, Weakness and anxiety, insulin resistance, ADHD.     Medications impacting care: Anti-depressant and Anti-inflammatory.  2) Examination of Body Systems comprised of:   Body structures and functions that impact the plan of care:      Pelvis and abdominals.   Activity limitations that impact the plan of care are:      Lifting, Stress incontinence and Urinary incontinence.  3) Clinical presentation characteristics are:   Stable/Uncomplicated.  4) Decision-Making    Low complexity using standardized patient assessment instrument and/or measureable assessment of functional outcome.  Cumulative Therapy Evaluation is: Low complexity.    Previous and current functional limitations:  (See Goal Flow Sheet for this information)    Short term and Long term goals: (See Goal Flow Sheet for this information)     Communication ability:  Patient appears to be able to clearly communicate and understand verbal and written communication and follow directions correctly.  Treatment Explanation - The following has been discussed with the patient:   RX ordered/plan of care  Anticipated outcomes  Possible risks and side effects  This patient would benefit from PT intervention to resume normal activities.   Rehab potential is good.    Frequency:  1 X week, once daily  Duration:  for 6 weeks  Discharge Plan:  Achieve all  LTG.  Independent in home treatment program.  Reach maximal therapeutic benefit.      NMR (25 mins)  Biofeedback unit used to provide visualization of PF activation in supine position. Discussed how tone of pelvic floor muscles will impact symptoms. Education provided on importance of isolation of abdominals vs pelvic floor and other proximal muscles.   Education provided on building strength for power and  endurance in pt's PF due to pt's low resting tone and symptoms. Pt working on 2s contractions, 2-4s relax, 5 reps 2-3 x/day in supine position. Initiated education on  the importance of core strength. Discussed activation of TA and performed contraction in both supine and sitting working up to 20-30s holds. Discussed performing TA contraction while nursing. Pt provided with HEP.    Therapeutic Activity (20 min): Today's session consisted of education regarding pelvic floor muscle anatomy, normal bladder function, urge suppression techniques and/or relaxation techniques as indicated.  Pt was instructed in the pathoanatomy of the pelvic floor utilizing pelvic model.  We discussed what pelvic floor physical therapy is, components of exam, and typical patient progression.  Pt was told that she was in control of exam progression, and if at any time was uncomfortable and wished to discontinue we could.     Please refer to the daily flowsheet for treatment today, total treatment time and time spent performing 1:1 timed codes.

## 2021-03-19 NOTE — LETTER
[facility]  [address]  [address]  [facility] [address]  [phone]    2021    Re: Ellyn Mcgee   :   1975  MRN:  [id number]   REFERRING PHYSICIAN:   DO FRANK Zaragoza [facility]  Date of Initial Evaluation:  3/19/2021  Visits:  Rxs Used: 1  Reason for Referral:     Urinary incontinence, unspecified type  Pelvic floor dysfunction  Urinary incontinence    EVALUATION SUMMARY    Physical Therapy Initial Evaluation  Subjective:  Patient Health History  Ellyn Mcgee being seen for Post partum pelvic floor issues.     Problem began: 2020.   Problem occurred: Had a baby   Pain is reported as 0/10 on pain scale.  General health as reported by patient is good.  Pertinent medical history includes: depression, heart problems, mental illness, overweight, pain at night/rest and other. Other medical history details: Anxiety, insulin resistance, weak pelvic floor.     Medical allergies: none and other.   Surgeries include:  Other. Other surgery history details: Laser surgery for moderate dysplasia.    Current medications:  Anti-depressants and anti-inflammatory.    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   Primary job tasks include:  Computer work, lifting/carrying, prolonged sitting, prolonged standing, pushing/pulling and repetitive tasks.                Objective:  Physical Therapy Initial Examination/Evaluation 2021   Ellyn Mcgee is a 45 year old female referred to physical therapy by Dr. Dorothy Guaman DO for treatment of pelvic dysfunction, urinary incontinence with Precautions/Restrictions/MD instructions E&T    Therapist Assessment:   Clinical Impression: Pt presents to PT with primary complaint of incontinence and weakness postpartum.  Per clinical examination, pt does have significant strength and control deficits in pelvic floor. Pt also  demonstrates weakness in core.  Pt will   Re: Ellyn Mcgee   :   1975  benefit from skilled physical therapy for strength program and education on safe return to activity postpartum.   Subjective: Pt has history of incontinence before this pregnancy. She also feels very weak in core.  She has a lot going on in her life and doesn't have space to even lie on her floor to do exercises at home. Pt reports that incontinence has improved since giving birth in December.   DOI/onset: MD order 21, Delivered son on 20  Mechanism of injury: childbirth and pregnancy   DOS NA   Related PMH: hx of incontinence  Previous treatment: 1 pelvic floor PT session    Imaging: NA   Chief Complaint: incontinence, weakness   Pain: rest 0 /10, activity 8/10  Described as: Back pain- achy, stabbing in the past Alleviated by: pressure, improving posture Exacerbated by: poor posture Progression of symptoms since initial onset: improving  Time of day when pain is worse: none noted   Sleeping: Wakes 1x/night to urinate   Social history: 14 year old and 9 year old, daughters 3 kids, 2 girls and 1 boy   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 Yes    Do you leak with cough,sneeze, jumping, running?Yes   Any other activities that cause leaking? Yes , yelling or laughing   Do you have triggers that make you feel you can't wait to go to the bathroom? No     Type of pad and number used per day? Liner; 1-3 liners if she is not going to be home  When you leak what is  "the amount?  Small-Large, small mostly     How long can you delay the need to urinate? Unsure.   How many times do you get up to urinate at night? 1    Can you stop the flow of urine when on the toilet? Sometimes   Is the volume of urine passed usually: Small-Medium . (8sec rule=  250ml with average bladder storing  400-600ml)  Re: Ellyn Mcgee   :   1975    Do you strain to pass urine? No  Do you have a slow or hesitant urinary stream? Sometimes  Do you have difficulty initiating the urine stream? No  Is urination painful?  No    How many bladder infections have you had in last 12 months? 0-1    Fluid intake(one glass is 8oz or one cup)  \"Lots\" glasses/day, 1-2 caffinated glasses/day  NA alcohol glasses/day.    Bowel habits:  Frequency of bowel movements? 5 times a week  Consistancy of stool? soft formed  Do you ignore the urge to defecate? No  Do you strain to pass stool? Sometimes     Pelvic Pain:  Do you have any pelvic pain with intercourse, exams, use of tampons? No  Is initial penetration during intercourse painful? Slightly post-partum   Is deeper penetration painful? No  Do you use lubricant?   Yes What kind? Water-based     Given birth? Yes Any complications? No  # of vaginal delieveries? 3   # of C-sections? 0  # of episiotomies? 0  Are you sexually active?Yes  Have you ever been worried for your physical safety? No  Any abdominal or pelvic surgeries? no  Are you having any regular exercise? Not since before pregnancy about 1.5 years ago   Have you practiced the PF(kegel) exercises for 4 or more weeks? No, unsure I of what's correct     LUMBAR ROM  Hyper but WNL  Single leg standing: >20s bilat     MUSCLE PERFORMANCE    Baseline PF tone:hypo  PF Tone with cough: hypo, slight bulge  Valsalva: not tested  PF Response quality: sluggish  PF Power: Center: 1   Endurance: Maximum contraction in seconds: < 5 seconds  # of endurance contractions before fatigue: NT  Quick contraction repetitions prior to " fatigue: Unable to coordinate quick contractions.  Specificity/accessory muscles: overall good isolation, some use of abdominals        Re: Ellyn Mcgee   :   1975    MMT 3//19/21 Left Right    Hip Flexion 4+/5 4+/5   Hip Abduction  4+/5 4+/5   Hip Extension  4+/5 4+/5     PALPATION: Non-tender all superficial structures with digital evaluation  1.5 finger-width rectus diastasis above umbilicus     Assessment/Plan:    Patient is a 45 year old female with pelvic complaints.    Patient has the following significant findings with corresponding treatment plan.                Diagnosis 1:  Pelvic Dysfunction, Urinary incontinence   Decreased strength - therapeutic exercise, therapeutic activities and home program  Impaired muscle performance - biofeedback, electric stimulation, neuro re-education and home program  Decreased function - therapeutic activities and home program    Therapy Evaluation Codes:   1) History comprised of:   Personal factors that impact the plan of care:      Living environment, Past/current experiences and Time since onset of symptoms.    Comorbidity factors that impact the plan of care are:      Depression, Overweight, Weakness and anxiety, insulin resistance, ADHD.     Medications impacting care: Anti-depressant and Anti-inflammatory.  2) Examination of Body Systems comprised of:   Body structures and functions that impact the plan of care:      Pelvis and abdominals.   Activity limitations that impact the plan of care are:      Lifting, Stress incontinence and Urinary incontinence.  3) Clinical presentation characteristics are:   Stable/Uncomplicated.  4) Decision-Making    Low complexity using standardized patient assessment instrument and/or measureable assessment of functional outcome.  Cumulative Therapy Evaluation is: Low complexity.    Previous and current functional limitations:  (See Goal Flow Sheet for this information)    Short term and Long term goals: (See Goal Flow Sheet  for this information)     Communication ability:  Patient appears to be able to clearly communicate and understand verbal and written communication and follow directions correctly.  Treatment Explanation - The following has been discussed with the patient:   RX ordered/plan of care  Anticipated outcomes  Possible risks and side effects  This patient would benefit from PT intervention to resume normal activities.   Rehab potential is good.    Re: Ellyn Mcgee   :   1975    Frequency:  1 X week, once daily  Duration:  for 6 weeks  Discharge Plan:  Achieve all LTG.  Independent in home treatment program.  Reach maximal therapeutic benefit.      NMR (25 mins)  Biofeedback unit used to provide visualization of PF activation in supine position. Discussed how tone of pelvic floor muscles will impact symptoms. Education provided on importance of isolation of abdominals vs pelvic floor and other proximal muscles.   Education provided on building strength for power and  endurance in pt's PF due to pt's low resting tone and symptoms. Pt working on 2s contractions, 2-4s relax, 5 reps 2-3 x/day in supine position. Initiated education on  the importance of core strength. Discussed activation of TA and performed contraction in both supine and sitting working up to 20-30s holds. Discussed performing TA contraction while nursing. Pt provided with HEP.    Therapeutic Activity (20 min): Today's session consisted of education regarding pelvic floor muscle anatomy, normal bladder function, urge suppression techniques and/or relaxation techniques as indicated.  Pt was instructed in the pathoanatomy of the pelvic floor utilizing pelvic model.  We discussed what pelvic floor physical therapy is, components of exam, and typical patient progression.  Pt was told that she was in control of exam progression, and if at any time was uncomfortable and wished to discontinue we could.     Please refer to the daily flowsheet for treatment  today, total treatment time and time spent performing 1:1 timed codes.         Thank you for your referral.    INQUIRIES  Therapist: Holly Bradford, PT, DPT   [address] [address]  Phone: [phone]  Fax: [phone]

## 2021-03-23 ENCOUNTER — IMMUNIZATION (OUTPATIENT)
Dept: PEDIATRICS | Facility: CLINIC | Age: 46
End: 2021-03-23
Payer: COMMERCIAL

## 2021-03-23 PROCEDURE — 91300 PR COVID VAC PFIZER DIL RECON 30 MCG/0.3 ML IM: CPT

## 2021-03-23 PROCEDURE — 0001A PR COVID VAC PFIZER DIL RECON 30 MCG/0.3 ML IM: CPT

## 2021-03-29 ENCOUNTER — THERAPY VISIT (OUTPATIENT)
Dept: PHYSICAL THERAPY | Facility: CLINIC | Age: 46
End: 2021-03-29
Attending: FAMILY MEDICINE
Payer: COMMERCIAL

## 2021-03-29 DIAGNOSIS — R32 URINARY INCONTINENCE: ICD-10-CM

## 2021-03-29 DIAGNOSIS — M62.89 PELVIC FLOOR DYSFUNCTION: ICD-10-CM

## 2021-03-29 PROCEDURE — 97112 NEUROMUSCULAR REEDUCATION: CPT | Mod: GP | Performed by: PHYSICAL THERAPIST

## 2021-04-01 ENCOUNTER — VIRTUAL VISIT (OUTPATIENT)
Dept: PSYCHOLOGY | Facility: CLINIC | Age: 46
End: 2021-04-01
Payer: COMMERCIAL

## 2021-04-01 DIAGNOSIS — F41.1 GAD (GENERALIZED ANXIETY DISORDER): Primary | ICD-10-CM

## 2021-04-01 DIAGNOSIS — F33.41 RECURRENT MAJOR DEPRESSIVE DISORDER, IN PARTIAL REMISSION (H): ICD-10-CM

## 2021-04-01 PROCEDURE — 90837 PSYTX W PT 60 MINUTES: CPT | Mod: 95 | Performed by: MARRIAGE & FAMILY THERAPIST

## 2021-04-01 NOTE — PROGRESS NOTES
Progress Note    Patient Name: Ellyn Mcgee  Date: 4/1/2021         Service Type: Individual      Session Start Time: 1:36 p.m.  Session End Time: 2:33 p.m.     Session Length: 57 min.    Session #: 18 (with this writer; patient met previously for DA with [facility] and psychiatry)    Attendees: Client and infant son    Service Modality:  Video Visit:    Telemedicine Visit: The patient's condition can be safely assessed and treated via synchronous audio and visual telemedicine encounter.      Reason for Telemedicine Visit: Services only offered telehealth    Originating Site (Patient Location): Patient's home    Distant Site (Provider Location): [facility] Clinics: [place]    Consent:  The patient/guardian has verbally consented to: the potential risks and benefits of telemedicine (video visit) versus in person care; bill my insurance or make self-payment for services provided; and responsibility for payment of non-covered services.     Patient would like the video invitation sent by: Send to e-mail at: [email or web address]    Mode of Communication:  Video Conference via [facility]    As the provider I attest to compliance with applicable laws and regulations related to telemedicine.     Treatment Plan Last Reviewed: 3/15/2021  PHQ-9 / TEE-7: 12/11/2020     DATA  Interactive Complexity: No  Crisis: No       Progress Since Last Session (Related to Symptoms / Goals / Homework):  Symptoms: fluctuating, as patient reports that a long-time friend became upset with her; patient reports additional anxiety and ended up researching qualities of a good friend; patient ultimately determined that she had done nothing wrong in this situation.     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  Patient reports she has been able to go for a couple of walks outside and  finds lele in seeing her chickens and collecting their eggs.      Episode of Care Goals: Satisfactory progress - ACTION (Actively working towards change); Intervened by reinforcing change plan / affirming steps taken     Current / Ongoing Stressors and Concerns:  Some financial and friend-related stress; is interested in a deeper dive into emotional state and core beliefs through ACT; new son born 2020; roommate/former friend is refusing to move out of their home (has lived there for three years and has not paid any rent for over a year); pt has been experiencing mild panic attacks; daughter (age 10) just started ADHD medication; history of anxious attachment stemming from relationship with father (he  8 years ago); is currently pregnant (high-risk due to advanced maternal age) and baby is due Dec. 2020; is in couples therapy with  due to frequent arguments and his emotional affair earlier in the year; financial stress; history of ADHD (hyperactive type).     Treatment Objective(s) Addressed in This Session:   use cognitive strategies identified in therapy to challenge anxious thoughts   Identified personal strengths, friendships, and qualities in past mentors  Discussed healthy boundaries and enforcement    Past/future:  Discussed work/life balance and current purpose  tell full story of past relational issues/trust/infidelity  identify how attachment style drives patient's and 's behaviors  will identify how past feelings are impacting current relationships  Discussed ambiguous loss related to relationship with father  tell full story of past trauma related to her relationship with her father (and current roommate issues)     Intervention:   Psychodynamic: Family systems and attachment style  Narrative Therapy: separate problem from person and find the bright spots   CBT: connect thoughts, feelings, and actions        ASSESSMENT: Current Emotional / Mental Status (status of significant  "symptoms):   Risk status (Self / Other harm or suicidal ideation)   Patient denies current fears or concerns for personal safety.   Patient denies current or recent suicidal ideation or behaviors. Patient last reported feeling hopeless/passive ideation on 8/6/2020.   Patient denies current or recent homicidal ideation or behaviors.   Patient denies current or recent self injurious behavior or ideation.   Patient denies other safety concerns.   Patient reports there has been no change in risk factors since their last session.     Patient reports there has been no change in protective factors since their last session.     Recommended that patient call 911 or go to the local ED should there be a change in any of these risk factors.     Appearance:   Appropriate    Eye Contact:   Good    Psychomotor Behavior: Normal     Attitude:   Cooperative, Expansive   Orientation:   All   Speech    Rate / Production: Normal/ Responsive Talkative    Volume:  Normal    Mood:    Anxious  Expansive   Affect:    Appropriate  Expansive    Thought Content:  Paranoia  Rumination    Thought Form:  Coherent  Goal Directed  Neurosis   Insight:    Good      Medication Review:   No changes to current psychiatric medication(s)     Medication Compliance:   Yes     Changes in Health Issues:   None reported      Chemical Use Review:   Substance Use: Chemical use reviewed, no active concerns identified ; no current alcohol use     Tobacco Use: No current tobacco use.      Diagnosis:  1. TEE (generalized anxiety disorder)    2. Recurrent major depressive disorder, in partial remission (H)      Collateral Reports Completed:  N/A      PLAN: (Patient Tasks / Therapist Tasks / Other)    Patient states that her goals for the next week include:    1. Continue to work on clearing out office to create bedroom for baby son  2. \"Work on trying to connect with my rational self even when my emotional self is triggered\"      (Deferred)  Homework: Write a letter " "to father with two parts: \"thank you for\": ________ and \"here's what I would like to say to you\": ___________.          Keesha Eller                                                         ______________________________________________________________________    Treatment Plan    Patient's Name: Ellyn Mcgee  YOB: 1975    Date: 3/15/2021    DSM5 Diagnoses: 296.32 (F33.1) Major Depressive Disorder, Recurrent Episode, Moderate _ or 300.02 (F41.1) Generalized Anxiety Disorder (patient has previously been diagnosed with ADHD, hyperactive type)    Psychosocial / Contextual Factors: History of anxious attachment stemming from relationship with father (he  8 years ago); is currently pregnant (high-risk due to advanced maternal age); is in couples therapy with  due to frequent arguments and his emotional affair earlier in the year; financial stress; history of ADHD (hyperactive type).    WHODAS 2.0 Total Score 2020   Total Score 23 30   Total Score MyChart 23 30       PHQ 2020   PHQ-9 Total Score 7 8 10   Q9: Thoughts of better off dead/self-harm past 2 weeks Not at all Not at all Not at all   F/U: Thoughts of suicide or self-harm - - -   F/U: Safety concerns - - -     TEE-7 SCORE 2020   Total Score - - -   Total Score 14 (moderate anxiety) 12 (moderate anxiety) -   Total Score 14 12 11       Referral / Collaboration:  Was/were discussed and client will pursue: ACT Therapist and [facility] if mental health concerns around maternity/pregnancy develop    Anticipated number of sessions for this episode of care: 20-24    Measurable Treatment Goal(s) related to diagnosis / functional impairment(s)    Patient is asking to focus treatment on her relationship with her father and past trauma.    Goal 1: Patient will tell full story of past trauma related to her relationship with her father and will identify how past feelings are " "impacting current relationships.    Objective #A (Patient Action)    Patient will tell full story of past trauma related to her relationship with her father.  Status: Restarted - Date: 3/15/2021      Intervention(s)  Therapist will collaborate in session to determine attachment wounds and inner child work.    Objective #B  Patient will identify how past feelings are impacting current relationships.  Status: Continued - Date(s): 3/15/2021     Intervention(s)  Therapist will role-play conflict management.    Objective #C  Patient will identify strengths and weaknesses within her family system of origin.  Status: Restarted - Date: 3/15/2021     Intervention(s)  Therapist will assign homework for patient to create list.      Goal 2: Patient will learn about resilience, trauma responses, and Jvaon Servin's \"the story I'm making up\" (cognitive strategy to manage anxious thoughts).    Objective #A (Patient Action)    Status: Completed - Date: 12/11/2020     Patient will learn about resilience.    Intervention(s)  Therapist will teach about protective factors.    Objective #B  Patient will learn about trauma responses.    Status: Continued - Date(s): 3/15/2021     Intervention(s)  Therapist will provide educational materials on trauma responses.    Objective #C  Patient will use cognitive strategies identified in therapy to challenge anxious thoughts.  Status: Continued - Date(s): 3/15/2021    Intervention(s)  Therapist will teach about Javon Servin's power of vulnerability and \"the story I'm making up\".      Goal 3: Patient will learn about ACE scores and attachment styles and will identify how her attachment style drives her behavior.     Objective #A (Patient Action)    Status: Restarted - Date: 3/15/2021     Patient will learn about ACE scores and will take assessment.    Intervention(s)  Therapist will complete with patient in-session.    Objective #B  Patient will learn about attachment styles and will take Attachment " Style assessment.    Status: Completed - Date: 9/2020     Intervention(s)  Therapist will complete with patient in-session.    Objective #C  Patient will identify how her attachment style drives her behavior.  Status: Continued - Date(s): 3/15/2021    Intervention(s)  Therapist will teach emotional regulation skills.        Patient has reviewed and agreed to the above plan.      Keesha Ellre

## 2021-04-01 NOTE — PATIENT INSTRUCTIONS
"Patient states that her goals for the next week include:    1. Continue to work on clearing out office to create bedroom for baby son  2. \"Work on trying to connect with my rational self even when my emotional self is triggered\"    "

## 2021-04-08 ENCOUNTER — THERAPY VISIT (OUTPATIENT)
Dept: PHYSICAL THERAPY | Facility: CLINIC | Age: 46
End: 2021-04-08
Payer: COMMERCIAL

## 2021-04-08 DIAGNOSIS — M62.89 PELVIC FLOOR DYSFUNCTION: ICD-10-CM

## 2021-04-08 DIAGNOSIS — R32 URINARY INCONTINENCE: ICD-10-CM

## 2021-04-08 PROCEDURE — 97112 NEUROMUSCULAR REEDUCATION: CPT | Mod: GP | Performed by: PHYSICAL THERAPIST

## 2021-04-08 PROCEDURE — 97110 THERAPEUTIC EXERCISES: CPT | Mod: GP | Performed by: PHYSICAL THERAPIST

## 2021-04-13 ENCOUNTER — IMMUNIZATION (OUTPATIENT)
Dept: PEDIATRICS | Facility: CLINIC | Age: 46
End: 2021-04-13
Attending: INTERNAL MEDICINE
Payer: COMMERCIAL

## 2021-04-13 PROCEDURE — 0002A PR COVID VAC PFIZER DIL RECON 30 MCG/0.3 ML IM: CPT

## 2021-04-13 PROCEDURE — 91300 PR COVID VAC PFIZER DIL RECON 30 MCG/0.3 ML IM: CPT

## 2021-04-20 ENCOUNTER — VIRTUAL VISIT (OUTPATIENT)
Dept: PSYCHIATRY | Facility: CLINIC | Age: 46
End: 2021-04-20
Payer: COMMERCIAL

## 2021-04-20 DIAGNOSIS — F41.1 GAD (GENERALIZED ANXIETY DISORDER): ICD-10-CM

## 2021-04-20 DIAGNOSIS — F90.0 ATTENTION DEFICIT HYPERACTIVITY DISORDER (ADHD), PREDOMINANTLY INATTENTIVE TYPE: ICD-10-CM

## 2021-04-20 DIAGNOSIS — G47.00 INSOMNIA, UNSPECIFIED TYPE: ICD-10-CM

## 2021-04-20 DIAGNOSIS — F33.41 RECURRENT MAJOR DEPRESSIVE DISORDER, IN PARTIAL REMISSION (H): Primary | ICD-10-CM

## 2021-04-20 PROCEDURE — 99214 OFFICE O/P EST MOD 30 MIN: CPT | Mod: 95 | Performed by: PSYCHIATRY & NEUROLOGY

## 2021-04-20 RX ORDER — PROPRANOLOL HYDROCHLORIDE 20 MG/1
20 TABLET ORAL 2 TIMES DAILY PRN
COMMUNITY
End: 2023-01-05 | Stop reason: ALTCHOICE

## 2021-04-20 RX ORDER — ESCITALOPRAM OXALATE 10 MG/1
10 TABLET ORAL DAILY
Qty: 90 TABLET | Refills: 0 | Status: SHIPPED | OUTPATIENT
Start: 2021-04-20 | End: 2021-07-21

## 2021-04-20 RX ORDER — VENLAFAXINE HYDROCHLORIDE 150 MG/1
150 CAPSULE, EXTENDED RELEASE ORAL DAILY
Qty: 90 CAPSULE | Refills: 0 | Status: SHIPPED | OUTPATIENT
Start: 2021-04-20 | End: 2021-07-20

## 2021-04-20 NOTE — PATIENT INSTRUCTIONS
Treatment Plan:    Continue Lexapro/escitalopram 10 mg daily for mood and anxiety    Continue venlafaxine/Effexor- mg daily for mood and anxiety.      Start propranolol 10-20 mg twice daily as needed for anxiety. Discussed risks/benefits of use while breastfeeding. To minimize exposure to infant try to breastfeed 3-4 hours after taking propranolol.     Continue all other medications as reviewed per electronic medical record today.     Safety plan reviewed. To the Emergency Department as needed or call after hours crisis line at [phone] or [phone]. [place] Crisis Text Line. Text [place] to 973637 or Suicide LifeLine Chat: suicidepreventionIMASTEline.org/chat    Continue therapy as planned.     Schedule an appointment with me in 4 weeks or sooner as needed. Call [facility] at [phone] to schedule if someone does not reach out to you within 2-3 days.     Follow up with primary care provider as planned or for acute medical concerns.    Call the psychiatric nurse line with medication questions or concerns at [phone].    [employer] may be used to communicate with your provider, but this is not intended to be used for emergencies.    Therapy Resources:  Www.PsychologyToday.com  Www.NAMIMN.org    [facility]- Psychiatric Disorders During Pregnancy  [email or web address].org/specialty-clinics/psychiatric-disorders-[email or web address]-[email or web address]    [employer]  [email or web address]

## 2021-04-20 NOTE — PROGRESS NOTES
"Ellyn Mcgee is a 45 year old year old who is being evaluated via a billable video visit.      How would you like to obtain your AVS? MyChart  If you are dropped from the video visit, the video invite should be resent to: Text to cell phone: see Epic  Will anyone else be joining your video visit? No       Telemedicine Visit: The patient's condition can be safely assessed and treated via synchronous audio and visual telemedicine encounter.      Reason for Telemedicine Visit: Covid-19 Pandemic    Originating Site (Patient Location): Patient's home     Distant Site (Provider Location): Provider Remote Setting    Mode of Communication:  Video Conference via MailInBlack    As the provider I attest to compliance with applicable laws and regulations related to telemedicine.    Half of the visit switched to telephone due to video failing.        Outpatient Psychiatric Progress Note    Name: Ellyn Mcgee   : 1975                    Primary Care Provider: Dorothy Guaman DO   Therapist: BARBARA Schulte     PHQ-9 scores:  PHQ-9 SCORE 2020   PHQ-9 Total Score - - -   PHQ-9 Total Score MyChart 7 (Mild depression) 8 (Mild depression) -   PHQ-9 Total Score 7 8 10       TEE-7 scores:  TEE-7 SCORE 2020   Total Score - - -   Total Score 14 (moderate anxiety) 12 (moderate anxiety) -   Total Score 14 12 11       Patient Identification:  Patient is a 45 year old,   White American female  who presents for return visit with me.  Patient is currently employed part time but on maternity leave. Patient attended the phone/video session alone. Patient prefers to be called: \"Ellyn\".    Interim History:  I last saw Ellyn Mcgee for outpatient psychiatry Return Visit on 2021. During that appointment, we:      Continue Lexapro/escitalopram 10 mg daily for mood and anxiety    Continue venlafaxine 150 mg daily for mood and anxiety.      : Pt feels a little worse " lately. Pt wonders if she is starting to have a hormonal shift. Pt's baby also experiencing likely sleep regression (baby about 4 months). More frequent mood shifts. Some feelings of panic with shortness of breath. Has taken propranolol in the past for feelings of panic and finds it very helpful. Has used xanax in past also-better for flying but likes to avoid unless would absolutely need.  Doesn't like anxiety meds that make her feel sleepy.  supportive. No safety concerns. No SI. No problematic drug/alcohol use.     Per [facility] [place], BARBARA Jarquin, during today's team-based visit:  Pt pre- and declines [facility] [place] visit.     Psychiatric ROS:  Ellyn Mcgee reports mood has been:  up and down   Anxiety has been: Up and down, feelings of panic  Sleep has been: Difficult due to infant, infant currently with likely sleep regression  Isabel sxs: None  Psychosis sxs: None  ADHD/ADD sxs: manageable without meds at this time  PTSD sxs: None  PHQ9 and GAD7 scores were reviewed today if completed.   Medication side effects:  See HPI  Current stressors include: Pana baby/infant, see HPI above  Coping mechanisms and supports include: Therapy, Family, Hobbies and Friends    Current medications include:   Current Outpatient Medications   Medication Sig     escitalopram (LEXAPRO) 10 MG tablet Take 1 tablet (10 mg) by mouth daily     ibuprofen (ADVIL/MOTRIN) 200 MG tablet Take 200-400 mg by mouth every 4 hours as needed for pain OTC     Prenatal Vit-Fe Fumarate-FA (PRENATAL PO)      venlafaxine (EFFEXOR-XR) 150 MG 24 hr capsule Take 1 capsule (150 mg) by mouth daily     No current facility-administered medications for this visit.        Past Medical/Surgical History:  Past Medical History:   Diagnosis Date     Abnormal Pap smear     Colposcopy     Adjustment disorder with mixed anxiety and depressed mood 2012     Anemia     In Past     Anxiety      Chlamydia trachomatis infection of other specified site       Depression      Fertility problem      Lyme disease 9/8/2010    ?false positive vs positve CMV - resolved     Moderate dysplasia of cervix 1995     Other and unspecified adverse effect of drug, medicinal and biological substance     insulin resistent     Varicosities      Wounds and injuries     fall down stairs, PT for back, pain meds      has a past medical history of Abnormal Pap smear, Adjustment disorder with mixed anxiety and depressed mood (4/4/2012), Anemia, Anxiety, Chlamydia trachomatis infection of other specified site (1993), Depression, Fertility problem, Lyme disease (9/8/2010), Moderate dysplasia of cervix (1995), Other and unspecified adverse effect of drug, medicinal and biological substance, Varicosities, and Wounds and injuries. She also has no past medical history of Asthma, Asymptomatic human immunodeficiency virus (HIV) infection status (H), Breast disorder, Chronic kidney disease, Complication of anesthesia, Diabetes mellitus (H), Heart disease, Herpes simplex without mention of complication, Hypertension, Liver disease, Postpartum depression, Rh incompatibility, Seizures (H), Sickle cell anemia (H), Systemic lupus erythematosus (H), or Thyroid disease.    Social History:  Reviewed. No changes to social history except as noted in HPI above.     Vital Signs:   None. This is phone/video visit.     Labs:  Most recent laboratory results reviewed and the pertinent results include:   Recent hemoglobin 11.4 on 12/25/2020, AST and ALT within normal limits on 12/24/2020    Review of Systems:  10 systems (general, cardiovascular, respiratory, eyes, ENT, endocrine, GI, , M/S, neurological) were reviewed. Most pertinent finding(s) is/are: denies fever, cough, headaches, shortness of breath, chest pain, N/V, constipation/diarrhea, trouble urinating, aches and pains. The remaining systems are all unremarkable.    Mental Status Examination (limited as this is by phone/video):  Appearance: Awake, alert,  "appears stated age, no acute distress, appropriately interacting baby  Attitude:  Cooperative, pleasant  Motor: No obvious abnormalities observed via video, not formally tested  Oriented to:  person, place, time, and situation  Attention Span and Concentration:  normal  Speech:  clear, coherent, regular rate, rhythm, and volume  Language: intact  Mood: \"Up and down\"  Affect: Overall appropriate and mood congruent  Associations:  no loose associations  Thought Process:  logical, linear and goal oriented  Thought Content:  no evidence of suicidal ideation or homicidal ideation, no evidence of psychotic thought, no auditory hallucinations present and no visual hallucinations present  Recent and Remote Memory:  Intact to interview. Not formally assessed. No amnesia.  Fund of Knowledge: appropriate  Insight:  good  Judgment:  intact, adequate for safety  Impulse Control:  intact    Suicide Risk Assessment:  Today Ellyn Mcgee reports no suicidal ideation. Based on all available evidence including the factors cited above, Ellyn Mcgee does not appear to be at imminent risk for self-harm, does not meet criteria for a 72-hr hold, and therefore remains appropriate for ongoing outpatient level of care.  A thorough assessment of risk factors related to suicide and self-harm have been reviewed and are noted above. The patient convincingly denies suicidality on several occasions. Local community safety resources printed and reviewed for patient to use if needed. There was no deceit detected, and the patient presented in a manner that was believable.     DSM5 Diagnosis:  Major Depressive Disorder, Recurrent Episode, In Partial Remission  300.02 (F41.1) Generalized Anxiety Disorder   ADHD, Unspecified  Insomnia-unspecified (mostly related to infant)    Medical comorbidities include:  Patient Active Problem List    Diagnosis Date Noted     Pelvic floor dysfunction 03/19/2021     Priority: Medium     Urinary incontinence " 03/19/2021     Priority: Medium     Labor and delivery, indication for care 12/23/2020     Priority: Medium     Major depressive disorder, recurrent episode, moderate (H) 10/08/2020     Priority: Medium     High-risk pregnancy, elderly multigravida, unspecified trimester 06/05/2020     Priority: Medium     TEE (generalized anxiety disorder) 10/10/2018     Priority: Medium     Cervical radiculopathy 04/16/2018     Priority: Medium     Attention deficit hyperactivity disorder (ADHD), predominantly inattentive type 10/04/2017     Priority: Medium     Patient is followed by PREMA MARIEE for ongoing prescription of stimulants.  All refills should be approved by this provider, or covering partner.    Medication(s): Concerta 27mg on weekend days and 36mg other days of the week  Maximum quantity per month: 30  Clinic visit frequency required: Q 6  months     Controlled substance agreement on file: Yes       Date(s): 10/4/17  Neuropsych evaluation for ADD completed:  Yes, completed 8/17/17, on file and diagnosis confirmed    Last [facility] website verification: 12/3/2019   [email or web address]           External hemorrhoids 04/25/2012     Priority: Medium     PCOS (polycystic ovarian syndrome) 02/22/2011     Priority: Medium     Hyperlipidemia LDL goal <130 09/05/2008     Priority: Medium     Anxiety state 09/05/2008     Priority: Medium     Problem list name updated by automated process. Provider to review    Patient is followed by PREMA MARIEE for ongoing prescription of benzodiazepines.  All refills should be approved by this provider, or covering partner.    Medication(s): Xanax.   Maximum quantity per month:   Clinic visit frequency required:      Controlled substance agreement on file: Yes       Date(s): 10/4/2017  Benzodiazepine use reviewed by psychiatry:      Last [facility] website verification:  done on 12/17/2018   [email or web address]             Psychosocial & Contextual Factors: See HPI  above    Assessment:  Per Intake Note with me 9/8/20:  Ellyn Mcgee is a 45-year-old female who is 23 weeks pregnant with a past psychiatric history including depression, anxiety, and ADHD who presents today for psychiatric evaluation.  Her depression and anxiety date back several years and she has also had postpartum depression/anxiety with her now 8-year-old (she also has a 14-year-old but did not experience postpartum mental health issues at that time).  She started sertraline during her second pregnancy and then ended up being maintained on Paxil-CR for several years.  She is also more recently diagnosed with ADHD and maintained on Concerta.  She weaned off both Paxil and Concerta when she found out she was pregnant and replaced these medications with her current regimen of Lexapro and Effexor-XR to decrease risk of fetal defects.  For the most part, she is feeling a little bit better on her current doses of Lexapro 10 mg and Effexor-XR 75 mg.  It is an unconventional combination as we discussed, but since she is doing quite well, I am hesitant to make many changes.  She feels as though her anxiety could be a little bit better controlled and so we will further increase Effexor-XR to 112.5 mg daily to be taken with her Lexapro 10 mg daily.  If she does a lot better, we can consider decreasing Lexapro to 5 mg daily. We discussed the risk of serotonin syndrome and she will continue to be vigilant for any signs or symptoms of this condition.  We did discuss the possibility of restarting a stimulant medication if necessary.  She does not feel as though her ADHD symptoms are too severe at this time.    4/20:   Today patient reports overall some ongoing ups and downs regarding her symptoms since last visit.  She is thinking much of it might be hormonal and related to sleep deprivation.  She still has not yet had her menses return since having her baby.  She has had some feelings of panic.  Used to take propranolol in  the past when she felt this way.  She would like to start this medication again.  Discussed risks and benefits while breast-feeding.  Limited risks for taking low-dose propranolol while breast-feeding although the infant does have some exposure through breast milk.  Recommended taking propranolol at least 3-4 hours prior to breast-feeding to decrease risks.  No other medication changes at this time.    Medication side effects and alternatives were reviewed. Health promotion activities recommended and reviewed today. All questions addressed. Education and counseling completed regarding risks and benefits of medications and psychotherapy options. Recommend ongoing therapy for additional support.     Treatment Plan:    Continue Lexapro/escitalopram 10 mg daily for mood and anxiety    Continue venlafaxine/Effexor- mg daily for mood and anxiety.      Start propranolol 10-20 mg twice daily as needed for anxiety. Discussed risks/benefits of use while breastfeeding. To minimize exposure to infant try to breastfeed 3-4 hours after taking propranolol.     Continue all other medications as reviewed per electronic medical record today.     Safety plan reviewed. To the Emergency Department as needed or call after hours crisis line at [phone] or [phone]. [place] Crisis Text Line. Text [place] to 370591 or Suicide LifeLine Chat: suicidepreventionlifeline.org/chat    Continue therapy as planned.     Schedule an appointment with me in 4 weeks or sooner as needed. Call [facility] at [phone] to schedule if someone does not reach out to you within 2-3 days.     Follow up with primary care provider as planned or for acute medical concerns.    Call the psychiatric nurse line with medication questions or concerns at [phone].    MyChart may be used to communicate with your provider, but this is not intended to be used for emergencies.    Therapy  Resources:  Www.PsychologyToday.com  Www.NAMIMN.org    [facility]- Psychiatric Disorders During Pregnancy  [email or web address]    [facility]  [email or web address]    Administrative Billing:   Phone Call/Video Duration: 22 Minutes  Start: 1:18p  Stop: 1:40p    1:18-1:28 via video  Phone: 1:28-1:40p    Time spent with patient was 22 minutes and greater than 50% of time or 12 minutes was spent in counseling and coordination of care regarding above diagnoses and treatment plan. Complexity high due to recently postpartum, breastfeeding on psychotropics, med change and hx of postpartum mood sxs.      Patient Status:  Patient will continue to be seen for ongoing consultation and stabilization.    Signed:   Sherlyn Wang DO  [facility]

## 2021-04-20 NOTE — Clinical Note
Please call this patient to get them scheduled for a follow-up visit in 4 weeks. Please schedule with ONLY me and no [facility] since pt pre- and declines enhanced model. Thanks!

## 2021-04-22 ENCOUNTER — THERAPY VISIT (OUTPATIENT)
Dept: PHYSICAL THERAPY | Facility: CLINIC | Age: 46
End: 2021-04-22
Payer: COMMERCIAL

## 2021-04-22 DIAGNOSIS — R32 URINARY INCONTINENCE: ICD-10-CM

## 2021-04-22 DIAGNOSIS — M62.89 PELVIC FLOOR DYSFUNCTION: ICD-10-CM

## 2021-04-22 PROCEDURE — 97110 THERAPEUTIC EXERCISES: CPT | Mod: GP | Performed by: PHYSICAL THERAPIST

## 2021-04-22 PROCEDURE — 97112 NEUROMUSCULAR REEDUCATION: CPT | Mod: GP | Performed by: PHYSICAL THERAPIST

## 2021-04-29 ENCOUNTER — MEDICAL CORRESPONDENCE (OUTPATIENT)
Dept: HEALTH INFORMATION MANAGEMENT | Facility: CLINIC | Age: 46
End: 2021-04-29

## 2021-04-29 ENCOUNTER — VIRTUAL VISIT (OUTPATIENT)
Dept: PSYCHOLOGY | Facility: CLINIC | Age: 46
End: 2021-04-29
Payer: COMMERCIAL

## 2021-04-29 DIAGNOSIS — F33.41 RECURRENT MAJOR DEPRESSIVE DISORDER, IN PARTIAL REMISSION (H): ICD-10-CM

## 2021-04-29 DIAGNOSIS — F41.1 GAD (GENERALIZED ANXIETY DISORDER): Primary | ICD-10-CM

## 2021-04-29 PROCEDURE — 90837 PSYTX W PT 60 MINUTES: CPT | Mod: 95 | Performed by: MARRIAGE & FAMILY THERAPIST

## 2021-04-30 NOTE — PATIENT INSTRUCTIONS
Patient states that her goals for the next three weeks include:    1. Continue to work on clearing out office to create bedroom for baby son (continued)  2. Schedule vet appointments for the cats  3. Network more with school contacts for [employer] business  4. Continue working on relationship with

## 2021-04-30 NOTE — PROGRESS NOTES
Progress Note    Patient Name: Ellyn Mcgee  Date: 4/29/2021         Service Type: Individual      Session Start Time: 1:40 p.m.  Session End Time: 2:35 p.m.     Session Length: 55 min.    Session #: 19 (with this writer; patient met previously for DA with [facility] and psychiatry)    Attendees: Client and infant son    Service Modality:  Video Visit:    Telemedicine Visit: The patient's condition can be safely assessed and treated via synchronous audio and visual telemedicine encounter.      Reason for Telemedicine Visit: Services only offered telehealth    Originating Site (Patient Location): Patient's home    Distant Site (Provider Location): [facility]: [place]    Consent:  The patient/guardian has verbally consented to: the potential risks and benefits of telemedicine (video visit) versus in person care; bill my insurance or make self-payment for services provided; and responsibility for payment of non-covered services.     Patient would like the video invitation sent by: Send to e-mail at: [email or web address]    Mode of Communication:  Video Conference via [facility]    As the provider I attest to compliance with applicable laws and regulations related to telemedicine.     Treatment Plan Last Reviewed: 3/15/2021  PHQ-9 / TEE-7: 12/11/2020     DATA  Interactive Complexity: No  Crisis: No       Progress Since Last Session (Related to Symptoms / Goals / Homework):  Symptoms: improving, as patient reports that she has been communicating her needs and emotional state more to her family members, and her  has been more patient and responsive while also differentiating his emotional state     Homework: Achieved / completed to satisfaction - patient has worked on clearing out the baby's room but admits that her ADHD symptoms sometimes hinder her progress and task completion; 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  Patient reports she has been able to go outside and finds lele in her time outdoors.      Episode of Care Goals: Satisfactory progress - ACTION (Actively working towards change); Intervened by reinforcing change plan / affirming steps taken     Current / Ongoing Stressors and Concerns:  Some financial and friend-related stress; is interested in a deeper dive into emotional state and core beliefs through ACT; new son born 2020; roommate/former friend is refusing to move out of their home (has lived there for three years and has not paid any rent for over a year); pt has been experiencing mild panic attacks; daughter (age 10) just started ADHD medication; history of anxious attachment stemming from relationship with father (he  8 years ago); is currently pregnant (high-risk due to advanced maternal age) and baby is due Dec. 2020; is in couples therapy with  due to frequent arguments and his emotional affair earlier in the year; financial stress; history of ADHD (hyperactive type).     Treatment Objective(s) Addressed in This Session:   identify how attachment style drives patient's and 's behaviors  will identify how past feelings are impacting current relationships  use cognitive strategies identified in therapy to challenge anxious thoughts   Identified personal strengths within friendships  Discussed healthy boundaries and enforcement    Past/future:  Discussed work/life balance and current purpose  tell full story of past relational issues/trust/infidelity  Discussed ambiguous loss related to relationship with father  tell full story of past trauma related to her relationship with her father (and current roommate issues)     Intervention:   Psychodynamic: Family systems and attachment style  Narrative Therapy: separate problem from person and find the bright spots   CBT: connect thoughts, feelings, and actions        ASSESSMENT: Current Emotional  / Mental Status (status of significant symptoms):   Risk status (Self / Other harm or suicidal ideation)   Patient denies current fears or concerns for personal safety.   Patient denies current or recent suicidal ideation or behaviors. Patient last reported feeling hopeless/passive ideation on 8/6/2020.   Patient denies current or recent homicidal ideation or behaviors.   Patient denies current or recent self injurious behavior or ideation.   Patient denies other safety concerns.   Patient reports there has been no change in risk factors since their last session.     Patient reports there has been no change in protective factors since their last session.     Recommended that patient call 911 or go to the local ED should there be a change in any of these risk factors.     Appearance:   Appropriate    Eye Contact:   Good    Psychomotor Behavior: Normal     Attitude:   Cooperative, Expansive, Pleasant   Orientation:   All   Speech    Rate / Production: Normal/ Responsive Talkative    Volume:  Normal    Mood:    Anxious  Normal Expansive   Affect:    Appropriate    Thought Content:  Rumination    Thought Form:  Coherent  Goal Directed  Neurosis   Insight:    Good      Medication Review:   Changes to psychiatric medications, see updated Medication List in EPIC.      Medication Compliance:   Yes     Changes in Health Issues:   None reported      Chemical Use Review:   Substance Use: Chemical use reviewed, no active concerns identified ; no current alcohol use     Tobacco Use: No current tobacco use.      Diagnosis:  1. TEE (generalized anxiety disorder)    2. Recurrent major depressive disorder, in partial remission (H)      Collateral Reports Completed:  N/A      PLAN: (Patient Tasks / Therapist Tasks / Other)    Patient states that her goals for the next three weeks include:    1. Continue to work on clearing out office to create bedroom for baby son (continued)  2. Schedule vet appointments for the cats  3. Network  "more with school contacts for [employer] business  4. Continue working on relationship with       (Deferred)  Homework: Write a letter to father with two parts: \"thank you for\": ________ and \"here's what I would like to say to you\": ___________.          Keesha Eller                                                         ______________________________________________________________________    Treatment Plan    Patient's Name: Ellyn Mcgee  YOB: 1975    Date: 3/15/2021    DSM5 Diagnoses: 296.32 (F33.1) Major Depressive Disorder, Recurrent Episode, Moderate _ or 300.02 (F41.1) Generalized Anxiety Disorder (patient has previously been diagnosed with ADHD, hyperactive type)    Psychosocial / Contextual Factors: History of anxious attachment stemming from relationship with father (he  8 years ago); is currently pregnant (high-risk due to advanced maternal age); is in couples therapy with  due to frequent arguments and his emotional affair earlier in the year; financial stress; history of ADHD (hyperactive type).    WHODAS 2.0 Total Score 2020   Total Score 23 30   Total Score MyChart 23 30       PHQ 2020   PHQ-9 Total Score 7 8 10   Q9: Thoughts of better off dead/self-harm past 2 weeks Not at all Not at all Not at all   F/U: Thoughts of suicide or self-harm - - -   F/U: Safety concerns - - -     TEE-7 SCORE 2020   Total Score - - -   Total Score 14 (moderate anxiety) 12 (moderate anxiety) -   Total Score 14 12 11       Referral / Collaboration:  Was/were discussed and client will pursue: ACT Therapist and [facility] if mental health concerns around maternity/pregnancy develop    Anticipated number of sessions for this episode of care: 20-24    Measurable Treatment Goal(s) related to diagnosis / functional impairment(s)    Patient is asking to focus treatment on her relationship with her father and past " "trauma.    Goal 1: Patient will tell full story of past trauma related to her relationship with her father and will identify how past feelings are impacting current relationships.    Objective #A (Patient Action)    Patient will tell full story of past trauma related to her relationship with her father.  Status: Restarted - Date: 3/15/2021      Intervention(s)  Therapist will collaborate in session to determine attachment wounds and inner child work.    Objective #B  Patient will identify how past feelings are impacting current relationships.  Status: Continued - Date(s): 3/15/2021     Intervention(s)  Therapist will role-play conflict management.    Objective #C  Patient will identify strengths and weaknesses within her family system of origin.  Status: Restarted - Date: 3/15/2021     Intervention(s)  Therapist will assign homework for patient to create list.      Goal 2: Patient will learn about resilience, trauma responses, and Javon Servin's \"the story I'm making up\" (cognitive strategy to manage anxious thoughts).    Objective #A (Patient Action)    Status: Completed - Date: 12/11/2020     Patient will learn about resilience.    Intervention(s)  Therapist will teach about protective factors.    Objective #B  Patient will learn about trauma responses.    Status: Continued - Date(s): 3/15/2021     Intervention(s)  Therapist will provide educational materials on trauma responses.    Objective #C  Patient will use cognitive strategies identified in therapy to challenge anxious thoughts.  Status: Continued - Date(s): 3/15/2021    Intervention(s)  Therapist will teach about Javon Servin's power of vulnerability and \"the story I'm making up\".      Goal 3: Patient will learn about ACE scores and attachment styles and will identify how her attachment style drives her behavior.     Objective #A (Patient Action)    Status: Restarted - Date: 3/15/2021     Patient will learn about ACE scores and will take " assessment.    Intervention(s)  Therapist will complete with patient in-session.    Objective #B  Patient will learn about attachment styles and will take Attachment Style assessment.    Status: Completed - Date: 9/2020     Intervention(s)  Therapist will complete with patient in-session.    Objective #C  Patient will identify how her attachment style drives her behavior.  Status: Continued - Date(s): 3/15/2021    Intervention(s)  Therapist will teach emotional regulation skills.        Patient has reviewed and agreed to the above plan.      Keesha Eller

## 2021-05-06 NOTE — TELEPHONE ENCOUNTER
Central Prior Authorization Team   Phone: [phone]    Prior Authorization Approval    Authorization Effective Date: 9/18/2019  Authorization Expiration Date: 10/17/2020  Medication: Concerta 27MG ER   Approved Dose/Quantity:   Reference #:     Insurance Company: [employer] - Phone [phone] Fax [phone]  Expected CoPay:       CoPay Card Available:      Foundation Assistance Needed:    Which Pharmacy is filling the prescription (Not needed for infusion/clinic administered): [id number] [address] AT [facility]  Pharmacy Notified: Yes  Patient Notified: Yes  **Instructed pharmacy to notify patient when script is ready to /ship.**    Received PA approval over the phone. Case #[id number], approval dates 9/18/19-10/17/20. Pharmacy needs to run Rx with LOGAN 9 as brand is the preferred product on patient's formulary.   Procedure Note:    I discussed with the patient the potential benefits of performing a therapeutic injections as well as potential risks including but not limited to infection, swelling, pain, bleeding, bruising, nerve/vessel damage, skin color changes, transient elevation in blood glucose levels, and fat atrophy. After informed consent and after the areas were prepped with chlorhexadine soap, ethyl chloride was used to numb the skin. Via the superiorlateral approach, 3mL of 1% lidocaine followed by Orthovisc No. 1 were each injected into the right knee joint. The patient tolerated the procedure well. There were no complications. A sterile dressing was placed over the injection sites.

## 2021-05-14 ENCOUNTER — THERAPY VISIT (OUTPATIENT)
Dept: PHYSICAL THERAPY | Facility: CLINIC | Age: 46
End: 2021-05-14
Payer: COMMERCIAL

## 2021-05-14 DIAGNOSIS — R32 URINARY INCONTINENCE: ICD-10-CM

## 2021-05-14 DIAGNOSIS — M62.89 PELVIC FLOOR DYSFUNCTION: ICD-10-CM

## 2021-05-14 PROCEDURE — 97110 THERAPEUTIC EXERCISES: CPT | Mod: GP | Performed by: PHYSICAL THERAPIST

## 2021-05-14 PROCEDURE — 97112 NEUROMUSCULAR REEDUCATION: CPT | Mod: GP | Performed by: PHYSICAL THERAPIST

## 2021-05-14 PROCEDURE — 97530 THERAPEUTIC ACTIVITIES: CPT | Mod: GP | Performed by: PHYSICAL THERAPIST

## 2021-05-20 ENCOUNTER — VIRTUAL VISIT (OUTPATIENT)
Dept: PSYCHOLOGY | Facility: CLINIC | Age: 46
End: 2021-05-20
Payer: COMMERCIAL

## 2021-05-20 DIAGNOSIS — F41.1 GAD (GENERALIZED ANXIETY DISORDER): Primary | ICD-10-CM

## 2021-05-20 DIAGNOSIS — F33.41 RECURRENT MAJOR DEPRESSIVE DISORDER, IN PARTIAL REMISSION (H): ICD-10-CM

## 2021-05-20 PROCEDURE — 90834 PSYTX W PT 45 MINUTES: CPT | Mod: 95 | Performed by: MARRIAGE & FAMILY THERAPIST

## 2021-05-20 NOTE — PROGRESS NOTES
Progress Note    Patient Name: Ellyn Mcgee  Date: 5/20/2021         Service Type: Individual      Session Start Time: 12:31 p.m.  Session End Time: 1:23 p.m.     Session Length: 52 min.    Session #: 20 (with this writer; patient met previously for DA with [facility] and psychiatry)    Attendees: Client and infant son    Service Modality:  Video Visit:    Telemedicine Visit: The patient's condition can be safely assessed and treated via synchronous audio and visual telemedicine encounter.      Reason for Telemedicine Visit: Services only offered telehealth    Originating Site (Patient Location): Patient's home    Distant Site (Provider Location): [facility] Clinics: [place]    Consent:  The patient/guardian has verbally consented to: the potential risks and benefits of telemedicine (video visit) versus in person care; bill my insurance or make self-payment for services provided; and responsibility for payment of non-covered services.     Patient would like the video invitation sent by: Send to e-mail at: [email or web address]    Mode of Communication:  Video Conference via [facility]    As the provider I attest to compliance with applicable laws and regulations related to telemedicine.     Treatment Plan Last Reviewed: 3/15/2021  PHQ-9 / TEE-7: 12/11/2020     DATA  Interactive Complexity: No  Crisis: No       Progress Since Last Session (Related to Symptoms / Goals / Homework):  Symptoms: improving somewhat, as patient reports that the past three weeks passed by quickly.  Patient reports getting less consistent sleep as her 4-month-old son's sleeping patterns have changed.  Pt reports greater relationship satisfaction and states that she feels 95-98% secure in her current relationship with her .  Patient reports increased awareness of her 's moods and reactivity and will check in with him to determine the underlying cause.     Homework: Partially  completed - patient has worked on clearing out the baby's room but admits that she has struggled with creating a concrete plan; patient states that she has been connecting with friends and is trying to accept that right now she is unable to get her roommate to leave due to COVID laws, time, and money      Episode of Care Goals: Satisfactory progress - ACTION (Actively working towards change); Intervened by reinforcing change plan / affirming steps taken     Current / Ongoing Stressors and Concerns:  Some financial and friend-related stress; is interested in a deeper dive into emotional state and core beliefs through ACT; new son born 2020; roommate/former friend is refusing to move out of their home (has lived there for three years and has not paid any rent for over a year); pt has been experiencing mild panic attacks; daughter (age 10) just started ADHD medication; history of anxious attachment stemming from relationship with father (he  8 years ago); is currently pregnant (high-risk due to advanced maternal age) and baby is due Dec. 2020; is in couples therapy with  due to frequent arguments and his emotional affair earlier in the year; financial stress; history of ADHD (hyperactive type).     Treatment Objective(s) Addressed in This Session:  Identified insecurities and how they affect patient's thoughts and actions now and in the past   identify how attachment style drives patient's and 's behaviors  will identify how past feelings are impacting current relationships  use cognitive strategies identified in therapy to challenge anxious thoughts   Discussed healthy boundaries and enforcement    Past/future:  Identified personal strengths within friendships  Discussed work/life balance and current purpose  tell full story of past relational issues/trust/infidelity  Discussed ambiguous loss related to relationship with father  tell full story of past trauma related to her relationship with  her father (and current roommate issues)     Intervention:   Psychodynamic: Family systems and attachment style  Narrative Therapy: separate problem from person and find the bright spots   CBT: connect thoughts, feelings, and actions        ASSESSMENT: Current Emotional / Mental Status (status of significant symptoms):   Risk status (Self / Other harm or suicidal ideation)   Patient denies current fears or concerns for personal safety.   Patient denies current or recent suicidal ideation or behaviors. Patient last reported feeling hopeless/passive ideation on 8/6/2020.   Patient denies current or recent homicidal ideation or behaviors.   Patient denies current or recent self injurious behavior or ideation.   Patient denies other safety concerns.   Patient reports there has been no change in risk factors since their last session.     Patient reports there has been no change in protective factors since their last session.     Recommended that patient call 911 or go to the local ED should there be a change in any of these risk factors.     Appearance:   Appropriate    Eye Contact:   Good    Psychomotor Behavior: Normal     Attitude:   Cooperative, Expansive, Pleasant   Orientation:   All   Speech    Rate / Production: Normal/ Responsive Talkative    Volume:  Normal    Mood:    Anxious  Normal Expansive   Affect:    Appropriate  Expansive    Thought Content:  Paranoia  Rumination    Thought Form:  Coherent  Goal Directed  Neurosis   Insight:    Good      Medication Review:   No changes to current psychiatric medication(s)     Medication Compliance:   Yes     Changes in Health Issues:   None reported      Chemical Use Review:   Substance Use: Chemical use reviewed, no active concerns identified ; no current alcohol use     Tobacco Use: No current tobacco use.      Diagnosis:  1. TEE (generalized anxiety disorder)    2. Recurrent major depressive disorder, in partial remission (H)      Collateral Reports  Completed:  N/A      PLAN: (Patient Tasks / Therapist Tasks / Other)    Patient states that her goals for the next four weeks include:    1. Finish planting in the garden  2. Create plan for making room for baby and ask friends to help babysit  3. Continue to eat more veggies      Keesha Eller                                                         ______________________________________________________________________    Treatment Plan    Patient's Name: Ellyn Mcgee  YOB: 1975    Date: 3/15/2021    DSM5 Diagnoses: 296.32 (F33.1) Major Depressive Disorder, Recurrent Episode, Moderate _ or 300.02 (F41.1) Generalized Anxiety Disorder (patient has previously been diagnosed with ADHD, hyperactive type)    Psychosocial / Contextual Factors: History of anxious attachment stemming from relationship with father (he  8 years ago); is currently pregnant (high-risk due to advanced maternal age); is in couples therapy with  due to frequent arguments and his emotional affair earlier in the year; financial stress; history of ADHD (hyperactive type).    WHODAS 2.0 Total Score 2020   Total Score 23 30   Total Score MyChart 23 30       PHQ 2020   PHQ-9 Total Score 7 8 10   Q9: Thoughts of better off dead/self-harm past 2 weeks Not at all Not at all Not at all   F/U: Thoughts of suicide or self-harm - - -   F/U: Safety concerns - - -     TEE-7 SCORE 2020   Total Score - - -   Total Score 14 (moderate anxiety) 12 (moderate anxiety) -   Total Score 14 12 11       Referral / Collaboration:  Was/were discussed and client will pursue: ACT Therapist and [facility] if mental health concerns around maternity/pregnancy develop    Anticipated number of sessions for this episode of care: 20-24    Measurable Treatment Goal(s) related to diagnosis / functional impairment(s)    Patient is asking to focus treatment on her relationship with her  "father and past trauma.    Goal 1: Patient will tell full story of past trauma related to her relationship with her father and will identify how past feelings are impacting current relationships.    Objective #A (Patient Action)    Patient will tell full story of past trauma related to her relationship with her father.  Status: Restarted - Date: 3/15/2021      Intervention(s)  Therapist will collaborate in session to determine attachment wounds and inner child work.    Objective #B  Patient will identify how past feelings are impacting current relationships.  Status: Continued - Date(s): 3/15/2021     Intervention(s)  Therapist will role-play conflict management.    Objective #C  Patient will identify strengths and weaknesses within her family system of origin.  Status: Restarted - Date: 3/15/2021     Intervention(s)  Therapist will assign homework for patient to create list.      Goal 2: Patient will learn about resilience, trauma responses, and Javon Servin's \"the story I'm making up\" (cognitive strategy to manage anxious thoughts).    Objective #A (Patient Action)    Status: Completed - Date: 12/11/2020     Patient will learn about resilience.    Intervention(s)  Therapist will teach about protective factors.    Objective #B  Patient will learn about trauma responses.    Status: Continued - Date(s): 3/15/2021     Intervention(s)  Therapist will provide educational materials on trauma responses.    Objective #C  Patient will use cognitive strategies identified in therapy to challenge anxious thoughts.  Status: Continued - Date(s): 3/15/2021    Intervention(s)  Therapist will teach about Javon Servin's power of vulnerability and \"the story I'm making up\".      Goal 3: Patient will learn about ACE scores and attachment styles and will identify how her attachment style drives her behavior.     Objective #A (Patient Action)    Status: Restarted - Date: 3/15/2021     Patient will learn about ACE scores and will take " assessment.    Intervention(s)  Therapist will complete with patient in-session.    Objective #B  Patient will learn about attachment styles and will take Attachment Style assessment.    Status: Completed - Date: 9/2020     Intervention(s)  Therapist will complete with patient in-session.    Objective #C  Patient will identify how her attachment style drives her behavior.  Status: Continued - Date(s): 3/15/2021    Intervention(s)  Therapist will teach emotional regulation skills.        Patient has reviewed and agreed to the above plan.      Keesha Eller

## 2021-05-20 NOTE — PATIENT INSTRUCTIONS
Patient states that her goals for the next four weeks include:    1. Finish planting in the garden  2. Create plan for making room for baby and ask friends to help babysit  3. Continue to eat more veggies

## 2021-06-09 ENCOUNTER — MYC MEDICAL ADVICE (OUTPATIENT)
Dept: FAMILY MEDICINE | Facility: CLINIC | Age: 46
End: 2021-06-09

## 2021-06-09 ASSESSMENT — PATIENT HEALTH QUESTIONNAIRE - PHQ9
SUM OF ALL RESPONSES TO PHQ QUESTIONS 1-9: 7
SUM OF ALL RESPONSES TO PHQ QUESTIONS 1-9: 7
10. IF YOU CHECKED OFF ANY PROBLEMS, HOW DIFFICULT HAVE THESE PROBLEMS MADE IT FOR YOU TO DO YOUR WORK, TAKE CARE OF THINGS AT HOME, OR GET ALONG WITH OTHER PEOPLE: SOMEWHAT DIFFICULT

## 2021-06-10 ASSESSMENT — PATIENT HEALTH QUESTIONNAIRE - PHQ9: SUM OF ALL RESPONSES TO PHQ QUESTIONS 1-9: 7

## 2021-06-16 ENCOUNTER — VIRTUAL VISIT (OUTPATIENT)
Dept: PSYCHOLOGY | Facility: CLINIC | Age: 46
End: 2021-06-16
Payer: COMMERCIAL

## 2021-06-16 DIAGNOSIS — F33.41 RECURRENT MAJOR DEPRESSIVE DISORDER, IN PARTIAL REMISSION (H): ICD-10-CM

## 2021-06-16 DIAGNOSIS — F41.1 GAD (GENERALIZED ANXIETY DISORDER): Primary | ICD-10-CM

## 2021-06-16 PROCEDURE — 90834 PSYTX W PT 45 MINUTES: CPT | Mod: 95 | Performed by: MARRIAGE & FAMILY THERAPIST

## 2021-06-17 NOTE — PATIENT INSTRUCTIONS
"Patient states that her goals for the next four weeks include:    1. Transition to new weekly schedule and daughter babysitting  2. \"Keep my sanity\"    "

## 2021-06-17 NOTE — PROGRESS NOTES
Progress Note    Patient Name: Ellyn Mcgee  Date: 6/16/2021         Service Type: Individual      Session Start Time: 10:05 a.m.  Session End Time: 10:57 a.m.     Session Length: 52 min.    Session #: 21 (with this writer; patient met previously for DA with [facility] Elena [facility] and psychiatry)    Attendees: Client and infant son    Service Modality:  Video Visit:    Telemedicine Visit: The patient's condition can be safely assessed and treated via synchronous audio and visual telemedicine encounter.      Reason for Telemedicine Visit: Services only offered telehealth    Originating Site (Patient Location): Patient's home    Distant Site (Provider Location): [facility]: [place]    Consent:  The patient/guardian has verbally consented to: the potential risks and benefits of telemedicine (video visit) versus in person care; bill my insurance or make self-payment for services provided; and responsibility for payment of non-covered services.     Patient would like the video invitation sent by: Send to e-mail at: [email or web address]    Mode of Communication:  Video Conference via [facility]    As the provider I attest to compliance with applicable laws and regulations related to telemedicine.     Treatment Plan Last Reviewed: 6/16/2021  PHQ-9 / TEE-7: 6/9/2021     DATA  Interactive Complexity: No  Crisis: No       Progress Since Last Session (Related to Symptoms / Goals / Homework):  Symptoms: fluctuating, as patient admits that her sleep-deprivation has been negatively affecting her functioning.  Patient reports that her  has been especially irritable since he discontinued his ADHD meds (due to them causing higher anxiety), and she admits that his mood often affects her own.  Patient's baby son is nearly 6 months old.     Homework: Partially completed - patient has been gardening and worked to cool the chicken coop during hot temps; pt also established  babysitting help from her teen daughter. Pt has not yet been able to clean a room for her son to move into and is frustrated by the lack of space and accumulation of pet residue and clutter.      Episode of Care Goals: Satisfactory progress - PREPARATION (Decided to change - considering how); Intervened by negotiating a change plan and determining options / strategies for behavior change, identifying triggers, exploring social supports, and working towards setting a date to begin behavior change     Current / Ongoing Stressors and Concerns:  Some financial and friend-related stress; is interested in a deeper dive into emotional state and core beliefs through ACT; new son born 2020; roommate/former friend is refusing to move out of their home (has lived there for three years and has not paid any rent for over a year); pt has been experiencing mild panic attacks; daughter (age 10) just started ADHD medication; history of anxious attachment stemming from relationship with father (he  8 years ago); is currently pregnant (high-risk due to advanced maternal age) and baby is due Dec. 2020; is in couples therapy with  due to frequent arguments and his emotional affair earlier in the year; financial stress; history of ADHD (hyperactive type).     Treatment Objective(s) Addressed in This Session:  Solution focused: how to prioritize and cope with interrupted sleep  Emotional differentiation    Past/future:  Identified insecurities and how they affect patient's thoughts and actions now and in the past   identify how attachment style drives patient's and 's behaviors  will identify how past feelings are impacting current relationships  use cognitive strategies identified in therapy to challenge anxious thoughts   Discussed healthy boundaries and enforcement  Identified personal strengths within friendships  Discussed work/life balance and current purpose  tell full story of past relational  issues/trust/infidelity  Discussed ambiguous loss related to relationship with father  tell full story of past trauma related to her relationship with her father (and current roommate issues)     Intervention:   Psychodynamic: Family systems and attachment style  Narrative Therapy: separate problem from person and find the bright spots   CBT: connect thoughts, feelings, and actions        ASSESSMENT: Current Emotional / Mental Status (status of significant symptoms):   Risk status (Self / Other harm or suicidal ideation)   Patient denies current fears or concerns for personal safety.   Patient denies current or recent suicidal ideation or behaviors. Patient last reported feeling hopeless/passive ideation on 8/6/2020.   Patient denies current or recent homicidal ideation or behaviors.   Patient denies current or recent self injurious behavior or ideation.   Patient denies other safety concerns.   Patient reports there has been no change in risk factors since their last session.     Patient reports there has been no change in protective factors since their last session.     Recommended that patient call 911 or go to the local ED should there be a change in any of these risk factors.     Appearance:   Appropriate    Eye Contact:   Good    Psychomotor Behavior: Normal     Attitude:   Cooperative, Expansive, Pleasant   Orientation:   All   Speech    Rate / Production: Normal/ Responsive Talkative    Volume:  Normal    Mood:    Anxious  Normal Expansive   Affect:    Appropriate  Expansive    Thought Content:  Paranoia  Rumination    Thought Form:  Coherent  Goal Directed  Neurosis   Insight:    Good      Medication Review:   No changes to current psychiatric medication(s)     Medication Compliance:   Yes     Changes in Health Issues:   None reported      Chemical Use Review:   Substance Use: Chemical use reviewed, no active concerns identified ; no current alcohol use     Tobacco Use: No current tobacco use.   "    Diagnosis:  1. TEE (generalized anxiety disorder)    2. Recurrent major depressive disorder, in partial remission (H)      Collateral Reports Completed:  N/A      PLAN: (Patient Tasks / Therapist Tasks / Other)    Patient states that her goals for the next four weeks include:    1. Transition to new weekly schedule and daughter babysitting  2. \"Keep my sanity\"      Keesha SON Rafita                                                         ______________________________________________________________________    Treatment Plan    Patient's Name: Ellyn Mcgee  YOB: 1975    Date: 2021    DSM5 Diagnoses: 296.32 (F33.1) Major Depressive Disorder, Recurrent Episode, Moderate _ or 300.02 (F41.1) Generalized Anxiety Disorder (patient has previously been diagnosed with ADHD, hyperactive type)    Psychosocial / Contextual Factors: History of anxious attachment stemming from relationship with father (he  8 years ago); is currently pregnant (high-risk due to advanced maternal age); is in couples therapy with  due to frequent arguments and his emotional affair earlier in the year; financial stress; history of ADHD (hyperactive type).    WHODAS 2.0 Total Score 2020   Total Score 23 30   Total Score MyChart 23 30       PHQ 2020   PHQ-9 Total Score 8 10 7   Q9: Thoughts of better off dead/self-harm past 2 weeks Not at all Not at all Not at all   F/U: Thoughts of suicide or self-harm - - -   F/U: Safety concerns - - -     TEE-7 SCORE 2020   Total Score - - -   Total Score 14 (moderate anxiety) 12 (moderate anxiety) -   Total Score 14 12 11       Referral / Collaboration:  Was/were discussed and client will pursue: ACT Therapist and [facility] if mental health concerns around maternity/pregnancy develop    Anticipated number of sessions for this episode of care: 20-24    Measurable Treatment Goal(s) related to diagnosis / " "functional impairment(s)    Patient is asking to focus treatment on her relationship with her father and past trauma.    Goal 1: Patient will tell full story of past trauma related to her relationship with her father and will identify how past feelings are impacting current relationships.    Objective #A (Patient Action)    Patient will tell full story of past trauma related to her relationship with her father.  Status: Deferred - Date: 6/16/2021      Intervention(s)  Therapist will collaborate in session to determine attachment wounds and inner child work.    Objective #B  Patient will identify how past feelings are impacting current relationships.  Status: Continued - Date(s): 6/16/2021     Intervention(s)  Therapist will role-play conflict management.    Objective #C  Patient will identify strengths and weaknesses within her family system of origin.  Status: Deferred - Date: 6/16/2021     Intervention(s)  Therapist will assign homework for patient to create list.      Goal 2: Patient will learn about resilience, trauma responses, and Javon Servin's \"the story I'm making up\" (cognitive strategy to manage anxious thoughts).    Objective #A (Patient Action)    Status: Completed - Date: 12/11/2020     Patient will learn about resilience.    Intervention(s)  Therapist will teach about protective factors.    Objective #B  Patient will learn about trauma responses.    Status: Continued - Date(s): 6/16/2021     Intervention(s)  Therapist will provide educational materials on trauma responses.    Objective #C  Patient will use cognitive strategies identified in therapy to challenge anxious thoughts.  Status: Continued - Date(s): 6/16/2021    Intervention(s)  Therapist will teach about Javon Servin's power of vulnerability and \"the story I'm making up\".      Goal 3: Patient will learn about ACE scores and attachment styles and will identify how her attachment style drives her behavior.     Objective #A (Patient " Action)    Status: Deferred - Date: 6/16/2021     Patient will learn about ACE scores and will take assessment.    Intervention(s)  Therapist will complete with patient in-session.    Objective #B  Patient will learn about attachment styles and will take Attachment Style assessment.    Status: Completed - Date: 9/2020     Intervention(s)  Therapist will complete with patient in-session.    Objective #C  Patient will identify how her attachment style drives her behavior.  Status: Continued - Date(s): 6/16/2021    Intervention(s)  Therapist will teach emotional regulation skills.        Patient has reviewed and agreed to the above plan.      Keesha Eller

## 2021-06-21 ENCOUNTER — OFFICE VISIT (OUTPATIENT)
Dept: DERMATOLOGY | Facility: CLINIC | Age: 46
End: 2021-06-21
Payer: COMMERCIAL

## 2021-06-21 DIAGNOSIS — L82.0 INFLAMED SEBORRHEIC KERATOSIS: ICD-10-CM

## 2021-06-21 DIAGNOSIS — D48.5 NEOPLASM OF UNCERTAIN BEHAVIOR OF SKIN: ICD-10-CM

## 2021-06-21 DIAGNOSIS — L81.4 SOLAR LENTIGINOSIS: ICD-10-CM

## 2021-06-21 DIAGNOSIS — Z12.83 SKIN CANCER SCREENING: Primary | ICD-10-CM

## 2021-06-21 DIAGNOSIS — D22.9 MULTIPLE BENIGN NEVI: ICD-10-CM

## 2021-06-21 PROCEDURE — 88305 TISSUE EXAM BY PATHOLOGIST: CPT | Performed by: PATHOLOGY

## 2021-06-21 PROCEDURE — 11102 TANGNTL BX SKIN SINGLE LES: CPT | Mod: XS | Performed by: PHYSICIAN ASSISTANT

## 2021-06-21 PROCEDURE — 17110 DESTRUCTION B9 LES UP TO 14: CPT | Performed by: PHYSICIAN ASSISTANT

## 2021-06-21 PROCEDURE — 88342 IMHCHEM/IMCYTCHM 1ST ANTB: CPT | Performed by: PATHOLOGY

## 2021-06-21 PROCEDURE — 99213 OFFICE O/P EST LOW 20 MIN: CPT | Mod: 25 | Performed by: PHYSICIAN ASSISTANT

## 2021-06-21 ASSESSMENT — PAIN SCALES - GENERAL: PAINLEVEL: NO PAIN (0)

## 2021-06-21 NOTE — NURSING NOTE
Dermatology Rooming Note    Wilfredo Mcgee's goals for this visit include:   Chief Complaint   Patient presents with     Skin Check     wilfredo is coming in today for a skin check, states that she noticed new moles when she was pregnant     Karolina Lewis CMA on 6/21/2021 at 12:15 PM

## 2021-06-21 NOTE — PROGRESS NOTES
[facility] Dermatology Note      Dermatology Problem List:  0.  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 Family history of melanoma in the patient's father.  2. Ink spot lentigines - right shoulder, left lower back  3. Hx of nonscarring alopecia  4. Hx of rosacea, patient uses otc treatments  5. 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  Inflamed seborrheic keratosis, right posterior neck status post cryotherapy 6/21/2021  7. Skin cancer screening 6/21/2021    Encounter Date: Jun 21, 2021    CC:  Chief Complaint   Patient presents with     Skin Check     wilfredo is coming in today for a skin check, states that she noticed new moles when she was pregnant         History of Present Illness:  Ms. Wilfredo Mcgee is a 46 year old female with a family history of melanoma in her father who presents for a skin cancer screening.  She was last seen here 12/16/2019 when she had an unremarkable skin cancer screening.  She is here today without specific concerns.  She now she has had a few new moles, presents she became pregnant.  She has a 6-month-old boy at home.  She is currently breast-feeding.    She has a spot on the back of her neck that she keeps picking at.  She is unaware of any changing, growing, bleeding or not healing nevi.  She is a family history of melanoma in her father.   Otherwise she is feeling well, without additional skin concerns at this time.      Past Medical History:   Patient Active Problem List   Diagnosis     Hyperlipidemia LDL goal <130     Anxiety state     PCOS (polycystic ovarian syndrome)     External hemorrhoids     Attention deficit hyperactivity disorder (ADHD), predominantly inattentive type     TEE (generalized anxiety disorder)     Cervical radiculopathy     High-risk pregnancy, elderly multigravida, unspecified trimester     Major depressive disorder, recurrent episode, moderate (H)     Labor and delivery,  indication for care     Pelvic floor dysfunction     Urinary incontinence     Past Medical History:   Diagnosis Date     Abnormal Pap smear     Colposcopy     Adjustment disorder with mixed anxiety and depressed mood 4/4/2012     Anemia     In Past     Anxiety      Chlamydia trachomatis infection of other specified site 1993     Depression      Fertility problem      Lyme disease 9/8/2010    ?false positive vs positve CMV - resolved     Moderate dysplasia of cervix 1995     Other and unspecified adverse effect of drug, medicinal and biological substance     insulin resistent     Varicosities      Wounds and injuries     fall down stairs, PT for back, pain meds     Past Surgical History:   Procedure Laterality Date     CONIZATION CERVIX,KNIFE/LASER  1995     SURGICAL HISTORY OF -       [place] teeth       Social History:  Patient is  and is a mother. She works in [employer].   She is outside when she gardens. Wears sunscreen when she remembers.  Has used a tanning bed when she was 20 years ago.   reports that she has never smoked. She has never used smokeless tobacco. She reports previous alcohol use of about 0.8 standard drinks of alcohol per week. She reports that she does not use drugs.    Family History:  Family history of melanoma in the patient's father. First diagnosed in his early 30s. Had various lymphomas throughout the next years. Passed away from a heart attack.  Family History   Problem Relation Age of Onset     Cancer Mother         vocal cord cancer     Lipids Mother      Myocardial Infarction Mother      Skin Cancer Mother      Melanoma Mother      Cancer Father         B-cell lymphoma, melanoma     Lipids Father      Heart Disease Father         open heart surgery     Cerebrovascular Disease Father      Crohn's Disease Sister      Diabetes Paternal Grandmother      Diabetes Paternal Uncle      Alcohol/Drug Maternal Grandfather        Medications:  Current Outpatient Medications    Medication Sig Dispense Refill     escitalopram (LEXAPRO) 10 MG tablet Take 1 tablet (10 mg) by mouth daily 90 tablet 0     ibuprofen (ADVIL/MOTRIN) 200 MG tablet Take 200-400 mg by mouth every 4 hours as needed for pain OTC       Prenatal Vit-Fe Fumarate-FA (PRENATAL PO)        propranolol (INDERAL) 20 MG tablet Take 20 mg by mouth 2 times daily as needed for anxiety       venlafaxine (EFFEXOR-XR) 150 MG 24 hr capsule Take 1 capsule (150 mg) by mouth daily 90 capsule 0       Allergies   Allergen Reactions     Codeine Itching     Codeine      itching       Cyclogyl [Cyclopentolate Hcl] Nausea and Vomiting     Cyclopentolate      Hydrocodone      anxiety  itching     Hydrocodone-Acetaminophen      Seasonal Allergies        Review of Systems:  -As per HPI  -Constitutional: The patient denies fatigue, fevers, chills, unintended weight loss, and night sweats.  -HEENT: Patient denies nonhealing oral sores.  -Skin: As above in HPI. No additional skin concerns.    Physical exam:  Vitals: There were no vitals taken for this visit.  GEN: This is a well developed, well-nourished female in no acute distress, in a pleasant mood.    SKIN: Full skin, which includes the head/face, both arms, chest, back, abdomen,both legs, genitalia and/or groin buttocks, digits and/or nails, was examined.  -There is a tan and brown asymmetric macule on the left posterior thigh, 5 mm.  - Firm tan/flesh colored papule that dimples with lateral pressure on the right upper thigh.  - Multiple regular brown pigmented macules and papules are identified on the face, trunk, and extremities.   - Scattered brown macules on sun exposed areas including the face.   - Dome shaped bright red papules on the trunk.   -There is an excoriated waxy, stuck on verrucous 3 to 4 mm papule on the right posterior neck.  - No other lesions of concern on areas examined.       Impression/Plan:  1. Neoplasm of uncertain behavior on the left posterior thigh, will perform  shave biopsy today to determine management.  Rule out malignant melanoma versus colliding nevi versus benign pigmented nevi versus other    Shave biopsy(performed by faculty): After discussion of benefits and risks including but not limited to bleeding, infection, scar, incomplete removal, recurrence, and non-diagnostic biopsy, written consent and photographs were obtained. Time-out was performed. The area was cleaned with isopropyl alcohol. 0.5 mL of 1% lidocaine with 1:100,000 epinephrine was injected to obtain adequate anesthesia of the lesion on the left posterior thigh. A shave biopsy was performed. Hemostasis was achieved with aluminium chloride. Vaseline and a sterile dressing were applied. The patient tolerated the procedure and no complications were noted. The patient was provided with verbal and written post care instructions.   2. Inflamed seborrheic keratosis right posterior neck, because this is bothersome to the patient will treat with cryotherapy in the office.    Cryotherapy procedure note (performed by faculty): After verbal consent and discussion of risks and benefits including but no limited to dyspigmentation/scar, blister, infection, recurrence, one was(were) treated with 1-2mm freeze border for 2 cycles with liquid nitrogen. Post cryotherapy instructions were provided.   3. Family history of melanoma in the patient's father    Continue regular skin checks and good sun protection practices    4. Dermatofibroma, right upper thigh    Benign nature reassured, no further intervention required at this time.     5. Multiple clinically benign nevi on the face, trunk, and extremities, including the congenital nevus on the left mid abdomen    Recommend sunscreens SPF #30 or greater, protective clothing and avoidance of tanning beds.    ABCD's of melanoma were reviewed with patient and handout provided.     Benign nature reassured, no further intervention required at this time.     6. Solar lentigines,  sun exposed skin    Benign nature reassured, no further intervention required at this time.     7. Cherry angiomas, trunk    Benign nature reassured, no further intervention required at this time.       Follow-up in 1 year, earlier for new or changing lesions.       Staff Involved:    All risks, benefits and alternatives were discussed with patient.  Patient is in agreement and understands the assessment and plan.  All questions were answered.  Sun Screen Education was given.   Return to Clinic annually or sooner as needed.   Keke Arcos PA-C   [facility]

## 2021-06-21 NOTE — NURSING NOTE
Lidocaine-epinephrine 1-1:087236 % injection   1mL once for one use, starting 6/21/2021 ending 6/21/2021,  2mL disp, R-0, injection  Injected by Karolina Lewis CMA

## 2021-06-21 NOTE — LETTER
6/21/2021       RE: Wilfredo Mcgee  [address] [facility] [address]     Dear Colleague,    Thank you for referring your patient, Wilfredo Mcgee, to the [facility] [place] at [facility]. Please see a copy of my visit note below.    [facility] Dermatology Note      Dermatology Problem List:  0.  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 Family history of melanoma in the patient's father.  2. Ink spot lentigines - right shoulder, left lower back  3. Hx of nonscarring alopecia  4. Hx of rosacea, patient uses otc treatments  5. 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  Inflamed seborrheic keratosis, right posterior neck status post cryotherapy 6/21/2021  7. Skin cancer screening 6/21/2021    Encounter Date: Jun 21, 2021    CC:  Chief Complaint   Patient presents with     Skin Check     wilfredo is coming in today for a skin check, states that she noticed new moles when she was pregnant         History of Present Illness:  Ms. Wilfredo Mcgee is a 46 year old female with a family history of melanoma in her father who presents for a skin cancer screening.  She was last seen here 12/16/2019 when she had an unremarkable skin cancer screening.  She is here today without specific concerns.  She now she has had a few new moles, presents she became pregnant.  She has a 6-month-old boy at home.  She is currently breast-feeding.    She has a spot on the back of her neck that she keeps picking at.  She is unaware of any changing, growing, bleeding or not healing nevi.  She is a family history of melanoma in her father.   Otherwise she is feeling well, without additional skin concerns at this time.      Past Medical History:   Patient Active Problem List   Diagnosis     Hyperlipidemia LDL goal <130     Anxiety state     PCOS (polycystic ovarian syndrome)      External hemorrhoids     Attention deficit hyperactivity disorder (ADHD), predominantly inattentive type     TEE (generalized anxiety disorder)     Cervical radiculopathy     High-risk pregnancy, elderly multigravida, unspecified trimester     Major depressive disorder, recurrent episode, moderate (H)     Labor and delivery, indication for care     Pelvic floor dysfunction     Urinary incontinence     Past Medical History:   Diagnosis Date     Abnormal Pap smear     Colposcopy     Adjustment disorder with mixed anxiety and depressed mood 4/4/2012     Anemia     In Past     Anxiety      Chlamydia trachomatis infection of other specified site 1993     Depression      Fertility problem      Lyme disease 9/8/2010    ?false positive vs positve CMV - resolved     Moderate dysplasia of cervix 1995     Other and unspecified adverse effect of drug, medicinal and biological substance     insulin resistent     Varicosities      Wounds and injuries     fall down stairs, PT for back, pain meds     Past Surgical History:   Procedure Laterality Date     CONIZATION CERVIX,KNIFE/LASER  1995     SURGICAL HISTORY OF -       Snook teeth       Social History:  Patient is  and is a mother. She works in [employer].   She is outside when she gardens. Wears sunscreen when she remembers.  Has used a tanning bed when she was 20 years ago.   reports that she has never smoked. She has never used smokeless tobacco. She reports previous alcohol use of about 0.8 standard drinks of alcohol per week. She reports that she does not use drugs.    Family History:  Family history of melanoma in the patient's father. First diagnosed in his early 30s. Had various lymphomas throughout the next years. Passed away from a heart attack.  Family History   Problem Relation Age of Onset     Cancer Mother         vocal cord cancer     Lipids Mother      Myocardial Infarction Mother      Skin Cancer Mother      Melanoma Mother      Cancer Father          B-cell lymphoma, melanoma     Lipids Father      Heart Disease Father         open heart surgery     Cerebrovascular Disease Father      Crohn's Disease Sister      Diabetes Paternal Grandmother      Diabetes Paternal Uncle      Alcohol/Drug Maternal Grandfather        Medications:  Current Outpatient Medications   Medication Sig Dispense Refill     escitalopram (LEXAPRO) 10 MG tablet Take 1 tablet (10 mg) by mouth daily 90 tablet 0     ibuprofen (ADVIL/MOTRIN) 200 MG tablet Take 200-400 mg by mouth every 4 hours as needed for pain OTC       Prenatal Vit-Fe Fumarate-FA (PRENATAL PO)        propranolol (INDERAL) 20 MG tablet Take 20 mg by mouth 2 times daily as needed for anxiety       venlafaxine (EFFEXOR-XR) 150 MG 24 hr capsule Take 1 capsule (150 mg) by mouth daily 90 capsule 0       Allergies   Allergen Reactions     Codeine Itching     Codeine      itching       Cyclogyl [Cyclopentolate Hcl] Nausea and Vomiting     Cyclopentolate      Hydrocodone      anxiety  itching     Hydrocodone-Acetaminophen      Seasonal Allergies        Review of Systems:  -As per HPI  -Constitutional: The patient denies fatigue, fevers, chills, unintended weight loss, and night sweats.  -HEENT: Patient denies nonhealing oral sores.  -Skin: As above in HPI. No additional skin concerns.    Physical exam:  Vitals: There were no vitals taken for this visit.  GEN: This is a well developed, well-nourished female in no acute distress, in a pleasant mood.    SKIN: Full skin, which includes the head/face, both arms, chest, back, abdomen,both legs, genitalia and/or groin buttocks, digits and/or nails, was examined.  -There is a tan and brown asymmetric macule on the left posterior thigh, 5 mm.  - Firm tan/flesh colored papule that dimples with lateral pressure on the right upper thigh.  - Multiple regular brown pigmented macules and papules are identified on the face, trunk, and extremities.   - Scattered brown macules on sun exposed areas  including the face.   - Dome shaped bright red papules on the trunk.   -There is an excoriated waxy, stuck on verrucous 3 to 4 mm papule on the right posterior neck.  - No other lesions of concern on areas examined.       Impression/Plan:  1. Neoplasm of uncertain behavior on the left posterior thigh, will perform shave biopsy today to determine management.  Rule out malignant melanoma versus colliding nevi versus benign pigmented nevi versus other    Shave biopsy(performed by faculty): After discussion of benefits and risks including but not limited to bleeding, infection, scar, incomplete removal, recurrence, and non-diagnostic biopsy, written consent and photographs were obtained. Time-out was performed. The area was cleaned with isopropyl alcohol. 0.5 mL of 1% lidocaine with 1:100,000 epinephrine was injected to obtain adequate anesthesia of the lesion on the left posterior thigh. A shave biopsy was performed. Hemostasis was achieved with aluminium chloride. Vaseline and a sterile dressing were applied. The patient tolerated the procedure and no complications were noted. The patient was provided with verbal and written post care instructions.   2. Inflamed seborrheic keratosis right posterior neck, because this is bothersome to the patient will treat with cryotherapy in the office.    Cryotherapy procedure note (performed by faculty): After verbal consent and discussion of risks and benefits including but no limited to dyspigmentation/scar, blister, infection, recurrence, one was(were) treated with 1-2mm freeze border for 2 cycles with liquid nitrogen. Post cryotherapy instructions were provided.   3. Family history of melanoma in the patient's father    Continue regular skin checks and good sun protection practices    4. Dermatofibroma, right upper thigh    Benign nature reassured, no further intervention required at this time.     5. Multiple clinically benign nevi on the face, trunk, and extremities,  including the congenital nevus on the left mid abdomen    Recommend sunscreens SPF #30 or greater, protective clothing and avoidance of tanning beds.    ABCD's of melanoma were reviewed with patient and handout provided.     Benign nature reassured, no further intervention required at this time.     6. Solar lentigines, sun exposed skin    Benign nature reassured, no further intervention required at this time.     7. Cherry angiomas, trunk    Benign nature reassured, no further intervention required at this time.       Follow-up in 1 year, earlier for new or changing lesions.       Staff Involved:    All risks, benefits and alternatives were discussed with patient.  Patient is in agreement and understands the assessment and plan.  All questions were answered.  Sun Screen Education was given.   Return to Clinic annually or sooner as needed.   Keke Arcos PA-C   [facility]

## 2021-06-21 NOTE — PATIENT INSTRUCTIONS
Wound Care After a Biopsy    What is a skin biopsy?  A skin biopsy allows the doctor to examine a very small piece of tissue under the microscope to determine the diagnosis and the best treatment for the skin condition. A local anesthetic (numbing medicine)  is injected with a very small needle into the skin area to be tested. A small piece of skin is taken from the area. Sometimes a suture (stitch) is used.     What are the risks of a skin biopsy?  I will experience scar, bleeding, swelling, pain, crusting and redness. I may experience incomplete removal or recurrence. Risks of this procedure are excessive bleeding, bruising, infection, nerve damage, numbness, thick (hypertrophic or keloidal) scar and non-diagnostic biopsy.    How should I care for my wound for the first 24 hours?    Keep the wound dry and covered for 24 hours    If it bleeds, hold direct pressure on the area for 15 minutes. If bleeding does not stop then go to the emergency room    Avoid strenuous exercise the first 1-2 days or as your doctor instructs you    How should I care for the wound after 24 hours?    After 24 hours, remove the bandage    You may bathe or shower as normal    If you had a scalp biopsy, you can shampoo as usual and can use shower water to clean the biopsy site daily    Clean the wound twice a day with gentle soap and water    Do not scrub, be gentle    Apply white petroleum/Vaseline after cleaning the wound with a cotton swab or a clean finger, and keep the site covered with a Bandaid /bandage. Bandages are not necessary with a scalp biopsy    If you are unable to cover the site with a Bandaid /bandage, re-apply ointment 2-3 times a day to keep the site moist. Moisture will help with healing    Avoid strenuous activity for first 1-2 days    Avoid lakes, rivers, pools, and oceans until the stitches are removed or the site is healed    How do I clean my wound?    Wash hands thoroughly with soap or use hand  before all  wound care    Clean the wound with gentle soap and water    Apply white petroleum/Vaseline  to wound after it is clean    Replace the Bandaid /bandage to keep the wound covered for the first few days or as instructed by your doctor    If you had a scalp biopsy, warm shower water to the area on a daily basis should suffice    What should I use to clean my wound?     Cotton-tipped applicators (Qtips )    White petroleum jelly (Vaseline ). Use a clean new container and use Q-tips to apply.    Bandaids   as needed    Gentle soap     How should I care for my wound long term?    Do not get your wound dirty    Keep up with wound care for one week or until the area is healed.    A small scab will form and fall off by itself when the area is completely healed. The area will be red and will become pink in color as it heals. Sun protection is very important for how your scar will turn out. Sunscreen with an SPF 30 or greater is recommended once the area is healed.    If you have stitches, stitches need to be removed in 14 days. You may return to our clinic for this or you may have it done locally at your doctor s office.    You should have some soreness but it should be mild and slowly go away over several days. Talk to your doctor about using tylenol for pain,    When should I call my doctor?  If you have increased:     Pain or swelling    Pus or drainage (clear or slightly yellow drainage is ok)    Temperature over 100F    Spreading redness or warmth around wound    When will I hear about my results?  The biopsy results can take 2-3 weeks to come back. The clinic will call you with the results, send you a [employer] message, or have you schedule a follow-up clinic or phone time to discuss the results. Contact our clinics if you do not hear from us in 3 weeks.     Who should I call with questions?    [facility]: [phone]     [facility]: [phone]    For  urgent needs outside of business hours call the [facility] at [phone] and ask for the dermatology resident on call    Cryotherapy    What is it?    Use of a very cold liquid, such as liquid nitrogen, to freeze and destroy abnormal skin cells that need to be removed    What should I expect?    Tenderness and redness    A small blister that might grow and fill with dark purple blood. There may be crusting.    More than one treatment may be needed if the lesions do not go away.    How do I care for the treated area?    Gently wash the area with your hands when bathing.    Use a thin layer of Vaseline to help with healing. You may use a Band-Aid.     The area should heal within 7-10 days and may leave behind a pink or lighter color.     Do not use an antibiotic or Neosporin ointment.     You may take acetaminophen (Tylenol) for pain.     Call your Doctor if you have:    Severe pain    Signs of infection (warmth, redness, cloudy yellow drainage, and or a bad smell)    Questions or concerns    Who should I call with questions?       [facility]: [phone]       [facility]: [phone]       For urgent needs outside of business hours call the [facility] at [phone]        and ask for the dermatology resident on call

## 2021-06-23 ENCOUNTER — MEDICAL CORRESPONDENCE (OUTPATIENT)
Dept: HEALTH INFORMATION MANAGEMENT | Facility: CLINIC | Age: 46
End: 2021-06-23

## 2021-06-23 LAB — COPATH REPORT: NORMAL

## 2021-07-14 ENCOUNTER — VIRTUAL VISIT (OUTPATIENT)
Dept: PSYCHOLOGY | Facility: CLINIC | Age: 46
End: 2021-07-14
Payer: COMMERCIAL

## 2021-07-14 DIAGNOSIS — F33.41 RECURRENT MAJOR DEPRESSIVE DISORDER, IN PARTIAL REMISSION (H): ICD-10-CM

## 2021-07-14 DIAGNOSIS — F41.1 GAD (GENERALIZED ANXIETY DISORDER): Primary | ICD-10-CM

## 2021-07-14 PROCEDURE — 90834 PSYTX W PT 45 MINUTES: CPT | Mod: 95 | Performed by: MARRIAGE & FAMILY THERAPIST

## 2021-07-14 NOTE — PATIENT INSTRUCTIONS
Patient states that her goals for the next three weeks include:    1. Create an intention/self-affirmation statement  2. Continue working on [employer] business   3. Continue prioritizing self-care

## 2021-07-14 NOTE — PROGRESS NOTES
Progress Note    Patient Name: Ellyn Mcgee  Date: 7/14/2021         Service Type: Individual      Session Start Time: 11 a.m.  Session End Time: 11:52 a.m.     Session Length: 52 min.    Session #: 22 (with this writer; patient met previously for DA with [facility] Elena [facility] and psychiatry)    Attendees: Client and infant son    Service Modality:  Video Visit:    Telemedicine Visit: The patient's condition can be safely assessed and treated via synchronous audio and visual telemedicine encounter.      Reason for Telemedicine Visit: Services only offered telehealth    Originating Site (Patient Location): Patient's home    Distant Site (Provider Location): [facility]: [place]    Consent:  The patient/guardian has verbally consented to: the potential risks and benefits of telemedicine (video visit) versus in person care; bill my insurance or make self-payment for services provided; and responsibility for payment of non-covered services.     Patient would like the video invitation sent by: Send to e-mail at: [email or web address]    Mode of Communication:  Video Conference via [facility]    As the provider I attest to compliance with applicable laws and regulations related to telemedicine.     Treatment Plan Last Reviewed: 6/16/2021  PHQ-9 / TEE-7: 6/9/2021     DATA  Interactive Complexity: No  Crisis: No       Progress Since Last Session (Related to Symptoms / Goals / Homework):  Symptoms: continued high anxiety, as patient is frustrated about the state of her house and the lack of space, as her former friend continues to refuse to move out.  Patient states taht she has been arguing more with her  but has scheduled additional couples therapy sessions in order to work through issues.     Homework: Partially completed - patient states that she has been asking her teen daughter to babysit and arranged for care for her animals and garden when she and her family go  "on a week-long road trip this month.      Episode of Care Goals: Satisfactory progress - PREPARATION (Decided to change - considering how); Intervened by negotiating a change plan and determining options / strategies for behavior change, identifying triggers, exploring social supports, and working towards setting a date to begin behavior change     Current / Ongoing Stressors and Concerns:  Some financial and friend-related stress; is interested in a deeper dive into emotional state and core beliefs through ACT; new son born 2020; roommate/former friend is refusing to move out of their home (has lived there for three years and has not paid any rent for over a year); pt has been experiencing mild panic attacks; daughter (age 10) just started ADHD medication; history of anxious attachment stemming from relationship with father (he  8 years ago); is currently pregnant (high-risk due to advanced maternal age) and baby is due Dec. 2020; is in couples therapy with  due to frequent arguments and his emotional affair earlier in the year; financial stress; history of ADHD (hyperactive type).     Treatment Objective(s) Addressed in This Session:  Dealing with \"big emotions\"  Emotional differentiation  Self-compassion and radical acceptance    Past/future:  Solution focused: how to prioritize and cope with interrupted sleep  Identified insecurities and how they affect patient's thoughts and actions now and in the past   identify how attachment style drives patient's and 's behaviors  will identify how past feelings are impacting current relationships  use cognitive strategies identified in therapy to challenge anxious thoughts   Discussed healthy boundaries and enforcement  Identified personal strengths within friendships  Discussed work/life balance and current purpose  tell full story of past relational issues/trust/infidelity  Discussed ambiguous loss related to relationship with father  tell full " story of past trauma related to her relationship with her father (and current roommate issues)     Intervention:  Narrative Therapy: separate problem from person and find the bright spots   CBT: connect thoughts, feelings, and actions  Past/future:   Psychodynamic: Family systems and attachment style          ASSESSMENT: Current Emotional / Mental Status (status of significant symptoms):   Risk status (Self / Other harm or suicidal ideation)   Patient denies current fears or concerns for personal safety.   Patient denies current or recent suicidal ideation or behaviors. Patient last reported feeling hopeless/passive ideation on 8/6/2020.   Patient denies current or recent homicidal ideation or behaviors.   Patient denies current or recent self injurious behavior or ideation.   Patient denies other safety concerns.   Patient reports there has been no change in risk factors since their last session.     Patient reports there has been no change in protective factors since their last session.     Recommended that patient call 911 or go to the local ED should there be a change in any of these risk factors.     Appearance:   Appropriate    Eye Contact:   Good    Psychomotor Behavior: Normal     Attitude:   Cooperative, Pleasant   Orientation:   All   Speech    Rate / Production: Normal/ Responsive Talkative    Volume:  Normal    Mood:    Anxious  Expansive Fearful   Affect:    Appropriate  Expansive    Thought Content:  Paranoia  Rumination    Thought Form:  Coherent  Goal Directed  Neurosis   Insight:    Good      Medication Review:   No changes to current psychiatric medication(s)     Medication Compliance:   Yes     Changes in Health Issues:   None reported      Chemical Use Review:   Substance Use: Chemical use reviewed, no active concerns identified ; no current alcohol use     Tobacco Use: No current tobacco use.      Diagnosis:  1. Recurrent major depressive disorder, in partial remission (H)    2. TEE (generalized  anxiety disorder)      Collateral Reports Completed:  N/A      PLAN: (Patient Tasks / Therapist Tasks / Other)    Patient states that her goals for the next three weeks include:    1. Create an intention/self-affirmation statement  2. Continue working on book business   3. Continue prioritizing self-care       Keesha Eller                                                         ______________________________________________________________________    Treatment Plan    Patient's Name: Ellyn Mcgee  YOB: 1975    Date: 2021    DSM5 Diagnoses: 296.32 (F33.1) Major Depressive Disorder, Recurrent Episode, Moderate _ or 300.02 (F41.1) Generalized Anxiety Disorder (patient has previously been diagnosed with ADHD, hyperactive type)    Psychosocial / Contextual Factors: History of anxious attachment stemming from relationship with father (he  8 years ago); is currently pregnant (high-risk due to advanced maternal age); is in couples therapy with  due to frequent arguments and his emotional affair earlier in the year; financial stress; history of ADHD (hyperactive type).    WHODAS 2.0 Total Score 2020   Total Score 23 30   Total Score MyChart 23 30       PHQ 2020   PHQ-9 Total Score 8 10 7   Q9: Thoughts of better off dead/self-harm past 2 weeks Not at all Not at all Not at all   F/U: Thoughts of suicide or self-harm - - -   F/U: Safety concerns - - -     TEE-7 SCORE 2020   Total Score - - -   Total Score 14 (moderate anxiety) 12 (moderate anxiety) -   Total Score 14 12 11       Referral / Collaboration:  Was/were discussed and client will pursue: ACT Therapist and [facility] Elena Hill if mental health concerns around maternity/pregnancy develop    Anticipated number of sessions for this episode of care: 20-24    Measurable Treatment Goal(s) related to diagnosis / functional impairment(s)    Patient is asking to focus treatment on  "her relationship with her father and past trauma.    Goal 1: Patient will tell full story of past trauma related to her relationship with her father and will identify how past feelings are impacting current relationships.    Objective #A (Patient Action)    Patient will tell full story of past trauma related to her relationship with her father.  Status: Deferred - Date: 6/16/2021      Intervention(s)  Therapist will collaborate in session to determine attachment wounds and inner child work.    Objective #B  Patient will identify how past feelings are impacting current relationships.  Status: Continued - Date(s): 6/16/2021     Intervention(s)  Therapist will role-play conflict management.    Objective #C  Patient will identify strengths and weaknesses within her family system of origin.  Status: Deferred - Date: 6/16/2021     Intervention(s)  Therapist will assign homework for patient to create list.      Goal 2: Patient will learn about resilience, trauma responses, and Javon Servin's \"the story I'm making up\" (cognitive strategy to manage anxious thoughts).    Objective #A (Patient Action)    Status: Completed - Date: 12/11/2020     Patient will learn about resilience.    Intervention(s)  Therapist will teach about protective factors.    Objective #B  Patient will learn about trauma responses.    Status: Continued - Date(s): 6/16/2021     Intervention(s)  Therapist will provide educational materials on trauma responses.    Objective #C  Patient will use cognitive strategies identified in therapy to challenge anxious thoughts.  Status: Continued - Date(s): 6/16/2021    Intervention(s)  Therapist will teach about Javon Servin's power of vulnerability and \"the story I'm making up\".      Goal 3: Patient will learn about ACE scores and attachment styles and will identify how her attachment style drives her behavior.     Objective #A (Patient Action)    Status: Deferred - Date: 6/16/2021     Patient will learn about ACE " scores and will take assessment.    Intervention(s)  Therapist will complete with patient in-session.    Objective #B  Patient will learn about attachment styles and will take Attachment Style assessment.    Status: Completed - Date: 9/2020     Intervention(s)  Therapist will complete with patient in-session.    Objective #C  Patient will identify how her attachment style drives her behavior.  Status: Continued - Date(s): 6/16/2021    Intervention(s)  Therapist will teach emotional regulation skills.        Patient has reviewed and agreed to the above plan.      Keesha Eller

## 2021-07-18 ENCOUNTER — MYC MEDICAL ADVICE (OUTPATIENT)
Dept: PSYCHIATRY | Facility: CLINIC | Age: 46
End: 2021-07-18

## 2021-07-18 DIAGNOSIS — F33.41 RECURRENT MAJOR DEPRESSIVE DISORDER, IN PARTIAL REMISSION (H): ICD-10-CM

## 2021-07-18 DIAGNOSIS — F41.1 GAD (GENERALIZED ANXIETY DISORDER): ICD-10-CM

## 2021-07-19 NOTE — TELEPHONE ENCOUNTER
Date of Last Office Visit: 4/20/21  Date of Next Office Visit: None-will ask the scheduling team to assist pt in scheduling a f/u apt  No shows since last visit: 0  Cancellations since last visit: 0    Medication requested: venlafaxine 150 mg 24 hr  Date last ordered: 4/20/21 Qty: 90 capsules Refills: 0     Review of [place] ?: N/A    Lapse in medication adherence greater than 5 days?: No  If yes, call patient and gather details: N/A  Medication refill request verified as identical to current order?: Yes  Result of Last DAM, VPA, Li+ Level, CBC, or Carbamazepine Level (at or since last visit): N/A    []Medication refilled per  Medication Refill in Ambulatory Care  policy.  [x]Medication unable to be refilled by RN due to criteria not met as indicated below:    []Eligibility - not seen in the last year   []Supervision - no future appointment   []Compliance - no shows, cancellations or lapse in therapy   []Verification - order discrepancy   []Controlled medication   [x]Medication not included in policy   []90-day supply request   []Other

## 2021-07-20 RX ORDER — VENLAFAXINE HYDROCHLORIDE 150 MG/1
150 CAPSULE, EXTENDED RELEASE ORAL DAILY
Qty: 90 CAPSULE | Refills: 0 | Status: SHIPPED | OUTPATIENT
Start: 2021-07-20 | End: 2021-11-05

## 2021-07-21 RX ORDER — ESCITALOPRAM OXALATE 10 MG/1
10 TABLET ORAL DAILY
Qty: 90 TABLET | Refills: 0 | Status: SHIPPED | OUTPATIENT
Start: 2021-07-21 | End: 2021-11-05

## 2021-07-21 NOTE — TELEPHONE ENCOUNTER
Patient should be out of the Lexapro per last script. Please review and approve if appropriate.    Outpatient Medication Detail     Disp Refills Start End LOGAN   escitalopram (LEXAPRO) 10 MG tablet 90 tablet 0 4/20/2021  No   Sig - Route: Take 1 tablet (10 mg) by mouth daily - Oral   Sent to pharmacy as: Escitalopram Oxalate 10 MG Oral Tablet (LEXAPRO)   Class: E-Prescribe   Order: [id number]   E-Prescribing Status: Receipt confirmed by pharmacy (4/20/2021  1:41 PM CDT)

## 2021-08-04 ENCOUNTER — VIRTUAL VISIT (OUTPATIENT)
Dept: PSYCHOLOGY | Facility: CLINIC | Age: 46
End: 2021-08-04
Payer: COMMERCIAL

## 2021-08-04 DIAGNOSIS — F33.41 RECURRENT MAJOR DEPRESSIVE DISORDER, IN PARTIAL REMISSION (H): ICD-10-CM

## 2021-08-04 DIAGNOSIS — F41.1 GAD (GENERALIZED ANXIETY DISORDER): Primary | ICD-10-CM

## 2021-08-04 PROCEDURE — 90834 PSYTX W PT 45 MINUTES: CPT | Mod: 95 | Performed by: MARRIAGE & FAMILY THERAPIST

## 2021-08-04 NOTE — PATIENT INSTRUCTIONS
Patient states that her goals for the next three weeks include:    1. Keep perspective  2. Recognize when irritable and try to find a reason for gratitude    Emailed Javon Servin's Elements of Trust to pt on this date and asked her to focus on one of her choosing during the next three weeks: [email or web address]

## 2021-08-04 NOTE — PROGRESS NOTES
Progress Note    Patient Name: Ellyn Mcgee  Date: 8/4/2021         Service Type: Individual      Session Start Time: 10 a.m.  Session End Time: 10:52 a.m.     Session Length: 52 min.    Session #: 23 (with this writer; patient met previously for DA with [facility] and psychiatry)    Attendees: Client    Service Modality:  Video Visit:    Telemedicine Visit: The patient's condition can be safely assessed and treated via synchronous audio and visual telemedicine encounter.      Reason for Telemedicine Visit: Services only offered telehealth    Originating Site (Patient Location): Patient's home    Distant Site (Provider Location): [facility] [place]    Consent:  The patient/guardian has verbally consented to: the potential risks and benefits of telemedicine (video visit) versus in person care; bill my insurance or make self-payment for services provided; and responsibility for payment of non-covered services.     Patient would like the video invitation sent by: Send to e-mail at: [email or web address]    Mode of Communication:  Video Conference via [facility]    As the provider I attest to compliance with applicable laws and regulations related to telemedicine.     Treatment Plan Last Reviewed: 6/16/2021  PHQ-9 / TEE-7: 6/9/2021     DATA  Interactive Complexity: No  Crisis: No       Progress Since Last Session (Related to Symptoms / Goals / Homework):  Symptoms: reports less arguing with  and states that although several financial/car repair-related stressors happened during the past three weeks she was able to recognize big emotions and did not become as reactive as she admits to being in the past.  Patient reports that her tenant is in the process of moving out, which is a relief as her family needs the extra room.  Patient notes that her oldest daughter starts her new school on 8/18/21.  Patient notes that her mom is having problems with her  Visit Information Date & Time Provider Department Dept. Phone Encounter #  
 4/17/2017  9:00 AM [place] Der, [address] [facility] [id number] Upcoming Health Maintenance Date Due Pneumococcal 19-64 Medium Risk (1 of 1 - PPSV23) 12/27/1979 DTaP/Tdap/Td series (1 - Tdap) 12/27/1981 COLONOSCOPY 12/22/2020 Allergies as of 4/17/2017  Review Complete On: 4/17/2017 By: Silvia Gorman LPN Severity Noted Reaction Type Reactions Ace Inhibitors  02/28/2011    Other (comments) Neutropenia Zestril [Lisinopril]  04/09/2010   Side Effect Other (comments)  
 neutropenia Current Immunizations  Reviewed on 8/31/2016 Name Date Influenza Vaccine (Quad) PF 8/31/2016 Influenza Vaccine Split 11/10/2010 Not reviewed this visit You Were Diagnosed With   
  
 Codes Comments Essential hypertension    -  Primary ICD-10-CM: I10 
ICD-9-CM: 401.9 Hyperlipidemia, unspecified hyperlipidemia type     ICD-10-CM: E78.5 ICD-9-CM: 272.4 Pre-diabetes     ICD-10-CM: R73.03 
ICD-9-CM: 790.29 Encounter for immunization     ICD-10-CM: Y63 ICD-9-CM: V03.89 Vitals BP Pulse Temp Resp Height(growth percentile) Weight(growth percentile) (!) 144/92 (BP 1 Location: Right arm, BP Patient Position: Sitting) (!) 56 98.2 °F (36.8 °C) (Oral) 18 5' 7\" (1.702 m) 165 lb (74.8 kg) SpO2 BMI Smoking Status 96% 25.84 kg/m2 Former Smoker Vitals History BMI and BSA Data Body Mass Index Body Surface Area  
 25.84 kg/m 2 1.88 m 2 Preferred Pharmacy Pharmacy Name Phone [facility] [address] [facility], [facility] [address] [phone] Your Updated Medication List  
  
   
This list is accurate as of: 4/17/17  9:55 AM.  Always use your most recent med list. amLODIPine 10 mg tablet Commonly known as:  Cynthia Half TAKE ONE TABLET BY MOUTH ONCE DAILY "short-term memory.  Patient also reports reconnecting with a formerly close friend during the past few weeks and has enjoyed the renewed contact.     Homework: Partially completed - patient reports that despite frustrations during her family's trip to [place], she was ultimately able to spend some time with her mom and is glad to have made the trip.  Patient states that she is still struggling to find some balance related to caring for herself, her animals, her home, and her family.      Episode of Care Goals: Satisfactory progress - ACTION (Actively working towards change); Intervened by reinforcing change plan / affirming steps taken     Current / Ongoing Stressors and Concerns:  Some financial and friend-related stress; is interested in a deeper dive into emotional state and core beliefs through ACT; new son born 2020; roommate/former friend is refusing to move out of their home (has lived there for three years and has not paid any rent for over a year); pt has been experiencing mild panic attacks; daughter (age 10) just started ADHD medication; history of anxious attachment stemming from relationship with father (he  8 years ago); is currently pregnant (high-risk due to advanced maternal age) and baby is due Dec. 2020; is in couples therapy with  due to frequent arguments and his emotional affair earlier in the year; financial stress; history of ADHD (hyperactive type).     Treatment Objective(s) Addressed in This Session:  Dealing with \"big emotions\"  Triggers for anxiety  Mixed emotions related to roommate moving out and visit with mother  Emotional differentiation  Self-compassion and radical acceptance    Past/future:  Solution focused: how to prioritize and cope with interrupted sleep  Identified insecurities and how they affect patient's thoughts and actions now and in the past   identify how attachment style drives patient's and 's behaviors  will identify how past feelings are " baclofen 10 mg tablet Commonly known as:  LIORESAL  
TAKE ONE TABLET BY MOUTH TWICE DAILY  
  
 cetirizine 10 mg tablet Commonly known as:  ZyrTEC Take 1 Tab by mouth daily as needed for Allergies. doxazosin 2 mg tablet Commonly known as:  CARDURA TAKE ONE TABLET BY MOUTH ONCE DAILY  
  
 famotidine 20 mg tablet Commonly known as:  PEPCID  
TAKE 1 TABLET BY MOUTH ONCE A DAY AT NIGHT  
  
 irbesartan 150 mg tablet Commonly known as:  AVAPRO TAKE ONE TABLET BY MOUTH ONCE DAILY  
  
 naproxen 500 mg tablet Commonly known as:  NAPROSYN Take 1 Tab by mouth two (2) times daily (with meals). Omeprazole delayed release 20 mg tablet Commonly known as:  PRILOSEC D/R Take 1 Tab by mouth daily. pneumococcal 23-valent 25 mcg/0.5 mL injection Commonly known as:  PNEUMOVAX 23  
0.5 mL by IntraMUSCular route once for 1 dose. pravastatin 40 mg tablet Commonly known as:  PRAVACHOL  
TAKE ONE TABLET BY MOUTH ONCE DAILY  
  
 traMADol-acetaminophen 37.5-325 mg per tablet Commonly known as:  ULTRACET  
1-2 tabs every 8 hours as needed. Indications: Pain  
  
 triamterene-hydroCHLOROthiazide 37.5-25 mg per tablet Commonly known as:  Maycol Kazakh TAKE ONE TABLET BY MOUTH ONCE DAILY Prescriptions Sent to Pharmacy Refills  
 pneumococcal 23-valent (PNEUMOVAX 23) 25 mcg/0.5 mL injection 0 Si.5 mL by IntraMUSCular route once for 1 dose. Class: Normal  
 Pharmacy: [id number] [place] [address] #: [phone] Route: IntraMUSCular We Performed the Following HEMOGLOBIN A1C WITH EAG [88995 CPT(R)] HGB & HCT [65566 CPT(R)] LIPID PANEL [52989 CPT(R)] METABOLIC PANEL, COMPREHENSIVE [15834 CPT(R)] TSH 3RD GENERATION [70273 CPT(R)] Patient Instructions Learning About Diabetes Food Guidelines Your Care Instructions Meal planning is important to manage diabetes.  It helps keep your blood impacting current relationships  use cognitive strategies identified in therapy to challenge anxious thoughts   Discussed healthy boundaries and enforcement  Identified personal strengths within friendships  Discussed work/life balance and current purpose  tell full story of past relational issues/trust/infidelity  Discussed ambiguous loss related to relationship with father  tell full story of past trauma related to her relationship with her father (and current roommate issues)     Intervention:  Narrative Therapy: separate problem from person and find the bright spots   CBT: connect thoughts, feelings, and actions    Past/future:   Psychodynamic: Family systems and attachment style          ASSESSMENT: Current Emotional / Mental Status (status of significant symptoms):   Risk status (Self / Other harm or suicidal ideation)   Patient denies current fears or concerns for personal safety.   Patient denies current or recent suicidal ideation or behaviors. Patient last reported feeling hopeless/passive ideation on 8/6/2020.   Patient denies current or recent homicidal ideation or behaviors.   Patient denies current or recent self injurious behavior or ideation.   Patient denies other safety concerns.   Patient reports there has been no change in risk factors since their last session.     Patient reports there has been no change in protective factors since their last session.     Recommended that patient call 911 or go to the local ED should there be a change in any of these risk factors.     Appearance:   Appropriate    Eye Contact:   Good    Psychomotor Behavior: Normal     Attitude:   Cooperative, Pleasant   Orientation:   All   Speech    Rate / Production: Normal/ Responsive Talkative    Volume:  Normal    Mood:    Anxious  Elevated  Expansive   Affect:    Appropriate  Expansive    Thought Content:  Paranoia  Rumination    Thought Form:  Coherent  Goal Directed  Neurosis   Insight:    Good      Medication  sugar at a target level (which you set with your doctor). You don't have to eat special foods. You can eat what your family eats, including sweets once in a while. But you do have to pay attention to how often you eat and how much you eat of certain foods. You may want to work with a dietitian or a certified diabetes educator (CDE) to help you plan meals and snacks. A dietitian or CDE can also help you lose weight if that is one of your goals. What should you know about eating carbs? Managing the amount of carbohydrate (carbs) you eat is an important part of healthy meals when you have diabetes. Carbohydrate is found in many foods. · Learn which foods have carbs. And learn the amounts of carbs in different foods. ¨ Bread, cereal, pasta, and rice have about 15 grams of carbs in a serving. A serving is 1 slice of bread (1 ounce), ½ cup of cooked cereal, or 1/3 cup of cooked pasta or rice. ¨ Fruits have 15 grams of carbs in a serving. A serving is 1 small fresh fruit, such as an apple or orange; ½ of a banana; ½ cup of cooked or canned fruit; ½ cup of fruit juice; 1 cup of melon or raspberries; or 2 tablespoons of dried fruit. ¨ Milk and no-sugar-added yogurt have 15 grams of carbs in a serving. A serving is 1 cup of milk or 2/3 cup of no-sugar-added yogurt. ¨ Starchy vegetables have 15 grams of carbs in a serving. A serving is ½ cup of mashed potatoes or sweet potato; 1 cup winter squash; ½ of a small baked potato; ½ cup of cooked beans; or ½ cup cooked corn or green peas. · Learn how much carbs to eat each day and at each meal. A dietitian or CDE can teach you how to keep track of the amount of carbs you eat. This is called carbohydrate counting. · If you are not sure how to count carbohydrate grams, use the Plate Method to plan meals. It is a good, quick way to make sure that you have a balanced meal. It also helps you spread carbs throughout the day. Review:   No changes to current psychiatric medication(s)     Medication Compliance:   Yes     Changes in Health Issues:   None reported      Chemical Use Review:   Substance Use: Chemical use reviewed, no active concerns identified ; no current alcohol use     Tobacco Use: No current tobacco use.      Diagnosis:  1. TEE (generalized anxiety disorder)    2. Recurrent major depressive disorder, in partial remission (H)      Collateral Reports Completed:  N/A      PLAN: (Patient Tasks / Therapist Tasks / Other)    Patient states that her goals for the next three weeks include:    1. Keep perspective  2. Recognize when irritable and try to find a reason for gratitude    Emailed Javon Servin's Elements of Trust to pt on this date and asked her to focus on one of her choosing during the next three weeks: [email or web address]      Keesha MARIEMaciel Eller                                                         ______________________________________________________________________    Treatment Plan    Patient's Name: Ellyn Mcgee  YOB: 1975    Date: 2021    DSM5 Diagnoses: 296.32 (F33.1) Major Depressive Disorder, Recurrent Episode, Moderate _ or 300.02 (F41.1) Generalized Anxiety Disorder (patient has previously been diagnosed with ADHD, hyperactive type)    Psychosocial / Contextual Factors: History of anxious attachment stemming from relationship with father (he  8 years ago); is currently pregnant (high-risk due to advanced maternal age); is in couples therapy with  due to frequent arguments and his emotional affair earlier in the year; financial stress; history of ADHD (hyperactive type).    WHODAS 2.0 Total Score 2020   Total Score 23 30   Total Score MyChart 23 30       PHQ 2020   PHQ-9 Total Score 8 10 7   Q9: Thoughts of better off dead/self-harm past 2 weeks Not at all Not at all Not at all   F/U:  ¨ Divide your plate by types of foods. Put non-starchy vegetables on half the plate, meat or other protein food on one-quarter of the plate, and a grain or starchy vegetable in the final quarter of the plate. To this you can add a small piece of fruit and 1 cup of milk or yogurt, depending on how many carbs you are supposed to eat at a meal. 
· Try to eat about the same amount of carbs at each meal. Do not \"save up\" your daily allowance of carbs to eat at one meal. 
· Proteins have very little or no carbs per serving. Examples of proteins are beef, chicken, turkey, fish, eggs, tofu, cheese, cottage cheese, and peanut butter. A serving size of meat is 3 ounces, which is about the size of a deck of cards. Examples of meat substitute serving sizes (equal to 1 ounce of meat) are 1/4 cup of cottage cheese, 1 egg, 1 tablespoon of peanut butter, and ½ cup of tofu. How can you eat out and still eat healthy? · Learn to estimate the serving sizes of foods that have carbohydrate. If you measure food at home, it will be easier to estimate the amount in a serving of restaurant food. · If the meal you order has too much carbohydrate (such as potatoes, corn, or baked beans), ask to have a low-carbohydrate food instead. Ask for a salad or green vegetables. · If you use insulin, check your blood sugar before and after eating out to help you plan how much to eat in the future. · If you eat more carbohydrate at a meal than you had planned, take a walk or do other exercise. This will help lower your blood sugar. What else should you know? · Limit saturated fat, such as the fat from meat and dairy products. This is a healthy choice because people who have diabetes are at higher risk of heart disease. So choose lean cuts of meat and nonfat or low-fat dairy products. Use olive or canola oil instead of butter or shortening when cooking. · Don't skip meals.  Your blood sugar may drop too low if you skip meals "Thoughts of suicide or self-harm - - -   F/U: Safety concerns - - -     TEE-7 SCORE 8/12/2020 9/8/2020 12/11/2020   Total Score - - -   Total Score 14 (moderate anxiety) 12 (moderate anxiety) -   Total Score 14 12 11       Referral / Collaboration:  Was/were discussed and client will pursue: ACT Therapist and [facility] if mental health concerns around maternity/pregnancy develop    Anticipated number of sessions for this episode of care: 20-24    Measurable Treatment Goal(s) related to diagnosis / functional impairment(s)    Patient is asking to focus treatment on her relationship with her father and past trauma.    Goal 1: Patient will tell full story of past trauma related to her relationship with her father and will identify how past feelings are impacting current relationships.    Objective #A (Patient Action)    Patient will tell full story of past trauma related to her relationship with her father.  Status: Deferred - Date: 6/16/2021      Intervention(s)  Therapist will collaborate in session to determine attachment wounds and inner child work.    Objective #B  Patient will identify how past feelings are impacting current relationships.  Status: Continued - Date(s): 6/16/2021     Intervention(s)  Therapist will role-play conflict management.    Objective #C  Patient will identify strengths and weaknesses within her family system of origin.  Status: Deferred - Date: 6/16/2021     Intervention(s)  Therapist will assign homework for patient to create list.      Goal 2: Patient will learn about resilience, trauma responses, and Javon Servin's \"the story I'm making up\" (cognitive strategy to manage anxious thoughts).    Objective #A (Patient Action)    Status: Completed - Date: 12/11/2020     Patient will learn about resilience.    Intervention(s)  Therapist will teach about protective factors.    Objective #B  Patient will learn about trauma responses.    Status: Continued - Date(s): 6/16/2021 " and take insulin or certain medicines for diabetes. · Check with your doctor before you drink alcohol. Alcohol can cause your blood sugar to drop too low. Alcohol can also cause a bad reaction if you take certain diabetes medicines. Follow-up care is a key part of your treatment and safety. Be sure to make and go to all appointments, and call your doctor if you are having problems. It's also a good idea to know your test results and keep a list of the medicines you take. Where can you learn more? Go to http://maico-leonora.info/. Enter F047 in the search box to learn more about \"Learning About Diabetes Food Guidelines. \" Current as of: May 23, 2016 Content Version: 11.2 © 5827-0781 [employer]. Care instructions adapted under license by [employer] (which disclaims liability or warranty for this information). If you have questions about a medical condition or this instruction, always ask your healthcare professional. [address] any warranty or liability for your use of this information. Introducing hospitals & HEALTH SERVICES! [facility] introduces [employer] patient portal. Now you can access parts of your medical record, email your doctor's office, and request medication refills online. 1. In your internet browser, go to [email or web address] 2. Click on the First Time User? Click Here link in the Sign In box. You will see the New Member Sign Up page. 3. Enter your [employer] Access Code exactly as it appears below. You will not need to use this code after youve completed the sign-up process. If you do not sign up before the expiration date, you must request a new code. · [employer] Access Code: [email or web address] Expires: 7/16/2017  9:54 AM 
 
4. Enter the last four digits of your Social Security Number (xxxx) and Date of Birth (mm/dd/yyyy) as indicated and click Submit. You will be taken to the next sign-up page. "    Intervention(s)  Therapist will provide educational materials on trauma responses.    Objective #C  Patient will use cognitive strategies identified in therapy to challenge anxious thoughts.  Status: Continued - Date(s): 6/16/2021    Intervention(s)  Therapist will teach about Javon Servin's power of vulnerability and \"the story I'm making up\".      Goal 3: Patient will learn about ACE scores and attachment styles and will identify how her attachment style drives her behavior.     Objective #A (Patient Action)    Status: Deferred - Date: 6/16/2021     Patient will learn about ACE scores and will take assessment.    Intervention(s)  Therapist will complete with patient in-session.    Objective #B  Patient will learn about attachment styles and will take Attachment Style assessment.    Status: Completed - Date: 9/2020     Intervention(s)  Therapist will complete with patient in-session.    Objective #C  Patient will identify how her attachment style drives her behavior.  Status: Continued - Date(s): 6/16/2021    Intervention(s)  Therapist will teach emotional regulation skills.        Patient has reviewed and agreed to the above plan.      Keesha Eller    " 5. Create a [employer] ID. This will be your [employer] login ID and cannot be changed, so think of one that is secure and easy to remember. 6. Create a [employer] password. You can change your password at any time. 7. Enter your Password Reset Question and Answer. This can be used at a later time if you forget your password. 8. Enter your e-mail address. You will receive e-mail notification when new information is available in [address] 9. Click Sign Up. You can now view and download portions of your medical record. 10. Click the Download Summary menu link to download a portable copy of your medical information. If you have questions, please visit the Frequently Asked Questions section of the [employer] website. Remember, [employer] is NOT to be used for urgent needs. For medical emergencies, dial 911. Now available from your iPhone and Android! Please provide this summary of care documentation to your next provider. Your primary care clinician is listed as Luz Rust. If you have any questions after today's visit, please call [phone].

## 2021-08-09 ENCOUNTER — THERAPY VISIT (OUTPATIENT)
Dept: PHYSICAL THERAPY | Facility: CLINIC | Age: 46
End: 2021-08-09
Payer: COMMERCIAL

## 2021-08-09 DIAGNOSIS — M62.89 PELVIC FLOOR DYSFUNCTION: ICD-10-CM

## 2021-08-09 DIAGNOSIS — R32 URINARY INCONTINENCE: ICD-10-CM

## 2021-08-09 PROCEDURE — 97112 NEUROMUSCULAR REEDUCATION: CPT | Mod: GP | Performed by: PHYSICAL THERAPIST

## 2021-08-09 NOTE — LETTER
[facility]  [address]  [address]  [place] [address]  [phone]    August 10, 2021    Re: Ellyn Mcgee   :   1975  MRN:  [id number]   REFERRING PHYSICIAN:   DO FRANK Zaragoza [facility]  Date of Initial Evaluation:  3/19/2021  Visits:  Rxs Used: 6  Reason for Referral:     Pelvic floor dysfunction  Urinary incontinence    EVALUATION SUMMARY  PROGRESS  REPORT  2021     SUBJECTIVE  Subjective: Pt reports summer has been very crazy. She has been doing kegals regularly but not other exercises. She does continue to have incontinence with a really hard sneeze.      Changes in function:  Yes (See Goal flowsheet attached for changes in current functional level)  Adverse reaction to treatment or activity: None    OBJECTIVE  Changes noted in objective findings:     MUSCLE PERFORMANCE 2021  Baseline PF tone: hypo  PF Tone with cough: slight bulge anteriorly   Valsalva: NT  PF Response quality: moderate  PF Power: Center: 2/5  Endurance: Maximum contraction in seconds: 8 seconds  # of endurance contractions before fatigue: NT  Quick contraction repetitions prior to fatigue: >6  Specificity/accessory muscles: overall good isolation     MUSCLE PERFORMANCE 3/19/2021   Baseline PF tone:hypo  PF Tone with cough: hypo, slight bulge  Valsalva: not tested  PF Response quality: sluggish  PF Power: Center: 1   Endurance: Maximum contraction in seconds: < 5 seconds  Re: Ellyn Mcgee   :   1975    # of endurance contractions before fatigue: NT  Quick contraction repetitions prior to fatigue: Unable to coordinate quick contractions.  Specificity/accessory muscles: overall good isolation, some use of abdominals    Objective: Slight improvement in pelvic floor muscle strength and control.      ASSESSMENT/PLAN  Updated problem list and treatment plan: Diagnosis 1:  Pelvic Floor Dysfunction, Urinary Incontinence   Decreased strength - therapeutic exercise, therapeutic activities and home program  Impaired muscle performance - neuro re-education and home program  Decreased function - therapeutic activities and home program  STG/LTGs have been met or progress has been made towards goals:  Yes (See Goal flow sheet completed today.)  Assessment of Progress: The patient's condition has potential to improve.  Self Management Plans:  Patient has been instructed in a home treatment program.  I have re-evaluated this patient and find that the nature, scope, duration and intensity of the therapy is appropriate for the medical condition of the patient.  Ellyn continues to require the following intervention to meet STG and LTG's:  PT    Recommendations:  This patient would benefit from continued therapy.     Frequency:  1 X a month, once daily  Duration:  for 2 months      Please refer to the daily flowsheet for treatment today, total treatment time and time spent performing 1:1 timed codes.      Thank you for your referral.    INQUIRIES  Therapist: Holly Bradford, PT, DPT   [address] [address]  Phone: [phone]  Fax: [phone]

## 2021-08-09 NOTE — PROGRESS NOTES
Subjective:  HPI  Physical Exam                    Objective:  System    Physical Exam    General     ROS      PROGRESS  REPORT  8/9/2021     SUBJECTIVE  Subjective: Pt reports summer has been very crazy. She has been doing kegals regularly but not other exercises. She does continue to have incontinence with a really hard sneeze.      Changes in function:  Yes (See Goal flowsheet attached for changes in current functional level)  Adverse reaction to treatment or activity: None    OBJECTIVE  Changes noted in objective findings:       MUSCLE PERFORMANCE 8/9/2021     Baseline PF tone: hypo  PF Tone with cough: slight bulge anteriorly   Valsalva: NT  PF Response quality: moderate  PF Power: Center: 2/5  Endurance: Maximum contraction in seconds: 8 seconds  # of endurance contractions before fatigue: NT  Quick contraction repetitions prior to fatigue: >6  Specificity/accessory muscles: overall good isolation     MUSCLE PERFORMANCE 3/19/2021      Baseline PF tone:hypo  PF Tone with cough: hypo, slight bulge  Valsalva: not tested  PF Response quality: sluggish  PF Power: Center: 1   Endurance: Maximum contraction in seconds: < 5 seconds  # of endurance contractions before fatigue: NT  Quick contraction repetitions prior to fatigue: Unable to coordinate quick contractions.  Specificity/accessory muscles: overall good isolation, some use of abdominals    Objective: Slight improvement in pelvic floor muscle strength and control.      ASSESSMENT/PLAN  Updated problem list and treatment plan: Diagnosis 1:  Pelvic Floor Dysfunction, Urinary Incontinence  Decreased strength - therapeutic exercise, therapeutic activities and home program  Impaired muscle performance - neuro re-education and home program  Decreased function - therapeutic activities and home program  STG/LTGs have been met or progress has been made towards goals:  Yes (See Goal flow sheet completed today.)  Assessment of Progress: The patient's condition has  potential to improve.  Self Management Plans:  Patient has been instructed in a home treatment program.  I have re-evaluated this patient and find that the nature, scope, duration and intensity of the therapy is appropriate for the medical condition of the patient.  Ellyn continues to require the following intervention to meet STG and LTG's:  PT    Recommendations:  This patient would benefit from continued therapy.     Frequency:  1 X a month, once daily  Duration:  for 2 months        Please refer to the daily flowsheet for treatment today, total treatment time and time spent performing 1:1 timed codes.

## 2021-09-09 ENCOUNTER — VIRTUAL VISIT (OUTPATIENT)
Dept: PSYCHOLOGY | Facility: CLINIC | Age: 46
End: 2021-09-09
Payer: COMMERCIAL

## 2021-09-09 DIAGNOSIS — F33.41 RECURRENT MAJOR DEPRESSIVE DISORDER, IN PARTIAL REMISSION (H): ICD-10-CM

## 2021-09-09 DIAGNOSIS — F41.1 GAD (GENERALIZED ANXIETY DISORDER): Primary | ICD-10-CM

## 2021-09-09 PROCEDURE — 90834 PSYTX W PT 45 MINUTES: CPT | Mod: 95 | Performed by: MARRIAGE & FAMILY THERAPIST

## 2021-09-09 NOTE — PATIENT INSTRUCTIONS
Patient states that her goals for the next month include:    1. Continue to work on integrity and choosing courage over comfort  2. Continue to sleep train baby  3. Get new toilets in home

## 2021-09-09 NOTE — PROGRESS NOTES
Progress Note    Patient Name: Ellyn Mcgee  Date: 9/9/2021         Service Type: Individual      Session Start Time: 11:04 a.m.  Session End Time: 11:56 a.m.     Session Length: 52 min.    Session #: 24 (with this writer; patient met previously for DA with [facility] Elena Hill and psychiatry)    Attendees: Client    Service Modality:  Video Visit:    Telemedicine Visit: The patient's condition can be safely assessed and treated via synchronous audio and visual telemedicine encounter.      Reason for Telemedicine Visit: Services only offered telehealth    Originating Site (Patient Location): Patient's home    Distant Site (Provider Location): [facility]: [place]    Consent:  The patient/guardian has verbally consented to: the potential risks and benefits of telemedicine (video visit) versus in person care; bill my insurance or make self-payment for services provided; and responsibility for payment of non-covered services.     Patient would like the video invitation sent by: Send to e-mail at: [email or web address]    Mode of Communication:  Video Conference via well    As the provider I attest to compliance with applicable laws and regulations related to telemedicine.     Treatment Plan Last Reviewed: 6/16/2021  PHQ-9 / TEE-7: 6/9/2021     DATA  Interactive Complexity: No  Crisis: No       Progress Since Last Session (Related to Symptoms / Goals / Homework):  Symptoms: improving relationships, although patient admits she has continued to suffer from sleep-deprivation.  Patient states that arguments with her  have been minimal, and they have cleared some space for their baby son to crawl.     Homework: Completed in session - patient reports that she is asking for what she wants and needs at work and in session identified consistent integrity as the skill she would like to work on      Episode of Care Goals: Satisfactory progress - ACTION (Actively  "working towards change); Intervened by reinforcing change plan / affirming steps taken     Current / Ongoing Stressors and Concerns:  Some financial and friend-related stress; is interested in a deeper dive into emotional state and core beliefs through ACT; new son born 2020; roommate/former friend is refusing to move out of their home (has lived there for three years and has not paid any rent for over a year); pt has been experiencing mild panic attacks; daughter (age 10) just started ADHD medication; history of anxious attachment stemming from relationship with father (he  8 years ago); is currently pregnant (high-risk due to advanced maternal age) and baby is due Dec. 2020; is in couples therapy with  due to frequent arguments and his emotional affair earlier in the year; financial stress; history of ADHD (hyperactive type).     Treatment Objective(s) Addressed in This Session:  Solution focused: how to prioritize and cope with interrupted sleep  Emotional differentiation  Javon Servin's elements of trust  Self-compassion and radical acceptance  Discussed work/life balance and current purpose  Identified strengths as a mother and need for positive affirmations    Past/future:  Dealing with \"big emotions\"  Triggers for anxiety  Mixed emotions related to roommate moving out and visit with mother  Identified insecurities and how they affect patient's thoughts and actions now and in the past   identify how attachment style drives patient's and 's behaviors  will identify how past feelings are impacting current relationships  use cognitive strategies identified in therapy to challenge anxious thoughts   Discussed healthy boundaries and enforcement  Identified personal strengths within friendships  tell full story of past relational issues/trust/infidelity  Discussed ambiguous loss related to relationship with father  tell full story of past trauma related to her relationship with her father (and " current roommate issues)     Intervention:  Narrative Therapy: separate problem from person and find the bright spots   CBT: connect thoughts, feelings, and actions    Past/future:   Psychodynamic: Family systems and attachment style          ASSESSMENT: Current Emotional / Mental Status (status of significant symptoms):   Risk status (Self / Other harm or suicidal ideation)   Patient denies current fears or concerns for personal safety.   Patient denies current or recent suicidal ideation or behaviors. Patient last reported feeling hopeless/passive ideation on 8/6/2020.   Patient denies current or recent homicidal ideation or behaviors.   Patient denies current or recent self injurious behavior or ideation.   Patient denies other safety concerns.   Patient reports there has been no change in risk factors since their last session.     Patient reports there has been no change in protective factors since their last session.     Recommended that patient call 911 or go to the local ED should there be a change in any of these risk factors.     Appearance:   Appropriate    Eye Contact:   Good    Psychomotor Behavior: Normal     Attitude:   Cooperative, Pleasant   Orientation:   All   Speech    Rate / Production: Normal/ Responsive Talkative    Volume:  Normal    Mood:    Anxious  Elevated  Expansive   Affect:    Appropriate  Expansive    Thought Content:  Paranoia  Rumination    Thought Form:  Coherent  Goal Directed  Neurosis   Insight:    Good      Medication Review:   No changes to current psychiatric medication(s)     Medication Compliance:   Yes     Changes in Health Issues:   None reported      Chemical Use Review:   Substance Use: Chemical use reviewed, no active concerns identified ; no current alcohol use     Tobacco Use: No current tobacco use.      Diagnosis:  1. TEE (generalized anxiety disorder)    2. Recurrent major depressive disorder, in partial remission (H)      Collateral Reports  Completed:  N/A      PLAN: (Patient Tasks / Therapist Tasks / Other)    Patient states that her goals for the next month include:    1. Continue to work on integrity and choosing courage over comfort  2. Continue to sleep train baby  3. Get new toilets in home      Keesha Eller                                                         ______________________________________________________________________    Treatment Plan    Patient's Name: Ellyn Mcgee  YOB: 1975    Date: 2021    DSM5 Diagnoses: 296.32 (F33.1) Major Depressive Disorder, Recurrent Episode, Moderate _ or 300.02 (F41.1) Generalized Anxiety Disorder (patient has previously been diagnosed with ADHD, hyperactive type)    Psychosocial / Contextual Factors: History of anxious attachment stemming from relationship with father (he  8 years ago); is currently pregnant (high-risk due to advanced maternal age); is in couples therapy with  due to frequent arguments and his emotional affair earlier in the year; financial stress; history of ADHD (hyperactive type).    WHODAS 2.0 Total Score 2020   Total Score 23 30   Total Score MyChart 23 30       PHQ 2020   PHQ-9 Total Score 8 10 7   Q9: Thoughts of better off dead/self-harm past 2 weeks Not at all Not at all Not at all   F/U: Thoughts of suicide or self-harm - - -   F/U: Safety concerns - - -     TEE-7 SCORE 2020   Total Score - - -   Total Score 14 (moderate anxiety) 12 (moderate anxiety) -   Total Score 14 12 11       Referral / Collaboration:  Was/were discussed and client will pursue: ACT Therapist and [facility] Elena [facility] if mental health concerns around maternity/pregnancy develop    Anticipated number of sessions for this episode of care: 20-24    Measurable Treatment Goal(s) related to diagnosis / functional impairment(s)    Patient is asking to focus treatment on her relationship with her father and past  "trauma.    Goal 1: Patient will tell full story of past trauma related to her relationship with her father and will identify how past feelings are impacting current relationships.    Objective #A (Patient Action)    Patient will tell full story of past trauma related to her relationship with her father.  Status: Deferred - Date: 6/16/2021      Intervention(s)  Therapist will collaborate in session to determine attachment wounds and inner child work.    Objective #B  Patient will identify how past feelings are impacting current relationships.  Status: Continued - Date(s): 6/16/2021     Intervention(s)  Therapist will role-play conflict management.    Objective #C  Patient will identify strengths and weaknesses within her family system of origin.  Status: Deferred - Date: 6/16/2021     Intervention(s)  Therapist will assign homework for patient to create list.      Goal 2: Patient will learn about resilience, trauma responses, and Javon Servin's \"the story I'm making up\" (cognitive strategy to manage anxious thoughts).    Objective #A (Patient Action)    Status: Completed - Date: 12/11/2020     Patient will learn about resilience.    Intervention(s)  Therapist will teach about protective factors.    Objective #B  Patient will learn about trauma responses.    Status: Continued - Date(s): 6/16/2021     Intervention(s)  Therapist will provide educational materials on trauma responses.    Objective #C  Patient will use cognitive strategies identified in therapy to challenge anxious thoughts.  Status: Continued - Date(s): 6/16/2021    Intervention(s)  Therapist will teach about Javon Servin's power of vulnerability and \"the story I'm making up\".      Goal 3: Patient will learn about ACE scores and attachment styles and will identify how her attachment style drives her behavior.     Objective #A (Patient Action)    Status: Deferred - Date: 6/16/2021     Patient will learn about ACE scores and will take " assessment.    Intervention(s)  Therapist will complete with patient in-session.    Objective #B  Patient will learn about attachment styles and will take Attachment Style assessment.    Status: Completed - Date: 9/2020     Intervention(s)  Therapist will complete with patient in-session.    Objective #C  Patient will identify how her attachment style drives her behavior.  Status: Continued - Date(s): 6/16/2021    Intervention(s)  Therapist will teach emotional regulation skills.        Patient has reviewed and agreed to the above plan.      Keesha Eller

## 2021-09-19 ENCOUNTER — HEALTH MAINTENANCE LETTER (OUTPATIENT)
Age: 46
End: 2021-09-19

## 2021-09-27 ENCOUNTER — MYC MEDICAL ADVICE (OUTPATIENT)
Dept: PSYCHIATRY | Facility: CLINIC | Age: 46
End: 2021-09-27

## 2021-09-27 ENCOUNTER — MYC MEDICAL ADVICE (OUTPATIENT)
Dept: FAMILY MEDICINE | Facility: CLINIC | Age: 46
End: 2021-09-27

## 2021-10-02 ENCOUNTER — IMMUNIZATION (OUTPATIENT)
Dept: FAMILY MEDICINE | Facility: CLINIC | Age: 46
End: 2021-10-02
Payer: COMMERCIAL

## 2021-10-02 VITALS — TEMPERATURE: 97 F

## 2021-10-02 DIAGNOSIS — Z23 NEED FOR PROPHYLACTIC VACCINATION AND INOCULATION AGAINST INFLUENZA: Primary | ICD-10-CM

## 2021-10-02 PROCEDURE — 99207 PR NO CHARGE NURSE ONLY: CPT

## 2021-10-02 PROCEDURE — 90686 IIV4 VACC NO PRSV 0.5 ML IM: CPT

## 2021-10-02 PROCEDURE — 90471 IMMUNIZATION ADMIN: CPT

## 2021-10-12 ENCOUNTER — VIRTUAL VISIT (OUTPATIENT)
Dept: PSYCHOLOGY | Facility: CLINIC | Age: 46
End: 2021-10-12
Payer: COMMERCIAL

## 2021-10-12 DIAGNOSIS — F41.1 GAD (GENERALIZED ANXIETY DISORDER): Primary | ICD-10-CM

## 2021-10-12 DIAGNOSIS — F33.41 RECURRENT MAJOR DEPRESSIVE DISORDER, IN PARTIAL REMISSION (H): ICD-10-CM

## 2021-10-12 PROCEDURE — 90834 PSYTX W PT 45 MINUTES: CPT | Mod: 95 | Performed by: MARRIAGE & FAMILY THERAPIST

## 2021-10-12 NOTE — PROGRESS NOTES
Progress Note    Patient Name: Ellyn Mcgee  Date: 10/12/2021         Service Type: Individual      Session Start Time: 11 a.m.  Session End Time: 11:52 a.m.     Session Length: 52 min.    Session #: 25 (with this writer; patient met previously for DA with [facility] and psychiatry)    Attendees: Client (baby son and daughter were in same room)    Service Modality:  Video Visit:    Telemedicine Visit: The patient's condition can be safely assessed and treated via synchronous audio and visual telemedicine encounter.      Reason for Telemedicine Visit: Services only offered telehealth    Originating Site (Patient Location): Patient's home    Distant Site (Provider Location): [facility]: [place]    Consent:  The patient/guardian has verbally consented to: the potential risks and benefits of telemedicine (video visit) versus in person care; bill my insurance or make self-payment for services provided; and responsibility for payment of non-covered services.     Patient would like the video invitation sent by: Send to e-mail at: [email or web address]    Mode of Communication:  Video Conference via well    As the provider I attest to compliance with applicable laws and regulations related to telemedicine.     Treatment Plan Last Reviewed: 10/12/2021  PHQ-9 / TEE-7: 6/9/2021     DATA  Interactive Complexity: No  Crisis: No       Progress Since Last Session (Related to Symptoms / Goals / Homework):  Symptoms: improving mindfulness and taking each moment as it comes (radical acceptance); patient states that her older daughter has been experiencing some health issues (dizziness, poor concentration/brain fog, and nausea) that they are further investigating.  Patient reports progress in clearing spaces in her home.     Homework: Partially completed - patient reports that she continues to try to sleep train her baby son, purchased a new vanity and will soon  "purchase toilets, and has been trying to focus on making the next right decision      Episode of Care Goals: Satisfactory progress - ACTION (Actively working towards change); Intervened by reinforcing change plan / affirming steps taken     Current / Ongoing Stressors and Concerns:  Some financial and friend-related stress; is interested in a deeper dive into emotional state and core beliefs through ACT; new son born 2020; roommate/former friend is refusing to move out of their home (has lived there for three years and has not paid any rent for over a year); pt has been experiencing mild panic attacks; daughter (age 10) just started ADHD medication; history of anxious attachment stemming from relationship with father (he  8 years ago); is currently pregnant (high-risk due to advanced maternal age) and baby is due Dec. 2020; is in couples therapy with  due to frequent arguments and his emotional affair earlier in the year; financial stress; history of ADHD (hyperactive type).     Treatment Objective(s) Addressed in This Session:  Discussed family system issues and possible messages internalized during childhood related to father  Emotional differentiation  Self-compassion and radical acceptance  Discussed work/life balance and current purpose    Past/future:  Solution focused: how to prioritize and cope with interrupted sleep  Javon Servin's elements of trust  Identified strengths as a mother and need for positive affirmations  Dealing with \"big emotions\"  Triggers for anxiety  Mixed emotions related to roommate moving out and visit with mother  Identified insecurities and how they affect patient's thoughts and actions now and in the past   identify how attachment style drives patient's and 's behaviors  will identify how past feelings are impacting current relationships  use cognitive strategies identified in therapy to challenge anxious thoughts   Discussed healthy boundaries and " enforcement  Identified personal strengths within friendships  tell full story of past relational issues/trust/infidelity  Discussed ambiguous loss related to relationship with father  tell full story of past trauma related to her relationship with her father (and current roommate issues)     Intervention:  Psychodynamic: Family systems and attachment style  Narrative Therapy: separate problem from person and find the bright spots   CBT: connect thoughts, feelings, and actions    Past/future:  Solution-Focused       ASSESSMENT: Current Emotional / Mental Status (status of significant symptoms):   Risk status (Self / Other harm or suicidal ideation)   Patient denies current fears or concerns for personal safety.   Patient denies current or recent suicidal ideation or behaviors. Patient last reported feeling hopeless/passive ideation on 8/6/2020.   Patient denies current or recent homicidal ideation or behaviors.   Patient denies current or recent self injurious behavior or ideation.   Patient denies other safety concerns.   Patient reports there has been no change in risk factors since their last session.     Patient reports there has been no change in protective factors since their last session.     Recommended that patient call 911 or go to the local ED should there be a change in any of these risk factors.     Appearance:   Appropriate    Eye Contact:   Good    Psychomotor Behavior: Normal  Restless     Attitude:   Cooperative, Pleasant   Orientation:   All   Speech    Rate / Production: Normal/ Responsive Talkative    Volume:  Normal    Mood:    Anxious  Elevated  Expansive   Affect:    Appropriate  Expansive    Thought Content:  Paranoia  Rumination    Thought Form:  Coherent  Flight of Ideas  Goal Directed  Neurosis   Insight:    Good      Medication Review:   No changes to current psychiatric medication(s)     Medication Compliance:   Yes     Changes in Health Issues:   None reported      Chemical Use  Review:   Substance Use: Chemical use reviewed, no active concerns identified ; no current alcohol use     Tobacco Use: No current tobacco use.      Diagnosis:  1. TEE (generalized anxiety disorder)    2. Recurrent major depressive disorder, in partial remission (H)      Collateral Reports Completed:  N/A      PLAN: (Patient Tasks / Therapist Tasks / Other)    Patient states that her goals for the next three weeks include:    1. Continue to purge items in home  2. Order new toilets and get vanity  3. Find out about daughter's health  4. Continue to work towards getting baby his own bedroom      Keesha Englishby                                                         ______________________________________________________________________    Treatment Plan    Patient's Name: Ellyn Mcgee  YOB: 1975    Date: 10/12/2021    DSM5 Diagnoses: 296.32 (F33.1) Major Depressive Disorder, Recurrent Episode, Moderate _ or 300.02 (F41.1) Generalized Anxiety Disorder (patient has previously been diagnosed with ADHD, hyperactive type)    Psychosocial / Contextual Factors: History of anxious attachment stemming from relationship with father (he  8 years ago); is currently pregnant (high-risk due to advanced maternal age); is in couples therapy with  due to frequent arguments and his emotional affair earlier in the year; financial stress; history of ADHD (hyperactive type).    WHODAS 2.0 Total Score 2020   Total Score 23 30   Total Score MyChart 23 30       PHQ 2020   PHQ-9 Total Score 8 10 7   Q9: Thoughts of better off dead/self-harm past 2 weeks Not at all Not at all Not at all   F/U: Thoughts of suicide or self-harm - - -   F/U: Safety concerns - - -     TEE-7 SCORE 2020   Total Score - - -   Total Score 14 (moderate anxiety) 12 (moderate anxiety) -   Total Score 14 12 11       Referral / Collaboration:  Was/were discussed and client will  "pursue: (contined) ACT Therapist    Anticipated number of sessions for this episode of care: 40    Measurable Treatment Goal(s) related to diagnosis / functional impairment(s)    Patient is asking to focus treatment on her relationship with her father and past trauma.    Goal 1: Patient will tell full story of past trauma related to her relationship with her father and will identify how past feelings are impacting current relationships.    Objective #A (Patient Action)    Patient will tell full story of past trauma related to her relationship with her father.  Status: Completed - Date: 10/12/2021      Intervention(s)  Therapist will collaborate in session to determine attachment wounds and inner child work.    Objective #B  Patient will identify how past feelings are impacting current relationships.  Status: Continued - Date(s): 10/12/2021    Intervention(s)  Therapist will role-play conflict management.    Objective #C  Patient will identify strengths and weaknesses within her family system of origin.  Status: Completed - Date: 10/12/2021     Intervention(s)  Therapist will assign homework for patient to create list.      Goal 2: Patient will learn about resilience, trauma responses, and Javon Servin's \"the story I'm making up\" (cognitive strategy to manage anxious thoughts).    Objective #A (Patient Action)    Patient will learn about and identify personal trauma responses.    Status: Continued - Date(s): 10/12/2021     Intervention(s)  Therapist will provide educational materials on trauma responses.    Objective #B  Patient will use cognitive strategies identified in therapy to challenge anxious thoughts.  Status: Continued - Date(s): 10/12/2021    Intervention(s)  Therapist will teach about Javon Servin's power of vulnerability and \"the story I'm making up\".      Goal 3: Patient will learn about ACE scores and attachment styles and will identify how her attachment style drives her behavior.     Objective #A " (Patient Action)    Status: Deferred - Date: 10/12/2021     Patient will learn about ACE scores and will take assessment.    Intervention(s)  Therapist will complete with patient in-session.    Objective #B  Patient will identify how her attachment style drives her behavior.  Status: Continued - Date(s): 10/12/2021    Intervention(s)  Therapist will teach emotional regulation skills.        Patient agreed to the above plan.      Keesha Eller

## 2021-10-12 NOTE — PATIENT INSTRUCTIONS
Patient states that her goals for the next three weeks include:    1. Continue to purge items in home  2. Order new toilets and get vanity  3. Find out about daughter's health  4. Continue to work towards getting baby his own bedroom

## 2021-11-02 ENCOUNTER — VIRTUAL VISIT (OUTPATIENT)
Dept: PSYCHOLOGY | Facility: CLINIC | Age: 46
End: 2021-11-02
Payer: COMMERCIAL

## 2021-11-02 DIAGNOSIS — F41.1 GAD (GENERALIZED ANXIETY DISORDER): Primary | ICD-10-CM

## 2021-11-02 DIAGNOSIS — F33.41 RECURRENT MAJOR DEPRESSIVE DISORDER, IN PARTIAL REMISSION (H): ICD-10-CM

## 2021-11-02 PROCEDURE — 90834 PSYTX W PT 45 MINUTES: CPT | Mod: 95 | Performed by: MARRIAGE & FAMILY THERAPIST

## 2021-11-02 NOTE — PROGRESS NOTES
Progress Note    Patient Name: Ellyn Mcgee  Date: 11/2/2021         Service Type: Individual      Session Start Time: 12:30 p.m.  Session End Time: 1:22 p.m.     Session Length: 52 min.    Session #: 26 (with this writer; patient met previously for DA with [facility] and psychiatry)    Attendees: Client (baby son and daughter were in same room)    Service Modality:  Video Visit:    Telemedicine Visit: The patient's condition can be safely assessed and treated via synchronous audio and visual telemedicine encounter.      Reason for Telemedicine Visit: Services only offered telehealth    Originating Site (Patient Location): Patient's home    Distant Site (Provider Location): [facility] Clin[facility]: [place]    Consent:  The patient/guardian has verbally consented to: the potential risks and benefits of telemedicine (video visit) versus in person care; bill my insurance or make self-payment for services provided; and responsibility for payment of non-covered services.     Patient would like the video invitation sent by: Send to e-mail at: [email or web address]    Mode of Communication:  Video Conference via well    As the provider I attest to compliance with applicable laws and regulations related to telemedicine.     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  Patient reports that her daughter has continued to experience some health concerns and will have visits to specialists in the coming weeks.     Homework: Partially completed      Episode of Care Goals: Satisfactory progress - ACTION (Actively working towards change); Intervened by  "reinforcing change plan / affirming steps taken     Current / Ongoing Stressors and Concerns:  Some financial and friend-related stress; is interested in a deeper dive into emotional state and core beliefs through ACT; new son born 2020; roommate/former friend is refusing to move out of their home (has lived there for 3-4 years and has not paid any rent for over 1-2 years); pt has been experiencing mild panic attacks; daughter (age 10) just started ADHD medication; history of anxious attachment stemming from relationship with father (he  8 years ago); is currently pregnant (high-risk due to advanced maternal age) and baby is due Dec. 2020; is in couples therapy with  due to frequent arguments and his emotional affair earlier in the year; financial stress; history of ADHD (hyperactive type).     Treatment Objective(s) Addressed in This Session:  Dealing with \"big emotions\"  Triggers for anxiety  Emotional differentiation  Self-compassion and radical acceptance  Discussed work/life balance and current purpose    Past/future:  Discussed family system issues and possible messages internalized during childhood related to father  Solution focused: how to prioritize and cope with interrupted sleep  Javon Servin's elements of trust  Identified strengths as a mother and need for positive affirmations  Mixed emotions related to roommate moving out and visit with mother  Identified insecurities and how they affect patient's thoughts and actions now and in the past   identify how attachment style drives patient's and 's behaviors  will identify how past feelings are impacting current relationships  use cognitive strategies identified in therapy to challenge anxious thoughts   Discussed healthy boundaries and enforcement  Identified personal strengths within friendships  tell full story of past relational issues/trust/infidelity  Discussed ambiguous loss related to relationship with father  tell full story " of past trauma related to her relationship with her father (and current roommate issues)     Intervention:  Psychodynamic: Family systems and attachment style  Narrative Therapy: separate problem from person and find the bright spots   CBT: connect thoughts, feelings, and actions    Past/future:  Solution-Focused       ASSESSMENT: Current Emotional / Mental Status (status of significant symptoms):   Risk status (Self / Other harm or suicidal ideation)   Patient denies current fears or concerns for personal safety.   Patient denies current or recent suicidal ideation or behaviors. Patient last reported feeling hopeless/passive ideation on 8/6/2020.   Patient denies current or recent homicidal ideation or behaviors.   Patient denies current or recent self injurious behavior or ideation.   Patient denies other safety concerns.   Patient reports there has been no change in risk factors since their last session.     Patient reports there has been no change in protective factors since their last session.     Recommended that patient call 911 or go to the local ED should there be a change in any of these risk factors.     Appearance:   Appropriate    Eye Contact:   Good    Psychomotor Behavior: Normal  Restless     Attitude:   Cooperative, Pleasant   Orientation:   All   Speech    Rate / Production: Normal/ Responsive Talkative    Volume:  Normal    Mood:    Euthymic   Affect:    Appropriate  Expansive    Thought Content:  Paranoia  Rumination    Thought Form:  Coherent  Goal Directed  Tangential  Neurosis   Insight:    Good      Medication Review:   No changes to current psychiatric medication(s)     Medication Compliance:   Yes     Changes in Health Issues:   None reported      Chemical Use Review:   Substance Use: Chemical use reviewed, no active concerns identified ; no current alcohol use     Tobacco Use: No current tobacco use.      Diagnosis:  1. TEE (generalized anxiety disorder)    2. Recurrent major depressive  "disorder, in partial remission (H)      Collateral Reports Completed:  N/A      PLAN: (Patient Tasks / Therapist Tasks / Other)    Patient states that her goals for the next two weeks include:    1. Continue to work on cleaning older daughter's room  2. \"Reduce chaos where I can\"  3. Continue to improve financial situation as needed by picking up extra shifts at work  4. Complete [facility] detox from [facility]                                                         ______________________________________________________________________    Treatment Plan    Patient's Name: Ellyn Mcgee  YOB: 1975    Date: 10/12/2021    DSM5 Diagnoses: 296.32 (F33.1) Major Depressive Disorder, Recurrent Episode, Moderate _ or 300.02 (F41.1) Generalized Anxiety Disorder (patient has previously been diagnosed with ADHD, hyperactive type)    Psychosocial / Contextual Factors: History of anxious attachment stemming from relationship with father (he  8 years ago); is currently pregnant (high-risk due to advanced maternal age); is in couples therapy with  due to frequent arguments and his emotional affair earlier in the year; financial stress; history of ADHD (hyperactive type).    WHODAS 2.0 Total Score 2020   Total Score 23 30   Total Score MyChart 23 30       PHQ 2020   PHQ-9 Total Score 8 10 7   Q9: Thoughts of better off dead/self-harm past 2 weeks Not at all Not at all Not at all   F/U: Thoughts of suicide or self-harm - - -   F/U: Safety concerns - - -     TEE-7 SCORE 2020   Total Score - - -   Total Score 14 (moderate anxiety) 12 (moderate anxiety) -   Total Score 14 12 11       Referral / Collaboration:  Was/were discussed and client will pursue: (contined) ACT Therapist    Anticipated number of sessions for this episode of care: 40    Measurable Treatment Goal(s) related to diagnosis / functional impairment(s)    Patient is " "asking to focus treatment on her relationship with her father and past trauma.    Goal 1: Patient will tell full story of past trauma related to her relationship with her father and will identify how past feelings are impacting current relationships.    Objective #A (Patient Action)    Patient will tell full story of past trauma related to her relationship with her father.  Status: Completed - Date: 10/12/2021      Intervention(s)  Therapist will collaborate in session to determine attachment wounds and inner child work.    Objective #B  Patient will identify how past feelings are impacting current relationships.  Status: Continued - Date(s): 10/12/2021    Intervention(s)  Therapist will role-play conflict management.    Objective #C  Patient will identify strengths and weaknesses within her family system of origin.  Status: Completed - Date: 10/12/2021     Intervention(s)  Therapist will assign homework for patient to create list.      Goal 2: Patient will learn about resilience, trauma responses, and Javon Servin's \"the story I'm making up\" (cognitive strategy to manage anxious thoughts).    Objective #A (Patient Action)    Patient will learn about and identify personal trauma responses.    Status: Continued - Date(s): 10/12/2021     Intervention(s)  Therapist will provide educational materials on trauma responses.    Objective #B  Patient will use cognitive strategies identified in therapy to challenge anxious thoughts.  Status: Continued - Date(s): 10/12/2021    Intervention(s)  Therapist will teach about Javon Servin's power of vulnerability and \"the story I'm making up\".      Goal 3: Patient will learn about ACE scores and attachment styles and will identify how her attachment style drives her behavior.     Objective #A (Patient Action)    Status: Deferred - Date: 10/12/2021     Patient will learn about ACE scores and will take assessment.    Intervention(s)  Therapist will complete with patient " in-session.    Objective #B  Patient will identify how her attachment style drives her behavior.  Status: Continued - Date(s): 10/12/2021    Intervention(s)  Therapist will teach emotional regulation skills.        Patient agreed to the above plan.      Keesha Eller

## 2021-11-02 NOTE — PATIENT INSTRUCTIONS
"Patient states that her goals for the next two weeks include:    1. Continue to work on cleaning older daughter's room  2. \"Reduce chaos where I can\"  3. Continue to improve financial situation as needed by picking up extra shifts at work  4. [facility]    "

## 2021-11-04 ENCOUNTER — MYC MEDICAL ADVICE (OUTPATIENT)
Dept: PSYCHIATRY | Facility: CLINIC | Age: 46
End: 2021-11-04

## 2021-11-04 DIAGNOSIS — F33.41 RECURRENT MAJOR DEPRESSIVE DISORDER, IN PARTIAL REMISSION (H): ICD-10-CM

## 2021-11-04 DIAGNOSIS — F41.1 GAD (GENERALIZED ANXIETY DISORDER): ICD-10-CM

## 2021-11-05 ENCOUNTER — MYC MEDICAL ADVICE (OUTPATIENT)
Dept: FAMILY MEDICINE | Facility: CLINIC | Age: 46
End: 2021-11-05
Payer: COMMERCIAL

## 2021-11-05 DIAGNOSIS — F33.1 MAJOR DEPRESSIVE DISORDER, RECURRENT EPISODE, MODERATE (H): ICD-10-CM

## 2021-11-05 DIAGNOSIS — F41.1 ANXIETY STATE: ICD-10-CM

## 2021-11-05 DIAGNOSIS — F90.0 ATTENTION DEFICIT HYPERACTIVITY DISORDER (ADHD), PREDOMINANTLY INATTENTIVE TYPE: Primary | ICD-10-CM

## 2021-11-05 RX ORDER — VENLAFAXINE HYDROCHLORIDE 150 MG/1
150 CAPSULE, EXTENDED RELEASE ORAL DAILY
Qty: 90 CAPSULE | Refills: 0 | Status: SHIPPED | OUTPATIENT
Start: 2021-11-05 | End: 2021-12-15

## 2021-11-05 RX ORDER — ESCITALOPRAM OXALATE 10 MG/1
10 TABLET ORAL DAILY
Qty: 90 TABLET | Refills: 0 | Status: SHIPPED | OUTPATIENT
Start: 2021-11-05 | End: 2021-12-15

## 2021-11-05 NOTE — TELEPHONE ENCOUNTER
Date of Last Office Visit: 4/20/21  Date of Next Office Visit: None - please see patient MyChart message  No shows since last visit: none  Cancellations since last visit: none    Medication requested: escitalopram 10mg Date last ordered: 7/21/21 Qty: 90 Refills: 0  Medication requested: venlafaxine 150mg Date last ordered: 7/20/21 Qty: 90 Refills: 0       Lapse in medication adherence greater than 5 days?: yes  If yes, call patient and gather details: patient had previous refill supply on hand  Medication refill request verified as identical to current order?: yes  Result of Last DAM, VPA, Li+ Level, CBC, or Carbamazepine Level (at or since last visit): N/A    Last visit treatment plan:    Treatment Plan:    Continue Lexapro/escitalopram 10 mg daily for mood and anxiety    Continue venlafaxine/Effexor- mg daily for mood and anxiety.      Start propranolol 10-20 mg twice daily as needed for anxiety. Discussed risks/benefits of use while breastfeeding. To minimize exposure to infant try to breastfeed 3-4 hours after taking propranolol.     Continue all other medications as reviewed per electronic medical record today.     Safety plan reviewed. To the Emergency Department as needed or call after hours crisis line at [phone] or [phone]. [place] Crisis Text Line. Text [place] to 962173 or Suicide LifeLine Chat: suicidepreventionlifeline.org/chat    Continue therapy as planned.     Schedule an appointment with me in 4 weeks or sooner as needed. Call [facility] at [phone] to schedule if someone does not reach out to you within 2-3 days.     Follow up with primary care provider as planned or for acute medical concerns.    Call the psychiatric nurse line with medication questions or concerns at [phone].    XIPWIREhart may be used to communicate with your provider, but this is not intended to be used for emergencies.         []Medication refilled per  Medication Refill in Ambulatory Care   policy.  [x]Medication unable to be refilled by RN due to criteria not met as indicated below:    []Eligibility - not seen in the last year   [x]Supervision - no future appointment   []Compliance - no shows, cancellations or lapse in therapy   []Verification - order discrepancy   []Controlled medication   []Medication not included in policy   []90-day supply request   []Other

## 2021-11-16 ENCOUNTER — VIRTUAL VISIT (OUTPATIENT)
Dept: PSYCHOLOGY | Facility: CLINIC | Age: 46
End: 2021-11-16
Payer: COMMERCIAL

## 2021-11-16 DIAGNOSIS — F41.1 GAD (GENERALIZED ANXIETY DISORDER): Primary | ICD-10-CM

## 2021-11-16 DIAGNOSIS — F33.41 RECURRENT MAJOR DEPRESSIVE DISORDER, IN PARTIAL REMISSION (H): ICD-10-CM

## 2021-11-16 PROCEDURE — 90834 PSYTX W PT 45 MINUTES: CPT | Mod: 95 | Performed by: MARRIAGE & FAMILY THERAPIST

## 2021-11-16 NOTE — PROGRESS NOTES
Progress Note    Patient Name: Ellyn Mcgee  Date: 11/16/2021         Service Type: Individual      Session Start Time: 12:30 p.m.  Session End Time: 1:22 p.m.     Session Length: 52 min.    Session #: 27 (with this writer; patient met previously for DA with [facility] and psychiatry)    Attendees: Client     Service Modality:  Video Visit:    Telemedicine Visit: The patient's condition can be safely assessed and treated via synchronous audio and visual telemedicine encounter.      Reason for Telemedicine Visit: Services only offered telehealth    Originating Site (Patient Location): Patient's home    Distant Site (Provider Location): [facility]: [place]    Consent:  The patient/guardian has verbally consented to: the potential risks and benefits of telemedicine (video visit) versus in person care; bill my insurance or make self-payment for services provided; and responsibility for payment of non-covered services.     Patient would like the video invitation sent by: Send to e-mail at: [email or web address]    Mode of Communication:  Video Conference via [facility]    As the provider I attest to compliance with applicable laws and regulations related to telemedicine.     Treatment Plan Last Reviewed: 10/12/2021  PHQ-9 / TEE-7: 6/9/2021     DATA  Interactive Complexity: No  Crisis: No       Progress Since Last Session (Related to Symptoms / Goals / Homework):  Symptoms: reports some increase in conflict and stress as patient states that her daughter has been increasingly symptomatic (lethargic and dizzy) and has not been able to attend much school; patient states that she is very exhausted and has been trying to coordinate her daughter's medical appointments and communicate with the school.  Patient also states that she had a significant argument with her  but was able to identify reasons for his irritability and reports the couple was able to repair  "somewhat.  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     Homework: Partially completed - pt reports picking up extra shifts at work and has continued to clean      Episode of Care Goals: Satisfactory progress - ACTION (Actively working towards change); Intervened by reinforcing change plan / affirming steps taken     Current / Ongoing Stressors and Concerns:  Some financial and friend-related stress; is interested in a deeper dive into emotional state and core beliefs through ACT; new son born 2020; roommate/former friend is refusing to move out of their home (has lived there for 3-4 years and has not paid any rent for over 1-2 years); pt has been experiencing mild panic attacks; daughter (age 10) just started ADHD medication; history of anxious attachment stemming from relationship with father (he  8 years ago); is currently pregnant (high-risk due to advanced maternal age) and baby is due Dec. 2020; is in couples therapy with  due to frequent arguments and his emotional affair earlier in the year; financial stress; history of ADHD (hyperactive type).     Treatment Objective(s) Addressed in This Session:  Solution focused: how to prioritize and cope with interrupted sleep  Javon Servin's elements of trust and relationship repair  Natali research  Dealing with \"big emotions\"  Triggers for anxiety  Emotional differentiation  Self-compassion and radical acceptance  Discussed work/life balance and current purpose    Past/future:  Discussed family system issues and possible messages internalized during childhood related to father  Identified strengths as a mother and need for positive affirmations  Mixed emotions related to roommate moving out and visit with mother  Identified insecurities and how they affect patient's thoughts and actions now and in the past   identify how attachment style drives patient's and 's behaviors  will identify how past feelings are impacting current " relationships  use cognitive strategies identified in therapy to challenge anxious thoughts   Discussed healthy boundaries and enforcement  Identified personal strengths within friendships  tell full story of past relational issues/trust/infidelity  Discussed ambiguous loss related to relationship with father  tell full story of past trauma related to her relationship with her father (and current roommate issues)     Intervention:  Psychodynamic: Family systems and attachment style  Narrative Therapy: separate problem from person and find the bright spots   CBT: connect thoughts, feelings, and actions - [facility] [place]    Past/future:  Solution-Focused       ASSESSMENT: Current Emotional / Mental Status (status of significant symptoms):   Risk status (Self / Other harm or suicidal ideation)   Patient denies current fears or concerns for personal safety.   Patient denies current or recent suicidal ideation or behaviors. Patient last reported feeling hopeless/passive ideation on 8/6/2020.   Patient denies current or recent homicidal ideation or behaviors.   Patient denies current or recent self injurious behavior or ideation.   Patient denies other safety concerns.   Patient reports there has been no change in risk factors since their last session.     Patient reports there has been no change in protective factors since their last session.     Recommended that patient call 911 or go to the local ED should there be a change in any of these risk factors.     Appearance:   Appropriate    Eye Contact:   Good    Psychomotor Behavior: Normal  Restless     Attitude:   Cooperative, Pleasant   Orientation:   All   Speech    Rate / Production: Normal/ Responsive Talkative    Volume:  Normal    Mood:    Anxious  Sad    Affect:    Appropriate  Expansive    Thought Content:  Paranoia  Rumination    Thought Form:  Coherent    Insight:    Good      Medication Review:   No changes to current psychiatric medication(s)     Medication  "Compliance:   Yes     Changes in Health Issues:   None reported      Chemical Use Review:   Substance Use: Chemical use reviewed, no active concerns identified ; no current alcohol use     Tobacco Use: No current tobacco use.      Diagnosis:  1. TEE (generalized anxiety disorder)    2. Recurrent major depressive disorder, in partial remission (H)      Collateral Reports Completed:  N/A      PLAN: (Patient Tasks / Therapist Tasks / Other)    Patient states that her goals for the next three weeks include:    1. Remove bassinet and baby bath from room  2. Install new toilets and vanity  3. \"Take it day by day!\"  4. Maintain work/life balance and work towards getting family to be physically, mentally, and financially stable      Keesha MMaciel Eller                                                         ______________________________________________________________________    Treatment Plan    Patient's Name: Ellyn Mcgee  YOB: 1975    Date: 10/12/2021    DSM5 Diagnoses: 296.32 (F33.1) Major Depressive Disorder, Recurrent Episode, Moderate _ or 300.02 (F41.1) Generalized Anxiety Disorder (patient has previously been diagnosed with ADHD, hyperactive type)    Psychosocial / Contextual Factors: History of anxious attachment stemming from relationship with father (he  8 years ago); is currently pregnant (high-risk due to advanced maternal age); is in couples therapy with  due to frequent arguments and his emotional affair earlier in the year; financial stress; history of ADHD (hyperactive type).    WHODAS 2.0 Total Score 2020   Total Score 23 30   Total Score MyChart 23 30       PHQ 2020   PHQ-9 Total Score 8 10 7   Q9: Thoughts of better off dead/self-harm past 2 weeks Not at all Not at all Not at all   F/U: Thoughts of suicide or self-harm - - -   F/U: Safety concerns - - -     TEE-7 SCORE 2020   Total Score - - -   Total Score 14 " "(moderate anxiety) 12 (moderate anxiety) -   Total Score 14 12 11       Referral / Collaboration:  Was/were discussed and client will pursue: (contined) ACT Therapist    Anticipated number of sessions for this episode of care: 40    Measurable Treatment Goal(s) related to diagnosis / functional impairment(s)    Patient is asking to focus treatment on her relationship with her father and past trauma.    Goal 1: Patient will tell full story of past trauma related to her relationship with her father and will identify how past feelings are impacting current relationships.    Objective #A (Patient Action)    Patient will tell full story of past trauma related to her relationship with her father.  Status: Completed - Date: 10/12/2021      Intervention(s)  Therapist will collaborate in session to determine attachment wounds and inner child work.    Objective #B  Patient will identify how past feelings are impacting current relationships.  Status: Continued - Date(s): 10/12/2021    Intervention(s)  Therapist will role-play conflict management.    Objective #C  Patient will identify strengths and weaknesses within her family system of origin.  Status: Completed - Date: 10/12/2021     Intervention(s)  Therapist will assign homework for patient to create list.      Goal 2: Patient will learn about resilience, trauma responses, and Javon Servin's \"the story I'm making up\" (cognitive strategy to manage anxious thoughts).    Objective #A (Patient Action)    Patient will learn about and identify personal trauma responses.    Status: Continued - Date(s): 10/12/2021     Intervention(s)  Therapist will provide educational materials on trauma responses.    Objective #B  Patient will use cognitive strategies identified in therapy to challenge anxious thoughts.  Status: Continued - Date(s): 10/12/2021    Intervention(s)  Therapist will teach about Javon Servin's power of vulnerability and \"the story I'm making up\".      Goal 3: Patient " will learn about ACE scores and attachment styles and will identify how her attachment style drives her behavior.     Objective #A (Patient Action)    Status: Deferred - Date: 10/12/2021     Patient will learn about ACE scores and will take assessment.    Intervention(s)  Therapist will complete with patient in-session.    Objective #B  Patient will identify how her attachment style drives her behavior.  Status: Continued - Date(s): 10/12/2021    Intervention(s)  Therapist will teach emotional regulation skills.        Patient agreed to the above plan.      Keesha Eller

## 2021-11-16 NOTE — PATIENT INSTRUCTIONS
"Patient states that her goals for the next three weeks include:    1. Remove bassinet and baby bath from room  2. Install new toilets and vanity  3. \"Take it day by day!\"  4. Maintain work/life balance and work towards getting family to be physically, mentally, and financially stable    "

## 2021-11-23 ENCOUNTER — IMMUNIZATION (OUTPATIENT)
Dept: NURSING | Facility: CLINIC | Age: 46
End: 2021-11-23
Payer: COMMERCIAL

## 2021-11-23 PROCEDURE — 0004A PR COVID VAC PFIZER DIL RECON 30 MCG/0.3 ML IM: CPT

## 2021-11-23 PROCEDURE — 91300 PR COVID VAC PFIZER DIL RECON 30 MCG/0.3 ML IM: CPT

## 2021-12-08 ENCOUNTER — VIRTUAL VISIT (OUTPATIENT)
Dept: PSYCHOLOGY | Facility: CLINIC | Age: 46
End: 2021-12-08
Payer: COMMERCIAL

## 2021-12-08 DIAGNOSIS — F41.1 GAD (GENERALIZED ANXIETY DISORDER): Primary | ICD-10-CM

## 2021-12-08 DIAGNOSIS — F33.41 RECURRENT MAJOR DEPRESSIVE DISORDER, IN PARTIAL REMISSION (H): ICD-10-CM

## 2021-12-08 PROCEDURE — 90834 PSYTX W PT 45 MINUTES: CPT | Mod: 95 | Performed by: MARRIAGE & FAMILY THERAPIST

## 2021-12-08 NOTE — PROGRESS NOTES
Progress Note    Patient Name: Ellyn Mcgee  Date: 12/8/2021         Service Type: Individual      Session Start Time: 2:04 p.m.  Session End Time: 2:56 p.m.     Session Length: 52 min.    Session #: 28 (with this writer; patient met previously for DA with [facility] and psychiatry)    Attendees: Client     Service Modality:  Video Visit:    Telemedicine Visit: The patient's condition can be safely assessed and treated via synchronous audio and visual telemedicine encounter.      Reason for Telemedicine Visit: Services only offered telehealth    Originating Site (Patient Location): Patient's home    Distant Site (Provider Location): [facility]: [place]    Consent:  The patient/guardian has verbally consented to: the potential risks and benefits of telemedicine (video visit) versus in person care; bill my insurance or make self-payment for services provided; and responsibility for payment of non-covered services.     Patient would like the video invitation sent by: Send to e-mail at: [email or web address]    Mode of Communication:  Video Conference via [facility]    As the provider I attest to compliance with applicable laws and regulations related to telemedicine.     Treatment Plan Last Reviewed: 10/12/2021  PHQ-9 / TEE-7: 6/9/2021     DATA  Interactive Complexity: No  Crisis: No       Progress Since Last Session (Related to Symptoms / Goals / Homework):  Symptoms: reports some increase in anxiety and irritability as the house squatter moved her cats and belongings out but is continuing to live in the basement.  Patient states that she is planning to file the formal eviction paperwork this month.  Patient states that her older daughter is continuing to feel ill most days and has been unable to attend school consistently; she has yet to receive a formal diagnosis.  Patient notes that she has continued to get staggered, poor sleep as her baby son does not  "sleep well.  Pt states that she has been working more and enjoys having the extra money.     Homework: Partially completed - pt reports picking up extra shifts at work and has continued to clean (continued)      Episode of Care Goals: Satisfactory progress - ACTION (Actively working towards change); Intervened by reinforcing change plan / affirming steps taken     Current / Ongoing Stressors and Concerns:  Some financial and friend-related stress; is interested in a deeper dive into emotional state and core beliefs through ACT; new son born 2020; roommate/former friend is refusing to move out of their home (has lived there for 3-4 years and has not paid any rent for over 1-2 years); pt has been experiencing mild panic attacks; daughter (age 10) just started ADHD medication; history of anxious attachment stemming from relationship with father (he  8 years ago); is currently pregnant (high-risk due to advanced maternal age) and baby is due Dec. 2020; is in couples therapy with  due to frequent arguments and his emotional affair earlier in the year; financial stress; history of ADHD (hyperactive type).     Treatment Objective(s) Addressed in This Session:  Solution focused: how to prioritize and cope with interrupted sleep  Triggers for anxiety  Emotional differentiation  Self-compassion and radical acceptance  Discussed work/life balance and current purpose    Past/future:  Javon Servin's elements of trust and relationship repair  Natali research  Dealing with \"big emotions\"  Discussed family system issues and possible messages internalized during childhood related to father  Identified strengths as a mother and need for positive affirmations  Mixed emotions related to roommate moving out and visit with mother  Identified insecurities and how they affect patient's thoughts and actions now and in the past   identify how attachment style drives patient's and 's behaviors  will identify how past " feelings are impacting current relationships  use cognitive strategies identified in therapy to challenge anxious thoughts   Discussed healthy boundaries and enforcement  Identified personal strengths within friendships  tell full story of past relational issues/trust/infidelity  Discussed ambiguous loss related to relationship with father  tell full story of past trauma related to her relationship with her father (and current roommate issues)     Intervention:  Solution-Focused  Psychodynamic: Family systems and attachment style  Narrative Therapy: separate problem from person and find the bright spots   CBT: connect thoughts, feelings, and actions - [facility]    Past/future:  N/A       ASSESSMENT: Current Emotional / Mental Status (status of significant symptoms):   Risk status (Self / Other harm or suicidal ideation)   Patient denies current fears or concerns for personal safety.   Patient denies current or recent suicidal ideation or behaviors. Patient last reported feeling hopeless/passive ideation on 8/6/2020.   Patient denies current or recent homicidal ideation or behaviors.   Patient denies current or recent self injurious behavior or ideation.   Patient denies other safety concerns.   Patient reports there has been no change in risk factors since their last session.     Patient reports there has been no change in protective factors since their last session.     Recommended that patient call 911 or go to the local ED should there be a change in any of these risk factors.     Appearance:   Appropriate    Eye Contact:   Good    Psychomotor Behavior: Normal  Restless     Attitude:   Cooperative, Pleasant, Tired   Orientation:   All   Speech    Rate / Production: Normal/ Responsive Talkative    Volume:  Normal    Mood:    Anxious  Fearful   Affect:    Appropriate  Expansive    Thought Content:  Paranoia  Rumination    Thought Form:  Coherent    Insight:    Good      Medication Review:   No changes to  "current psychiatric medication(s)     Medication Compliance:   Yes     Changes in Health Issues:   None reported      Chemical Use Review:   Substance Use: Chemical use reviewed, no active concerns identified ; no current alcohol use     Tobacco Use: No current tobacco use.      Diagnosis:  1. TEE (generalized anxiety disorder)    2. Recurrent major depressive disorder, in partial remission (H)      Collateral Reports Completed:  N/A    PLAN: (Patient Tasks / Therapist Tasks / Other)    Patient states that her goals for the next two weeks include:    1. \"Get through the next couple of weeks\"  2. Put up a [place] tree  3. Try to get more consistent sleep (if possible)  4. \"Learn how to ask for help before reaching the point of exasperation\"    Future goal: plan trip to [place] to visit friend      Keesha Eller                                                         ______________________________________________________________________    Treatment Plan    Patient's Name: Ellyn Mcgee  YOB: 1975    Date: 10/12/2021    DSM5 Diagnoses: 296.32 (F33.1) Major Depressive Disorder, Recurrent Episode, Moderate _ or 300.02 (F41.1) Generalized Anxiety Disorder (patient has previously been diagnosed with ADHD, hyperactive type)    Psychosocial / Contextual Factors: History of anxious attachment stemming from relationship with father (he  8 years ago); is currently pregnant (high-risk due to advanced maternal age); is in couples therapy with  due to frequent arguments and his emotional affair earlier in the year; financial stress; history of ADHD (hyperactive type).    WHODAS 2.0 Total Score 2020   Total Score 23 30   Total Score MyChart 23 30       PHQ 2020   PHQ-9 Total Score 8 10 7   Q9: Thoughts of better off dead/self-harm past 2 weeks Not at all Not at all Not at all   F/U: Thoughts of suicide or self-harm - - -   F/U: Safety concerns - - - " "    TEE-7 SCORE 8/12/2020 9/8/2020 12/11/2020   Total Score - - -   Total Score 14 (moderate anxiety) 12 (moderate anxiety) -   Total Score 14 12 11       Referral / Collaboration:  Was/were discussed and client will pursue: (contined) ACT Therapist    Anticipated number of sessions for this episode of care: 40    Measurable Treatment Goal(s) related to diagnosis / functional impairment(s)    Patient is asking to focus treatment on her relationship with her father and past trauma.    Goal 1: Patient will tell full story of past trauma related to her relationship with her father and will identify how past feelings are impacting current relationships.    Objective #A (Patient Action)    Patient will tell full story of past trauma related to her relationship with her father.  Status: Completed - Date: 10/12/2021      Intervention(s)  Therapist will collaborate in session to determine attachment wounds and inner child work.    Objective #B  Patient will identify how past feelings are impacting current relationships.  Status: Continued - Date(s): 10/12/2021    Intervention(s)  Therapist will role-play conflict management.    Objective #C  Patient will identify strengths and weaknesses within her family system of origin.  Status: Completed - Date: 10/12/2021     Intervention(s)  Therapist will assign homework for patient to create list.      Goal 2: Patient will learn about resilience, trauma responses, and Javon Servin's \"the story I'm making up\" (cognitive strategy to manage anxious thoughts).    Objective #A (Patient Action)    Patient will learn about and identify personal trauma responses.    Status: Continued - Date(s): 10/12/2021     Intervention(s)  Therapist will provide educational materials on trauma responses.    Objective #B  Patient will use cognitive strategies identified in therapy to challenge anxious thoughts.  Status: Continued - Date(s): 10/12/2021    Intervention(s)  Therapist will teach about Javon " "Brown's power of vulnerability and \"the story I'm making up\".      Goal 3: Patient will learn about ACE scores and attachment styles and will identify how her attachment style drives her behavior.     Objective #A (Patient Action)    Status: Deferred - Date: 10/12/2021     Patient will learn about ACE scores and will take assessment.    Intervention(s)  Therapist will complete with patient in-session.    Objective #B  Patient will identify how her attachment style drives her behavior.  Status: Continued - Date(s): 10/12/2021    Intervention(s)  Therapist will teach emotional regulation skills.        Patient agreed to the above plan.      Keesha Eller    "

## 2021-12-09 NOTE — PATIENT INSTRUCTIONS
"Patient states that her goals for the next two weeks include:    1. \"Get through the next couple of weeks\"  2. Put up a [place] tree  3. Try to get more consistent sleep (if possible)  4. \"Learn how to ask for help before reaching the point of exasperation\"    Future goal: plan trip to [place] to visit friend    "

## 2021-12-15 ENCOUNTER — MYC MEDICAL ADVICE (OUTPATIENT)
Dept: PSYCHIATRY | Facility: CLINIC | Age: 46
End: 2021-12-15
Payer: COMMERCIAL

## 2021-12-15 ENCOUNTER — VIRTUAL VISIT (OUTPATIENT)
Dept: PSYCHIATRY | Facility: CLINIC | Age: 46
End: 2021-12-15
Payer: COMMERCIAL

## 2021-12-15 DIAGNOSIS — G47.00 INSOMNIA, UNSPECIFIED TYPE: ICD-10-CM

## 2021-12-15 DIAGNOSIS — F41.1 GAD (GENERALIZED ANXIETY DISORDER): ICD-10-CM

## 2021-12-15 DIAGNOSIS — F90.0 ATTENTION DEFICIT HYPERACTIVITY DISORDER (ADHD), PREDOMINANTLY INATTENTIVE TYPE: ICD-10-CM

## 2021-12-15 DIAGNOSIS — F33.42 RECURRENT MAJOR DEPRESSIVE DISORDER, IN FULL REMISSION (H): Primary | ICD-10-CM

## 2021-12-15 PROCEDURE — 99214 OFFICE O/P EST MOD 30 MIN: CPT | Mod: 95 | Performed by: PSYCHIATRY & NEUROLOGY

## 2021-12-15 RX ORDER — VENLAFAXINE HYDROCHLORIDE 150 MG/1
150 CAPSULE, EXTENDED RELEASE ORAL DAILY
Qty: 90 CAPSULE | Refills: 3 | Status: SHIPPED | OUTPATIENT
Start: 2021-12-15 | End: 2022-12-14 | Stop reason: ALTCHOICE

## 2021-12-15 RX ORDER — ESCITALOPRAM OXALATE 10 MG/1
10 TABLET ORAL DAILY
Qty: 90 TABLET | Refills: 3 | Status: SHIPPED | OUTPATIENT
Start: 2021-12-15 | End: 2022-06-14

## 2021-12-15 ASSESSMENT — PATIENT HEALTH QUESTIONNAIRE - PHQ9
SUM OF ALL RESPONSES TO PHQ QUESTIONS 1-9: 7
10. IF YOU CHECKED OFF ANY PROBLEMS, HOW DIFFICULT HAVE THESE PROBLEMS MADE IT FOR YOU TO DO YOUR WORK, TAKE CARE OF THINGS AT HOME, OR GET ALONG WITH OTHER PEOPLE: SOMEWHAT DIFFICULT
SUM OF ALL RESPONSES TO PHQ QUESTIONS 1-9: 7

## 2021-12-15 NOTE — PATIENT INSTRUCTIONS
Treatment Plan:    Continue Lexapro/escitalopram 10 mg daily for mood and anxiety    Continue venlafaxine/Effexor- mg daily for mood and anxiety.      Continue propranolol 10-20 mg twice daily as needed for anxiety. Discussed risks/benefits of use while breastfeeding. To minimize exposure to infant try to breastfeed 3-4 hours after taking propranolol.     Continue all other medications as reviewed per electronic medical record today.     Safety plan reviewed. To the Emergency Department as needed or call after hours crisis line at [phone] or [phone]. [place] Crisis Text Line. Text [place] to 506638 or Suicide LifeLine Chat: suicidepreventionPersoneraline.org/chat    Continue therapy as planned.     Schedule an appointment with me in 6 months or sooner as needed.  Patient scheduled today.    Follow up with primary care provider as planned or for acute medical concerns.    Call the psychiatric nurse line with medication questions or concerns at [phone].    [employer] may be used to communicate with your provider, but this is not intended to be used for emergencies.    Therapy Resources:  Www.PsychologyToday.com  Www.NAMIMN.org    [facility]- Psychiatric Disorders During Pregnancy  [email or web address].org/specialty-clinics/psychiatric-disorders-[email or web address]-[email or web address]    [employer]  [email or web address]

## 2021-12-15 NOTE — PROGRESS NOTES
"Ellyn Mcege is a 46 year old year old who is being evaluated via a billable video visit.      How would you like to obtain your AVS? MyChart  If you are dropped from the video visit, the video invite should be resent to: Text to cell phone: see Epic  Will anyone else be joining your video visit? No       Telemedicine Visit: The patient's condition can be safely assessed and treated via synchronous audio and visual telemedicine encounter.      Reason for Telemedicine Visit: Covid-19 Pandemic    Originating Site (Patient Location): Patient's home     Distant Site (Provider Location): Provider Remote Setting    Mode of Communication:  Video Conference via [employer]    As the provider I attest to compliance with applicable laws and regulations related to telemedicine.    Half of the visit switched to telephone due to video failing.        Outpatient Psychiatric Progress Note    Name: Ellyn Mcgee   : 1975                    Primary Care Provider: Dorothy Guaman DO   Therapist: BARBARA Schulte     PHQ-9 scores:  PHQ-9 SCORE 2020 2021 12/15/2021   PHQ-9 Total Score - - -   PHQ-9 Total Score MyChart - 7 (Mild depression) 7 (Mild depression)   PHQ-9 Total Score 10 7 7       TEE-7 scores:  TEE-7 SCORE 2020   Total Score - - -   Total Score 14 (moderate anxiety) 12 (moderate anxiety) -   Total Score 14 12 11       Patient Identification:  Patient is a 46 year old,   White Not  or  female  who presents for return visit with me.  Patient is currently employed part time but on maternity leave. Patient attended the phone/video session alone. Patient prefers to be called: \"Ellyn\".    Interim History:  I last saw Ellyn Mcgee for outpatient psychiatry Return Visit on 2021. During that appointment, we:      Continue Lexapro/escitalopram 10 mg daily for mood and anxiety    Continue venlafaxine/Effexor- mg daily for mood and anxiety.      Start " propranolol 10-20 mg twice daily as needed for anxiety. Discussed risks/benefits of use while breastfeeding. To minimize exposure to infant try to breastfeed 3-4 hours after taking propranolol.     12/15: Patient overall has been doing relatively okay. Mood has been overall pretty good. She does often feel exhausted and overwhelmed. Caring for the children and working can be a lot at times. There has also been stress related to a renter they've had to evict. That person should hopefully be out by Eduardo and she has little bit of time off from work.  is supportive. Propranolol has been helpful to take as needed. No acute safety concerns or SI today. No problematic drug or alcohol use.    Per [facility] [place], BARBARA Jarquin, during today's team-based visit:  Patient is prepilot and declines the enhanced model. No [facility] appointment.    Psychiatric ROS:  Ellyn Mcgee reports mood has been: Overall stable, not depressed  Anxiety has been: High at times but overall manageable, propranolol helpful  Sleep has been: Difficult due to infant, infant currently with likely sleep regression  Isabel sxs: None  Psychosis sxs: None  ADHD/ADD sxs: manageable without meds at this time  PTSD sxs: None  PHQ9 and GAD7 scores were reviewed today if completed.   Medication side effects: Denies  Current stressors include: See HPI above  Coping mechanisms and supports include: Therapy, Family, Hobbies and Friends    Current medications include:   Current Outpatient Medications   Medication Sig     escitalopram (LEXAPRO) 10 MG tablet Take 1 tablet (10 mg) by mouth daily     ibuprofen (ADVIL/MOTRIN) 200 MG tablet Take 200-400 mg by mouth every 4 hours as needed for pain OTC     Prenatal Vit-Fe Fumarate-FA (PRENATAL PO)      propranolol (INDERAL) 20 MG tablet Take 20 mg by mouth 2 times daily as needed for anxiety     venlafaxine (EFFEXOR-XR) 150 MG 24 hr capsule Take 1 capsule (150 mg) by mouth daily     No current facility-administered  medications for this visit.       Past Medical/Surgical History:  Past Medical History:   Diagnosis Date     Abnormal Pap smear     Colposcopy     Adjustment disorder with mixed anxiety and depressed mood 4/4/2012     Anemia     In Past     Anxiety      Chlamydia trachomatis infection of other specified site 1993     Depression      Fertility problem      Lyme disease 9/8/2010    ?false positive vs positve CMV - resolved     Moderate dysplasia of cervix 1995     Other and unspecified adverse effect of drug, medicinal and biological substance     insulin resistent     Varicosities      Wounds and injuries     fall down stairs, PT for back, pain meds      has a past medical history of Abnormal Pap smear, Adjustment disorder with mixed anxiety and depressed mood (4/4/2012), Anemia, Anxiety, Chlamydia trachomatis infection of other specified site (1993), Depression, Fertility problem, Lyme disease (9/8/2010), Moderate dysplasia of cervix (1995), Other and unspecified adverse effect of drug, medicinal and biological substance, Varicosities, and Wounds and injuries.    She has no past medical history of Asthma, Asymptomatic human immunodeficiency virus (HIV) infection status (H), Breast disorder, Chronic kidney disease, Complication of anesthesia, Diabetes mellitus (H), Heart disease, Herpes simplex without mention of complication, Hypertension, Liver disease, Postpartum depression, Rh incompatibility, Seizures (H), Sickle cell anemia (H), Systemic lupus erythematosus (H), or Thyroid disease.    Social History:  Reviewed. No changes to social history except as noted in HPI above.     Vital Signs:   None. This is phone/video visit.     Labs:  Most recent laboratory results reviewed and the pertinent results include:   Hemoglobin 11.4 on 12/25/2020, AST and ALT within normal limits on 12/24/2020    Review of Systems:  10 systems (general, cardiovascular, respiratory, eyes, ENT, endocrine, GI, , M/S, neurological) were  reviewed. Most pertinent finding(s) is/are: denies fever, cough, headaches, shortness of breath, chest pain, N/V, constipation/diarrhea, trouble urinating, aches and pains. The remaining systems are all unremarkable.    Mental Status Examination (limited as this is by phone/video):  Appearance: Awake, alert, appears stated age, no acute distress, well-groomed  Attitude:  Cooperative, pleasant  Motor: No obvious abnormalities observed via video, not formally tested  Oriented to:  person, place, time, and situation  Attention Span and Concentration:  normal  Speech:  clear, coherent, regular rate, rhythm, and volume  Language: intact  Mood: not depressed  Affect: Overall appropriate and mood congruent  Associations:  no loose associations  Thought Process:  logical, linear and goal oriented  Thought Content:  no evidence of suicidal ideation or homicidal ideation, no evidence of psychotic thought, no auditory hallucinations present and no visual hallucinations present  Recent and Remote Memory:  Intact to interview. Not formally assessed. No amnesia.  Fund of Knowledge: appropriate  Insight:  good  Judgment:  intact, adequate for safety  Impulse Control:  intact    Suicide Risk Assessment:  Today Ellyn Mcgee reports no suicidal ideation. Based on all available evidence including the factors cited above, Ellyn Mcgee does not appear to be at imminent risk for self-harm, does not meet criteria for a 72-hr hold, and therefore remains appropriate for ongoing outpatient level of care.  A thorough assessment of risk factors related to suicide and self-harm have been reviewed and are noted above. The patient convincingly denies suicidality on several occasions. Local community safety resources printed and reviewed for patient to use if needed. There was no deceit detected, and the patient presented in a manner that was believable.     DSM5 Diagnosis:  Major Depressive Disorder, Recurrent Episode, In Full  Remission  300.02 (F41.1) Generalized Anxiety Disorder   ADHD, Unspecified  Insomnia-unspecified (mostly related to infant)    Medical comorbidities include:  Patient Active Problem List    Diagnosis Date Noted     Pelvic floor dysfunction 03/19/2021     Priority: Medium     Urinary incontinence 03/19/2021     Priority: Medium     Labor and delivery, indication for care 12/23/2020     Priority: Medium     Major depressive disorder, recurrent episode, moderate (H) 10/08/2020     Priority: Medium     High-risk pregnancy, elderly multigravida, unspecified trimester 06/05/2020     Priority: Medium     TEE (generalized anxiety disorder) 10/10/2018     Priority: Medium     Cervical radiculopathy 04/16/2018     Priority: Medium     Attention deficit hyperactivity disorder (ADHD), predominantly inattentive type 10/04/2017     Priority: Medium     Patient is followed by PREMA MARIEE for ongoing prescription of stimulants.  All refills should be approved by this provider, or covering partner.    Medication(s): Concerta 27mg on weekend days and 36mg other days of the week  Maximum quantity per month: 30  Clinic visit frequency required: Q 6  months     Controlled substance agreement on file: Yes       Date(s): 10/4/17  Neuropsych evaluation for ADD completed:  Yes, completed 8/17/17, on file and diagnosis confirmed    Last [facility] website verification: 12/3/2019   [email or web address]           External hemorrhoids 04/25/2012     Priority: Medium     PCOS (polycystic ovarian syndrome) 02/22/2011     Priority: Medium     Hyperlipidemia LDL goal <130 09/05/2008     Priority: Medium     Anxiety state 09/05/2008     Priority: Medium     Problem list name updated by automated process. Provider to review    Patient is followed by PREMA MARIEE for ongoing prescription of benzodiazepines.  All refills should be approved by this provider, or covering partner.    Medication(s): Xanax.   Maximum quantity per month:   Clinic  visit frequency required:      Controlled substance agreement on file: Yes       Date(s): 10/4/2017  Benzodiazepine use reviewed by psychiatry:      Last [facility] website verification:  done on 12/17/2018   [email or web address]             Psychosocial & Contextual Factors: See HPI above    Assessment:  Per Intake Note with me 9/8/20:  Ellyn Mcgee is a 45-year-old female who is 23 weeks pregnant with a past psychiatric history including depression, anxiety, and ADHD who presents today for psychiatric evaluation.  Her depression and anxiety date back several years and she has also had postpartum depression/anxiety with her now 8-year-old (she also has a 14-year-old but did not experience postpartum mental health issues at that time).  She started sertraline during her second pregnancy and then ended up being maintained on Paxil-CR for several years.  She is also more recently diagnosed with ADHD and maintained on Concerta.  She weaned off both Paxil and Concerta when she found out she was pregnant and replaced these medications with her current regimen of Lexapro and Effexor-XR to decrease risk of fetal defects.  For the most part, she is feeling a little bit better on her current doses of Lexapro 10 mg and Effexor-XR 75 mg.  It is an unconventional combination as we discussed, but since she is doing quite well, I am hesitant to make many changes.  She feels as though her anxiety could be a little bit better controlled and so we will further increase Effexor-XR to 112.5 mg daily to be taken with her Lexapro 10 mg daily.  If she does a lot better, we can consider decreasing Lexapro to 5 mg daily. We discussed the risk of serotonin syndrome and she will continue to be vigilant for any signs or symptoms of this condition.  We did discuss the possibility of restarting a stimulant medication if necessary.  She does not feel as though her ADHD symptoms are too severe at this time.    4/20/21:   Today patient  reports overall some ongoing ups and downs regarding her symptoms since last visit.  She is thinking much of it might be hormonal and related to sleep deprivation.  She still has not yet had her menses return since having her baby.  She has had some feelings of panic.  Used to take propranolol in the past when she felt this way.  She would like to start this medication again.  Discussed risks and benefits while breast-feeding.  Limited risks for taking low-dose propranolol while breast-feeding although the infant does have some exposure through breast milk.  Recommended taking propranolol at least 3-4 hours prior to breast-feeding to decrease risks.  No other medication changes at this time.    12/15/2021:   Patient has overall been feeling quite stable mood wise.  Has some ups and downs that are more related to feeling overwhelmed and exhausted regarding responsibilities for her children, home life, and work.  Does not feel depressed.  Feels like medications are working well.  Propranolol has been helpful.  No medication changes today.  No safety concerns or SI.  No problematic drug or alcohol use.    Medication side effects and alternatives were reviewed. Health promotion activities recommended and reviewed today. All questions addressed. Education and counseling completed regarding risks and benefits of medications and psychotherapy options. Recommend ongoing therapy for additional support.     Treatment Plan:    Continue Lexapro/escitalopram 10 mg daily for mood and anxiety    Continue venlafaxine/Effexor- mg daily for mood and anxiety.      Continue propranolol 10-20 mg twice daily as needed for anxiety. Discussed risks/benefits of use while breastfeeding. To minimize exposure to infant try to breastfeed 3-4 hours after taking propranolol.     Continue all other medications as reviewed per electronic medical record today.     Safety plan reviewed. To the Emergency Department as needed or call after hours  "crisis line at [phone] or [phone]. [place] Crisis Text Line. Text [place] to 931559 or Suicide LifeLine Chat: suicidepreventionFriendCodeline.org/chat    Continue therapy as planned.     Schedule an appointment with me in 6 months or sooner as needed.  Patient scheduled today.    Follow up with primary care provider as planned or for acute medical concerns.    Call the psychiatric nurse line with medication questions or concerns at [phone].    Echoing Greenhart may be used to communicate with your provider, but this is not intended to be used for emergencies.    Therapy Resources:  Www.PsychologyToday.Padinmotion  Www.NAMIMN.org    [facility]- Psychiatric Disorders During Pregnancy  [email or web address]/specialty-[email or web address]-disorders-[email or web address]-[email or web address]    [facility]  [email or web address]    Administrative Billing:   Phone Call/Video Duration: 28 Minutes  Start: 11:09a  Stop: 11:37a    Time spent with patient was 28 minutes and greater than 50% of time or 15 minutes was spent in counseling and coordination of care regarding above diagnoses and treatment plan.  Patient with multiple psychiatric diagnoses and ongoing/chronic psychiatric medication therapy.    Patient Status:  Patient will continue to be seen for ongoing consultation and stabilization.    Signed:   Sherlyn Wang DO  [facility]    This note was created with voice recognition software. Inadvertent grammatical errors, typographical errors, and \"sound-a-like\" substitutions may occur due to limitations of the software.  Read the note carefully and apply context when erroneous substitutions have occurred. Thank you.     "

## 2021-12-16 ASSESSMENT — PATIENT HEALTH QUESTIONNAIRE - PHQ9: SUM OF ALL RESPONSES TO PHQ QUESTIONS 1-9: 7

## 2021-12-20 ENCOUNTER — THERAPY VISIT (OUTPATIENT)
Dept: PHYSICAL THERAPY | Facility: CLINIC | Age: 46
End: 2021-12-20
Payer: COMMERCIAL

## 2021-12-20 DIAGNOSIS — M62.89 PELVIC FLOOR DYSFUNCTION: ICD-10-CM

## 2021-12-20 DIAGNOSIS — R32 URINARY INCONTINENCE: ICD-10-CM

## 2021-12-20 PROCEDURE — 97530 THERAPEUTIC ACTIVITIES: CPT | Mod: GP | Performed by: PHYSICAL THERAPIST

## 2021-12-20 PROCEDURE — 97110 THERAPEUTIC EXERCISES: CPT | Mod: GP | Performed by: PHYSICAL THERAPIST

## 2021-12-20 PROCEDURE — 97112 NEUROMUSCULAR REEDUCATION: CPT | Mod: GP | Performed by: PHYSICAL THERAPIST

## 2021-12-21 ASSESSMENT — PATIENT HEALTH QUESTIONNAIRE - PHQ9
SUM OF ALL RESPONSES TO PHQ QUESTIONS 1-9: 6
SUM OF ALL RESPONSES TO PHQ QUESTIONS 1-9: 6
10. IF YOU CHECKED OFF ANY PROBLEMS, HOW DIFFICULT HAVE THESE PROBLEMS MADE IT FOR YOU TO DO YOUR WORK, TAKE CARE OF THINGS AT HOME, OR GET ALONG WITH OTHER PEOPLE: SOMEWHAT DIFFICULT

## 2021-12-21 NOTE — PROGRESS NOTES
Subjective:  HPI  Physical Exam                    Objective:  System    Physical Exam    General      ROS      PROGRESS  REPORT  12/20/2021    SUBJECTIVE  Subjective: Pt reports continued stress in her life and does not feel like she has been able to take care of her herself. She has done kegal exercises occasionally but otherwise has not been consistent with any exercise program. She is very hopeful that some of the stressors in her life with improve in the next couple of weeks.     Changes in function:  Minimal change in function   Adverse reaction to treatment or activity: None    OBJECTIVE    Objective: Pt requesting to hold on Pelvic floor muscle assessment. Discussed possibly holding on PT until she has more time to commit to a program. Pt reports that she would like to continue and find a way to improve compliance. Reviewed core and proximal mm strengthening progam. Continues to demonstrate generalized weakness and deconditioning post partum      ASSESSMENT/PLAN  Updated problem list and treatment plan: Diagnosis 1:  Urinary Incontinence  Decreased strength - therapeutic exercise, therapeutic activities and home program  Impaired muscle performance - biofeedback, electric stimulation, neuro re-education and home program  Decreased function - therapeutic activities and home program  STG/LTGs have been met or progress has been made towards goals:  Yes (See Goal flow sheet completed today.)  Assessment of Progress: The patient's condition has potential to improve.  The patient's condition is unchanged.  Self Management Plans:  Patient has been instructed in a home treatment program.  I have re-evaluated this patient and find that the nature, scope, duration and intensity of the therapy is appropriate for the medical condition of the patient.  Ellyn continues to require the following intervention to meet STG and LTG's:  PT    Recommendations:  This patient would benefit from continued therapy.     Frequency:  1 X  a month, once daily  Duration:  for 2 months        Please refer to the daily flowsheet for treatment today, total treatment time and time spent performing 1:1 timed codes.

## 2021-12-22 ENCOUNTER — VIRTUAL VISIT (OUTPATIENT)
Dept: PSYCHOLOGY | Facility: CLINIC | Age: 46
End: 2021-12-22
Payer: COMMERCIAL

## 2021-12-22 DIAGNOSIS — F41.1 GAD (GENERALIZED ANXIETY DISORDER): Primary | ICD-10-CM

## 2021-12-22 DIAGNOSIS — F33.41 RECURRENT MAJOR DEPRESSIVE DISORDER, IN PARTIAL REMISSION (H): ICD-10-CM

## 2021-12-22 DIAGNOSIS — G47.00 INSOMNIA, UNSPECIFIED TYPE: ICD-10-CM

## 2021-12-22 PROCEDURE — 90834 PSYTX W PT 45 MINUTES: CPT | Mod: 95 | Performed by: MARRIAGE & FAMILY THERAPIST

## 2021-12-22 ASSESSMENT — PATIENT HEALTH QUESTIONNAIRE - PHQ9: SUM OF ALL RESPONSES TO PHQ QUESTIONS 1-9: 6

## 2021-12-22 NOTE — PATIENT INSTRUCTIONS
Patient states that her goals for the next two weeks include:    1. Put up a Eduardo tree (continued)  2. Try to eat healthier foods  3. Maintain sanity    Homework: practice self-compassion and channel and acknowledge personal strengths

## 2021-12-22 NOTE — PROGRESS NOTES
Progress Note    Patient Name: Ellyn Mcgee  Date: 12/22/2021         Service Type: Individual      Session Start Time: 2:03 p.m.  Session End Time: 2:55 p.m.     Session Length: 52 min.    Session #: 29 (with this writer; patient met previously for DA with [facility] Elena Hill and psychiatry)    Attendees: Client     Service Modality:  Video Visit:    Telemedicine Visit: The patient's condition can be safely assessed and treated via synchronous audio and visual telemedicine encounter.      Reason for Telemedicine Visit: Services only offered telehealth    Originating Site (Patient Location): Patient's home    Distant Site (Provider Location): [facility]: [place]    Consent:  The patient/guardian has verbally consented to: the potential risks and benefits of telemedicine (video visit) versus in person care; bill my insurance or make self-payment for services provided; and responsibility for payment of non-covered services.     Patient would like the video invitation sent by: Send to e-mail at: [email or web address]    Mode of Communication:  Video Conference via [facility]    As the provider I attest to compliance with applicable laws and regulations related to telemedicine.     Treatment Plan Last Reviewed: 10/12/2021  PHQ-9 / TEE-7: 12/21/2021 & 12/11/2020     DATA  Interactive Complexity: No  Crisis: No       Progress Since Last Session (Related to Symptoms / Goals / Homework):  Symptoms: reports experiencing intense release of frustration, anger, and guilt yesterday after court to evict her former friend.  Patient reports that the lodger finally moved out but abandoned several items at the house and left behind a stench of cat urine and bleach.  Patient states that she feels overwhelmed and has been feeling underappreciated.  Patient also notes that she has continued to get very little sleep and has been anxious, irritable, and on edge; patient states that she  "feels like she is not giving her middle child the attention she needs.     Homework: Partially completed - pt is working on clearing a space in her living room for their Eduardo tree      Episode of Care Goals: Satisfactory progress - ACTION (Actively working towards change); Intervened by reinforcing change plan / affirming steps taken     Current / Ongoing Stressors and Concerns:  Older daughter is experiencing dizziness/lethargy; some financial and friend-related stress; is interested in a deeper dive into emotional state and core beliefs through ACT; new son born 2020; roommate/former friend was finally evicted from their home (lived there for 3-4 years and did not paid any rent for over 1-2 years); pt has been experiencing mild panic attacks; daughter (age 10) just started ADHD medication; history of anxious attachment stemming from relationship with father (he  8 years ago); is currently pregnant (high-risk due to advanced maternal age) and baby is due Dec. 2020; is in couples therapy with  due to frequent arguments and his emotional affair earlier in the year; financial stress; history of ADHD (hyperactive type).     Treatment Objective(s) Addressed in This Session:  Feeling unappreciated and the 5:1 ratio (Natali)  Triggers for anxiety  Emotional differentiation  Self-compassion and radical acceptance  Discussed work/life balance and current purpose    Past/future:  Solution focused: how to prioritize and cope with interrupted sleep  Javon Servin's elements of trust and relationship repair  Natali research  Dealing with \"big emotions\"  Discussed family system issues and possible messages internalized during childhood related to father  Identified strengths as a mother and need for positive affirmations  Mixed emotions related to roommate moving out and visit with mother  Identified insecurities and how they affect patient's thoughts and actions now and in the past   identify how " attachment style drives patient's and 's behaviors  will identify how past feelings are impacting current relationships  use cognitive strategies identified in therapy to challenge anxious thoughts   Discussed healthy boundaries and enforcement  Identified personal strengths within friendships  tell full story of past relational issues/trust/infidelity  Discussed ambiguous loss related to relationship with father  tell full story of past trauma related to her relationship with her father (and current roommate issues)     Intervention:  Solution-Focused  Psychodynamic: Family systems and attachment style  Narrative Therapy: separate problem from person and find the bright spots   CBT: connect thoughts, feelings, and actions - [facility]    Past/future:  N/A       ASSESSMENT: Current Emotional / Mental Status (status of significant symptoms):   Risk status (Self / Other harm or suicidal ideation)   Patient denies current fears or concerns for personal safety.   Patient denies current or recent suicidal ideation or behaviors. Patient last reported feeling hopeless/passive ideation on 8/6/2020.   Patient denies current or recent homicidal ideation or behaviors.   Patient denies current or recent self injurious behavior or ideation.   Patient denies other safety concerns.   Patient reports there has been no change in risk factors since their last session.     Patient reports there has been no change in protective factors since their last session.     Recommended that patient call 911 or go to the local ED should there be a change in any of these risk factors.     Appearance:   Appropriate    Eye Contact:   Good    Psychomotor Behavior: Normal  Restless     Attitude:   Cooperative, Pleasant, Tired   Orientation:   All   Speech    Rate / Production: Normal/ Responsive Talkative    Volume:  Normal    Mood:    Grieving Fearful   Affect:    Appropriate  Expansive    Thought Content:  Paranoia  Rumination     Thought Form:  Coherent  Circumstantial   Insight:    Good      Medication Review:   Changes to psychiatric medications, see updated Medication List in EPIC.      Medication Compliance:   Yes     Changes in Health Issues:   None reported      Chemical Use Review:   Substance Use: Chemical use reviewed, no active concerns identified ; no current alcohol use     Tobacco Use: No current tobacco use.      Diagnosis:  1. TEE (generalized anxiety disorder)    2. Recurrent major depressive disorder, in partial remission (H)    3. Insomnia, unspecified type      Collateral Reports Completed:  N/A    PLAN: (Patient Tasks / Therapist Tasks / Other)    Patient states that her goals for the next two weeks include:    1. Put up a Eduardo tree (continued)  2. Try to eat healthier foods  3. Maintain sanity    Homework: practice self-compassion and channel and acknowledge personal strengths      Keesha Eller                                                         ______________________________________________________________________    Treatment Plan    Patient's Name: Ellyn Mcgee  YOB: 1975    Date: 10/12/2021    DSM5 Diagnoses: 296.32 (F33.1) Major Depressive Disorder, Recurrent Episode, Moderate _ or 300.02 (F41.1) Generalized Anxiety Disorder (patient has previously been diagnosed with ADHD, hyperactive type)    Psychosocial / Contextual Factors: History of anxious attachment stemming from relationship with father (he  8 years ago); is currently pregnant (high-risk due to advanced maternal age); is in couples therapy with  due to frequent arguments and his emotional affair earlier in the year; financial stress; history of ADHD (hyperactive type).    WHODAS 2.0 Total Score 2020   Total Score 23 30   Total Score MyChart 23 30       PHQ 2021 12/15/2021 2021   PHQ-9 Total Score 7 7 6   Q9: Thoughts of better off dead/self-harm past 2 weeks Not at all Not at all Not at  "all   F/U: Thoughts of suicide or self-harm - - -   F/U: Safety concerns - - -     TEE-7 SCORE 8/12/2020 9/8/2020 12/11/2020   Total Score - - -   Total Score 14 (moderate anxiety) 12 (moderate anxiety) -   Total Score 14 12 11       Referral / Collaboration:  Was/were discussed and client will pursue: (contined) ACT Therapist    Anticipated number of sessions for this episode of care: 40    Measurable Treatment Goal(s) related to diagnosis / functional impairment(s)    Patient is asking to focus treatment on her relationship with her father and past trauma.    Goal 1: Patient will tell full story of past trauma related to her relationship with her father and will identify how past feelings are impacting current relationships.    Objective #A (Patient Action)    Patient will tell full story of past trauma related to her relationship with her father.  Status: Completed - Date: 10/12/2021      Intervention(s)  Therapist will collaborate in session to determine attachment wounds and inner child work.    Objective #B  Patient will identify how past feelings are impacting current relationships.  Status: Continued - Date(s): 10/12/2021    Intervention(s)  Therapist will role-play conflict management.    Objective #C  Patient will identify strengths and weaknesses within her family system of origin.  Status: Completed - Date: 10/12/2021     Intervention(s)  Therapist will assign homework for patient to create list.      Goal 2: Patient will learn about resilience, trauma responses, and Javon Servin's \"the story I'm making up\" (cognitive strategy to manage anxious thoughts).    Objective #A (Patient Action)    Patient will learn about and identify personal trauma responses.    Status: Continued - Date(s): 10/12/2021     Intervention(s)  Therapist will provide educational materials on trauma responses.    Objective #B  Patient will use cognitive strategies identified in therapy to challenge anxious thoughts.  Status: " "Continued - Date(s): 10/12/2021    Intervention(s)  Therapist will teach about Javon Servin's power of vulnerability and \"the story I'm making up\".      Goal 3: Patient will learn about ACE scores and attachment styles and will identify how her attachment style drives her behavior.     Objective #A (Patient Action)    Status: Deferred - Date: 10/12/2021     Patient will learn about ACE scores and will take assessment.    Intervention(s)  Therapist will complete with patient in-session.    Objective #B  Patient will identify how her attachment style drives her behavior.  Status: Continued - Date(s): 10/12/2021    Intervention(s)  Therapist will teach emotional regulation skills.        Patient agreed to the above plan.      Keesha Eller    "

## 2022-01-04 ENCOUNTER — MYC MEDICAL ADVICE (OUTPATIENT)
Dept: PSYCHIATRY | Facility: CLINIC | Age: 47
End: 2022-01-04
Payer: COMMERCIAL

## 2022-01-04 ENCOUNTER — TELEPHONE (OUTPATIENT)
Dept: PSYCHOLOGY | Facility: CLINIC | Age: 47
End: 2022-01-04
Payer: COMMERCIAL

## 2022-01-19 ENCOUNTER — VIRTUAL VISIT (OUTPATIENT)
Dept: PSYCHOLOGY | Facility: CLINIC | Age: 47
End: 2022-01-19
Payer: COMMERCIAL

## 2022-01-19 DIAGNOSIS — G47.00 INSOMNIA, UNSPECIFIED TYPE: ICD-10-CM

## 2022-01-19 DIAGNOSIS — F41.1 GAD (GENERALIZED ANXIETY DISORDER): Primary | ICD-10-CM

## 2022-01-19 DIAGNOSIS — F33.41 RECURRENT MAJOR DEPRESSIVE DISORDER, IN PARTIAL REMISSION (H): ICD-10-CM

## 2022-01-19 PROCEDURE — 90834 PSYTX W PT 45 MINUTES: CPT | Mod: 95 | Performed by: MARRIAGE & FAMILY THERAPIST

## 2022-01-19 NOTE — PROGRESS NOTES
Progress Note    Patient Name: Ellyn Mcgee  Date: 1/19/2022         Service Type: Individual      Session Start Time: 1:07 p.m.  Session End Time: 1:59 p.m.     Session Length: 52 min.    Session #: 30 (with this writer; patient met previously for DA with [facility] Elena Hill and psychiatry)    Attendees: Client     Service Modality:  Video Visit:    Telemedicine Visit: The patient's condition can be safely assessed and treated via synchronous audio and visual telemedicine encounter.      Reason for Telemedicine Visit: Services only offered telehealth    Originating Site (Patient Location): Patient's home    Distant Site (Provider Location): [facility]: [place]    Consent:  The patient/guardian has verbally consented to: the potential risks and benefits of telemedicine (video visit) versus in person care; bill my insurance or make self-payment for services provided; and responsibility for payment of non-covered services.     Patient would like the video invitation sent by: Send to e-mail at: [email or web address]    Mode of Communication:  Video Conference via well    As the provider I attest to compliance with applicable laws and regulations related to telemedicine.     Treatment Plan Last Reviewed: 1/19/2022  PHQ-9 / TEE-7: 12/21/2021 & 12/11/2020     DATA  Interactive Complexity: No  Crisis: No       Progress Since Last Session (Related to Symptoms / Goals / Homework):  Symptoms: reports feeling relieved after her daughter received a POTS diagnosis and treatment plan; admits to having anxiety related to finances, lack of sleep, organizing basement now that roommate has left, and trying to find time to herself.  Patient reports ongoing issues with insomnia and consistent sleep.     Homework: Partially completed - pt admits the family needs to resume healthy eating      Episode of Care Goals: Satisfactory progress - ACTION (Actively working towards change);  "Intervened by reinforcing change plan / affirming steps taken     Current / Ongoing Stressors and Concerns:  Older daughter is experiencing dizziness/lethargy and was dx with POTS; some financial and friend-related stress; is interested in a deeper dive into emotional state and core beliefs through ACT; new son born 2020; roommate/former friend was finally evicted from their home (lived there for 3-4 years and did not paid any rent for over 1-2 years); pt has been experiencing mild panic attacks; daughter (age 10) just started ADHD medication; history of anxious attachment stemming from relationship with father (he  8 years ago); is currently pregnant (high-risk due to advanced maternal age) and baby is due Dec. 2020; is in couples therapy with  due to frequent arguments and his emotional affair earlier in the year; financial stress; history of ADHD (hyperactive type).     Treatment Objective(s) Addressed in This Session:  Discussed healthy boundaries and enforcement - Mixed emotions related to roommate moving out  Triggers for anxiety  Emotional differentiation  Self-compassion and radical acceptance  10:10:10 rule (10 minutes, 10 months, and 10 years)  Discussed work/life balance and current purpose    Past/future:  Feeling unappreciated and the 5:1 ratio (Natali)  Solution focused: how to prioritize and cope with interrupted sleep  Javon Servin's elements of trust and relationship repair  Natali research  Dealing with \"big emotions\"  Discussed family system issues and possible messages internalized during childhood related to father  Identified strengths as a mother and need for positive affirmations  Identified insecurities and how they affect patient's thoughts and actions now and in the past   identify how attachment style drives patient's and 's behaviors  will identify how past feelings are impacting current relationships  use cognitive strategies identified in therapy to challenge " anxious thoughts   Identified personal strengths within friendships  tell full story of past relational issues/trust/infidelity  Discussed ambiguous loss related to relationship with father  tell full story of past trauma related to her relationship with her father (and current roommate issues)     Intervention:  Solution-Focused  Psychodynamic: Family systems and attachment style  Narrative Therapy: separate problem from person and find the bright spots   CBT: connect thoughts, feelings, and actions - [facility]    Past/future:  N/A       ASSESSMENT: Current Emotional / Mental Status (status of significant symptoms):   Risk status (Self / Other harm or suicidal ideation)   Patient denies current fears or concerns for personal safety.   Patient denies current or recent suicidal ideation or behaviors. Patient last reported feeling hopeless/passive ideation on 8/6/2020.   Patient denies current or recent homicidal ideation or behaviors.   Patient denies current or recent self injurious behavior or ideation.   Patient denies other safety concerns.   Patient reports there has been no change in risk factors since their last session.     Patient reports there has been no change in protective factors since their last session.     Recommended that patient call 911 or go to the local ED should there be a change in any of these risk factors.     Appearance:   Appropriate    Eye Contact:   Good    Psychomotor Behavior: Normal  Restless     Attitude:   Cooperative, Pleasant, Tired   Orientation:   All   Speech    Rate / Production: Normal/ Responsive Talkative    Volume:  Normal    Mood:    Anxious    Affect:    Appropriate  Expansive    Thought Content:  Paranoia  Rumination    Thought Form:  Coherent  Circumstantial   Insight:    Good      Medication Review:   No changes to current psychiatric medication(s)     Medication Compliance:   Yes     Changes in Health Issues:   None reported      Chemical Use  Review:   Substance Use: Chemical use reviewed, no active concerns identified ; no current alcohol use     Tobacco Use: No current tobacco use.      Diagnosis:  1. TEE (generalized anxiety disorder)    2. Recurrent major depressive disorder, in partial remission (H)    3. Insomnia, unspecified type      Collateral Reports Completed:  N/A    PLAN: (Patient Tasks / Therapist Tasks / Other)    Patient states that her goals for the next three weeks include:    1. Continue to work on house projects and make progress (even if minor)  2. Continue to take precautions to stay healthy at work  3. Continue to keep up with dishes and laundry (and ask for help as needed)  4. Restart healthy eating  5. Find ways to get time to self    Keesha FRANKMaciel Rafita                                                         ______________________________________________________________________    Treatment Plan    Patient's Name: Ellyn Mcgee  YOB: 1975    Date: 2022    DSM5 Diagnoses: 296.32 (F33.1) Major Depressive Disorder, Recurrent Episode, Moderate _ or 300.02 (F41.1) Generalized Anxiety Disorder (patient has previously been diagnosed with ADHD, hyperactive type)    Psychosocial / Contextual Factors: History of anxious attachment stemming from relationship with father (he  8 years ago); is currently pregnant (high-risk due to advanced maternal age); is in couples therapy with  due to frequent arguments and his emotional affair earlier in the year; financial stress; history of ADHD (hyperactive type).    WHODAS 2.0 Total Score 2020   Total Score 23 30   Total Score MyChart 23 30       PHQ 2021 12/15/2021 2021   PHQ-9 Total Score 7 7 6   Q9: Thoughts of better off dead/self-harm past 2 weeks Not at all Not at all Not at all   F/U: Thoughts of suicide or self-harm - - -   F/U: Safety concerns - - -     TEE-7 SCORE 2020   Total Score - - -   Total Score 14  "(moderate anxiety) 12 (moderate anxiety) -   Total Score 14 12 11       Referral / Collaboration:  Was/were discussed and client will pursue: (contined) ACT Therapist    Anticipated number of sessions for this episode of care: 40    Measurable Treatment Goal(s) related to diagnosis / functional impairment(s)    Patient is asking to focus treatment on her relationship with her father and past trauma.    Goal 1: Patient will tell full story of past trauma related to her relationship with her father and will identify how past feelings are impacting current relationships.    Objective #A (Patient Action)    Patient will identify how past feelings are impacting current relationships.  Status: Continued - Date(s): 1/19/2022    Intervention(s)  Therapist will role-play conflict management.    Objective #B  Patient will identify strengths and weaknesses within her family system of origin.  Status: Restarted - Date: 1/19/2022     Intervention(s)  Therapist will assign homework for patient to create list.      Goal 2: Patient will learn about resilience, trauma responses, and Javon Servin's \"the story I'm making up\" (cognitive strategy to manage anxious thoughts).    Objective #A (Patient Action)    Patient will learn about and identify personal trauma responses.    Status: Completed - Date: 1/19/2022    Intervention(s)  Therapist will provide educational materials on trauma responses.    Objective #B  Patient will use cognitive strategies identified in therapy to challenge anxious thoughts.  Status: Continued - Date(s): 1/19/2022    Intervention(s)  Therapist will teach about Javon Servin's power of vulnerability and \"the story I'm making up\".      Goal 3: Patient will learn about protective factors that sugey resilience and attachment styles and will identify how her attachment style drives her behavior.     Objective #A (Patient Action)    Status: Restarted - Date: 1/19/2022     Patient will learn about protective factors " and will identify her own.    Intervention(s)  Therapist will complete with patient in session.    Objective #B  Patient will identify how her attachment style drives her behavior.  Status: Continued - Date(s): 1/19/2022    Intervention(s)  Therapist will teach emotional regulation skills.        Patient agreed to the above plan.      Keesha Eller

## 2022-01-19 NOTE — PATIENT INSTRUCTIONS
Patient states that her goals for the next three weeks include:    1. Continue to work on house projects and make progress (even if minor)  2. Continue to take precautions to stay healthy at work  3. Continue to keep up with dishes and laundry (and ask for help as needed)  4. Restart healthy eating  5. Find ways to get time to self

## 2022-02-10 ENCOUNTER — VIRTUAL VISIT (OUTPATIENT)
Dept: PSYCHOLOGY | Facility: CLINIC | Age: 47
End: 2022-02-10
Payer: COMMERCIAL

## 2022-02-10 DIAGNOSIS — F33.41 RECURRENT MAJOR DEPRESSIVE DISORDER, IN PARTIAL REMISSION (H): ICD-10-CM

## 2022-02-10 DIAGNOSIS — G47.00 INSOMNIA, UNSPECIFIED TYPE: ICD-10-CM

## 2022-02-10 DIAGNOSIS — F41.1 GAD (GENERALIZED ANXIETY DISORDER): Primary | ICD-10-CM

## 2022-02-10 PROCEDURE — 90834 PSYTX W PT 45 MINUTES: CPT | Mod: 95 | Performed by: MARRIAGE & FAMILY THERAPIST

## 2022-02-10 NOTE — PATIENT INSTRUCTIONS
Patient states that her goals for the next three weeks include:    1. Keep working on sleep-training son  2. Plan visit for sister-in-law's memorial  3. 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 Try to focus on one task at a time (resist urge to constantly multi-task)

## 2022-02-10 NOTE — PROGRESS NOTES
Progress Note    Patient Name: Ellyn Mcgee  Date: 2/10/2022         Service Type: Individual      Session Start Time: 1:31 p.m.  Session End Time: 2:23 p.m.     Session Length: 52 min.    Session #: 31 (with this writer; patient met previously for DA with [facility] Elena Hill and psychiatry)    Attendees: Client     Service Modality:  Video Visit:    Telemedicine Visit: The patient's condition can be safely assessed and treated via synchronous audio and visual telemedicine encounter.      Reason for Telemedicine Visit: Services only offered telehealth    Originating Site (Patient Location): Patient's home    Distant Site (Provider Location): [facility]: [place]    Consent:  The patient/guardian has verbally consented to: the potential risks and benefits of telemedicine (video visit) versus in person care; bill my insurance or make self-payment for services provided; and responsibility for payment of non-covered services.     Patient would like the video invitation sent by: Send to e-mail at: [email or web address]    Mode of Communication:  Video Conference via [facility]    As the provider I attest to compliance with applicable laws and regulations related to telemedicine.     Treatment Plan Last Reviewed: 1/19/2022  PHQ-9 / TEE-7: 2/10/2022 & 12/11/2020     DATA  Interactive Complexity: No  Crisis: No       Progress Since Last Session (Related to Symptoms / Goals / Homework):  Symptoms: reports grief related to her brother's wife's imminent death due to cancer.  Patient states that she has continued to struggle to get good quality sleep and has notice great improvements in the maturity of her younger daughter.  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     Homework: Partially completed      Episode of Care Goals: Satisfactory progress - ACTION (Actively working towards change); Intervened by reinforcing  "change plan / affirming steps taken     Current / Ongoing Stressors and Concerns:  Older daughter is experiencing dizziness/lethargy and was dx with POTS; some financial and friend-related stress; is interested in a deeper dive into emotional state and core beliefs through ACT; new son born 2020; roommate/former friend was finally evicted from their home (lived there for 3-4 years and did not paid any rent for over 1-2 years); pt has been experiencing mild panic attacks; daughter (age 10) just started ADHD medication; history of anxious attachment stemming from relationship with father (he  8 years ago); is currently pregnant (high-risk due to advanced maternal age) and baby is due Dec. 2020; is in couples therapy with  due to frequent arguments and his emotional affair earlier in the year; financial stress; history of ADHD (hyperactive type).     Treatment Objective(s) Addressed in This Session:  Anticipatory grief and creating meaning  Triggers for anxiety  Emotional differentiation  Self-compassion and radical acceptance  Discussed work/life balance and current purpose    Past/future:  Discussed healthy boundaries and enforcement - Mixed emotions related to roommate moving out  10:10:10 rule (10 minutes, 10 months, and 10 years)  Feeling unappreciated and the 5:1 ratio (Natali)  Solution focused: how to prioritize and cope with interrupted sleep  Javon Servin's elements of trust and relationship repair  Natali research  Dealing with \"big emotions\"  Discussed family system issues and possible messages internalized during childhood related to father  Identified strengths as a mother and need for positive affirmations  Identified insecurities and how they affect patient's thoughts and actions now and in the past   identify how attachment style drives patient's and 's behaviors  will identify how past feelings are impacting current relationships  use cognitive strategies identified in therapy " to challenge anxious thoughts   Identified personal strengths within friendships  tell full story of past relational issues/trust/infidelity  Discussed ambiguous loss related to relationship with father  tell full story of past trauma related to her relationship with her father (and current roommate issues)     Intervention:  Solution-Focused  Psychodynamic: Family systems and attachment style  Narrative Therapy: separate problem from person and find the bright spots     Past/future:  CBT: connect thoughts, feelings, and actions [facility] research      ASSESSMENT: Current Emotional / Mental Status (status of significant symptoms):   Risk status (Self / Other harm or suicidal ideation)   Patient denies current fears or concerns for personal safety.   Patient denies current or recent suicidal ideation or behaviors. Patient last reported feeling hopeless/passive ideation on 8/6/2020.   Patient denies current or recent homicidal ideation or behaviors.   Patient denies current or recent self injurious behavior or ideation.   Patient denies other safety concerns.   Patient reports there has been no change in risk factors since their last session.     Patient reports there has been no change in protective factors since their last session.     Recommended that patient call 911 or go to the local ED should there be a change in any of these risk factors.     Appearance:   Appropriate    Eye Contact:   Good    Psychomotor Behavior: Normal  Restless     Attitude:   Cooperative, Pleasant, Tired   Orientation:   All   Speech    Rate / Production: Normal/ Responsive Talkative    Volume:  Normal    Mood:    Anxious  Grieving   Affect:    Appropriate    Thought Content:  Clear  Rumination    Thought Form:  Coherent  Circumstantial   Insight:    Good      Medication Review:   No changes to current psychiatric medication(s)     Medication Compliance:   Yes     Changes in Health Issues:   None reported      Chemical Use  Review:   Substance Use: Chemical use reviewed, no active concerns identified ; no current alcohol use     Tobacco Use: No current tobacco use.      Diagnosis:  1. TEE (generalized anxiety disorder)    2. Recurrent major depressive disorder, in partial remission (H)    3. Insomnia, unspecified type      Collateral Reports Completed:  N/A    PLAN: (Patient Tasks / Therapist Tasks / Other)    Patient states that her goals for the next three weeks include:    1. Keep working on sleep-training son  2. Plan visit for sister-in-law's memorial  3. Complete social media challenge to further [employer] business  4. Try to focus on one task at a time (resist urge to constantly multi-task)          Keesha Eller                                                         ______________________________________________________________________    Treatment Plan    Patient's Name: Ellyn Mcgee  YOB: 1975    Date: 2022    DSM5 Diagnoses: 296.32 (F33.1) Major Depressive Disorder, Recurrent Episode, Moderate _ or 300.02 (F41.1) Generalized Anxiety Disorder (patient has previously been diagnosed with ADHD, hyperactive type)    Psychosocial / Contextual Factors: History of anxious attachment stemming from relationship with father (he  8 years ago); is currently pregnant (high-risk due to advanced maternal age); is in couples therapy with  due to frequent arguments and his emotional affair earlier in the year; financial stress; history of ADHD (hyperactive type).    WHODAS 2.0 Total Score 2020   Total Score 23 30   Total Score MyChart 23 30       PHQ 12/15/2021 2021 2/10/2022   PHQ-9 Total Score 7 6 8   Q9: Thoughts of better off dead/self-harm past 2 weeks Not at all Not at all Not at all   F/U: Thoughts of suicide or self-harm - - -   F/U: Safety concerns - - -     TEE-7 SCORE 2020   Total Score - - -   Total Score 14 (moderate anxiety) 12 (moderate anxiety) -  "  Total Score 14 12 11       Referral / Collaboration:  Was/were discussed and client will pursue: (contined) ACT Therapist    Anticipated number of sessions for this episode of care: 40    Measurable Treatment Goal(s) related to diagnosis / functional impairment(s)    Patient is asking to focus treatment on her relationship with her father and past trauma.    Goal 1: Patient will tell full story of past trauma related to her relationship with her father and will identify how past feelings are impacting current relationships.    Objective #A (Patient Action)    Patient will identify how past feelings are impacting current relationships.  Status: Continued - Date(s): 1/19/2022    Intervention(s)  Therapist will role-play conflict management.    Objective #B  Patient will identify strengths and weaknesses within her family system of origin.  Status: Completed - Date: 1/19/2022     Intervention(s)  Therapist will assign homework for patient to create list.      Goal 2: Patient will learn about resilience, trauma responses, and Javon Servin's \"the story I'm making up\" (cognitive strategy to manage anxious thoughts).    Objective #A (Patient Action)    Patient will learn about and identify personal trauma responses.    Status: Restarted - Date: 1/19/2022    Intervention(s)  Therapist will provide educational materials on trauma responses.    Objective #B  Patient will use cognitive strategies identified in therapy to challenge anxious thoughts.  Status: Continued - Date(s): 1/19/2022    Intervention(s)  Therapist will teach about Javon Servin's power of vulnerability and \"the story I'm making up\".      Goal 3: Patient will learn about protective factors that sugey resilience and attachment styles and will identify how her attachment style drives her behavior.     Objective #A (Patient Action)    Status: Restarted - Date: 1/19/2022     Patient will learn about protective factors and will identify her " own.    Intervention(s)  Therapist will complete with patient in session.    Objective #B  Patient will identify how her attachment style drives her behavior.  Status: Continued - Date(s): 1/19/2022    Intervention(s)  Therapist will teach emotional regulation skills.        Patient agreed to the above plan.      Keesha Eller

## 2022-02-11 ASSESSMENT — PATIENT HEALTH QUESTIONNAIRE - PHQ9: SUM OF ALL RESPONSES TO PHQ QUESTIONS 1-9: 8

## 2022-03-02 ASSESSMENT — ANXIETY QUESTIONNAIRES
1. FEELING NERVOUS, ANXIOUS, OR ON EDGE: MORE THAN HALF THE DAYS
5. BEING SO RESTLESS THAT IT IS HARD TO SIT STILL: SEVERAL DAYS
GAD7 TOTAL SCORE: 9
6. BECOMING EASILY ANNOYED OR IRRITABLE: SEVERAL DAYS
2. NOT BEING ABLE TO STOP OR CONTROL WORRYING: SEVERAL DAYS
7. FEELING AFRAID AS IF SOMETHING AWFUL MIGHT HAPPEN: SEVERAL DAYS
4. TROUBLE RELAXING: MORE THAN HALF THE DAYS
7. FEELING AFRAID AS IF SOMETHING AWFUL MIGHT HAPPEN: SEVERAL DAYS
GAD7 TOTAL SCORE: 9
GAD7 TOTAL SCORE: 9
3. WORRYING TOO MUCH ABOUT DIFFERENT THINGS: SEVERAL DAYS

## 2022-03-03 ENCOUNTER — VIRTUAL VISIT (OUTPATIENT)
Dept: PSYCHOLOGY | Facility: CLINIC | Age: 47
End: 2022-03-03
Payer: COMMERCIAL

## 2022-03-03 DIAGNOSIS — F33.41 RECURRENT MAJOR DEPRESSIVE DISORDER, IN PARTIAL REMISSION (H): Primary | ICD-10-CM

## 2022-03-03 DIAGNOSIS — F41.1 GAD (GENERALIZED ANXIETY DISORDER): ICD-10-CM

## 2022-03-03 PROCEDURE — 90834 PSYTX W PT 45 MINUTES: CPT | Mod: 95 | Performed by: MARRIAGE & FAMILY THERAPIST

## 2022-03-03 ASSESSMENT — ANXIETY QUESTIONNAIRES: GAD7 TOTAL SCORE: 9
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
## 2022-03-03 NOTE — PATIENT INSTRUCTIONS
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 Complete business challenge program  3. Finish taxes  4. Work on house projects  5. Take baby for walks and maybe go to the [facility]    Note: ask about former roommate's DUI accident and summons and fear of abandonment related to neighbor incident    Protective Factors:  1. Caring relationships with parents and/or extended family members  2. Good health and history of adequate development  3. Good peer relationships  4. Hobbies and interests  5. Above average intelligence  (did not get to self-rating the following:)  6. Positive disposition  7. Active coping style  8. Positive self-esteem  9. Good social skills  10. Internal focus of control  11. Balance between seeking help and autonomy

## 2022-03-06 ENCOUNTER — HEALTH MAINTENANCE LETTER (OUTPATIENT)
Age: 47
End: 2022-03-06

## 2022-03-07 PROBLEM — M62.89 PELVIC FLOOR DYSFUNCTION: Status: RESOLVED | Noted: 2021-03-19 | Resolved: 2022-03-07

## 2022-03-07 PROBLEM — R32 URINARY INCONTINENCE: Status: RESOLVED | Noted: 2021-03-19 | Resolved: 2022-03-07

## 2022-03-07 NOTE — PROGRESS NOTES
Patient did not return for further treatment and no additional progress was noted.  Please refer to the progress note and goal flowsheet completed on 12/20/21 for discharge information.

## 2022-03-16 ENCOUNTER — VIRTUAL VISIT (OUTPATIENT)
Dept: PSYCHOLOGY | Facility: CLINIC | Age: 47
End: 2022-03-16
Payer: COMMERCIAL

## 2022-03-16 DIAGNOSIS — F41.1 GAD (GENERALIZED ANXIETY DISORDER): ICD-10-CM

## 2022-03-16 DIAGNOSIS — F33.41 RECURRENT MAJOR DEPRESSIVE DISORDER, IN PARTIAL REMISSION (H): Primary | ICD-10-CM

## 2022-03-16 PROCEDURE — 90834 PSYTX W PT 45 MINUTES: CPT | Mod: 95 | Performed by: MARRIAGE & FAMILY THERAPIST

## 2022-03-16 ASSESSMENT — PATIENT HEALTH QUESTIONNAIRE - PHQ9: SUM OF ALL RESPONSES TO PHQ QUESTIONS 1-9: 8

## 2022-03-16 NOTE — PROGRESS NOTES
[facility]                                               Progress Note    Patient Name: Ellyn Mcgee  Date: 3/16/2022         Service Type: Individual      Session Start Time: 2:02 p.m.  Session End Time: 2:54 p.m.     Session Length: 52 min.    Session #: 33 (with this writer; patient met previously for DA with [facility] and psychiatry)    Attendees: Client     Service Modality:  Video Visit:    Telemedicine Visit: The patient's condition can be safely assessed and treated via synchronous audio and visual telemedicine encounter.      Reason for Telemedicine Visit: Services only offered telehealth    Originating Site (Patient Location): Patient's home    Distant Site (Provider Location): [facility] [place]    Consent:  The patient/guardian has verbally consented to: the potential risks and benefits of telemedicine (video visit) versus in person care; bill my insurance or make self-payment for services provided; and responsibility for payment of non-covered services.     Patient would like the video invitation sent by: Send to e-mail at: [email or web address]    Mode of Communication:  Video Conference via well    As the provider I attest to compliance with applicable laws and regulations related to telemedicine.     Treatment Plan Last Reviewed: 1/19/2022  PHQ-9 / TEE-7: 3/15/2022 & 3/2/2022     DATA  Interactive Complexity: No  Crisis: No       Progress Since Last Session (Related to Symptoms / Goals / Homework):  Symptoms: reports feeling agitated after receiving a letter from her daughter's school indicating that she would be considered truant if the school did not receive a letter from her neurologist confirming her POTS diagnosis and explaining how the condition would affect her ability to attend school.  Patient states that she communicated with the school since December's diagnosis and feels that the e-mail she received from the  was somewhat  "callous.  Patient reports that she has been struggling with feeling overwhelmed, exhausted, and has continued to struggle with some relational insecurities due to a recent reminder of her 's communication with other women in 2017.      Homework: Partially completed      Episode of Care Goals: Satisfactory progress - ACTION (Actively working towards change); Intervened by reinforcing change plan / affirming steps taken     Current / Ongoing Stressors and Concerns:  Message from patient: \"There's an issue I have that I'd like to work on, I believe it has had a negative effect on my choices and how I feel about myself for decades.  I have this deep down fear of being judged and being thought of as \"less than\" by others.  I'd really like to get over this.  I know it's probably rooted in something from my childhood and how judgemental my father was, it doesn't serve me anymore and hasn't for a while.\"    Older daughter is experiencing dizziness/lethargy and was dx with POTS; some financial and friend-related stress; is interested in a deeper dive into emotional state and core beliefs through ACT; new son born 2020; roommate/former friend was finally evicted from their home (lived there for 3-4 years and did not paid any rent for over 1-2 years); pt has been experiencing mild panic attacks; daughter (age 10) just started ADHD medication; history of anxious attachment stemming from relationship with father (he  8 years ago); is currently pregnant (high-risk due to advanced maternal age) and baby is due Dec. 2020; is in couples therapy with  due to frequent arguments and his emotional affair earlier in the year; financial stress; history of ADHD (hyperactive type).     Treatment Objective(s) Addressed in This Session:  Triggers for anxiety and insecurities  Emotional differentiation  Self-compassion and radical acceptance  tell full story of past relational issues/trust/infidelity  Discussed healthy " "boundaries and enforcement  Discussed ambiguous loss and grief in a box analogy    Past/future:  Discussed work/life balance and current purpose  Anticipatory grief and creating meaning  10:10:10 rule (10 minutes, 10 months, and 10 years)  Feeling unappreciated and the 5:1 ratio (Natali)  Solution focused: how to prioritize and cope with interrupted sleep  Javon Servin's elements of trust and relationship repair  Natali research  Dealing with \"big emotions\"  Discussed family system issues and possible messages internalized during childhood related to father  Identified strengths as a mother and need for positive affirmations  Identified insecurities and how they affect patient's thoughts and actions now and in the past   identify how attachment style drives patient's and 's behaviors  will identify how past feelings are impacting current relationships  use cognitive strategies identified in therapy to challenge anxious thoughts   Identified personal strengths within friendships  tell full story of past trauma related to her relationship with her father (and current roommate issues)     Intervention:  Solution-Focused  Psychodynamic: Family systems and attachment style  Narrative Therapy: separate problem from person and find the bright spots     Past/future:  CBT: connect thoughts, feelings, and actions - Gottman research     Assessments completed prior to visit:  PHQ9:   PHQ-9 SCORE 9/8/2020 12/11/2020 6/9/2021 12/15/2021 12/21/2021 2/10/2022 3/15/2022   PHQ-9 Total Score - - - - - - -   PHQ-9 Total Score [facility] 8 (Mild depression) - 7 (Mild depression) 7 (Mild depression) 6 (Mild depression) 8 (Mild depression) 8 (Mild depression)   PHQ-9 Total Score 8 10 7 7 6 8 8     GAD7:   TEE-7 SCORE 4/30/2019 5/10/2020 8/6/2020 8/12/2020 9/8/2020 12/11/2020 3/2/2022   Total Score - - - - - - -   Total Score - 5 (mild anxiety) - 14 (moderate anxiety) 12 (moderate anxiety) - 9 (mild anxiety)   Total Score 11 5 13 14 " 12 11 9     PROMIS 10-Global Health (all questions and answers displayed):   PROMIS 10 12/15/2021 12/15/2021 3/15/2022   In general, would you say your health is: - Fair Fair   In general, would you say your quality of life is: - Fair Good   In general, how would you rate your physical health? - Fair Fair   In general, how would you rate your mental health, including your mood and your ability to think? - Good Good   In general, how would you rate your satisfaction with your social activities and relationships? - Fair Good   In general, please rate how well you carry out your usual social activities and roles - Fair Fair   To what extent are you able to carry out your everyday physical activities such as walking, climbing stairs, carrying groceries, or moving a chair? - Completely Completely   How often have you been bothered by emotional problems such as feeling anxious, depressed or irritable? - Sometimes Often   How would you rate your fatigue on average? - Severe Moderate   How would you rate your pain on average?   0 = No Pain  to  10 = Worst Imaginable Pain - 5 7   In general, would you say your health is: 2 2 2   In general, would you say your quality of life is: 2 2 3   In general, how would you rate your physical health? 2 2 2   In general, how would you rate your mental health, including your mood and your ability to think? 3 3 3   In general, how would you rate your satisfaction with your social activities and relationships? 2 2 3   In general, please rate how well you carry out your usual social activities and roles. (This includes activities at home, at work and in your community, and responsibilities as a parent, child, spouse, employee, friend, etc.) 2 2 2   To what extent are you able to carry out your everyday physical activities such as walking, climbing stairs, carrying groceries, or moving a chair? 5 5 5   In the past 7 days, how often have you been bothered by emotional problems such as feeling  anxious, depressed, or irritable? 3 3 4   In the past 7 days, how would you rate your fatigue on average? 4 4 3   In the past 7 days, how would you rate your pain on average, where 0 means no pain, and 10 means worst imaginable pain? 5 5 7   Global Mental Health Score 10 10 11   Global Physical Health Score 12 12 12   PROMIS TOTAL - SUBSCORES 22 22 23   Some recent data might be hidden        ASSESSMENT: Current Emotional / Mental Status (status of significant symptoms):   Risk status (Self / Other harm or suicidal ideation)   Patient denies current fears or concerns for personal safety.   Patient denies current or recent suicidal ideation or behaviors. Patient last reported feeling hopeless/passive ideation on 8/6/2020.   Patient denies current or recent homicidal ideation or behaviors.   Patient denies current or recent self injurious behavior or ideation.   Patient denies other safety concerns.   Patient reports there has been no change in risk factors since their last session.     Patient reports there has been no change in protective factors since their last session.     Recommended that patient call 911 or go to the local ED should there be a change in any of these risk factors.     Appearance:   Appropriate    Eye Contact:   Good    Psychomotor Behavior: Normal  Restless     Attitude:   Cooperative, Pleasant   Orientation:   All   Speech    Rate / Production: Normal/ Responsive Talkative    Volume:  Normal    Mood:    Sad  Agitated, Frustrated   Affect:    Appropriate    Thought Content:  Clear  Rumination    Thought Form:  Coherent  Neurosis   Insight:    Good      Medication Review:   No changes to current psychiatric medication(s)     Medication Compliance:   Yes     Changes in Health Issues:   None reported      Chemical Use Review:   Substance Use: Chemical use reviewed, no active concerns identified ; no current alcohol use     Tobacco Use: No current tobacco use.      Diagnosis:  1. Recurrent major  depressive disorder, in partial remission (H)    2. TEE (generalized anxiety disorder)      Note: There has been demonstrated improvement in functioning while patient has been engaged in psychotherapy/psychological service- if withdrawn the patient would deteriorate and/or relapse.     Collateral Reports Completed:  N/A    PLAN: (Patient Tasks / Therapist Tasks / Other)    Patient states that her goals for the next three weeks include:    1. Improve self-care and resist stress eating  2. File taxes  3. Continue to find ways to try to get older daughter back in school despite her POTS    Note: ask about former roommate's DUI accident and summons     (future)  Protective Factors:  1. Caring relationships with parents and/or extended family members  2. Good health and history of adequate development  3. Good peer relationships  4. Hobbies and interests  5. Above average intelligence  (did not get to self-rating the following:)  6. Positive disposition  7. Active coping style  8. Positive self-esteem  9. Good social skills  10. Internal focus of control  11. Balance between seeking help and autonomy    Keesha SON Rafita    ______________________________________________________________________                                                           [facility]    Individual Treatment Plan    Patient's Name: Ellyn Mcgee  YOB: 1975    Date of Creation: 2020  Date Treatment Plan Last Reviewed/Revised: 2022    DSM5 Diagnoses: 296.32 (F33.1) Major Depressive Disorder, Recurrent Episode, Moderate _ or 300.02 (F41.1) Generalized Anxiety Disorder (patient has previously been diagnosed with ADHD, hyperactive type)  Psychosocial / Contextual Factors: History of anxious attachment stemming from relationship with father (he  8 years ago); is currently pregnant (high-risk due to advanced maternal age); is in couples therapy with  due to frequent arguments and his emotional  "affair earlier in the year; financial stress; history of ADHD (hyperactive type).  PROMIS (reviewed every 90 days): PROMIS-10 Scores  Global Mental Health Score: (P) 11  Global Physical Health Score: (P) 12   PROMIS TOTAL - SUBSCORES: (P) 23   Referral / Collaboration:  Referral to another professional/service is not indicated at this time. Patient was previously referred to an ACT therapist but plans to wait to schedule.    Anticipated number of session for this episode of care: 60  Anticipation frequency of session: Monthly  Anticipated Duration of each session: 38-52 minutes  Treatment plan will be reviewed in 90 days or when goals have been changed.             Measurable Treatment Goal(s) related to diagnosis / functional impairment(s)    Patient is asking to focus treatment on her relationship with her father and past trauma.    Goal 1: Patient will tell full story of past trauma related to her relationship with her father and will identify how past feelings are impacting current relationships.    Objective #A (Patient Action)    Patient will identify how past feelings are impacting current relationships.  Status: Continued - Date(s): 1/19/2022    Intervention(s)  Therapist will role-play conflict management.    Objective #B  Patient will identify strengths and weaknesses within her family system of origin.  Status: Completed - Date: 1/19/2022     Intervention(s)  Therapist will assign homework for patient to create list.      Goal 2: Patient will learn about resilience, trauma responses, and Javon Servin's \"the story I'm making up\" (cognitive strategy to manage anxious thoughts).    Objective #A (Patient Action)    Patient will learn about and identify personal trauma responses.    Status: Restarted - Date: 1/19/2022    Intervention(s)  Therapist will provide educational materials on trauma responses.    Objective #B  Patient will use cognitive strategies identified in therapy to challenge anxious " "thoughts.  Status: Continued - Date(s): 1/19/2022    Intervention(s)  Therapist will teach about Javon Servin's power of vulnerability and \"the story I'm making up\".      Goal 3: Patient will learn about protective factors that sugey resilience and attachment styles and will identify how her attachment style drives her behavior.     Objective #A (Patient Action)    Status: Restarted - Date: 1/19/2022     Patient will learn about protective factors and will identify her own.    Intervention(s)  Therapist will complete with patient in session.    Objective #B  Patient will identify how her attachment style drives her behavior.  Status: Continued - Date(s): 1/19/2022    Intervention(s)  Therapist will teach emotional regulation skills.        Patient agreed to the above plan.      Keesha Eller    "

## 2022-03-16 NOTE — PATIENT INSTRUCTIONS
Patient states that her goals for the next three weeks include:    1. Improve self-care and resist stress eating  2. File taxes  3. Continue to find ways to try to get older daughter back in school despite her POTS    Note: ask about former roommate's DUI accident and summons     (future)  Protective Factors:  1. Caring relationships with parents and/or extended family members  2. Good health and history of adequate development  3. Good peer relationships  4. Hobbies and interests  5. Above average intelligence  (did not get to self-rating the following:)  6. Positive disposition  7. Active coping style  8. Positive self-esteem  9. Good social skills  10. Internal focus of control  11. Balance between seeking help and autonomy

## 2022-03-27 ENCOUNTER — MYC MEDICAL ADVICE (OUTPATIENT)
Dept: DERMATOLOGY | Facility: CLINIC | Age: 47
End: 2022-03-27
Payer: COMMERCIAL

## 2022-03-27 DIAGNOSIS — L73.9 FOLLICULITIS: Primary | ICD-10-CM

## 2022-03-31 RX ORDER — TRIAMCINOLONE ACETONIDE 0.25 MG/G
OINTMENT TOPICAL 2 TIMES DAILY
Qty: 30 G | Refills: 1 | Status: SHIPPED | OUTPATIENT
Start: 2022-03-31 | End: 2023-03-07

## 2022-03-31 RX ORDER — CLINDAMYCIN PHOSPHATE 10 UG/ML
LOTION TOPICAL 2 TIMES DAILY
Qty: 60 ML | Refills: 0 | Status: SHIPPED | OUTPATIENT
Start: 2022-03-31 | End: 2023-03-07

## 2022-04-07 ENCOUNTER — VIRTUAL VISIT (OUTPATIENT)
Dept: PSYCHOLOGY | Facility: CLINIC | Age: 47
End: 2022-04-07
Payer: COMMERCIAL

## 2022-04-07 DIAGNOSIS — G47.00 INSOMNIA, UNSPECIFIED TYPE: ICD-10-CM

## 2022-04-07 DIAGNOSIS — F41.1 GAD (GENERALIZED ANXIETY DISORDER): Primary | ICD-10-CM

## 2022-04-07 DIAGNOSIS — F33.41 RECURRENT MAJOR DEPRESSIVE DISORDER, IN PARTIAL REMISSION (H): ICD-10-CM

## 2022-04-07 PROCEDURE — 90834 PSYTX W PT 45 MINUTES: CPT | Mod: 95 | Performed by: MARRIAGE & FAMILY THERAPIST
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
## 2022-04-21 ENCOUNTER — VIRTUAL VISIT (OUTPATIENT)
Dept: PSYCHOLOGY | Facility: CLINIC | Age: 47
End: 2022-04-21
Payer: COMMERCIAL

## 2022-04-21 DIAGNOSIS — F41.1 GAD (GENERALIZED ANXIETY DISORDER): Primary | ICD-10-CM

## 2022-04-21 DIAGNOSIS — F33.41 RECURRENT MAJOR DEPRESSIVE DISORDER, IN PARTIAL REMISSION (H): ICD-10-CM

## 2022-04-21 PROCEDURE — 90834 PSYTX W PT 45 MINUTES: CPT | Mod: 95 | Performed by: MARRIAGE & FAMILY THERAPIST

## 2022-04-21 ASSESSMENT — PATIENT HEALTH QUESTIONNAIRE - PHQ9
10. IF YOU CHECKED OFF ANY PROBLEMS, HOW DIFFICULT HAVE THESE PROBLEMS MADE IT FOR YOU TO DO YOUR WORK, TAKE CARE OF THINGS AT HOME, OR GET ALONG WITH OTHER PEOPLE: SOMEWHAT DIFFICULT
SUM OF ALL RESPONSES TO PHQ QUESTIONS 1-9: 11
SUM OF ALL RESPONSES TO PHQ QUESTIONS 1-9: 11

## 2022-04-21 ASSESSMENT — ANXIETY QUESTIONNAIRES
GAD7 TOTAL SCORE: 12
2. NOT BEING ABLE TO STOP OR CONTROL WORRYING: MORE THAN HALF THE DAYS
GAD7 TOTAL SCORE: 12
3. WORRYING TOO MUCH ABOUT DIFFERENT THINGS: SEVERAL DAYS
5. BEING SO RESTLESS THAT IT IS HARD TO SIT STILL: NOT AT ALL
GAD7 TOTAL SCORE: 12
4. TROUBLE RELAXING: MORE THAN HALF THE DAYS
6. BECOMING EASILY ANNOYED OR IRRITABLE: MORE THAN HALF THE DAYS
7. FEELING AFRAID AS IF SOMETHING AWFUL MIGHT HAPPEN: MORE THAN HALF THE DAYS
1. FEELING NERVOUS, ANXIOUS, OR ON EDGE: NEARLY EVERY DAY
7. FEELING AFRAID AS IF SOMETHING AWFUL MIGHT HAPPEN: MORE THAN HALF THE DAYS
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
## 2022-04-22 ASSESSMENT — ANXIETY QUESTIONNAIRES: GAD7 TOTAL SCORE: 12

## 2022-04-22 ASSESSMENT — PATIENT HEALTH QUESTIONNAIRE - PHQ9: SUM OF ALL RESPONSES TO PHQ QUESTIONS 1-9: 11

## 2022-04-27 ENCOUNTER — E-VISIT (OUTPATIENT)
Dept: FAMILY MEDICINE | Facility: CLINIC | Age: 47
End: 2022-04-27
Payer: COMMERCIAL

## 2022-04-27 DIAGNOSIS — Z53.9 ERRONEOUS ENCOUNTER--DISREGARD: Primary | ICD-10-CM

## 2022-04-28 ENCOUNTER — VIRTUAL VISIT (OUTPATIENT)
Dept: FAMILY MEDICINE | Facility: CLINIC | Age: 47
End: 2022-04-28
Payer: COMMERCIAL

## 2022-04-28 DIAGNOSIS — N64.0 NIPPLE FISSURE: Primary | ICD-10-CM

## 2022-04-28 PROCEDURE — 99213 OFFICE O/P EST LOW 20 MIN: CPT | Mod: 95 | Performed by: FAMILY MEDICINE

## 2022-04-28 RX ORDER — MUPIROCIN 20 MG/G
OINTMENT TOPICAL 3 TIMES DAILY
Qty: 22 G | Refills: 0 | Status: SHIPPED | OUTPATIENT
Start: 2022-04-28 | End: 2022-05-03

## 2022-04-28 NOTE — PROGRESS NOTES
Ellyn is a 46 year old who is being evaluated via a billable video visit.      How would you like to obtain your AVS? MyChart  If the video visit is dropped, the invitation should be resent by: Text to cell phone: [phone]  Will anyone else be joining your video visit? No      Video Start Time: 317    Assessment & Plan     Nipple fissure     - mupirocin (BACTROBAN) 2 % external ointment; Apply topically 3 times daily for 5 days    Prescription drug management  15 minutes spent on the date of the encounter doing chart review, history and exam, documentation and further activities per the note           Return in about 6 months (around 10/28/2022) for Physical Exam.    Elena Caputo MD  [facility]   Ellyn is a 46 year old who presents for the following health issues     History of Present Illness       Reason for visit:  Nipple/shooting breast pain when nursinf tooddler on one side. Possible infection  Symptom onset:  3-7 days ago  Symptoms include:  Extreme pain in nipple and breast when nurse toddler on left side  Symptom intensity:  Severe  Symptom progression:  Staying the same  Had these symptoms before:  No  What makes it worse:  When baby (16 months) sucks really hard  What makes it better:  Not nursing and sometimes applying pressure to breast tissue around or near nipple    She eats 2-3 servings of fruits and vegetables daily.She consumes 0 sweetened beverage(s) daily.She exercises with enough effort to increase her heart rate 9 or less minutes per day.  She exercises with enough effort to increase her heart rate 3 or less days per week. She is missing 1 dose(s) of medications per week.  She is not taking prescribed medications regularly due to remembering to take.       Breast Concern  Onset/Duration: over the past week  Has a fissure along the nipple    No fevers  When he nurses pain is intense.  Has fatigue    Description:   Location: left sided  Pain or  tenderness: YES  Redness: no  Intensity: severe  Progression of Symptoms: worsening  Accompanying Signs & Symptoms:  Any lumps in axillary region: no  Movable: no  Nipple discharge: none besides milk  Changes in the skin or nipple: marks  On Hormone therapy: no  Does it change with menstrual cycle:  no   Previous history of similar problem: no  First degree relative with breast cancer: no, only an aunt and two great aunts  Therapies tried and outcome: None  No LMP recorded.        Review of Systems   Constitutional, HEENT, cardiovascular, pulmonary, gi and gu systems are negative, except as otherwise noted.      Objective           Vitals:  No vitals were obtained today due to virtual visit.    Physical Exam   GENERAL: Healthy, alert and no distress  EYES: Eyes grossly normal to inspection.  No discharge or erythema, or obvious scleral/conjunctival abnormalities.  RESP: No audible wheeze, cough, or visible cyanosis.  No visible retractions or increased work of breathing.    SKIN: Visible skin clear. No significant rash, abnormal pigmentation or lesions.  NEURO: Cranial nerves grossly intact.  Mentation and speech appropriate for age.  PSYCH: Mentation appears normal, affect normal/bright, judgement and insight intact, normal speech and appearance well-groomed.                Video-Visit Details    Type of service:  Video Visit    Video End Time:327    Originating Location (pt. Location): Home    Distant Location (provider location):  [facility]     Platform used for Video Visit: [employer]

## 2022-04-28 NOTE — PATIENT INSTRUCTIONS
Apply mupirocin or bacitracin ointment to the area 3 times daily for 3-5 days or until healed.    Use a non stick pad to avoid the nipple from sticking to clothing    Cool compresses might also feel good for pain    OK to express milk from this side - try to pump or give a break from nursing until the pain improves.    Over the counter hydrocortisone might help  - studies are inconclusive.  You could try this for a day or two in addition to the antibiotic ointment if you would like.    Elena Caputo MD

## 2022-05-01 ENCOUNTER — HEALTH MAINTENANCE LETTER (OUTPATIENT)
Age: 47
End: 2022-05-01

## 2022-05-05 ENCOUNTER — MYC MEDICAL ADVICE (OUTPATIENT)
Dept: FAMILY MEDICINE | Facility: CLINIC | Age: 47
End: 2022-05-05
Payer: COMMERCIAL

## 2022-05-16 ENCOUNTER — OFFICE VISIT (OUTPATIENT)
Dept: DERMATOLOGY | Facility: CLINIC | Age: 47
End: 2022-05-16
Payer: COMMERCIAL

## 2022-05-16 DIAGNOSIS — D23.9 DERMATOFIBROMA: ICD-10-CM

## 2022-05-16 DIAGNOSIS — L30.4 INTERTRIGO: ICD-10-CM

## 2022-05-16 DIAGNOSIS — Z12.83 SKIN CANCER SCREENING: ICD-10-CM

## 2022-05-16 DIAGNOSIS — L81.4 SOLAR LENTIGO: Primary | ICD-10-CM

## 2022-05-16 DIAGNOSIS — D18.01 CHERRY ANGIOMA: ICD-10-CM

## 2022-05-16 DIAGNOSIS — D22.9 MULTIPLE BENIGN NEVI: ICD-10-CM

## 2022-05-16 PROCEDURE — 99213 OFFICE O/P EST LOW 20 MIN: CPT | Performed by: PHYSICIAN ASSISTANT

## 2022-05-16 ASSESSMENT — PAIN SCALES - GENERAL: PAINLEVEL: NO PAIN (0)

## 2022-05-16 NOTE — PATIENT INSTRUCTIONS
Patient Education     Checking for Skin Cancer  You can find cancer early by checking your skin each month. There are 3 kinds of skin cancer. They are melanoma, basal cell carcinoma, and squamous cell carcinoma. Doing monthly skin checks is the best way to find new marks or skin changes. Follow the instructions below for checking your skin.   The ABCDEs of checking moles for melanoma   Check your moles or growths for signs of melanoma using ABCDE:   Asymmetry: the sides of the mole or growth don t match  Border: the edges are ragged, notched, or blurred  Color: the color within the mole or growth varies  Diameter: the mole or growth is larger than 6 mm (size of a pencil eraser)  Evolving: the size, shape, or color of the mole or growth is changing (evolving is not shown in the images below)    Checking for other types of skin cancer  Basal cell carcinoma or squamous cell carcinoma have symptoms such as:     A spot or mole that looks different from all other marks on your skin  Changes in how an area feels, such as itching, tenderness, or pain  Changes in the skin's surface, such as oozing, bleeding, or scaliness  A sore that does not heal  New swelling or redness beyond the border of a mole    Who s at risk?  Anyone can get skin cancer. But you are at greater risk if you have:   Fair skin, light-colored hair, or light-colored eyes  Many moles or abnormal moles on your skin  A history of sunburns from sunlight or tanning beds  A family history of skin cancer  A history of exposure to radiation or chemicals  A weakened immune system  If you have had skin cancer in the past, you are at risk for recurring skin cancer.   How to check your skin  Do your monthly skin checkups in front of a full-length mirror. Check all parts of your body, including your:   Head (ears, face, neck, and scalp)  Torso (front, back, and sides)  Arms (tops, undersides, upper, and lower armpits)  Hands (palms, backs, and fingers, including  under the nails)  Buttocks and genitals  Legs (front, back, and sides)  Feet (tops, soles, toes, including under the nails, and between toes)  If you have a lot of moles, take digital photos of them each month. Make sure to take photos both up close and from a distance. These can help you see if any moles change over time.   Most skin changes are not cancer. But if you see any changes in your skin, call your doctor right away. Only he or she can diagnose a problem. If you have skin cancer, seeing your doctor can be the first step toward getting the treatment that could save your life.   Enabled Employment last reviewed this educational content on 4/1/2019 2000-2020 [employer]. [address] [place] [address], [place] [address]. All rights reserved. This information is not intended as a substitute for professional medical care. Always follow your healthcare professional's instructions.       When should I call my doctor?  If you are worsening or not improving, please, contact us or seek urgent care as noted below.     Who should I call with questions (adults)?  [facility] (adult and pediatric): [phone]  [facility] (adult): [phone]  For urgent needs outside of business hours call the [facility] at [phone] and ask for the dermatology resident on call to be paged  If this is a medical emergency and you are unable to reach an ER, Call [phone]    Who should I call with questions (pediatric)?  [facility]- Pediatric Dermatology  Dr. Zeny Curran, Dr. Hiram Rahman, Dr. Phoebe Kenney, CRISTINA Amado, Dr. Aleida Emmanuel, Dr. Mary Damico & Dr. Dwayne Verma  Non-urgent nurse triage line; [phone]- Cassie and Rosita HICKEY Care Coordinatorgris Guerrero (/Complex ) [phone]    If you need a prescription refill, please contact your pharmacy. Refills are approved or denied by our  Physicians during normal business hours, Monday through Fridays  Per office policy, refills will not be granted if you have not been seen within the past year (or sooner depending on your child's condition)    Scheduling Information:  Pediatric Appointment Scheduling and Call Center [phone]  Radiology Scheduling- [phone]  Sedation Unit Scheduling- [phone]  [place] Scheduling- General [phone]; Pediatric Dermatology [phone]  Main  Services: [phone]  Nepali: [phone]  Sammarinese: [phone]  Hmong/Chinese/Kiswahili: [phone]  Preadmission Nursing Department Fax Number: [phone] (Fax all pre-operative paperwork to this number)    For urgent matters arising during evenings, weekends, or holidays that cannot wait for normal business hours please call [phone] and ask for the dermatology resident on call to be paged.

## 2022-05-16 NOTE — PROGRESS NOTES
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  Discussed the nature and etiology of this condition.    # Benign lesions: Multiple benign nevi, solar lentigos, cherry angiomas. Explained to patient benign nature of lesion. No treatment is necessary at this time unless the lesion changes or becomes symptomatic.   - ABCDs of melanoma were discussed and self skin checks were advised.  - Sun precaution was advised including the use of sun screens of SPF 30 or higher, sun protective clothing, and avoidance of tanning beds.    # Family history of melanoma (father)  - Continue photoprotection - recommend SPF 30 or higher with frequent reapplication  - Continue yearly skin exams  - Advised to monitor for changing, non-healing, bleeding, painful, changing, or otherwise symptomatic lesions    Procedures Performed:   None    Follow-up: 1 year(s) in-person, or earlier for new or changing lesions    Staff and Scribe:     Scribe Disclosure:  I, Ofelia Jones, am serving as a scribe to document services personally performed by Keke Arcos PA-C based on data collection and the provider's statements to me.     Provider Disclosure:   The documentation recorded by the scribe accurately reflects the services I personally performed and the decisions made by me.    All risks, benefits and alternatives were discussed with patient.  Patient is in agreement and  understands the assessment and plan.  All questions were answered.  Sun Screen Education was given.   Return to Clinic annually or sooner as needed.   Keke Arcos PA-C   [facility]   ____________________________________________    CC: Derm Problem (States she wants moles all over her body looked at )    HPI:  Ms. Ellyn Mcgee is a(n) 46 year old female who presents today as a return patient for a derm pro. The patient was last seen in dermatology by myself on 06/21/2021 at which point patient had a negative skin exam.    Today, the patient reports lesions that appeared in the last year that gets itchy. She also reports rashes that spread from her armpit to under her breast. She attributed this to an allergic reaction and deodorant use.  I had prescribed her clindamycin 1% lotion and triamcinolone 0.025% ointment to apply to these areas twice daily through [employer] message..  She states the rashes cleared but there is a lingering spot in the right axilla and a spot popped up on the inframammary area.  She feels the rashes respond to the medications.    Patient is otherwise feeling well, without additional skin concerns.    Labs Reviewed:  N/A    Physical Exam:  Vitals: There were no vitals taken for this visit.  SKIN: Total skin excluding the undergarment areas was performed. The exam included the head/face, neck, both arms, chest, back, abdomen, both legs, digits and/or nails.   - Very faint erythema on the right breast and right axilla  - There is a firm tan/flesh colored papule that dimples with lateral pressure on the legs and lower extremities, ~5.  - There are dome shaped bright red papules on the trunk and extremities.  - Multiple regular brown pigmented macules and papules are identified on the trunk and extremities.  None with concerning features.  - Scattered brown macules on sun exposed areas..   - No other lesions of concern on areas examined.      Medications:  Current Outpatient Medications   Medication     clindamycin (CLEOCIN T) 1 % external lotion     escitalopram (LEXAPRO) 10 MG tablet     ibuprofen (ADVIL/MOTRIN) 200 MG tablet     Prenatal Vit-Fe Fumarate-FA (PRENATAL PO)     propranolol (INDERAL) 20 MG tablet     triamcinolone (KENALOG) 0.025 % external ointment     venlafaxine (EFFEXOR-XR) 150 MG 24 hr capsule     No current facility-administered medications for this visit.      Past Medical History:   Patient Active Problem List   Diagnosis     Hyperlipidemia LDL goal <130     Anxiety state     PCOS (polycystic ovarian syndrome)     External hemorrhoids     Attention deficit hyperactivity disorder (ADHD), predominantly inattentive type     TEE (generalized anxiety disorder)     Cervical radiculopathy     High-risk pregnancy, elderly multigravida, unspecified trimester     Major depressive disorder, recurrent episode, moderate (H)     Labor and delivery, indication for care     Past Medical History:   Diagnosis Date     Abnormal Pap smear     Colposcopy     Adjustment disorder with mixed anxiety and depressed mood 04/04/2012     Anemia     In Past     Anxiety      Chlamydia trachomatis infection of other specified site 1993     Depression      Depressive disorder 1979    Not diagnosed until college...later diagnosed with TEE     Fertility problem      Lyme disease 09/08/2010    ?false positive vs positve CMV - resolved     Moderate dysplasia of cervix 1995     Other and unspecified adverse effect of drug, medicinal and biological substance     insulin resistent     Varicosities      Wounds and injuries     fall down stairs, PT for back, pain meds        CC Referred Self, MD  No address on file on close of this encounter.   No

## 2022-05-16 NOTE — LETTER
5/16/2022       RE: Ellyn Mcgee  [address]  [place] [address]     Dear Colleague,    Thank you for referring your patient, Ellyn Mcgee, to the [facility] [place] at [facility]. Please see a copy of my visit note below.    [facility] Dermatology Note  Encounter Date: May 16, 2022  Office Visit     Dermatology Problem List:  # Skin cancer screening 5/16/2022  # Family history of melanoma (father)  # Hx of nonscarring alopecia  # Hx of rosacea, patient uses otc treatments  # Ink spot lentigines - right shoulder, left lower back  # Congenital nevus - left mid abdomen s/p partial Bx 9/11/17  # Inflamed seborrheic keratosis, right posterior neck, s/p cryotherapy 6/21/2021  # Intertrigo  ____________________________________________    Assessment & Plan:    # Intertrigo, right breast and right axilla, controlled.  Discussed the nature and etiology of this condition.    # Benign lesions: Multiple benign nevi, solar lentigos, cherry angiomas. Explained to patient benign nature of lesion. No treatment is necessary at this time unless the lesion changes or becomes symptomatic.   - ABCDs of melanoma were discussed and self skin checks were advised.  - Sun precaution was advised including the use of sun screens of SPF 30 or higher, sun protective clothing, and avoidance of tanning beds.    # Family history of melanoma (father)  - Continue photoprotection - recommend SPF 30 or higher with frequent reapplication  - Continue yearly skin exams  - Advised to monitor for changing, non-healing, bleeding, painful, changing, or otherwise symptomatic lesions    Procedures Performed:   None    Follow-up: 1 year(s) in-person, or earlier for new or changing lesions    Staff and Scribe:     Scribe Disclosure:  Ofelia GONZALEZ, am serving as a scribe to document services personally performed by Keke Arcos PA-C  based on data collection and the provider's statements to me.     Provider Disclosure:   The documentation recorded by the scribe accurately reflects the services I personally performed and the decisions made by me.    All risks, benefits and alternatives were discussed with patient.  Patient is in agreement and understands the assessment and plan.  All questions were answered.  Sun Screen Education was given.   Return to Clinic annually or sooner as needed.   Keke Arcos PA-C   [facility]   ____________________________________________    CC: Derm Problem (States she wants moles all over her body looked at )    HPI:  Ms. Ellyn Mcgee is a(n) 46 year old female who presents today as a return patient for a derm pro. The patient was last seen in dermatology by myself on 06/21/2021 at which point patient had a negative skin exam.    Today, the patient reports lesions that appeared in the last year that gets itchy. She also reports rashes that spread from her armpit to under her breast. She attributed this to an allergic reaction and deodorant use.  I had prescribed her clindamycin 1% lotion and triamcinolone 0.025% ointment to apply to these areas twice daily through [employer] message..  She states the rashes cleared but there is a lingering spot in the right axilla and a spot popped up on the inframammary area.  She feels the rashes respond to the medications.    Patient is otherwise feeling well, without additional skin concerns.    Labs Reviewed:  N/A    Physical Exam:  Vitals: There were no vitals taken for this visit.  SKIN: Total skin excluding the undergarment areas was performed. The exam included the head/face, neck, both arms, chest, back, abdomen, both legs, digits and/or nails.   - Very faint erythema on the right breast and right axilla  - There is a firm tan/flesh colored papule that dimples with lateral pressure on the legs and lower extremities, ~5.  - There are dome  shaped bright red papules on the trunk and extremities.  - Multiple regular brown pigmented macules and papules are identified on the trunk and extremities.  None with concerning features.  - Scattered brown macules on sun exposed areas..   - No other lesions of concern on areas examined.     Medications:  Current Outpatient Medications   Medication     clindamycin (CLEOCIN T) 1 % external lotion     escitalopram (LEXAPRO) 10 MG tablet     ibuprofen (ADVIL/MOTRIN) 200 MG tablet     Prenatal Vit-Fe Fumarate-FA (PRENATAL PO)     propranolol (INDERAL) 20 MG tablet     triamcinolone (KENALOG) 0.025 % external ointment     venlafaxine (EFFEXOR-XR) 150 MG 24 hr capsule     No current facility-administered medications for this visit.      Past Medical History:   Patient Active Problem List   Diagnosis     Hyperlipidemia LDL goal <130     Anxiety state     PCOS (polycystic ovarian syndrome)     External hemorrhoids     Attention deficit hyperactivity disorder (ADHD), predominantly inattentive type     TEE (generalized anxiety disorder)     Cervical radiculopathy     High-risk pregnancy, elderly multigravida, unspecified trimester     Major depressive disorder, recurrent episode, moderate (H)     Labor and delivery, indication for care     Past Medical History:   Diagnosis Date     Abnormal Pap smear     Colposcopy     Adjustment disorder with mixed anxiety and depressed mood 04/04/2012     Anemia     In Past     Anxiety      Chlamydia trachomatis infection of other specified site 1993     Depression      Depressive disorder 1979    Not diagnosed until college...later diagnosed with TEE     Fertility problem      Lyme disease 09/08/2010    ?false positive vs positve CMV - resolved     Moderate dysplasia of cervix 1995     Other and unspecified adverse effect of drug, medicinal and biological substance     insulin resistent     Varicosities      Wounds and injuries     fall down stairs, PT for back, pain meds        CC  Referred Self, MD  No address on file on close of this encounter.

## 2022-05-16 NOTE — NURSING NOTE
Dermatology Rooming Note    Ellyn Mcgee's goals for this visit include:   Chief Complaint   Patient presents with     Derm Problem     States she wants moles all over her body looked at      Halima Cantu, Visit Facilitator

## 2022-05-20 ENCOUNTER — VIRTUAL VISIT (OUTPATIENT)
Dept: PSYCHOLOGY | Facility: CLINIC | Age: 47
End: 2022-05-20
Payer: COMMERCIAL

## 2022-05-20 DIAGNOSIS — F41.1 GAD (GENERALIZED ANXIETY DISORDER): Primary | ICD-10-CM

## 2022-05-20 DIAGNOSIS — F33.41 RECURRENT MAJOR DEPRESSIVE DISORDER, IN PARTIAL REMISSION (H): ICD-10-CM

## 2022-05-20 PROCEDURE — 90834 PSYTX W PT 45 MINUTES: CPT | Mod: 95 | Performed by: MARRIAGE & FAMILY THERAPIST
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
## 2022-06-06 ENCOUNTER — VIRTUAL VISIT (OUTPATIENT)
Dept: PSYCHOLOGY | Facility: CLINIC | Age: 47
End: 2022-06-06
Payer: COMMERCIAL

## 2022-06-06 DIAGNOSIS — F41.1 GAD (GENERALIZED ANXIETY DISORDER): Primary | ICD-10-CM

## 2022-06-06 DIAGNOSIS — F33.41 RECURRENT MAJOR DEPRESSIVE DISORDER, IN PARTIAL REMISSION (H): ICD-10-CM

## 2022-06-06 DIAGNOSIS — G47.00 INSOMNIA, UNSPECIFIED TYPE: ICD-10-CM

## 2022-06-06 PROCEDURE — 90834 PSYTX W PT 45 MINUTES: CPT | Mod: 95 | Performed by: MARRIAGE & FAMILY THERAPIST

## 2022-06-06 NOTE — PROGRESS NOTES
[facility]                                               Progress Note    Patient Name: Ellyn Mcgee  Date: 6/6/2022         Service Type: Individual      Session Start Time: 11:05 a.m.  Session End Time: 11:57 a.m.     Session Length: 52 min.    Session #: 37 (with this writer; patient met previously for DA with [facility] and psychiatry)    Attendees: Client     Service Modality:  Video Visit:    Telemedicine Visit: The patient's condition can be safely assessed and treated via synchronous audio and visual telemedicine encounter.      Reason for Telemedicine Visit: Services only offered telehealth    Originating Site (Patient Location): Patient's home    Distant Site (Provider Location): [facility]: [place]    Consent:  The patient/guardian has verbally consented to: the potential risks and benefits of telemedicine (video visit) versus in person care; bill my insurance or make self-payment for services provided; and responsibility for payment of non-covered services.     Patient would like the video invitation sent by: Send to e-mail at: [email or web address]    Mode of Communication:  Video Conference via well    As the provider I attest to compliance with applicable laws and regulations related to telemedicine.      DATA  Interactive Complexity: No  Crisis: No       Progress Since Last Session (Related to Symptoms / Goals / Homework):  Symptoms: reports continues high anxiety and self-criticism but has also been practicing gratitude and has been successfully connecting with others (related to children living with POTS and homeschooling).       Homework: Partially completed      Episode of Care Goals: Satisfactory progress - ACTION (Actively working towards change); Intervened by reinforcing change plan / affirming steps taken     Current / Ongoing Stressors and Concerns:  Homeschooling and self-employment challenges; feeling overwhelmed by organizing home    Older  "daughter is experiencing dizziness/lethargy and was dx with POTS; some financial and friend-related stress; is interested in a deeper dive into emotional state and core beliefs through ACT; new son born 2020; roommate/former friend was finally evicted from their home (lived there for 3-4 years and did not paid any rent for over 1-2 years); pt has been experiencing mild panic attacks; daughter (age 10) just started ADHD medication; history of anxious attachment stemming from relationship with father (he  8 years ago); is currently pregnant (high-risk due to advanced maternal age) and baby is due Dec. 2020; is in couples therapy with  due to frequent arguments and his emotional affair earlier in the year; financial stress; history of ADHD (hyperactive type).     Treatment Objective(s) Addressed in This Session:  will identify how past feelings are impacting current relationships  Self-compassion and radical acceptance  Triggers for anxiety and insecurities  Emotional differentiation  Discussed healthy boundaries and enforcement    Past/future:  tell full story of past relational issues/trust/infidelity  use cognitive strategies identified in therapy to challenge anxious thoughts   Body appreciation  Discussed work/life balance and current purpose  Dealing with \"big emotions\"  Discussed family system issues and possible messages internalized during childhood related to father  Relationship repair  Discussed ambiguous loss and grief in a box analogy  Anticipatory grief and creating meaning  10:10:10 rule (10 minutes, 10 months, and 10 years)  Feeling unappreciated and the 5:1 ratio (Natali)  Solution focused: how to prioritize and cope with interrupted sleep  Javon Servin's elements of trust and relationship repair  Natali research  Identified strengths as a mother and need for positive affirmations  Identified insecurities and how they affect patient's thoughts and actions now and in the " past   identify how attachment style drives patient's and 's behaviors  Identified personal strengths within friendships  tell full story of past trauma related to her relationship with her father (and current roommate issues)     Intervention:  Solution-Focused  CBT: connect thoughts, feelings, and actions   Narrative Therapy: separate problem from person and find the bright spots     Past/future:  Psychodynamic: Family systems and attachment style  Natali research     Assessments completed prior to visit:  PHQ9:   PHQ-9 SCORE 12/11/2020 6/9/2021 12/15/2021 12/21/2021 2/10/2022 3/15/2022 4/21/2022   PHQ-9 Total Score - - - - - - -   PHQ-9 Total Score MyChart - 7 (Mild depression) 7 (Mild depression) 6 (Mild depression) 8 (Mild depression) 8 (Mild depression) 11 (Moderate depression)   PHQ-9 Total Score 10 7 7 6 8 8 11     GAD7:   TEE-7 SCORE 5/10/2020 8/6/2020 8/12/2020 9/8/2020 12/11/2020 3/2/2022 4/21/2022   Total Score - - - - - - -   Total Score 5 (mild anxiety) - 14 (moderate anxiety) 12 (moderate anxiety) - 9 (mild anxiety) 12 (moderate anxiety)   Total Score 5 13 14 12 11 9 12     PROMIS 10-Global Health (all questions and answers displayed):   PROMIS 10 12/15/2021 12/15/2021 3/15/2022   In general, would you say your health is: - Fair Fair   In general, would you say your quality of life is: - Fair Good   In general, how would you rate your physical health? - Fair Fair   In general, how would you rate your mental health, including your mood and your ability to think? - Good Good   In general, how would you rate your satisfaction with your social activities and relationships? - Fair Good   In general, please rate how well you carry out your usual social activities and roles - Fair Fair   To what extent are you able to carry out your everyday physical activities such as walking, climbing stairs, carrying groceries, or moving a chair? - Completely Completely   How often have you been bothered by  emotional problems such as feeling anxious, depressed or irritable? - Sometimes Often   How would you rate your fatigue on average? - Severe Moderate   How would you rate your pain on average?   0 = No Pain  to  10 = Worst Imaginable Pain - 5 7   In general, would you say your health is: 2 2 2   In general, would you say your quality of life is: 2 2 3   In general, how would you rate your physical health? 2 2 2   In general, how would you rate your mental health, including your mood and your ability to think? 3 3 3   In general, how would you rate your satisfaction with your social activities and relationships? 2 2 3   In general, please rate how well you carry out your usual social activities and roles. (This includes activities at home, at work and in your community, and responsibilities as a parent, child, spouse, employee, friend, etc.) 2 2 2   To what extent are you able to carry out your everyday physical activities such as walking, climbing stairs, carrying groceries, or moving a chair? 5 5 5   In the past 7 days, how often have you been bothered by emotional problems such as feeling anxious, depressed, or irritable? 3 3 4   In the past 7 days, how would you rate your fatigue on average? 4 4 3   In the past 7 days, how would you rate your pain on average, where 0 means no pain, and 10 means worst imaginable pain? 5 5 7   Global Mental Health Score 10 10 11   Global Physical Health Score 12 12 12   PROMIS TOTAL - SUBSCORES 22 22 23   Some recent data might be hidden        ASSESSMENT: Current Emotional / Mental Status (status of significant symptoms):   Risk status (Self / Other harm or suicidal ideation)   Patient denies current fears or concerns for personal safety.   Patient denies current or recent suicidal ideation or behaviors. Patient last reported feeling hopeless/passive ideation on 8/6/2020.   Patient denies current or recent homicidal ideation or behaviors.   Patient denies current or recent self  "injurious behavior or ideation.   Patient denies other safety concerns.   Patient reports there has been no change in risk factors since their last session.     Patient reports there has been no change in protective factors since their last session.     Recommended that patient call 911 or go to the local ED should there be a change in any of these risk factors.     Appearance:   Appropriate    Eye Contact:   Good    Psychomotor Behavior: Normal  Restless     Attitude:   Cooperative, Pleasant   Orientation:   All   Speech    Rate / Production: Normal/ Responsive Talkative    Volume:  Normal    Mood:    Anxious  Fearful   Affect:    Appropriate    Thought Content:  Clear  Rumination    Thought Form:  Coherent  Neurosis   Insight:    Good      Medication Review:   No changes to current psychiatric medication(s)     Medication Compliance:   Yes     Changes in Health Issues:   None reported      Chemical Use Review:   Substance Use: Chemical use reviewed, no active concerns identified ; no current alcohol use     Tobacco Use: No current tobacco use.      Diagnosis:  1. TEE (generalized anxiety disorder)    2. Recurrent major depressive disorder, in partial remission (H)    3. Insomnia, unspecified type      Note: There has been demonstrated improvement in functioning while patient has been engaged in psychotherapy/psychological service- if withdrawn the patient would deteriorate and/or relapse.     Collateral Reports Completed:  N/A    PLAN: (Patient Tasks / Therapist Tasks / Other)    Patient states that her goals for the next four weeks include:    1. Continue to maintain or improve home  2. \"Be smarter about food\" - purchase easy, accessible, and healthy snacks (continued)  3. \"Focus on my business and state of mind\"  4. Put effort into creating a structure for physical and mental wellness      Keesha Eller    ______________________________________________________________________                                   "                         [facility]    Individual Treatment Plan    Patient's Name: Ellyn Mcgee  YOB: 1975    Date of Creation: 2020  Date Treatment Plan Last Reviewed/Revised: 2022    DSM5 Diagnoses: 296.32 (F33.1) Major Depressive Disorder, Recurrent Episode, Moderate _ or 300.02 (F41.1) Generalized Anxiety Disorder (patient has previously been diagnosed with ADHD, hyperactive type)  Psychosocial / Contextual Factors: History of anxious attachment stemming from relationship with father (he  8 years ago); is currently pregnant (high-risk due to advanced maternal age); is in couples therapy with  due to frequent arguments and his emotional affair earlier in the year; financial stress; history of ADHD (hyperactive type).  PROMIS (reviewed every 90 days): PROMIS-10 Scores             Referral / Collaboration:  Referral to another professional/service is not indicated at this time. Patient was previously referred to an ACT therapist but plans to wait to schedule.    Anticipated number of sessions for this episode of care: 60  Anticipation frequency of session: Monthly  Anticipated duration of each session: 38-52 minutes  Treatment plan will be reviewed in 90 days or when goals have been changed.             Measurable Treatment Goal(s) related to diagnosis / functional impairment(s):  Patient is asking to focus treatment on her relationship with her father and past trauma.    Goal 1: Patient will tell full story of past trauma related to her relationship with her father and will identify how past feelings are impacting current relationships.    Objective #A (Patient Action)    Patient will identify how past feelings are impacting current relationships.  Status: Continued - Date(s): 2022    Intervention(s)  Therapist will role-play conflict management.    Objective #B  Patient will identify strengths and weaknesses within her family system of origin.  Status:  "Restarted - Date: 4/21/2022     Intervention(s)  Therapist will assign homework for patient to create list.      Goal 2: Patient will learn about resilience, trauma responses, and Javon Servin's \"the story I'm making up\" (cognitive strategy to manage anxious thoughts).    Objective #A (Patient Action)    Patient will learn about and identify personal trauma responses.    Status: Continued - Date(s): 4/21/2022    Intervention(s)  Therapist will provide educational materials on trauma responses.    Objective #B  Patient will use cognitive strategies identified in therapy to challenge anxious thoughts.  Status: Continued - Date(s): 4/21/2022    Intervention(s)  Therapist will teach about Javon Servin's power of vulnerability and \"the story I'm making up\".      Goal 3: Patient will learn about protective factors that sugey resilience and attachment styles and will identify how her attachment style drives her behavior.     Objective #A (Patient Action)    Status: Completed - Date: 4/21/2022     Patient will learn about protective factors and will identify her own.    Intervention(s)  Therapist will complete with patient in session.    Objective #B  Patient will identify how her attachment style drives her behavior.  Status: Continued - Date(s): 4/21/2022    Intervention(s)  Therapist will teach emotional regulation skills.        Patient agreed to the above plan.      Keesha Eller    "

## 2022-06-06 NOTE — PATIENT INSTRUCTIONS
"Patient states that her goals for the next four weeks include:    1. Continue to maintain or improve home  2. \"Be smarter about food\" - purchase easy, accessible, and healthy snacks (continued)  3. \"Focus on my business and state of mind\"  4. Put effort into creating a structure for physical and mental wellness    "

## 2022-06-14 ENCOUNTER — VIRTUAL VISIT (OUTPATIENT)
Dept: PSYCHIATRY | Facility: CLINIC | Age: 47
End: 2022-06-14
Payer: COMMERCIAL

## 2022-06-14 DIAGNOSIS — F41.1 GAD (GENERALIZED ANXIETY DISORDER): ICD-10-CM

## 2022-06-14 DIAGNOSIS — G47.00 INSOMNIA, UNSPECIFIED TYPE: ICD-10-CM

## 2022-06-14 DIAGNOSIS — F90.0 ATTENTION DEFICIT HYPERACTIVITY DISORDER (ADHD), PREDOMINANTLY INATTENTIVE TYPE: Primary | ICD-10-CM

## 2022-06-14 DIAGNOSIS — F33.1 MODERATE EPISODE OF RECURRENT MAJOR DEPRESSIVE DISORDER (H): ICD-10-CM

## 2022-06-14 PROCEDURE — 99214 OFFICE O/P EST MOD 30 MIN: CPT | Mod: 95 | Performed by: PSYCHIATRY & NEUROLOGY

## 2022-06-14 RX ORDER — DEXTROAMPHETAMINE SACCHARATE, AMPHETAMINE ASPARTATE MONOHYDRATE, DEXTROAMPHETAMINE SULFATE AND AMPHETAMINE SULFATE 3.75; 3.75; 3.75; 3.75 MG/1; MG/1; MG/1; MG/1
15 CAPSULE, EXTENDED RELEASE ORAL DAILY
Qty: 30 CAPSULE | Refills: 0 | Status: SHIPPED | OUTPATIENT
Start: 2022-06-14 | End: 2022-07-27

## 2022-06-14 ASSESSMENT — ANXIETY QUESTIONNAIRES
8. IF YOU CHECKED OFF ANY PROBLEMS, HOW DIFFICULT HAVE THESE MADE IT FOR YOU TO DO YOUR WORK, TAKE CARE OF THINGS AT HOME, OR GET ALONG WITH OTHER PEOPLE?: SOMEWHAT DIFFICULT
GAD7 TOTAL SCORE: 8
GAD7 TOTAL SCORE: 8
2. NOT BEING ABLE TO STOP OR CONTROL WORRYING: NOT AT ALL
3. WORRYING TOO MUCH ABOUT DIFFERENT THINGS: NOT AT ALL
7. FEELING AFRAID AS IF SOMETHING AWFUL MIGHT HAPPEN: SEVERAL DAYS
6. BECOMING EASILY ANNOYED OR IRRITABLE: NEARLY EVERY DAY
GAD7 TOTAL SCORE: 8
4. TROUBLE RELAXING: SEVERAL DAYS
7. FEELING AFRAID AS IF SOMETHING AWFUL MIGHT HAPPEN: SEVERAL DAYS
1. FEELING NERVOUS, ANXIOUS, OR ON EDGE: NEARLY EVERY DAY
5. BEING SO RESTLESS THAT IT IS HARD TO SIT STILL: NOT AT ALL

## 2022-06-14 NOTE — PATIENT INSTRUCTIONS
Treatment Plan:  Discontinue Lexapro/escitalopram since already stopped  Continue venlafaxine/Effexor- mg daily for mood and anxiety.  Could consider decreasing dose if necessary.  Please message me if needed.  Continue propranolol 10-20 mg twice daily as needed for anxiety.  Have previously discussed risks/benefits of use while breastfeeding. To minimize exposure to infant try to breastfeed 3-4 hours after taking propranolol.   Start Adderall XR 15 mg daily as needed for ADHD symptoms.  Discussed risks vs benefits of taking stimulant medication while lactating/breast-feeding.  Additional information sent in Actimo message.  Continue all other medications as reviewed per electronic medical record today.   Safety plan reviewed. To the Emergency Department as needed or call after hours crisis line at [phone] or [phone]. [place] Crisis Text Line. Text [place] to 742478 or Suicide LifeLine Chat: suicidepreBina Technologiesline.org/chat  Continue therapy as planned.   Schedule an appointment with me in 4-6 weeks or sooner as needed.  Patient scheduled today.  Follow up with primary care provider as planned or for acute medical concerns.  Call the psychiatric nurse line with medication questions or concerns at [phone].  Actimo may be used to communicate with your provider, but this is not intended to be used for emergencies.    Risks of stimulant medication include, but not limited to, decreased appetite, risk of tics (and that they may be lasting), trouble sleeping, cardiac risks such as increased heart rate and blood pressure, and rare risk of sudden cardiac death.  Also risk of addiction/tolerance/dependence.    Therapy Resources:  Www.PsychologyToday.com  Www.NAMIMN.org    [facility]- Psychiatric Disorders During Pregnancy  [email or web address]://[facility]mental[facility].org/specialty-clinics/psychiatric-disorders-[email or web address]-[email or web address]    [employer]  [email or web address]

## 2022-06-14 NOTE — Clinical Note
Please call this patient to get them scheduled for a follow-up visit in 4-6 weeks. Please schedule with me ONLY and NO [facility]. Thanks!

## 2022-06-14 NOTE — PROGRESS NOTES
"Ellyn Mcgee is a 46 year old year old who is being evaluated via a billable video visit.      How would you like to obtain your AVS? MyChart  If you are dropped from the video visit, the video invite should be resent to: Text to cell phone: see Epic  Will anyone else be joining your video visit? No     Telemedicine Visit: The patient's condition can be safely assessed and treated via synchronous audio and visual telemedicine encounter.      Reason for Telemedicine Visit: Covid-19 Pandemic    Originating Site (Patient Location): Patient's home     Distant Site (Provider Location): Provider Remote Setting    Mode of Communication:  Video Conference via [employer]    As the provider I attest to compliance with applicable laws and regulations related to telemedicine.    Half of the visit switched to telephone due to video failing.        Outpatient Psychiatric Progress Note    Name: Ellyn Mcgee   : 1975                    Primary Care Provider: Dorothy Guaman DO   Therapist: BARBARA Schulte     PHQ 2/10/2022 3/15/2022 2022   PHQ-9 Total Score 8 8 11   Q9: Thoughts of better off dead/self-harm past 2 weeks Not at all Not at all Not at all   F/U: Thoughts of suicide or self-harm - - -   F/U: Safety concerns - - -   TEE-7 scores:  TEE-7 SCORE 3/2/2022 2022 2022   Total Score - - -   Total Score 9 (mild anxiety) 12 (moderate anxiety) 8 (mild anxiety)   Total Score 9 12 8       Patient Identification:  Patient is a 46 year old,   White Not  or  female  who presents for return visit with me.  Patient is currently employed part time but on maternity leave. Patient attended the phone/video session alone. Patient prefers to be called: \"Ellyn\".    Interim History:  I last saw Ellyn Mcgee for outpatient psychiatry Return Visit on 12/15/2021. During that appointment, we:      Continue Lexapro/escitalopram 10 mg daily for mood and anxiety    Continue venlafaxine/Effexor-XR " 150 mg daily for mood and anxiety.      Continue propranolol 10-20 mg twice daily as needed for anxiety. Discussed risks/benefits of use while breastfeeding. To minimize exposure to infant try to breastfeed 3-4 hours after taking propranolol.      6/14: Patient feels like overall things have been a little bit worse lately.  Mood has been less stable.  Feels like Lexapro is making her a little more down and depressed.  She discontinued this medication.  Has just been taking her Effexor-XR.  Often feels overwhelmed.  Feels stressed out often by her environment because things often feel like a mess and disorganized.  Does not endorse any acute safety concerns.  Things going well with her toddler.  Will be 2 soon.  Plans to wean off breast-feeding around 1 years old.  No problematic drug or alcohol use.  Continues in individual therapy and also couples therapy.    Per [facility], Dr. Vincenzo Edward, during today's team-based visit:  Patient is prepilot and declines the enhanced model. No [facility] appointment.    Psychiatric ROS:  Ellyn Mcgee reports mood has been: More depressed  Anxiety has been: Still high at times, feeling less manageable than before  Sleep has been: Difficult due to toddler  Isabel sxs: None  Psychosis sxs: None  ADHD/ADD sxs: A little worse lately  PTSD sxs: None  PHQ9 and GAD7 scores were reviewed today if completed.   Medication side effects: Felt like Lexapro was making her feel more depressed  Current stressors include: See HPI above  Coping mechanisms and supports include: Therapy, Family, Hobbies and Friends    Current medications include:   Current Outpatient Medications   Medication Sig     clindamycin (CLEOCIN T) 1 % external lotion Apply topically 2 times daily To affected areas in the axilla until clear.     escitalopram (LEXAPRO) 10 MG tablet Take 1 tablet (10 mg) by mouth daily     ibuprofen (ADVIL/MOTRIN) 200 MG tablet Take 200-400 mg by mouth every 4 hours as needed for pain OTC     Prenatal Vit-Fe  Fumarate-FA (PRENATAL PO)      propranolol (INDERAL) 20 MG tablet Take 20 mg by mouth 2 times daily as needed for anxiety     triamcinolone (KENALOG) 0.025 % external ointment Apply topically 2 times daily To affected area in the axilla until clear.     venlafaxine (EFFEXOR-XR) 150 MG 24 hr capsule Take 1 capsule (150 mg) by mouth daily     No current facility-administered medications for this visit.     [facility] checked:   No records for past year    Past Medical/Surgical History:  Past Medical History:   Diagnosis Date     Abnormal Pap smear     Colposcopy     Adjustment disorder with mixed anxiety and depressed mood 04/04/2012     Anemia     In Past     Anxiety      Chlamydia trachomatis infection of other specified site 1993     Depression      Depressive disorder 1979    Not diagnosed until college...later diagnosed with TEE     Fertility problem      Lyme disease 09/08/2010    ?false positive vs positve CMV - resolved     Moderate dysplasia of cervix 1995     Other and unspecified adverse effect of drug, medicinal and biological substance     insulin resistent     Varicosities      Wounds and injuries     fall down stairs, PT for back, pain meds      has a past medical history of Abnormal Pap smear, Adjustment disorder with mixed anxiety and depressed mood (04/04/2012), Anemia, Anxiety, Chlamydia trachomatis infection of other specified site (1993), Depression, Depressive disorder (1979), Fertility problem, Lyme disease (09/08/2010), Moderate dysplasia of cervix (1995), Other and unspecified adverse effect of drug, medicinal and biological substance, Varicosities, and Wounds and injuries.    She has no past medical history of Arthritis, Asthma, Asymptomatic human immunodeficiency virus (HIV) infection status (H), Breast disorder, Cancer (H), Cerebral infarction (H), Chronic kidney disease, Complication of anesthesia, Congestive heart failure (H), COPD (chronic obstructive pulmonary disease) (H), Diabetes (H),  Diabetes mellitus (H), Heart disease, Herpes simplex without mention of complication, History of blood transfusion, Hypertension, Liver disease, Postpartum depression, Rh incompatibility, Seizures (H), Sickle cell anemia (H), Systemic lupus erythematosus (H), Thyroid disease, or Uncomplicated asthma.    Social History:  Reviewed. No changes to social history except as noted in HPI above.     Vital Signs:   None. This is phone/video visit.     Labs:  Most recent laboratory results reviewed and the pertinent results include:   Hemoglobin 11.4 on 12/25/2020, AST and ALT within normal limits on 12/24/2020    Review of Systems:  10 systems (general, cardiovascular, respiratory, eyes, ENT, endocrine, GI, , M/S, neurological) were reviewed. Most pertinent finding(s) is/are: denies fever, cough, headaches, shortness of breath, chest pain, N/V, constipation/diarrhea, trouble urinating, aches and pains. The remaining systems are all unremarkable.    Mental Status Examination (limited as this is by phone/video):  Appearance: Awake, alert, appears stated age, no acute distress, well-groomed  Attitude:  Cooperative, pleasant  Motor: No obvious abnormalities observed via video, not formally tested  Oriented to:  person, place, time, and situation  Attention Span and Concentration:  normal  Speech:  clear, coherent, regular rate, rhythm, and volume  Language: intact  Mood: Much more anxious and mood up and down  Affect: Overall appropriate and mood congruent  Associations:  no loose associations  Thought Process:  logical, linear and goal oriented  Thought Content:  no evidence of suicidal ideation or homicidal ideation, no evidence of psychotic thought, no auditory hallucinations present and no visual hallucinations present  Recent and Remote Memory:  Intact to interview. Not formally assessed. No amnesia.  Fund of Knowledge: appropriate  Insight:  good  Judgment:  intact, adequate for safety  Impulse Control:   intact    Suicide Risk Assessment:  Today Ellyn Mcgee reports no suicidal ideation. Based on all available evidence including the factors cited above, Ellyn Mcgee does not appear to be at imminent risk for self-harm, does not meet criteria for a 72-hr hold, and therefore remains appropriate for ongoing outpatient level of care.  A thorough assessment of risk factors related to suicide and self-harm have been reviewed and are noted above. The patient convincingly denies suicidality on several occasions. Local community safety resources printed and reviewed for patient to use if needed. There was no deceit detected, and the patient presented in a manner that was believable.     DSM5 Diagnosis:  Major Depressive Disorder, Recurrent Episode, moderate  300.02 (F41.1) Generalized Anxiety Disorder   ADHD, Unspecified  Insomnia-unspecified (mostly related to infant)    Medical comorbidities include:  Patient Active Problem List    Diagnosis Date Noted     Labor and delivery, indication for care 12/23/2020     Priority: Medium     Major depressive disorder, recurrent episode, moderate (H) 10/08/2020     Priority: Medium     High-risk pregnancy, elderly multigravida, unspecified trimester 06/05/2020     Priority: Medium     TEE (generalized anxiety disorder) 10/10/2018     Priority: Medium     Cervical radiculopathy 04/16/2018     Priority: Medium     Attention deficit hyperactivity disorder (ADHD), predominantly inattentive type 10/04/2017     Priority: Medium     Patient is followed by PREMA MARIEE for ongoing prescription of stimulants.  All refills should be approved by this provider, or covering partner.    Medication(s): Concerta 27mg on weekend days and 36mg other days of the week  Maximum quantity per month: 30  Clinic visit frequency required: Q 6  months     Controlled substance agreement on file: Yes       Date(s): 10/4/17  Neuropsych evaluation for ADD completed:  Yes, completed 8/17/17, on file and  diagnosis confirmed    Last [facility] website verification: 12/3/2019   [email or web address]           External hemorrhoids 04/25/2012     Priority: Medium     PCOS (polycystic ovarian syndrome) 02/22/2011     Priority: Medium     Hyperlipidemia LDL goal <130 09/05/2008     Priority: Medium     Anxiety state 09/05/2008     Priority: Medium     Problem list name updated by automated process. Provider to review    Patient is followed by PREMA MARIEE for ongoing prescription of benzodiazepines.  All refills should be approved by this provider, or covering partner.    Medication(s): Xanax.   Maximum quantity per month:   Clinic visit frequency required:      Controlled substance agreement on file: Yes       Date(s): 10/4/2017  Benzodiazepine use reviewed by psychiatry:      Last [facility] website verification:  done on 12/17/2018   [email or web address]             Psychosocial & Contextual Factors: See HPI above    Assessment:  Per Intake Note with me 9/8/20:  Ellyn Mcgee is a 45-year-old female who is 23 weeks pregnant with a past psychiatric history including depression, anxiety, and ADHD who presents today for psychiatric evaluation.  Her depression and anxiety date back several years and she has also had postpartum depression/anxiety with her now 8-year-old (she also has a 14-year-old but did not experience postpartum mental health issues at that time).  She started sertraline during her second pregnancy and then ended up being maintained on Paxil-CR for several years.  She is also more recently diagnosed with ADHD and maintained on Concerta.  She weaned off both Paxil and Concerta when she found out she was pregnant and replaced these medications with her current regimen of Lexapro and Effexor-XR to decrease risk of fetal defects.  For the most part, she is feeling a little bit better on her current doses of Lexapro 10 mg and Effexor-XR 75 mg.  It is an unconventional combination as we discussed, but  since she is doing quite well, I am hesitant to make many changes.  She feels as though her anxiety could be a little bit better controlled and so we will further increase Effexor-XR to 112.5 mg daily to be taken with her Lexapro 10 mg daily.  If she does a lot better, we can consider decreasing Lexapro to 5 mg daily. We discussed the risk of serotonin syndrome and she will continue to be vigilant for any signs or symptoms of this condition.  We did discuss the possibility of restarting a stimulant medication if necessary.  She does not feel as though her ADHD symptoms are too severe at this time.    4/20/21:   Today patient reports overall some ongoing ups and downs regarding her symptoms since last visit.  She is thinking much of it might be hormonal and related to sleep deprivation.  She still has not yet had her menses return since having her baby.  She has had some feelings of panic.  Used to take propranolol in the past when she felt this way.  She would like to start this medication again.  Discussed risks and benefits while breast-feeding.  Limited risks for taking low-dose propranolol while breast-feeding although the infant does have some exposure through breast milk.  Recommended taking propranolol at least 3-4 hours prior to breast-feeding to decrease risks.  No other medication changes at this time.    12/15/2021:   Patient has overall been feeling quite stable mood wise.  Has some ups and downs that are more related to feeling overwhelmed and exhausted regarding responsibilities for her children, home life, and work.  Does not feel depressed.  Feels like medications are working well.  Propranolol has been helpful.  No medication changes today.  No safety concerns or SI.  No problematic drug or alcohol use.    6/14/2022:  Patient overall struggling lately with mood, anxiety, ADHD symptoms.  Stopped Lexapro since felt like it was making her symptoms worse.  We have considered for quite some time about  starting her back on an ADHD medication.  I think it is worth a trial at this time.  Discussed risks and benefits of a stimulant medication while breast-feeding.  She will likely be weaning soon.  Still has not been getting great sleep due to cosleeping and nighttime feedings.  No acute safety concerns.  No SI.  No problematic drug or alcohol use.  If she has too much irritability on Adderall, we could consider either going back to Concerta or a trial with Vyvanse.    Medication side effects and alternatives were reviewed. Health promotion activities recommended and reviewed today. All questions addressed. Education and counseling completed regarding risks and benefits of medications and psychotherapy options. Recommend ongoing therapy for additional support.     Treatment Plan:    Discontinue Lexapro/escitalopram since already stopped    Continue venlafaxine/Effexor- mg daily for mood and anxiety.  Could consider decreasing dose if necessary.  Please message me if needed.    Continue propranolol 10-20 mg twice daily as needed for anxiety.  Have previously discussed risks/benefits of use while breastfeeding. To minimize exposure to infant try to breastfeed 3-4 hours after taking propranolol.     Start Adderall XR 15 mg daily as needed for ADHD symptoms.  Discussed risks vs benefits of taking stimulant medication while lactating/breast-feeding.  Additional information sent in [employer] message.    Continue all other medications as reviewed per electronic medical record today.     Safety plan reviewed. To the Emergency Department as needed or call after hours crisis line at [phone] or [phone]. [place] Crisis Text Line. Text [place] to 597830 or Suicide LifeLine Chat: suicidepreventionlifeline.org/chat    Continue therapy as planned.     Schedule an appointment with me in 4-6 weeks or sooner as needed.  Patient scheduled today.    Follow up with primary care provider as planned or for acute medical  "concerns.    Call the psychiatric nurse line with medication questions or concerns at [phone].    MyChart may be used to communicate with your provider, but this is not intended to be used for emergencies.    Risks of stimulant medication include, but not limited to, decreased appetite, risk of tics (and that they may be lasting), trouble sleeping, cardiac risks such as increased heart rate and blood pressure, and rare risk of sudden cardiac death.  Also risk of addiction/tolerance/dependence.    Therapy Resources:  Www.PsychologyToday.com  Www.NAMIMN.org    [facility]- Psychiatric Disorders During Pregnancy  https://womensmentalhealth.org/specialty-clinics/psychiatric-disorders-during-pregnancy    [facility]  [email or web address]    Administrative Billing:   Phone Call/Video Duration: 32 Minutes  Start: 10:07a  Stop: 10:39a    Time spent with patient was 32 minutes and greater than 50% of time or 17 minutes was spent in counseling and coordination of care regarding above diagnoses and treatment plan.      Patient Status:  Patient will continue to be seen for ongoing consultation and stabilization.    Signed:   Sherlyn Wang DO  [facility]    This note was created with voice recognition software. Inadvertent grammatical errors, typographical errors, and \"sound-a-like\" substitutions may occur due to limitations of the software.  Read the note carefully and apply context when erroneous substitutions have occurred. Thank you.   "

## 2022-06-16 ENCOUNTER — TELEPHONE (OUTPATIENT)
Dept: PSYCHIATRY | Facility: CLINIC | Age: 47
End: 2022-06-16
Payer: COMMERCIAL

## 2022-06-16 NOTE — TELEPHONE ENCOUNTER
Prior Authorization Retail Medication Request    Medication/Dose: amphetamine-dextroamphetamine (ADDERALL XR) 15 MG 24 hr capsule  ICD code (if different than what is on RX):  Attention deficit hyperactivity disorder (ADHD), predominantly inattentive type [F90.0]   Previously Tried and Failed:   Rationale:      Insurance Name: [employer]  Insurance ID:  [id number]    Pharmacy Information (if different than what is on RX)  [id number] [address] AT [facility] OF [facility]  Phone:  [phone]

## 2022-06-21 NOTE — TELEPHONE ENCOUNTER
Prior Authorization Approval    Authorization Effective Date: 5/22/2022  Authorization Expiration Date: 6/21/2023  Medication: amphetamine-dextroamphetamine (ADDERALL XR) 15 MG 24 hr capsule  Approved Dose/Quantity:   Reference #: [id number]   Insurance Company: [employer] - Phone [phone] Fax [phone]  Which Pharmacy is filling the prescription (Not needed for infusion/clinic administered): [id number] [address] AT [facility] OF [facility]  Pharmacy Notified: Yes  Patient Notified: Yes

## 2022-06-21 NOTE — TELEPHONE ENCOUNTER
PA Initiation    Medication: amphetamine-dextroamphetamine (ADDERALL XR) 15 MG 24 hr capsule  Insurance Company: [employer] - Phone [phone] Fax [phone]  Pharmacy Filling the Rx: [id number] [facility] [address] AT [facility] OF [facility]  Filling Pharmacy Phone: [phone]  Filling Pharmacy Fax: [phone]  Start Date: 6/21/2022

## 2022-07-05 NOTE — PROGRESS NOTES
[facility]                                               Progress Note    Patient Name: Ellyn Mcgee  Date: 7/6/2022         Service Type: Individual      Session Start Time: 2:06 p.m.  Session End Time: 2:58 p.m.     Session Length: 52 min.    Session #: 38 (with this writer; patient met previously for DA with [facility] and psychiatry)    Attendees: Client     Service Modality:  Video Visit:    Telemedicine Visit: The patient's condition can be safely assessed and treated via synchronous audio and visual telemedicine encounter.      Reason for Telemedicine Visit: Services only offered telehealth    Originating Site (Patient Location): Patient's home    Distant Site (Provider Location): [facility]: [place]    Consent:  The patient/guardian has verbally consented to: the potential risks and benefits of telemedicine (video visit) versus in person care; bill my insurance or make self-payment for services provided; and responsibility for payment of non-covered services.     Patient would like the video invitation sent by: Send to e-mail at: [email or web address]    Mode of Communication:  Video Conference via well    As the provider I attest to compliance with applicable laws and regulations related to telemedicine.      DATA  Interactive Complexity: No  Crisis: No       Progress Since Last Session (Related to Symptoms / Goals / Homework):  Symptoms: reports feeling overwhelmed and having additional relationship dissatisfaction/issues.       Homework: Partially completed      Episode of Care Goals: Satisfactory progress - ACTION (Actively working towards change); Intervened by reinforcing change plan / affirming steps taken     Current / Ongoing Stressors and Concerns:  Homeschooling and self-employment challenges; feeling overwhelmed by organizing home    Older daughter is experiencing dizziness/lethargy and was dx with POTS; some financial and friend-related stress; is  "interested in a deeper dive into emotional state and core beliefs through ACT; new son born 2020; roommate/former friend was finally evicted from their home (lived there for 3-4 years and did not paid any rent for over 1-2 years); pt has been experiencing mild panic attacks; daughter (age 10) just started ADHD medication; history of anxious attachment stemming from relationship with father (he  8 years ago); is currently pregnant (high-risk due to advanced maternal age) and baby is due Dec. 2020; is in couples therapy with  due to frequent arguments and his emotional affair earlier in the year; financial stress; history of ADHD (hyperactive type).     Treatment Objective(s) Addressed in This Session:  will identify how past feelings are impacting current relationships  Participating in enjoyable activities  Self-compassion and radical acceptance  Self-care and barriers to this  Emotional differentiation  Discussed healthy boundaries and enforcement    Past/future:  Triggers for anxiety and insecurities  tell full story of past relational issues/trust/infidelity  use cognitive strategies identified in therapy to challenge anxious thoughts   Body appreciation  Discussed work/life balance and current purpose  Dealing with \"big emotions\"  Discussed family system issues and possible messages internalized during childhood related to father  Relationship repair  Discussed ambiguous loss and grief in a box analogy  Anticipatory grief and creating meaning  10:10:10 rule (10 minutes, 10 months, and 10 years)  Feeling unappreciated and the 5:1 ratio (Natali)  Solution focused: how to prioritize and cope with interrupted sleep  Javon Servin's elements of trust and relationship repair  Natali research  Identified strengths as a mother and need for positive affirmations  Identified insecurities and how they affect patient's thoughts and actions now and in the past   identify how attachment style drives patient's " and 's behaviors  Identified personal strengths within friendships  tell full story of past trauma related to her relationship with her father (and current roommate issues)     Intervention:  Solution-Focused  CBT: connect thoughts, feelings, and actions   Narrative Therapy: separate problem from person and find the bright spots     Past/future:  Psychodynamic: Family systems and attachment style  [facility] research     Assessments completed prior to visit:  PHQ9:   PHQ-9 SCORE 6/9/2021 12/15/2021 12/21/2021 2/10/2022 3/15/2022 4/21/2022 7/6/2022   PHQ-9 Total Score - - - - - - -   PHQ-9 Total Score MyChart 7 (Mild depression) 7 (Mild depression) 6 (Mild depression) 8 (Mild depression) 8 (Mild depression) 11 (Moderate depression) 8 (Mild depression)   PHQ-9 Total Score 7 7 6 8 8 11 8     GAD7:   TEE-7 SCORE 8/12/2020 9/8/2020 12/11/2020 3/2/2022 4/21/2022 6/14/2022 7/6/2022   Total Score - - - - - - -   Total Score 14 (moderate anxiety) 12 (moderate anxiety) - 9 (mild anxiety) 12 (moderate anxiety) 8 (mild anxiety) 10 (moderate anxiety)   Total Score 14 12 11 9 12 8 10     PROMIS 10-Global Health (all questions and answers displayed):   PROMIS 10 12/15/2021 12/15/2021 3/15/2022 6/14/2022 7/6/2022   In general, would you say your health is: - Fair Fair Fair Fair   In general, would you say your quality of life is: - Fair Good Fair Fair   In general, how would you rate your physical health? - Fair Fair Fair Fair   In general, how would you rate your mental health, including your mood and your ability to think? - Good Good Fair Fair   In general, how would you rate your satisfaction with your social activities and relationships? - Fair Good Poor Fair   In general, please rate how well you carry out your usual social activities and roles - Fair Fair Good Fair   To what extent are you able to carry out your everyday physical activities such as walking, climbing stairs, carrying groceries, or moving a chair? -  Completely Completely Completely Mostly   How often have you been bothered by emotional problems such as feeling anxious, depressed or irritable? - Sometimes Often Often Often   How would you rate your fatigue on average? - Severe Moderate Moderate Moderate   How would you rate your pain on average?   0 = No Pain  to  10 = Worst Imaginable Pain - 5 7 7 8   In general, would you say your health is: 2 2 2 2 2   In general, would you say your quality of life is: 2 2 3 2 2   In general, how would you rate your physical health? 2 2 2 2 2   In general, how would you rate your mental health, including your mood and your ability to think? 3 3 3 2 2   In general, how would you rate your satisfaction with your social activities and relationships? 2 2 3 1 2   In general, please rate how well you carry out your usual social activities and roles. (This includes activities at home, at work and in your community, and responsibilities as a parent, child, spouse, employee, friend, etc.) 2 2 2 3 2   To what extent are you able to carry out your everyday physical activities such as walking, climbing stairs, carrying groceries, or moving a chair? 5 5 5 5 4   In the past 7 days, how often have you been bothered by emotional problems such as feeling anxious, depressed, or irritable? 3 3 4 4 4   In the past 7 days, how would you rate your fatigue on average? 4 4 3 3 3   In the past 7 days, how would you rate your pain on average, where 0 means no pain, and 10 means worst imaginable pain? 5 5 7 7 8   Global Mental Health Score 10 10 11 7 8   Global Physical Health Score 12 12 12 12 11   PROMIS TOTAL - SUBSCORES 22 22 23 19 19   Some recent data might be hidden        ASSESSMENT: Current Emotional / Mental Status (status of significant symptoms):   Risk status (Self / Other harm or suicidal ideation)   Patient denies current fears or concerns for personal safety.   Patient denies current or recent suicidal ideation or behaviors. Patient  last reported feeling hopeless/passive ideation on 8/6/2020.   Patient denies current or recent homicidal ideation or behaviors.   Patient denies current or recent self injurious behavior or ideation.   Patient denies other safety concerns.   Patient reports there has been no change in risk factors since their last session.     Patient reports there has been no change in protective factors since their last session.     Recommended that patient call 911 or go to the local ED should there be a change in any of these risk factors.     Appearance:   Appropriate    Eye Contact:   Good    Psychomotor Behavior: Normal  Restless     Attitude:   Cooperative, Pleasant   Orientation:   All   Speech    Rate / Production: Normal/ Responsive Talkative    Volume:  Normal    Mood:    Anxious  Sad  Fearful   Affect:    Appropriate  Subdued    Thought Content:  Clear  Rumination    Thought Form:  Coherent  Neurosis   Insight:    Good      Medication Review:   No changes to current psychiatric medication(s)     Medication Compliance:   Yes     Changes in Health Issues:   None reported      Chemical Use Review:   Substance Use: Chemical use reviewed, no active concerns identified ; no current alcohol use     Tobacco Use: No current tobacco use.      Diagnosis:  1. TEE (generalized anxiety disorder)    2. Recurrent major depressive disorder, in partial remission (H)      Note: There has been demonstrated improvement in functioning while patient has been engaged in psychotherapy/psychological service- if withdrawn the patient would deteriorate and/or relapse.     Collateral Reports Completed:  N/A    PLAN: (Patient Tasks / Therapist Tasks / Other)    Patient states that her goals for the next two weeks include:    1. Think more about relationship w/  2. Check off items on checklist, including clean chicken coop  3. Request off at the least one full weekend at work  4. 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 Enjoy spending time with friend at [employer]    ______________________________________________________________________                                                           [facility]    Individual Treatment Plan    Patient's Name: Ellyn Mcgee  YOB: 1975    Date of Creation: 2020  Date Treatment Plan Last Reviewed/Revised: 2022    DSM5 Diagnoses: 296.32 (F33.1) Major Depressive Disorder, Recurrent Episode, Moderate _ or 300.02 (F41.1) Generalized Anxiety Disorder (patient has previously been diagnosed with ADHD, hyperactive type)  Psychosocial / Contextual Factors: History of anxious attachment stemming from relationship with father (he  8 years ago); is currently pregnant (high-risk due to advanced maternal age); is in couples therapy with  due to frequent arguments and his emotional affair earlier in the year; financial stress; history of ADHD (hyperactive type).  PROMIS (reviewed every 90 days): PROMIS-10 Scores             Referral / Collaboration:  Referral to another professional/service is not indicated at this time. Patient was previously referred to an ACT therapist but plans to wait to schedule.    Anticipated number of sessions for this episode of care: 60  Anticipation frequency of session: Monthly  Anticipated duration of each session: 38-52 minutes  Treatment plan will be reviewed in 90 days or when goals have been changed.             Measurable Treatment Goal(s) related to diagnosis / functional impairment(s):  Patient is asking to focus treatment on her relationship with her father and past trauma.    Goal 1: Patient will tell full story of past trauma related to her relationship with her father and will identify how past feelings are impacting current relationships.    Objective #A (Patient Action)    Patient will identify how past feelings are impacting " "current relationships.  Status: Continued - Date(s): 4/21/2022    Intervention(s)  Therapist will role-play conflict management.    Objective #B  Patient will identify strengths and weaknesses within her family system of origin.  Status: Restarted - Date: 4/21/2022     Intervention(s)  Therapist will assign homework for patient to create list.      Goal 2: Patient will learn about resilience, trauma responses, and Javon Servin's \"the story I'm making up\" (cognitive strategy to manage anxious thoughts).    Objective #A (Patient Action)    Patient will learn about and identify personal trauma responses.    Status: Continued - Date(s): 4/21/2022    Intervention(s)  Therapist will provide educational materials on trauma responses.    Objective #B  Patient will use cognitive strategies identified in therapy to challenge anxious thoughts.  Status: Continued - Date(s): 4/21/2022    Intervention(s)  Therapist will teach about Javon Gareth's power of vulnerability and \"the story I'm making up\".      Goal 3: Patient will learn about protective factors that sugey resilience and attachment styles and will identify how her attachment style drives her behavior.     Objective #A (Patient Action)    Status: Completed - Date: 4/21/2022     Patient will learn about protective factors and will identify her own.    Intervention(s)  Therapist will complete with patient in session.    Objective #B  Patient will identify how her attachment style drives her behavior.  Status: Continued - Date(s): 4/21/2022    Intervention(s)  Therapist will teach emotional regulation skills.        Patient agreed to the above plan.      Keesha Eller    "

## 2022-07-06 ENCOUNTER — VIRTUAL VISIT (OUTPATIENT)
Dept: PSYCHOLOGY | Facility: CLINIC | Age: 47
End: 2022-07-06
Payer: COMMERCIAL

## 2022-07-06 DIAGNOSIS — F33.41 RECURRENT MAJOR DEPRESSIVE DISORDER, IN PARTIAL REMISSION (H): ICD-10-CM

## 2022-07-06 DIAGNOSIS — F41.1 GAD (GENERALIZED ANXIETY DISORDER): Primary | ICD-10-CM

## 2022-07-06 PROCEDURE — 90834 PSYTX W PT 45 MINUTES: CPT | Mod: 95 | Performed by: MARRIAGE & FAMILY THERAPIST

## 2022-07-06 ASSESSMENT — PATIENT HEALTH QUESTIONNAIRE - PHQ9
10. IF YOU CHECKED OFF ANY PROBLEMS, HOW DIFFICULT HAVE THESE PROBLEMS MADE IT FOR YOU TO DO YOUR WORK, TAKE CARE OF THINGS AT HOME, OR GET ALONG WITH OTHER PEOPLE: SOMEWHAT DIFFICULT
SUM OF ALL RESPONSES TO PHQ QUESTIONS 1-9: 8
SUM OF ALL RESPONSES TO PHQ QUESTIONS 1-9: 8

## 2022-07-06 ASSESSMENT — ANXIETY QUESTIONNAIRES
3. WORRYING TOO MUCH ABOUT DIFFERENT THINGS: SEVERAL DAYS
8. IF YOU CHECKED OFF ANY PROBLEMS, HOW DIFFICULT HAVE THESE MADE IT FOR YOU TO DO YOUR WORK, TAKE CARE OF THINGS AT HOME, OR GET ALONG WITH OTHER PEOPLE?: SOMEWHAT DIFFICULT
GAD7 TOTAL SCORE: 10
GAD7 TOTAL SCORE: 10
7. FEELING AFRAID AS IF SOMETHING AWFUL MIGHT HAPPEN: SEVERAL DAYS
7. FEELING AFRAID AS IF SOMETHING AWFUL MIGHT HAPPEN: SEVERAL DAYS
5. BEING SO RESTLESS THAT IT IS HARD TO SIT STILL: NOT AT ALL
6. BECOMING EASILY ANNOYED OR IRRITABLE: NEARLY EVERY DAY
4. TROUBLE RELAXING: MORE THAN HALF THE DAYS
1. FEELING NERVOUS, ANXIOUS, OR ON EDGE: NEARLY EVERY DAY
2. NOT BEING ABLE TO STOP OR CONTROL WORRYING: NOT AT ALL
GAD7 TOTAL SCORE: 10

## 2022-07-06 NOTE — PATIENT INSTRUCTIONS
"Patient states that her goals for the next two weeks include:    1. Think more about relationship w/  2. Check off items on checklist, including clean chicken coop  3. Request off at the least one full weekend at work  4. Shower today  5. Find a way to work on [employer] business and figure out \"what I think I need and how to make it happen.\"  6. Enjoy spending time with friend at [employer]  "

## 2022-07-19 NOTE — PROGRESS NOTES
[facility]                                               Progress Note    Patient Name: Ellyn Mcgee  Date: 7/20/2022         Service Type: Individual      Session Start Time: 3:06 p.m.  Session End Time: 4:16 p.m.     Session Length: 70 min.    Session #: 39 (with this writer; patient met previously for DA with [facility] Elena Hill and psychiatry)    Attendees: Client     Service Modality:  Video Visit:    Telemedicine Visit: The patient's condition can be safely assessed and treated via synchronous audio and visual telemedicine encounter.      Reason for Telemedicine Visit: Services only offered telehealth    Originating Site (Patient Location): Patient's home    Distant Site (Provider Location): [facility]: [place]    Consent:  The patient/guardian has verbally consented to: the potential risks and benefits of telemedicine (video visit) versus in person care; bill my insurance or make self-payment for services provided; and responsibility for payment of non-covered services.     Patient would like the video invitation sent by: Send to e-mail at: [email or web address]    Mode of Communication:  Video Conference via well    As the provider I attest to compliance with applicable laws and regulations related to telemedicine.      DATA  Interactive Complexity: No  Crisis: No       Progress Since Last Session (Related to Symptoms / Goals / Homework):  Symptoms: reports feeling completely overwhelmed/stressed and having additional relationship dissatisfaction/issues; reduced work schedule has helped somewhat.       Homework: Achieved / completed to satisfaction      Episode of Care Goals: Satisfactory progress - ACTION (Actively working towards change); Intervened by reinforcing change plan / affirming steps taken     Current / Ongoing Stressors and Concerns:  Homeschooling and self-employment challenges; feeling overwhelmed by organizing home    Older daughter is experiencing  "dizziness/lethargy and was dx with POTS; some financial and friend-related stress; is interested in a deeper dive into emotional state and core beliefs through ACT; new son born 2020; roommate/former friend was finally evicted from their home (lived there for 3-4 years and did not paid any rent for over 1-2 years); pt has been experiencing mild panic attacks; daughter (age 10) just started ADHD medication; history of anxious attachment stemming from relationship with father (he  8 years ago); is currently pregnant (high-risk due to advanced maternal age) and baby is due Dec. 2020; is in couples therapy with  due to frequent arguments and his emotional affair earlier in the year; financial stress; history of ADHD (hyperactive type).     Treatment Objective(s) Addressed in This Session:  Discussed work/life balance and current purpose  Management of anger  Dealing with \"big emotions\"  will identify how past feelings are impacting current relationships  Self-care and barriers to this  Emotional differentiation  Discussed healthy boundaries and enforcement    Past/future:  Participating in enjoyable activities  Self-compassion and radical acceptance  Triggers for anxiety and insecurities  tell full story of past relational issues/trust/infidelity  use cognitive strategies identified in therapy to challenge anxious thoughts   Body appreciation  Discussed family system issues and possible messages internalized during childhood related to father  Relationship repair  Discussed ambiguous loss and grief in a box analogy  Anticipatory grief and creating meaning  10:10:10 rule (10 minutes, 10 months, and 10 years)  Feeling unappreciated and the 5:1 ratio (Natali)  Solution focused: how to prioritize and cope with interrupted sleep  Javon Servin's elements of trust and relationship repair  Natali research  Identified strengths as a mother and need for positive affirmations  Identified insecurities and how they " affect patient's thoughts and actions now and in the past   identify how attachment style drives patient's and 's behaviors  Identified personal strengths within friendships  tell full story of past trauma related to her relationship with her father (and current roommate issues)     Intervention:  Management of anger  Solution-Focused  CBT: connect thoughts, feelings, and actions   Narrative Therapy: separate problem from person and find the bright spots     Past/future:  Psychodynamic: Family systems and attachment style  [facility] research     Assessments completed prior to visit:  PHQ9:   PHQ-9 SCORE 6/9/2021 12/15/2021 12/21/2021 2/10/2022 3/15/2022 4/21/2022 7/6/2022   PHQ-9 Total Score - - - - - - -   PHQ-9 Total Score MyChart 7 (Mild depression) 7 (Mild depression) 6 (Mild depression) 8 (Mild depression) 8 (Mild depression) 11 (Moderate depression) 8 (Mild depression)   PHQ-9 Total Score 7 7 6 8 8 11 8     GAD7:   TEE-7 SCORE 8/12/2020 9/8/2020 12/11/2020 3/2/2022 4/21/2022 6/14/2022 7/6/2022   Total Score - - - - - - -   Total Score 14 (moderate anxiety) 12 (moderate anxiety) - 9 (mild anxiety) 12 (moderate anxiety) 8 (mild anxiety) 10 (moderate anxiety)   Total Score 14 12 11 9 12 8 10     PROMIS 10-Global Health (all questions and answers displayed):   PROMIS 10 12/15/2021 12/15/2021 3/15/2022 6/14/2022 7/6/2022   In general, would you say your health is: - Fair Fair Fair Fair   In general, would you say your quality of life is: - Fair Good Fair Fair   In general, how would you rate your physical health? - Fair Fair Fair Fair   In general, how would you rate your mental health, including your mood and your ability to think? - Good Good Fair Fair   In general, how would you rate your satisfaction with your social activities and relationships? - Fair Good Poor Fair   In general, please rate how well you carry out your usual social activities and roles - Fair Fair Good Fair   To what extent are you able  to carry out your everyday physical activities such as walking, climbing stairs, carrying groceries, or moving a chair? - Completely Completely Completely Mostly   How often have you been bothered by emotional problems such as feeling anxious, depressed or irritable? - Sometimes Often Often Often   How would you rate your fatigue on average? - Severe Moderate Moderate Moderate   How would you rate your pain on average?   0 = No Pain  to  10 = Worst Imaginable Pain - 5 7 7 8   In general, would you say your health is: 2 2 2 2 2   In general, would you say your quality of life is: 2 2 3 2 2   In general, how would you rate your physical health? 2 2 2 2 2   In general, how would you rate your mental health, including your mood and your ability to think? 3 3 3 2 2   In general, how would you rate your satisfaction with your social activities and relationships? 2 2 3 1 2   In general, please rate how well you carry out your usual social activities and roles. (This includes activities at home, at work and in your community, and responsibilities as a parent, child, spouse, employee, friend, etc.) 2 2 2 3 2   To what extent are you able to carry out your everyday physical activities such as walking, climbing stairs, carrying groceries, or moving a chair? 5 5 5 5 4   In the past 7 days, how often have you been bothered by emotional problems such as feeling anxious, depressed, or irritable? 3 3 4 4 4   In the past 7 days, how would you rate your fatigue on average? 4 4 3 3 3   In the past 7 days, how would you rate your pain on average, where 0 means no pain, and 10 means worst imaginable pain? 5 5 7 7 8   Global Mental Health Score 10 10 11 7 8   Global Physical Health Score 12 12 12 12 11   PROMIS TOTAL - SUBSCORES 22 22 23 19 19   Some recent data might be hidden        ASSESSMENT: Current Emotional / Mental Status (status of significant symptoms):   Risk status (Self / Other harm or suicidal ideation)   Patient denies  current fears or concerns for personal safety.   Patient denies current or recent suicidal ideation or behaviors. Patient last reported feeling hopeless/passive ideation on 8/6/2020.   Patient denies current or recent homicidal ideation or behaviors.   Patient denies current or recent self injurious behavior or ideation.   Patient denies other safety concerns.   Patient reports there has been no change in risk factors since their last session.     Patient reports there has been no change in protective factors since their last session.     Recommended that patient call 911 or go to the local ED should there be a change in any of these risk factors.     Appearance:   Appropriate    Eye Contact:   Good    Psychomotor Behavior: Normal  Restless     Attitude:   Cooperative, Pleasant   Orientation:   All   Speech    Rate / Production: Normal/ Responsive Talkative    Volume:  Normal    Mood:    Anxious  Fearful   Affect:    Appropriate    Thought Content:  Clear  Rumination    Thought Form:  Coherent  Neurosis   Insight:    Good      Medication Review:   No changes to current psychiatric medication(s)     Medication Compliance:   Yes     Changes in Health Issues:   None reported      Chemical Use Review:   Substance Use: Chemical use reviewed, no active concerns identified ; no current alcohol use     Tobacco Use: No current tobacco use.      Diagnosis:  1. TEE (generalized anxiety disorder)    2. Recurrent major depressive disorder, in partial remission (H)      Note: There has been demonstrated improvement in functioning while patient has been engaged in psychotherapy/psychological service- if withdrawn the patient would deteriorate and/or relapse.     Collateral Reports Completed:  N/A    PLAN: (Patient Tasks / Therapist Tasks / Other)    Patient states that her goals for the next 1-2 weeks include:    1. Homework: think about desired presentation (how you want to show up for friends/family) and write 5-10 descriptions  of this idealized version of self    Next session: discuss each quality and how current self aligns with or diverges from idealized self        Keesha Eller    ______________________________________________________________________                                                           [facility]    Individual Treatment Plan    Patient's Name: Ellyn Mcgee  YOB: 1975    Date of Creation: 2020  Date Treatment Plan Last Reviewed/Revised: 2022    DSM5 Diagnoses: 296.32 (F33.1) Major Depressive Disorder, Recurrent Episode, Moderate _ or 300.02 (F41.1) Generalized Anxiety Disorder (patient has previously been diagnosed with ADHD, hyperactive type)  Psychosocial / Contextual Factors: History of anxious attachment stemming from relationship with father (he  8 years ago); is currently pregnant (high-risk due to advanced maternal age); is in couples therapy with  due to frequent arguments and his emotional affair earlier in the year; financial stress; history of ADHD (hyperactive type).  PROMIS (reviewed every 90 days): PROMIS-10 Scores             Referral / Collaboration:  Referral to another professional/service is not indicated at this time. Patient was previously referred to an ACT therapist but plans to wait to schedule.    Anticipated number of sessions for this episode of care: 60  Anticipation frequency of session: Monthly  Anticipated duration of each session: 38-52 minutes  Treatment plan will be reviewed in 90 days or when goals have been changed.             Measurable Treatment Goal(s) related to diagnosis / functional impairment(s):  Patient is asking to focus treatment on her relationship with her father and past trauma.    Goal 1: Patient will tell full story of past trauma related to her relationship with her father and will identify how past feelings are impacting current relationships.    Objective #A (Patient Action)    Patient will identify  "how past feelings are impacting current relationships.  Status: Continued - Date(s): 7/21/2022    Intervention(s)  Therapist will role-play conflict management.    Objective #B  Patient will identify strengths and weaknesses within her family system of origin.  Status: Continued - Date(s): 7/21/2022     Intervention(s)  Therapist will assign homework for patient to create list.      Goal 2: Patient will learn about resilience, trauma responses, and Javon Servin's \"the story I'm making up\" (cognitive strategy to manage anxious thoughts).    Objective #A (Patient Action)    Patient will learn about and identify personal trauma responses.    Status: Continued - Date(s): 7/21/2022    Intervention(s)  Therapist will provide educational materials on trauma responses.    Objective #B  Patient will use cognitive strategies identified in therapy to challenge anxious thoughts.  Status: Continued - Date(s): 7/21/2022    Intervention(s)  Therapist will teach about Javon Servin's power of vulnerability and \"the story I'm making up\".      Goal 3: Patient will learn about protective factors that sugey resilience and attachment styles and will identify how her attachment style drives her behavior.     Objective #A (Patient Action)    Status: Restarted - Date: 7/21/2022     Patient will learn about protective factors and will identify her own.    Intervention(s)  Therapist will complete with patient in session.    Objective #B  Patient will identify how her attachment style drives her behavior.  Status: Continued - Date(s): 7/21/2022    Intervention(s)  Therapist will teach emotional regulation skills.        Patient agreed to the above plan.      Keesha Eller    "

## 2022-07-20 ENCOUNTER — VIRTUAL VISIT (OUTPATIENT)
Dept: PSYCHOLOGY | Facility: OTHER | Age: 47
End: 2022-07-20
Payer: COMMERCIAL

## 2022-07-20 DIAGNOSIS — F41.1 GAD (GENERALIZED ANXIETY DISORDER): Primary | ICD-10-CM

## 2022-07-20 DIAGNOSIS — F33.41 RECURRENT MAJOR DEPRESSIVE DISORDER, IN PARTIAL REMISSION (H): ICD-10-CM

## 2022-07-20 PROCEDURE — 90837 PSYTX W PT 60 MINUTES: CPT | Mod: 95 | Performed by: MARRIAGE & FAMILY THERAPIST

## 2022-07-20 NOTE — PATIENT INSTRUCTIONS
Patient states that her goals for the next 1-2 weeks include:    1. Homework: think about desired presentation (how you want to show up for friends/family) and write 5-10 descriptions of this idealized version of self    Next session: discuss each quality and how current self aligns with or diverges from idealized self

## 2022-07-27 ENCOUNTER — MYC MEDICAL ADVICE (OUTPATIENT)
Dept: PSYCHIATRY | Facility: CLINIC | Age: 47
End: 2022-07-27

## 2022-07-27 DIAGNOSIS — F90.0 ATTENTION DEFICIT HYPERACTIVITY DISORDER (ADHD), PREDOMINANTLY INATTENTIVE TYPE: ICD-10-CM

## 2022-07-27 RX ORDER — DEXTROAMPHETAMINE SACCHARATE, AMPHETAMINE ASPARTATE MONOHYDRATE, DEXTROAMPHETAMINE SULFATE AND AMPHETAMINE SULFATE 3.75; 3.75; 3.75; 3.75 MG/1; MG/1; MG/1; MG/1
15 CAPSULE, EXTENDED RELEASE ORAL DAILY PRN
Qty: 30 CAPSULE | Refills: 0
Start: 2022-06-14 | End: 2023-08-15

## 2022-07-28 NOTE — PROGRESS NOTES
[facility]                                               Progress Note    Patient Name: Ellyn Mcgee  Date: 7/29/2022         Service Type: Individual      Session Start Time: 2:02 p.m.  Session End Time: 2:54 p.m.     Session Length: 52 min.    Session #: 40 (with this writer; patient met previously for DA with [facility] [place] [facility] and psychiatry)    Attendees: Client     Service Modality:  Video Visit:    Telemedicine Visit: The patient's condition can be safely assessed and treated via synchronous audio and visual telemedicine encounter.      Reason for Telemedicine Visit: Services only offered telehealth    Originating Site (Patient Location): Patient's home    Distant Site (Provider Location): [facility]: [place]    Consent:  The patient/guardian has verbally consented to: the potential risks and benefits of telemedicine (video visit) versus in person care; bill my insurance or make self-payment for services provided; and responsibility for payment of non-covered services.     Patient would like the video invitation sent by: Send to e-mail at: [email or web address]    Mode of Communication:  Video Conference via well    As the provider I attest to compliance with applicable laws and regulations related to telemedicine.      DATA  Interactive Complexity: No  Crisis: No       Progress Since Last Session (Related to Symptoms / Goals / Homework):  Symptoms: reports having a pretty stable mood and energy level this week but some guilt/sadness about not being able to be everything for everyone in her family       Homework: Achieved / completed to satisfaction      Episode of Care Goals: Satisfactory progress - ACTION (Actively working towards change); Intervened by reinforcing change plan / affirming steps taken     Current / Ongoing Stressors and Concerns:  Homeschooling and self-employment challenges; feeling overwhelmed by organizing home    Older daughter is experiencing  "dizziness/lethargy and was dx with POTS; some financial and friend-related stress; is interested in a deeper dive into emotional state and core beliefs through ACT; new son born 2020; roommate/former friend was finally evicted from their home (lived there for 3-4 years and did not paid any rent for over 1-2 years); pt has been experiencing mild panic attacks; daughter (age 10) just started ADHD medication; history of anxious attachment stemming from relationship with father (he  8 years ago); is currently pregnant (high-risk due to advanced maternal age) and baby is due Dec. 2020; is in couples therapy with  due to frequent arguments and his emotional affair earlier in the year; financial stress; history of ADHD (hyperactive type).     Treatment Objective(s) Addressed in This Session:  Identified strengths as a mother and need for positive affirmations  Identified insecurities and how they affect patient's thoughts and actions now and in the past  Discussed work/life balance and current purpose  Management of anger  Dealing with \"big emotions\"  will identify how past feelings are impacting current relationships  Self-care and barriers to this  Emotional differentiation  Discussed healthy boundaries and enforcement    Past/future:  Participating in enjoyable activities  Self-compassion and radical acceptance  Triggers for anxiety and insecurities  tell full story of past relational issues/trust/infidelity  use cognitive strategies identified in therapy to challenge anxious thoughts   Body appreciation  Discussed family system issues and possible messages internalized during childhood related to father  Relationship repair  Discussed ambiguous loss and grief in a box analogy  Anticipatory grief and creating meaning  10:10:10 rule (10 minutes, 10 months, and 10 years)  Feeling unappreciated and the 5:1 ratio (Natali)  Solution focused: how to prioritize and cope with interrupted sleep  Javon Servin's " elements of trust and relationship repair  [facility] research   identify how attachment style drives patient's and 's behaviors  Identified personal strengths within friendships  tell full story of past trauma related to her relationship with her father (and current roommate issues)     Intervention:  Solution-Focused  Future-self goals  CBT: connect thoughts, feelings, and actions   Narrative Therapy: separate problem from person and find the bright spots     Past/future:  Management of anger  Psychodynamic: Family systems and attachment style  [facility] research     Assessments completed prior to visit:  PHQ9:   PHQ-9 SCORE 6/9/2021 12/15/2021 12/21/2021 2/10/2022 3/15/2022 4/21/2022 7/6/2022   PHQ-9 Total Score - - - - - - -   PHQ-9 Total Score MyChart 7 (Mild depression) 7 (Mild depression) 6 (Mild depression) 8 (Mild depression) 8 (Mild depression) 11 (Moderate depression) 8 (Mild depression)   PHQ-9 Total Score 7 7 6 8 8 11 8     GAD7:   TEE-7 SCORE 8/12/2020 9/8/2020 12/11/2020 3/2/2022 4/21/2022 6/14/2022 7/6/2022   Total Score - - - - - - -   Total Score 14 (moderate anxiety) 12 (moderate anxiety) - 9 (mild anxiety) 12 (moderate anxiety) 8 (mild anxiety) 10 (moderate anxiety)   Total Score 14 12 11 9 12 8 10     PROMIS 10-Global Health (all questions and answers displayed):   PROMIS 10 12/15/2021 12/15/2021 3/15/2022 6/14/2022 7/6/2022   In general, would you say your health is: - Fair Fair Fair Fair   In general, would you say your quality of life is: - Fair Good Fair Fair   In general, how would you rate your physical health? - Fair Fair Fair Fair   In general, how would you rate your mental health, including your mood and your ability to think? - Good Good Fair Fair   In general, how would you rate your satisfaction with your social activities and relationships? - Fair Good Poor Fair   In general, please rate how well you carry out your usual social activities and roles - Fair Fair Good Fair   To what  extent are you able to carry out your everyday physical activities such as walking, climbing stairs, carrying groceries, or moving a chair? - Completely Completely Completely Mostly   How often have you been bothered by emotional problems such as feeling anxious, depressed or irritable? - Sometimes Often Often Often   How would you rate your fatigue on average? - Severe Moderate Moderate Moderate   How would you rate your pain on average?   0 = No Pain  to  10 = Worst Imaginable Pain - 5 7 7 8   In general, would you say your health is: 2 2 2 2 2   In general, would you say your quality of life is: 2 2 3 2 2   In general, how would you rate your physical health? 2 2 2 2 2   In general, how would you rate your mental health, including your mood and your ability to think? 3 3 3 2 2   In general, how would you rate your satisfaction with your social activities and relationships? 2 2 3 1 2   In general, please rate how well you carry out your usual social activities and roles. (This includes activities at home, at work and in your community, and responsibilities as a parent, child, spouse, employee, friend, etc.) 2 2 2 3 2   To what extent are you able to carry out your everyday physical activities such as walking, climbing stairs, carrying groceries, or moving a chair? 5 5 5 5 4   In the past 7 days, how often have you been bothered by emotional problems such as feeling anxious, depressed, or irritable? 3 3 4 4 4   In the past 7 days, how would you rate your fatigue on average? 4 4 3 3 3   In the past 7 days, how would you rate your pain on average, where 0 means no pain, and 10 means worst imaginable pain? 5 5 7 7 8   Global Mental Health Score 10 10 11 7 8   Global Physical Health Score 12 12 12 12 11   PROMIS TOTAL - SUBSCORES 22 22 23 19 19   Some recent data might be hidden        ASSESSMENT: Current Emotional / Mental Status (status of significant symptoms):   Risk status (Self / Other harm or suicidal  "ideation)   Patient denies current fears or concerns for personal safety.   Patient denies current or recent suicidal ideation or behaviors. Patient last reported feeling hopeless/passive ideation on 8/6/2020.   Patient denies current or recent homicidal ideation or behaviors.   Patient denies current or recent self injurious behavior or ideation.   Patient denies other safety concerns.   Patient reports there has been no change in risk factors since their last session.     Patient reports there has been no change in protective factors since their last session.     Recommended that patient call 911 or go to the local ED should there be a change in any of these risk factors.     Appearance:   Appropriate    Eye Contact:   Good    Psychomotor Behavior: Normal  Restless     Attitude:   Cooperative, Pleasant   Orientation:   All   Speech    Rate / Production: Normal/ Responsive Talkative    Volume:  Normal    Mood:    Anxious    Affect:    Appropriate    Thought Content:  Clear  Rumination    Thought Form:  Coherent  Neurosis   Insight:    Good      Medication Review:   No changes to current psychiatric medication(s)     Medication Compliance:   Yes     Changes in Health Issues:   None reported      Chemical Use Review:   Substance Use: Chemical use reviewed, no active concerns identified ; no current alcohol use     Tobacco Use: No current tobacco use.      Diagnosis:  1. TEE (generalized anxiety disorder)    2. Recurrent major depressive disorder, in partial remission (H)      Note: There has been demonstrated improvement in functioning while patient has been engaged in psychotherapy/psychological service- if withdrawn the patient would deteriorate and/or relapse.     Collateral Reports Completed:  N/A    PLAN: (Patient Tasks / Therapist Tasks / Other)    Patient states that her goals for the next two weeks include:    1. Homework: ask daughters and , \"How would you describe me?\"  2. Work on emotional " reactivity when stressed  3. Schedule therapy appointments for daughters    List of idealized self qualities:  Patient  Optimistic  Understanding  Empathetic  Better at listening  Someone people look up to  Help children to have better lives (with less emotional and financial struggles) than their parents had            Keesha MMaciel Eller    ______________________________________________________________________                                                           [facility]    Individual Treatment Plan    Patient's Name: Ellyn Mcgee  YOB: 1975    Date of Creation: 2020  Date Treatment Plan Last Reviewed/Revised: 2022    DSM5 Diagnoses: 296.32 (F33.1) Major Depressive Disorder, Recurrent Episode, Moderate _ or 300.02 (F41.1) Generalized Anxiety Disorder (patient has previously been diagnosed with ADHD, hyperactive type)  Psychosocial / Contextual Factors: History of anxious attachment stemming from relationship with father (he  8 years ago); is currently pregnant (high-risk due to advanced maternal age); is in couples therapy with  due to frequent arguments and his emotional affair earlier in the year; financial stress; history of ADHD (hyperactive type).  PROMIS (reviewed every 90 days): PROMIS-10 Scores             Referral / Collaboration:  Referral to another professional/service is not indicated at this time. Patient was previously referred to an ACT therapist but plans to wait to schedule.    Anticipated number of sessions for this episode of care: 60  Anticipation frequency of session: Monthly  Anticipated duration of each session: 38-52 minutes  Treatment plan will be reviewed in 90 days or when goals have been changed.             Measurable Treatment Goal(s) related to diagnosis / functional impairment(s):  Patient is asking to focus treatment on her relationship with her father and past trauma.    Goal 1: Patient will tell full story of past  "trauma related to her relationship with her father and will identify how past feelings are impacting current relationships.    Objective #A (Patient Action)    Patient will identify how past feelings are impacting current relationships.  Status: Continued - Date(s): 7/21/2022    Intervention(s)  Therapist will role-play conflict management.    Objective #B  Patient will identify strengths and weaknesses within her family system of origin.  Status: Continued - Date(s): 7/21/2022     Intervention(s)  Therapist will assign homework for patient to create list.      Goal 2: Patient will learn about resilience, trauma responses, and Javon Servin's \"the story I'm making up\" (cognitive strategy to manage anxious thoughts).    Objective #A (Patient Action)    Patient will learn about and identify personal trauma responses.    Status: Continued - Date(s): 7/21/2022    Intervention(s)  Therapist will provide educational materials on trauma responses.    Objective #B  Patient will use cognitive strategies identified in therapy to challenge anxious thoughts.  Status: Continued - Date(s): 7/21/2022    Intervention(s)  Therapist will teach about Javon Servin's power of vulnerability and \"the story I'm making up\".      Goal 3: Patient will learn about protective factors that sugey resilience and attachment styles and will identify how her attachment style drives her behavior.     Objective #A (Patient Action)    Status: Restarted - Date: 7/21/2022     Patient will learn about protective factors and will identify her own.    Intervention(s)  Therapist will complete with patient in session.    Objective #B  Patient will identify how her attachment style drives her behavior.  Status: Continued - Date(s): 7/21/2022    Intervention(s)  Therapist will teach emotional regulation skills.        Patient agreed to the above plan.      Keesha Eller    "

## 2022-07-29 ENCOUNTER — VIRTUAL VISIT (OUTPATIENT)
Dept: PSYCHOLOGY | Facility: OTHER | Age: 47
End: 2022-07-29
Payer: COMMERCIAL

## 2022-07-29 DIAGNOSIS — F41.1 GAD (GENERALIZED ANXIETY DISORDER): Primary | ICD-10-CM

## 2022-07-29 DIAGNOSIS — F33.41 RECURRENT MAJOR DEPRESSIVE DISORDER, IN PARTIAL REMISSION (H): ICD-10-CM

## 2022-07-29 PROCEDURE — 90834 PSYTX W PT 45 MINUTES: CPT | Mod: 95 | Performed by: MARRIAGE & FAMILY THERAPIST

## 2022-07-29 NOTE — PATIENT INSTRUCTIONS
"Patient states that her goals for the next two weeks include:    Homework: ask daughters and , \"How would you describe me?\"  Work on emotional reactivity when stressed  Schedule therapy appointments for daughters    List of idealized self qualities:  Patient  Optimistic  Understanding  Empathetic  Better at listening  Someone people look up to  Help children to have better lives (with less emotional and financial struggles) than their parents had      "

## 2022-08-08 NOTE — PROGRESS NOTES
[facility]                                               Progress Note    Patient Name: Ellyn Mcgee  Date: 8/9/2022         Service Type: Individual      Session Start Time: 9:37 a.m.  Session End Time: 10:42 a.m.     Session Length: 65 min.    Session #: 41 (with this writer; patient met previously for DA with [facility] and psychiatry)    Attendees: Client     Service Modality:  Video Visit:    Telemedicine Visit: The patient's condition can be safely assessed and treated via synchronous audio and visual telemedicine encounter.      Reason for Telemedicine Visit: Patient convenience (e.g. access to timely appointments / distance to available provider)    Originating Site (Patient Location): Patient's home    Distant Site (Provider Location): [facility]: [place]    Consent:  The patient/guardian has verbally consented to: the potential risks and benefits of telemedicine (video visit) versus in person care; bill my insurance or make self-payment for services provided; and responsibility for payment of non-covered services.     Patient would like the video invitation sent by: Send to e-mail at: [email or web address]    Mode of Communication:  Video Conference via [employer]well    As the provider I attest to compliance with applicable laws and regulations related to telemedicine.      DATA  Interactive Complexity: No  Crisis: No       Progress Since Last Session (Related to Symptoms / Goals / Homework):  Symptoms: reports higher than usual anxiety related to the start of school and the state of her home; wants to bring up possibility that  is on the autism spectrum in couple's therapy.       Homework: Partially completed      Episode of Care Goals: Satisfactory progress - ACTION (Actively working towards change); Intervened by reinforcing change plan / affirming steps taken     Current / Ongoing Stressors and Concerns:  Homeschooling and self-employment challenges; feeling  "overwhelmed by organizing home;  may be on autism spectrum    Older daughter is experiencing dizziness/lethargy and was dx with POTS; some financial and friend-related stress; is interested in a deeper dive into emotional state and core beliefs through ACT; new son born 2020; roommate/former friend was finally evicted from their home (lived there for 3-4 years and did not paid any rent for over 1-2 years); pt has been experiencing mild panic attacks; daughter (age 10) just started ADHD medication; history of anxious attachment stemming from relationship with father (he  8 years ago); is currently pregnant (high-risk due to advanced maternal age) and baby is due Dec. 2020; is in couples therapy with  due to frequent arguments and his emotional affair earlier in the year; financial stress; history of ADHD (hyperactive type).     Treatment Objective(s) Addressed in This Session:  Identified strengths as a mother and need for positive affirmations  Identified insecurities and how they affect patient's thoughts and actions now and in the past  will identify how past feelings are impacting current relationships  Self-care and barriers to this  Emotional differentiation  Discussed healthy boundaries and enforcement    Past/future:  Discussed work/life balance and current purpose  Management of anger  Dealing with \"big emotions\"  Participating in enjoyable activities  Self-compassion and radical acceptance  Triggers for anxiety and insecurities  tell full story of past relational issues/trust/infidelity  use cognitive strategies identified in therapy to challenge anxious thoughts   Body appreciation  Discussed family system issues and possible messages internalized during childhood related to father  Relationship repair  Discussed ambiguous loss and grief in a box analogy  Anticipatory grief and creating meaning  10:10:10 rule (10 minutes, 10 months, and 10 years)  Feeling unappreciated and the 5:1 " ratio ([facility])  Solution focused: how to prioritize and cope with interrupted sleep  Javon Servin's elements of trust and relationship repair  [facility] research   identify how attachment style drives patient's and 's behaviors  Identified personal strengths within friendships  tell full story of past trauma related to her relationship with her father (and current roommate issues)     Intervention:  Solution-Focused  Future-self goals  CBT: connect thoughts, feelings, and actions   Narrative Therapy: separate problem from person and find the bright spots     Past/future:  Management of anger  Psychodynamic: Family systems and attachment style  [facility]     Assessments completed prior to visit:  PHQ9:   PHQ-9 SCORE 12/15/2021 12/21/2021 2/10/2022 3/15/2022 4/21/2022 7/6/2022 8/9/2022   PHQ-9 Total Score - - - - - - -   PHQ-9 Total Score MyChart 7 (Mild depression) 6 (Mild depression) 8 (Mild depression) 8 (Mild depression) 11 (Moderate depression) 8 (Mild depression) 5 (Mild depression)   PHQ-9 Total Score 7 6 8 8 11 8 5     GAD7:   TEE-7 SCORE 8/12/2020 9/8/2020 12/11/2020 3/2/2022 4/21/2022 6/14/2022 7/6/2022   Total Score - - - - - - -   Total Score 14 (moderate anxiety) 12 (moderate anxiety) - 9 (mild anxiety) 12 (moderate anxiety) 8 (mild anxiety) 10 (moderate anxiety)   Total Score 14 12 11 9 12 8 10     PROMIS 10-Global Health (all questions and answers displayed):   PROMIS 10 12/15/2021 12/15/2021 3/15/2022 6/14/2022 7/6/2022   In general, would you say your health is: - Fair Fair Fair Fair   In general, would you say your quality of life is: - Fair Good Fair Fair   In general, how would you rate your physical health? - Fair Fair Fair Fair   In general, how would you rate your mental health, including your mood and your ability to think? - Good Good Fair Fair   In general, how would you rate your satisfaction with your social activities and relationships? - Fair Good Poor Fair   In general,  please rate how well you carry out your usual social activities and roles - Fair Fair Good Fair   To what extent are you able to carry out your everyday physical activities such as walking, climbing stairs, carrying groceries, or moving a chair? - Completely Completely Completely Mostly   How often have you been bothered by emotional problems such as feeling anxious, depressed or irritable? - Sometimes Often Often Often   How would you rate your fatigue on average? - Severe Moderate Moderate Moderate   How would you rate your pain on average?   0 = No Pain  to  10 = Worst Imaginable Pain - 5 7 7 8   In general, would you say your health is: 2 2 2 2 2   In general, would you say your quality of life is: 2 2 3 2 2   In general, how would you rate your physical health? 2 2 2 2 2   In general, how would you rate your mental health, including your mood and your ability to think? 3 3 3 2 2   In general, how would you rate your satisfaction with your social activities and relationships? 2 2 3 1 2   In general, please rate how well you carry out your usual social activities and roles. (This includes activities at home, at work and in your community, and responsibilities as a parent, child, spouse, employee, friend, etc.) 2 2 2 3 2   To what extent are you able to carry out your everyday physical activities such as walking, climbing stairs, carrying groceries, or moving a chair? 5 5 5 5 4   In the past 7 days, how often have you been bothered by emotional problems such as feeling anxious, depressed, or irritable? 3 3 4 4 4   In the past 7 days, how would you rate your fatigue on average? 4 4 3 3 3   In the past 7 days, how would you rate your pain on average, where 0 means no pain, and 10 means worst imaginable pain? 5 5 7 7 8   Global Mental Health Score 10 10 11 7 8   Global Physical Health Score 12 12 12 12 11   PROMIS TOTAL - SUBSCORES 22 22 23 19 19   Some recent data might be hidden        ASSESSMENT: Current Emotional  / Mental Status (status of significant symptoms):   Risk status (Self / Other harm or suicidal ideation)   Patient denies current fears or concerns for personal safety.   Patient denies current or recent suicidal ideation or behaviors. Patient last reported feeling hopeless/passive ideation on 8/6/2020.   Patient denies current or recent homicidal ideation or behaviors.   Patient denies current or recent self injurious behavior or ideation.   Patient denies other safety concerns.   Patient reports there has been no change in risk factors since their last session.     Patient reports there has been no change in protective factors since their last session.     Recommended that patient call 911 or go to the local ED should there be a change in any of these risk factors.     Appearance:   Appropriate    Eye Contact:   Good    Psychomotor Behavior: Normal  Restless     Attitude:   Cooperative, Pleasant   Orientation:   All   Speech    Rate / Production: Normal/ Responsive Talkative    Volume:  Normal    Mood:    Anxious  frustrated   Affect:    Appropriate    Thought Content:  Clear  Rumination    Thought Form:  Coherent  Neurosis   Insight:    Good      Medication Review:   No changes to current psychiatric medication(s)     Medication Compliance:   Yes     Changes in Health Issues:   None reported      Chemical Use Review:   Substance Use: Chemical use reviewed, no active concerns identified ; no current alcohol use     Tobacco Use: No current tobacco use.      Diagnosis:  1. TEE (generalized anxiety disorder)    2. Recurrent major depressive disorder, in partial remission (H)      Note: There has been demonstrated improvement in functioning while patient has been engaged in psychotherapy/psychological service- if withdrawn the patient would deteriorate and/or relapse.     Collateral Reports Completed:  N/A    PLAN: (Patient Tasks / Therapist Tasks / Other)    Patient states that her goals for the next two weeks  "include:    1. Homework: ask daughters and , \"How would you describe me?\" (continued)  2. Gently bring up (during couples therapy next week) that  may be on the spectrum            Keesha Englishby    ______________________________________________________________________                                                           [facility]    Individual Treatment Plan    Patient's Name: Ellyn Mcgee  YOB: 1975    Date of Creation: 2020  Date Treatment Plan Last Reviewed/Revised: 2022    DSM5 Diagnoses: 296.32 (F33.1) Major Depressive Disorder, Recurrent Episode, Moderate _ or 300.02 (F41.1) Generalized Anxiety Disorder (patient has previously been diagnosed with ADHD, hyperactive type)  Psychosocial / Contextual Factors: History of anxious attachment stemming from relationship with father (he  8 years ago); is currently pregnant (high-risk due to advanced maternal age); is in couples therapy with  due to frequent arguments and his emotional affair earlier in the year; financial stress; history of ADHD (hyperactive type).  PROMIS (reviewed every 90 days): PROMIS-10 Scores             Referral / Collaboration:  Referral to another professional/service is not indicated at this time. Patient was previously referred to an ACT therapist but plans to wait to schedule.    Anticipated number of sessions for this episode of care: 60  Anticipation frequency of session: Monthly  Anticipated duration of each session: 38-52 minutes  Treatment plan will be reviewed in 90 days or when goals have been changed.             Measurable Treatment Goal(s) related to diagnosis / functional impairment(s):  Patient is asking to focus treatment on her relationship with her father and past trauma.    Goal 1: Patient will tell full story of past trauma related to her relationship with her father and will identify how past feelings are impacting current " "relationships.    Objective #A (Patient Action)    Patient will identify how past feelings are impacting current relationships.  Status: Continued - Date(s): 7/21/2022    Intervention(s)  Therapist will role-play conflict management.    Objective #B  Patient will identify strengths and weaknesses within her family system of origin.  Status: Continued - Date(s): 7/21/2022     Intervention(s)  Therapist will assign homework for patient to create list.      Goal 2: Patient will learn about resilience, trauma responses, and Javon Servin's \"the story I'm making up\" (cognitive strategy to manage anxious thoughts).    Objective #A (Patient Action)    Patient will learn about and identify personal trauma responses.    Status: Continued - Date(s): 7/21/2022    Intervention(s)  Therapist will provide educational materials on trauma responses.    Objective #B  Patient will use cognitive strategies identified in therapy to challenge anxious thoughts.  Status: Continued - Date(s): 7/21/2022    Intervention(s)  Therapist will teach about Javon Servin's power of vulnerability and \"the story I'm making up\".      Goal 3: Patient will learn about protective factors that sugey resilience and attachment styles and will identify how her attachment style drives her behavior.     Objective #A (Patient Action)    Status: Restarted - Date: 7/21/2022     Patient will learn about protective factors and will identify her own.    Intervention(s)  Therapist will complete with patient in session.    Objective #B  Patient will identify how her attachment style drives her behavior.  Status: Continued - Date(s): 7/21/2022    Intervention(s)  Therapist will teach emotional regulation skills.        Patient agreed to the above plan.      Keesha Eller    "

## 2022-08-09 ENCOUNTER — VIRTUAL VISIT (OUTPATIENT)
Dept: PSYCHOLOGY | Facility: CLINIC | Age: 47
End: 2022-08-09
Payer: COMMERCIAL

## 2022-08-09 DIAGNOSIS — F41.1 GAD (GENERALIZED ANXIETY DISORDER): Primary | ICD-10-CM

## 2022-08-09 DIAGNOSIS — F33.41 RECURRENT MAJOR DEPRESSIVE DISORDER, IN PARTIAL REMISSION (H): ICD-10-CM

## 2022-08-09 PROCEDURE — 90837 PSYTX W PT 60 MINUTES: CPT | Mod: 95 | Performed by: MARRIAGE & FAMILY THERAPIST

## 2022-08-09 ASSESSMENT — PATIENT HEALTH QUESTIONNAIRE - PHQ9
SUM OF ALL RESPONSES TO PHQ QUESTIONS 1-9: 5
10. IF YOU CHECKED OFF ANY PROBLEMS, HOW DIFFICULT HAVE THESE PROBLEMS MADE IT FOR YOU TO DO YOUR WORK, TAKE CARE OF THINGS AT HOME, OR GET ALONG WITH OTHER PEOPLE: NOT DIFFICULT AT ALL
SUM OF ALL RESPONSES TO PHQ QUESTIONS 1-9: 5

## 2022-08-09 NOTE — PATIENT INSTRUCTIONS
"Patient states that her goals for the next two weeks include:    Homework: ask daughters and , \"How would you describe me?\" (continued)  Gently bring up (during couples therapy next week) that  may be on the spectrum      "

## 2022-08-23 NOTE — PATIENT INSTRUCTIONS
"Patient states that her goals for the next two weeks include:    Maintain work boundary and do not work more than three nights per week  Work more on cleaning/organizing spaces in the home  Focus on \"what I can do\"  Aim for less stress and more family time      "

## 2022-08-23 NOTE — PROGRESS NOTES
[facility]                                               Progress Note    Patient Name: Ellyn Mcgee  Date: 8/26/2022         Service Type: Individual      Session Start Time: 12:03 p.m.  Session End Time: 12:55 p.m.     Session Length: 52 min.    Session #: 42 (with this writer; patient met previously for DA with [facility] and psychiatry)    Attendees: Client     Service Modality:  Video Visit:    Telemedicine Visit: The patient's condition can be safely assessed and treated via synchronous audio and visual telemedicine encounter.      Reason for Telemedicine Visit: Patient convenience (e.g. access to timely appointments / distance to available provider)    Originating Site (Patient Location): Patient's home (outside)    Distant Site (Provider Location): [facility]: [place]    Consent:  The patient/guardian has verbally consented to: the potential risks and benefits of telemedicine (video visit) versus in person care; bill my insurance or make self-payment for services provided; and responsibility for payment of non-covered services.     Patient would like the video invitation sent by: Send to e-mail at: [email or web address]    Mode of Communication:  Video Conference via well    As the provider I attest to compliance with applicable laws and regulations related to telemedicine.      DATA  Interactive Complexity: No  Crisis: No       Progress Since Last Session (Related to Symptoms / Goals / Homework):  Symptoms: reports struggling somewhat with anxiety and insomnia due to her son's demanding nursing schedule.  Patient discussed relational patterns within her family system.      Homework: Partially completed      Episode of Care Goals: Satisfactory progress - ACTION (Actively working towards change); Intervened by reinforcing change plan / affirming steps taken     Current / Ongoing Stressors and Concerns:  Homeschooling and self-employment challenges; feeling  "overwhelmed by organizing home;  and daughter may be on autism spectrum    Older daughter is experiencing dizziness/lethargy and was dx with POTS; some financial and friend-related stress; is interested in a deeper dive into emotional state and core beliefs through ACT; new son born 2020; roommate/former friend was finally evicted from their home (lived there for 3-4 years and did not paid any rent for over 1-2 years); pt has been experiencing mild panic attacks; daughter (age 10) just started ADHD medication; history of anxious attachment stemming from relationship with father (he  8 years ago); is currently pregnant (high-risk due to advanced maternal age) and baby is due Dec. 2020; is in couples therapy with  due to frequent arguments and his emotional affair earlier in the year; financial stress; history of ADHD (hyperactive type).     Treatment Objective(s) Addressed in This Session:  will identify how past feelings are impacting current relationships  Self-care and barriers to this  Discussed family system issues (and possible messages internalized during childhood related to father)  Participating in enjoyable activities  Emotional differentiation  Discussed healthy boundaries and enforcement    Past/future:  Identified strengths as a mother and need for positive affirmations  Identified insecurities and how they affect patient's thoughts and actions now and in the past  Discussed work/life balance and current purpose  Management of anger  Dealing with \"big emotions\"  Self-compassion and radical acceptance  Triggers for anxiety and insecurities  tell full story of past relational issues/trust/infidelity  use cognitive strategies identified in therapy to challenge anxious thoughts   Body appreciation  Relationship repair  Discussed ambiguous loss and grief in a box analogy  Anticipatory grief and creating meaning  10:10:10 rule (10 minutes, 10 months, and 10 years)  Feeling " unappreciated and the 5:1 ratio (Natali)  Solution focused: how to prioritize and cope with interrupted sleep  Javon Servin's elements of trust and relationship repair  [facility]   identify how attachment style drives patient's and 's behaviors  Identified personal strengths within friendships  tell full story of past trauma related to her relationship with her father (and current roommate issues)     Intervention:  Solution-Focused  CBT: connect thoughts, feelings, and actions   Narrative Therapy: separate problem from person and find the bright spots     Past/future:  Future-self goals  Management of anger  Psychodynamic: Family systems and attachment style  Butchivan research     Assessments completed prior to visit:  PHQ9:   PHQ-9 SCORE 12/15/2021 12/21/2021 2/10/2022 3/15/2022 4/21/2022 7/6/2022 8/9/2022   PHQ-9 Total Score - - - - - - -   PHQ-9 Total Score MyChart 7 (Mild depression) 6 (Mild depression) 8 (Mild depression) 8 (Mild depression) 11 (Moderate depression) 8 (Mild depression) 5 (Mild depression)   PHQ-9 Total Score 7 6 8 8 11 8 5     GAD7:   TEE-7 SCORE 8/12/2020 9/8/2020 12/11/2020 3/2/2022 4/21/2022 6/14/2022 7/6/2022   Total Score - - - - - - -   Total Score 14 (moderate anxiety) 12 (moderate anxiety) - 9 (mild anxiety) 12 (moderate anxiety) 8 (mild anxiety) 10 (moderate anxiety)   Total Score 14 12 11 9 12 8 10     PROMIS 10-Global Health (all questions and answers displayed):   PROMIS 10 12/15/2021 12/15/2021 3/15/2022 6/14/2022 7/6/2022   In general, would you say your health is: - Fair Fair Fair Fair   In general, would you say your quality of life is: - Fair Good Fair Fair   In general, how would you rate your physical health? - Fair Fair Fair Fair   In general, how would you rate your mental health, including your mood and your ability to think? - Good Good Fair Fair   In general, how would you rate your satisfaction with your social activities and relationships? - Fair Good  Poor Fair   In general, please rate how well you carry out your usual social activities and roles - Fair Fair Good Fair   To what extent are you able to carry out your everyday physical activities such as walking, climbing stairs, carrying groceries, or moving a chair? - Completely Completely Completely Mostly   How often have you been bothered by emotional problems such as feeling anxious, depressed or irritable? - Sometimes Often Often Often   How would you rate your fatigue on average? - Severe Moderate Moderate Moderate   How would you rate your pain on average?   0 = No Pain  to  10 = Worst Imaginable Pain - 5 7 7 8   In general, would you say your health is: 2 2 2 2 2   In general, would you say your quality of life is: 2 2 3 2 2   In general, how would you rate your physical health? 2 2 2 2 2   In general, how would you rate your mental health, including your mood and your ability to think? 3 3 3 2 2   In general, how would you rate your satisfaction with your social activities and relationships? 2 2 3 1 2   In general, please rate how well you carry out your usual social activities and roles. (This includes activities at home, at work and in your community, and responsibilities as a parent, child, spouse, employee, friend, etc.) 2 2 2 3 2   To what extent are you able to carry out your everyday physical activities such as walking, climbing stairs, carrying groceries, or moving a chair? 5 5 5 5 4   In the past 7 days, how often have you been bothered by emotional problems such as feeling anxious, depressed, or irritable? 3 3 4 4 4   In the past 7 days, how would you rate your fatigue on average? 4 4 3 3 3   In the past 7 days, how would you rate your pain on average, where 0 means no pain, and 10 means worst imaginable pain? 5 5 7 7 8   Global Mental Health Score 10 10 11 7 8   Global Physical Health Score 12 12 12 12 11   PROMIS TOTAL - SUBSCORES 22 22 23 19 19   Some recent data might be hidden         ASSESSMENT: Current Emotional / Mental Status (status of significant symptoms):   Risk status (Self / Other harm or suicidal ideation)   Patient denies current fears or concerns for personal safety.   Patient denies current or recent suicidal ideation or behaviors. Patient last reported feeling hopeless/passive ideation on 8/6/2020.   Patient denies current or recent homicidal ideation or behaviors.   Patient denies current or recent self injurious behavior or ideation.   Patient denies other safety concerns.   Patient reports there has been no change in risk factors since their last session.     Patient reports there has been no change in protective factors since their last session.     Recommended that patient call 911 or go to the local ED should there be a change in any of these risk factors.     Appearance:   Appropriate    Eye Contact:   Good    Psychomotor Behavior: Normal  Restless     Attitude:   Cooperative, Pleasant   Orientation:   All   Speech    Rate / Production: Normal/ Responsive Talkative    Volume:  Normal    Mood:    Anxious  frustrated but also grateful   Affect:    Appropriate ; tired   Thought Content:  Clear  Rumination    Thought Form:  Coherent  Neurosis   Insight:    Good      Medication Review:   No changes to current psychiatric medication(s)     Medication Compliance:   Yes     Changes in Health Issues:   None reported      Chemical Use Review:   Substance Use: Chemical use reviewed, no active concerns identified ; no current alcohol use     Tobacco Use: No current tobacco use.      Diagnosis:  1. TEE (generalized anxiety disorder)    2. Recurrent major depressive disorder, in partial remission (H)      Note: There has been demonstrated improvement in functioning while patient has been engaged in psychotherapy/psychological service- if withdrawn the patient would deteriorate and/or relapse.     Collateral Reports Completed:  N/A    PLAN: (Patient Tasks / Therapist Tasks /  "Other)    Patient states that her goals for the next two weeks include:    1. Maintain work boundary and do not work more than three nights per week  2. Work more on cleaning/organizing spaces in the home  3. Focus on \"what I can do\"  4. Aim for less stress and more family time      Keesha SON Rafita    ______________________________________________________________________                                                           [facility]    Individual Treatment Plan    Patient's Name: Ellyn Mcgee  YOB: 1975    Date of Creation: 2020  Date Treatment Plan Last Reviewed/Revised: 2022    DSM5 Diagnoses: 296.32 (F33.1) Major Depressive Disorder, Recurrent Episode, Moderate _ or 300.02 (F41.1) Generalized Anxiety Disorder (patient has previously been diagnosed with ADHD, hyperactive type)  Psychosocial / Contextual Factors: History of anxious attachment stemming from relationship with father (he  8 years ago); is currently pregnant (high-risk due to advanced maternal age); is in couples therapy with  due to frequent arguments and his emotional affair earlier in the year; financial stress; history of ADHD (hyperactive type).  PROMIS (reviewed every 90 days): PROMIS-10 Scores             Referral / Collaboration:  Referral to another professional/service is not indicated at this time. Patient was previously referred to an ACT therapist but plans to wait to schedule.    Anticipated number of sessions for this episode of care: 60  Anticipation frequency of session: Monthly  Anticipated duration of each session: 38-52 minutes  Treatment plan will be reviewed in 90 days or when goals have been changed.             Measurable Treatment Goal(s) related to diagnosis / functional impairment(s):  Patient is asking to focus treatment on her relationship with her father and past trauma.    Goal 1: Patient will tell full story of past trauma related to her relationship with her " "father and will identify how past feelings are impacting current relationships.    Objective #A (Patient Action)    Patient will identify how past feelings are impacting current relationships.  Status: Continued - Date(s): 7/21/2022    Intervention(s)  Therapist will role-play conflict management.    Objective #B  Patient will identify strengths and weaknesses within her family system of origin.  Status: Continued - Date(s): 7/21/2022     Intervention(s)  Therapist will assign homework for patient to create list.      Goal 2: Patient will learn about resilience, trauma responses, and Javon Sevrin's \"the story I'm making up\" (cognitive strategy to manage anxious thoughts).    Objective #A (Patient Action)    Patient will learn about and identify personal trauma responses.    Status: Continued - Date(s): 7/21/2022    Intervention(s)  Therapist will provide educational materials on trauma responses.    Objective #B  Patient will use cognitive strategies identified in therapy to challenge anxious thoughts.  Status: Continued - Date(s): 7/21/2022    Intervention(s)  Therapist will teach about Javon Servin's power of vulnerability and \"the story I'm making up\".      Goal 3: Patient will learn about protective factors that sugey resilience and attachment styles and will identify how her attachment style drives her behavior.     Objective #A (Patient Action)    Status: Restarted - Date: 7/21/2022     Patient will learn about protective factors and will identify her own.    Intervention(s)  Therapist will complete with patient in session.    Objective #B  Patient will identify how her attachment style drives her behavior.  Status: Continued - Date(s): 7/21/2022    Intervention(s)  Therapist will teach emotional regulation skills.        Patient agreed to the above plan.      Keesha Eller    "

## 2022-08-26 ENCOUNTER — VIRTUAL VISIT (OUTPATIENT)
Dept: PSYCHOLOGY | Facility: OTHER | Age: 47
End: 2022-08-26
Payer: COMMERCIAL

## 2022-08-26 DIAGNOSIS — F33.41 RECURRENT MAJOR DEPRESSIVE DISORDER, IN PARTIAL REMISSION (H): ICD-10-CM

## 2022-08-26 DIAGNOSIS — F41.1 GAD (GENERALIZED ANXIETY DISORDER): Primary | ICD-10-CM

## 2022-08-26 PROCEDURE — 90834 PSYTX W PT 45 MINUTES: CPT | Mod: 95 | Performed by: MARRIAGE & FAMILY THERAPIST

## 2022-09-06 NOTE — PROGRESS NOTES
[facility]                                               Progress Note    Patient Name: Ellyn Mcgee  Date: 9/7/2022         Service Type: Individual      Session Start Time: 4:03 p.m.  Session End Time: 4:55 p.m.     Session Length: 52 min.    Session #: 43 (with this writer; patient met previously for DA with [facility] and psychiatry)    Attendees: Client     Service Modality:  Video Visit:    Telemedicine Visit: The patient's condition can be safely assessed and treated via synchronous audio and visual telemedicine encounter.      Reason for Telemedicine Visit: Patient convenience (e.g. access to timely appointments / distance to available provider)    Originating Site (Patient Location): Patient's home    Distant Site (Provider Location): [facility]: [place]    Consent:  The patient/guardian has verbally consented to: the potential risks and benefits of telemedicine (video visit) versus in person care; bill my insurance or make self-payment for services provided; and responsibility for payment of non-covered services.     Patient would like the video invitation sent by: Send to e-mail at: [email or web address]    Mode of Communication:  Video Conference via [employer]    As the provider I attest to compliance with applicable laws and regulations related to telemedicine.      DATA  Interactive Complexity: No  Crisis: No       Progress Since Last Session (Related to Symptoms / Goals / Homework):  Symptoms: ongoing anxiety/stress related to lack of sleep and familial issues      Homework: Achieved / completed to satisfaction      Episode of Care Goals: Satisfactory progress - ACTION (Actively working towards change); Intervened by reinforcing change plan / affirming steps taken     Current / Ongoing Stressors and Concerns:  Feeling overwhelmed by organizing home;  and daughter may be on autism spectrum    Older daughter is experiencing dizziness/lethargy and was dx  "with POTS; some financial and friend-related stress; is interested in a deeper dive into emotional state and core beliefs through ACT; new son born 2020; roommate/former friend was finally evicted from their home (lived there for 3-4 years and did not paid any rent for over 1-2 years); pt has been experiencing mild panic attacks; daughter (age 10) just started ADHD medication; history of anxious attachment stemming from relationship with father (he  8 years ago); is currently pregnant (high-risk due to advanced maternal age) and baby is due Dec. 2020; is in couples therapy with  due to frequent arguments and his emotional affair earlier in the year; financial stress; history of ADHD (hyperactive type).     Treatment Objective(s) Addressed in This Session:  Natali research  will identify how past feelings are impacting current relationships  Self-care and barriers to this  Discussed family system issues (and possible messages internalized during childhood related to father)  Participating in enjoyable activities  Emotional differentiation  Discussed healthy boundaries and enforcement    Past/future:  Identified strengths as a mother and need for positive affirmations  Identified insecurities and how they affect patient's thoughts and actions now and in the past  Discussed work/life balance and current purpose  Management of anger  Dealing with \"big emotions\"  Self-compassion and radical acceptance  Triggers for anxiety and insecurities  tell full story of past relational issues/trust/infidelity  use cognitive strategies identified in therapy to challenge anxious thoughts   Body appreciation  Relationship repair  Discussed ambiguous loss and grief in a box analogy  Anticipatory grief and creating meaning  10:10:10 rule (10 minutes, 10 months, and 10 years)  Feeling unappreciated and the 5:1 ratio (Natali)  Solution focused: how to prioritize and cope with interrupted sleep  Javon Servin's elements of " trust and relationship repair   identify how attachment style drives patient's and 's behaviors  Identified personal strengths within friendships  tell full story of past trauma related to her relationship with her father (and current roommate issues)     Intervention:  Solution-Focused  Psychodynamic: Family systems and attachment style  Gottman research  CBT: connect thoughts, feelings, and actions   Narrative Therapy: separate problem from person and find the bright spots     Past/future:  Future-self goals  Management of anger     Assessments completed prior to visit:  PHQ9:   PHQ-9 SCORE 12/15/2021 12/21/2021 2/10/2022 3/15/2022 4/21/2022 7/6/2022 8/9/2022   PHQ-9 Total Score - - - - - - -   PHQ-9 Total Score MyChart 7 (Mild depression) 6 (Mild depression) 8 (Mild depression) 8 (Mild depression) 11 (Moderate depression) 8 (Mild depression) 5 (Mild depression)   PHQ-9 Total Score 7 6 8 8 11 8 5     GAD7:   TEE-7 SCORE 8/12/2020 9/8/2020 12/11/2020 3/2/2022 4/21/2022 6/14/2022 7/6/2022   Total Score - - - - - - -   Total Score 14 (moderate anxiety) 12 (moderate anxiety) - 9 (mild anxiety) 12 (moderate anxiety) 8 (mild anxiety) 10 (moderate anxiety)   Total Score 14 12 11 9 12 8 10     PROMIS 10-Global Health (all questions and answers displayed):   PROMIS 10 12/15/2021 12/15/2021 3/15/2022 6/14/2022 7/6/2022   In general, would you say your health is: - Fair Fair Fair Fair   In general, would you say your quality of life is: - Fair Good Fair Fair   In general, how would you rate your physical health? - Fair Fair Fair Fair   In general, how would you rate your mental health, including your mood and your ability to think? - Good Good Fair Fair   In general, how would you rate your satisfaction with your social activities and relationships? - Fair Good Poor Fair   In general, please rate how well you carry out your usual social activities and roles - Fair Fair Good Fair   To what extent are you able to carry  out your everyday physical activities such as walking, climbing stairs, carrying groceries, or moving a chair? - Completely Completely Completely Mostly   How often have you been bothered by emotional problems such as feeling anxious, depressed or irritable? - Sometimes Often Often Often   How would you rate your fatigue on average? - Severe Moderate Moderate Moderate   How would you rate your pain on average?   0 = No Pain  to  10 = Worst Imaginable Pain - 5 7 7 8   In general, would you say your health is: 2 2 2 2 2   In general, would you say your quality of life is: 2 2 3 2 2   In general, how would you rate your physical health? 2 2 2 2 2   In general, how would you rate your mental health, including your mood and your ability to think? 3 3 3 2 2   In general, how would you rate your satisfaction with your social activities and relationships? 2 2 3 1 2   In general, please rate how well you carry out your usual social activities and roles. (This includes activities at home, at work and in your community, and responsibilities as a parent, child, spouse, employee, friend, etc.) 2 2 2 3 2   To what extent are you able to carry out your everyday physical activities such as walking, climbing stairs, carrying groceries, or moving a chair? 5 5 5 5 4   In the past 7 days, how often have you been bothered by emotional problems such as feeling anxious, depressed, or irritable? 3 3 4 4 4   In the past 7 days, how would you rate your fatigue on average? 4 4 3 3 3   In the past 7 days, how would you rate your pain on average, where 0 means no pain, and 10 means worst imaginable pain? 5 5 7 7 8   Global Mental Health Score 10 10 11 7 8   Global Physical Health Score 12 12 12 12 11   PROMIS TOTAL - SUBSCORES 22 22 23 19 19   Some recent data might be hidden        ASSESSMENT: Current Emotional / Mental Status (status of significant symptoms):   Risk status (Self / Other harm or suicidal ideation)   Patient denies current  fears or concerns for personal safety.   Patient denies current or recent suicidal ideation or behaviors. Patient last reported feeling hopeless/passive ideation on 8/6/2020.   Patient denies current or recent homicidal ideation or behaviors.   Patient denies current or recent self injurious behavior or ideation.   Patient denies other safety concerns.   Patient reports there has been no change in risk factors since their last session.     Patient reports there has been no change in protective factors since their last session.     Recommended that patient call 911 or go to the local ED should there be a change in any of these risk factors.     Appearance:   Appropriate    Eye Contact:   Good    Psychomotor Behavior: Normal  Restless     Attitude:   Cooperative, Pleasant   Orientation:   All   Speech    Rate / Production: Normal/ Responsive Talkative    Volume:  Normal    Mood:    Anxious  Sad  Expansive   Affect:    Appropriate ; tired   Thought Content:  Clear  Rumination    Thought Form:  Coherent  Neurosis   Insight:    Good      Medication Review:   No changes to current psychiatric medication(s)     Medication Compliance:   Yes     Changes in Health Issues:   None reported      Chemical Use Review:   Substance Use: Chemical use reviewed, no active concerns identified ; no current alcohol use     Tobacco Use: No current tobacco use.      Diagnosis:  1. TEE (generalized anxiety disorder)    2. Recurrent major depressive disorder, in partial remission (H)    3. Attention deficit hyperactivity disorder (ADHD), unspecified ADHD type    4. Insomnia, unspecified type      Note: There has been demonstrated improvement in functioning while patient has been engaged in psychotherapy/psychological service- if withdrawn the patient would deteriorate and/or relapse.     Collateral Reports Completed:  N/A    PLAN: (Patient Tasks / Therapist Tasks / Other)    Patient states that her goals for the next two weeks  include:    1. Figure out a manageable work schedule  2. Try to get better at planning food purchases and turning the food into meals  3. Take care of the dead tree and ask  to help      Keesha Englishby    ______________________________________________________________________                                                           [facility]    Individual Treatment Plan    Patient's Name: Ellyn Mcgee  YOB: 1975    Date of Creation: 2020  Date Treatment Plan Last Reviewed/Revised: 2022    DSM5 Diagnoses: 296.32 (F33.1) Major Depressive Disorder, Recurrent Episode, Moderate _ or 300.02 (F41.1) Generalized Anxiety Disorder (patient has previously been diagnosed with ADHD, hyperactive type)  Psychosocial / Contextual Factors: History of anxious attachment stemming from relationship with father (he  8 years ago); is currently pregnant (high-risk due to advanced maternal age); is in couples therapy with  due to frequent arguments and his emotional affair earlier in the year; financial stress; history of ADHD (hyperactive type).  PROMIS (reviewed every 90 days): PROMIS-10 Scores             Referral / Collaboration:  Referral to another professional/service is not indicated at this time. Patient was previously referred to an ACT therapist but plans to wait to schedule.    Anticipated number of sessions for this episode of care: 60  Anticipation frequency of session: Monthly  Anticipated duration of each session: 38-52 minutes  Treatment plan will be reviewed in 90 days or when goals have been changed.             Measurable Treatment Goal(s) related to diagnosis / functional impairment(s):  Patient is asking to focus treatment on her relationship with her father and past trauma.    Goal 1: Patient will tell full story of past trauma related to her relationship with her father and will identify how past feelings are impacting current  "relationships.    Objective #A (Patient Action)    Patient will identify how past feelings are impacting current relationships.  Status: Continued - Date(s): 7/21/2022    Intervention(s)  Therapist will role-play conflict management.    Objective #B  Patient will identify strengths and weaknesses within her family system of origin.  Status: Continued - Date(s): 7/21/2022     Intervention(s)  Therapist will assign homework for patient to create list.      Goal 2: Patient will learn about resilience, trauma responses, and Javon Servin's \"the story I'm making up\" (cognitive strategy to manage anxious thoughts).    Objective #A (Patient Action)    Patient will learn about and identify personal trauma responses.    Status: Continued - Date(s): 7/21/2022    Intervention(s)  Therapist will provide educational materials on trauma responses.    Objective #B  Patient will use cognitive strategies identified in therapy to challenge anxious thoughts.  Status: Continued - Date(s): 7/21/2022    Intervention(s)  Therapist will teach about Javon Servin's power of vulnerability and \"the story I'm making up\".      Goal 3: Patient will learn about protective factors that sugey resilience and attachment styles and will identify how her attachment style drives her behavior.     Objective #A (Patient Action)    Status: Restarted - Date: 7/21/2022     Patient will learn about protective factors and will identify her own.    Intervention(s)  Therapist will complete with patient in session.    Objective #B  Patient will identify how her attachment style drives her behavior.  Status: Continued - Date(s): 7/21/2022    Intervention(s)  Therapist will teach emotional regulation skills.        Patient agreed to the above plan.      Keesha Eller    "

## 2022-09-07 ENCOUNTER — VIRTUAL VISIT (OUTPATIENT)
Dept: PSYCHOLOGY | Facility: OTHER | Age: 47
End: 2022-09-07
Payer: COMMERCIAL

## 2022-09-07 DIAGNOSIS — F33.41 RECURRENT MAJOR DEPRESSIVE DISORDER, IN PARTIAL REMISSION (H): ICD-10-CM

## 2022-09-07 DIAGNOSIS — G47.00 INSOMNIA, UNSPECIFIED TYPE: ICD-10-CM

## 2022-09-07 DIAGNOSIS — F41.1 GAD (GENERALIZED ANXIETY DISORDER): Primary | ICD-10-CM

## 2022-09-07 DIAGNOSIS — F90.9 ATTENTION DEFICIT HYPERACTIVITY DISORDER (ADHD), UNSPECIFIED ADHD TYPE: ICD-10-CM

## 2022-09-07 PROCEDURE — 90834 PSYTX W PT 45 MINUTES: CPT | Mod: 95 | Performed by: MARRIAGE & FAMILY THERAPIST

## 2022-09-07 NOTE — PATIENT INSTRUCTIONS
Patient states that her goals for the next two weeks include:    Figure out a manageable work schedule  Try to get better at planning food purchases and turning the food into meals  Take care of the dead tree and ask  to help

## 2022-09-20 ENCOUNTER — VIRTUAL VISIT (OUTPATIENT)
Dept: PSYCHOLOGY | Facility: CLINIC | Age: 47
End: 2022-09-20
Payer: COMMERCIAL

## 2022-09-20 DIAGNOSIS — F33.41 RECURRENT MAJOR DEPRESSIVE DISORDER, IN PARTIAL REMISSION (H): ICD-10-CM

## 2022-09-20 DIAGNOSIS — F90.9 ATTENTION DEFICIT HYPERACTIVITY DISORDER (ADHD), UNSPECIFIED ADHD TYPE: ICD-10-CM

## 2022-09-20 DIAGNOSIS — F41.1 GAD (GENERALIZED ANXIETY DISORDER): Primary | ICD-10-CM

## 2022-09-20 PROCEDURE — 90834 PSYTX W PT 45 MINUTES: CPT | Mod: 95 | Performed by: MARRIAGE & FAMILY THERAPIST

## 2022-09-20 ASSESSMENT — ANXIETY QUESTIONNAIRES
GAD7 TOTAL SCORE: 10
GAD7 TOTAL SCORE: 10
4. TROUBLE RELAXING: MORE THAN HALF THE DAYS
IF YOU CHECKED OFF ANY PROBLEMS ON THIS QUESTIONNAIRE, HOW DIFFICULT HAVE THESE PROBLEMS MADE IT FOR YOU TO DO YOUR WORK, TAKE CARE OF THINGS AT HOME, OR GET ALONG WITH OTHER PEOPLE: SOMEWHAT DIFFICULT
6. BECOMING EASILY ANNOYED OR IRRITABLE: MORE THAN HALF THE DAYS
1. FEELING NERVOUS, ANXIOUS, OR ON EDGE: NEARLY EVERY DAY
8. IF YOU CHECKED OFF ANY PROBLEMS, HOW DIFFICULT HAVE THESE MADE IT FOR YOU TO DO YOUR WORK, TAKE CARE OF THINGS AT HOME, OR GET ALONG WITH OTHER PEOPLE?: SOMEWHAT DIFFICULT
2. NOT BEING ABLE TO STOP OR CONTROL WORRYING: NOT AT ALL
7. FEELING AFRAID AS IF SOMETHING AWFUL MIGHT HAPPEN: MORE THAN HALF THE DAYS
7. FEELING AFRAID AS IF SOMETHING AWFUL MIGHT HAPPEN: MORE THAN HALF THE DAYS
3. WORRYING TOO MUCH ABOUT DIFFERENT THINGS: SEVERAL DAYS
GAD7 TOTAL SCORE: 10
5. BEING SO RESTLESS THAT IT IS HARD TO SIT STILL: NOT AT ALL

## 2022-09-20 NOTE — PATIENT INSTRUCTIONS
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

## 2022-09-20 NOTE — PROGRESS NOTES
[facility]                                               Progress Note    Patient Name: Ellyn Mcgee  Date: 9/20/2022         Service Type: Individual      Session Start Time: 10:33 a.m.  Session End Time: 11:25 a.m.     Session Length: 52 min.    Session #: 44 (with this writer; patient met previously for DA with [facility] and psychiatry)    Attendees: Client     Service Modality:  Video Visit:    Telemedicine Visit: The patient's condition can be safely assessed and treated via synchronous audio and visual telemedicine encounter.      Reason for Telemedicine Visit: Patient convenience (e.g. access to timely appointments / distance to available provider)    Originating Site (Patient Location): Patient's home    Distant Site (Provider Location): [facility] [place]    Consent:  The patient/guardian has verbally consented to: the potential risks and benefits of telemedicine (video visit) versus in person care; bill my insurance or make self-payment for services provided; and responsibility for payment of non-covered services.     Patient would like the video invitation sent by: Send to e-mail at: [email or web address]    Mode of Communication:  Video Conference via [employer]    As the provider I attest to compliance with applicable laws and regulations related to telemedicine.      DATA  Interactive Complexity: No  Crisis: No       Progress Since Last Session (Related to Symptoms / Goals / Homework):  Symptoms: reports increased anxiety two weeks ago but notes some improvement in relationship and overall family system after productive couples' therapy sessions.     Homework: Achieved / completed to satisfaction      Episode of Care Goals: Satisfactory progress - ACTION (Actively working towards change); Intervened by reinforcing change plan / affirming steps taken     Current / Ongoing Stressors and Concerns:  Feeling overwhelmed by organizing home;  and daughter may  "be on autism spectrum    Older daughter is experiencing dizziness/lethargy and was dx with POTS; some financial and friend-related stress; is interested in a deeper dive into emotional state and core beliefs through ACT; new son born 2020; roommate/former friend was finally evicted from their home (lived there for 3-4 years and did not paid any rent for over 1-2 years); pt has been experiencing mild panic attacks; daughter (age 10) just started ADHD medication; history of anxious attachment stemming from relationship with father (he  8 years ago); is currently pregnant (high-risk due to advanced maternal age) and baby is due Dec. 2020; is in couples therapy with  due to frequent arguments and his emotional affair earlier in the year; financial stress; history of ADHD (hyperactive type).     Treatment Objective(s) Addressed in This Session:  Identified personal strengths within friendships  will identify how past feelings are impacting current relationships  Self-care and barriers to this  Discussed family system issues (and possible messages internalized during childhood related to father)  Participating in enjoyable activities  Emotional differentiation  Discussed healthy boundaries and enforcement    Past/future:  Natali research  Identified strengths as a mother and need for positive affirmations  Identified insecurities and how they affect patient's thoughts and actions now and in the past  Discussed work/life balance and current purpose  Management of anger  Dealing with \"big emotions\"  Self-compassion and radical acceptance  Triggers for anxiety and insecurities  tell full story of past relational issues/trust/infidelity  use cognitive strategies identified in therapy to challenge anxious thoughts   Body appreciation  Relationship repair  Discussed ambiguous loss and grief in a box analogy  Anticipatory grief and creating meaning  10:10:10 rule (10 minutes, 10 months, and 10 years)  Feeling " unappreciated and the 5:1 ratio (Natali)  Solution focused: how to prioritize and cope with interrupted sleep  Javon Servin's elements of trust and relationship repair   identify how attachment style drives patient's and 's behaviors  tell full story of past trauma related to her relationship with her father (and current roommate issues)     Intervention:  Solution-Focused  Family systems theory  CBT: connect thoughts, feelings, and actions   Narrative Therapy: separate problem from person and find the bright spots     Past/future:  Natali research  Future-self goals  Management of anger     Assessments completed prior to visit:  PHQ9:   PHQ-9 SCORE 12/21/2021 2/10/2022 3/15/2022 4/21/2022 7/6/2022 8/9/2022 9/20/2022   PHQ-9 Total Score - - - - - - -   PHQ-9 Total Score MyChart 6 (Mild depression) 8 (Mild depression) 8 (Mild depression) 11 (Moderate depression) 8 (Mild depression) 5 (Mild depression) 8 (Mild depression)   PHQ-9 Total Score 6 8 8 11 8 5 8     GAD7:   TEE-7 SCORE 9/8/2020 12/11/2020 3/2/2022 4/21/2022 6/14/2022 7/6/2022 9/20/2022   Total Score - - - - - - -   Total Score 12 (moderate anxiety) - 9 (mild anxiety) 12 (moderate anxiety) 8 (mild anxiety) 10 (moderate anxiety) 10 (moderate anxiety)   Total Score 12 11 9 12 8 10 10     PROMIS 10-Global Health (all questions and answers displayed):   PROMIS 10 12/15/2021 12/15/2021 3/15/2022 6/14/2022 7/6/2022   In general, would you say your health is: - Fair Fair Fair Fair   In general, would you say your quality of life is: - Fair Good Fair Fair   In general, how would you rate your physical health? - Fair Fair Fair Fair   In general, how would you rate your mental health, including your mood and your ability to think? - Good Good Fair Fair   In general, how would you rate your satisfaction with your social activities and relationships? - Fair Good Poor Fair   In general, please rate how well you carry out your usual social activities and roles -  Fair Fair Good Fair   To what extent are you able to carry out your everyday physical activities such as walking, climbing stairs, carrying groceries, or moving a chair? - Completely Completely Completely Mostly   How often have you been bothered by emotional problems such as feeling anxious, depressed or irritable? - Sometimes Often Often Often   How would you rate your fatigue on average? - Severe Moderate Moderate Moderate   How would you rate your pain on average?   0 = No Pain  to  10 = Worst Imaginable Pain - 5 7 7 8   In general, would you say your health is: 2 2 2 2 2   In general, would you say your quality of life is: 2 2 3 2 2   In general, how would you rate your physical health? 2 2 2 2 2   In general, how would you rate your mental health, including your mood and your ability to think? 3 3 3 2 2   In general, how would you rate your satisfaction with your social activities and relationships? 2 2 3 1 2   In general, please rate how well you carry out your usual social activities and roles. (This includes activities at home, at work and in your community, and responsibilities as a parent, child, spouse, employee, friend, etc.) 2 2 2 3 2   To what extent are you able to carry out your everyday physical activities such as walking, climbing stairs, carrying groceries, or moving a chair? 5 5 5 5 4   In the past 7 days, how often have you been bothered by emotional problems such as feeling anxious, depressed, or irritable? 3 3 4 4 4   In the past 7 days, how would you rate your fatigue on average? 4 4 3 3 3   In the past 7 days, how would you rate your pain on average, where 0 means no pain, and 10 means worst imaginable pain? 5 5 7 7 8   Global Mental Health Score 10 10 11 7 8   Global Physical Health Score 12 12 12 12 11   PROMIS TOTAL - SUBSCORES 22 22 23 19 19   Some recent data might be hidden        ASSESSMENT: Current Emotional / Mental Status (status of significant symptoms):   Risk status (Self /  Other harm or suicidal ideation)   Patient denies current fears or concerns for personal safety.   Patient denies current or recent suicidal ideation or behaviors. Patient last reported feeling hopeless/passive ideation on 8/6/2020.   Patient denies current or recent homicidal ideation or behaviors.   Patient denies current or recent self injurious behavior or ideation.   Patient denies other safety concerns.   Patient reports there has been no change in risk factors since their last session.     Patient reports there has been no change in protective factors since their last session.     Recommended that patient call 911 or go to the local ED should there be a change in any of these risk factors.     Appearance:   Appropriate    Eye Contact:   Good    Psychomotor Behavior: Normal  Restless     Attitude:   Cooperative, Pleasant   Orientation:   All   Speech    Rate / Production: Normal/ Responsive Talkative    Volume:  Normal    Mood:    Anxious    Affect:    Appropriate ; tired   Thought Content:  Clear  Rumination    Thought Form:  Coherent  Neurosis   Insight:    Good      Medication Review:   No changes to current psychiatric medication(s)     Medication Compliance:   Yes     Changes in Health Issues:   None reported      Chemical Use Review:   Substance Use: Chemical use reviewed, no active concerns identified ; no current alcohol use     Tobacco Use: No current tobacco use.      Diagnosis:  1. TEE (generalized anxiety disorder)    2. Recurrent major depressive disorder, in partial remission (H)    3. Attention deficit hyperactivity disorder (ADHD), unspecified ADHD type      Note: There has been demonstrated improvement in functioning while patient has been engaged in psychotherapy/psychological service- if withdrawn the patient would deteriorate and/or relapse.     Collateral Reports Completed:  N/A    PLAN: (Patient Tasks / Therapist Tasks / Other)    Patient states that her goals for the next two weeks  include:    1. Continue to practice radical acceptance  2. Take care of the dead tree and ask  to help (continued)  3. Continue to try to work on cleaning middle child's room  4. Work to improve finances and budget  5. 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      Keesha SON Rafita    ______________________________________________________________________                                                           [facility]    Individual Treatment Plan    Patient's Name: Ellyn Mcgee  YOB: 1975    Date of Creation: 2020  Date Treatment Plan Last Reviewed/Revised: 2022    DSM5 Diagnoses: 296.32 (F33.1) Major Depressive Disorder, Recurrent Episode, Moderate _ or 300.02 (F41.1) Generalized Anxiety Disorder (patient has previously been diagnosed with ADHD, hyperactive type)  Psychosocial / Contextual Factors: History of anxious attachment stemming from relationship with father (he  8 years ago); is currently pregnant (high-risk due to advanced maternal age); is in couples therapy with  due to frequent arguments and his emotional affair earlier in the year; financial stress; history of ADHD (hyperactive type).  PROMIS (reviewed every 90 days): PROMIS-10 Scores             Referral / Collaboration:  Referral to another professional/service is not indicated at this time. Patient was previously referred to an ACT therapist but plans to wait to schedule.    Anticipated number of sessions for this episode of care: 60  Anticipation frequency of session: Monthly  Anticipated duration of each session: 38-52 minutes  Treatment plan will be reviewed in 90 days or when goals have been changed.             Measurable Treatment Goal(s) related to diagnosis / functional impairment(s):  Patient is asking to focus treatment on her relationship with her father and past trauma.    Goal 1: Patient will tell full story of past trauma related to  "her relationship with her father and will identify how past feelings are impacting current relationships.    Objective #A (Patient Action)    Patient will identify how past feelings are impacting current relationships.  Status: Continued - Date(s): 7/21/2022    Intervention(s)  Therapist will role-play conflict management.    Objective #B  Patient will identify strengths and weaknesses within her family system of origin.  Status: Continued - Date(s): 7/21/2022     Intervention(s)  Therapist will assign homework for patient to create list.      Goal 2: Patient will learn about resilience, trauma responses, and Javon Servin's \"the story I'm making up\" (cognitive strategy to manage anxious thoughts).    Objective #A (Patient Action)    Patient will learn about and identify personal trauma responses.    Status: Continued - Date(s): 7/21/2022    Intervention(s)  Therapist will provide educational materials on trauma responses.    Objective #B  Patient will use cognitive strategies identified in therapy to challenge anxious thoughts.  Status: Continued - Date(s): 7/21/2022    Intervention(s)  Therapist will teach about Jaovn Servin's power of vulnerability and \"the story I'm making up\".      Goal 3: Patient will learn about protective factors that sugey resilience and attachment styles and will identify how her attachment style drives her behavior.     Objective #A (Patient Action)    Status: Restarted - Date: 7/21/2022     Patient will learn about protective factors and will identify her own.    Intervention(s)  Therapist will complete with patient in session.    Objective #B  Patient will identify how her attachment style drives her behavior.  Status: Continued - Date(s): 7/21/2022    Intervention(s)  Therapist will teach emotional regulation skills.        Patient agreed to the above plan.      Keesha Eller    "

## 2022-10-04 ENCOUNTER — VIRTUAL VISIT (OUTPATIENT)
Dept: PSYCHOLOGY | Facility: CLINIC | Age: 47
End: 2022-10-04
Payer: COMMERCIAL

## 2022-10-04 DIAGNOSIS — F90.9 ATTENTION DEFICIT HYPERACTIVITY DISORDER (ADHD), UNSPECIFIED ADHD TYPE: ICD-10-CM

## 2022-10-04 DIAGNOSIS — F41.1 GAD (GENERALIZED ANXIETY DISORDER): Primary | ICD-10-CM

## 2022-10-04 DIAGNOSIS — F33.41 RECURRENT MAJOR DEPRESSIVE DISORDER, IN PARTIAL REMISSION (H): ICD-10-CM

## 2022-10-04 PROCEDURE — 90837 PSYTX W PT 60 MINUTES: CPT | Mod: 95 | Performed by: MARRIAGE & FAMILY THERAPIST

## 2022-10-04 NOTE — PATIENT INSTRUCTIONS
Patient states that her goals for the next week include:    Figure out options for stump removal  Continue to fight for daughter (emma w/the school)  Continue to work on de-cluttering and cleaning house  Cook a couple of healthy meals

## 2022-10-04 NOTE — PROGRESS NOTES
[facility]                                               Progress Note    Patient Name: Ellyn Mcgee  Date: 10/4/2022         Service Type: Individual      Session Start Time: 9:34 a.m.  Session End Time: 10:37 a.m.     Session Length: 63 min.    Session #: 45 (with this writer; patient met previously for DA with [facility] and psychiatry)    Attendees: Client     Service Modality:  Video Visit:    Telemedicine Visit: The patient's condition can be safely assessed and treated via synchronous audio and visual telemedicine encounter.      Reason for Telemedicine Visit: Patient convenience (e.g. access to timely appointments / distance to available provider)    Originating Site (Patient Location): Patient's home    Distant Site (Provider Location): [facility]: [place]    Consent:  The patient/guardian has verbally consented to: the potential risks and benefits of telemedicine (video visit) versus in person care; bill my insurance or make self-payment for services provided; and responsibility for payment of non-covered services.     Patient would like the video invitation sent by: Send to e-mail at: [email or web address]    Mode of Communication:  Video Conference via [employer]    As the provider I attest to compliance with applicable laws and regulations related to telemedicine.      DATA  Interactive Complexity: No  Crisis: No       Progress Since Last Session (Related to Symptoms / Goals / Homework):  Symptoms: reports difficulties related to daughter's physical and mental health management; anxiety increased.     Homework: Achieved / completed to satisfaction      Episode of Care Goals: Satisfactory progress - ACTION (Actively working towards change); Intervened by reinforcing change plan / affirming steps taken     Current / Ongoing Stressors and Concerns:  Feeling overwhelmed by organizing home;  and daughter may be on autism spectrum    Older daughter is  "experiencing dizziness/lethargy and was dx with POTS; some financial and friend-related stress; is interested in a deeper dive into emotional state and core beliefs through ACT; new son born 2020; roommate/former friend was finally evicted from their home (lived there for 3-4 years and did not paid any rent for over 1-2 years); pt has been experiencing mild panic attacks; daughter (age 10) just started ADHD medication; history of anxious attachment stemming from relationship with father (he  8 years ago); is currently pregnant (high-risk due to advanced maternal age) and baby is due Dec. 2020; is in couples therapy with  due to frequent arguments and his emotional affair earlier in the year; financial stress; history of ADHD (hyperactive type).     Treatment Objective(s) Addressed in This Session:  Identified and processed recent triggers for anxiety and sadness  Discussed family system issues (and possible messages internalized during childhood related to father)  Participating in enjoyable activities  Emotional differentiation  Discussed healthy boundaries and enforcement    Past/future:  Identified personal strengths within friendships  will identify how past feelings are impacting current relationships  Self-care and barriers to this  [facility] research  Identified strengths as a mother and need for positive affirmations  Identified insecurities and how they affect patient's thoughts and actions now and in the past  Discussed work/life balance and current purpose  Management of anger  Dealing with \"big emotions\"  Self-compassion and radical acceptance  Triggers for anxiety and insecurities  tell full story of past relational issues/trust/infidelity  use cognitive strategies identified in therapy to challenge anxious thoughts   Body appreciation  Relationship repair  Discussed ambiguous loss and grief in a box analogy  Anticipatory grief and creating meaning  10:10:10 rule (10 minutes, 10 months, " and 10 years)  Feeling unappreciated and the 5:1 ratio (Natali)  Solution focused: how to prioritize and cope with interrupted sleep  Javon Servin's elements of trust and relationship repair   identify how attachment style drives patient's and 's behaviors  tell full story of past trauma related to her relationship with her father (and current roommate issues)     Intervention:  Solution-Focused  Family systems theory  CBT: connect thoughts, feelings, and actions   Narrative Therapy: separate problem from person and find the bright spots     Past/future:  [employer] research  Future-self goals  Management of anger     Assessments completed prior to visit:  PHQ9:   PHQ-9 SCORE 12/21/2021 2/10/2022 3/15/2022 4/21/2022 7/6/2022 8/9/2022 9/20/2022   PHQ-9 Total Score - - - - - - -   PHQ-9 Total Score MyChart 6 (Mild depression) 8 (Mild depression) 8 (Mild depression) 11 (Moderate depression) 8 (Mild depression) 5 (Mild depression) 8 (Mild depression)   PHQ-9 Total Score 6 8 8 11 8 5 8     GAD7:   TEE-7 SCORE 9/8/2020 12/11/2020 3/2/2022 4/21/2022 6/14/2022 7/6/2022 9/20/2022   Total Score - - - - - - -   Total Score 12 (moderate anxiety) - 9 (mild anxiety) 12 (moderate anxiety) 8 (mild anxiety) 10 (moderate anxiety) 10 (moderate anxiety)   Total Score 12 11 9 12 8 10 10     PROMIS 10-Global Health (all questions and answers displayed):   PROMIS 10 12/15/2021 12/15/2021 3/15/2022 6/14/2022 7/6/2022   In general, would you say your health is: - Fair Fair Fair Fair   In general, would you say your quality of life is: - Fair Good Fair Fair   In general, how would you rate your physical health? - Fair Fair Fair Fair   In general, how would you rate your mental health, including your mood and your ability to think? - Good Good Fair Fair   In general, how would you rate your satisfaction with your social activities and relationships? - Fair Good Poor Fair   In general, please rate how well you carry out your usual social  activities and roles - Fair Fair Good Fair   To what extent are you able to carry out your everyday physical activities such as walking, climbing stairs, carrying groceries, or moving a chair? - Completely Completely Completely Mostly   How often have you been bothered by emotional problems such as feeling anxious, depressed or irritable? - Sometimes Often Often Often   How would you rate your fatigue on average? - Severe Moderate Moderate Moderate   How would you rate your pain on average?   0 = No Pain  to  10 = Worst Imaginable Pain - 5 7 7 8   In general, would you say your health is: 2 2 2 2 2   In general, would you say your quality of life is: 2 2 3 2 2   In general, how would you rate your physical health? 2 2 2 2 2   In general, how would you rate your mental health, including your mood and your ability to think? 3 3 3 2 2   In general, how would you rate your satisfaction with your social activities and relationships? 2 2 3 1 2   In general, please rate how well you carry out your usual social activities and roles. (This includes activities at home, at work and in your community, and responsibilities as a parent, child, spouse, employee, friend, etc.) 2 2 2 3 2   To what extent are you able to carry out your everyday physical activities such as walking, climbing stairs, carrying groceries, or moving a chair? 5 5 5 5 4   In the past 7 days, how often have you been bothered by emotional problems such as feeling anxious, depressed, or irritable? 3 3 4 4 4   In the past 7 days, how would you rate your fatigue on average? 4 4 3 3 3   In the past 7 days, how would you rate your pain on average, where 0 means no pain, and 10 means worst imaginable pain? 5 5 7 7 8   Global Mental Health Score 10 10 11 7 8   Global Physical Health Score 12 12 12 12 11   PROMIS TOTAL - SUBSCORES 22 22 23 19 19   Some recent data might be hidden        ASSESSMENT: Current Emotional / Mental Status (status of significant  symptoms):   Risk status (Self / Other harm or suicidal ideation)   Patient denies current fears or concerns for personal safety.   Patient denies current or recent suicidal ideation or behaviors. Patient last reported feeling hopeless/passive ideation on 8/6/2020.   Patient denies current or recent homicidal ideation or behaviors.   Patient denies current or recent self injurious behavior or ideation.   Patient denies other safety concerns.   Patient reports there has been no change in risk factors since their last session.     Patient reports there has been no change in protective factors since their last session.     Recommended that patient call 911 or go to the local ED should there be a change in any of these risk factors.     Appearance:   Appropriate    Eye Contact:   Good    Psychomotor Behavior: Normal  Restless     Attitude:   Cooperative, Pleasant   Orientation:   All   Speech    Rate / Production: Normal/ Responsive Talkative    Volume:  Normal    Mood:    Anxious    Affect:    Appropriate  Tearful; tired   Thought Content:  Clear  Rumination    Thought Form:  Coherent  Neurosis   Insight:    Good      Medication Review:   No changes to current psychiatric medication(s)     Medication Compliance:   Yes     Changes in Health Issues:   None reported      Chemical Use Review:   Substance Use: Chemical use reviewed, no active concerns identified ; no current alcohol use     Tobacco Use: No current tobacco use.      Diagnosis:  1. TEE (generalized anxiety disorder)    2. Recurrent major depressive disorder, in partial remission (H)    3. Attention deficit hyperactivity disorder (ADHD), unspecified ADHD type      Note: There has been demonstrated improvement in functioning while patient has been engaged in psychotherapy/psychological service- if withdrawn the patient would deteriorate and/or relapse.     Collateral Reports Completed:  N/A    PLAN: (Patient Tasks / Therapist Tasks / Other)    Patient states  that her goals for the next week include:    1. Figure out options for stump removal  2. Continue to fight for daughter (emma w/the school)  3. Continue to work on de-cluttering and cleaning house  4. Cook a couple of healthy meals      Keesha Eller    ______________________________________________________________________                                                           [facility]    Individual Treatment Plan    Patient's Name: Ellyn Mcgee  YOB: 1975    Date of Creation: 2020  Date Treatment Plan Last Reviewed/Revised: 2022    DSM5 Diagnoses: 296.32 (F33.1) Major Depressive Disorder, Recurrent Episode, Moderate _ or 300.02 (F41.1) Generalized Anxiety Disorder (patient has previously been diagnosed with ADHD, hyperactive type)  Psychosocial / Contextual Factors: History of anxious attachment stemming from relationship with father (he  8 years ago); is currently pregnant (high-risk due to advanced maternal age); is in couples therapy with  due to frequent arguments and his emotional affair earlier in the year; financial stress; history of ADHD (hyperactive type).  PROMIS (reviewed every 90 days): PROMIS-10 Scores             Referral / Collaboration:  Referral to another professional/service is not indicated at this time. Patient was previously referred to an ACT therapist but plans to wait to schedule.    Anticipated number of sessions for this episode of care: 60  Anticipation frequency of session: Monthly  Anticipated duration of each session: 38-52 minutes  Treatment plan will be reviewed in 90 days or when goals have been changed.             Measurable Treatment Goal(s) related to diagnosis / functional impairment(s):  Patient is asking to focus treatment on her relationship with her father and past trauma.    Goal 1: Patient will tell full story of past trauma related to her relationship with her father and will identify how past feelings are  "impacting current relationships.    Objective #A (Patient Action)    Patient will identify how past feelings are impacting current relationships.  Status: Continued - Date(s): 7/21/2022    Intervention(s)  Therapist will role-play conflict management.    Objective #B  Patient will identify strengths and weaknesses within her family system of origin.  Status: Continued - Date(s): 7/21/2022     Intervention(s)  Therapist will assign homework for patient to create list.      Goal 2: Patient will learn about resilience, trauma responses, and Javon Servin's \"the story I'm making up\" (cognitive strategy to manage anxious thoughts).    Objective #A (Patient Action)    Patient will learn about and identify personal trauma responses.    Status: Continued - Date(s): 7/21/2022    Intervention(s)  Therapist will provide educational materials on trauma responses.    Objective #B  Patient will use cognitive strategies identified in therapy to challenge anxious thoughts.  Status: Continued - Date(s): 7/21/2022    Intervention(s)  Therapist will teach about Javon Servin's power of vulnerability and \"the story I'm making up\".      Goal 3: Patient will learn about protective factors that sugey resilience and attachment styles and will identify how her attachment style drives her behavior.     Objective #A (Patient Action)    Status: Restarted - Date: 7/21/2022     Patient will learn about protective factors and will identify her own.    Intervention(s)  Therapist will complete with patient in session.    Objective #B  Patient will identify how her attachment style drives her behavior.  Status: Continued - Date(s): 7/21/2022    Intervention(s)  Therapist will teach emotional regulation skills.        Patient agreed to the above plan.      Keesha Eller    "

## 2022-10-12 ENCOUNTER — VIRTUAL VISIT (OUTPATIENT)
Dept: PSYCHOLOGY | Facility: OTHER | Age: 47
End: 2022-10-12
Payer: COMMERCIAL

## 2022-10-12 DIAGNOSIS — F41.1 GAD (GENERALIZED ANXIETY DISORDER): Primary | ICD-10-CM

## 2022-10-12 DIAGNOSIS — F33.41 RECURRENT MAJOR DEPRESSIVE DISORDER, IN PARTIAL REMISSION (H): ICD-10-CM

## 2022-10-12 PROCEDURE — 90834 PSYTX W PT 45 MINUTES: CPT | Mod: 95 | Performed by: MARRIAGE & FAMILY THERAPIST

## 2022-10-12 NOTE — PROGRESS NOTES
[facility]                                               Progress Note    Patient Name: Ellyn Mcgee  Date: 10/12/2022         Service Type: Individual      Session Start Time: 1:18 p.m.  Session End Time: 2:07 p.m.     Session Length: 49 min.    Session #: 46 (with this writer; patient met previously for DA with [facility] Elena [place] and psychiatry)    Attendees: Client     Service Modality:  Video Visit:    Telemedicine Visit: The patient's condition can be safely assessed and treated via synchronous audio and visual telemedicine encounter.      Reason for Telemedicine Visit: Patient convenience (e.g. access to timely appointments / distance to available provider)    Originating Site (Patient Location): Patient's home    Distant Site (Provider Location): [facility]: [place]    Consent:  The patient/guardian has verbally consented to: the potential risks and benefits of telemedicine (video visit) versus in person care; bill my insurance or make self-payment for services provided; and responsibility for payment of non-covered services.     Patient would like the video invitation sent by: Send to e-mail at: [email or web address]    Mode of Communication:  Video Conference via well    As the provider I attest to compliance with applicable laws and regulations related to telemedicine.      DATA  Interactive Complexity: No  Crisis: No       Progress Since Last Session (Related to Symptoms / Goals / Homework):  Symptoms: reports wanting to run away and feels overwhelmed.  Patient states that she has been sleeping poorly as she is still nursing her son and is expecting her period soon; patient presented as very sad and reports that she had had a screaming match with her oldest daughter prior to the therapy session.  Patient states that she would desperately like to visit her mother in [place] but states that financially and logistically she doesn't see how it can happen.  Additional financial  concerns and strain.     Homework: Partially completed      Episode of Care Goals: Satisfactory progress - ACTION (Actively working towards change); Intervened by reinforcing change plan / affirming steps taken     Current / Ongoing Stressors and Concerns:  Feeling overwhelmed by organizing home;  and daughter may be on autism spectrum    Older daughter is experiencing dizziness/lethargy and was dx with POTS; some financial and friend-related stress; is interested in a deeper dive into emotional state and core beliefs through ACT; new son born 2020; roommate/former friend was finally evicted from their home (lived there for 3-4 years and did not paid any rent for over 1-2 years); pt has been experiencing mild panic attacks; daughter (age 10) just started ADHD medication; history of anxious attachment stemming from relationship with father (he  8 years ago); is currently pregnant (high-risk due to advanced maternal age) and baby is due Dec. 2020; is in couples therapy with  due to frequent arguments and his emotional affair earlier in the year; financial stress; history of ADHD (hyperactive type).     Treatment Objective(s) Addressed in This Session:  Identified and processed recent triggers for anxiety and sadness (finances)  Grounding and mindfulness  Self-care and barriers to this  Discussed family system issues (and possible messages internalized during childhood related to father)  Participating in enjoyable activities and connecting with social and family supports  Emotional differentiation  Discussed healthy boundaries and enforcement    Past/future:  Identified personal strengths within friendships  will identify how past feelings are impacting current relationships  Natali research  Identified strengths as a mother and need for positive affirmations  Identified insecurities and how they affect patient's thoughts and actions now and in the past  Discussed work/life balance and current  "purpose  Management of anger  Dealing with \"big emotions\"  Self-compassion and radical acceptance  Triggers for anxiety and insecurities  tell full story of past relational issues/trust/infidelity  use cognitive strategies identified in therapy to challenge anxious thoughts   Body appreciation  Relationship repair  Discussed ambiguous loss and grief in a box analogy  Anticipatory grief and creating meaning  10:10:10 rule (10 minutes, 10 months, and 10 years)  Feeling unappreciated and the 5:1 ratio (Natali)  Solution focused: how to prioritize and cope with interrupted sleep  Javon Servin's elements of trust and relationship repair   identify how attachment style drives patient's and 's behaviors  tell full story of past trauma related to her relationship with her father (and current roommate issues)     Intervention:  Solution-Focused  Management of anger   Family systems theory  CBT: connect thoughts, feelings, and actions   Narrative Therapy: separate problem from person and find the bright spots     Past/future:  Gottman research  Future-self goals    Assessments completed prior to visit:  PHQ9:   PHQ-9 SCORE 12/21/2021 2/10/2022 3/15/2022 4/21/2022 7/6/2022 8/9/2022 9/20/2022   PHQ-9 Total Score - - - - - - -   PHQ-9 Total Score MyChart 6 (Mild depression) 8 (Mild depression) 8 (Mild depression) 11 (Moderate depression) 8 (Mild depression) 5 (Mild depression) 8 (Mild depression)   PHQ-9 Total Score 6 8 8 11 8 5 8     GAD7:   TEE-7 SCORE 9/8/2020 12/11/2020 3/2/2022 4/21/2022 6/14/2022 7/6/2022 9/20/2022   Total Score - - - - - - -   Total Score 12 (moderate anxiety) - 9 (mild anxiety) 12 (moderate anxiety) 8 (mild anxiety) 10 (moderate anxiety) 10 (moderate anxiety)   Total Score 12 11 9 12 8 10 10     PROMIS 10-Global Health (all questions and answers displayed):   PROMIS 10 12/15/2021 12/15/2021 3/15/2022 6/14/2022 7/6/2022   In general, would you say your health is: - Fair Fair Fair Fair   In general, " would you say your quality of life is: - Fair Good Fair Fair   In general, how would you rate your physical health? - Fair Fair Fair Fair   In general, how would you rate your mental health, including your mood and your ability to think? - Good Good Fair Fair   In general, how would you rate your satisfaction with your social activities and relationships? - Fair Good Poor Fair   In general, please rate how well you carry out your usual social activities and roles - Fair Fair Good Fair   To what extent are you able to carry out your everyday physical activities such as walking, climbing stairs, carrying groceries, or moving a chair? - Completely Completely Completely Mostly   How often have you been bothered by emotional problems such as feeling anxious, depressed or irritable? - Sometimes Often Often Often   How would you rate your fatigue on average? - Severe Moderate Moderate Moderate   How would you rate your pain on average?   0 = No Pain  to  10 = Worst Imaginable Pain - 5 7 7 8   In general, would you say your health is: 2 2 2 2 2   In general, would you say your quality of life is: 2 2 3 2 2   In general, how would you rate your physical health? 2 2 2 2 2   In general, how would you rate your mental health, including your mood and your ability to think? 3 3 3 2 2   In general, how would you rate your satisfaction with your social activities and relationships? 2 2 3 1 2   In general, please rate how well you carry out your usual social activities and roles. (This includes activities at home, at work and in your community, and responsibilities as a parent, child, spouse, employee, friend, etc.) 2 2 2 3 2   To what extent are you able to carry out your everyday physical activities such as walking, climbing stairs, carrying groceries, or moving a chair? 5 5 5 5 4   In the past 7 days, how often have you been bothered by emotional problems such as feeling anxious, depressed, or irritable? 3 3 4 4 4   In the past 7  days, how would you rate your fatigue on average? 4 4 3 3 3   In the past 7 days, how would you rate your pain on average, where 0 means no pain, and 10 means worst imaginable pain? 5 5 7 7 8   Global Mental Health Score 10 10 11 7 8   Global Physical Health Score 12 12 12 12 11   PROMIS TOTAL - SUBSCORES 22 22 23 19 19   Some recent data might be hidden        ASSESSMENT: Current Emotional / Mental Status (status of significant symptoms):   Risk status (Self / Other harm or suicidal ideation)   Patient denies current fears or concerns for personal safety.   Patient denies current or recent suicidal ideation or behaviors. Patient last reported feeling hopeless/passive ideation on 8/6/2020.   Patient denies current or recent homicidal ideation or behaviors.   Patient denies current or recent self injurious behavior or ideation.   Patient denies other safety concerns.   Patient reports there has been a change in risk factors since their last session: worsening finances and increased use of credit   Patient reports there has been no change in protective factors since their last session.     Recommended that patient call 911 or go to the local ED should there be a change in any of these risk factors.     Appearance:   Appropriate    Eye Contact:   Good    Psychomotor Behavior: Normal  Restless     Attitude:   Cooperative   Orientation:   All   Speech    Rate / Production: Normal/ Responsive Talkative    Volume:  Normal    Mood:    Anxious  Sad  Agitated   Affect:    Tearful Worrisome ; exhausted   Thought Content:  Clear  Rumination    Thought Form:  Coherent  Neurosis   Insight:    Good      Medication Review:   No changes to current psychiatric medication(s)     Medication Compliance:   Yes     Changes in Health Issues:   None reported      Chemical Use Review:   Substance Use: Chemical use reviewed, no active concerns identified ; no current alcohol use     Tobacco Use: No current tobacco use.      Diagnosis:  1. TEE  "(generalized anxiety disorder)    2. Recurrent major depressive disorder, in partial remission (H)      Note: There has been demonstrated improvement in functioning while patient has been engaged in psychotherapy/psychological service- if withdrawn the patient would deteriorate and/or relapse.     Collateral Reports Completed:  Care Coordination referral    PLAN: (Patient Tasks / Therapist Tasks / Other)    Patient states that her goals for the next two weeks include:    1. Homework: continue to use \"I feel\" and \"I need\" statements and dig under the feeling of overwhelm to identify further core emotions  2. Find ways to prioritize self-care and to possibly reach out to mother to figure out when/how to coordinate a visit     Patient was encouraged to seek help from [facility] as needed.  Provider placed referral for Care Coordination on this date.        Keesha Eller    ______________________________________________________________________                                                           [facility]    Individual Treatment Plan    Patient's Name: Ellyn Mcgee  YOB: 1975    Date of Creation: 2020  Date Treatment Plan Last Reviewed/Revised: 10/26/2022    DSM5 Diagnoses: 296.32 (F33.1) Major Depressive Disorder, Recurrent Episode, Moderate _ or 300.02 (F41.1) Generalized Anxiety Disorder (patient has previously been diagnosed with ADHD, hyperactive type)  Psychosocial / Contextual Factors: History of anxious attachment stemming from relationship with father (he  8 years ago); is currently pregnant (high-risk due to advanced maternal age); is in couples therapy with  due to frequent arguments and his emotional affair earlier in the year; financial stress; history of ADHD (hyperactive type).  PROMIS (reviewed every 90 days): PROMIS-10 Scores             Referral / Collaboration:  Care Coordination    Anticipated number of sessions for this episode of care: " "60+  Anticipation frequency of session: Every 2-3 weeks  Anticipated duration of each session: 38-52 minutes  Treatment plan will be reviewed in 90 days or when goals have been changed.             Measurable Treatment Goal(s) related to diagnosis / functional impairment(s):  Patient is asking to focus treatment on her relationship with her father and past trauma.    Goal 1: Patient will tell full story of past trauma related to her relationship with her father and will identify how past feelings are impacting current relationships.    Objective #A (Patient Action)    Patient will identify how past feelings are impacting current relationships.  Status: Continued - Date(s): 10/26/2022    Intervention(s)  Therapist will role-play conflict management.    Objective #B  Patient will identify strengths and weaknesses within her family system of origin.  Status: Continued - Date(s): 10/26/2022    Intervention(s)  Therapist will assign homework for patient to create list.      Goal 2: Patient will learn about resilience, trauma responses, and Javon Servin's \"the story I'm making up\" (cognitive strategy to manage anxious thoughts).    Objective #A (Patient Action)    Patient will learn about and identify personal trauma responses.    Status: Continued - Date(s): 10/26/2022    Intervention(s)  Therapist will provide educational materials on trauma responses.    Objective #B  Patient will use cognitive strategies identified in therapy to challenge anxious thoughts.  Status: Continued - Date(s): 10/26/2022    Intervention(s)  Therapist will teach about Javon Servin's power of vulnerability and \"the story I'm making up\".      Goal 3: Patient will learn about protective factors that sugey resilience and attachment styles and will identify how her attachment style drives her behavior.     Objective #A (Patient Action)    Status: Continued - Date(s): 10/26/2022     Patient will learn about protective factors and will identify her " own.    Intervention(s)  Therapist will complete with patient in session.    Objective #B  Patient will identify how her attachment style drives her behavior.  Status: Continued - Date(s): 10/26/2022    Intervention(s)  Therapist will teach emotional regulation skills.        Patient agreed to the above plan.      Keesha Eller

## 2022-10-12 NOTE — PATIENT INSTRUCTIONS
"Patient states that her goals for the next two weeks include:    Homework: continue to use \"I feel\" and \"I need\" statements and dig under the feeling of overwhelm to identify further core emotions  Find ways to prioritize self-care and to possibly reach out to mother to figure out when/how to coordinate a visit     Patient was encouraged to seek help from [facility] as needed.  Provider placed referral for Care Coordination on this date.    "

## 2022-10-13 ENCOUNTER — PATIENT OUTREACH (OUTPATIENT)
Dept: CARE COORDINATION | Facility: CLINIC | Age: 47
End: 2022-10-13

## 2022-10-13 NOTE — PROGRESS NOTES
Clinic Care Coordination Contact    Clinic Care Coordination Contact  OUTREACH    Referral Information: Behavioral Health Clinician      ARLYN WILSON reached out to pt and spoke to her regarding referral. Pt reported that she has applied to [facility] benefits including SNAP and others for her family and kids. Pt reports that she applied a little over 2 weeks ago and had difficulty connecting with [facility] on an update but heard back that it will be processed late as they are behind. ARLYN WILSON validated this saying that [facility] applications take time as staffing might be short as well. ARLYN WILSON asked pt if she was in need of any other resources or information about the [facility]. Pt asked if there were any disability resources for her family as one of her kids has a disability. ARLYN WILSON spoke about disability [facility] website and resources they could provide. Pt said she would look into this and did not need any other resources at this time. Pt requests call back in a few weeks to see if any additional resources are needed.        Chief Complaint   Patient presents with     Clinic Care Coordination - Initial        Universal Utilization: [facility]     Utilization    Hospital Admissions  0             ED Visits  0             No Show Count (past year)  0                Current as of: 10/13/2022  4:43 AM              Clinical Concerns:  Current Medical Concerns:  Did not discuss    Current Behavioral Concerns: Did not discuss    Education Provided to patient: Disability Hub       Health Maintenance Reviewed:    Clinical Pathway:    Medication Management:  Medication review status: Did not discuss    Functional Status:       Living Situation:       Lifestyle & Psychosocial Needs:    Social Determinants of Health     Tobacco Use: Low Risk      Smoking Tobacco Use: Never     Smokeless Tobacco Use: Never     Passive Exposure: Not on file   Alcohol Use: Not on file   Financial Resource Strain: Not on file   Food Insecurity:  Not on file   Transportation Needs: Not on file   Physical Activity: Not on file   Stress: Not on file   Social Connections: Not on file   Intimate Partner Violence: Not on file   Depression: Not at risk     PHQ-2 Score: 2   Housing Stability: Not on file          Resources and Interventions:  Current Resources:      [email or web address]     Care Plan:      Patient/Caregiver understanding:Patient verbalized understanding, engaged in AIDET communication during patient encounter.       Future Appointments              In 1 week Keesha Eller [facility] AUDELIA SURESH          Plan: ARLYN WILSON will call pt in 3 weeks to check in if any additional resources are needed    ANDREW Andujar? Social Work Care Coordinator   [facility]  [email or web address]? [email or web address]    Phone: [phone]  she/her

## 2022-10-26 ENCOUNTER — VIRTUAL VISIT (OUTPATIENT)
Dept: PSYCHOLOGY | Facility: OTHER | Age: 47
End: 2022-10-26
Payer: COMMERCIAL

## 2022-10-26 DIAGNOSIS — F33.41 RECURRENT MAJOR DEPRESSIVE DISORDER, IN PARTIAL REMISSION (H): Primary | ICD-10-CM

## 2022-10-26 DIAGNOSIS — F41.1 GAD (GENERALIZED ANXIETY DISORDER): ICD-10-CM

## 2022-10-26 PROCEDURE — 90837 PSYTX W PT 60 MINUTES: CPT | Mod: 95 | Performed by: MARRIAGE & FAMILY THERAPIST

## 2022-10-26 ASSESSMENT — ANXIETY QUESTIONNAIRES
5. BEING SO RESTLESS THAT IT IS HARD TO SIT STILL: NOT AT ALL
8. IF YOU CHECKED OFF ANY PROBLEMS, HOW DIFFICULT HAVE THESE MADE IT FOR YOU TO DO YOUR WORK, TAKE CARE OF THINGS AT HOME, OR GET ALONG WITH OTHER PEOPLE?: SOMEWHAT DIFFICULT
6. BECOMING EASILY ANNOYED OR IRRITABLE: SEVERAL DAYS
3. WORRYING TOO MUCH ABOUT DIFFERENT THINGS: SEVERAL DAYS
GAD7 TOTAL SCORE: 6
7. FEELING AFRAID AS IF SOMETHING AWFUL MIGHT HAPPEN: SEVERAL DAYS
4. TROUBLE RELAXING: SEVERAL DAYS
GAD7 TOTAL SCORE: 6
1. FEELING NERVOUS, ANXIOUS, OR ON EDGE: MORE THAN HALF THE DAYS
2. NOT BEING ABLE TO STOP OR CONTROL WORRYING: NOT AT ALL
GAD7 TOTAL SCORE: 6
IF YOU CHECKED OFF ANY PROBLEMS ON THIS QUESTIONNAIRE, HOW DIFFICULT HAVE THESE PROBLEMS MADE IT FOR YOU TO DO YOUR WORK, TAKE CARE OF THINGS AT HOME, OR GET ALONG WITH OTHER PEOPLE: SOMEWHAT DIFFICULT
7. FEELING AFRAID AS IF SOMETHING AWFUL MIGHT HAPPEN: SEVERAL DAYS
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
## 2022-10-27 ENCOUNTER — PATIENT OUTREACH (OUTPATIENT)
Dept: CARE COORDINATION | Facility: CLINIC | Age: 47
End: 2022-10-27

## 2022-10-27 NOTE — PATIENT INSTRUCTIONS
"Patient states that her goals for the next two weeks include:    Continue to take it day by day and focus on getting through the next couple of weeks  Take care of little things  \"Try to make sure people have food and follow through\"    "

## 2022-10-27 NOTE — PROGRESS NOTES
Clinic Care Coordination Contact    Follow Up Progress Note      Assessment: ARLYN CC reached out to pt and spoke to her about follow up on how things were going. Pt reports that she was supposed to have interview with [facility] on  Tuesday (10/25) over the phone but never received a call. Pt reports that she called her  and has not heard back as she left a VM. ARLYN CC recommended that pt call her  again and leave VM just so they are aware she is attempting to reschedule in case there needs to be documentation or rejection saying they were unable to contact her. ARLYN ml also said she could try calling number for [facility] if she thinks necessary as well. Pt said she will do this. ARLYN CC asked if pt has looked into previous resource of [facility]. Pt said she has not been able to yet but was reminded of it and will look into it when she can. Pt reports she does not need any additional resources at this time .    Care Gaps:    Health Maintenance Due   Topic Date Due     ADVANCE CARE PLANNING  Never done     DEPRESSION ACTION PLAN  Never done     HEPATITIS B IMMUNIZATION (1 of 3 - 3-dose series) Never done     COLORECTAL CANCER SCREENING  Never done     MAMMO SCREENING  07/18/2018     COVID-19 Vaccine (4 - Booster for Pfizer series) 01/18/2022     YEARLY PREVENTIVE VISIT  02/09/2022     INFLUENZA VACCINE (1) 09/01/2022       Care Plans      Intervention/Education provided during outreach:[facility]      Plan:   Care Coordinator will follow up in 1 month     ANDREW Andujar? Social Work Care Coordinator   [facility]  [email or web address]? [email or web address]    Phone: [phone]  she/her

## 2022-11-08 ENCOUNTER — PATIENT OUTREACH (OUTPATIENT)
Dept: CARE COORDINATION | Facility: CLINIC | Age: 47
End: 2022-11-08

## 2022-11-08 NOTE — PROGRESS NOTES
Clinic Care Coordination Contact    Follow Up Progress Note      Assessment: ARLYN CC reached out to pt regarding a follow up from provider requesting need to get in contact with . ARLYN ml spoke to pt and she reported that she was able to get in contact with  and does not need any additional resources at this time. Pt says she still needs to look into [facility].     Care Gaps:    Health Maintenance Due   Topic Date Due     ADVANCE CARE PLANNING  Never done     DEPRESSION ACTION PLAN  Never done     COLORECTAL CANCER SCREENING  Never done     MAMMO SCREENING  07/18/2018     COVID-19 Vaccine (4 - Booster for Pfizer series) 01/18/2022     YEARLY PREVENTIVE VISIT  02/09/2022     INFLUENZA VACCINE (1) 09/01/2022       Care Plans      Intervention/Education provided during outreach: N/A     Outreach Frequency: monthly    Plan:   Care Coordinator will follow up in 2 weeks    ANDREW Andujar? Social Work Care Coordinator   [facility]  [email or web address]? [email or web address]    Phone: [phone]  she/her

## 2022-11-09 ENCOUNTER — MYC MEDICAL ADVICE (OUTPATIENT)
Dept: FAMILY MEDICINE | Facility: CLINIC | Age: 47
End: 2022-11-09

## 2022-11-09 ENCOUNTER — VIRTUAL VISIT (OUTPATIENT)
Dept: PSYCHOLOGY | Facility: OTHER | Age: 47
End: 2022-11-09
Payer: COMMERCIAL

## 2022-11-09 DIAGNOSIS — F33.41 RECURRENT MAJOR DEPRESSIVE DISORDER, IN PARTIAL REMISSION (H): ICD-10-CM

## 2022-11-09 DIAGNOSIS — F41.1 GAD (GENERALIZED ANXIETY DISORDER): Primary | ICD-10-CM

## 2022-11-09 PROCEDURE — 90837 PSYTX W PT 60 MINUTES: CPT | Mod: 95 | Performed by: MARRIAGE & FAMILY THERAPIST

## 2022-11-09 NOTE — PATIENT INSTRUCTIONS
Patient states that her goals for the next two weeks include:    Continue to try to be slower, more patient, and thoughtful in communications w/  Get house ready for winter  Get info about laws and absences from [place] [employer]  Try to find a winter activity for toddler son to do

## 2022-11-09 NOTE — PROGRESS NOTES
[facility]                                               Progress Note    Patient Name: Ellyn Mcgee  Date: 11/9/2022         Service Type: Individual      Session Start Time: 2:07 p.m.  Session End Time: 3:05 p.m.     Session Length: 58 min.    Session #: 48 (with this writer; patient met previously for DA with [facility] and psychiatry)    Attendees: Client     Service Modality:  Video Visit:    Telemedicine Visit: The patient's condition can be safely assessed and treated via synchronous audio and visual telemedicine encounter.      Reason for Telemedicine Visit: Patient convenience (e.g. access to timely appointments / distance to available provider)    Originating Site (Patient Location): Patient's home    Distant Site (Provider Location): [facility]: [place]    Consent:  The patient/guardian has verbally consented to: the potential risks and benefits of telemedicine (video visit) versus in person care; bill my insurance or make self-payment for services provided; and responsibility for payment of non-covered services.     Patient would like the video invitation sent by: Send to e-mail at: [email or web address]    Mode of Communication:  Video Conference via [employer]    As the provider I attest to compliance with applicable laws and regulations related to telemedicine.      DATA  Interactive Complexity: No  Crisis: No       Progress Since Last Session (Related to Symptoms / Goals / Homework):  Symptoms: reports relationship improvements and better communication; patient continues to advocate for her family's needs and her daughter's education.  Continued anxiety.     Homework: Achieved / completed to satisfaction      Episode of Care Goals: Satisfactory progress - ACTION (Actively working towards change); Intervened by reinforcing change plan / affirming steps taken     Current / Ongoing Stressors and Concerns:  Feeling overwhelmed by organizing home;  and  "daughter may be on autism spectrum    Older daughter is experiencing dizziness/lethargy and was dx with POTS; some financial and friend-related stress; is interested in a deeper dive into emotional state and core beliefs through ACT; new son born 2020; roommate/former friend was finally evicted from their home (lived there for 3-4 years and did not paid any rent for over 1-2 years); pt has been experiencing mild panic attacks; daughter (age 10) just started ADHD medication; history of anxious attachment stemming from relationship with father (he  8 years ago); is currently pregnant (high-risk due to advanced maternal age) and baby is due Dec. 2020; is in couples therapy with  due to frequent arguments and his emotional affair earlier in the year; financial stress; history of ADHD (hyperactive type).     Treatment Objective(s) Addressed in This Session:  Discussed work/life balance and current purpose  Grounding and mindfulness  Identified personal strengths within friendships  will identify how past feelings are impacting current relationships  Identified and processed recent triggers for anxiety and sadness (finances)  Self-care and barriers to this  Discussed family system issues (and possible messages internalized during childhood related to father)  Participating in enjoyable activities and connecting with social and family supports  Emotional differentiation  Discussed healthy boundaries and enforcement    Past/future:  Natali research  Identified strengths as a mother and need for positive affirmations  Identified insecurities and how they affect patient's thoughts and actions now and in the past  Management of anger  Dealing with \"big emotions\"  Self-compassion and radical acceptance  Triggers for anxiety and insecurities  tell full story of past relational issues/trust/infidelity  use cognitive strategies identified in therapy to challenge anxious thoughts   Body appreciation  Relationship " repair  Discussed ambiguous loss and grief in a box analogy  Anticipatory grief and creating meaning  10:10:10 rule (10 minutes, 10 months, and 10 years)  Feeling unappreciated and the 5:1 ratio (Natali)  Solution focused: how to prioritize and cope with interrupted sleep  Javon Servin's elements of trust and relationship repair   identify how attachment style drives patient's and 's behaviors  tell full story of past trauma related to her relationship with her father (and current roommate issues)     Intervention:  Solution-Focused  Family systems theory  CBT: connect thoughts, feelings, and actions   Narrative Therapy: separate problem from person and find the bright spots   Future-self goals    Past/future:  Management of anger   Natali research    Assessments completed prior to visit:  PHQ9:   PHQ-9 SCORE 2/10/2022 3/15/2022 4/21/2022 7/6/2022 8/9/2022 9/20/2022 10/26/2022   PHQ-9 Total Score - - - - - - -   PHQ-9 Total Score MyChart 8 (Mild depression) 8 (Mild depression) 11 (Moderate depression) 8 (Mild depression) 5 (Mild depression) 8 (Mild depression) 8 (Mild depression)   PHQ-9 Total Score 8 8 11 8 5 8 8     GAD7:   TEE-7 SCORE 12/11/2020 3/2/2022 4/21/2022 6/14/2022 7/6/2022 9/20/2022 10/26/2022   Total Score - - - - - - -   Total Score - 9 (mild anxiety) 12 (moderate anxiety) 8 (mild anxiety) 10 (moderate anxiety) 10 (moderate anxiety) 6 (mild anxiety)   Total Score 11 9 12 8 10 10 6     PROMIS 10-Global Health (all questions and answers displayed):   PROMIS 10 12/15/2021 12/15/2021 3/15/2022 6/14/2022 7/6/2022 10/26/2022   In general, would you say your health is: - Fair Fair Fair Fair Fair   In general, would you say your quality of life is: - Fair Good Fair Fair Fair   In general, how would you rate your physical health? - Fair Fair Fair Fair Fair   In general, how would you rate your mental health, including your mood and your ability to think? - Good Good Fair Fair Fair   In general, how  would you rate your satisfaction with your social activities and relationships? - Fair Good Poor Fair Fair   In general, please rate how well you carry out your usual social activities and roles - Fair Fair Good Fair Fair   To what extent are you able to carry out your everyday physical activities such as walking, climbing stairs, carrying groceries, or moving a chair? - Completely Completely Completely Mostly Completely   How often have you been bothered by emotional problems such as feeling anxious, depressed or irritable? - Sometimes Often Often Often Often   How would you rate your fatigue on average? - Severe Moderate Moderate Moderate Moderate   How would you rate your pain on average?   0 = No Pain  to  10 = Worst Imaginable Pain - 5 7 7 8 3   In general, would you say your health is: 2 2 2 2 2 2   In general, would you say your quality of life is: 2 2 3 2 2 2   In general, how would you rate your physical health? 2 2 2 2 2 2   In general, how would you rate your mental health, including your mood and your ability to think? 3 3 3 2 2 2   In general, how would you rate your satisfaction with your social activities and relationships? 2 2 3 1 2 2   In general, please rate how well you carry out your usual social activities and roles. (This includes activities at home, at work and in your community, and responsibilities as a parent, child, spouse, employee, friend, etc.) 2 2 2 3 2 2   To what extent are you able to carry out your everyday physical activities such as walking, climbing stairs, carrying groceries, or moving a chair? 5 5 5 5 4 5   In the past 7 days, how often have you been bothered by emotional problems such as feeling anxious, depressed, or irritable? 3 3 4 4 4 4   In the past 7 days, how would you rate your fatigue on average? 4 4 3 3 3 3   In the past 7 days, how would you rate your pain on average, where 0 means no pain, and 10 means worst imaginable pain? 5 5 7 7 8 3   Global Mental Health  Score 10 10 11 7 8 8   Global Physical Health Score 12 12 12 12 11 14   PROMIS TOTAL - SUBSCORES 22 22 23 19 19 22   Some recent data might be hidden        ASSESSMENT: Current Emotional / Mental Status (status of significant symptoms):   Risk status (Self / Other harm or suicidal ideation)   Patient denies current fears or concerns for personal safety.   Patient denies current or recent suicidal ideation or behaviors. Patient last reported feeling hopeless/passive ideation on 8/6/2020.   Patient denies current or recent homicidal ideation or behaviors.   Patient denies current or recent self injurious behavior or ideation.   Patient denies other safety concerns.   Patient reports there has been no change in risk factors since their last session.   Patient reports there has been a change in protective factors since their last session: had had contact with EBT    Recommended that patient call 911 or go to the local ED should there be a change in any of these risk factors.     Appearance:   Appropriate    Eye Contact:   Good    Psychomotor Behavior: Normal  Restless     Attitude:   Cooperative   Orientation:   All   Speech    Rate / Production: Normal/ Responsive Talkative    Volume:  Soft (toddler was sleeping)   Mood:    Euthymic   Affect:    Appropriate    Thought Content:  Clear  Rumination    Thought Form:  Coherent  Neurosis   Insight:    Good      Medication Review:   No changes to current psychiatric medication(s)     Medication Compliance:   Yes     Changes in Health Issues:   None reported      Chemical Use Review:   Substance Use: Chemical use reviewed, no active concerns identified ; no current alcohol use     Tobacco Use: No current tobacco use.      Diagnosis:  1. TEE (generalized anxiety disorder)    2. Recurrent major depressive disorder, in partial remission (H)      Note: There has been demonstrated improvement in functioning while patient has been engaged in psychotherapy/psychological  service- if withdrawn the patient would deteriorate and/or relapse.     Collateral Reports Completed:  Care Coordination referral    PLAN: (Patient Tasks / Therapist Tasks / Other)    Patient states that her goals for the next two weeks include:    1. Continue to try to be slower, more patient, and thoughtful in communications w/  2. Get house ready for winter  3. Get info about laws and absences from [place] [employer] of [employer]  4. Try to find a winter activity for toddler son to do       Keesha FRANK. Rafita    ______________________________________________________________________                                                           [facility]    Individual Treatment Plan    Patient's Name: Ellyn Mcgee  YOB: 1975    Date of Creation: 2020  Date Treatment Plan Last Reviewed/Revised: 10/26/2022    DSM5 Diagnoses: 296.32 (F33.1) Major Depressive Disorder, Recurrent Episode, Moderate _ or 300.02 (F41.1) Generalized Anxiety Disorder (patient has previously been diagnosed with ADHD, hyperactive type)  Psychosocial / Contextual Factors: History of anxious attachment stemming from relationship with father (he  8 years ago); is currently pregnant (high-risk due to advanced maternal age); is in couples therapy with  due to frequent arguments and his emotional affair earlier in the year; financial stress; history of ADHD (hyperactive type).  PROMIS (reviewed every 90 days): PROMIS-10 Scores             Referral / Collaboration:  Care Coordination    Anticipated number of sessions for this episode of care: 60+  Anticipation frequency of session: Every 2-3 weeks  Anticipated duration of each session: 38-52 minutes  Treatment plan will be reviewed in 90 days or when goals have been changed.             Measurable Treatment Goal(s) related to diagnosis / functional impairment(s):  Patient is asking to focus treatment on her relationship with her father and past  "trauma.    Goal 1: Patient will tell full story of past trauma related to her relationship with her father and will identify how past feelings are impacting current relationships.    Objective #A (Patient Action)    Patient will identify how past feelings are impacting current relationships.  Status: Continued - Date(s): 10/26/2022    Intervention(s)  Therapist will role-play conflict management.    Objective #B  Patient will identify strengths and weaknesses within her family system of origin.  Status: Continued - Date(s): 10/26/2022    Intervention(s)  Therapist will assign homework for patient to create list.      Goal 2: Patient will learn about resilience, trauma responses, and Javon Servin's \"the story I'm making up\" (cognitive strategy to manage anxious thoughts).    Objective #A (Patient Action)    Patient will learn about and identify personal trauma responses.    Status: Continued - Date(s): 10/26/2022    Intervention(s)  Therapist will provide educational materials on trauma responses.    Objective #B  Patient will use cognitive strategies identified in therapy to challenge anxious thoughts.  Status: Continued - Date(s): 10/26/2022    Intervention(s)  Therapist will teach about Javon Servin's power of vulnerability and \"the story I'm making up\".      Goal 3: Patient will learn about protective factors that foster resilience and attachment styles and will identify how her attachment style drives her behavior.     Objective #A (Patient Action)    Status: Continued - Date(s): 10/26/2022     Patient will learn about protective factors and will identify her own.    Intervention(s)  Therapist will complete with patient in session.    Objective #B  Patient will identify how her attachment style drives her behavior.  Status: Continued - Date(s): 10/26/2022    Intervention(s)  Therapist will teach emotional regulation skills.        Patient agreed to the above plan.      Keesha Eller    "

## 2022-11-21 ENCOUNTER — HEALTH MAINTENANCE LETTER (OUTPATIENT)
Age: 47
End: 2022-11-21

## 2022-11-23 ENCOUNTER — VIRTUAL VISIT (OUTPATIENT)
Dept: PSYCHOLOGY | Facility: OTHER | Age: 47
End: 2022-11-23
Payer: COMMERCIAL

## 2022-11-23 DIAGNOSIS — F41.1 GAD (GENERALIZED ANXIETY DISORDER): Primary | ICD-10-CM

## 2022-11-23 DIAGNOSIS — F33.41 RECURRENT MAJOR DEPRESSIVE DISORDER, IN PARTIAL REMISSION (H): ICD-10-CM

## 2022-11-23 PROCEDURE — 90837 PSYTX W PT 60 MINUTES: CPT | Mod: 95 | Performed by: MARRIAGE & FAMILY THERAPIST

## 2022-11-23 NOTE — PROGRESS NOTES
"    [facility]                                               Progress Note    Patient Name: Ellyn Mcgee  Date: 11/23/2022         Service Type: Individual      Session Start Time: 2:06 p.m.  Session End Time: 3:10 p.m.     Session Length: 64 min.    Session #: 49 (with this writer; patient met previously for DA with [facility] and psychiatry)    Attendees: Client     Service Modality:  Video Visit:    Telemedicine Visit: The patient's condition can be safely assessed and treated via synchronous audio and visual telemedicine encounter.      Reason for Telemedicine Visit: Patient convenience (e.g. access to timely appointments / distance to available provider)    Originating Site (Patient Location): Patient's home    Distant Site (Provider Location): [facility]: [place]    Consent:  The patient/guardian has verbally consented to: the potential risks and benefits of telemedicine (video visit) versus in person care; bill my insurance or make self-payment for services provided; and responsibility for payment of non-covered services.     Patient would like the video invitation sent by: Send to e-mail at: [email or web address]    Mode of Communication:  Video Conference via [employer]    As the provider I attest to compliance with applicable laws and regulations related to telemedicine.      DATA  Interactive Complexity: No  Crisis: No       Progress Since Last Session (Related to Symptoms / Goals / Homework):  Symptoms: reports improvements in ability to talk through difficult situations and feelings (\"I can get through this\" or \"It won't always be like this\"); continues to advocate for daughter at school and has been slightly improving the family's finances.     Homework: Achieved / completed to satisfaction      Episode of Care Goals: Satisfactory progress - ACTION (Actively working towards change); Intervened by reinforcing change plan / affirming steps taken     Current / Ongoing " "Stressors and Concerns:  Feeling overwhelmed by organizing home;  and daughter may be on autism spectrum    Older daughter is experiencing dizziness/lethargy and was dx with POTS; some financial and friend-related stress; is interested in a deeper dive into emotional state and core beliefs through ACT; new son born 2020; roommate/former friend was finally evicted from their home (lived there for 3-4 years and did not paid any rent for over 1-2 years); pt has been experiencing mild panic attacks; daughter (age 10) just started ADHD medication; history of anxious attachment stemming from relationship with father (he  8 years ago); is currently pregnant (high-risk due to advanced maternal age) and baby is due Dec. 2020; is in couples therapy with  due to frequent arguments and his emotional affair earlier in the year; financial stress; history of ADHD (hyperactive type).     Treatment Objective(s) Addressed in This Session:  will identify how past feelings are impacting current relationships  Identified and processed recent triggers for anxiety and sadness (finances)  Self-care and barriers to this  Discussed family system issues (and possible messages internalized during childhood related to father)  Participating in enjoyable activities and connecting with social and family supports  Emotional differentiation  Discussed healthy boundaries and enforcement    Past/future:  Discussed work/life balance and current purpose  Grounding and mindfulness  Identified personal strengths within friendships  Natali research  Identified strengths as a mother and need for positive affirmations  Identified insecurities and how they affect patient's thoughts and actions now and in the past  Management of anger  Dealing with \"big emotions\"  Self-compassion and radical acceptance  Triggers for anxiety and insecurities  tell full story of past relational issues/trust/infidelity  use cognitive strategies " identified in therapy to challenge anxious thoughts   Body appreciation  Relationship repair  Discussed ambiguous loss and grief in a box analogy  Anticipatory grief and creating meaning  10:10:10 rule (10 minutes, 10 months, and 10 years)  Feeling unappreciated and the 5:1 ratio (Natali)  Solution focused: how to prioritize and cope with interrupted sleep  Javon Servin's elements of trust and relationship repair   identify how attachment style drives patient's and 's behaviors  tell full story of past trauma related to her relationship with her father (and current roommate issues)     Intervention:  Solution-Focused  Family systems theory  CBT: connect thoughts, feelings, and actions   Narrative Therapy: separate problem from person and find the bright spots   Future-self goals    Past/future:  Management of anger   Natali research    Assessments completed prior to visit:  PHQ9:   PHQ-9 SCORE 2/10/2022 3/15/2022 4/21/2022 7/6/2022 8/9/2022 9/20/2022 10/26/2022   PHQ-9 Total Score - - - - - - -   PHQ-9 Total Score [facility] 8 (Mild depression) 8 (Mild depression) 11 (Moderate depression) 8 (Mild depression) 5 (Mild depression) 8 (Mild depression) 8 (Mild depression)   PHQ-9 Total Score 8 8 11 8 5 8 8     GAD7:   TEE-7 SCORE 12/11/2020 3/2/2022 4/21/2022 6/14/2022 7/6/2022 9/20/2022 10/26/2022   Total Score - - - - - - -   Total Score - 9 (mild anxiety) 12 (moderate anxiety) 8 (mild anxiety) 10 (moderate anxiety) 10 (moderate anxiety) 6 (mild anxiety)   Total Score 11 9 12 8 10 10 6     PROMIS 10-Global Health (all questions and answers displayed):   PROMIS 10 12/15/2021 12/15/2021 3/15/2022 6/14/2022 7/6/2022 10/26/2022   In general, would you say your health is: - Fair Fair Fair Fair Fair   In general, would you say your quality of life is: - Fair Good Fair Fair Fair   In general, how would you rate your physical health? - Fair Fair Fair Fair Fair   In general, how would you rate your mental health, including  your mood and your ability to think? - Good Good Fair Fair Fair   In general, how would you rate your satisfaction with your social activities and relationships? - Fair Good Poor Fair Fair   In general, please rate how well you carry out your usual social activities and roles - Fair Fair Good Fair Fair   To what extent are you able to carry out your everyday physical activities such as walking, climbing stairs, carrying groceries, or moving a chair? - Completely Completely Completely Mostly Completely   How often have you been bothered by emotional problems such as feeling anxious, depressed or irritable? - Sometimes Often Often Often Often   How would you rate your fatigue on average? - Severe Moderate Moderate Moderate Moderate   How would you rate your pain on average?   0 = No Pain  to  10 = Worst Imaginable Pain - 5 7 7 8 3   In general, would you say your health is: 2 2 2 2 2 2   In general, would you say your quality of life is: 2 2 3 2 2 2   In general, how would you rate your physical health? 2 2 2 2 2 2   In general, how would you rate your mental health, including your mood and your ability to think? 3 3 3 2 2 2   In general, how would you rate your satisfaction with your social activities and relationships? 2 2 3 1 2 2   In general, please rate how well you carry out your usual social activities and roles. (This includes activities at home, at work and in your community, and responsibilities as a parent, child, spouse, employee, friend, etc.) 2 2 2 3 2 2   To what extent are you able to carry out your everyday physical activities such as walking, climbing stairs, carrying groceries, or moving a chair? 5 5 5 5 4 5   In the past 7 days, how often have you been bothered by emotional problems such as feeling anxious, depressed, or irritable? 3 3 4 4 4 4   In the past 7 days, how would you rate your fatigue on average? 4 4 3 3 3 3   In the past 7 days, how would you rate your pain on average, where 0 means no  pain, and 10 means worst imaginable pain? 5 5 7 7 8 3   Global Mental Health Score 10 10 11 7 8 8   Global Physical Health Score 12 12 12 12 11 14   PROMIS TOTAL - SUBSCORES 22 22 23 19 19 22   Some recent data might be hidden        ASSESSMENT: Current Emotional / Mental Status (status of significant symptoms):   Risk status (Self / Other harm or suicidal ideation)   Patient denies current fears or concerns for personal safety.   Patient denies current or recent suicidal ideation or behaviors. Patient last reported feeling hopeless/passive ideation on 8/6/2020.   Patient denies current or recent homicidal ideation or behaviors.   Patient denies current or recent self injurious behavior or ideation.   Patient denies other safety concerns.   Patient reports there has been no change in risk factors since their last session.   Patient reports there has been a change in protective factors since their last session: had had contact with EBT    Recommended that patient call 911 or go to the local ED should there be a change in any of these risk factors.     Appearance:   Appropriate    Eye Contact:   Good    Psychomotor Behavior: Normal  Restless     Attitude:   Cooperative   Orientation:   All   Speech    Rate / Production: Normal/ Responsive Talkative    Volume:  Normal    Mood:    Anxious    Affect:    Appropriate  Expansive    Thought Content:  Clear  Rumination    Thought Form:  Coherent  Neurosis   Insight:    Good      Medication Review:   No changes to current psychiatric medication(s)     Medication Compliance:   Yes     Changes in Health Issues:   None reported      Chemical Use Review:   Substance Use: Chemical use reviewed, no active concerns identified ; no current alcohol use     Tobacco Use: No current tobacco use.      Diagnosis:  1. TEE (generalized anxiety disorder)    2. Recurrent major depressive disorder, in partial remission (H)      Note: There has been demonstrated improvement in  functioning while patient has been engaged in psychotherapy/psychological service- if withdrawn the patient would deteriorate and/or relapse.     Collateral Reports Completed:  Care Coordination referral    PLAN: (Patient Tasks / Therapist Tasks / Other)    Patient states that her goals for the next two weeks include:    1. Continue to choose and cook healthy dinners  2. Focus on what is within control: my words, responses, and choosing to  or let go of work shifts  3. Think more about weaning and how to move towards that (if patient determines this is what she wants)  4.       Keesha Eller    ______________________________________________________________________                                                           [facility]    Individual Treatment Plan    Patient's Name: Ellyn Mcgee  YOB: 1975    Date of Creation: 2020  Date Treatment Plan Last Reviewed/Revised: 10/26/2022    DSM5 Diagnoses: 296.32 (F33.1) Major Depressive Disorder, Recurrent Episode, Moderate _ or 300.02 (F41.1) Generalized Anxiety Disorder (patient has previously been diagnosed with ADHD, hyperactive type)  Psychosocial / Contextual Factors: History of anxious attachment stemming from relationship with father (he  8 years ago); is currently pregnant (high-risk due to advanced maternal age); is in couples therapy with  due to frequent arguments and his emotional affair earlier in the year; financial stress; history of ADHD (hyperactive type).  PROMIS (reviewed every 90 days): PROMIS-10 Scores             Referral / Collaboration:  Care Coordination    Anticipated number of sessions for this episode of care: 60+  Anticipation frequency of session: Every 2-3 weeks  Anticipated duration of each session: 38-52 minutes  Treatment plan will be reviewed in 90 days or when goals have been changed.             Measurable Treatment Goal(s) related to diagnosis / functional  "impairment(s):  Patient is asking to focus treatment on her relationship with her father and past trauma.    Goal 1: Patient will tell full story of past trauma related to her relationship with her father and will identify how past feelings are impacting current relationships.    Objective #A (Patient Action)    Patient will identify how past feelings are impacting current relationships.  Status: Continued - Date(s): 10/26/2022    Intervention(s)  Therapist will role-play conflict management.    Objective #B  Patient will identify strengths and weaknesses within her family system of origin.  Status: Continued - Date(s): 10/26/2022    Intervention(s)  Therapist will assign homework for patient to create list.      Goal 2: Patient will learn about resilience, trauma responses, and Javon Servin's \"the story I'm making up\" (cognitive strategy to manage anxious thoughts).    Objective #A (Patient Action)    Patient will learn about and identify personal trauma responses.    Status: Continued - Date(s): 10/26/2022    Intervention(s)  Therapist will provide educational materials on trauma responses.    Objective #B  Patient will use cognitive strategies identified in therapy to challenge anxious thoughts.  Status: Continued - Date(s): 10/26/2022    Intervention(s)  Therapist will teach about Javon Servin's power of vulnerability and \"the story I'm making up\".      Goal 3: Patient will learn about protective factors that foster resilience and attachment styles and will identify how her attachment style drives her behavior.     Objective #A (Patient Action)    Status: Continued - Date(s): 10/26/2022     Patient will learn about protective factors and will identify her own.    Intervention(s)  Therapist will complete with patient in session.    Objective #B  Patient will identify how her attachment style drives her behavior.  Status: Continued - Date(s): 10/26/2022    Intervention(s)  Therapist will teach emotional regulation " skills.        Patient agreed to the above plan.      Keesha Eller

## 2022-11-23 NOTE — PATIENT INSTRUCTIONS
Patient states that her goals for the next two weeks include:    Continue to choose and cook healthy dinners  Focus on what is within control: my words, responses, and choosing to  or let go of work shifts  Think more about weaning and how to move towards that (if patient determines this is what she wants)

## 2022-11-25 ENCOUNTER — PATIENT OUTREACH (OUTPATIENT)
Dept: CARE COORDINATION | Facility: CLINIC | Age: 47
End: 2022-11-25

## 2022-11-25 NOTE — PROGRESS NOTES
Clinic Care Coordination Contact  [facility]/Voicemail       Clinical Data: Care Coordinator Outreach  Outreach attempted x 2.  Left message on patient's voicemail with call back information and requested return call.  Plan: Care Coordinator will try to reach patient again in 6-7 business days.    ANDREW Harris for Elena Tyson  Clinic Care Coordination  [facility]  [email or web address]  [phone]

## 2022-12-05 ENCOUNTER — PATIENT OUTREACH (OUTPATIENT)
Dept: CARE COORDINATION | Facility: CLINIC | Age: 47
End: 2022-12-05

## 2022-12-05 NOTE — LETTER
[facility] CARE COORDINATION  December 5, 2022      Dear Ellyn,    I have been attempting to reach you since our last contact. I would like to continue to work with you and provide any additional support you may need on achieving your health care related goals. I would appreciate if you would give me a call at [phone] to let me know if you would like to continue working together. I know that there are many things that can affect our ability to communicate and I hope we can continue to work together. I will give you a call next week and we can hopefully connect then.     All [facility] us at [facility] are invested in your health and are here to assist you in meeting your goals.     Sincerely,    ANDREW Andujar

## 2022-12-05 NOTE — PROGRESS NOTES
Clinic Care Coordination Contact  [facility]/Voicemail       Clinical Data: Care Coordinator Outreach  Outreach attempted x 2.  Left message on patient's voicemail with call back information and requested return call.  Plan: Care Coordinator will send unable to contact letter with care coordinator contact information via [employer]. Care Coordinator will try to reach patient again in 1 week    ANDREW Andujar? Social Work Care Coordinator   [facility]  [email or web address]? [email or web address]    Phone: [phone]  she/her

## 2022-12-07 ENCOUNTER — VIRTUAL VISIT (OUTPATIENT)
Dept: PSYCHOLOGY | Facility: OTHER | Age: 47
End: 2022-12-07
Payer: COMMERCIAL

## 2022-12-07 DIAGNOSIS — F90.9 ATTENTION DEFICIT HYPERACTIVITY DISORDER (ADHD), UNSPECIFIED ADHD TYPE: ICD-10-CM

## 2022-12-07 DIAGNOSIS — F33.41 RECURRENT MAJOR DEPRESSIVE DISORDER, IN PARTIAL REMISSION (H): Primary | ICD-10-CM

## 2022-12-07 DIAGNOSIS — F41.1 GAD (GENERALIZED ANXIETY DISORDER): ICD-10-CM

## 2022-12-07 PROCEDURE — 90837 PSYTX W PT 60 MINUTES: CPT | Mod: 95 | Performed by: MARRIAGE & FAMILY THERAPIST

## 2022-12-07 ASSESSMENT — ANXIETY QUESTIONNAIRES
4. TROUBLE RELAXING: SEVERAL DAYS
6. BECOMING EASILY ANNOYED OR IRRITABLE: MORE THAN HALF THE DAYS
7. FEELING AFRAID AS IF SOMETHING AWFUL MIGHT HAPPEN: SEVERAL DAYS
2. NOT BEING ABLE TO STOP OR CONTROL WORRYING: SEVERAL DAYS
5. BEING SO RESTLESS THAT IT IS HARD TO SIT STILL: SEVERAL DAYS
IF YOU CHECKED OFF ANY PROBLEMS ON THIS QUESTIONNAIRE, HOW DIFFICULT HAVE THESE PROBLEMS MADE IT FOR YOU TO DO YOUR WORK, TAKE CARE OF THINGS AT HOME, OR GET ALONG WITH OTHER PEOPLE: SOMEWHAT DIFFICULT
GAD7 TOTAL SCORE: 8
GAD7 TOTAL SCORE: 8
1. FEELING NERVOUS, ANXIOUS, OR ON EDGE: MORE THAN HALF THE DAYS
3. WORRYING TOO MUCH ABOUT DIFFERENT THINGS: NOT AT ALL
8. IF YOU CHECKED OFF ANY PROBLEMS, HOW DIFFICULT HAVE THESE MADE IT FOR YOU TO DO YOUR WORK, TAKE CARE OF THINGS AT HOME, OR GET ALONG WITH OTHER PEOPLE?: SOMEWHAT DIFFICULT
GAD7 TOTAL SCORE: 8
7. FEELING AFRAID AS IF SOMETHING AWFUL MIGHT HAPPEN: SEVERAL DAYS

## 2022-12-07 ASSESSMENT — PATIENT HEALTH QUESTIONNAIRE - PHQ9
10. IF YOU CHECKED OFF ANY PROBLEMS, HOW DIFFICULT HAVE THESE PROBLEMS MADE IT FOR YOU TO DO YOUR WORK, TAKE CARE OF THINGS AT HOME, OR GET ALONG WITH OTHER PEOPLE: SOMEWHAT DIFFICULT
SUM OF ALL RESPONSES TO PHQ QUESTIONS 1-9: 7
SUM OF ALL RESPONSES TO PHQ QUESTIONS 1-9: 7

## 2022-12-07 NOTE — PROGRESS NOTES
[facility]                                               Progress Note    Patient Name: Ellyn Mcgee  Date: 12/7/2022         Service Type: Individual      Session Start Time: 2:07 p.m.  Session End Time: 3:06 p.m.     Session Length: 59 min.    Session #: 50 (with this writer; patient met previously for DA with [facility] and psychiatry)    Attendees: Client     Service Modality:  Video Visit:    Telemedicine Visit: The patient's condition can be safely assessed and treated via synchronous audio and visual telemedicine encounter.      Reason for Telemedicine Visit: Patient convenience (e.g. access to timely appointments / distance to available provider)    Originating Site (Patient Location): Patient's home    Distant Site (Provider Location): [facility]: [place]    Consent:  The patient/guardian has verbally consented to: the potential risks and benefits of telemedicine (video visit) versus in person care; bill my insurance or make self-payment for services provided; and responsibility for payment of non-covered services.     Patient would like the video invitation sent by: Send to e-mail at: [email or web address]    Mode of Communication:  Video Conference via [employer]well    As the provider I attest to compliance with applicable laws and regulations related to telemedicine.      DATA  Interactive Complexity: No  Crisis: No       Progress Since Last Session (Related to Symptoms / Goals / Homework):  Symptoms: reports continued anxiety and depression but denies active SI.  Increased relationship distress and has found herself wanting to be away from home.     Homework: Partially completed      Episode of Care Goals: Satisfactory progress - ACTION (Actively working towards change); Intervened by reinforcing change plan / affirming steps taken     Current / Ongoing Stressors and Concerns:  Feeling overwhelmed by organizing home;  and daughter may be on autism  "spectrum    Older daughter is experiencing dizziness/lethargy and was dx with POTS; some financial and friend-related stress; is interested in a deeper dive into emotional state and core beliefs through ACT; new son born 2020; roommate/former friend was finally evicted from their home (lived there for 3-4 years and did not paid any rent for over 1-2 years); pt has been experiencing mild panic attacks; daughter (age 10) just started ADHD medication; history of anxious attachment stemming from relationship with father (he  8 years ago); is currently pregnant (high-risk due to advanced maternal age) and baby is due Dec. 2020; is in couples therapy with  due to frequent arguments and his emotional affair earlier in the year; financial stress; history of ADHD (hyperactive type).     Treatment Objective(s) Addressed in This Session:  will identify how past feelings are impacting current relationships  Identified and processed recent triggers for anxiety and sadness (relationship)  Self-care and barriers to this  Discussed family system issues  Participating in enjoyable activities and connecting with social and family supports  Emotional differentiation  Discussed healthy boundaries and enforcement    Past/future:  Discussed work/life balance and current purpose  Grounding and mindfulness  Identified personal strengths within friendships  [facility] research  Identified strengths as a mother and need for positive affirmations  Identified insecurities and how they affect patient's thoughts and actions now and in the past  Management of anger  Dealing with \"big emotions\"  Self-compassion and radical acceptance  Triggers for anxiety and insecurities  tell full story of past relational issues/trust/infidelity  use cognitive strategies identified in therapy to challenge anxious thoughts   Body appreciation  Relationship repair  Discussed ambiguous loss and grief in a box analogy  Anticipatory grief and creating " meaning  10:10:10 rule (10 minutes, 10 months, and 10 years)  Feeling unappreciated and the 5:1 ratio (Natali)  Solution focused: how to prioritize and cope with interrupted sleep  Javon Servin's elements of trust and relationship repair   identify how attachment style drives patient's and 's behaviors  tell full story of past trauma related to her relationship with her father (and current roommate issues)     Intervention:  Solution-Focused  Family systems theory  CBT: connect thoughts, feelings, and actions   Narrative Therapy: separate problem from person and find the bright spots     Past/future:  Management of anger   Natali research    Assessments completed prior to visit:  PHQ9:   PHQ-9 SCORE 3/15/2022 4/21/2022 7/6/2022 8/9/2022 9/20/2022 10/26/2022 12/7/2022   PHQ-9 Total Score - - - - - - -   PHQ-9 Total Score MyChart 8 (Mild depression) 11 (Moderate depression) 8 (Mild depression) 5 (Mild depression) 8 (Mild depression) 8 (Mild depression) 7 (Mild depression)   PHQ-9 Total Score 8 11 8 5 8 8 7     GAD7:   TEE-7 SCORE 3/2/2022 4/21/2022 6/14/2022 7/6/2022 9/20/2022 10/26/2022 12/7/2022   Total Score - - - - - - -   Total Score 9 (mild anxiety) 12 (moderate anxiety) 8 (mild anxiety) 10 (moderate anxiety) 10 (moderate anxiety) 6 (mild anxiety) 8 (mild anxiety)   Total Score 9 12 8 10 10 6 8     PROMIS 10-Global Health (all questions and answers displayed):   PROMIS 10 12/15/2021 12/15/2021 3/15/2022 6/14/2022 7/6/2022 10/26/2022   In general, would you say your health is: - Fair Fair Fair Fair Fair   In general, would you say your quality of life is: - Fair Good Fair Fair Fair   In general, how would you rate your physical health? - Fair Fair Fair Fair Fair   In general, how would you rate your mental health, including your mood and your ability to think? - Good Good Fair Fair Fair   In general, how would you rate your satisfaction with your social activities and relationships? - Fair Good Poor Fair  Fair   In general, please rate how well you carry out your usual social activities and roles - Fair Fair Good Fair Fair   To what extent are you able to carry out your everyday physical activities such as walking, climbing stairs, carrying groceries, or moving a chair? - Completely Completely Completely Mostly Completely   How often have you been bothered by emotional problems such as feeling anxious, depressed or irritable? - Sometimes Often Often Often Often   How would you rate your fatigue on average? - Severe Moderate Moderate Moderate Moderate   How would you rate your pain on average?   0 = No Pain  to  10 = Worst Imaginable Pain - 5 7 7 8 3   In general, would you say your health is: 2 2 2 2 2 2   In general, would you say your quality of life is: 2 2 3 2 2 2   In general, how would you rate your physical health? 2 2 2 2 2 2   In general, how would you rate your mental health, including your mood and your ability to think? 3 3 3 2 2 2   In general, how would you rate your satisfaction with your social activities and relationships? 2 2 3 1 2 2   In general, please rate how well you carry out your usual social activities and roles. (This includes activities at home, at work and in your community, and responsibilities as a parent, child, spouse, employee, friend, etc.) 2 2 2 3 2 2   To what extent are you able to carry out your everyday physical activities such as walking, climbing stairs, carrying groceries, or moving a chair? 5 5 5 5 4 5   In the past 7 days, how often have you been bothered by emotional problems such as feeling anxious, depressed, or irritable? 3 3 4 4 4 4   In the past 7 days, how would you rate your fatigue on average? 4 4 3 3 3 3   In the past 7 days, how would you rate your pain on average, where 0 means no pain, and 10 means worst imaginable pain? 5 5 7 7 8 3   Global Mental Health Score 10 10 11 7 8 8   Global Physical Health Score 12 12 12 12 11 14   PROMIS TOTAL - SUBSCORES 22 22 23  19 19 22   Some recent data might be hidden        ASSESSMENT: Current Emotional / Mental Status (status of significant symptoms):   Risk status (Self / Other harm or suicidal ideation)   Patient denies current fears or concerns for personal safety.   Patient denies current or recent suicidal ideation or behaviors. Patient last reported feeling hopeless/passive ideation on 8/6/2020.   Patient denies current or recent homicidal ideation or behaviors.   Patient denies current or recent self injurious behavior or ideation.   Patient denies other safety concerns.   Patient reports there has been no change in risk factors since their last session.   Patient reports there has been a change in protective factors since their last session: had had contact with [facility]    Recommended that patient call 911 or go to the local ED should there be a change in any of these risk factors.     Appearance:   Appropriate    Eye Contact:   Good    Psychomotor Behavior: Normal  Restless     Attitude:   Cooperative, tired   Orientation:   All   Speech    Rate / Production: Normal/ Responsive Talkative    Volume:  Normal    Mood:    Anxious    Affect:    Appropriate  Expansive  Tearful   Thought Content:  Clear  Rumination    Thought Form:  Coherent  Neurosis   Insight:    Good      Medication Review:   No changes to current psychiatric medication(s)     Medication Compliance:   Yes     Changes in Health Issues:   None reported      Chemical Use Review:   Substance Use: Chemical use reviewed, no active concerns identified ; no current alcohol use     Tobacco Use: No current tobacco use.      Diagnosis:  1. Recurrent major depressive disorder, in partial remission (H)    2. TEE (generalized anxiety disorder)    3. Attention deficit hyperactivity disorder (ADHD), unspecified ADHD type      Note: There has been demonstrated improvement in functioning while patient has been engaged in psychotherapy/psychological service- if withdrawn  the patient would deteriorate and/or relapse.     Collateral Reports Completed:  Patient notes that she will consider day treatment or PHP if her symptoms increase or persist.    PLAN: (Patient Tasks / Therapist Tasks / Other)    Patient states that her goals for the next two weeks include:    1. Continue to choose and cook healthy dinners  2. Make it through this week's various doctor and vet appointments  3. Decorate and declutter living room area  4. Continue to get rid of household items      Keesha FRANKMaciel Rafita    ______________________________________________________________________                                                           [facility]    Individual Treatment Plan    Patient's Name: Ellyn Mcgee  YOB: 1975    Date of Creation: 2020  Date Treatment Plan Last Reviewed/Revised: 10/26/2022    DSM5 Diagnoses: 296.32 (F33.1) Major Depressive Disorder, Recurrent Episode, Moderate _ or 300.02 (F41.1) Generalized Anxiety Disorder (patient has previously been diagnosed with ADHD, hyperactive type)  Psychosocial / Contextual Factors: History of anxious attachment stemming from relationship with father (he  8 years ago); is currently pregnant (high-risk due to advanced maternal age); is in couples therapy with  due to frequent arguments and his emotional affair earlier in the year; financial stress; history of ADHD (hyperactive type).  PROMIS (reviewed every 90 days): PROMIS-10 Scores             Referral / Collaboration:  Care Coordination    Anticipated number of sessions for this episode of care: 60+  Anticipated frequency of session: Every 2-3 weeks  Anticipated duration of each session: 38-52 minutes  Treatment plan will be reviewed in 90 days or when goals have been changed.             Measurable Treatment Goal(s) related to diagnosis / functional impairment(s):  Patient is asking to focus treatment on her relationship with her father and past  "trauma.    Goal 1: Patient will tell full story of past trauma related to her relationship with her father and will identify how past feelings are impacting current relationships.    Objective #A (Patient Action)    Patient will identify how past feelings are impacting current relationships.  Status: Continued - Date(s): 10/26/2022    Intervention(s)  Therapist will role-play conflict management.    Objective #B  Patient will identify strengths and weaknesses within her family system of origin.  Status: Continued - Date(s): 10/26/2022    Intervention(s)  Therapist will assign homework for patient to create list.      Goal 2: Patient will learn about resilience, trauma responses, and Javon Servin's \"the story I'm making up\" (cognitive strategy to manage anxious thoughts).    Objective #A (Patient Action)    Patient will learn about and identify personal trauma responses.    Status: Continued - Date(s): 10/26/2022    Intervention(s)  Therapist will provide educational materials on trauma responses.    Objective #B  Patient will use cognitive strategies identified in therapy to challenge anxious thoughts.  Status: Continued - Date(s): 10/26/2022    Intervention(s)  Therapist will teach about Javon Servin's power of vulnerability and \"the story I'm making up\".      Goal 3: Patient will learn about protective factors that foster resilience and attachment styles and will identify how her attachment style drives her behavior.     Objective #A (Patient Action)    Status: Continued - Date(s): 10/26/2022     Patient will learn about protective factors and will identify her own.    Intervention(s)  Therapist will complete with patient in session.    Objective #B  Patient will identify how her attachment style drives her behavior.  Status: Continued - Date(s): 10/26/2022    Intervention(s)  Therapist will teach emotional regulation skills.        Patient agreed to the above plan.      Keesha Eller    "

## 2022-12-07 NOTE — PATIENT INSTRUCTIONS
Patient states that her goals for the next two weeks include:    Continue to choose and cook healthy dinners  Make it through this week's various doctor and vet appointments  Decorate and declutter living room area  Continue to get rid of household items

## 2022-12-13 ENCOUNTER — PATIENT OUTREACH (OUTPATIENT)
Dept: CARE COORDINATION | Facility: CLINIC | Age: 47
End: 2022-12-13

## 2022-12-13 NOTE — PROGRESS NOTES
Clinic Care Coordination Contact  [facility]/Voicemail       Clinical Data: Care Coordinator Outreach  Outreach attempted x 2.  Left message on patient's voicemail with call back information and requested return call.  Plan Care Coordinator will do no further outreaches at this time. Pt status has been set to declined.    ANDREW Andujar? Social Work Care Coordinator   [facility]  [email or web address]? [email or web address]    Phone: [phone]  she/her

## 2022-12-14 ENCOUNTER — VIRTUAL VISIT (OUTPATIENT)
Dept: PSYCHIATRY | Facility: CLINIC | Age: 47
End: 2022-12-14
Payer: COMMERCIAL

## 2022-12-14 DIAGNOSIS — F90.0 ATTENTION DEFICIT HYPERACTIVITY DISORDER (ADHD), PREDOMINANTLY INATTENTIVE TYPE: ICD-10-CM

## 2022-12-14 DIAGNOSIS — F33.1 MODERATE EPISODE OF RECURRENT MAJOR DEPRESSIVE DISORDER (H): ICD-10-CM

## 2022-12-14 DIAGNOSIS — F41.1 GAD (GENERALIZED ANXIETY DISORDER): Primary | ICD-10-CM

## 2022-12-14 DIAGNOSIS — G47.00 INSOMNIA, UNSPECIFIED TYPE: ICD-10-CM

## 2022-12-14 PROCEDURE — 99215 OFFICE O/P EST HI 40 MIN: CPT | Mod: 95 | Performed by: PSYCHIATRY & NEUROLOGY

## 2022-12-14 RX ORDER — LORAZEPAM 1 MG/1
.5-1 TABLET ORAL DAILY PRN
Qty: 20 TABLET | Refills: 0 | Status: SHIPPED | OUTPATIENT
Start: 2022-12-14 | End: 2024-06-13

## 2022-12-14 RX ORDER — VENLAFAXINE HYDROCHLORIDE 37.5 MG/1
CAPSULE, EXTENDED RELEASE ORAL
Qty: 90 CAPSULE | Refills: 0 | Status: SHIPPED | OUTPATIENT
Start: 2022-12-14 | End: 2023-01-05 | Stop reason: ALTCHOICE

## 2022-12-14 RX ORDER — VENLAFAXINE HYDROCHLORIDE 75 MG/1
CAPSULE, EXTENDED RELEASE ORAL
Qty: 90 CAPSULE | Refills: 0 | Status: SHIPPED | OUTPATIENT
Start: 2022-12-14 | End: 2023-01-05 | Stop reason: ALTCHOICE

## 2022-12-14 RX ORDER — PAROXETINE HYDROCHLORIDE HEMIHYDRATE 25 MG/1
25 TABLET, FILM COATED, EXTENDED RELEASE ORAL EVERY MORNING
Qty: 30 TABLET | Refills: 1 | Status: SHIPPED | OUTPATIENT
Start: 2022-12-14 | End: 2023-01-05 | Stop reason: DRUGHIGH

## 2022-12-14 NOTE — PATIENT INSTRUCTIONS
Treatment Plan:  Start Paxil/paroxetine CR 25 mg daily for anxiety, mood.   Start lorazepam/Ativan 0.5-1 mg daily as needed for severe anxiety. To minimize exposure to infant try to breastfeed >4 hours after taking propranolol.   Decrease venlafaxine/Effexor-XR to 112.5 mg daily for mood and anxiety.    Continue propranolol 10-20 mg twice daily as needed for anxiety.  Have previously discussed risks/benefits of use while breastfeeding. To minimize exposure to infant try to breastfeed 3-4 hours after taking propranolol.   Continue Adderall XR 15 mg daily as needed for ADHD symptoms.  Discussed risks vs benefits of taking stimulant medication while lactating/breast-feeding.  Additional information previsously sent in SpinNote message.  Continue all other medications as reviewed per electronic medical record today.   Safety plan reviewed. To the Emergency Department as needed or call after hours crisis line at [phone] or [phone]. [place] Crisis Text Line. Text [place] to 636619 or Suicide LifeLine Chat: suicidepreventionKonaWareline.org/chat  Continue therapy as planned.   Schedule an appointment with me in 3-4 weeks or sooner as needed.  Patient scheduled today.  Follow up with primary care provider as planned or for acute medical concerns.  Call the psychiatric nurse line with medication questions or concerns at [phone].  SpinNote may be used to communicate with your provider, but this is not intended to be used for emergencies.    Risks of stimulant medication include, but not limited to, decreased appetite, risk of tics (and that they may be lasting), trouble sleeping, cardiac risks such as increased heart rate and blood pressure, and rare risk of sudden cardiac death.  Also risk of addiction/tolerance/dependence.    Risks of benzodiazepine (Ativan, Xanax, Klonopin, Valium, etc) use including, but not limited to, sedation, tolerance, risk for addiction/dependence. Do not drink alcohol while taking  benzodiazepines due to risk of trouble breathing and potential death. Do not drive or operate heavy machinery until it is known how the drug affects you. Discuss with physician or pharmacist before ever taking a benzodiazepine with a narcotic/opioid pain medication.     Risks and benefits of breastfeeding on current medications discussed today.     Therapy Resources:  Www.PsychologyToday.com  Www.[facility]IMN.org    [facility]- Psychiatric Disorders During Pregnancy  [email or web address]-[email or web address]-[email or web address]    [employer]  [email or web address]

## 2022-12-14 NOTE — PROGRESS NOTES
"Telemedicine Visit: The patient's condition can be safely assessed and treated via synchronous audio and visual telemedicine encounter.      Reason for Telemedicine Visit: Patient has requested telehealth visit    Originating Site (Patient Location): Patient's home    Distant Location (provider location):  Off-site    Consent:  The patient/guardian has verbally consented to: the potential risks and benefits of telemedicine (video visit) versus in person care; bill my insurance or make self-payment for services provided; and responsibility for payment of non-covered services.     Mode of Communication:  Video Conference via [employer]    As the provider I attest to compliance with applicable laws and regulations related to telemedicine.        Outpatient Psychiatric Progress Note    Name: Ellyn Mcgee   : 1975                    Primary Care Provider: Dorothy Guaman DO   Therapist: BARBARA Schulte     PHQ 2022 10/26/2022 2022   PHQ-9 Total Score 8 8 7   Q9: Thoughts of better off dead/self-harm past 2 weeks Not at all Not at all Not at all   F/U: Thoughts of suicide or self-harm - - -   F/U: Safety concerns - - -   TEE-7 scores:  TEE-7 SCORE 2022 10/26/2022 2022   Total Score - - -   Total Score 10 (moderate anxiety) 6 (mild anxiety) 8 (mild anxiety)   Total Score 10 6 8       Patient Identification:  Patient is a 47 year old,   White Not  or  female  who presents for return visit with me.  Patient is currently employed part time but on maternity leave. Patient attended the phone/video session alone. Patient prefers to be called: \"Ellyn\".    Interim History:  I last saw Ellyn Collado Everardo for outpatient psychiatry Return Visit on 2022 During that appointment, we:      Discontinue Lexapro/escitalopram since already stopped    Continue venlafaxine/Effexor- mg daily for mood and anxiety.  Could consider decreasing dose if necessary.  Please message me if " needed.    Continue propranolol 10-20 mg twice daily as needed for anxiety.  Have previously discussed risks/benefits of use while breastfeeding. To minimize exposure to infant try to breastfeed 3-4 hours after taking propranolol.     Start Adderall XR 15 mg daily as needed for ADHD symptoms.  Discussed risks vs benefits of taking stimulant medication while lactating/breast-feeding.  Additional information sent in [employer] message.    12/14: Patient struggling more lately with anxiety.  Anxiety has remained quite high.  The last few weeks things have been much worse.  Shortness of breath and near panic symptoms.  Extreme irritability and very little patience.  Patient is working 2-3 days a week.  Increase psychosocial stressors overall.  Some challenging situations with daughter.  She is trying to take some time for herself and did have some friends over the other day which was thoroughly enjoyable.  Wondering about Paxil CR.  Also wondering about an as needed medication like Xanax.  No acute safety concerns.  No SI.  No problematic drug or alcohol use.    Working 2-3 days a week. Oldest daughter with some health issues. Had some friends over and it went well.     Per [facility], Dr. Vincenzo Edward, during today's team-based visit:  Patient is prepilot and declines the enhanced model. No [facility] appointment.    Psychiatric ROS:  Ellyn Mcgee reports mood has been: Up and down  Anxiety has been: Much lately  Sleep has been: Up and down, difficulty still some due toddler  Isabel sxs: None  Psychosis sxs: None  ADHD/ADD sxs: Up-and-down  PTSD sxs: None  PHQ9 and GAD7 scores were reviewed today if completed.   Medication side effects: Denies  Current stressors include: See HPI above  Coping mechanisms and supports include: Therapy, Family, Hobbies and Friends    Current medications include:   Current Outpatient Medications   Medication Sig     amphetamine-dextroamphetamine (ADDERALL XR) 15 MG 24 hr capsule Take 1 capsule (15 mg) by  mouth daily as needed (ADHD)     clindamycin (CLEOCIN T) 1 % external lotion Apply topically 2 times daily To affected areas in the axilla until clear.     ibuprofen (ADVIL/MOTRIN) 200 MG tablet Take 200-400 mg by mouth every 4 hours as needed for pain OTC     Prenatal Vit-Fe Fumarate-FA (PRENATAL PO)      propranolol (INDERAL) 20 MG tablet Take 20 mg by mouth 2 times daily as needed for anxiety     triamcinolone (KENALOG) 0.025 % external ointment Apply topically 2 times daily To affected area in the axilla until clear.     venlafaxine (EFFEXOR-XR) 150 MG 24 hr capsule Take 1 capsule (150 mg) by mouth daily     No current facility-administered medications for this visit.     MNPMP checked:   No records for past year    Past Medical/Surgical History:  Past Medical History:   Diagnosis Date     Abnormal Pap smear     Colposcopy     Adjustment disorder with mixed anxiety and depressed mood 04/04/2012     Anemia     In Past     Anxiety      Chlamydia trachomatis infection of other specified site 1993     Depression      Depressive disorder 1979    Not diagnosed until college...later diagnosed with TEE     Fertility problem      Lyme disease 09/08/2010    ?false positive vs positve CMV - resolved     Moderate dysplasia of cervix 1995     Other and unspecified adverse effect of drug, medicinal and biological substance     insulin resistent     Varicosities      Wounds and injuries     fall down stairs, PT for back, pain meds      has a past medical history of Abnormal Pap smear, Adjustment disorder with mixed anxiety and depressed mood (04/04/2012), Anemia, Anxiety, Chlamydia trachomatis infection of other specified site (1993), Depression, Depressive disorder (1979), Fertility problem, Lyme disease (09/08/2010), Moderate dysplasia of cervix (1995), Other and unspecified adverse effect of drug, medicinal and biological substance, Varicosities, and Wounds and injuries.    She has no past medical history of Arthritis,  Asthma, Asymptomatic human immunodeficiency virus (HIV) infection status (H), Breast disorder, Cancer (H), Cerebral infarction (H), Chronic kidney disease, Complication of anesthesia, Congestive heart failure (H), COPD (chronic obstructive pulmonary disease) (H), Diabetes (H), Diabetes mellitus (H), Heart disease, Herpes simplex without mention of complication, History of blood transfusion, Hypertension, Liver disease, Postpartum depression, Rh incompatibility, Seizures (H), Sickle cell anemia (H), Systemic lupus erythematosus (H), Thyroid disease, or Uncomplicated asthma.    Social History:  Reviewed. No changes to social history except as noted in HPI above.     Vital Signs:   None. This is phone/video visit.     Labs:  Most recent laboratory results reviewed and the pertinent results include:   Hemoglobin 11.4 on 12/25/2020, AST and ALT within normal limits on 12/24/2020    Review of Systems:  10 systems (general, cardiovascular, respiratory, eyes, ENT, endocrine, GI, , M/S, neurological) were reviewed. Most pertinent finding(s) is/are: denies fever, cough, headaches, shortness of breath, chest pain, N/V, constipation/diarrhea, trouble urinating, aches and pains. The remaining systems are all unremarkable.    Mental Status Examination (limited as this is by phone/video):  Appearance: Awake, alert, appears stated age, no acute distress, well-groomed  Attitude:  Cooperative, pleasant  Motor: No obvious abnormalities observed via video, not formally tested  Oriented to:  person, place, time, and situation  Attention Span and Concentration:  normal  Speech:  clear, coherent, regular rate, rhythm, and volume  Language: intact  Mood: Much more anxious   Affect: Overall appropriate and mood congruent  Associations:  no loose associations  Thought Process:  logical, linear and goal oriented  Thought Content:  no evidence of suicidal ideation or homicidal ideation, no evidence of psychotic thought, no auditory  hallucinations present and no visual hallucinations present  Recent and Remote Memory:  Intact to interview. Not formally assessed. No amnesia.  Fund of Knowledge: appropriate  Insight:  good  Judgment:  intact, adequate for safety  Impulse Control:  intact    Suicide Risk Assessment:  Today Ellyn Mcgee reports no suicidal ideation. Based on all available evidence including the factors cited above, Ellyn Mcgee does not appear to be at imminent risk for self-harm, does not meet criteria for a 72-hr hold, and therefore remains appropriate for ongoing outpatient level of care.  A thorough assessment of risk factors related to suicide and self-harm have been reviewed and are noted above. The patient convincingly denies suicidality on several occasions. Local community safety resources reviewed for patient to use if needed. There was no deceit detected, and the patient presented in a manner that was believable.     DSM5 Diagnosis:  Major Depressive Disorder, Recurrent Episode, moderate  300.02 (F41.1) Generalized Anxiety Disorder   ADHD, Unspecified  Insomnia-unspecified (mostly related to infant)    Medical comorbidities include:  Patient Active Problem List    Diagnosis Date Noted     Labor and delivery, indication for care 12/23/2020     Priority: Medium     Major depressive disorder, recurrent episode, moderate (H) 10/08/2020     Priority: Medium     High-risk pregnancy, elderly multigravida, unspecified trimester 06/05/2020     Priority: Medium     TEE (generalized anxiety disorder) 10/10/2018     Priority: Medium     Cervical radiculopathy 04/16/2018     Priority: Medium     Attention deficit hyperactivity disorder (ADHD), predominantly inattentive type 10/04/2017     Priority: Medium     Patient is followed by PREMA MARIEE for ongoing prescription of stimulants.  All refills should be approved by this provider, or covering partner.    Medication(s): Concerta 27mg on weekend days and 36mg other days of the  week  Maximum quantity per month: 30  Clinic visit frequency required: Q 6  months     Controlled substance agreement on file: Yes       Date(s): 10/4/17  Neuropsych evaluation for ADD completed:  Yes, completed 8/17/17, on file and diagnosis confirmed    [facility] website verification: 12/3/2019   [email or web address]           External hemorrhoids 04/25/2012     Priority: Medium     PCOS (polycystic ovarian syndrome) 02/22/2011     Priority: Medium     Hyperlipidemia LDL goal <130 09/05/2008     Priority: Medium     Anxiety state 09/05/2008     Priority: Medium     Problem list name updated by automated process. Provider to review    Patient is followed by PREMA MARIEE for ongoing prescription of benzodiazepines.  All refills should be approved by this provider, or covering partner.    Medication(s): Xanax.   Maximum quantity per month:   Clinic visit frequency required:      Controlled substance agreement on file: Yes       Date(s): 10/4/2017  Benzodiazepine use reviewed by psychiatry:      [facility] website verification:  done on 12/17/2018   [email or web address]             Psychosocial & Contextual Factors: See HPI above    Assessment:  Per Intake Note with me 9/8/20:  Ellyn Mcgee is a 45-year-old female who is 23 weeks pregnant with a past psychiatric history including depression, anxiety, and ADHD who presents today for psychiatric evaluation.  Her depression and anxiety date back several years and she has also had postpartum depression/anxiety with her now 8-year-old (she also has a 14-year-old but did not experience postpartum mental health issues at that time).  She started sertraline during her second pregnancy and then ended up being maintained on Paxil-CR for several years.  She is also more recently diagnosed with ADHD and maintained on Concerta.  She weaned off both Paxil and Concerta when she found out she was pregnant and replaced these medications with her current regimen of  Lexapro and Effexor-XR to decrease risk of fetal defects.  For the most part, she is feeling a little bit better on her current doses of Lexapro 10 mg and Effexor-XR 75 mg.  It is an unconventional combination as we discussed, but since she is doing quite well, I am hesitant to make many changes.  She feels as though her anxiety could be a little bit better controlled and so we will further increase Effexor-XR to 112.5 mg daily to be taken with her Lexapro 10 mg daily.  If she does a lot better, we can consider decreasing Lexapro to 5 mg daily. We discussed the risk of serotonin syndrome and she will continue to be vigilant for any signs or symptoms of this condition.  We did discuss the possibility of restarting a stimulant medication if necessary.  She does not feel as though her ADHD symptoms are too severe at this time.    4/20/21:   Today patient reports overall some ongoing ups and downs regarding her symptoms since last visit.  She is thinking much of it might be hormonal and related to sleep deprivation.  She still has not yet had her menses return since having her baby.  She has had some feelings of panic.  Used to take propranolol in the past when she felt this way.  She would like to start this medication again.  Discussed risks and benefits while breast-feeding.  Limited risks for taking low-dose propranolol while breast-feeding although the infant does have some exposure through breast milk.  Recommended taking propranolol at least 3-4 hours prior to breast-feeding to decrease risks.  No other medication changes at this time.    12/15/2021:   Patient has overall been feeling quite stable mood wise.  Has some ups and downs that are more related to feeling overwhelmed and exhausted regarding responsibilities for her children, home life, and work.  Does not feel depressed.  Feels like medications are working well.  Propranolol has been helpful.  No medication changes today.  No safety concerns or SI.  No  problematic drug or alcohol use.    6/14/2022:  Patient overall struggling lately with mood, anxiety, ADHD symptoms.  Stopped Lexapro since felt like it was making her symptoms worse.  We have considered for quite some time about starting her back on an ADHD medication.  I think it is worth a trial at this time.  Discussed risks and benefits of a stimulant medication while breast-feeding.  She will likely be weaning soon.  Still has not been getting great sleep due to cosleeping and nighttime feedings.  No acute safety concerns.  No SI.  No problematic drug or alcohol use.  If she has too much irritability on Adderall, we could consider either going back to Concerta or a trial with Vyvanse.    12/14/2022:  Akanksha overall struggling more with anxiety and panic related symptoms.  Stress has been higher.  Patient wondering about starting Paxil-CR again.  Has tolerated well in the past.  We will add a low dose with venlafaxine.  We will also decrease venlafaxine some.  May continue cross taper/titration.  Lorazepam/Ativan provided for current acute symptoms and for any severe discomfort that may arise with this transition.  Therapy encouraged.  No acute safety concerns.  No SI.  No problematic drug or alcohol use.  Patient will be weaning her 2-year-old from breast-feeding.  We discussed risks and benefits of current medication regimen while breast-feeding.    Medication side effects and alternatives were reviewed. Health promotion activities recommended and reviewed today. All questions addressed. Education and counseling completed regarding risks and benefits of medications and psychotherapy options. Recommend ongoing therapy for additional support.     Treatment Plan:    Start Paxil/paroxetine CR 25 mg daily for anxiety, mood.     Start lorazepam/Ativan 0.5-1 mg daily as needed for severe anxiety. To minimize exposure to infant try to breastfeed >4 hours after taking propranolol.     Decrease venlafaxine/Effexor-XR to  112.5 mg daily for mood and anxiety.      Continue propranolol 10-20 mg twice daily as needed for anxiety.  Have previously discussed risks/benefits of use while breastfeeding. To minimize exposure to infant try to breastfeed 3-4 hours after taking propranolol.     Continue Adderall XR 15 mg daily as needed for ADHD symptoms.  Discussed risks vs benefits of taking stimulant medication while lactating/breast-feeding.  Additional information previsously sent in [employer] message.    Continue all other medications as reviewed per electronic medical record today.     Safety plan reviewed. To the Emergency Department as needed or call after hours crisis line at [phone] or [phone]. [place] Crisis Text Line. Text [place] to 427009 or Suicide LifeLine Chat: suicidepreModanisaline.org/chat    Continue therapy as planned.     Schedule an appointment with me in 4-6 weeks or sooner as needed.  Patient scheduled today.    Follow up with primary care provider as planned or for acute medical concerns.    Call the psychiatric nurse line with medication questions or concerns at [phone].    [employer] may be used to communicate with your provider, but this is not intended to be used for emergencies.    Risks of stimulant medication include, but not limited to, decreased appetite, risk of tics (and that they may be lasting), trouble sleeping, cardiac risks such as increased heart rate and blood pressure, and rare risk of sudden cardiac death.  Also risk of addiction/tolerance/dependence.    Risks of benzodiazepine (Ativan, Xanax, Klonopin, Valium, etc) use including, but not limited to, sedation, tolerance, risk for addiction/dependence. Do not drink alcohol while taking benzodiazepines due to risk of trouble breathing and potential death. Do not drive or operate heavy machinery until it is known how the drug affects you. Discuss with physician or pharmacist before ever taking a benzodiazepine with a narcotic/opioid pain  "medication.       Risks and benefits of breastfeeding on current medications discussed today.     Therapy Resources:  Www.PsychologyToday.com  Www.NAMIMN.org    [facility]- Psychiatric Disorders During Pregnancy  [email or web address]-[email or web address]-disorders-[email or web address]-pregnancy    [facility]  [email or web address]    Administrative Billing:   Phone Call/Video Duration: 41 Minutes  Start: 1:00p  Stop: 1:41p    Time spent with patient was 32 minutes and greater than 50% of time or 17 minutes was spent in counseling and coordination of care regarding above diagnoses and treatment plan.      Patient Status:  Patient will continue to be seen for ongoing consultation and stabilization.    Signed:   Sherlyn Wang DO  [facility]    This note was created with voice recognition software. Inadvertent grammatical errors, typographical errors, and \"sound-a-like\" substitutions may occur due to limitations of the software.  Read the note carefully and apply context when erroneous substitutions have occurred. Thank you.   "

## 2022-12-14 NOTE — Clinical Note
Please call this patient to get them scheduled for a follow-up visit in 4 weeks. Please schedule with me and the [facility]. Thanks!

## 2022-12-16 ENCOUNTER — TELEPHONE (OUTPATIENT)
Dept: PSYCHIATRY | Facility: CLINIC | Age: 47
End: 2022-12-16

## 2022-12-16 NOTE — TELEPHONE ENCOUNTER
RN received a Prior Authorization request from [facility] for Paxil ER 25 mg tablets.      1.  RN faxed this Prior authorization request to the Prior authorization Team [phone].

## 2022-12-19 NOTE — TELEPHONE ENCOUNTER
PA Initiation    Medication: PARoxetine (PAXIL-CR) 25 MG 24 hr tablet  Insurance Company: [employer] - Phone [phone] Fax [phone]  Pharmacy Filling the Rx: [id number] [address] AT [facility]  Filling Pharmacy Phone: [phone]  Filling Pharmacy Fax: [phone]  Start Date: 12/19/2022    Ellyn Mcgee (Espino: [id number])

## 2022-12-19 NOTE — TELEPHONE ENCOUNTER
Prior Authorization Approval    Authorization Effective Date: 11/19/2022  Authorization Expiration Date: 12/19/2023  Medication: PARoxetine (PAXIL-CR) 25 MG 24 hr tablet  Approved Dose/Quantity:   Reference #: [id number]   Insurance Company: [employer] - Phone [phone] Fax [phone]  Which Pharmacy is filling the prescription (Not needed for infusion/clinic administered): [facility] #[id number] [address] AT [facility]  Pharmacy Notified: Yes  Patient Notified: Yes

## 2022-12-20 NOTE — PROGRESS NOTES
[facility]                                               Progress Note    Patient Name: Ellyn Mcgee  Date: 12/21/2022         Service Type: Individual      Session Start Time: 2:04 p.m.  Session End Time: 3:06 p.m.     Session Length: 62 min.    Session #: 51 (with this writer; patient met previously for DA with [facility] Elena Hill and psychiatry)    Attendees: Client     Service Modality:  Video Visit:    Telemedicine Visit: The patient's condition can be safely assessed and treated via synchronous audio and visual telemedicine encounter.      Reason for Telemedicine Visit: Patient convenience (e.g. access to timely appointments / distance to available provider)    Originating Site (Patient Location): Patient's home    Distant Site (Provider Location): [facility]: [place]    Consent:  The patient/guardian has verbally consented to: the potential risks and benefits of telemedicine (video visit) versus in person care; bill my insurance or make self-payment for services provided; and responsibility for payment of non-covered services.     Patient would like the video invitation sent by: Send to e-mail at: [email or web address]    Mode of Communication:  Video Conference via well    As the provider I attest to compliance with applicable laws and regulations related to telemedicine.      DATA  Interactive Complexity: No  Crisis: No       Progress Since Last Session (Related to Symptoms / Goals / Homework):  Symptoms: reports improved symptoms since changing medications; daughter has neuropsych assessment tomorrow.     Homework: Partially completed      Episode of Care Goals: Satisfactory progress - ACTION (Actively working towards change); Intervened by reinforcing change plan / affirming steps taken     Current / Ongoing Stressors and Concerns:  Feeling overwhelmed by organizing home;  and daughter may be on autism spectrum    Older daughter is experiencing  "dizziness/lethargy and was dx with POTS; some financial and friend-related stress; is interested in a deeper dive into emotional state and core beliefs through [facility]; new son born 2020; roommate/former friend was finally evicted from their home (lived there for 3-4 years and did not paid any rent for over 1-2 years); pt has been experiencing mild panic attacks; daughter (age 10) just started ADHD medication; history of anxious attachment stemming from relationship with father (he  8 years ago); is currently pregnant (high-risk due to advanced maternal age) and baby is due Dec. 2020; is in couples therapy with  due to frequent arguments and his emotional affair earlier in the year; financial stress; history of ADHD (hyperactive type).     Treatment Objective(s) Addressed in This Session:  Identified insecurities and how they affect patient's thoughts and actions now and in the past  will identify how past feelings are impacting current relationships  Identified and processed recent triggers for anxiety and sadness (relationship)  Self-care and barriers to this  Discussed family system issues  Participating in enjoyable activities and connecting with social and family supports  Emotional differentiation  Discussed healthy boundaries and enforcement    Past/future:  Discussed work/life balance and current purpose  Grounding and mindfulness  Identified personal strengths within friendships  [facility] research  Identified strengths as a mother and need for positive affirmations  Management of anger  Dealing with \"big emotions\"  Self-compassion and radical acceptance  Triggers for anxiety and insecurities  tell full story of past relational issues/trust/infidelity  use cognitive strategies identified in therapy to challenge anxious thoughts   Body appreciation  Relationship repair  Discussed ambiguous loss and grief in a box analogy  Anticipatory grief and creating meaning  10:10:10 rule (10 minutes, 10 " months, and 10 years)  Feeling unappreciated and the 5:1 ratio (Natali)  Solution focused: how to prioritize and cope with interrupted sleep  Javon Servin's elements of trust and relationship repair   identify how attachment style drives patient's and 's behaviors  tell full story of past trauma related to her relationship with her father (and current roommate issues)     Intervention:  Solution-Focused  Family systems theory  CBT: connect thoughts, feelings, and actions   Narrative Therapy: separate problem from person and find the bright spots     Past/future:  Management of anger   Natali research    Assessments completed prior to visit:  PHQ9:   PHQ-9 SCORE 4/21/2022 7/6/2022 8/9/2022 9/20/2022 10/26/2022 12/7/2022 12/20/2022   PHQ-9 Total Score - - - - - - -   PHQ-9 Total Score MyChart 11 (Moderate depression) 8 (Mild depression) 5 (Mild depression) 8 (Mild depression) 8 (Mild depression) 7 (Mild depression) 4 (Minimal depression)   PHQ-9 Total Score 11 8 5 8 8 7 4     GAD7:   TEE-7 SCORE 3/2/2022 4/21/2022 6/14/2022 7/6/2022 9/20/2022 10/26/2022 12/7/2022   Total Score - - - - - - -   Total Score 9 (mild anxiety) 12 (moderate anxiety) 8 (mild anxiety) 10 (moderate anxiety) 10 (moderate anxiety) 6 (mild anxiety) 8 (mild anxiety)   Total Score 9 12 8 10 10 6 8     PROMIS 10-Global Health (all questions and answers displayed):   PROMIS 10 12/15/2021 12/15/2021 3/15/2022 6/14/2022 7/6/2022 10/26/2022   In general, would you say your health is: - Fair Fair Fair Fair Fair   In general, would you say your quality of life is: - Fair Good Fair Fair Fair   In general, how would you rate your physical health? - Fair Fair Fair Fair Fair   In general, how would you rate your mental health, including your mood and your ability to think? - Good Good Fair Fair Fair   In general, how would you rate your satisfaction with your social activities and relationships? - Fair Good Poor Fair Fair   In general, please rate how  well you carry out your usual social activities and roles - Fair Fair Good Fair Fair   To what extent are you able to carry out your everyday physical activities such as walking, climbing stairs, carrying groceries, or moving a chair? - Completely Completely Completely Mostly Completely   How often have you been bothered by emotional problems such as feeling anxious, depressed or irritable? - Sometimes Often Often Often Often   How would you rate your fatigue on average? - Severe Moderate Moderate Moderate Moderate   How would you rate your pain on average?   0 = No Pain  to  10 = Worst Imaginable Pain - 5 7 7 8 3   In general, would you say your health is: 2 2 2 2 2 2   In general, would you say your quality of life is: 2 2 3 2 2 2   In general, how would you rate your physical health? 2 2 2 2 2 2   In general, how would you rate your mental health, including your mood and your ability to think? 3 3 3 2 2 2   In general, how would you rate your satisfaction with your social activities and relationships? 2 2 3 1 2 2   In general, please rate how well you carry out your usual social activities and roles. (This includes activities at home, at work and in your community, and responsibilities as a parent, child, spouse, employee, friend, etc.) 2 2 2 3 2 2   To what extent are you able to carry out your everyday physical activities such as walking, climbing stairs, carrying groceries, or moving a chair? 5 5 5 5 4 5   In the past 7 days, how often have you been bothered by emotional problems such as feeling anxious, depressed, or irritable? 3 3 4 4 4 4   In the past 7 days, how would you rate your fatigue on average? 4 4 3 3 3 3   In the past 7 days, how would you rate your pain on average, where 0 means no pain, and 10 means worst imaginable pain? 5 5 7 7 8 3   Global Mental Health Score 10 10 11 7 8 8   Global Physical Health Score 12 12 12 12 11 14   PROMIS TOTAL - SUBSCORES 22 22 23 19 19 22   Some recent data might  be hidden        ASSESSMENT: Current Emotional / Mental Status (status of significant symptoms):   Risk status (Self / Other harm or suicidal ideation)   Patient denies current fears or concerns for personal safety.   Patient denies current or recent suicidal ideation or behaviors. Patient last reported feeling hopeless/passive ideation on 8/6/2020.   Patient denies current or recent homicidal ideation or behaviors.   Patient denies current or recent self injurious behavior or ideation.   Patient denies other safety concerns.   Patient reports there has been no change in risk factors since their last session.   Patient reports there has been no change in protective factors since their last session.   Recommended that patient call 911 or go to the local ED should there be a change in any of these risk factors.     Appearance:   Appropriate    Eye Contact:   Good    Psychomotor Behavior: Normal  Restless     Attitude:   Cooperative, tired   Orientation:   All   Speech    Rate / Production: Normal/ Responsive Talkative    Volume:  Normal    Mood:    Anxious  Thoughtful   Affect:    Appropriate  Expansive    Thought Content:  Clear  Rumination    Thought Form:  Coherent  Neurosis   Insight:    Good      Medication Review:   Changes to psychiatric medications, see updated Medication List in EPIC.      Medication Compliance:   Yes     Changes in Health Issues:   None reported      Chemical Use Review:   Substance Use: Chemical use reviewed, no active concerns identified ; no current alcohol use     Tobacco Use: No current tobacco use.      Diagnosis:  1. Recurrent major depressive disorder, in partial remission (H)    2. TEE (generalized anxiety disorder)      Note: There has been demonstrated improvement in functioning while patient has been engaged in psychotherapy/psychological service- if withdrawn the patient would deteriorate and/or relapse.     Collateral Reports Completed:  N/A    PLAN: (Patient Tasks / Therapist  Tasks / Other)    Patient states that her goals for the next three weeks include:    1. Continue to choose and cook healthy dinners  2. Make it through this week's various doctor and vet appointments  3. Decorate and declutter living room area  4. Continue to get rid of household items  5. Homework: think more about what is underneath the need to be viewed as successful/accomplished?      Keesha FRANKMaciel Rafita    ______________________________________________________________________                                                           [facility]    Individual Treatment Plan    Patient's Name: Ellyn Mcgee  YOB: 1975    Date of Creation: 2020  Date Treatment Plan Last Reviewed/Revised: 10/26/2022    DSM5 Diagnoses: 296.32 (F33.1) Major Depressive Disorder, Recurrent Episode, Moderate _ or 300.02 (F41.1) Generalized Anxiety Disorder (patient has previously been diagnosed with ADHD, hyperactive type)  Psychosocial / Contextual Factors: History of anxious attachment stemming from relationship with father (he  8 years ago); is currently pregnant (high-risk due to advanced maternal age); is in couples therapy with  due to frequent arguments and his emotional affair earlier in the year; financial stress; history of ADHD (hyperactive type).  PROMIS (reviewed every 90 days): PROMIS-10 Scores             Referral / Collaboration:  Care Coordination    Anticipated number of sessions for this episode of care: 60+  Anticipated frequency of session: Every 2-3 weeks  Anticipated duration of each session: 38-52 minutes  Treatment plan will be reviewed in 90 days or when goals have been changed.             Measurable Treatment Goal(s) related to diagnosis / functional impairment(s):  Patient is asking to focus treatment on her relationship with her father and past trauma.    Goal 1: Patient will tell full story of past trauma related to her relationship with her father and will  "identify how past feelings are impacting current relationships.    Objective #A (Patient Action)    Patient will identify how past feelings are impacting current relationships.  Status: Continued - Date(s): 10/26/2022    Intervention(s)  Therapist will role-play conflict management.    Objective #B  Patient will identify strengths and weaknesses within her family system of origin.  Status: Continued - Date(s): 10/26/2022    Intervention(s)  Therapist will assign homework for patient to create list.      Goal 2: Patient will learn about resilience, trauma responses, and Javon Servin's \"the story I'm making up\" (cognitive strategy to manage anxious thoughts).    Objective #A (Patient Action)    Patient will learn about and identify personal trauma responses.    Status: Continued - Date(s): 10/26/2022    Intervention(s)  Therapist will provide educational materials on trauma responses.    Objective #B  Patient will use cognitive strategies identified in therapy to challenge anxious thoughts.  Status: Continued - Date(s): 10/26/2022    Intervention(s)  Therapist will teach about Javon Servin's power of vulnerability and \"the story I'm making up\".      Goal 3: Patient will learn about protective factors that foster resilience and attachment styles and will identify how her attachment style drives her behavior.     Objective #A (Patient Action)    Status: Continued - Date(s): 10/26/2022     Patient will learn about protective factors and will identify her own.    Intervention(s)  Therapist will complete with patient in session.    Objective #B  Patient will identify how her attachment style drives her behavior.  Status: Continued - Date(s): 10/26/2022    Intervention(s)  Therapist will teach emotional regulation skills.        Patient agreed to the above plan.      Keesha Eller    "

## 2022-12-21 ENCOUNTER — VIRTUAL VISIT (OUTPATIENT)
Dept: PSYCHOLOGY | Facility: OTHER | Age: 47
End: 2022-12-21
Payer: COMMERCIAL

## 2022-12-21 DIAGNOSIS — F41.1 GAD (GENERALIZED ANXIETY DISORDER): Primary | ICD-10-CM

## 2022-12-21 DIAGNOSIS — F33.41 RECURRENT MAJOR DEPRESSIVE DISORDER, IN PARTIAL REMISSION (H): ICD-10-CM

## 2022-12-21 PROCEDURE — 90837 PSYTX W PT 60 MINUTES: CPT | Mod: 95 | Performed by: MARRIAGE & FAMILY THERAPIST

## 2022-12-21 NOTE — PATIENT INSTRUCTIONS
Patient states that her goals for the next three weeks include:    Continue to choose and cook healthy dinners  Make it through this week's various doctor and vet appointments  Decorate and declutter living room area  Continue to get rid of household items  Homework: think more about what is underneath the need to be viewed as successful/accomplished?

## 2023-01-05 ENCOUNTER — VIRTUAL VISIT (OUTPATIENT)
Dept: PSYCHIATRY | Facility: CLINIC | Age: 48
End: 2023-01-05
Payer: COMMERCIAL

## 2023-01-05 DIAGNOSIS — F33.41 RECURRENT MAJOR DEPRESSIVE DISORDER, IN PARTIAL REMISSION (H): ICD-10-CM

## 2023-01-05 DIAGNOSIS — G47.00 INSOMNIA, UNSPECIFIED TYPE: ICD-10-CM

## 2023-01-05 DIAGNOSIS — F41.1 GAD (GENERALIZED ANXIETY DISORDER): Primary | ICD-10-CM

## 2023-01-05 DIAGNOSIS — F90.0 ATTENTION DEFICIT HYPERACTIVITY DISORDER (ADHD), PREDOMINANTLY INATTENTIVE TYPE: ICD-10-CM

## 2023-01-05 PROCEDURE — 99214 OFFICE O/P EST MOD 30 MIN: CPT | Mod: 95 | Performed by: PSYCHIATRY & NEUROLOGY

## 2023-01-05 RX ORDER — PAROXETINE HYDROCHLORIDE HEMIHYDRATE 37.5 MG/1
37.5 TABLET, FILM COATED, EXTENDED RELEASE ORAL EVERY MORNING
Qty: 90 TABLET | Refills: 1 | Status: SHIPPED | OUTPATIENT
Start: 2023-01-05 | End: 2023-03-07

## 2023-01-05 NOTE — PROGRESS NOTES
"Telemedicine Visit: The patient's condition can be safely assessed and treated via synchronous audio and visual telemedicine encounter.      Reason for Telemedicine Visit: Patient has requested telehealth visit    Originating Site (Patient Location): Patient's home    Distant Location (provider location):  Off-site    Consent:  The patient/guardian has verbally consented to: the potential risks and benefits of telemedicine (video visit) versus in person care; bill my insurance or make self-payment for services provided; and responsibility for payment of non-covered services.     Mode of Communication:  Video Conference via [employer]    As the provider I attest to compliance with applicable laws and regulations related to telemedicine.        Outpatient Psychiatric Progress Note    Name: Ellyn Mcgee   : 1975                    Primary Care Provider: Dorothy Guamna DO   Therapist: BARBARA Schulte     PHQ 10/26/2022 2022 2022   PHQ-9 Total Score 8 7 4   Q9: Thoughts of better off dead/self-harm past 2 weeks Not at all Not at all Not at all   F/U: Thoughts of suicide or self-harm - - -   F/U: Safety concerns - - -   TEE-7 scores:  TEE-7 SCORE 2022 10/26/2022 2022   Total Score - - -   Total Score 10 (moderate anxiety) 6 (mild anxiety) 8 (mild anxiety)   Total Score 10 6 8       Patient Identification:  Patient is a 47 year old,   White Not  or  female  who presents for return visit with me.  Patient is currently employed part time but on maternity leave. Patient attended the phone/video session alone. Patient prefers to be called: \"Ellyn\".    Interim History:  I last saw Ellyn Mcgee for outpatient psychiatry Return Visit on 2022 During that appointment, we:      Start Paxil/paroxetine CR 25 mg daily for anxiety, mood.     Start lorazepam/Ativan 0.5-1 mg daily as needed for severe anxiety. To minimize exposure to infant try to breastfeed >4 hours after " taking lorazepam.     Decrease venlafaxine/Effexor-XR to 112.5 mg daily for mood and anxiety.      Continue propranolol 10-20 mg twice daily as needed for anxiety.  Have previously discussed risks/benefits of use while breastfeeding. To minimize exposure to infant try to breastfeed 3-4 hours after taking propranolol.     Continue Adderall XR 15 mg daily as needed for ADHD symptoms.  Discussed risks vs benefits of taking stimulant medication while lactating/breast-feeding.  Additional information previsously sent in [employer] message.    1/5: Patient with notable difference a few days after starting Paxil.  Tolerating well with no negative side effects.  Decrease of Effexor went well.  Mood much more stable and less irritable.  Patience has improved.  Feeling less depressed.  Not feeling as overwhelmed, but still with increased/high psychosocial stressors.  No acute safety concerns.  No SI.  No problematic drug or alcohol use.    Per [facility], Dr. Vincenzo Edward, during today's team-based visit:  Patient is prepilot and declines the enhanced model. No [facility] appointment.    Psychiatric ROS:  Ellyn Mcgee reports mood has been: Better  Anxiety has been: Much better  Sleep has been: Up and down, difficulty still some due toddler  Isabel sxs: None  Psychosis sxs: None  ADHD/ADD sxs: Up-and-down  PTSD sxs: None  PHQ9 and GAD7 scores were reviewed today if completed.   Medication side effects: Denies  Current stressors include: See HPI above  Coping mechanisms and supports include: Therapy, Family, Hobbies and Friends    Current medications include:   Current Outpatient Medications   Medication Sig     amphetamine-dextroamphetamine (ADDERALL XR) 15 MG 24 hr capsule Take 1 capsule (15 mg) by mouth daily as needed (ADHD)     clindamycin (CLEOCIN T) 1 % external lotion Apply topically 2 times daily To affected areas in the axilla until clear.     ibuprofen (ADVIL/MOTRIN) 200 MG tablet Take 200-400 mg by mouth every 4 hours as needed for  pain OTC     LORazepam (ATIVAN) 1 MG tablet Take 0.5-1 tablets (0.5-1 mg) by mouth daily as needed (anxiety and sleep)     PARoxetine (PAXIL-CR) 25 MG 24 hr tablet Take 1 tablet (25 mg) by mouth every morning     Prenatal Vit-Fe Fumarate-FA (PRENATAL PO)      propranolol (INDERAL) 20 MG tablet Take 20 mg by mouth 2 times daily as needed for anxiety     triamcinolone (KENALOG) 0.025 % external ointment Apply topically 2 times daily To affected area in the axilla until clear.     venlafaxine (EFFEXOR XR) 37.5 MG 24 hr capsule Take one cap by mouth daily WITH 75 mg cap for total daily dose 112.5 mg.     venlafaxine (EFFEXOR XR) 75 MG 24 hr capsule Take one cap by mouth daily WITH 37.5 mg cap for total daily dose 112.5 mg.     No current facility-administered medications for this visit.     [place] checked:   12/14/2022  1   12/14/2022  Lorazepam 1 MG Tablet  20.00  20 Al Bau   [id number]   [facility] ([id number])   0/0  1.00 LME  [place]   06/21/2022  1   06/14/2022  Dextroamp-Amphet Er 15 MG Cap  30.00  30 Al Bau   [id number]   [place] ([id number])   [place]   [place]         Past Medical/Surgical History:  Past Medical History:   Diagnosis Date     Abnormal Pap smear     Colposcopy     Adjustment disorder with mixed anxiety and depressed mood 04/04/2012     Anemia     In Past     Anxiety      Chlamydia trachomatis infection of other specified site 1993     Depression      Depressive disorder 1979    Not diagnosed until college...later diagnosed with TEE     Fertility problem      Lyme disease 09/08/2010    ?false positive vs positve CMV - resolved     Moderate dysplasia of cervix 1995     Other and unspecified adverse effect of drug, medicinal and biological substance     insulin resistent     Varicosities      Wounds and injuries     fall down stairs, PT for back, pain meds      has a past medical history of Abnormal Pap smear, Adjustment disorder with mixed anxiety and depressed mood (04/04/2012), Anemia, Anxiety, Chlamydia trachomatis  infection of other specified site (1993), Depression, Depressive disorder (1979), Fertility problem, Lyme disease (09/08/2010), Moderate dysplasia of cervix (1995), Other and unspecified adverse effect of drug, medicinal and biological substance, Varicosities, and Wounds and injuries.    She has no past medical history of Arthritis, Asthma, Asymptomatic human immunodeficiency virus (HIV) infection status (H), Breast disorder, Cancer (H), Cerebral infarction (H), Chronic kidney disease, Complication of anesthesia, Congestive heart failure (H), COPD (chronic obstructive pulmonary disease) (H), Diabetes (H), Diabetes mellitus (H), Heart disease, Herpes simplex without mention of complication, History of blood transfusion, Hypertension, Liver disease, Postpartum depression, Rh incompatibility, Seizures (H), Sickle cell anemia (H), Systemic lupus erythematosus (H), Thyroid disease, or Uncomplicated asthma.    Social History:  Reviewed. No changes to social history except as noted in HPI above.     Vital Signs:   None. This is phone/video visit.     Labs:  Most recent laboratory results reviewed and the pertinent results include:   Hemoglobin 11.4 on 12/25/2020, AST and ALT within normal limits on 12/24/2020    Review of Systems:  10 systems (general, cardiovascular, respiratory, eyes, ENT, endocrine, GI, , M/S, neurological) were reviewed. Most pertinent finding(s) is/are: denies fever, cough, headaches, shortness of breath, chest pain, N/V, constipation/diarrhea, trouble urinating, aches and pains. The remaining systems are all unremarkable.    Mental Status Examination (limited as this is by phone/video):  Appearance: Awake, alert, appears stated age, no acute distress, well-groomed  Attitude:  Cooperative, pleasant  Motor: No obvious abnormalities observed via video, not formally tested  Oriented to:  person, place, time, and situation  Attention Span and Concentration:  normal  Speech:  clear, coherent, regular  rate, rhythm, and volume  Language: intact  Mood: better  Affect: Overall appropriate and mood congruent  Associations:  no loose associations  Thought Process:  logical, linear and goal oriented  Thought Content:  no evidence of suicidal ideation or homicidal ideation, no evidence of psychotic thought, no auditory hallucinations present and no visual hallucinations present  Recent and Remote Memory:  Intact to interview. Not formally assessed. No amnesia.  Fund of Knowledge: appropriate  Insight:  good  Judgment:  intact, adequate for safety  Impulse Control:  intact    Suicide Risk Assessment:  Today Ellyn Mcgee reports no suicidal ideation. Based on all available evidence including the factors cited above, Ellyn Mcgee does not appear to be at imminent risk for self-harm, does not meet criteria for a 72-hr hold, and therefore remains appropriate for ongoing outpatient level of care.  A thorough assessment of risk factors related to suicide and self-harm have been reviewed and are noted above. The patient convincingly denies suicidality on several occasions. Local community safety resources reviewed for patient to use if needed. There was no deceit detected, and the patient presented in a manner that was believable.     DSM5 Diagnosis:  Major Depressive Disorder, Recurrent Episode, In Partial Remission  300.02 (F41.1) Generalized Anxiety Disorder   ADHD, Unspecified  Insomnia-unspecified (mostly related to infant)    Medical comorbidities include:  Patient Active Problem List    Diagnosis Date Noted     Labor and delivery, indication for care 12/23/2020     Priority: Medium     Major depressive disorder, recurrent episode, moderate (H) 10/08/2020     Priority: Medium     High-risk pregnancy, elderly multigravida, unspecified trimester 06/05/2020     Priority: Medium     TEE (generalized anxiety disorder) 10/10/2018     Priority: Medium     Cervical radiculopathy 04/16/2018     Priority: Medium     Attention  deficit hyperactivity disorder (ADHD), predominantly inattentive type 10/04/2017     Priority: Medium     Patient is followed by PREMA MARIEE for ongoing prescription of stimulants.  All refills should be approved by this provider, or covering partner.    Medication(s): Concerta 27mg on weekend days and 36mg other days of the week  Maximum quantity per month: 30  Clinic visit frequency required: Q 6  months     Controlled substance agreement on file: Yes       Date(s): 10/4/17  Neuropsych evaluation for ADD completed:  Yes, completed 8/17/17, on file and diagnosis confirmed    Last [facility] website verification: 12/3/2019   [email or web address]           External hemorrhoids 04/25/2012     Priority: Medium     PCOS (polycystic ovarian syndrome) 02/22/2011     Priority: Medium     Hyperlipidemia LDL goal <130 09/05/2008     Priority: Medium     Anxiety state 09/05/2008     Priority: Medium     Problem list name updated by automated process. Provider to review    Patient is followed by PREMA MARIEE for ongoing prescription of benzodiazepines.  All refills should be approved by this provider, or covering partner.    Medication(s): Xanax.   Maximum quantity per month:   Clinic visit frequency required:      Controlled substance agreement on file: Yes       Date(s): 10/4/2017  Benzodiazepine use reviewed by psychiatry:      Last [facility] website verification:  done on 12/17/2018   [email or web address]             Psychosocial & Contextual Factors: See HPI above    Assessment:  Per Intake Note with me 9/8/20:  Ellyn Mcgee is a 45-year-old female who is 23 weeks pregnant with a past psychiatric history including depression, anxiety, and ADHD who presents today for psychiatric evaluation.  Her depression and anxiety date back several years and she has also had postpartum depression/anxiety with her now 8-year-old (she also has a 14-year-old but did not experience postpartum mental health issues at that  time).  She started sertraline during her second pregnancy and then ended up being maintained on Paxil-CR for several years.  She is also more recently diagnosed with ADHD and maintained on Concerta.  She weaned off both Paxil and Concerta when she found out she was pregnant and replaced these medications with her current regimen of Lexapro and Effexor-XR to decrease risk of fetal defects.  For the most part, she is feeling a little bit better on her current doses of Lexapro 10 mg and Effexor-XR 75 mg.  It is an unconventional combination as we discussed, but since she is doing quite well, I am hesitant to make many changes.  She feels as though her anxiety could be a little bit better controlled and so we will further increase Effexor-XR to 112.5 mg daily to be taken with her Lexapro 10 mg daily.  If she does a lot better, we can consider decreasing Lexapro to 5 mg daily. We discussed the risk of serotonin syndrome and she will continue to be vigilant for any signs or symptoms of this condition.  We did discuss the possibility of restarting a stimulant medication if necessary.  She does not feel as though her ADHD symptoms are too severe at this time.    4/20/21:   Today patient reports overall some ongoing ups and downs regarding her symptoms since last visit.  She is thinking much of it might be hormonal and related to sleep deprivation.  She still has not yet had her menses return since having her baby.  She has had some feelings of panic.  Used to take propranolol in the past when she felt this way.  She would like to start this medication again.  Discussed risks and benefits while breast-feeding.  Limited risks for taking low-dose propranolol while breast-feeding although the infant does have some exposure through breast milk.  Recommended taking propranolol at least 3-4 hours prior to breast-feeding to decrease risks.  No other medication changes at this time.    12/15/2021:   Patient has overall been feeling  quite stable mood wise.  Has some ups and downs that are more related to feeling overwhelmed and exhausted regarding responsibilities for her children, home life, and work.  Does not feel depressed.  Feels like medications are working well.  Propranolol has been helpful.  No medication changes today.  No safety concerns or SI.  No problematic drug or alcohol use.    6/14/2022:  Patient overall struggling lately with mood, anxiety, ADHD symptoms.  Stopped Lexapro since felt like it was making her symptoms worse.  We have considered for quite some time about starting her back on an ADHD medication.  I think it is worth a trial at this time.  Discussed risks and benefits of a stimulant medication while breast-feeding.  She will likely be weaning soon.  Still has not been getting great sleep due to cosleeping and nighttime feedings.  No acute safety concerns.  No SI.  No problematic drug or alcohol use.  If she has too much irritability on Adderall, we could consider either going back to Concerta or a trial with Vyvanse.    12/14/2022:  Akanksha overall struggling more with anxiety and panic related symptoms.  Stress has been higher.  Patient wondering about starting Paxil-CR again.  Has tolerated well in the past.  We will add a low dose with venlafaxine.  We will also decrease venlafaxine some.  May continue cross taper/titration.  Lorazepam/Ativan provided for current acute symptoms and for any severe discomfort that may arise with this transition.  Therapy encouraged.  No acute safety concerns.  No SI.  No problematic drug or alcohol use.  Patient will be weaning her 2-year-old from breast-feeding.  We discussed risks and benefits of current medication regimen while breast-feeding.    1/5/2023:  Doing well on Paxil.  Symptoms improved quite quickly.  Tolerating well with no negative side effects.  Was able to decrease Effexor with ease as well.  We will continue tapering off Effexor and further optimizing Paxil.   Encouraged to continue in therapy.  No acute safety concerns.  No SI.  No problematic drug or alcohol use.    Medication side effects and alternatives were reviewed. Health promotion activities recommended and reviewed today. All questions addressed. Education and counseling completed regarding risks and benefits of medications and psychotherapy options. Recommend ongoing therapy for additional support.     Treatment Plan:    Increase Paxil/paroxetine CR for anxiety and mood: take 25 mg daily for 5 days while taking venlafaxine 75 mg daily.  Increase Paxil to 37.5 mg daily when venlafaxine is decreased to 37.5 mg.    Continue lorazepam/Ativan 0.5-1 mg daily as needed for severe anxiety. To minimize exposure to infant try to breastfeed >4 hours after taking lorazepam.     Decrease venlafaxine/Effexor-XR: Take 75 mg daily for 5 days (while taking Paxil 25 mg) and then decrease to 37.5 mg (while taking Paxil 37.5 mg) for 1-2 weeks and then discontinue.    Discontinued propranolol since no longer using.    Continue Adderall XR 15 mg daily as needed for ADHD symptoms.  Discussed risks vs benefits of taking stimulant medication while lactating/breast-feeding.  Additional information previsously sent in [employer] message.    Continue all other medications as reviewed per electronic medical record today.     Safety plan reviewed. To the Emergency Department as needed or call after hours crisis line at [phone] or [phone]. [place] Crisis Text Line. Text [place] to 340004 or Suicide LifeLine Chat: suicidepreventionlifeline.org/chat    Continue therapy as planned.     Schedule an appointment with me in 4-6 weeks or sooner as needed.  Patient scheduled today.    Follow up with primary care provider as planned or for acute medical concerns.    Call the psychiatric nurse line with medication questions or concerns at [phone].    [employer] may be used to communicate with your provider, but this is not intended to be used for  "emergencies.    Risks of stimulant medication include, but not limited to, decreased appetite, risk of tics (and that they may be lasting), trouble sleeping, cardiac risks such as increased heart rate and blood pressure, and rare risk of sudden cardiac death.  Also risk of addiction/tolerance/dependence.    Risks of benzodiazepine (Ativan, Xanax, Klonopin, Valium, etc) use including, but not limited to, sedation, tolerance, risk for addiction/dependence. Do not drink alcohol while taking benzodiazepines due to risk of trouble breathing and potential death. Do not drive or operate heavy machinery until it is known how the drug affects you. Discuss with physician or pharmacist before ever taking a benzodiazepine with a narcotic/opioid pain medication.     Therapy Resources:  Www.PsychologyToday.[employer]  Www.NAMIMN.org    [facility]- Psychiatric Disorders During Pregnancy  [email or web address].org/specialty-clinics/psychiatric-disorders-[email or web address]-pregnancy    [employer]  [email or web address]    Administrative Billing:   Phone Call/Video Duration: 24 Minutes  Start: 11:24a  Stop: 11:58a    Patient Status:  Patient is a continuous/long-term care patient and refills will continue to come from this provider until otherwise noted.     Signed:   Sherlyn Wang DO  [facility]    This note was created with voice recognition software. Inadvertent grammatical errors, typographical errors, and \"sound-a-like\" substitutions may occur due to limitations of the software.  Read the note carefully and apply context when erroneous substitutions have occurred. Thank you.   "

## 2023-01-05 NOTE — PATIENT INSTRUCTIONS
Treatment Plan:  Increase Paxil/paroxetine CR for anxiety and mood: take 25 mg daily for 5 days while taking venlafaxine 75 mg daily.  Increase Paxil to 37.5 mg daily when venlafaxine is decreased to 37.5 mg.  Continue lorazepam/Ativan 0.5-1 mg daily as needed for severe anxiety. To minimize exposure to infant try to breastfeed >4 hours after taking lorazepam.   Decrease venlafaxine/Effexor-XR: Take 75 mg daily for 5 days (while taking Paxil 25 mg) and then decrease to 37.5 mg (while taking Paxil 37.5 mg) for 1-2 weeks and then discontinue.  Discontinued propranolol since no longer using.  Continue Adderall XR 15 mg daily as needed for ADHD symptoms.  Discussed risks vs benefits of taking stimulant medication while lactating/breast-feeding.  Additional information previsously sent in [employer] message.  Continue all other medications as reviewed per electronic medical record today.   Safety plan reviewed. To the Emergency Department as needed or call after hours crisis line at [phone] or [phone]. [place] Crisis Text Line. Text [place] to 342770 or Suicide LifeLine Chat: suicidepreventionlifeline.org/chat  Continue therapy as planned.   Schedule an appointment with me in 4 weeks or sooner as needed.  Patient scheduled today.  Follow up with primary care provider as planned or for acute medical concerns.  Call the psychiatric nurse line with medication questions or concerns at [phone].  [employer] may be used to communicate with your provider, but this is not intended to be used for emergencies.    Risks of stimulant medication include, but not limited to, decreased appetite, risk of tics (and that they may be lasting), trouble sleeping, cardiac risks such as increased heart rate and blood pressure, and rare risk of sudden cardiac death.  Also risk of addiction/tolerance/dependence.    Risks of benzodiazepine (Ativan, Xanax, Klonopin, Valium, etc) use including, but not limited to, sedation, tolerance, risk for  addiction/dependence. Do not drink alcohol while taking benzodiazepines due to risk of trouble breathing and potential death. Do not drive or operate heavy machinery until it is known how the drug affects you. Discuss with physician or pharmacist before ever taking a benzodiazepine with a narcotic/opioid pain medication.     Therapy Resources:  Www.PsychologyToday.com  Www.NAMIMN.org    [facility]- Psychiatric Disorders During Pregnancy  [email or web address]/specialty-clinics/psychiatric-disorders-[email or web address]-pregnancy    MothertoBaby  [email or web address]

## 2023-01-05 NOTE — Clinical Note
Please call this patient to get them scheduled for a follow-up visit in 4-6 weeks. Please schedule with me and the [facility]. Thanks!

## 2023-01-05 NOTE — Clinical Note
Oh my goodness, I have been terrible today with the mistakes!!! This pt does NOT need a [facility] appt, just one with me ONLY in 4-6 weeks. Sorry, and thanks!!!

## 2023-01-09 ENCOUNTER — MYC MEDICAL ADVICE (OUTPATIENT)
Dept: PSYCHIATRY | Facility: CLINIC | Age: 48
End: 2023-01-09

## 2023-01-10 NOTE — PROGRESS NOTES
[facility]                                               Progress Note    Patient Name: Ellyn Mcgee  Date: 1/11/2023         Service Type: Individual      Session Start Time: 2:16 p.m.  Session End Time: 3:08 p.m.     Session Length: 52 min.    Session #: 52 (with this writer; patient met previously for DA with [facility] and psychiatry)    Attendees: Client and toddler son was present    Service Modality:  Video Visit:    Telemedicine Visit: The patient's condition can be safely assessed and treated via synchronous audio and visual telemedicine encounter.      Reason for Telemedicine Visit: Patient convenience (e.g. access to timely appointments / distance to available provider)    Originating Site (Patient Location): Patient's home    On-Site (Provider Location): [facility]: [place]    Consent:  The patient/guardian has verbally consented to: the potential risks and benefits of telemedicine (video visit) versus in person care; bill my insurance or make self-payment for services provided; and responsibility for payment of non-covered services.     Patient would like the video invitation sent by: Send to e-mail at: [email or web address]    Mode of Communication:  Video Conference via [facility]    As the provider I attest to compliance with applicable laws and regulations related to telemedicine.      DATA  Interactive Complexity: No  Crisis: No       Progress Since Last Session (Related to Symptoms / Goals / Homework):  Symptoms: reports improved symptoms since changing medications but admits to having some emotional reactivity when triggered (altercationswith  at work and with teen daughter).  Continues to feel exhausted and overwhelmed by life responsibilities.   is now agreeing to a neuropsychological exam after seeing the results of his daughter's assessment.     Homework: Partially completed      Episode of Care Goals: Satisfactory progress - ACTION  "(Actively working towards change); Intervened by reinforcing change plan / affirming steps taken     Current / Ongoing Stressors and Concerns:  Feeling overwhelmed by organizing home and advocating for children's needs;  and daughter are on autism spectrum    Older daughter is experiencing dizziness/lethargy and was dx with POTS; some financial and friend-related stress; is interested in a deeper dive into emotional state and core beliefs through [facility]; new son born 2020; roommate/former friend was finally evicted from their home (lived there for 3-4 years and did not paid any rent for over 1-2 years); pt has been experiencing mild panic attacks; daughter (age 10) just started ADHD medication; history of anxious attachment stemming from relationship with father (he  8 years ago); is currently pregnant (high-risk due to advanced maternal age) and baby is due Dec. 2020; is in couples therapy with  due to frequent arguments and his emotional affair earlier in the year; financial stress; history of ADHD (hyperactive type).     Treatment Objective(s) Addressed in This Session:  Emotional differentiation  Addressed possible solutions to insurance and financial concerns  Identified insecurities and how they affect patient's thoughts and actions now and in the past  will identify how past feelings are impacting current relationships  Identified and processed recent triggers for anxiety and sadness (relationship)  Self-care and barriers to this  Discussed family system issues  Participating in enjoyable activities and connecting with social and family supports  Discussed healthy boundaries and enforcement    Past/future:  Discussed work/life balance and current purpose  Grounding and mindfulness  Identified personal strengths within friendships  [facility]  Identified strengths as a mother and need for positive affirmations  Management of anger  Dealing with \"big emotions\"  Self-compassion and " radical acceptance  Triggers for anxiety and insecurities  tell full story of past relational issues/trust/infidelity  use cognitive strategies identified in therapy to challenge anxious thoughts   Body appreciation  Relationship repair  Discussed ambiguous loss and grief in a box analogy  Anticipatory grief and creating meaning  10:10:10 rule (10 minutes, 10 months, and 10 years)  Feeling unappreciated and the 5:1 ratio (Natali)  Solution focused: how to prioritize and cope with interrupted sleep  Javon Servin's elements of trust and relationship repair   identify how attachment style drives patient's and 's behaviors  tell full story of past trauma related to her relationship with her father (and current roommate issues)     Intervention:  Solution-Focused  Management of anger and when to step away/disengage  Family systems theory  CBT: connect thoughts, feelings, and actions   Narrative Therapy: separate problem from person and find the bright spots     Past/future:  Natali research    Assessments completed prior to visit:  PHQ9:   PHQ-9 SCORE 4/21/2022 7/6/2022 8/9/2022 9/20/2022 10/26/2022 12/7/2022 12/20/2022   PHQ-9 Total Score - - - - - - -   PHQ-9 Total Score MyChart 11 (Moderate depression) 8 (Mild depression) 5 (Mild depression) 8 (Mild depression) 8 (Mild depression) 7 (Mild depression) 4 (Minimal depression)   PHQ-9 Total Score 11 8 5 8 8 7 4     GAD7:   TEE-7 SCORE 3/2/2022 4/21/2022 6/14/2022 7/6/2022 9/20/2022 10/26/2022 12/7/2022   Total Score - - - - - - -   Total Score 9 (mild anxiety) 12 (moderate anxiety) 8 (mild anxiety) 10 (moderate anxiety) 10 (moderate anxiety) 6 (mild anxiety) 8 (mild anxiety)   Total Score 9 12 8 10 10 6 8     PROMIS 10-Global Health (all questions and answers displayed):   PROMIS 10 12/15/2021 12/15/2021 3/15/2022 6/14/2022 7/6/2022 10/26/2022   In general, would you say your health is: - Fair Fair Fair Fair Fair   In general, would you say your quality of life  is: - Fair Good Fair Fair Fair   In general, how would you rate your physical health? - Fair Fair Fair Fair Fair   In general, how would you rate your mental health, including your mood and your ability to think? - Good Good Fair Fair Fair   In general, how would you rate your satisfaction with your social activities and relationships? - Fair Good Poor Fair Fair   In general, please rate how well you carry out your usual social activities and roles - Fair Fair Good Fair Fair   To what extent are you able to carry out your everyday physical activities such as walking, climbing stairs, carrying groceries, or moving a chair? - Completely Completely Completely Mostly Completely   How often have you been bothered by emotional problems such as feeling anxious, depressed or irritable? - Sometimes Often Often Often Often   How would you rate your fatigue on average? - Severe Moderate Moderate Moderate Moderate   How would you rate your pain on average?   0 = No Pain  to  10 = Worst Imaginable Pain - 5 7 7 8 3   In general, would you say your health is: 2 2 2 2 2 2   In general, would you say your quality of life is: 2 2 3 2 2 2   In general, how would you rate your physical health? 2 2 2 2 2 2   In general, how would you rate your mental health, including your mood and your ability to think? 3 3 3 2 2 2   In general, how would you rate your satisfaction with your social activities and relationships? 2 2 3 1 2 2   In general, please rate how well you carry out your usual social activities and roles. (This includes activities at home, at work and in your community, and responsibilities as a parent, child, spouse, employee, friend, etc.) 2 2 2 3 2 2   To what extent are you able to carry out your everyday physical activities such as walking, climbing stairs, carrying groceries, or moving a chair? 5 5 5 5 4 5   In the past 7 days, how often have you been bothered by emotional problems such as feeling anxious, depressed, or  irritable? 3 3 4 4 4 4   In the past 7 days, how would you rate your fatigue on average? 4 4 3 3 3 3   In the past 7 days, how would you rate your pain on average, where 0 means no pain, and 10 means worst imaginable pain? 5 5 7 7 8 3   Global Mental Health Score 10 10 11 7 8 8   Global Physical Health Score 12 12 12 12 11 14   PROMIS TOTAL - SUBSCORES 22 22 23 19 19 22   Some recent data might be hidden        ASSESSMENT: Current Emotional / Mental Status (status of significant symptoms):   Risk status (Self / Other harm or suicidal ideation)   Patient denies current fears or concerns for personal safety.   Patient denies current or recent suicidal ideation or behaviors. Patient last reported feeling hopeless/passive ideation on 8/6/2020.   Patient denies current or recent homicidal ideation or behaviors.   Patient denies current or recent self injurious behavior or ideation.   Patient denies other safety concerns.   Patient reports there has been no change in risk factors since their last session.   Patient reports there has been no change in protective factors since their last session.   Recommended that patient call 911 or go to the local ED should there be a change in any of these risk factors.     Appearance:   Appropriate    Eye Contact:   Good    Psychomotor Behavior: Normal  Restless     Attitude:   Cooperative, tired   Orientation:   All   Speech    Rate / Production: Normal/ Responsive Talkative    Volume:  Normal    Mood:    Anxious  Thoughtful   Affect:    Appropriate  Expansive    Thought Content:  Clear  Rumination    Thought Form:  Coherent  Neurosis   Insight:    Good      Medication Review:   No changes to current psychiatric medication(s)     Medication Compliance:   Yes     Changes in Health Issues:   None reported      Chemical Use Review:   Substance Use: Chemical use reviewed, no active concerns identified ; no current alcohol use     Tobacco Use: No current tobacco use.      Diagnosis:  1.  TEE (generalized anxiety disorder)    2. Recurrent major depressive disorder, in partial remission (H)      Note: There has been demonstrated improvement in functioning while patient has been engaged in psychotherapy/psychological service- if withdrawn the patient would deteriorate and/or relapse.     Collateral Reports Completed:  N/A    PLAN: (Patient Tasks / Therapist Tasks / Other)    Patient states that her goals for the next two weeks include:    1. Figure out insurance situation or apply for [facility]  2. Evaluate work schedule and time-off needs (discuss this w/)  3. Continue to think of ways to take a long, hot bath or to find time to self      Keesha SON Rafita    ______________________________________________________________________                                                           [facility]    Individual Treatment Plan    Patient's Name: Ellyn Mcgee  YOB: 1975    Date of Creation: 2020  Date Treatment Plan Last Reviewed/Revised: 10/26/2022    DSM5 Diagnoses: 296.32 (F33.1) Major Depressive Disorder, Recurrent Episode, Moderate _ or 300.02 (F41.1) Generalized Anxiety Disorder (patient has previously been diagnosed with ADHD, hyperactive type)  Psychosocial / Contextual Factors: History of anxious attachment stemming from relationship with father (he  8 years ago); is currently pregnant (high-risk due to advanced maternal age); is in couples therapy with  due to frequent arguments and his emotional affair earlier in the year; financial stress; history of ADHD (hyperactive type).  PROMIS (reviewed every 90 days): PROMIS-10 Scores             Referral / Collaboration:  Care Coordination    Anticipated number of sessions for this episode of care: 60+  Anticipated frequency of session: Every 2-3 weeks  Anticipated duration of each session: 38-52 minutes  Treatment plan will be reviewed in 90 days or when goals have been changed.      "        Measurable Treatment Goal(s) related to diagnosis / functional impairment(s):  Patient is asking to focus treatment on her relationship with her father and past trauma.    Goal 1: Patient will tell full story of past trauma related to her relationship with her father and will identify how past feelings are impacting current relationships.    Objective #A (Patient Action)    Patient will identify how past feelings are impacting current relationships.  Status: Continued - Date(s): 10/26/2022    Intervention(s)  Therapist will role-play conflict management.    Objective #B  Patient will identify strengths and weaknesses within her family system of origin.  Status: Continued - Date(s): 10/26/2022    Intervention(s)  Therapist will assign homework for patient to create list.      Goal 2: Patient will learn about resilience, trauma responses, and Javon Servin's \"the story I'm making up\" (cognitive strategy to manage anxious thoughts).    Objective #A (Patient Action)    Patient will learn about and identify personal trauma responses.    Status: Continued - Date(s): 10/26/2022    Intervention(s)  Therapist will provide educational materials on trauma responses.    Objective #B  Patient will use cognitive strategies identified in therapy to challenge anxious thoughts.  Status: Continued - Date(s): 10/26/2022    Intervention(s)  Therapist will teach about Javon Servin's power of vulnerability and \"the story I'm making up\".      Goal 3: Patient will learn about protective factors that foster resilience and attachment styles and will identify how her attachment style drives her behavior.     Objective #A (Patient Action)    Status: Continued - Date(s): 10/26/2022     Patient will learn about protective factors and will identify her own.    Intervention(s)  Therapist will complete with patient in session.    Objective #B  Patient will identify how her attachment style drives her behavior.  Status: Continued - Date(s): " 10/26/2022    Intervention(s)  Therapist will teach emotional regulation skills.        Patient agreed to the above plan.      Keesha Eller

## 2023-01-10 NOTE — CONFIDENTIAL NOTE
"    1) RN reviewed 1/9/23 [facility] Medical Advice message: \"Hi, Sherlyn. I m due to increase my dose of paroxotine in a couple of days and decrease venlafaxine again. The 75venlafaxine ans 25 paroxotine is better than 100% venlafaxine, but not enough (the 112.5 venla and 25 paroxotine was). I m concerned the increase in paroxotine won t be enough. I used a half pill of Ativan yesterday for the first time and it was helpful, but . Thoughts?\"    2) RN sent [facility] reply thanking pt for reaching out about her concerns and informing her that RN is sending her questions to provider for review and consult.    Miranda Connelly RN on 1/10/2023 at 9:52 AM      "

## 2023-01-11 ENCOUNTER — VIRTUAL VISIT (OUTPATIENT)
Dept: PSYCHOLOGY | Facility: OTHER | Age: 48
End: 2023-01-11
Payer: COMMERCIAL

## 2023-01-11 DIAGNOSIS — F33.41 RECURRENT MAJOR DEPRESSIVE DISORDER, IN PARTIAL REMISSION (H): ICD-10-CM

## 2023-01-11 DIAGNOSIS — F41.1 GAD (GENERALIZED ANXIETY DISORDER): Primary | ICD-10-CM

## 2023-01-11 PROCEDURE — 90834 PSYTX W PT 45 MINUTES: CPT | Mod: 95 | Performed by: MARRIAGE & FAMILY THERAPIST

## 2023-01-11 NOTE — PATIENT INSTRUCTIONS
Patient states that her goals for the next two weeks include:    Figure out insurance situation or apply for [facility]  Evaluate work schedule and time-off needs (discuss this w/)  Continue to think of ways to take a long, hot bath or to find time to self

## 2023-01-12 ENCOUNTER — HOSPITAL ENCOUNTER (EMERGENCY)
Facility: CLINIC | Age: 48
Discharge: HOME | End: 2023-01-12
Attending: EMERGENCY MEDICINE | Admitting: EMERGENCY MEDICINE
Payer: COMMERCIAL

## 2023-01-12 VITALS
DIASTOLIC BLOOD PRESSURE: 88 MMHG | WEIGHT: 186.4 LBS | HEART RATE: 85 BPM | BODY MASS INDEX: 31.82 KG/M2 | OXYGEN SATURATION: 98 % | RESPIRATION RATE: 23 BRPM | SYSTOLIC BLOOD PRESSURE: 124 MMHG | TEMPERATURE: 97.9 F | HEIGHT: 64 IN

## 2023-01-12 DIAGNOSIS — M79.602 ARM PAIN, LEFT: ICD-10-CM

## 2023-01-12 LAB
ALBUMIN SERPL-MCNC: 4.2 G/DL (ref 3.4–5)
ALP SERPL-CCNC: 60 U/L (ref 40–150)
ALT SERPL W P-5'-P-CCNC: 26 U/L (ref 0–50)
ANION GAP SERPL CALCULATED.3IONS-SCNC: 6 MMOL/L (ref 3–14)
AST SERPL W P-5'-P-CCNC: 16 U/L (ref 0–45)
ATRIAL RATE - MUSE: 71 BPM
BASOPHILS # BLD AUTO: 0 10E3/UL (ref 0–0.2)
BASOPHILS NFR BLD AUTO: 0 %
BILIRUB SERPL-MCNC: 0.2 MG/DL (ref 0.2–1.3)
BUN SERPL-MCNC: 14 MG/DL (ref 7–30)
CALCIUM SERPL-MCNC: 9.1 MG/DL (ref 8.5–10.1)
CHLORIDE BLD-SCNC: 106 MMOL/L (ref 94–109)
CO2 SERPL-SCNC: 27 MMOL/L (ref 20–32)
CREAT SERPL-MCNC: 0.68 MG/DL (ref 0.52–1.04)
DIASTOLIC BLOOD PRESSURE - MUSE: NORMAL MMHG
EOSINOPHIL # BLD AUTO: 0.1 10E3/UL (ref 0–0.7)
EOSINOPHIL NFR BLD AUTO: 1 %
ERYTHROCYTE [DISTWIDTH] IN BLOOD BY AUTOMATED COUNT: 12.3 % (ref 10–15)
GFR SERPL CREATININE-BSD FRML MDRD: >90 ML/MIN/1.73M2
GLUCOSE BLD-MCNC: 89 MG/DL (ref 70–99)
HCT VFR BLD AUTO: 39.1 % (ref 35–47)
HGB BLD-MCNC: 12.8 G/DL (ref 11.7–15.7)
IMM GRANULOCYTES # BLD: 0 10E3/UL
IMM GRANULOCYTES NFR BLD: 0 %
INTERPRETATION ECG - MUSE: NORMAL
LYMPHOCYTES # BLD AUTO: 2.7 10E3/UL (ref 0.8–5.3)
LYMPHOCYTES NFR BLD AUTO: 28 %
MCH RBC QN AUTO: 28.8 PG (ref 26.5–33)
MCHC RBC AUTO-ENTMCNC: 32.7 G/DL (ref 31.5–36.5)
MCV RBC AUTO: 88 FL (ref 78–100)
MONOCYTES # BLD AUTO: 0.7 10E3/UL (ref 0–1.3)
MONOCYTES NFR BLD AUTO: 7 %
NEUTROPHILS # BLD AUTO: 6 10E3/UL (ref 1.6–8.3)
NEUTROPHILS NFR BLD AUTO: 64 %
NRBC # BLD AUTO: 0 10E3/UL
NRBC BLD AUTO-RTO: 0 /100
P AXIS - MUSE: 34 DEGREES
PLATELET # BLD AUTO: 329 10E3/UL (ref 150–450)
POTASSIUM BLD-SCNC: 4 MMOL/L (ref 3.4–5.3)
PR INTERVAL - MUSE: 124 MS
PROT SERPL-MCNC: 7.7 G/DL (ref 6.8–8.8)
QRS DURATION - MUSE: 88 MS
QT - MUSE: 382 MS
QTC - MUSE: 415 MS
R AXIS - MUSE: 52 DEGREES
RBC # BLD AUTO: 4.44 10E6/UL (ref 3.8–5.2)
SODIUM SERPL-SCNC: 139 MMOL/L (ref 133–144)
SYSTOLIC BLOOD PRESSURE - MUSE: NORMAL MMHG
T AXIS - MUSE: 34 DEGREES
TROPONIN I SERPL HS-MCNC: 4 NG/L
TROPONIN I SERPL HS-MCNC: 5 NG/L
VENTRICULAR RATE- MUSE: 71 BPM
WBC # BLD AUTO: 9.4 10E3/UL (ref 4–11)

## 2023-01-12 PROCEDURE — 85004 AUTOMATED DIFF WBC COUNT: CPT | Performed by: EMERGENCY MEDICINE

## 2023-01-12 PROCEDURE — 99283 EMERGENCY DEPT VISIT LOW MDM: CPT | Mod: 25 | Performed by: EMERGENCY MEDICINE

## 2023-01-12 PROCEDURE — 99284 EMERGENCY DEPT VISIT MOD MDM: CPT | Performed by: EMERGENCY MEDICINE

## 2023-01-12 PROCEDURE — 84484 ASSAY OF TROPONIN QUANT: CPT | Performed by: EMERGENCY MEDICINE

## 2023-01-12 PROCEDURE — 84484 ASSAY OF TROPONIN QUANT: CPT | Mod: 91 | Performed by: EMERGENCY MEDICINE

## 2023-01-12 PROCEDURE — 93010 ELECTROCARDIOGRAM REPORT: CPT | Performed by: EMERGENCY MEDICINE

## 2023-01-12 PROCEDURE — 80053 COMPREHEN METABOLIC PANEL: CPT | Performed by: EMERGENCY MEDICINE

## 2023-01-12 PROCEDURE — 93005 ELECTROCARDIOGRAM TRACING: CPT | Performed by: EMERGENCY MEDICINE

## 2023-01-12 PROCEDURE — 36415 COLL VENOUS BLD VENIPUNCTURE: CPT | Performed by: EMERGENCY MEDICINE

## 2023-01-12 RX ORDER — VENLAFAXINE 75 MG/1
75 TABLET ORAL 3 TIMES DAILY
COMMUNITY
End: 2023-02-06 | Stop reason: ALTCHOICE

## 2023-01-12 ASSESSMENT — ACTIVITIES OF DAILY LIVING (ADL): ADLS_ACUITY_SCORE: 35

## 2023-01-12 NOTE — ED TRIAGE NOTES
Patient said she had left arm pain that went all the way up to her left shoulder lat night. She said the pain went away this morning. Patient also reported heartburn and upper back pain. Patient is here for lab works. She wanted to make sure that it was not related to her heart.      Triage Assessment     Row Name 01/12/23 [id number]       Triage Assessment (Adult)    Airway WDL WDL       Respiratory WDL    Respiratory WDL WDL       Skin Circulation/Temperature WDL    Skin Circulation/Temperature WDL WDL       Cardiac WDL    Cardiac WDL X  patient was unsure. She c/o heartburn and upperback pain       Peripheral/Neurovascular WDL    Peripheral Neurovascular WDL WDL       Cognitive/Neuro/Behavioral WDL    Cognitive/Neuro/Behavioral WDL WDL

## 2023-01-12 NOTE — ED PROVIDER NOTES
[facility] [place] EMERGENCY DEPARTMENT ([facility])     January 12, 2023     History     Chief Complaint   Patient presents with     labs     HPI  Ellyn Mcgee is a 47 year old female with a past medical history including cervical radiculopathy, HLD (LDL goal <130) who presents to the Emergency Department for evaluation of left upper extremity pain. Patient reports that she had an episode last night of left bicep pain that radiated down her arm into her fingers and up into her neck.  She has had episodes of arm pains in the past but notes that this one felt different.  She did not come in as she thought the pain was musculoskeletal.  After the episode of pain she had a lot of burping, felt fatigued and nauseated, and went to sleep early.  She felt fine this morning other than some nausea but decided to come in because she has a history of high cholesterol when she is stressed out. Endorses heartburn today, as well as some upper back pain that she feels is likely musculoskeletal.    The patient states that she also came in because she has a family history of heart attack. Both of her parents required coronary bypass surgery.    Patient denies chest pain. No other symptoms noted.    Past Medical History  Past Medical History:   Diagnosis Date     Abnormal Pap smear     Colposcopy     Adjustment disorder with mixed anxiety and depressed mood 04/04/2012     Anemia     In Past     Anxiety      Chlamydia trachomatis infection of other specified site 1993     Depression      Depressive disorder 1979    Not diagnosed until college...later diagnosed with TEE     Fertility problem      Lyme disease 09/08/2010    ?false positive vs positve CMV - resolved     Moderate dysplasia of cervix 1995     Other and unspecified adverse effect of drug, medicinal and biological substance     insulin resistent     Varicosities      Wounds and injuries     fall down stairs, PT for back, pain meds     Past Surgical History:   Procedure  Laterality Date     BIOPSY  , , ,     mole on stomach and mole on back of thigh     CONIZATION CERVIX,KNIFE/LASER  1995     SURGICAL HISTORY OF -       Weare teeth     amphetamine-dextroamphetamine (ADDERALL XR) 15 MG 24 hr capsule  clindamycin (CLEOCIN T) 1 % external lotion  ibuprofen (ADVIL/MOTRIN) 200 MG tablet  LORazepam (ATIVAN) 1 MG tablet  PARoxetine (PAXIL-CR) 37.5 MG 24 hr tablet  Prenatal Vit-Fe Fumarate-FA (PRENATAL PO)  triamcinolone (KENALOG) 0.025 % external ointment      Allergies   Allergen Reactions     Codeine Itching     Codeine      itching       Cyclogyl [Cyclopentolate Hcl] Nausea and Vomiting     Cyclopentolate      Hydrocodone      anxiety  itching     Hydrocodone-Acetaminophen      Seasonal Allergies      Family History  Family History   Problem Relation Age of Onset     Cancer Mother         vocal cord cancer     Lipids Mother      Myocardial Infarction Mother      Hyperlipidemia Mother      Other Cancer Mother         throat     Depression Mother      Anxiety Disorder Mother      Melanoma Father      Cancer Father         B-cell lymphoma, melanoma     Lipids Father      Heart Disease Father         open heart surgery     Cerebrovascular Disease Father         mini strokes, multiple     Hyperlipidemia Father              Other Cancer Father         melanoma, b-cell lymphoma     Depression Father         undiagnosed     Anxiety Disorder Father         undiagnosed     Mental Illness Father         Undiagnosed Narcissitic Personality Disorder     Substance Abuse Father         alcohol abuse     Alcohol/Drug Maternal Grandfather      Substance Abuse Maternal Grandfather         Alcoholic     Diabetes Paternal Grandmother         think it was type II     Obesity Paternal Grandmother      Thyroid Disease Brother      Crohn's Disease Sister      Diabetes Paternal Uncle      Anxiety Disorder Paternal Half-Sister      Diabetes Other         paternal uncle has, not  "sure of type     Social History   Social History     Tobacco Use     Smoking status: Never     Smokeless tobacco: Never   Substance Use Topics     Alcohol use: Yes     Types: 1 Standard drinks or equivalent per week     Comment: Very Occasional     Drug use: Not Currently     Types: Marijuana, Psilocybin     Comment: I added one, but not because it's recent, been 20+ years...      Past medical history, past surgical history, medications, allergies, family history, and social history were reviewed with the patient. No additional pertinent items.      A medically appropriate review of systems was performed with pertinent positives and negatives noted in the HPI, and all other systems negative.    Physical Exam   BP: (!) 150/88  Pulse: 76  Temp: 97.9  F (36.6  C)  Resp: 18  Height: 161.3 cm (5' 3.5\")  Weight: 84.6 kg (186 lb 6.4 oz)  SpO2: 100 %  Physical Exam  Vitals and nursing note reviewed.   Constitutional:       General: She is not in acute distress.     Appearance: She is well-developed. She is not diaphoretic.   HENT:      Head: Normocephalic and atraumatic.      Mouth/Throat:      Pharynx: No oropharyngeal exudate.   Eyes:      General: No scleral icterus.        Right eye: No discharge.         Left eye: No discharge.      Pupils: Pupils are equal, round, and reactive to light.   Cardiovascular:      Rate and Rhythm: Normal rate and regular rhythm.      Heart sounds: Normal heart sounds. No murmur heard.    No friction rub. No gallop.   Pulmonary:      Effort: Pulmonary effort is normal. No respiratory distress.      Breath sounds: Normal breath sounds. No wheezing.   Chest:      Chest wall: No tenderness.   Abdominal:      General: Bowel sounds are normal. There is no distension.      Palpations: Abdomen is soft.      Tenderness: There is no abdominal tenderness.   Musculoskeletal:         General: No tenderness or deformity. Normal range of motion.      Cervical back: Normal range of motion and neck supple. "   Skin:     General: Skin is warm and dry.      Coloration: Skin is not pale.      Findings: No erythema or rash.   Neurological:      Mental Status: She is alert and oriented to person, place, and time.      Cranial Nerves: No cranial nerve deficit.           ED Course, Procedures, & Data     7:21 PM  The patient was seen and examined by Francisco Rowland DO in Room ED03.    Procedures       ED Course Selections:        EKG Interpretation:      Interpreted by Francisco Rowland DO  Time reviewed: 1742  Symptoms at time of EKG: none   Rhythm: normal sinus   Rate: 71  Axis: normal  Ectopy: none  Conduction: normal  ST Segments/ T Waves: No ST-T wave changes  Q Waves: none  Comparison to prior: No old EKG available    Clinical Impression: normal EKG                     No results found for any visits on 01/12/23.  Medications - No data to display  Labs Ordered and Resulted from Time of ED Arrival to Time of ED Departure - No data to display  No orders to display          Medical Decision Making  The patient presented with a problem that is an acute health issue posing potential threat to life or bodily function.    The patient's evaluation involved:  ordering and review of 3+ test(s) (see separate area of note for details)    The patient's management involved only simple and very low risk treatment.      Assessment & Plan    Is a 47-year-old female who presents with left arm pain.  Patient is also had some nausea.  Patient wanted to get checked because she has a family history of cardiac disease.  She is currently asymptomatic.  Exam demonstrates no acute abnormalities.  ECG shows no acute abnormalities.  Lab work including troponin show no acute abnormalities.  Second troponin is also negative.  I discussed all results with patient.  I calculated her HEART score with her which was found to be at 2.  Will discharge with return precautions.    I have reviewed the nursing notes. I have reviewed the findings, diagnosis, plan  and need for follow up with the patient.    New Prescriptions    No medications on file       Final diagnoses:   None     IGabrielle, am serving as a trained medical scribe to document services personally performed by Francisco Rowland DO, based on the provider's statements to me.   Francisco GONZALEZ DO, was physically present and have reviewed and verified the accuracy of this note documented by Gabrielle Jeffers.    Francisco Rowland DO  [facility] EMERGENCY DEPARTMENT  1/12/2023     Francisco Rowland DO  01/13/23 0057

## 2023-01-16 ENCOUNTER — IMMUNIZATION (OUTPATIENT)
Dept: NURSING | Facility: CLINIC | Age: 48
End: 2023-01-16
Payer: COMMERCIAL

## 2023-01-16 PROCEDURE — 91312 COVID-19 VACCINE BIVALENT BOOSTER 12+ (PFIZER): CPT

## 2023-01-16 PROCEDURE — 0124A COVID-19 VACCINE BIVALENT BOOSTER 12+ (PFIZER): CPT

## 2023-01-25 NOTE — PROGRESS NOTES
[facility]                                               Progress Note    Patient Name: Ellyn Mcgee  Date: 1/26/2023         Service Type: Individual      Session Start Time: 1:36 p.m.  Session End Time: 2:32 p.m.     Session Length: 56 min.    Session #: 53 (with this writer; patient met previously for DA with [facility] and psychiatry)    Attendees: Client and toddler son was present    Service Modality:  Video Visit:    Telemedicine Visit: The patient's condition can be safely assessed and treated via synchronous audio and visual telemedicine encounter.      Reason for Telemedicine Visit: Patient convenience (e.g. access to timely appointments / distance to available provider)    Originating Site (Patient Location): Patient's home    On-Site (Provider Location): [facility] [place]    Consent:  The patient/guardian has verbally consented to: the potential risks and benefits of telemedicine (video visit) versus in person care; bill my insurance or make self-payment for services provided; and responsibility for payment of non-covered services.     Patient would like the video invitation sent by: Send to e-mail at: [email or web address]    Mode of Communication:  Video Conference via well    As the provider I attest to compliance with applicable laws and regulations related to telemedicine.      DATA  Interactive Complexity: No  Crisis: No       Progress Since Last Session (Related to Symptoms / Goals / Homework):  Symptoms: reports improvement in symptoms (continued) and was able to slow down and take some time off from work; also connected with friends and spent some time alone (although it was while in the ER for testing).  Enjoyed seeing older daughter perform in her high school J-term performance.     Homework: Achieved / completed to satisfaction      Episode of Care Goals: Satisfactory progress - ACTION (Actively working towards change); Intervened by reinforcing  "change plan / affirming steps taken     Current / Ongoing Stressors and Concerns:  Feeling overwhelmed by organizing home and advocating for children's needs;  and daughter are on autism spectrum    Older daughter is experiencing dizziness/lethargy and was dx with POTS; some financial and friend-related stress; is interested in a deeper dive into emotional state and core beliefs through ACT; new son born 2020; roommate/former friend was finally evicted from their home (lived there for 3-4 years and did not paid any rent for over 1-2 years); pt has been experiencing mild panic attacks; daughter (age 10) just started ADHD medication; history of anxious attachment stemming from relationship with father (he  8 years ago); is currently pregnant (high-risk due to advanced maternal age) and baby is due Dec. 2020; is in couples therapy with  due to frequent arguments and his emotional affair earlier in the year; financial stress; history of ADHD (hyperactive type).     Treatment Objective(s) Addressed in This Session:  Discussed work/life balance and current purpose  Addressed possible solutions to insurance and financial concerns  Identified insecurities and how they affect patient's thoughts and actions now and in the past  will identify how past feelings are impacting current relationships  Identified and processed recent triggers for anxiety and sadness (relationship)  Self-care and barriers to this  Discussed family system issues  Participating in enjoyable activities and connecting with social and family supports  Discussed healthy boundaries and enforcement    Past/future:  Emotional differentiation  Grounding and mindfulness  Identified personal strengths within friendships  Natali research  Identified strengths as a mother and need for positive affirmations  Management of anger  Dealing with \"big emotions\"  Self-compassion and radical acceptance  Triggers for anxiety and insecurities  tell " full story of past relational issues/trust/infidelity  use cognitive strategies identified in therapy to challenge anxious thoughts   Body appreciation  Relationship repair  Discussed ambiguous loss and grief in a box analogy  Anticipatory grief and creating meaning  10:10:10 rule (10 minutes, 10 months, and 10 years)  Feeling unappreciated and the 5:1 ratio (Natali)  Solution focused: how to prioritize and cope with interrupted sleep  Javon Servin's elements of trust and relationship repair   identify how attachment style drives patient's and 's behaviors  tell full story of past trauma related to her relationship with her father (and current roommate issues)     Intervention:  Solution-Focused  Management of anger and when to step away/disengage  Family systems theory  CBT: connect thoughts, feelings, and actions   Narrative Therapy: separate problem from person and find the bright spots     Past/future:  Natali research    Assessments completed prior to visit:  PHQ9:   PHQ-9 SCORE 4/21/2022 7/6/2022 8/9/2022 9/20/2022 10/26/2022 12/7/2022 12/20/2022   PHQ-9 Total Score - - - - - - -   PHQ-9 Total Score MyChart 11 (Moderate depression) 8 (Mild depression) 5 (Mild depression) 8 (Mild depression) 8 (Mild depression) 7 (Mild depression) 4 (Minimal depression)   PHQ-9 Total Score 11 8 5 8 8 7 4     GAD7:   TEE-7 SCORE 3/2/2022 4/21/2022 6/14/2022 7/6/2022 9/20/2022 10/26/2022 12/7/2022   Total Score - - - - - - -   Total Score 9 (mild anxiety) 12 (moderate anxiety) 8 (mild anxiety) 10 (moderate anxiety) 10 (moderate anxiety) 6 (mild anxiety) 8 (mild anxiety)   Total Score 9 12 8 10 10 6 8     PROMIS 10-Global Health (all questions and answers displayed):   PROMIS 10 12/15/2021 12/15/2021 3/15/2022 6/14/2022 7/6/2022 10/26/2022   In general, would you say your health is: - Fair Fair Fair Fair Fair   In general, would you say your quality of life is: - Fair Good Fair Fair Fair   In general, how would you rate  your physical health? - Fair Fair Fair Fair Fair   In general, how would you rate your mental health, including your mood and your ability to think? - Good Good Fair Fair Fair   In general, how would you rate your satisfaction with your social activities and relationships? - Fair Good Poor Fair Fair   In general, please rate how well you carry out your usual social activities and roles - Fair Fair Good Fair Fair   To what extent are you able to carry out your everyday physical activities such as walking, climbing stairs, carrying groceries, or moving a chair? - Completely Completely Completely Mostly Completely   How often have you been bothered by emotional problems such as feeling anxious, depressed or irritable? - Sometimes Often Often Often Often   How would you rate your fatigue on average? - Severe Moderate Moderate Moderate Moderate   How would you rate your pain on average?   0 = No Pain  to  10 = Worst Imaginable Pain - 5 7 7 8 3   In general, would you say your health is: 2 2 2 2 2 2   In general, would you say your quality of life is: 2 2 3 2 2 2   In general, how would you rate your physical health? 2 2 2 2 2 2   In general, how would you rate your mental health, including your mood and your ability to think? 3 3 3 2 2 2   In general, how would you rate your satisfaction with your social activities and relationships? 2 2 3 1 2 2   In general, please rate how well you carry out your usual social activities and roles. (This includes activities at home, at work and in your community, and responsibilities as a parent, child, spouse, employee, friend, etc.) 2 2 2 3 2 2   To what extent are you able to carry out your everyday physical activities such as walking, climbing stairs, carrying groceries, or moving a chair? 5 5 5 5 4 5   In the past 7 days, how often have you been bothered by emotional problems such as feeling anxious, depressed, or irritable? 3 3 4 4 4 4   In the past 7 days, how would you rate your  fatigue on average? 4 4 3 3 3 3   In the past 7 days, how would you rate your pain on average, where 0 means no pain, and 10 means worst imaginable pain? 5 5 7 7 8 3   Global Mental Health Score 10 10 11 7 8 8   Global Physical Health Score 12 12 12 12 11 14   PROMIS TOTAL - SUBSCORES 22 22 23 19 19 22   Some recent data might be hidden        ASSESSMENT: Current Emotional / Mental Status (status of significant symptoms):   Risk status (Self / Other harm or suicidal ideation)   Patient denies current fears or concerns for personal safety.   Patient denies current or recent suicidal ideation or behaviors. Patient last reported feeling hopeless/passive ideation on 8/6/2020.   Patient denies current or recent homicidal ideation or behaviors.   Patient denies current or recent self injurious behavior or ideation.   Patient denies other safety concerns.   Patient reports there has been no change in risk factors since their last session.   Patient reports there has been no change in protective factors since their last session.   Recommended that patient call 911 or go to the local ED should there be a change in any of these risk factors.     Appearance:   Appropriate    Eye Contact:   Good    Psychomotor Behavior: Normal  Restless     Attitude:   Cooperative   Orientation:   All   Speech    Rate / Production: Normal/ Responsive Talkative    Volume:  Normal    Mood:    Anxious  Optimistic   Affect:    Appropriate  Expansive    Thought Content:  Clear  Rumination    Thought Form:  Coherent  Neurosis   Insight:    Good      Medication Review:   No changes to current psychiatric medication(s)     Medication Compliance:   Yes     Changes in Health Issues:   Went to ER for testing after experiencing shooting pain in arm     Chemical Use Review:   Substance Use: Chemical use reviewed, no active concerns identified ; no current alcohol use     Tobacco Use: No current tobacco use.      Diagnosis:  1. TEE (generalized anxiety  disorder)    2. Recurrent major depressive disorder, in partial remission (H)    3. Attention deficit hyperactivity disorder (ADHD), unspecified ADHD type      Note: There has been demonstrated improvement in functioning while patient has been engaged in psychotherapy/psychological service- if withdrawn the patient would deteriorate and/or relapse.     Collateral Reports Completed:  N/A    PLAN: (Patient Tasks / Therapist Tasks / Other)    Patient states that her goals for the next two weeks include:    1. Consider requesting a Saturday off from work to spend time either alone or with the whole family  2. Figure out how to get son in his own room  3. Take one step at a time and pay attention to personal health and wellness needs      Keesha Eller    ______________________________________________________________________                                                           [facility]    Individual Treatment Plan    Patient's Name: Ellyn Mcgee  YOB: 1975    Date of Creation: 2020  Date Treatment Plan Last Reviewed/Revised: 2023    DSM5 Diagnoses: 296.32 (F33.1) Major Depressive Disorder, Recurrent Episode, Moderate _ or 300.02 (F41.1) Generalized Anxiety Disorder (patient has previously been diagnosed with ADHD, hyperactive type)  Psychosocial / Contextual Factors: History of anxious attachment stemming from relationship with father (he  8 years ago); is currently pregnant (high-risk due to advanced maternal age); is in couples therapy with  due to frequent arguments and his emotional affair earlier in the year; financial stress; history of ADHD (hyperactive type).  PROMIS (reviewed every 90 days): PROMIS-10 Scores             Referral / Collaboration:  Care Coordination    Anticipated number of sessions for this episode of care: 60+  Anticipated frequency of session: Every 2-3 weeks  Anticipated duration of each session: 38-52 minutes  Treatment plan  "will be reviewed in 90 days or when goals have been changed.             Measurable Treatment Goal(s) related to diagnosis / functional impairment(s):  Patient is asking to focus treatment on her relationship with her father and past trauma.    Goal 1: Patient will tell full story of past trauma related to her relationship with her father and will identify how past feelings are impacting current relationships.    Objective #A (Patient Action)    Patient will identify how past feelings are impacting current relationships.  Status: Continued - Date(s): 1/26/2023    Intervention(s)  Therapist will role-play conflict management.    Objective #B  Patient will identify strengths and weaknesses within her family system of origin.  Status: Continued - Date(s): 1/26/2023    Intervention(s)  Therapist will assign homework for patient to create list.      Goal 2: Patient will learn about resilience, trauma responses, and Javon Servin's \"the story I'm making up\" (cognitive strategy to manage anxious thoughts).    Objective #A (Patient Action)    Patient will learn about and identify personal trauma responses.    Status: Completed - Date: 1/26/2023     Intervention(s)  Therapist will provide educational materials on trauma responses.    Objective #B  Patient will use cognitive strategies identified in therapy to challenge anxious thoughts.  Status: Continued - Date(s): 1/26/2023    Intervention(s)  Therapist will teach about Javon Servin's power of vulnerability and \"the story I'm making up\".      Goal 3: Patient will learn about protective factors that foster resilience and attachment styles and will identify how her attachment style drives her behavior.     Objective #A (Patient Action)    Status: Partially completed: 1/26/2023    Patient will learn about protective factors and will identify her own.    Intervention(s)  Therapist will complete with patient in session.    Objective #B  Patient will identify how her attachment " style drives her behavior.  Status: Completed - Date: 1/26/2023     Intervention(s)  Therapist will teach emotional regulation skills.        Patient agreed to the above plan.      Keesha Eller

## 2023-01-26 ENCOUNTER — VIRTUAL VISIT (OUTPATIENT)
Dept: PSYCHOLOGY | Facility: CLINIC | Age: 48
End: 2023-01-26
Payer: COMMERCIAL

## 2023-01-26 DIAGNOSIS — F41.1 GAD (GENERALIZED ANXIETY DISORDER): Primary | ICD-10-CM

## 2023-01-26 DIAGNOSIS — F33.41 RECURRENT MAJOR DEPRESSIVE DISORDER, IN PARTIAL REMISSION (H): ICD-10-CM

## 2023-01-26 DIAGNOSIS — F90.9 ATTENTION DEFICIT HYPERACTIVITY DISORDER (ADHD), UNSPECIFIED ADHD TYPE: ICD-10-CM

## 2023-01-26 PROCEDURE — 90837 PSYTX W PT 60 MINUTES: CPT | Mod: 95 | Performed by: MARRIAGE & FAMILY THERAPIST

## 2023-01-26 NOTE — PATIENT INSTRUCTIONS
Patient states that her goals for the next two weeks include:    Consider requesting a Saturday off from work to spend time either alone or with the whole family  Figure out how to get son in his own room  Take one step at a time and pay attention to personal health and wellness needs

## 2023-02-06 ENCOUNTER — VIRTUAL VISIT (OUTPATIENT)
Dept: PSYCHIATRY | Facility: CLINIC | Age: 48
End: 2023-02-06
Payer: COMMERCIAL

## 2023-02-06 DIAGNOSIS — G47.00 INSOMNIA, UNSPECIFIED TYPE: ICD-10-CM

## 2023-02-06 DIAGNOSIS — F33.41 RECURRENT MAJOR DEPRESSIVE DISORDER, IN PARTIAL REMISSION (H): ICD-10-CM

## 2023-02-06 DIAGNOSIS — F41.1 GAD (GENERALIZED ANXIETY DISORDER): Primary | ICD-10-CM

## 2023-02-06 DIAGNOSIS — F90.0 ATTENTION DEFICIT HYPERACTIVITY DISORDER (ADHD), PREDOMINANTLY INATTENTIVE TYPE: ICD-10-CM

## 2023-02-06 PROCEDURE — 99214 OFFICE O/P EST MOD 30 MIN: CPT | Mod: 95 | Performed by: PSYCHIATRY & NEUROLOGY

## 2023-02-06 NOTE — PROGRESS NOTES
"Telemedicine Visit: The patient's condition can be safely assessed and treated via synchronous audio and visual telemedicine encounter.      Reason for Telemedicine Visit: Patient has requested telehealth visit    Originating Site (Patient Location): Patient's home    Distant Location (provider location):  Off-site    Consent:  The patient/guardian has verbally consented to: the potential risks and benefits of telemedicine (video visit) versus in person care; bill my insurance or make self-payment for services provided; and responsibility for payment of non-covered services.     Mode of Communication:  Video Conference via [employer]    As the provider I attest to compliance with applicable laws and regulations related to telemedicine.        Outpatient Psychiatric Progress Note    Name: Ellyn Collado Everardo   : 1975                    Primary Care Provider: Dorothy Guaman DO   Therapist: BARBARA Schulte     PHQ 10/26/2022 2022 2022   PHQ-9 Total Score 8 7 4   Q9: Thoughts of better off dead/self-harm past 2 weeks Not at all Not at all Not at all   F/U: Thoughts of suicide or self-harm - - -   F/U: Safety concerns - - -   TEE-7 scores:  TEE-7 SCORE 2022 10/26/2022 2022   Total Score - - -   Total Score 10 (moderate anxiety) 6 (mild anxiety) 8 (mild anxiety)   Total Score 10 6 8       Patient Identification:  Patient is a 47 year old,   White Not  or  female  who presents for return visit with me.  Patient is currently employed part time but on maternity leave. Patient attended the phone/video session alone. Patient prefers to be called: \"Ellyn\".    Interim History:  I last saw Ellyn Collado Everardo for outpatient psychiatry Return Visit on 2023 During that appointment, we:      Increase Paxil/paroxetine CR for anxiety and mood: take 25 mg daily for 5 days while taking venlafaxine 75 mg daily.  Increase Paxil to 37.5 mg daily when venlafaxine is decreased to 37.5 " mg.    Continue lorazepam/Ativan 0.5-1 mg daily as needed for severe anxiety. To minimize exposure to infant try to breastfeed >4 hours after taking lorazepam.     Decrease venlafaxine/Effexor-XR: Take 75 mg daily for 5 days (while taking Paxil 25 mg) and then decrease to 37.5 mg (while taking Paxil 37.5 mg) for 1-2 weeks and then discontinue.    Discontinued propranolol since no longer using.    Continue Adderall XR 15 mg daily as needed for ADHD symptoms.  Discussed risks vs benefits of taking stimulant medication while lactating/breast-feeding.  Additional information previsously sent in [employer] message.    2/6: Patient overall with ongoing improvement of symptoms.  Mood and anxiety remain improved.  Still a little irritable.  Some emotionality lately as well.  Has been off Effexor-XR completely for about a week.  Overall though has been quite pleased with response to Paxil.  No acute safety concerns.  No SI.  No problematic drug or alcohol use.      Per [facility], Dr. Vincenzo Edward, during today's team-based visit:  Patient is prepilot and declines the enhanced model. No [facility] appointment.    Psychiatric ROS:  Ellyn Mcgee reports mood has been: Better  Anxiety has been: Better  Sleep has been: Up and down, difficulty still some due toddler  Isabel sxs: None  Psychosis sxs: None  ADHD/ADD sxs: Up-and-down  PTSD sxs: None  PHQ9 and GAD7 scores were reviewed today if completed.   Medication side effects: Denies  Current stressors include: See HPI above  Coping mechanisms and supports include: Therapy, Family, Hobbies and Friends    Current medications include:   Current Outpatient Medications   Medication Sig     amphetamine-dextroamphetamine (ADDERALL XR) 15 MG 24 hr capsule Take 1 capsule (15 mg) by mouth daily as needed (ADHD)     clindamycin (CLEOCIN T) 1 % external lotion Apply topically 2 times daily To affected areas in the axilla until clear.     ibuprofen (ADVIL/MOTRIN) 200 MG tablet Take 200-400 mg by mouth every 4  hours as needed for pain OTC     LORazepam (ATIVAN) 1 MG tablet Take 0.5-1 tablets (0.5-1 mg) by mouth daily as needed (anxiety and sleep)     PARoxetine (PAXIL-CR) 37.5 MG 24 hr tablet Take 1 tablet (37.5 mg) by mouth every morning     Prenatal Vit-Fe Fumarate-FA (PRENATAL PO)      triamcinolone (KENALOG) 0.025 % external ointment Apply topically 2 times daily To affected area in the axilla until clear.     venlafaxine (EFFEXOR) 75 MG tablet Take 75 mg by mouth 3 times daily     No current facility-administered medications for this visit.     [place] checked:   12/14/2022  1   12/14/2022  Lorazepam 1 MG Tablet  20.00  20 [address]au   [id number]   [facility] ([id number])   0/0  1.00 LME  [place]   06/21/2022  1   06/14/2022  Dextroamp-Amphet Er 15 MG Cap  30.00  30 Al Bau   [id number]   [place] ([id number])   0/0   [place]         Past Medical/Surgical History:  Past Medical History:   Diagnosis Date     Abnormal Pap smear     Colposcopy     Adjustment disorder with mixed anxiety and depressed mood 04/04/2012     Anemia     In Past     Anxiety      Chlamydia trachomatis infection of other specified site 1993     Depression      Depressive disorder 1979    Not diagnosed until college...later diagnosed with TEE     Fertility problem      Lyme disease 09/08/2010    ?false positive vs positve CMV - resolved     Moderate dysplasia of cervix 1995     Other and unspecified adverse effect of drug, medicinal and biological substance     insulin resistent     Varicosities      Wounds and injuries     fall down stairs, PT for back, pain meds      has a past medical history of Abnormal Pap smear, Adjustment disorder with mixed anxiety and depressed mood (04/04/2012), Anemia, Anxiety, Chlamydia trachomatis infection of other specified site (1993), Depression, Depressive disorder (1979), Fertility problem, Lyme disease (09/08/2010), Moderate dysplasia of cervix (1995), Other and unspecified adverse effect of drug, medicinal and biological substance,  Varicosities, and Wounds and injuries.    She has no past medical history of Arthritis, Asthma, Asymptomatic human immunodeficiency virus (HIV) infection status (H), Breast disorder, Cancer (H), Cerebral infarction (H), Chronic kidney disease, Complication of anesthesia, Congestive heart failure (H), COPD (chronic obstructive pulmonary disease) (H), Diabetes (H), Diabetes mellitus (H), Heart disease, Herpes simplex without mention of complication, History of blood transfusion, Hypertension, Liver disease, Postpartum depression, Rh incompatibility, Seizures (H), Sickle cell anemia (H), Systemic lupus erythematosus (H), Thyroid disease, or Uncomplicated asthma.    Social History:  Reviewed. No changes to social history except as noted in HPI above.     Vital Signs:   None. This is phone/video visit.     Labs:  Most recent laboratory results reviewed and the pertinent results include:   Hemoglobin 11.4 on 12/25/2020, AST and ALT within normal limits on 12/24/2020    Review of Systems:  10 systems (general, cardiovascular, respiratory, eyes, ENT, endocrine, GI, , M/S, neurological) were reviewed. Most pertinent finding(s) is/are: denies fever, cough, headaches, shortness of breath, chest pain, N/V, constipation/diarrhea, trouble urinating, aches and pains. The remaining systems are all unremarkable.    Mental Status Examination (limited as this is by phone/video):  Appearance: Awake, alert, appears stated age, no acute distress, well-groomed  Attitude:  Cooperative, pleasant  Motor: No obvious abnormalities observed via video, not formally tested  Oriented to:  person, place, time, and situation  Attention Span and Concentration:  normal  Speech:  clear, coherent, regular rate, rhythm, and volume  Language: intact  Mood: better  Affect: Overall appropriate and mood congruent  Associations:  no loose associations  Thought Process:  logical, linear and goal oriented  Thought Content:  no evidence of suicidal ideation or  homicidal ideation, no evidence of psychotic thought, no auditory hallucinations present and no visual hallucinations present  Recent and Remote Memory:  Intact to interview. Not formally assessed. No amnesia.  Fund of Knowledge: appropriate  Insight:  good  Judgment:  intact, adequate for safety  Impulse Control:  intact    Suicide Risk Assessment:  Today Ellyn Mcgee reports no suicidal ideation. Based on all available evidence including the factors cited above, Ellyn Mcgee does not appear to be at imminent risk for self-harm, does not meet criteria for a 72-hr hold, and therefore remains appropriate for ongoing outpatient level of care.  A thorough assessment of risk factors related to suicide and self-harm have been reviewed and are noted above. The patient convincingly denies suicidality on several occasions. Local community safety resources reviewed for patient to use if needed. There was no deceit detected, and the patient presented in a manner that was believable.     DSM5 Diagnosis:  Major Depressive Disorder, Recurrent Episode, In Partial Remission  300.02 (F41.1) Generalized Anxiety Disorder   ADHD, Unspecified  Insomnia-unspecified (mostly related to infant)    Medical comorbidities include:  Patient Active Problem List    Diagnosis Date Noted     Labor and delivery, indication for care 12/23/2020     Priority: Medium     Major depressive disorder, recurrent episode, moderate (H) 10/08/2020     Priority: Medium     High-risk pregnancy, elderly multigravida, unspecified trimester 06/05/2020     Priority: Medium     TEE (generalized anxiety disorder) 10/10/2018     Priority: Medium     Cervical radiculopathy 04/16/2018     Priority: Medium     Attention deficit hyperactivity disorder (ADHD), predominantly inattentive type 10/04/2017     Priority: Medium     Patient is followed by PREMA MARIEE for ongoing prescription of stimulants.  All refills should be approved by this provider, or covering  partner.    Medication(s): Concerta 27mg on weekend days and 36mg other days of the week  Maximum quantity per month: 30  Clinic visit frequency required: Q 6  months     Controlled substance agreement on file: Yes       Date(s): 10/4/17  Neuropsych evaluation for ADD completed:  Yes, completed 8/17/17, on file and diagnosis confirmed    Last [facility] website verification: 12/3/2019   [email or web address]           External hemorrhoids 04/25/2012     Priority: Medium     PCOS (polycystic ovarian syndrome) 02/22/2011     Priority: Medium     Hyperlipidemia LDL goal <130 09/05/2008     Priority: Medium     Anxiety state 09/05/2008     Priority: Medium     Problem list name updated by automated process. Provider to review    Patient is followed by PREMA MARIEE for ongoing prescription of benzodiazepines.  All refills should be approved by this provider, or covering partner.    Medication(s): Xanax.   Maximum quantity per month:   Clinic visit frequency required:      Controlled substance agreement on file: Yes       Date(s): 10/4/2017  Benzodiazepine use reviewed by psychiatry:      Last [facility] website verification:  done on 12/17/2018   [email or web address]             Psychosocial & Contextual Factors: See HPI above    Assessment:  Per Intake Note with me 9/8/20:  Ellyn Mcgee is a 45-year-old female who is 23 weeks pregnant with a past psychiatric history including depression, anxiety, and ADHD who presents today for psychiatric evaluation.  Her depression and anxiety date back several years and she has also had postpartum depression/anxiety with her now 8-year-old (she also has a 14-year-old but did not experience postpartum mental health issues at that time).  She started sertraline during her second pregnancy and then ended up being maintained on Paxil-CR for several years.  She is also more recently diagnosed with ADHD and maintained on Concerta.  She weaned off both Paxil and Concerta when she  found out she was pregnant and replaced these medications with her current regimen of Lexapro and Effexor-XR to decrease risk of fetal defects.  For the most part, she is feeling a little bit better on her current doses of Lexapro 10 mg and Effexor-XR 75 mg.  It is an unconventional combination as we discussed, but since she is doing quite well, I am hesitant to make many changes.  She feels as though her anxiety could be a little bit better controlled and so we will further increase Effexor-XR to 112.5 mg daily to be taken with her Lexapro 10 mg daily.  If she does a lot better, we can consider decreasing Lexapro to 5 mg daily. We discussed the risk of serotonin syndrome and she will continue to be vigilant for any signs or symptoms of this condition.  We did discuss the possibility of restarting a stimulant medication if necessary.  She does not feel as though her ADHD symptoms are too severe at this time.    4/20/21:   Today patient reports overall some ongoing ups and downs regarding her symptoms since last visit.  She is thinking much of it might be hormonal and related to sleep deprivation.  She still has not yet had her menses return since having her baby.  She has had some feelings of panic.  Used to take propranolol in the past when she felt this way.  She would like to start this medication again.  Discussed risks and benefits while breast-feeding.  Limited risks for taking low-dose propranolol while breast-feeding although the infant does have some exposure through breast milk.  Recommended taking propranolol at least 3-4 hours prior to breast-feeding to decrease risks.  No other medication changes at this time.    12/15/2021:   Patient has overall been feeling quite stable mood wise.  Has some ups and downs that are more related to feeling overwhelmed and exhausted regarding responsibilities for her children, home life, and work.  Does not feel depressed.  Feels like medications are working well.   Propranolol has been helpful.  No medication changes today.  No safety concerns or SI.  No problematic drug or alcohol use.    6/14/2022:  Patient overall struggling lately with mood, anxiety, ADHD symptoms.  Stopped Lexapro since felt like it was making her symptoms worse.  We have considered for quite some time about starting her back on an ADHD medication.  I think it is worth a trial at this time.  Discussed risks and benefits of a stimulant medication while breast-feeding.  She will likely be weaning soon.  Still has not been getting great sleep due to cosleeping and nighttime feedings.  No acute safety concerns.  No SI.  No problematic drug or alcohol use.  If she has too much irritability on Adderall, we could consider either going back to Concerta or a trial with Vyvanse.    12/14/2022:  Akanksha overall struggling more with anxiety and panic related symptoms.  Stress has been higher.  Patient wondering about starting Paxil-CR again.  Has tolerated well in the past.  We will add a low dose with venlafaxine.  We will also decrease venlafaxine some.  May continue cross taper/titration.  Lorazepam/Ativan provided for current acute symptoms and for any severe discomfort that may arise with this transition.  Therapy encouraged.  No acute safety concerns.  No SI.  No problematic drug or alcohol use.  Patient will be weaning her 2-year-old from breast-feeding.  We discussed risks and benefits of current medication regimen while breast-feeding.    1/5/2023:  Doing well on Paxil.  Symptoms improved quite quickly.  Tolerating well with no negative side effects.  Was able to decrease Effexor with ease as well.  We will continue tapering off Effexor and further optimizing Paxil.  Encouraged to continue in therapy.  No acute safety concerns.  No SI.  No problematic drug or alcohol use.    2/6/2023:  Patient overall doing relatively okay.  Symptoms of anxiety and depression have improved.  Irritability still lingering some.   Feeling a little emotional as well at times.  Some symptoms could be remaining from stopping venlafaxine.  We will wait increase Paxil and patient will observe her symptoms further/longer.  Patient can message in a few weeks if wants to increase Paxil.  No acute safety concerns.  No SI.  No problematic drug or alcohol use.    Medication side effects and alternatives were reviewed. Health promotion activities recommended and reviewed today. All questions addressed. Education and counseling completed regarding risks and benefits of medications and psychotherapy options. Recommend ongoing therapy for additional support.     Treatment Plan:    Continue Paxil/paroxetine CR for anxiety and mood: take 37.5 mg daily.  Message me in 2-3 weeks if you prefer to increase the dose (to 50 mg) as we discussed as a possibility, but deferred, today.    Continue lorazepam/Ativan 0.5-1 mg daily as needed for severe anxiety. To minimize exposure to infant try to breastfeed >4 hours after taking lorazepam.     Continue Adderall XR 15 mg daily as needed for ADHD symptoms.  Discussed risks vs benefits of taking stimulant medication while lactating/breast-feeding.  Additional information previsously sent in [employer] message.    Continue all other medications as reviewed per electronic medical record today.     Safety plan reviewed. To the Emergency Department as needed or call after hours crisis line at [phone] or [phone]. [place] Crisis Text Line. Text [place] to 820178 or Suicide LifeLine Chat: suicidepreventionlifeline.org/chat    Continue therapy as planned.     Schedule an appointment with me in 2 months or sooner as needed.  Patient scheduled today.    Follow up with primary care provider as planned or for acute medical concerns.    Call the psychiatric nurse line with medication questions or concerns at [phone].    [employer] may be used to communicate with your provider, but this is not intended to be used for  "emergencies.    Risks of stimulant medication include, but not limited to, decreased appetite, risk of tics (and that they may be lasting), trouble sleeping, cardiac risks such as increased heart rate and blood pressure, and rare risk of sudden cardiac death.  Also risk of addiction/tolerance/dependence.    Risks of benzodiazepine (Ativan, Xanax, Klonopin, Valium, etc) use including, but not limited to, sedation, tolerance, risk for addiction/dependence. Do not drink alcohol while taking benzodiazepines due to risk of trouble breathing and potential death. Do not drive or operate heavy machinery until it is known how the drug affects you. Discuss with physician or pharmacist before ever taking a benzodiazepine with a narcotic/opioid pain medication.     Therapy Resources:  Www.PsychologyToday.[employer]  Www.NAMIMN.org    [facility]- Psychiatric Disorders During Pregnancy  https://womenentalhealth.org/specialty-clinics/psychiatric-disorders-during-pregnancy    [employer]  [email or web address]    Administrative Billing:   Phone Call/Video Duration: 14 Minutes  Start: 11:44a  Stop: 11:58a    Patient Status:  Patient is a continuous/long-term care patient and refills will continue to come from this provider until otherwise noted.     Signed:   Sherlyn Wang DO  [facility] Psychiatry    This note was created with voice recognition software. Inadvertent grammatical errors, typographical errors, and \"sound-a-like\" substitutions may occur due to limitations of the software.  Read the note carefully and apply context when erroneous substitutions have occurred. Thank you.   "

## 2023-02-06 NOTE — Clinical Note
Please call this patient to get them scheduled for a follow-up visit in 2 months. Please schedule with ONLY me and NO [facility]. Thanks!

## 2023-02-06 NOTE — PATIENT INSTRUCTIONS
Treatment Plan:  Continue Paxil/paroxetine CR for anxiety and mood: take 37.5 mg daily.  Message me in 2-3 weeks if you prefer to increase the dose (to 50 mg) as we discussed as a possibility, but deferred, today.  Continue lorazepam/Ativan 0.5-1 mg daily as needed for severe anxiety. To minimize exposure to infant try to breastfeed >4 hours after taking lorazepam.   Continue Adderall XR 15 mg daily as needed for ADHD symptoms.  Discussed risks vs benefits of taking stimulant medication while lactating/breast-feeding.  Additional information previsously sent in [employer] message.  Continue all other medications as reviewed per electronic medical record today.   Safety plan reviewed. To the Emergency Department as needed or call after hours crisis line at [phone] or [phone]. [place] Crisis Text Line. Text [place] to 080327 or Suicide LifeLine Chat: suicidepreIronroad USAline.org/chat  Continue therapy as planned.   Schedule an appointment with me in 2 months or sooner as needed.  Patient scheduled today.  Follow up with primary care provider as planned or for acute medical concerns.  Call the psychiatric nurse line with medication questions or concerns at [phone].  [employer] may be used to communicate with your provider, but this is not intended to be used for emergencies.    Risks of stimulant medication include, but not limited to, decreased appetite, risk of tics (and that they may be lasting), trouble sleeping, cardiac risks such as increased heart rate and blood pressure, and rare risk of sudden cardiac death.  Also risk of addiction/tolerance/dependence.    Risks of benzodiazepine (Ativan, Xanax, Klonopin, Valium, etc) use including, but not limited to, sedation, tolerance, risk for addiction/dependence. Do not drink alcohol while taking benzodiazepines due to risk of trouble breathing and potential death. Do not drive or operate heavy machinery until it is known how the drug affects you. Discuss with  physician or pharmacist before ever taking a benzodiazepine with a narcotic/opioid pain medication.     Therapy Resources:  Www.PsychologyToday.com  Www.NAMIMN.org    [facility]- Psychiatric Disorders During Pregnancy  https://womensmentalhealth.org/specialty-clinics/psychiatric-disorders-during-pregnancy    [employer]  [email or web address]

## 2023-02-08 ENCOUNTER — VIRTUAL VISIT (OUTPATIENT)
Dept: PSYCHOLOGY | Facility: OTHER | Age: 48
End: 2023-02-08
Payer: COMMERCIAL

## 2023-02-08 DIAGNOSIS — F90.9 ATTENTION DEFICIT HYPERACTIVITY DISORDER (ADHD), UNSPECIFIED ADHD TYPE: ICD-10-CM

## 2023-02-08 DIAGNOSIS — F33.41 RECURRENT MAJOR DEPRESSIVE DISORDER, IN PARTIAL REMISSION (H): ICD-10-CM

## 2023-02-08 DIAGNOSIS — F41.1 GAD (GENERALIZED ANXIETY DISORDER): Primary | ICD-10-CM

## 2023-02-08 PROCEDURE — 90837 PSYTX W PT 60 MINUTES: CPT | Mod: VID | Performed by: MARRIAGE & FAMILY THERAPIST

## 2023-02-08 NOTE — PATIENT INSTRUCTIONS
Patient states that her goals for the next two weeks include:    Get to the gym and exercise at least 1x  Adjust availability at work to TH, F & Sat  Request one full weekend off from work  Continue to plan for gardening season

## 2023-02-08 NOTE — PROGRESS NOTES
[facility]                                               Progress Note    Patient Name: Ellyn Mcgee  Date: 2/8/2023         Service Type: Individual      Session Start Time: 2:07 p.m.  Session End Time: 3:05 p.m.     Session Length: 58 min.    Session #: 54 (with this writer; patient met previously for DA with [facility] and psychiatry)    Attendees: Client    Service Modality:  Video Visit:    Telemedicine Visit: The patient's condition can be safely assessed and treated via synchronous audio and visual telemedicine encounter.      Reason for Telemedicine Visit: Patient convenience (e.g. access to timely appointments / distance to available provider)    Originating Site (Patient Location): Patient's home    On-Site (Provider Location): [facility]: [place]    Consent:  The patient/guardian has verbally consented to: the potential risks and benefits of telemedicine (video visit) versus in person care; bill my insurance or make self-payment for services provided; and responsibility for payment of non-covered services.     Patient would like the video invitation sent by: Send to e-mail at: [email or web address]    Mode of Communication:  Video Conference via well    As the provider I attest to compliance with applicable laws and regulations related to telemedicine.      DATA  Interactive Complexity: No  Crisis: No       Progress Since Last Session (Related to Symptoms / Goals / Homework):  Symptoms: reports improvement in symptoms (continued) but notes some increase in depression and anxiety following her week off, as the state of her household has returned to normal.  Notes that she has been thinking about her relationship with her  and how it has evolved over time.     Homework: Achieved / completed to satisfaction      Episode of Care Goals: Satisfactory progress - ACTION (Actively working towards change); Intervened by reinforcing change plan / affirming steps  "taken     Current / Ongoing Stressors and Concerns:  Feeling overwhelmed by organizing home and advocating for children's needs;  and daughter are on autism spectrum    Older daughter is experiencing dizziness/lethargy and was dx with POTS; some financial and friend-related stress; is interested in a deeper dive into emotional state and core beliefs through [facility]; new son born 2020; roommate/former friend was finally evicted from their home (lived there for 3-4 years and did not paid any rent for over 1-2 years); pt has been experiencing mild panic attacks; daughter (age 10) just started ADHD medication; history of anxious attachment stemming from relationship with father (he  8 years ago); is currently pregnant (high-risk due to advanced maternal age) and baby is due Dec. 2020; is in couples therapy with  due to frequent arguments and his emotional affair earlier in the year; financial stress; history of ADHD (hyperactive type).     Treatment Objective(s) Addressed in This Session:  Identified insecurities and how they affect patient's thoughts and actions now and in the past  will identify how past feelings are impacting current relationships  Identified and processed recent triggers for anxiety and sadness (relationship)  Self-care and barriers to this  Discussed family system issues  Participating in enjoyable activities and connecting with social and family supports  Discussed healthy boundaries and enforcement    Past/future:  Discussed work/life balance and current purpose  Addressed possible solutions to insurance and financial concerns  Emotional differentiation  Grounding and mindfulness  Identified personal strengths within friendships  Natali research  Identified strengths as a mother and need for positive affirmations  Management of anger  Dealing with \"big emotions\"  Self-compassion and radical acceptance  Triggers for anxiety and insecurities  tell full story of past relational " issues/trust/infidelity  use cognitive strategies identified in therapy to challenge anxious thoughts   Body appreciation  Relationship repair  Discussed ambiguous loss and grief in a box analogy  Anticipatory grief and creating meaning  10:10:10 rule (10 minutes, 10 months, and 10 years)  Feeling unappreciated and the 5:1 ratio (Natali)  Solution focused: how to prioritize and cope with interrupted sleep  Javon Servin's elements of trust and relationship repair   identify how attachment style drives patient's and 's behaviors  tell full story of past trauma related to her relationship with her father (and current roommate issues)     Intervention:  Solution-Focused  Family systems theory  CBT: connect thoughts, feelings, and actions   Narrative Therapy: separate problem from person and find the bright spots     Past/future:  Management of anger and when to step away/disengage  Natali research    Assessments completed prior to visit:  PHQ9:   PHQ-9 SCORE 7/6/2022 8/9/2022 9/20/2022 10/26/2022 12/7/2022 12/20/2022 2/8/2023   PHQ-9 Total Score - - - - - - -   PHQ-9 Total Score MyChart 8 (Mild depression) 5 (Mild depression) 8 (Mild depression) 8 (Mild depression) 7 (Mild depression) 4 (Minimal depression) 6 (Mild depression)   PHQ-9 Total Score 8 5 8 8 7 4 6     GAD7:   TEE-7 SCORE 3/2/2022 4/21/2022 6/14/2022 7/6/2022 9/20/2022 10/26/2022 12/7/2022   Total Score - - - - - - -   Total Score 9 (mild anxiety) 12 (moderate anxiety) 8 (mild anxiety) 10 (moderate anxiety) 10 (moderate anxiety) 6 (mild anxiety) 8 (mild anxiety)   Total Score 9 12 8 10 10 6 8     PROMIS 10-Global Health (all questions and answers displayed):   PROMIS 10 12/15/2021 3/15/2022 6/14/2022 7/6/2022 10/26/2022 2/6/2023 2/6/2023   In general, would you say your health is: Fair Fair Fair Fair Fair - Fair   In general, would you say your quality of life is: Fair Good Fair Fair Fair - Fair   In general, how would you rate your physical health?  Fair Fair Fair Fair Fair - Fair   In general, how would you rate your mental health, including your mood and your ability to think? Good Good Fair Fair Fair - Fair   In general, how would you rate your satisfaction with your social activities and relationships? Fair Good Poor Fair Fair - Fair   In general, please rate how well you carry out your usual social activities and roles Fair Fair Good Fair Fair - Fair   To what extent are you able to carry out your everyday physical activities such as walking, climbing stairs, carrying groceries, or moving a chair? Completely Completely Completely Mostly Completely - Completely   How often have you been bothered by emotional problems such as feeling anxious, depressed or irritable? Sometimes Often Often Often Often - Sometimes   How would you rate your fatigue on average? Severe Moderate Moderate Moderate Moderate - Moderate   How would you rate your pain on average?   0 = No Pain  to  10 = Worst Imaginable Pain 5 7 7 8 3 - 6   In general, would you say your health is: 2 2 2 2 2 2 2   In general, would you say your quality of life is: 2 3 2 2 2 2 2   In general, how would you rate your physical health? 2 2 2 2 2 2 2   In general, how would you rate your mental health, including your mood and your ability to think? 3 3 2 2 2 2 2   In general, how would you rate your satisfaction with your social activities and relationships? 2 3 1 2 2 2 2   In general, please rate how well you carry out your usual social activities and roles. (This includes activities at home, at work and in your community, and responsibilities as a parent, child, spouse, employee, friend, etc.) 2 2 3 2 2 2 2   To what extent are you able to carry out your everyday physical activities such as walking, climbing stairs, carrying groceries, or moving a chair? 5 5 5 4 5 5 5   In the past 7 days, how often have you been bothered by emotional problems such as feeling anxious, depressed, or irritable? 3 4 4 4 4 3 3    In the past 7 days, how would you rate your fatigue on average? 4 3 3 3 3 3 3   In the past 7 days, how would you rate your pain on average, where 0 means no pain, and 10 means worst imaginable pain? 5 7 7 8 3 6 6   Global Mental Health Score 10 11 7 8 8 9 9   Global Physical Health Score 12 12 12 11 14 13 13   PROMIS TOTAL - SUBSCORES 22 23 19 19 22 22 22   Some recent data might be hidden        ASSESSMENT: Current Emotional / Mental Status (status of significant symptoms):   Risk status (Self / Other harm or suicidal ideation)   Patient denies current fears or concerns for personal safety.   Patient denies current or recent suicidal ideation or behaviors. Patient last reported feeling hopeless/passive ideation on 8/6/2020.   Patient denies current or recent homicidal ideation or behaviors.   Patient denies current or recent self injurious behavior or ideation.   Patient denies other safety concerns.   Patient reports there has been no change in risk factors since their last session.   Patient reports there has been no change in protective factors since their last session.   Recommended that patient call 911 or go to the local ED should there be a change in any of these risk factors.     Appearance:   Appropriate    Eye Contact:   Good    Psychomotor Behavior: Normal  Restless     Attitude:   Cooperative   Orientation:   All   Speech    Rate / Production: Normal/ Responsive Talkative    Volume:  Normal    Mood:    Anxious  Optimistic   Affect:    Appropriate  Expansive    Thought Content:  Clear  Rumination    Thought Form:  Coherent  Neurosis   Insight:    Good      Medication Review:   No changes to current psychiatric medication(s)     Medication Compliance:   Yes     Changes in Health Issues:   None     Recent: went to ER for testing after experiencing shooting pain in arm     Chemical Use Review:   Substance Use: Chemical use reviewed, no active concerns identified ; no current alcohol use     Tobacco Use:  No current tobacco use.      Diagnosis:  1. TEE (generalized anxiety disorder)    2. Recurrent major depressive disorder, in partial remission (H)    3. Attention deficit hyperactivity disorder (ADHD), unspecified ADHD type      Note: There has been demonstrated improvement in functioning while patient has been engaged in psychotherapy/psychological service- if withdrawn the patient would deteriorate and/or relapse.     Collateral Reports Completed:  N/A    PLAN: (Patient Tasks / Therapist Tasks / Other)    Patient states that her goals for the next two weeks include:    1. Get to the gym and exercise at least 1x  2. Adjust availability at work to TH, F & Sat  3. Request one full weekend off from work  4. Continue to plan for gardening season      Keesha SON Rafita    ______________________________________________________________________                                                           [facility]    Individual Treatment Plan    Patient's Name: Ellyn Mcgee  YOB: 1975    Date of Creation: 2020  Date Treatment Plan Last Reviewed/Revised: 2023    DSM5 Diagnoses: 296.32 (F33.1) Major Depressive Disorder, Recurrent Episode, Moderate _ or 300.02 (F41.1) Generalized Anxiety Disorder (patient has previously been diagnosed with ADHD, hyperactive type)  Psychosocial / Contextual Factors: History of anxious attachment stemming from relationship with father (he  8 years ago); is currently pregnant (high-risk due to advanced maternal age); is in couples therapy with  due to frequent arguments and his emotional affair earlier in the year; financial stress; history of ADHD (hyperactive type).  PROMIS (reviewed every 90 days): PROMIS-10 Scores  Global Mental Health Score: (P) 9  Global Physical Health Score: (P) 13   PROMIS TOTAL - SUBSCORES: (P) 22   Referral / Collaboration:  Care Coordination    Anticipated number of sessions for this episode of care:  "60+  Anticipated frequency of session: Every 2-3 weeks  Anticipated duration of each session: 38-52 minutes  Treatment plan will be reviewed in 90 days or when goals have been changed.             Measurable Treatment Goal(s) related to diagnosis / functional impairment(s):  Patient is asking to focus treatment on her relationship with her father and past trauma.    Goal 1: Patient will tell full story of past trauma related to her relationship with her father and will identify how past feelings are impacting current relationships.    Objective #A (Patient Action)    Patient will identify how past feelings are impacting current relationships.  Status: Continued - Date(s): 1/26/2023    Intervention(s)  Therapist will role-play conflict management.    Objective #B  Patient will identify strengths and weaknesses within her family system of origin.  Status: Continued - Date(s): 1/26/2023    Intervention(s)  Therapist will assign homework for patient to create list.      Goal 2: Patient will learn about resilience, trauma responses, and Javon Servin's \"the story I'm making up\" (cognitive strategy to manage anxious thoughts).    Objective #A (Patient Action)    Patient will learn about and identify personal trauma responses.    Status: Completed - Date: 1/26/2023     Intervention(s)  Therapist will provide educational materials on trauma responses.    Objective #B  Patient will use cognitive strategies identified in therapy to challenge anxious thoughts.  Status: Continued - Date(s): 1/26/2023    Intervention(s)  Therapist will teach about Javon Servin's power of vulnerability and \"the story I'm making up\".      Goal 3: Patient will learn about protective factors that foster resilience and attachment styles and will identify how her attachment style drives her behavior.     Objective #A (Patient Action)    Status: Partially completed: 1/26/2023    Patient will learn about protective factors and will identify her " own.    Intervention(s)  Therapist will complete with patient in session.    Objective #B  Patient will identify how her attachment style drives her behavior.  Status: Completed - Date: 1/26/2023     Intervention(s)  Therapist will teach emotional regulation skills.        Patient agreed to the above plan.      Keesha Eller

## 2023-02-21 NOTE — PROGRESS NOTES
[facility]                                               Progress Note    Patient Name: Ellyn Mcege  Date: 2/23/2023         Service Type: Individual      Session Start Time: 1:30 p.m.  Session End Time: 2:31 p.m.     Session Length: 61 min.    Session #: 55 (with this writer; patient met previously for DA with [facility] and psychiatry)    Attendees: Client    Service Modality:  Video Visit:    Telemedicine Visit: The patient's condition can be safely assessed and treated via synchronous audio and visual telemedicine encounter.      Reason for Telemedicine Visit: Patient convenience (e.g. access to timely appointments / distance to available provider)    Originating Site (Patient Location): Patient's home    On-Site (Provider Location): [facility]: [place]    Consent:  The patient/guardian has verbally consented to: the potential risks and benefits of telemedicine (video visit) versus in person care; bill my insurance or make self-payment for services provided; and responsibility for payment of non-covered services.     Patient would like the video invitation sent by: Send to e-mail at: [email or web address]    Mode of Communication:  Video Conference via Amwell    As the provider I attest to compliance with applicable laws and regulations related to telemedicine.      DATA  Interactive Complexity: No  Crisis: No       Progress Since Last Session (Related to Symptoms / Goals / Homework):  Symptoms: reports feeling insecure about her life decisions after conflict with a friend; some return of anxiety and depression.     Homework: Achieved / completed to satisfaction      Episode of Care Goals: Satisfactory progress - ACTION (Actively working towards change); Intervened by reinforcing change plan / affirming steps taken     Current / Ongoing Stressors and Concerns:  Feeling overwhelmed by organizing home and advocating for children's needs;  and daughter are on  "autism spectrum    Older daughter is experiencing dizziness/lethargy and was dx with POTS; some financial and friend-related stress; is interested in a deeper dive into emotional state and core beliefs through ACT; new son born 2020; roommate/former friend was finally evicted from their home (lived there for 3-4 years and did not paid any rent for over 1-2 years); pt has been experiencing mild panic attacks; daughter (age 10) just started ADHD medication; history of anxious attachment stemming from relationship with father (he  8 years ago); is currently pregnant (high-risk due to advanced maternal age) and baby is due Dec. 2020; is in couples therapy with  due to frequent arguments and his emotional affair earlier in the year; financial stress; history of ADHD (hyperactive type).     Treatment Objective(s) Addressed in This Session:  Identified insecurities and how they affect patient's thoughts and actions now and in the past  will identify how past feelings are impacting current relationships  Identified and processed recent triggers for anxiety and sadness (relationship)  Self-care and barriers to this  Discussed family system issues  Participating in enjoyable activities and connecting with social and family supports  Discussed healthy boundaries and enforcement  (continued)    Past/future:  Discussed work/life balance and current purpose  Addressed possible solutions to insurance and financial concerns  Emotional differentiation  Grounding and mindfulness  Identified personal strengths within friendships  Natali research  Identified strengths as a mother and need for positive affirmations  Management of anger  Dealing with \"big emotions\"  Self-compassion and radical acceptance  Triggers for anxiety and insecurities  tell full story of past relational issues/trust/infidelity  use cognitive strategies identified in therapy to challenge anxious thoughts   Body appreciation  Relationship " repair  Discussed ambiguous loss and grief in a box analogy  Anticipatory grief and creating meaning  10:10:10 rule (10 minutes, 10 months, and 10 years)  Feeling unappreciated and the 5:1 ratio (Natali)  Solution focused: how to prioritize and cope with interrupted sleep  Javon Servin's elements of trust and relationship repair   identify how attachment style drives patient's and 's behaviors  tell full story of past trauma related to her relationship with her father (and current roommate issues)     Intervention:  Solution-Focused  Family systems theory  CBT: connect thoughts, feelings, and actions   Narrative Therapy: separate problem from person and find the bright spots     Past/future:  Management of anger and when to step away/disengage  Natali research    Assessments completed prior to visit:  PHQ9:   PHQ-9 SCORE 7/6/2022 8/9/2022 9/20/2022 10/26/2022 12/7/2022 12/20/2022 2/8/2023   PHQ-9 Total Score - - - - - - -   PHQ-9 Total Score MyChart 8 (Mild depression) 5 (Mild depression) 8 (Mild depression) 8 (Mild depression) 7 (Mild depression) 4 (Minimal depression) 6 (Mild depression)   PHQ-9 Total Score 8 5 8 8 7 4 6     GAD7:   TEE-7 SCORE 3/2/2022 4/21/2022 6/14/2022 7/6/2022 9/20/2022 10/26/2022 12/7/2022   Total Score - - - - - - -   Total Score 9 (mild anxiety) 12 (moderate anxiety) 8 (mild anxiety) 10 (moderate anxiety) 10 (moderate anxiety) 6 (mild anxiety) 8 (mild anxiety)   Total Score 9 12 8 10 10 6 8     PROMIS 10-Global Health (all questions and answers displayed):   PROMIS 10 12/15/2021 3/15/2022 6/14/2022 7/6/2022 10/26/2022 2/6/2023 2/6/2023   In general, would you say your health is: Fair Fair Fair Fair Fair - Fair   In general, would you say your quality of life is: Fair Good Fair Fair Fair - Fair   In general, how would you rate your physical health? Fair Fair Fair Fair Fair - Fair   In general, how would you rate your mental health, including your mood and your ability to think? Good  Good Fair Fair Fair - Fair   In general, how would you rate your satisfaction with your social activities and relationships? Fair Good Poor Fair Fair - Fair   In general, please rate how well you carry out your usual social activities and roles Fair Fair Good Fair Fair - Fair   To what extent are you able to carry out your everyday physical activities such as walking, climbing stairs, carrying groceries, or moving a chair? Completely Completely Completely Mostly Completely - Completely   How often have you been bothered by emotional problems such as feeling anxious, depressed or irritable? Sometimes Often Often Often Often - Sometimes   How would you rate your fatigue on average? Severe Moderate Moderate Moderate Moderate - Moderate   How would you rate your pain on average?   0 = No Pain  to  10 = Worst Imaginable Pain 5 7 7 8 3 - 6   In general, would you say your health is: 2 2 2 2 2 2 2   In general, would you say your quality of life is: 2 3 2 2 2 2 2   In general, how would you rate your physical health? 2 2 2 2 2 2 2   In general, how would you rate your mental health, including your mood and your ability to think? 3 3 2 2 2 2 2   In general, how would you rate your satisfaction with your social activities and relationships? 2 3 1 2 2 2 2   In general, please rate how well you carry out your usual social activities and roles. (This includes activities at home, at work and in your community, and responsibilities as a parent, child, spouse, employee, friend, etc.) 2 2 3 2 2 2 2   To what extent are you able to carry out your everyday physical activities such as walking, climbing stairs, carrying groceries, or moving a chair? 5 5 5 4 5 5 5   In the past 7 days, how often have you been bothered by emotional problems such as feeling anxious, depressed, or irritable? 3 4 4 4 4 3 3   In the past 7 days, how would you rate your fatigue on average? 4 3 3 3 3 3 3   In the past 7 days, how would you rate your pain on  average, where 0 means no pain, and 10 means worst imaginable pain? 5 7 7 8 3 6 6   Global Mental Health Score 10 11 7 8 8 9 9   Global Physical Health Score 12 12 12 11 14 13 13   PROMIS TOTAL - SUBSCORES 22 23 19 19 22 22 22   Some recent data might be hidden        ASSESSMENT: Current Emotional / Mental Status (status of significant symptoms):   Risk status (Self / Other harm or suicidal ideation)   Patient denies current fears or concerns for personal safety.   Patient denies current or recent suicidal ideation or behaviors. Patient last reported feeling hopeless/passive ideation on 8/6/2020.   Patient denies current or recent homicidal ideation or behaviors.   Patient denies current or recent self injurious behavior or ideation.   Patient denies other safety concerns.   Patient reports there has been no change in risk factors since their last session.   Patient reports there has been no change in protective factors since their last session.   Recommended that patient call 911 or go to the local ED should there be a change in any of these risk factors.     Appearance:   Appropriate    Eye Contact:   Good    Psychomotor Behavior: Normal  Restless     Attitude:   Cooperative   Orientation:   All   Speech    Rate / Production: Normal/ Responsive Talkative    Volume:  Normal    Mood:    Anxious  Sad  Grieving   Affect:    Appropriate  Expansive    Thought Content:  Clear  Rumination    Thought Form:  Coherent  Neurosis   Insight:    Good      Medication Review:   No changes to current psychiatric medication(s)     Medication Compliance:   Yes     Changes in Health Issues:   None     Recent: went to ER for testing after experiencing shooting pain in arm     Chemical Use Review:   Substance Use: Chemical use reviewed, no active concerns identified ; no current alcohol use     Tobacco Use: No current tobacco use.      Diagnosis:  1. TEE (generalized anxiety disorder)    2. Recurrent major depressive disorder, in  partial remission (H)    3. Attention deficit hyperactivity disorder (ADHD), unspecified ADHD type      Note: There has been demonstrated improvement in functioning while patient has been engaged in psychotherapy/psychological service- if withdrawn the patient would deteriorate and/or relapse.     Collateral Reports Completed:  N/A    PLAN: (Patient Tasks / Therapist Tasks / Other)    Patient states that her goals for the next two weeks include:    1. Decide whether to increase medications or stay at current dose  2. Continue to make progress on house projects and decluttering  3. Try to cook      Keesha Eller    ______________________________________________________________________                                                           [facility]    Individual Treatment Plan    Patient's Name: Ellyn Mcgee  YOB: 1975    Date of Creation: 2020  Date Treatment Plan Last Reviewed/Revised: 2023    DSM5 Diagnoses: 296.32 (F33.1) Major Depressive Disorder, Recurrent Episode, Moderate _ or 300.02 (F41.1) Generalized Anxiety Disorder (patient has previously been diagnosed with ADHD, hyperactive type)  Psychosocial / Contextual Factors: History of anxious attachment stemming from relationship with father (he  8 years ago); is currently pregnant (high-risk due to advanced maternal age); is in couples therapy with  due to frequent arguments and his emotional affair earlier in the year; financial stress; history of ADHD (hyperactive type).  PROMIS (reviewed every 90 days): PROMIS-10 Scores             Referral / Collaboration:  Care Coordination    Anticipated number of sessions for this episode of care: 60+  Anticipated frequency of session: Every 2-3 weeks  Anticipated duration of each session: 38-52 minutes  Treatment plan will be reviewed in 90 days or when goals have been changed.             Measurable Treatment Goal(s) related to diagnosis / functional  "impairment(s):  Patient is asking to focus treatment on her relationship with her father and past trauma.    Goal 1: Patient will tell full story of past trauma related to her relationship with her father and will identify how past feelings are impacting current relationships.    Objective #A (Patient Action)    Patient will identify how past feelings are impacting current relationships.  Status: Continued - Date(s): 1/26/2023    Intervention(s)  Therapist will role-play conflict management.    Objective #B  Patient will identify strengths and weaknesses within her family system of origin.  Status: Continued - Date(s): 1/26/2023    Intervention(s)  Therapist will assign homework for patient to create list.      Goal 2: Patient will learn about resilience, trauma responses, and Javon Servin's \"the story I'm making up\" (cognitive strategy to manage anxious thoughts).    Objective #A (Patient Action)    Patient will learn about and identify personal trauma responses.    Status: Completed - Date: 1/26/2023     Intervention(s)  Therapist will provide educational materials on trauma responses.    Objective #B  Patient will use cognitive strategies identified in therapy to challenge anxious thoughts.  Status: Continued - Date(s): 1/26/2023    Intervention(s)  Therapist will teach about Javon Servin's power of vulnerability and \"the story I'm making up\".      Goal 3: Patient will learn about protective factors that foster resilience and attachment styles and will identify how her attachment style drives her behavior.     Objective #A (Patient Action)    Status: Partially completed: 1/26/2023    Patient will learn about protective factors and will identify her own.    Intervention(s)  Therapist will complete with patient in session.    Objective #B  Patient will identify how her attachment style drives her behavior.  Status: Completed - Date: 1/26/2023     Intervention(s)  Therapist will teach emotional regulation skills.  "       Patient agreed to the above plan.      Keesha Eller

## 2023-02-23 ENCOUNTER — VIRTUAL VISIT (OUTPATIENT)
Dept: PSYCHOLOGY | Facility: CLINIC | Age: 48
End: 2023-02-23
Payer: COMMERCIAL

## 2023-02-23 DIAGNOSIS — F90.9 ATTENTION DEFICIT HYPERACTIVITY DISORDER (ADHD), UNSPECIFIED ADHD TYPE: ICD-10-CM

## 2023-02-23 DIAGNOSIS — F33.41 RECURRENT MAJOR DEPRESSIVE DISORDER, IN PARTIAL REMISSION (H): ICD-10-CM

## 2023-02-23 DIAGNOSIS — F41.1 GAD (GENERALIZED ANXIETY DISORDER): Primary | ICD-10-CM

## 2023-02-23 PROCEDURE — 90837 PSYTX W PT 60 MINUTES: CPT | Mod: VID | Performed by: MARRIAGE & FAMILY THERAPIST

## 2023-02-23 NOTE — PATIENT INSTRUCTIONS
Patient states that her goals for the next two weeks include:    Decide whether to increase medications or stay at current dose  Continue to make progress on house projects and decluttering  Try to cook

## 2023-03-07 ENCOUNTER — LAB (OUTPATIENT)
Dept: FAMILY MEDICINE | Facility: CLINIC | Age: 48
End: 2023-03-07

## 2023-03-07 ENCOUNTER — OFFICE VISIT (OUTPATIENT)
Dept: FAMILY MEDICINE | Facility: CLINIC | Age: 48
End: 2023-03-07
Payer: COMMERCIAL

## 2023-03-07 VITALS
HEIGHT: 64 IN | HEART RATE: 68 BPM | BODY MASS INDEX: 31.89 KG/M2 | RESPIRATION RATE: 16 BRPM | SYSTOLIC BLOOD PRESSURE: 99 MMHG | WEIGHT: 186.8 LBS | OXYGEN SATURATION: 97 % | DIASTOLIC BLOOD PRESSURE: 65 MMHG | TEMPERATURE: 97.3 F

## 2023-03-07 DIAGNOSIS — Z12.11 SCREEN FOR COLON CANCER: ICD-10-CM

## 2023-03-07 DIAGNOSIS — F41.1 ANXIETY STATE: ICD-10-CM

## 2023-03-07 DIAGNOSIS — Z00.00 ROUTINE GENERAL MEDICAL EXAMINATION AT A HEALTH CARE FACILITY: ICD-10-CM

## 2023-03-07 DIAGNOSIS — F90.0 ATTENTION DEFICIT HYPERACTIVITY DISORDER (ADHD), PREDOMINANTLY INATTENTIVE TYPE: ICD-10-CM

## 2023-03-07 DIAGNOSIS — Z12.31 VISIT FOR SCREENING MAMMOGRAM: ICD-10-CM

## 2023-03-07 PROBLEM — O09.529 HIGH-RISK PREGNANCY, ELDERLY MULTIGRAVIDA, UNSPECIFIED TRIMESTER: Status: RESOLVED | Noted: 2020-06-05 | Resolved: 2023-03-07

## 2023-03-07 LAB
ALBUMIN SERPL BCG-MCNC: 4.7 G/DL (ref 3.5–5.2)
ALP SERPL-CCNC: 60 U/L (ref 35–104)
ALT SERPL W P-5'-P-CCNC: 31 U/L (ref 10–35)
ANION GAP SERPL CALCULATED.3IONS-SCNC: 12 MMOL/L (ref 7–15)
AST SERPL W P-5'-P-CCNC: 28 U/L (ref 10–35)
BILIRUB SERPL-MCNC: 0.2 MG/DL
BUN SERPL-MCNC: 11.3 MG/DL (ref 6–20)
CALCIUM SERPL-MCNC: 9.3 MG/DL (ref 8.6–10)
CHLORIDE SERPL-SCNC: 102 MMOL/L (ref 98–107)
CHOLEST SERPL-MCNC: 313 MG/DL
CREAT SERPL-MCNC: 0.69 MG/DL (ref 0.51–0.95)
DEPRECATED HCO3 PLAS-SCNC: 23 MMOL/L (ref 22–29)
GFR SERPL CREATININE-BSD FRML MDRD: >90 ML/MIN/1.73M2
GLUCOSE SERPL-MCNC: 138 MG/DL (ref 70–99)
HBA1C MFR BLD: 5.4 % (ref 0–5.6)
HDLC SERPL-MCNC: 72 MG/DL
LDLC SERPL CALC-MCNC: 221 MG/DL
NONHDLC SERPL-MCNC: 241 MG/DL
POTASSIUM SERPL-SCNC: 4.3 MMOL/L (ref 3.4–5.3)
PROT SERPL-MCNC: 7.4 G/DL (ref 6.4–8.3)
SODIUM SERPL-SCNC: 137 MMOL/L (ref 136–145)
TRIGL SERPL-MCNC: 102 MG/DL

## 2023-03-07 PROCEDURE — 80061 LIPID PANEL: CPT | Performed by: FAMILY MEDICINE

## 2023-03-07 PROCEDURE — 99396 PREV VISIT EST AGE 40-64: CPT | Mod: 25 | Performed by: FAMILY MEDICINE

## 2023-03-07 PROCEDURE — 90686 IIV4 VACC NO PRSV 0.5 ML IM: CPT | Performed by: FAMILY MEDICINE

## 2023-03-07 PROCEDURE — 90471 IMMUNIZATION ADMIN: CPT | Performed by: FAMILY MEDICINE

## 2023-03-07 PROCEDURE — 36415 COLL VENOUS BLD VENIPUNCTURE: CPT | Performed by: FAMILY MEDICINE

## 2023-03-07 PROCEDURE — 80053 COMPREHEN METABOLIC PANEL: CPT | Performed by: FAMILY MEDICINE

## 2023-03-07 PROCEDURE — 83036 HEMOGLOBIN GLYCOSYLATED A1C: CPT | Performed by: FAMILY MEDICINE

## 2023-03-07 RX ORDER — VIT C/B6/B5/MAGNESIUM/HERB 173 50-5-6-5MG
CAPSULE ORAL
COMMUNITY
Start: 2023-03-07

## 2023-03-07 ASSESSMENT — ENCOUNTER SYMPTOMS
HEMATOCHEZIA: 0
SORE THROAT: 0
HEMATURIA: 0
ARTHRALGIAS: 1
MYALGIAS: 1
CHILLS: 0
PARESTHESIAS: 0
FEVER: 0
CONSTIPATION: 0
DIARRHEA: 0
SHORTNESS OF BREATH: 0
DYSURIA: 0
DIZZINESS: 0
COUGH: 0
HEADACHES: 1
EYE PAIN: 0
HEARTBURN: 1
ABDOMINAL PAIN: 0
PALPITATIONS: 0
FREQUENCY: 1
WEAKNESS: 0
NAUSEA: 0
NERVOUS/ANXIOUS: 1
JOINT SWELLING: 0

## 2023-03-07 ASSESSMENT — ANXIETY QUESTIONNAIRES
GAD7 TOTAL SCORE: 5
1. FEELING NERVOUS, ANXIOUS, OR ON EDGE: MORE THAN HALF THE DAYS
IF YOU CHECKED OFF ANY PROBLEMS ON THIS QUESTIONNAIRE, HOW DIFFICULT HAVE THESE PROBLEMS MADE IT FOR YOU TO DO YOUR WORK, TAKE CARE OF THINGS AT HOME, OR GET ALONG WITH OTHER PEOPLE: SOMEWHAT DIFFICULT
5. BEING SO RESTLESS THAT IT IS HARD TO SIT STILL: NOT AT ALL
4. TROUBLE RELAXING: SEVERAL DAYS
3. WORRYING TOO MUCH ABOUT DIFFERENT THINGS: NOT AT ALL
8. IF YOU CHECKED OFF ANY PROBLEMS, HOW DIFFICULT HAVE THESE MADE IT FOR YOU TO DO YOUR WORK, TAKE CARE OF THINGS AT HOME, OR GET ALONG WITH OTHER PEOPLE?: SOMEWHAT DIFFICULT
7. FEELING AFRAID AS IF SOMETHING AWFUL MIGHT HAPPEN: NOT AT ALL
7. FEELING AFRAID AS IF SOMETHING AWFUL MIGHT HAPPEN: NOT AT ALL
2. NOT BEING ABLE TO STOP OR CONTROL WORRYING: SEVERAL DAYS
GAD7 TOTAL SCORE: 5
6. BECOMING EASILY ANNOYED OR IRRITABLE: SEVERAL DAYS
GAD7 TOTAL SCORE: 5

## 2023-03-07 ASSESSMENT — PAIN SCALES - GENERAL: PAINLEVEL: NO PAIN (1)

## 2023-03-07 NOTE — NURSING NOTE
Per orders of Dr. Guaman, injection of Flu given by Lady Hawkins RN. Prior to immunization administration, verified patients identity using patient s name and date of birth. Patient instructed to remain in clinic for 15 minutes afterwards, and to report any adverse reaction to me or clinic staff immediately.    Lady Hawkins RN on 3/7/2023 at 9:48 AM.    Please see Immunization Activity for additional information.

## 2023-03-07 NOTE — PROGRESS NOTES
SUBJECTIVE:   CC: Ellyn is an 47 year old who presents for preventive health visit.      Patient has been advised of split billing requirements and indicates understanding: Yes  Healthy Habits:     Getting at least 3 servings of Calcium per day:  NO    Bi-annual eye exam:  Yes    Dental care twice a year:  NO    Sleep apnea or symptoms of sleep apnea:  None    Diet:  Carbohydrate counting and Gluten-free/reduced    Frequency of exercise:  None    Taking medications regularly:  Yes    Medication side effects:  None    PHQ-2 Total Score: 1    Additional concerns today:  Yes  Answers for HPI/ROS submitted by the patient on 3/7/2023  If you checked off any problems, how difficult have these problems made it for you to do your work, take care of things at home, or get along with other people?: Somewhat difficult  PHQ9 TOTAL SCORE: 8  TEE 7 TOTAL SCORE: 5            -------------------------------------    Today's PHQ-2 Score:   PHQ-2 ( 1999 [employer]) 3/7/2023   Q1: Little interest or pleasure in doing things 0   Q2: Feeling down, depressed or hopeless 1   PHQ-2 Score 1   PHQ-2 Total Score (12-17 Years)- Positive if 3 or more points; Administer PHQ-A if positive -   Q1: Little interest or pleasure in doing things Not at all   Q2: Feeling down, depressed or hopeless Several days   PHQ-2 Score 1       Have you ever done Advance Care Planning? (For example, a Health Directive, POLST, or a discussion with a medical provider or your loved ones about your wishes):     Social History     Tobacco Use     Smoking status: Never     Smokeless tobacco: Never   Substance Use Topics     Alcohol use: Yes     Types: 1 Standard drinks or equivalent per week     Comment: Very Occasional          Alcohol Use 3/7/2023   Prescreen: >3 drinks/day or >7 drinks/week? No   No flowsheet data found.    Reviewed orders with patient.  Reviewed health maintenance and updated orders accordingly - Yes  Patient Active Problem List   Diagnosis      Hyperlipidemia LDL goal <130     Anxiety state     PCOS (polycystic ovarian syndrome)     External hemorrhoids     Attention deficit hyperactivity disorder (ADHD), predominantly inattentive type     TEE (generalized anxiety disorder)     Cervical radiculopathy     Major depressive disorder, recurrent episode, moderate (H)     Past Surgical History:   Procedure Laterality Date     BIOPSY  , , ,     mole on stomach and mole on back of thigh     CONIZATION CERVIX,KNIFE/LASER  1995     SURGICAL HISTORY OF -       [place]ills teeth       Social History     Tobacco Use     Smoking status: Never     Smokeless tobacco: Never   Substance Use Topics     Alcohol use: Yes     Types: 1 Standard drinks or equivalent per week     Comment: Very Occasional     Family History   Problem Relation Age of Onset     Cancer Mother         vocal cord cancer     Lipids Mother      Myocardial Infarction Mother      Hyperlipidemia Mother      Other Cancer Mother         throat     Depression Mother      Anxiety Disorder Mother      Melanoma Father      Cancer Father         B-cell lymphoma, melanoma     Lipids Father      Heart Disease Father         open heart surgery     Cerebrovascular Disease Father         mini strokes, multiple     Hyperlipidemia Father              Other Cancer Father         melanoma, b-cell lymphoma     Depression Father         undiagnosed     Anxiety Disorder Father         undiagnosed     Mental Illness Father         Undiagnosed Narcissitic Personality Disorder     Substance Abuse Father         alcohol abuse     Alcohol/Drug Maternal Grandfather      Substance Abuse Maternal Grandfather         Alcoholic     Diabetes Paternal Grandmother         think it was type II     Obesity Paternal Grandmother      Thyroid Disease Brother      Crohn's Disease Sister      Diabetes Paternal Uncle      Anxiety Disorder Paternal Half-Sister      Diabetes Other         paternal uncle has, not sure of type            Breast Cancer Screening:    FHS-7:   Breast CA Risk Assessment (FHS-7) 3/7/2023   Did any of your first-degree relatives have breast or ovarian cancer? Unknown   Did any of your relatives have bilateral breast cancer? Unknown   Did any man in your family have breast cancer? No   Did any woman in your family have breast and ovarian cancer? Unknown   Did any woman in your family have breast cancer before age 50 y? No   Do you have 2 or more relatives with breast and/or ovarian cancer? Yes   Do you have 2 or more relatives with breast and/or bowel cancer? Unknown       Mammogram Screening: Recommended annual mammography and BREASTFEEDING SO HELD OFF  Pertinent mammograms are reviewed under the imaging tab.    History of abnormal Pap smear: NO - age 30-65 PAP every 5 years with negative HPV co-testing recommended  PAP / HPV Latest Ref Rng & Units 2/9/2021 4/1/2016 6/25/2013   PAP (Historical) - OTHER-NIL, See Result NIL NIL   HPV16 NEG:Negative Negative Negative -   HPV18 NEG:Negative Negative Negative -   HRHPV NEG:Negative Negative Negative -     Reviewed and updated as needed this visit by clinical staff   Tobacco  Allergies  Meds  Problems  Med Hx  Surg Hx  Fam Hx          Reviewed and updated as needed this visit by Provider   Tobacco  Allergies  Meds  Problems  Med Hx  Surg Hx  Fam Hx             Review of Systems   Constitutional: Negative for chills and fever.   HENT: Negative for congestion, ear pain, hearing loss and sore throat.    Eyes: Negative for pain and visual disturbance.   Respiratory: Negative for cough and shortness of breath.    Cardiovascular: Negative for chest pain, palpitations and peripheral edema.   Gastrointestinal: Positive for heartburn. Negative for abdominal pain, constipation, diarrhea, hematochezia and nausea.   Genitourinary: Positive for frequency. Negative for dysuria, genital sores, hematuria and urgency.   Musculoskeletal: Positive for arthralgias and  "myalgias. Negative for joint swelling.   Skin: Negative for rash.   Neurological: Positive for headaches. Negative for dizziness, weakness and paresthesias.   Psychiatric/Behavioral: Negative for mood changes. The patient is nervous/anxious.           OBJECTIVE:   BP 99/65 (BP Location: Left arm, Patient Position: Sitting, Cuff Size: Adult Regular)   Pulse 68   Temp 97.3  F (36.3  C) (Temporal)   Resp 16   Ht 1.62 m (5' 3.78\")   Wt 84.7 kg (186 lb 12.8 oz)   LMP 02/17/2023 (Approximate)   SpO2 97%   Breastfeeding Yes   BMI 32.29 kg/m    Physical Exam  GENERAL: healthy, alert and no distress  EYES: Eyes grossly normal to inspection, PERRL and conjunctivae and sclerae normal  HENT: ear canals and TM's normal, nose and mouth without ulcers or lesions  NECK: no adenopathy, no asymmetry, masses, or scars and thyroid normal to palpation  RESP: lungs clear to auscultation - no rales, rhonchi or wheezes  BREAST: normal without masses, tenderness or nipple discharge and no palpable axillary masses or adenopathy  CV: regular rate and rhythm, normal S1 S2, no S3 or S4, no murmur, click or rub, no peripheral edema and peripheral pulses strong  ABDOMEN: soft, nontender, no hepatosplenomegaly, no masses and bowel sounds normal  MS: no gross musculoskeletal defects noted, no edema  SKIN: no suspicious lesions or rashes  NEURO: Normal strength and tone, mentation intact and speech normal  PSYCH: mentation appears normal, affect normal/bright    Diagnostic Test Results:  Labs reviewed in Epic    ASSESSMENT/PLAN:   (Z00.00) Routine general medical examination at a health care facility  Comment:    Plan: Lipid panel reflex to direct LDL Fasting,         Comprehensive metabolic panel (BMP + Alb, Alk         Phos, ALT, AST, Total. Bili, TP), Hemoglobin         A1c             (Z12.31) Visit for screening mammogram  Comment:    Plan:  Breastfeeding - will reach out for number when weaned     (Z12.11) Screen for colon " "cancer  Comment:    Plan: GARTH([employer])             (F90.0) Attention deficit hyperactivity disorder (ADHD), predominantly inattentive type  Comment:    Plan:      (F41.1) Anxiety state  Comment:    Plan:            COUNSELING:  Reviewed preventive health counseling, as reflected in patient instructions      BMI:   Estimated body mass index is 32.29 kg/m  as calculated from the following:    Height as of this encounter: 1.62 m (5' 3.78\").    Weight as of this encounter: 84.7 kg (186 lb 12.8 oz).   Weight management plan: Discussed healthy diet and exercise guidelines      She reports that she has never smoked. She has never used smokeless tobacco.       Dorothy Guaman, [facility]  "

## 2023-03-08 ENCOUNTER — MYC MEDICAL ADVICE (OUTPATIENT)
Dept: FAMILY MEDICINE | Facility: CLINIC | Age: 48
End: 2023-03-08
Payer: COMMERCIAL

## 2023-03-08 NOTE — RESULT ENCOUNTER NOTE
Dear Ellyn,   Your test results are all back -   Yikes -   Your cholesterol is too high as well as your glucose.  The average glucose over past 3 months is not in diabetes range but could be if this continues.  I want you to work on diet and exercise for the next 3 months and recheck both.  Let us know if you have any questions.  -Dorothy Guaman, DO

## 2023-03-09 NOTE — TELEPHONE ENCOUNTER
PN,  Please see below MyChart message and advise.  Patient had a physical done with you 3/7    Thanks,  Jack ROSARIO RN

## 2023-03-09 NOTE — PROGRESS NOTES
[facility]                                               Progress Note    Patient Name: Ellyn Mcgee  Date: 3/10/2023         Service Type: Individual      Session Start Time: 1:01 p.m.  Session End Time: 1:59 p.m.     Session Length: 58 min.    Session #: 56 (with this writer; patient met previously for DA with [facility] and psychiatry)    Attendees: Client    Service Modality:  Video Visit:    Telemedicine Visit: The patient's condition can be safely assessed and treated via synchronous audio and visual telemedicine encounter.      Reason for Telemedicine Visit: Patient convenience (e.g. access to timely appointments / distance to available provider)    Originating Site (Patient Location): Patient's home    On-Site (Provider Location): [facility]: [place]    Consent:  The patient/guardian has verbally consented to: the potential risks and benefits of telemedicine (video visit) versus in person care; bill my insurance or make self-payment for services provided; and responsibility for payment of non-covered services.     Patient would like the video invitation sent by: Send to e-mail at: [email or web address]    Mode of Communication:  Video Conference via well    As the provider I attest to compliance with applicable laws and regulations related to telemedicine.      DATA  Interactive Complexity: No  Crisis: No       Progress Since Last Session (Related to Symptoms / Goals / Homework):  Symptoms: reports getting poor sleep and is considering really trying to wean toddler son; also reports feeling less stressed and more secure in romantic relationship.     Homework: Achieved / completed to satisfaction      Episode of Care Goals: Satisfactory progress - ACTION (Actively working towards change); Intervened by reinforcing change plan / affirming steps taken     Current / Ongoing Stressors and Concerns:  Feeling overwhelmed by organizing home and advocating for children's  "needs;  and daughter are on autism spectrum    Older daughter is experiencing dizziness/lethargy and was dx with POTS; some financial and friend-related stress; is interested in a deeper dive into emotional state and core beliefs through [facility]; new son born 2020; roommate/former friend was finally evicted from their home (lived there for 3-4 years and did not paid any rent for over 1-2 years); pt has been experiencing mild panic attacks; daughter (age 10) just started ADHD medication; history of anxious attachment stemming from relationship with father (he  8 years ago); is currently pregnant (high-risk due to advanced maternal age) and baby is due Dec. 2020; is in couples therapy with  due to frequent arguments and his emotional affair earlier in the year; financial stress; history of ADHD (hyperactive type).     Treatment Objective(s) Addressed in This Session:  Solution focused: how to prioritize and cope with interrupted sleep  Identified insecurities and how they affect patient's thoughts and actions now and in the past  will identify how past feelings are impacting current relationships  Identified and processed recent triggers for anxiety and sadness (relationship)  Self-care and barriers to this  Discussed family system issues  Participating in enjoyable activities and connecting with social and family supports  Discussed healthy boundaries and enforcement    Past/future:  Discussed work/life balance and current purpose  Addressed possible solutions to insurance and financial concerns  Emotional differentiation  Grounding and mindfulness  Identified personal strengths within friendships  Natali research  Identified strengths as a mother and need for positive affirmations  Management of anger  Dealing with \"big emotions\"  Self-compassion and radical acceptance  Triggers for anxiety and insecurities  tell full story of past relational issues/trust/infidelity  use cognitive strategies " identified in therapy to challenge anxious thoughts   Body appreciation  Relationship repair  Discussed ambiguous loss and grief in a box analogy  Anticipatory grief and creating meaning  10:10:10 rule (10 minutes, 10 months, and 10 years)  Feeling unappreciated and the 5:1 ratio (Natali)  Javon Servin's elements of trust and relationship repair   identify how attachment style drives patient's and 's behaviors  tell full story of past trauma related to her relationship with her father (and current roommate issues)     Intervention:  Solution-Focused  Family systems theory  CBT: connect thoughts, feelings, and actions   Narrative Therapy: separate problem from person and find the bright spots     Past/future:  Management of anger and when to step away/disengage  Natali research    Assessments completed prior to visit:  PHQ9:   PHQ-9 SCORE 8/9/2022 9/20/2022 10/26/2022 12/7/2022 12/20/2022 2/8/2023 3/7/2023   PHQ-9 Total Score - - - - - - -   PHQ-9 Total Score MyChart 5 (Mild depression) 8 (Mild depression) 8 (Mild depression) 7 (Mild depression) 4 (Minimal depression) 6 (Mild depression) 8 (Mild depression)   PHQ-9 Total Score 5 8 8 7 4 6 8     GAD7:   TEE-7 SCORE 4/21/2022 6/14/2022 7/6/2022 9/20/2022 10/26/2022 12/7/2022 3/7/2023   Total Score - - - - - - -   Total Score 12 (moderate anxiety) 8 (mild anxiety) 10 (moderate anxiety) 10 (moderate anxiety) 6 (mild anxiety) 8 (mild anxiety) 5 (mild anxiety)   Total Score 12 8 10 10 6 8 5     PROMIS 10-Global Health (all questions and answers displayed):   PROMIS 10 12/15/2021 3/15/2022 6/14/2022 7/6/2022 10/26/2022 2/6/2023 2/6/2023   In general, would you say your health is: Fair Fair Fair Fair Fair - Fair   In general, would you say your quality of life is: Fair Good Fair Fair Fair - Fair   In general, how would you rate your physical health? Fair Fair Fair Fair Fair - Fair   In general, how would you rate your mental health, including your mood and your  ability to think? Good Good Fair Fair Fair - Fair   In general, how would you rate your satisfaction with your social activities and relationships? Fair Good Poor Fair Fair - Fair   In general, please rate how well you carry out your usual social activities and roles Fair Fair Good Fair Fair - Fair   To what extent are you able to carry out your everyday physical activities such as walking, climbing stairs, carrying groceries, or moving a chair? Completely Completely Completely Mostly Completely - Completely   How often have you been bothered by emotional problems such as feeling anxious, depressed or irritable? Sometimes Often Often Often Often - Sometimes   How would you rate your fatigue on average? Severe Moderate Moderate Moderate Moderate - Moderate   How would you rate your pain on average?   0 = No Pain  to  10 = Worst Imaginable Pain 5 7 7 8 3 - 6   In general, would you say your health is: 2 2 2 2 2 2 2   In general, would you say your quality of life is: 2 3 2 2 2 2 2   In general, how would you rate your physical health? 2 2 2 2 2 2 2   In general, how would you rate your mental health, including your mood and your ability to think? 3 3 2 2 2 2 2   In general, how would you rate your satisfaction with your social activities and relationships? 2 3 1 2 2 2 2   In general, please rate how well you carry out your usual social activities and roles. (This includes activities at home, at work and in your community, and responsibilities as a parent, child, spouse, employee, friend, etc.) 2 2 3 2 2 2 2   To what extent are you able to carry out your everyday physical activities such as walking, climbing stairs, carrying groceries, or moving a chair? 5 5 5 4 5 5 5   In the past 7 days, how often have you been bothered by emotional problems such as feeling anxious, depressed, or irritable? 3 4 4 4 4 3 3   In the past 7 days, how would you rate your fatigue on average? 4 3 3 3 3 3 3   In the past 7 days, how would  you rate your pain on average, where 0 means no pain, and 10 means worst imaginable pain? 5 7 7 8 3 6 6   Global Mental Health Score 10 11 7 8 8 9 9   Global Physical Health Score 12 12 12 11 14 13 13   PROMIS TOTAL - SUBSCORES 22 23 19 19 22 22 22   Some recent data might be hidden        ASSESSMENT: Current Emotional / Mental Status (status of significant symptoms):   Risk status (Self / Other harm or suicidal ideation)   Patient denies current fears or concerns for personal safety.   Patient denies current or recent suicidal ideation or behaviors. Patient last reported feeling hopeless/passive ideation on 8/6/2020.   Patient denies current or recent homicidal ideation or behaviors.   Patient denies current or recent self injurious behavior or ideation.   Patient denies other safety concerns.   Patient reports there has been no change in risk factors since their last session.   Patient reports there has been no change in protective factors since their last session.   Recommended that patient call 911 or go to the local ED should there be a change in any of these risk factors.     Appearance:   Appropriate    Eye Contact:   Good    Psychomotor Behavior: Normal  Restless     Attitude:   Cooperative   Orientation:   All   Speech    Rate / Production: Normal/ Responsive Talkative    Volume:  Normal    Mood:    Anxious      Affect:    Appropriate  Expansive    Thought Content:  Clear  Rumination    Thought Form:  Coherent  Flight of Ideas  Goal Directed  Neurosis   Insight:    Good      Medication Review:   Changes to psychiatric medications, see updated Medication List in EPIC.  Increased Paxil.     Medication Compliance:   Yes     Changes in Health Issues:   Reports having physical and having high LDL cholesterol and glucose     Recent: went to ER for testing after experiencing shooting pain in arm     Chemical Use Review:   Substance Use: Chemical use reviewed, no active concerns identified ; no current alcohol  use     Tobacco Use: No current tobacco use.      Diagnosis:  1. TEE (generalized anxiety disorder)    2. Recurrent major depressive disorder, in partial remission (H)      Note: There has been demonstrated improvement in functioning while patient has been engaged in psychotherapy/psychological service- if withdrawn the patient would deteriorate and/or relapse.     Collateral Reports Completed:  N/A    PLAN: (Patient Tasks / Therapist Tasks / Other)    Patient states that her goals for the next two weeks include:    1. Revisit articles about weaning a toddler  2. Add soluble fiber to diet  3. Continue to channel patience      Keesha Englishby    ______________________________________________________________________                                                           [facility]    Individual Treatment Plan    Patient's Name: Ellyn Mcgee  YOB: 1975    Date of Creation: 2020  Date Treatment Plan Last Reviewed/Revised: 2023    DSM5 Diagnoses: 296.32 (F33.1) Major Depressive Disorder, Recurrent Episode, Moderate _ or 300.02 (F41.1) Generalized Anxiety Disorder (patient has previously been diagnosed with ADHD, hyperactive type)  Psychosocial / Contextual Factors: History of anxious attachment stemming from relationship with father (he  8 years ago); is currently pregnant (high-risk due to advanced maternal age); is in couples therapy with  due to frequent arguments and his emotional affair earlier in the year; financial stress; history of ADHD (hyperactive type).  PROMIS (reviewed every 90 days): PROMIS-10 Scores             Referral / Collaboration:  Care Coordination    Anticipated number of sessions for this episode of care: 60+  Anticipated frequency of session: Every 2-3 weeks  Anticipated duration of each session: 38-52 minutes  Treatment plan will be reviewed in 90 days or when goals have been changed.             Measurable Treatment Goal(s) related to  "diagnosis / functional impairment(s):  Patient is asking to focus treatment on her relationship with her father and past trauma.    Goal 1: Patient will tell full story of past trauma related to her relationship with her father and will identify how past feelings are impacting current relationships.    Objective #A (Patient Action)    Patient will identify how past feelings are impacting current relationships.  Status: Continued - Date(s): 1/26/2023    Intervention(s)  Therapist will role-play conflict management.    Objective #B  Patient will identify strengths and weaknesses within her family system of origin.  Status: Continued - Date(s): 1/26/2023    Intervention(s)  Therapist will assign homework for patient to create list.      Goal 2: Patient will learn about resilience, trauma responses, and Javon Servin's \"the story I'm making up\" (cognitive strategy to manage anxious thoughts).    Objective #A (Patient Action)    Patient will learn about and identify personal trauma responses.    Status: Completed - Date: 1/26/2023     Intervention(s)  Therapist will provide educational materials on trauma responses.    Objective #B  Patient will use cognitive strategies identified in therapy to challenge anxious thoughts.  Status: Continued - Date(s): 1/26/2023    Intervention(s)  Therapist will teach about Javon Servin's power of vulnerability and \"the story I'm making up\".      Goal 3: Patient will learn about protective factors that foster resilience and attachment styles and will identify how her attachment style drives her behavior.     Objective #A (Patient Action)    Status: Partially completed: 1/26/2023    Patient will learn about protective factors and will identify her own.    Intervention(s)  Therapist will complete with patient in session.    Objective #B  Patient will identify how her attachment style drives her behavior.  Status: Completed - Date: 1/26/2023     Intervention(s)  Therapist will teach emotional " regulation skills.        Patient agreed to the above plan.      Keesha Eller

## 2023-03-10 ENCOUNTER — VIRTUAL VISIT (OUTPATIENT)
Dept: PSYCHOLOGY | Facility: OTHER | Age: 48
End: 2023-03-10
Payer: COMMERCIAL

## 2023-03-10 DIAGNOSIS — F33.41 RECURRENT MAJOR DEPRESSIVE DISORDER, IN PARTIAL REMISSION (H): ICD-10-CM

## 2023-03-10 DIAGNOSIS — F41.1 GAD (GENERALIZED ANXIETY DISORDER): Primary | ICD-10-CM

## 2023-03-10 PROCEDURE — 90837 PSYTX W PT 60 MINUTES: CPT | Mod: VID | Performed by: MARRIAGE & FAMILY THERAPIST

## 2023-03-10 NOTE — PATIENT INSTRUCTIONS
Patient states that her goals for the next two weeks include:    Revisit articles about weaning a toddler  Add soluble fiber to diet  Continue to channel patience

## 2023-03-22 NOTE — PROGRESS NOTES
[facility]                                               Progress Note    Patient Name: Ellyn Mcgee  Date: 3/23/2023         Service Type: Individual      Session Start Time: 1:31 p.m.  Session End Time: 2:34 p.m.     Session Length: 63 min. (patient needed additional processing time related to son needing attention during the session)    Session #: 57 (with this writer; patient met previously for DA with [facility] Elena Hill and psychiatry)    Attendees: Client and toddler son    Service Modality:  Video Visit:    Telemedicine Visit: The patient's condition can be safely assessed and treated via synchronous audio and visual telemedicine encounter.      Reason for Telemedicine Visit: Patient convenience (e.g. access to timely appointments / distance to available provider)    Originating Site (Patient Location): Patient's home    On-Site (Provider Location): [facility]: [place]    Consent:  The patient/guardian has verbally consented to: the potential risks and benefits of telemedicine (video visit) versus in person care; bill my insurance or make self-payment for services provided; and responsibility for payment of non-covered services.     Patient would like the video invitation sent by: Send to e-mail at: [email or web address]    Mode of Communication:  Video Conference via [employer]well    As the provider I attest to compliance with applicable laws and regulations related to telemedicine.      DATA  Interactive Complexity: No  Crisis: No       Progress Since Last Session (Related to Symptoms / Goals / Homework):  Symptoms: reports some improvements in relationships and being able to manage expectations.  Processed need for appreciation and balance.     Homework: Partially completed      Episode of Care Goals: Satisfactory progress - ACTION (Actively working towards change); Intervened by reinforcing change plan / affirming steps taken     Current / Ongoing Stressors and  "Concerns:  Feeling overwhelmed by organizing home and advocating for children's needs;  and daughter are on autism spectrum    Older daughter is experiencing dizziness/lethargy and was dx with POTS; some financial and friend-related stress; is interested in a deeper dive into emotional state and core beliefs through ACT; new son born 2020; roommate/former friend was finally evicted from their home (lived there for 3-4 years and did not paid any rent for over 1-2 years); pt has been experiencing mild panic attacks; daughter (age 10) just started ADHD medication; history of anxious attachment stemming from relationship with father (he  8 years ago); is currently pregnant (high-risk due to advanced maternal age) and baby is due Dec. 2020; is in couples therapy with  due to frequent arguments and his emotional affair earlier in the year; financial stress; history of ADHD (hyperactive type).     Treatment Objective(s) Addressed in This Session:  Needs for appreciation and validation  Emotional differentiation  Grounding and mindfulness  will identify how past feelings are impacting current relationships  Identified and processed recent triggers for anxiety and sadness (relationship)  Self-care and barriers to this  Discussed family system issues  Participating in enjoyable activities and connecting with social and family supports  Discussed healthy boundaries and enforcement    Past/future:  Identified insecurities and how they affect patient's thoughts and actions now and in the past  Solution focused: how to prioritize and cope with interrupted sleep  Discussed work/life balance and current purpose  Addressed possible solutions to insurance and financial concerns  Identified personal strengths within friendships  Natali research  Identified strengths as a mother and need for positive affirmations  Management of anger  Dealing with \"big emotions\"  Self-compassion and radical acceptance  Triggers " for anxiety and insecurities  tell full story of past relational issues/trust/infidelity  use cognitive strategies identified in therapy to challenge anxious thoughts   Body appreciation  Relationship repair  Discussed ambiguous loss and grief in a box analogy  Anticipatory grief and creating meaning  10:10:10 rule (10 minutes, 10 months, and 10 years)  Feeling unappreciated and the 5:1 ratio (Natali)  Javon Servin's elements of trust and relationship repair   identify how attachment style drives patient's and 's behaviors  tell full story of past trauma related to her relationship with her father (and current roommate issues)     Intervention:  Solution-Focused  Family systems theory  CBT: connect thoughts, feelings, and actions   Narrative Therapy: separate problem from person and find the bright spots     Past/future:  Management of anger and when to step away/disengage  Natali research    Assessments completed prior to visit:  PHQ9:   PHQ-9 SCORE 8/9/2022 9/20/2022 10/26/2022 12/7/2022 12/20/2022 2/8/2023 3/7/2023   PHQ-9 Total Score - - - - - - -   PHQ-9 Total Score MyChart 5 (Mild depression) 8 (Mild depression) 8 (Mild depression) 7 (Mild depression) 4 (Minimal depression) 6 (Mild depression) 8 (Mild depression)   PHQ-9 Total Score 5 8 8 7 4 6 8     GAD7:   TEE-7 SCORE 4/21/2022 6/14/2022 7/6/2022 9/20/2022 10/26/2022 12/7/2022 3/7/2023   Total Score - - - - - - -   Total Score 12 (moderate anxiety) 8 (mild anxiety) 10 (moderate anxiety) 10 (moderate anxiety) 6 (mild anxiety) 8 (mild anxiety) 5 (mild anxiety)   Total Score 12 8 10 10 6 8 5     PROMIS 10-Global Health (all questions and answers displayed):   PROMIS 10 12/15/2021 3/15/2022 6/14/2022 7/6/2022 10/26/2022 2/6/2023 2/6/2023   In general, would you say your health is: Fair Fair Fair Fair Fair - Fair   In general, would you say your quality of life is: Fair Good Fair Fair Fair - Fair   In general, how would you rate your physical health? Fair  Fair Fair Fair Fair - Fair   In general, how would you rate your mental health, including your mood and your ability to think? Good Good Fair Fair Fair - Fair   In general, how would you rate your satisfaction with your social activities and relationships? Fair Good Poor Fair Fair - Fair   In general, please rate how well you carry out your usual social activities and roles Fair Fair Good Fair Fair - Fair   To what extent are you able to carry out your everyday physical activities such as walking, climbing stairs, carrying groceries, or moving a chair? Completely Completely Completely Mostly Completely - Completely   How often have you been bothered by emotional problems such as feeling anxious, depressed or irritable? Sometimes Often Often Often Often - Sometimes   How would you rate your fatigue on average? Severe Moderate Moderate Moderate Moderate - Moderate   How would you rate your pain on average?   0 = No Pain  to  10 = Worst Imaginable Pain 5 7 7 8 3 - 6   In general, would you say your health is: 2 2 2 2 2 2 2   In general, would you say your quality of life is: 2 3 2 2 2 2 2   In general, how would you rate your physical health? 2 2 2 2 2 2 2   In general, how would you rate your mental health, including your mood and your ability to think? 3 3 2 2 2 2 2   In general, how would you rate your satisfaction with your social activities and relationships? 2 3 1 2 2 2 2   In general, please rate how well you carry out your usual social activities and roles. (This includes activities at home, at work and in your community, and responsibilities as a parent, child, spouse, employee, friend, etc.) 2 2 3 2 2 2 2   To what extent are you able to carry out your everyday physical activities such as walking, climbing stairs, carrying groceries, or moving a chair? 5 5 5 4 5 5 5   In the past 7 days, how often have you been bothered by emotional problems such as feeling anxious, depressed, or irritable? 3 4 4 4 4 3 3   In  the past 7 days, how would you rate your fatigue on average? 4 3 3 3 3 3 3   In the past 7 days, how would you rate your pain on average, where 0 means no pain, and 10 means worst imaginable pain? 5 7 7 8 3 6 6   Global Mental Health Score 10 11 7 8 8 9 9   Global Physical Health Score 12 12 12 11 14 13 13   PROMIS TOTAL - SUBSCORES 22 23 19 19 22 22 22   Some recent data might be hidden        ASSESSMENT: Current Emotional / Mental Status (status of significant symptoms):   Risk status (Self / Other harm or suicidal ideation)   Patient denies current fears or concerns for personal safety.   Patient denies current or recent suicidal ideation or behaviors. Patient last reported feeling hopeless/passive ideation on 8/6/2020.   Patient denies current or recent homicidal ideation or behaviors.   Patient denies current or recent self injurious behavior or ideation.   Patient denies other safety concerns.   Patient reports there has been no change in risk factors since their last session.   Patient reports there has been no change in protective factors since their last session.   Recommended that patient call 911 or go to the local ED should there be a change in any of these risk factors.     Appearance:   Appropriate    Eye Contact:   Good    Psychomotor Behavior: Normal  Restless     Attitude:   Cooperative   Orientation:   All   Speech    Rate / Production: Normal/ Responsive Talkative    Volume:  Normal    Mood:    Anxious      Affect:    Appropriate  Expansive    Thought Content:  Clear  Rumination    Thought Form:  Coherent  Flight of Ideas  Goal Directed  Neurosis   Insight:    Good      Medication Review:   No changes to current psychiatric medication(s) Recently increased Paxil.     Medication Compliance:   Yes     Changes in Health Issues:   None     Recent:   Reports having physical and having high LDL cholesterol and glucose  went to ER for testing after experiencing shooting pain in arm     Chemical Use  Review:   Substance Use: Chemical use reviewed, no active concerns identified ; no current alcohol use     Tobacco Use: No current tobacco use.      Diagnosis:  1. TEE (generalized anxiety disorder)    2. Recurrent major depressive disorder, in partial remission (H)    3. Attention deficit hyperactivity disorder (ADHD), unspecified ADHD type      Note: There has been demonstrated improvement in functioning while patient has been engaged in psychotherapy/psychological service- if withdrawn the patient would deteriorate and/or relapse.     Collateral Reports Completed:  N/A    PLAN: (Patient Tasks / Therapist Tasks / Other)    Patient states that her goals for the next two weeks include:    1. Continue to participate in social events  2. Start seeds for the garden  3. Clean the chicken coop and find ways to get outside  4. Try to find time to go to the gym  5. Homework: ask for needs to be met and find ways to be your own best source of appreciation (practice self-affirmations)      Keesha Eller    ______________________________________________________________________                                                           [facility]    Individual Treatment Plan    Patient's Name: Ellyn Mcgee  YOB: 1975    Date of Creation: 2020  Date Treatment Plan Last Reviewed/Revised: 2023    DSM5 Diagnoses: 296.32 (F33.1) Major Depressive Disorder, Recurrent Episode, Moderate _ or 300.02 (F41.1) Generalized Anxiety Disorder (patient has previously been diagnosed with ADHD, hyperactive type)  Psychosocial / Contextual Factors: History of anxious attachment stemming from relationship with father (he  8 years ago); is currently pregnant (high-risk due to advanced maternal age); is in couples therapy with  due to frequent arguments and his emotional affair earlier in the year; financial stress; history of ADHD (hyperactive type).  PROMIS (reviewed every 90 days):  "PROMIS-10 Scores             Referral / Collaboration:  Care Coordination    Anticipated number of sessions for this episode of care: 60+  Anticipated frequency of session: Every 2-3 weeks  Anticipated duration of each session: 38-52 minutes  Treatment plan will be reviewed in 90 days or when goals have been changed.             Measurable Treatment Goal(s) related to diagnosis / functional impairment(s):  Patient is asking to focus treatment on her relationship with her father and past trauma.    Goal 1: Patient will tell full story of past trauma related to her relationship with her father and will identify how past feelings are impacting current relationships.    Objective #A (Patient Action)    Patient will identify how past feelings are impacting current relationships.  Status: Continued - Date(s): 1/26/2023    Intervention(s)  Therapist will role-play conflict management.    Objective #B  Patient will identify strengths and weaknesses within her family system of origin.  Status: Continued - Date(s): 1/26/2023    Intervention(s)  Therapist will assign homework for patient to create list.      Goal 2: Patient will learn about resilience, trauma responses, and Javon Servin's \"the story I'm making up\" (cognitive strategy to manage anxious thoughts).    Objective #A (Patient Action)    Patient will learn about and identify personal trauma responses.    Status: Completed - Date: 1/26/2023     Intervention(s)  Therapist will provide educational materials on trauma responses.    Objective #B  Patient will use cognitive strategies identified in therapy to challenge anxious thoughts.  Status: Continued - Date(s): 1/26/2023    Intervention(s)  Therapist will teach about Javon Servin's power of vulnerability and \"the story I'm making up\".      Goal 3: Patient will learn about protective factors that foster resilience and attachment styles and will identify how her attachment style drives her behavior.     Objective #A " (Patient Action)    Status: Partially completed: 1/26/2023    Patient will learn about protective factors and will identify her own.    Intervention(s)  Therapist will complete with patient in session.    Objective #B  Patient will identify how her attachment style drives her behavior.  Status: Completed - Date: 1/26/2023     Intervention(s)  Therapist will teach emotional regulation skills.        Patient agreed to the above plan.      Keesha Eller

## 2023-03-23 ENCOUNTER — VIRTUAL VISIT (OUTPATIENT)
Dept: PSYCHOLOGY | Facility: CLINIC | Age: 48
End: 2023-03-23
Payer: COMMERCIAL

## 2023-03-23 ENCOUNTER — MYC MEDICAL ADVICE (OUTPATIENT)
Dept: PSYCHIATRY | Facility: CLINIC | Age: 48
End: 2023-03-23
Payer: COMMERCIAL

## 2023-03-23 DIAGNOSIS — F33.41 RECURRENT MAJOR DEPRESSIVE DISORDER, IN PARTIAL REMISSION (H): ICD-10-CM

## 2023-03-23 DIAGNOSIS — F90.9 ATTENTION DEFICIT HYPERACTIVITY DISORDER (ADHD), UNSPECIFIED ADHD TYPE: ICD-10-CM

## 2023-03-23 DIAGNOSIS — F41.1 GAD (GENERALIZED ANXIETY DISORDER): ICD-10-CM

## 2023-03-23 DIAGNOSIS — F41.1 GAD (GENERALIZED ANXIETY DISORDER): Primary | ICD-10-CM

## 2023-03-23 PROCEDURE — 90837 PSYTX W PT 60 MINUTES: CPT | Mod: VID | Performed by: MARRIAGE & FAMILY THERAPIST

## 2023-03-23 RX ORDER — PAROXETINE HYDROCHLORIDE HEMIHYDRATE 37.5 MG/1
37.5 TABLET, FILM COATED, EXTENDED RELEASE ORAL EVERY MORNING
Qty: 90 TABLET | Refills: 3 | Status: SHIPPED | OUTPATIENT
Start: 2023-03-23 | End: 2023-10-13 | Stop reason: DRUGHIGH

## 2023-03-23 NOTE — TELEPHONE ENCOUNTER
Patient sending update about her increased paxil dose. She said the 50 mg was too much and made her jittery. Went back to 37.5 mg.     From 3/1 encounter: she doesn't feel the paxil is quite strong enough and now would like to increase the dose. She had said at last visit that she didn't want to but has changed her mind.

## 2023-03-23 NOTE — PATIENT INSTRUCTIONS
Patient states that her goals for the next two weeks include:    Continue to participate in social events  Start seeds for the garden  Clean the chicken coop and find ways to get outside  Try to find time to go to the gym  Homework: ask for needs to be met and find ways to be your own best source of appreciation (practice self-affirmations)

## 2023-03-30 NOTE — TELEPHONE ENCOUNTER
PN,    Controlled Substance Refill Request for Xanax    Last refill: not filled within the past year - looks like last Rx from 2015    Last clinic visit: 12/19/2017 phone visit     Clinic visit frequency required: not documented  Next appt: none    Controlled substance agreement on file: Yes:  Date 10/4/2017.    Documentation in problem list reviewed:  started, needs to be completed    Processing:  Fax Rx to [facility]    [place]:  reviewed- no concerns  [place]:  [email or web address]    Please authorize if appropriate.  Thanks,  Keeley BROWNING RN       [phone]

## 2023-04-05 NOTE — PROGRESS NOTES
[facility]                                               Progress Note    Patient Name: Ellyn Mcgee  Date: 4/6/2023         Service Type: Individual      Session Start Time: 10:32 a.m.  Session End Time: 11:29 a.m.     Session Length: 57 min.    Session #: 58 (with this writer; patient met previously for DA with [facility] [place] and psychiatry)    Attendees: Client    Service Modality:  Video Visit:    Telemedicine Visit: The patient's condition can be safely assessed and treated via synchronous audio and visual telemedicine encounter.      Reason for Telemedicine Visit: Patient convenience (e.g. access to timely appointments / distance to available provider)    Originating Site (Patient Location): Patient's home    On-Site (Provider Location): [facility] [place]    Consent:  The patient/guardian has verbally consented to: the potential risks and benefits of telemedicine (video visit) versus in person care; bill my insurance or make self-payment for services provided; and responsibility for payment of non-covered services.     Patient would like the video invitation sent by: Send to e-mail at: [email or web address]    Mode of Communication:  Video Conference via well    As the provider I attest to compliance with applicable laws and regulations related to telemedicine.      DATA  Extended Session (53+ minutes): PROLONGED SERVICE IN THE OUTPATIENT SETTING REQUIRING DIRECT (FACE-TO-FACE) PATIENT CONTACT BEYOND THE USUAL SERVICE:    - Longer session due to limited access to mental health appointments and necessity to address patient's distress / complexity    - Patient's presenting concerns require more intensive intervention than could be completed within the usual service  Interactive Complexity: No  Crisis: No       Progress Since Last Session (Related to Symptoms / Goals / Homework):  Symptoms: reports feeling depressed yesterday but denies active SI; notes mood shifts and  irritability and states that these might be related to changes in hormones.  Patient reports that she recently started turmeric and ashwagandha.  Discussed struggles with weaning and motivation.     Homework: Partially completed      Episode of Care Goals: Satisfactory progress - ACTION (Actively working towards change); Intervened by reinforcing change plan / affirming steps taken     Current / Ongoing Stressors and Concerns:  Feeling overwhelmed by organizing home and advocating for children's needs;  and daughter are on autism spectrum    Older daughter is experiencing dizziness/lethargy and was dx with POTS; some financial and friend-related stress; is interested in a deeper dive into emotional state and core beliefs through [facility]; new son born 2020; roommate/former friend was finally evicted from their home (lived there for 3-4 years and did not paid any rent for over 1-2 years); pt has been experiencing mild panic attacks; daughter (age 10) just started ADHD medication; history of anxious attachment stemming from relationship with father (he  8 years ago); is currently pregnant (high-risk due to advanced maternal age) and baby is due Dec. 2020; is in couples therapy with  due to frequent arguments and his emotional affair earlier in the year; financial stress; history of ADHD (hyperactive type).     Treatment Objective(s) Addressed in This Session:  Self-care and barriers to this  Completing the stress cycle: creativity and movement  Needs for appreciation and validation  Emotional differentiation  Grounding and mindfulness  will identify how past feelings are impacting current relationships  Identified and processed recent triggers for anxiety and sadness (relationship)  Discussed family system issues  Participating in enjoyable activities and connecting with social and family supports  Discussed healthy boundaries and enforcement    Past/future:  Identified insecurities and how they  "affect patient's thoughts and actions now and in the past  Solution focused: how to prioritize and cope with interrupted sleep  Discussed work/life balance and current purpose  Addressed possible solutions to insurance and financial concerns  Identified personal strengths within friendships  Natali research  Identified strengths as a mother and need for positive affirmations  Management of anger  Dealing with \"big emotions\"  Self-compassion and radical acceptance  Triggers for anxiety and insecurities  tell full story of past relational issues/trust/infidelity  use cognitive strategies identified in therapy to challenge anxious thoughts   Body appreciation  Relationship repair  Discussed ambiguous loss and grief in a box analogy  Anticipatory grief and creating meaning  10:10:10 rule (10 minutes, 10 months, and 10 years)  Feeling unappreciated and the 5:1 ratio (Natali)  Javon Servin's elements of trust and relationship repair   identify how attachment style drives patient's and 's behaviors  tell full story of past trauma related to her relationship with her father (and current roommate issues)     Intervention:  Solution-Focused  Family systems theory  CBT: connect thoughts, feelings, and actions   Narrative Therapy: separate problem from person and find the bright spots     Past/future:  Management of anger and when to step away/disengage  Natali research    Assessments completed prior to visit:  PHQ9:       8/9/2022     9:22 AM 9/20/2022    10:23 AM 10/26/2022    11:28 AM 12/7/2022     9:40 AM 12/20/2022     8:54 AM 2/8/2023    12:27 PM 3/7/2023     8:46 AM   PHQ-9 SCORE   PHQ-9 Total Score MyChart 5 (Mild depression) 8 (Mild depression) 8 (Mild depression) 7 (Mild depression) 4 (Minimal depression) 6 (Mild depression) 8 (Mild depression)   PHQ-9 Total Score 5 8 8 7 4 6 8     GAD7:       4/21/2022     1:58 PM 6/14/2022     9:57 AM 7/6/2022     2:01 PM 9/20/2022    10:24 AM 10/26/2022    11:29 AM " 12/7/2022     9:43 AM 3/7/2023     8:47 AM   TEE-7 SCORE   Total Score 12 (moderate anxiety) 8 (mild anxiety) 10 (moderate anxiety) 10 (moderate anxiety) 6 (mild anxiety) 8 (mild anxiety) 5 (mild anxiety)   Total Score 12 8 10 10 6 8 5     PROMIS 10-Global Health (all questions and answers displayed):       12/15/2021    10:29 AM 3/15/2022     7:14 PM 6/14/2022     9:59 AM 7/6/2022     2:02 PM 10/26/2022    11:31 AM 2/6/2023    11:06 AM   PROMIS 10   In general, would you say your health is: Fair Fair Fair Fair Fair Fair   In general, would you say your quality of life is: Fair Good Fair Fair Fair Fair   In general, how would you rate your physical health? Fair Fair Fair Fair Fair Fair   In general, how would you rate your mental health, including your mood and your ability to think? Good Good Fair Fair Fair Fair   In general, how would you rate your satisfaction with your social activities and relationships? Fair Good Poor Fair Fair Fair   In general, please rate how well you carry out your usual social activities and roles Fair Fair Good Fair Fair Fair   To what extent are you able to carry out your everyday physical activities such as walking, climbing stairs, carrying groceries, or moving a chair? Completely Completely Completely Mostly Completely Completely   In the past 7 days, how often have you been bothered by emotional problems such as feeling anxious, depressed, or irritable? Sometimes Often Often Often Often Sometimes   In the past 7 days, how would you rate your fatigue on average? Severe Moderate Moderate Moderate Moderate Moderate   In the past 7 days, how would you rate your pain on average, where 0 means no pain, and 10 means worst imaginable pain? 5 7 7 8 3 6   In general, would you say your health is: 2    2 2 2 2 2 2    2   In general, would you say your quality of life is: 2    2 3 2 2 2 2    2   In general, how would you rate your physical health? 2    2 2 2 2 2 2    2   In general, how would  you rate your mental health, including your mood and your ability to think? 3    3 3 2 2 2 2    2   In general, how would you rate your satisfaction with your social activities and relationships? 2    2 3 1 2 2 2    2   In general, please rate how well you carry out your usual social activities and roles. (This includes activities at home, at work and in your community, and responsibilities as a parent, child, spouse, employee, friend, etc.) 2    2 2 3 2 2 2    2   To what extent are you able to carry out your everyday physical activities such as walking, climbing stairs, carrying groceries, or moving a chair? 5    5 5 5 4 5 5    5   In the past 7 days, how often have you been bothered by emotional problems such as feeling anxious, depressed, or irritable? 3    3 4 4 4 4 3    3   In the past 7 days, how would you rate your fatigue on average? 4    4 3 3 3 3 3    3   In the past 7 days, how would you rate your pain on average, where 0 means no pain, and 10 means worst imaginable pain? 5    5 7 7 8 3 6    6   Global Mental Health Score 10    10 11 7 8 8 9    9   Global Physical Health Score 12    12 12 12 11 14 13    13   PROMIS TOTAL - SUBSCORES 22    22 23 19 19 22 22    22        ASSESSMENT: Current Emotional / Mental Status (status of significant symptoms):   Risk status (Self / Other harm or suicidal ideation)   Patient denies current fears or concerns for personal safety.   Patient denies current or recent suicidal ideation or behaviors. Patient reports wanting to sleep and/or disappear for a while but denies active SI.   Patient denies current or recent homicidal ideation or behaviors.   Patient denies current or recent self injurious behavior or ideation.   Patient denies other safety concerns.   Patient reports there has been no change in risk factors since their last session.   Patient reports there has been no change in protective factors since their last session.   Recommended that patient call 911 or go  to the local ED should there be a change in any of these risk factors.     Appearance:   Appropriate    Eye Contact:   Good    Psychomotor Behavior: Normal  Restless     Attitude:   Cooperative   Orientation:   All   Speech    Rate / Production: Normal/ Responsive Talkative    Volume:  Normal    Mood:    Anxious  ; reports depressed mood yesterday   Affect:    Appropriate  Expansive    Thought Content:  Clear  Rumination    Thought Form:  Coherent  Neurosis   Insight:    Good      Medication Review:   No changes to current psychiatric medication(s) Recently increased Paxil.     Medication Compliance:   Yes     Changes in Health Issues:   Concerns about cholesterol and resting glucose levels; pain in hip     Recent:   Reports having physical and having high LDL cholesterol and glucose  went to ER for testing after experiencing shooting pain in arm     Chemical Use Review:   Substance Use: Chemical use reviewed, no active concerns identified ; no current alcohol use     Tobacco Use: No current tobacco use.      Diagnosis:  1. TEE (generalized anxiety disorder)    2. Recurrent major depressive disorder, in partial remission (H)    3. Attention deficit hyperactivity disorder (ADHD), unspecified ADHD type      Note: There has been demonstrated improvement in functioning while patient has been engaged in psychotherapy/psychological service- if withdrawn the patient would deteriorate and/or relapse.     Collateral Reports Completed:  N/A    PLAN: (Patient Tasks / Therapist Tasks / Other)    Patient states that her goals for the next two weeks include:    1. Keep doing hip and core exercises  2. Continue to work with toddler son on weaning  3. Collaborate with  on finding him a new job       Keesha Eller    ______________________________________________________________________                                                           [facility]    Individual Treatment Plan    Patient's Name:  "Ellyn Mcgee  YOB: 1975    Date of Creation: 2020  Date Treatment Plan Last Reviewed/Revised: 2023    DSM5 Diagnoses: 296.32 (F33.1) Major Depressive Disorder, Recurrent Episode, Moderate _ or 300.02 (F41.1) Generalized Anxiety Disorder (patient has previously been diagnosed with ADHD, hyperactive type)  Psychosocial / Contextual Factors: History of anxious attachment stemming from relationship with father (he  8 years ago); is currently pregnant (high-risk due to advanced maternal age); is in couples therapy with  due to frequent arguments and his emotional affair earlier in the year; financial stress; history of ADHD (hyperactive type).  PROMIS (reviewed every 90 days): PROMIS-10 Scores             Referral / Collaboration:  Care Coordination    Anticipated number of sessions for this episode of care: 60+  Anticipated frequency of session: Every 2-3 weeks  Anticipated duration of each session: 38-52 minutes  Treatment plan will be reviewed in 90 days or when goals have been changed.             Measurable Treatment Goal(s) related to diagnosis / functional impairment(s):  Patient is asking to focus treatment on her relationship with her father and past trauma.    Goal 1: Patient will tell full story of past trauma related to her relationship with her father and will identify how past feelings are impacting current relationships.    Objective #A (Patient Action)    Patient will identify how past feelings are impacting current relationships.  Status: Continued - Date(s): 2023    Intervention(s)  Therapist will role-play conflict management.    Objective #B  Patient will identify strengths and weaknesses within her family system of origin.  Status: Continued - Date(s): 2023    Intervention(s)  Therapist will assign homework for patient to create list.      Goal 2: Patient will learn about resilience, trauma responses, and Javon Servin's \"the story I'm making up\" " "(cognitive strategy to manage anxious thoughts).    Objective #A (Patient Action)    Patient will learn about and identify personal trauma responses.    Status: Completed - Date: 1/26/2023     Intervention(s)  Therapist will provide educational materials on trauma responses.    Objective #B  Patient will use cognitive strategies identified in therapy to challenge anxious thoughts.  Status: Continued - Date(s): 1/26/2023    Intervention(s)  Therapist will teach about Javon Servin's power of vulnerability and \"the story I'm making up\".      Goal 3: Patient will learn about protective factors that foster resilience and attachment styles and will identify how her attachment style drives her behavior.     Objective #A (Patient Action)    Status: Partially completed: 1/26/2023    Patient will learn about protective factors and will identify her own.    Intervention(s)  Therapist will complete with patient in session.    Objective #B  Patient will identify how her attachment style drives her behavior.  Status: Completed - Date: 1/26/2023     Intervention(s)  Therapist will teach emotional regulation skills.        Patient agreed to the above plan.      Keesha Eller    "

## 2023-04-06 ENCOUNTER — VIRTUAL VISIT (OUTPATIENT)
Dept: PSYCHOLOGY | Facility: CLINIC | Age: 48
End: 2023-04-06
Payer: COMMERCIAL

## 2023-04-06 DIAGNOSIS — F41.1 GAD (GENERALIZED ANXIETY DISORDER): Primary | ICD-10-CM

## 2023-04-06 DIAGNOSIS — F33.41 RECURRENT MAJOR DEPRESSIVE DISORDER, IN PARTIAL REMISSION (H): ICD-10-CM

## 2023-04-06 DIAGNOSIS — F90.9 ATTENTION DEFICIT HYPERACTIVITY DISORDER (ADHD), UNSPECIFIED ADHD TYPE: ICD-10-CM

## 2023-04-06 PROCEDURE — 90837 PSYTX W PT 60 MINUTES: CPT | Mod: VID | Performed by: MARRIAGE & FAMILY THERAPIST

## 2023-04-06 NOTE — PATIENT INSTRUCTIONS
Patient states that her goals for the next two weeks include:    Keep doing hip and core exercises  Continue to work with toddler son on weaning  Collaborate with  on finding him a new job

## 2023-04-16 ENCOUNTER — HEALTH MAINTENANCE LETTER (OUTPATIENT)
Age: 48
End: 2023-04-16

## 2023-04-19 NOTE — PROGRESS NOTES
[facility]                                               Progress Note    Patient Name: Ellyn Mcgee  Date: 4/21/2023         Service Type: Individual      Session Start Time: 1:06 p.m.  Session End Time: 2:04 p.m.     Session Length: 58 min.    Session #: 59 (with this writer; patient met previously for DA with [facility] Elena Hill and psychiatry)    Attendees: Client    Service Modality:  Video Visit:    Telemedicine Visit: The patient's condition can be safely assessed and treated via synchronous audio and visual telemedicine encounter.      Reason for Telemedicine Visit: Patient convenience (e.g. access to timely appointments / distance to available provider)    Originating Site (Patient Location): Patient's home    On-Site (Provider Location): [facility]: [place]    Consent:  The patient/guardian has verbally consented to: the potential risks and benefits of telemedicine (video visit) versus in person care; bill my insurance or make self-payment for services provided; and responsibility for payment of non-covered services.     Patient would like the video invitation sent by: Send to e-mail at: [email or web address]    Mode of Communication:  Video Conference via well    As the provider I attest to compliance with applicable laws and regulations related to telemedicine.      DATA  Extended Session (53+ minutes): PROLONGED SERVICE IN THE OUTPATIENT SETTING REQUIRING DIRECT (FACE-TO-FACE) PATIENT CONTACT BEYOND THE USUAL SERVICE:    - Longer session due to limited access to mental health appointments and necessity to address patient's distress / complexity  Interactive Complexity: No  Crisis: No       Progress Since Last Session (Related to Symptoms / Goals / Homework):  Symptoms: reports feeling more irritable than usual and has been trying to manage her reactivity in relation to family members.  Received positive feedback during work review.     Homework: Partially  completed      Episode of Care Goals: Satisfactory progress - ACTION (Actively working towards change); Intervened by reinforcing change plan / affirming steps taken     Current / Ongoing Stressors and Concerns:  Feeling overwhelmed by organizing home and advocating for children's needs;  and daughter are on autism spectrum    Older daughter is experiencing dizziness/lethargy and was dx with POTS; some financial and friend-related stress; is interested in a deeper dive into emotional state and core beliefs through ACT; new son born 2020; roommate/former friend was finally evicted from their home (lived there for 3-4 years and did not paid any rent for over 1-2 years); pt has been experiencing mild panic attacks; daughter (age 10) just started ADHD medication; history of anxious attachment stemming from relationship with father (he  8 years ago); is currently pregnant (high-risk due to advanced maternal age) and baby is due Dec. 2020; is in couples therapy with  due to frequent arguments and his emotional affair earlier in the year; financial stress; history of ADHD (hyperactive type).     Treatment Objective(s) Addressed in This Session:  Needs for appreciation and validation  Self-care and barriers to this  Completing the stress cycle: creativity and movement  Emotional differentiation  Grounding and mindfulness  will identify how past feelings are impacting current relationships  Identified and processed recent triggers for anxiety and sadness (relationship)  Discussed family system issues  Participating in enjoyable activities and connecting with social and family supports  Discussed healthy boundaries and enforcement    Past/future:  Identified insecurities and how they affect patient's thoughts and actions now and in the past  Solution focused: how to prioritize and cope with interrupted sleep  Discussed work/life balance and current purpose  Addressed possible solutions to insurance and  "financial concerns  Identified personal strengths within friendships  [facility] research  Identified strengths as a mother and need for positive affirmations  Management of anger  Dealing with \"big emotions\"  Self-compassion and radical acceptance  Triggers for anxiety and insecurities  tell full story of past relational issues/trust/infidelity  use cognitive strategies identified in therapy to challenge anxious thoughts   Body appreciation  Relationship repair  Discussed ambiguous loss and grief in a box analogy  Anticipatory grief and creating meaning  10:10:10 rule (10 minutes, 10 months, and 10 years)  Feeling unappreciated and the 5:1 ratio ([facility])  Javon Servin's elements of trust and relationship repair   identify how attachment style drives patient's and 's behaviors  tell full story of past trauma related to her relationship with her father (and current roommate issues)     Intervention:  Solution-Focused  Family systems theory  CBT: connect thoughts, feelings, and actions   Narrative Therapy: separate problem from person and find the bright spots     Past/future:  Management of anger and when to step away/disengage  [facility] research    Assessments completed prior to visit:  PHQ9:       9/20/2022    10:23 AM 10/26/2022    11:28 AM 12/7/2022     9:40 AM 12/20/2022     8:54 AM 2/8/2023    12:27 PM 3/7/2023     8:46 AM 4/21/2023    12:05 PM   PHQ-9 SCORE   PHQ-9 Total Score MyChart 8 (Mild depression) 8 (Mild depression) 7 (Mild depression) 4 (Minimal depression) 6 (Mild depression) 8 (Mild depression) 9 (Mild depression)   PHQ-9 Total Score 8 8 7 4 6 8 9     GAD7:       6/14/2022     9:57 AM 7/6/2022     2:01 PM 9/20/2022    10:24 AM 10/26/2022    11:29 AM 12/7/2022     9:43 AM 3/7/2023     8:47 AM 4/21/2023    12:12 PM   TEE-7 SCORE   Total Score 8 (mild anxiety) 10 (moderate anxiety) 10 (moderate anxiety) 6 (mild anxiety) 8 (mild anxiety) 5 (mild anxiety) 8 (mild anxiety)   Total Score 8 10 10 6 8 5 8 "     PROMIS 10-Global Health (all questions and answers displayed):       12/15/2021    10:29 AM 3/15/2022     7:14 PM 6/14/2022     9:59 AM 7/6/2022     2:02 PM 10/26/2022    11:31 AM 2/6/2023    11:06 AM   PROMIS 10   In general, would you say your health is: Fair Fair Fair Fair Fair Fair   In general, would you say your quality of life is: Fair Good Fair Fair Fair Fair   In general, how would you rate your physical health? Fair Fair Fair Fair Fair Fair   In general, how would you rate your mental health, including your mood and your ability to think? Good Good Fair Fair Fair Fair   In general, how would you rate your satisfaction with your social activities and relationships? Fair Good Poor Fair Fair Fair   In general, please rate how well you carry out your usual social activities and roles Fair Fair Good Fair Fair Fair   To what extent are you able to carry out your everyday physical activities such as walking, climbing stairs, carrying groceries, or moving a chair? Completely Completely Completely Mostly Completely Completely   In the past 7 days, how often have you been bothered by emotional problems such as feeling anxious, depressed, or irritable? Sometimes Often Often Often Often Sometimes   In the past 7 days, how would you rate your fatigue on average? Severe Moderate Moderate Moderate Moderate Moderate   In the past 7 days, how would you rate your pain on average, where 0 means no pain, and 10 means worst imaginable pain? 5 7 7 8 3 6   In general, would you say your health is: 2    2 2 2 2 2 2    2   In general, would you say your quality of life is: 2    2 3 2 2 2 2    2   In general, how would you rate your physical health? 2    2 2 2 2 2 2    2   In general, how would you rate your mental health, including your mood and your ability to think? 3    3 3 2 2 2 2    2   In general, how would you rate your satisfaction with your social activities and relationships? 2    2 3 1 2 2 2    2   In general,  please rate how well you carry out your usual social activities and roles. (This includes activities at home, at work and in your community, and responsibilities as a parent, child, spouse, employee, friend, etc.) 2    2 2 3 2 2 2    2   To what extent are you able to carry out your everyday physical activities such as walking, climbing stairs, carrying groceries, or moving a chair? 5    5 5 5 4 5 5    5   In the past 7 days, how often have you been bothered by emotional problems such as feeling anxious, depressed, or irritable? 3    3 4 4 4 4 3    3   In the past 7 days, how would you rate your fatigue on average? 4    4 3 3 3 3 3    3   In the past 7 days, how would you rate your pain on average, where 0 means no pain, and 10 means worst imaginable pain? 5    5 7 7 8 3 6    6   Global Mental Health Score 10    10 11 7 8 8 9    9   Global Physical Health Score 12    12 12 12 11 14 13    13   PROMIS TOTAL - SUBSCORES 22    22 23 19 19 22 22    22        ASSESSMENT: Current Emotional / Mental Status (status of significant symptoms):   Risk status (Self / Other harm or suicidal ideation)   Patient denies current fears or concerns for personal safety.   Patient denies current or recent suicidal ideation or behaviors. Patient reports wanting to sleep and/or disappear for a while but denies active SI.   Patient denies current or recent homicidal ideation or behaviors.   Patient denies current or recent self injurious behavior or ideation.   Patient denies other safety concerns.   Patient reports there has been no change in risk factors since their last session.   Patient reports there has been no change in protective factors since their last session.   Recommended that patient call 911 or go to the local ED should there be a change in any of these risk factors.     Appearance:   Appropriate    Eye Contact:   Good    Psychomotor Behavior: Normal  Restless     Attitude:   Cooperative   Orientation:   All   Speech    Rate /  Production: Normal/ Responsive Talkative    Volume:  Normal    Mood:    Anxious  tired   Affect:    Appropriate  Expansive    Thought Content:  Clear  Rumination    Thought Form:  Coherent  Neurosis   Insight:    Good      Medication Review:   No changes to current psychiatric medication(s)     Medication Compliance:   Yes     Changes in Health Issues:   None     Recent:   Concerns about cholesterol and resting glucose levels; pain in hip  Reports having physical and having high LDL cholesterol and glucose  went to ER for testing after experiencing shooting pain in arm     Chemical Use Review:   Substance Use: Chemical use reviewed, no active concerns identified ; no current alcohol use     Tobacco Use: No current tobacco use.      Diagnosis:  1. TEE (generalized anxiety disorder)    2. Recurrent major depressive disorder, in partial remission (H)    3. Attention deficit hyperactivity disorder (ADHD), unspecified ADHD type      Note: There has been demonstrated improvement in functioning while patient has been engaged in psychotherapy/psychological service- if withdrawn the patient would deteriorate and/or relapse.     Collateral Reports Completed:  N/A    PLAN: (Patient Tasks / Therapist Tasks / Other)    Patient states that her goals for the next two weeks include:    1. Continue to try to figure out finances and get a new car  2. Continue working on finding time and ways to care for self  3. Prepare for book fair at [facility]      Keesha Eller    ______________________________________________________________________                                                           [facility]    Individual Treatment Plan    Patient's Name: Ellyn Mcgee  YOB: 1975    Date of Creation: 9/22/2020  Date Treatment Plan Last Reviewed/Revised: 1/26/2023    DSM5 Diagnoses: 296.32 (F33.1) Major Depressive Disorder, Recurrent Episode, Moderate _ or 300.02 (F41.1) Generalized Anxiety  "Disorder (patient has previously been diagnosed with ADHD, hyperactive type)    Psychosocial / Contextual Factors: History of anxious attachment stemming from relationship with father (he  8 years ago); is currently pregnant (high-risk due to advanced maternal age); is in couples therapy with  due to frequent arguments and his emotional affair earlier in the year; financial stress; history of ADHD (hyperactive type).    PROMIS (reviewed every 90 days): PROMIS-10 Scores (see above note)    Referral / Collaboration:  Care Coordination    Anticipated number of sessions for this episode of care: 60+  Anticipated frequency of sessions: Every 2-3 weeks  Anticipated duration of each session: 38-52 minutes  Treatment plan will be reviewed in 90 days or when goals have been changed.             Measurable Treatment Goal(s) related to diagnosis / functional impairment(s):  Patient is asking to focus treatment on her relationship with her father and past trauma.    Goal 1: Patient will tell full story of past trauma related to her relationship with her father and will identify how past feelings are impacting current relationships.    Objective #A (Patient Action)    Patient will identify how past feelings are impacting current relationships.  Status: Continued - Date(s): 2023    Intervention(s)  Therapist will role-play conflict management.    Objective #B  Patient will identify strengths and weaknesses within her family system of origin.  Status: Continued - Date(s): 2023    Intervention(s)  Therapist will assign homework for patient to create list.      Goal 2: Patient will learn about resilience, trauma responses, and Javon Servin's \"the story I'm making up\" (cognitive strategy to manage anxious thoughts).    Objective #A (Patient Action)    Patient will learn about and identify personal trauma responses.    Status: Completed - Date: 2023     Intervention(s)  Therapist will provide educational " "materials on trauma responses.    Objective #B  Patient will use cognitive strategies identified in therapy to challenge anxious thoughts.  Status: Continued - Date(s): 1/26/2023    Intervention(s)  Therapist will teach about Javon Gareth's power of vulnerability and \"the story I'm making up\".      Goal 3: Patient will learn about protective factors that foster resilience and attachment styles and will identify how her attachment style drives her behavior.     Objective #A (Patient Action)    Status: Partially completed: 1/26/2023    Patient will learn about protective factors and will identify her own.    Intervention(s)  Therapist will complete with patient in session.    Objective #B  Patient will identify how her attachment style drives her behavior.  Status: Completed - Date: 1/26/2023     Intervention(s)  Therapist will teach emotional regulation skills.        Patient agreed to the above plan.      Keesha Eller    "

## 2023-04-21 ENCOUNTER — VIRTUAL VISIT (OUTPATIENT)
Dept: PSYCHOLOGY | Facility: OTHER | Age: 48
End: 2023-04-21
Payer: COMMERCIAL

## 2023-04-21 DIAGNOSIS — F90.9 ATTENTION DEFICIT HYPERACTIVITY DISORDER (ADHD), UNSPECIFIED ADHD TYPE: ICD-10-CM

## 2023-04-21 DIAGNOSIS — F41.1 GAD (GENERALIZED ANXIETY DISORDER): Primary | ICD-10-CM

## 2023-04-21 DIAGNOSIS — F33.41 RECURRENT MAJOR DEPRESSIVE DISORDER, IN PARTIAL REMISSION (H): ICD-10-CM

## 2023-04-21 PROCEDURE — 90837 PSYTX W PT 60 MINUTES: CPT | Mod: VID | Performed by: MARRIAGE & FAMILY THERAPIST

## 2023-04-21 ASSESSMENT — ANXIETY QUESTIONNAIRES
6. BECOMING EASILY ANNOYED OR IRRITABLE: SEVERAL DAYS
4. TROUBLE RELAXING: MORE THAN HALF THE DAYS
5. BEING SO RESTLESS THAT IT IS HARD TO SIT STILL: SEVERAL DAYS
2. NOT BEING ABLE TO STOP OR CONTROL WORRYING: SEVERAL DAYS
7. FEELING AFRAID AS IF SOMETHING AWFUL MIGHT HAPPEN: NOT AT ALL
IF YOU CHECKED OFF ANY PROBLEMS ON THIS QUESTIONNAIRE, HOW DIFFICULT HAVE THESE PROBLEMS MADE IT FOR YOU TO DO YOUR WORK, TAKE CARE OF THINGS AT HOME, OR GET ALONG WITH OTHER PEOPLE: SOMEWHAT DIFFICULT
7. FEELING AFRAID AS IF SOMETHING AWFUL MIGHT HAPPEN: NOT AT ALL
8. IF YOU CHECKED OFF ANY PROBLEMS, HOW DIFFICULT HAVE THESE MADE IT FOR YOU TO DO YOUR WORK, TAKE CARE OF THINGS AT HOME, OR GET ALONG WITH OTHER PEOPLE?: SOMEWHAT DIFFICULT
GAD7 TOTAL SCORE: 8
3. WORRYING TOO MUCH ABOUT DIFFERENT THINGS: NOT AT ALL
GAD7 TOTAL SCORE: 8
1. FEELING NERVOUS, ANXIOUS, OR ON EDGE: NEARLY EVERY DAY
GAD7 TOTAL SCORE: 8

## 2023-04-21 ASSESSMENT — PATIENT HEALTH QUESTIONNAIRE - PHQ9
SUM OF ALL RESPONSES TO PHQ QUESTIONS 1-9: 9
SUM OF ALL RESPONSES TO PHQ QUESTIONS 1-9: 9
10. IF YOU CHECKED OFF ANY PROBLEMS, HOW DIFFICULT HAVE THESE PROBLEMS MADE IT FOR YOU TO DO YOUR WORK, TAKE CARE OF THINGS AT HOME, OR GET ALONG WITH OTHER PEOPLE: SOMEWHAT DIFFICULT

## 2023-04-21 NOTE — PATIENT INSTRUCTIONS
Patient states that her goals for the next two weeks include:    Continue to try to figure out finances and get a new car  Continue working on finding time and ways to care for self  Prepare for book fair at [facility]

## 2023-05-01 ENCOUNTER — MYC MEDICAL ADVICE (OUTPATIENT)
Dept: PSYCHIATRY | Facility: CLINIC | Age: 48
End: 2023-05-01
Payer: COMMERCIAL

## 2023-05-03 NOTE — PROGRESS NOTES
[facility]                                               Progress Note    Patient Name: Ellyn Mcgee  Date: 5/4/2023         Service Type: Individual      Session Start Time: 1:33 p.m.  Session End Time: 2:28 p.m.     Session Length: 55 min.    Session #: 60 (with this writer; patient met previously for DA with [facility] and psychiatry)    Attendees: Client    Service Modality:  Video Visit:    Telemedicine Visit: The patient's condition can be safely assessed and treated via synchronous audio and visual telemedicine encounter.      Reason for Telemedicine Visit: Patient convenience (e.g. access to timely appointments / distance to available provider)    Originating Site (Patient Location): Patient's home    On-Site (Provider Location): [facility]: [place]    Consent:  The patient/guardian has verbally consented to: the potential risks and benefits of telemedicine (video visit) versus in person care; bill my insurance or make self-payment for services provided; and responsibility for payment of non-covered services.     Patient would like the video invitation sent by: Send to e-mail at: [email or web address]    Mode of Communication:  Video Conference via [facility]    As the provider I attest to compliance with applicable laws and regulations related to telemedicine.      DATA  Extended Session (53+ minutes): PROLONGED SERVICE IN THE OUTPATIENT SETTING REQUIRING DIRECT (FACE-TO-FACE) PATIENT CONTACT BEYOND THE USUAL SERVICE:    - Longer session due to limited access to mental health appointments and necessity to address patient's distress / complexity  Interactive Complexity: No  Crisis: No       Progress Since Last Session (Related to Symptoms / Goals / Homework):  Symptoms: reports feeling depressed for several days but has felt better since increasing her Paxil. Also asked  for more help with toddler son.Pt is unsure whether symptoms are related to possibly being  latisha-menopausal.     Homework: Partially completed      Episode of Care Goals: Satisfactory progress - ACTION (Actively working towards change); Intervened by reinforcing change plan / affirming steps taken     Current / Ongoing Stressors and Concerns:  Feeling overwhelmed by organizing home and advocating for children's needs;  and daughter are on autism spectrum    Older daughter is experiencing dizziness/lethargy and was dx with POTS; some financial and friend-related stress; is interested in a deeper dive into emotional state and core beliefs through ACT; new son born 2020; roommate/former friend was finally evicted from their home (lived there for 3-4 years and did not paid any rent for over 1-2 years); pt has been experiencing mild panic attacks; daughter (age 10) just started ADHD medication; history of anxious attachment stemming from relationship with father (he  8 years ago); is currently pregnant (high-risk due to advanced maternal age) and baby is due Dec. 2020; is in couples therapy with  due to frequent arguments and his emotional affair earlier in the year; financial stress; history of ADHD (hyperactive type).     Treatment Objective(s) Addressed in This Session:  Needs for appreciation and validation  Self-care and barriers to this  Discussed family system issues  Participating in enjoyable activities and connecting with social and family supports  Discussed healthy boundaries and enforcement    Past/future:  Completing the stress cycle: creativity and movement  Emotional differentiation  Grounding and mindfulness  will identify how past feelings are impacting current relationships  Identified and processed recent triggers for anxiety and sadness (relationship)  Identified insecurities and how they affect patient's thoughts and actions now and in the past  Solution focused: how to prioritize and cope with interrupted sleep  Discussed work/life balance and current  "purpose  Addressed possible solutions to insurance and financial concerns  Identified personal strengths within friendships  [facility] research  Identified strengths as a mother and need for positive affirmations  Management of anger  Dealing with \"big emotions\"  Self-compassion and radical acceptance  Triggers for anxiety and insecurities  tell full story of past relational issues/trust/infidelity  use cognitive strategies identified in therapy to challenge anxious thoughts   Body appreciation  Relationship repair  Discussed ambiguous loss and grief in a box analogy  Anticipatory grief and creating meaning  10:10:10 rule (10 minutes, 10 months, and 10 years)  Feeling unappreciated and the 5:1 ratio ([facility])  Javon Servin's elements of trust and relationship repair   identify how attachment style drives patient's and 's behaviors  tell full story of past trauma related to her relationship with her father (and current roommate issues)     Intervention:  Solution-Focused  Family systems theory  CBT: connect thoughts, feelings, and actions   Narrative Therapy: separate problem from person and find the bright spots     Past/future:  Management of anger and when to step away/disengage  [employer] research    Assessments completed prior to visit:  PHQ9:       9/20/2022    10:23 AM 10/26/2022    11:28 AM 12/7/2022     9:40 AM 12/20/2022     8:54 AM 2/8/2023    12:27 PM 3/7/2023     8:46 AM 4/21/2023    12:05 PM   PHQ-9 SCORE   PHQ-9 Total Score MyChart 8 (Mild depression) 8 (Mild depression) 7 (Mild depression) 4 (Minimal depression) 6 (Mild depression) 8 (Mild depression) 9 (Mild depression)   PHQ-9 Total Score 8 8 7 4 6 8 9     GAD7:       6/14/2022     9:57 AM 7/6/2022     2:01 PM 9/20/2022    10:24 AM 10/26/2022    11:29 AM 12/7/2022     9:43 AM 3/7/2023     8:47 AM 4/21/2023    12:12 PM   TEE-7 SCORE   Total Score 8 (mild anxiety) 10 (moderate anxiety) 10 (moderate anxiety) 6 (mild anxiety) 8 (mild anxiety) 5 (mild " anxiety) 8 (mild anxiety)   Total Score 8 10 10 6 8 5 8     PROMIS 10-Global Health (all questions and answers displayed):       12/15/2021    10:29 AM 3/15/2022     7:14 PM 6/14/2022     9:59 AM 7/6/2022     2:02 PM 10/26/2022    11:31 AM 2/6/2023    11:06 AM   PROMIS 10   In general, would you say your health is: Fair Fair Fair Fair Fair Fair   In general, would you say your quality of life is: Fair Good Fair Fair Fair Fair   In general, how would you rate your physical health? Fair Fair Fair Fair Fair Fair   In general, how would you rate your mental health, including your mood and your ability to think? Good Good Fair Fair Fair Fair   In general, how would you rate your satisfaction with your social activities and relationships? Fair Good Poor Fair Fair Fair   In general, please rate how well you carry out your usual social activities and roles Fair Fair Good Fair Fair Fair   To what extent are you able to carry out your everyday physical activities such as walking, climbing stairs, carrying groceries, or moving a chair? Completely Completely Completely Mostly Completely Completely   In the past 7 days, how often have you been bothered by emotional problems such as feeling anxious, depressed, or irritable? Sometimes Often Often Often Often Sometimes   In the past 7 days, how would you rate your fatigue on average? Severe Moderate Moderate Moderate Moderate Moderate   In the past 7 days, how would you rate your pain on average, where 0 means no pain, and 10 means worst imaginable pain? 5 7 7 8 3 6   In general, would you say your health is: 2    2 2 2 2 2 2    2   In general, would you say your quality of life is: 2    2 3 2 2 2 2    2   In general, how would you rate your physical health? 2    2 2 2 2 2 2    2   In general, how would you rate your mental health, including your mood and your ability to think? 3    3 3 2 2 2 2    2   In general, how would you rate your satisfaction with your social activities and  relationships? 2    2 3 1 2 2 2    2   In general, please rate how well you carry out your usual social activities and roles. (This includes activities at home, at work and in your community, and responsibilities as a parent, child, spouse, employee, friend, etc.) 2    2 2 3 2 2 2    2   To what extent are you able to carry out your everyday physical activities such as walking, climbing stairs, carrying groceries, or moving a chair? 5    5 5 5 4 5 5    5   In the past 7 days, how often have you been bothered by emotional problems such as feeling anxious, depressed, or irritable? 3    3 4 4 4 4 3    3   In the past 7 days, how would you rate your fatigue on average? 4    4 3 3 3 3 3    3   In the past 7 days, how would you rate your pain on average, where 0 means no pain, and 10 means worst imaginable pain? 5    5 7 7 8 3 6    6   Global Mental Health Score 10    10 11 7 8 8 9    9   Global Physical Health Score 12    12 12 12 11 14 13    13   PROMIS TOTAL - SUBSCORES 22    22 23 19 19 22 22    22        ASSESSMENT: Current Emotional / Mental Status (status of significant symptoms):   Risk status (Self / Other harm or suicidal ideation)   Patient denies current fears or concerns for personal safety.   Patient denies current or recent suicidal ideation or behaviors. Patient reports wanting to sleep and/or disappear for a while but denies active SI (continued).   Patient denies current or recent homicidal ideation or behaviors.   Patient denies current or recent self injurious behavior or ideation.   Patient denies other safety concerns.   Patient reports there has been no change in risk factors since their last session.   Patient reports there has been no change in protective factors since their last session.   Recommended that patient call 911 or go to the local ED should there be a change in any of these risk factors.     Appearance:   Appropriate    Eye Contact:   Good    Psychomotor Behavior: Normal  Restless   "   Attitude:   Cooperative   Orientation:   All   Speech    Rate / Production: Normal/ Responsive Talkative    Volume:  Normal    Mood:    Exhausted, a little sad   Affect:    Appropriate  Expansive    Thought Content:  Clear  Rumination    Thought Form:  Coherent  Neurosis   Insight:    Good      Medication Review:   Changes to psychiatric medications, see updated Medication List in EPIC.      Medication Compliance:   Yes     Changes in Health Issues:   May be latisha-menopausal     Recent:   Concerns about cholesterol and resting glucose levels; pain in hip  Reports having physical and having high LDL cholesterol and glucose  went to ER for testing after experiencing shooting pain in arm     Chemical Use Review:   Substance Use: Chemical use reviewed, no active concerns identified ; no current alcohol use     Tobacco Use: No current tobacco use.      Diagnosis:  1. TEE (generalized anxiety disorder)    2. Recurrent major depressive disorder, in partial remission (H)    3. Attention deficit hyperactivity disorder (ADHD), unspecified ADHD type      Note: There has been demonstrated improvement in functioning while patient has been engaged in psychotherapy/psychological service- if withdrawn the patient would deteriorate and/or relapse.     Collateral Reports Completed:  N/A    PLAN: (Patient Tasks / Therapist Tasks / Other)    Patient states that her goals for the next two weeks include:    1. Continue taking 50 mg Paxil  2. Prepare for middle child's birthday party  3. Help oldest daughter prepare for two proms  4. \"Get things done\"      Keesha Eller    ______________________________________________________________________                                                           [facility]    Individual Treatment Plan    Patient's Name: Ellyn Mcgee  YOB: 1975    Date of Creation: 9/22/2020  Date Treatment Plan Last Reviewed/Revised: 5/4/2023    DSM5 Diagnoses: 296.32 (F33.1) " "Major Depressive Disorder, Recurrent Episode, Moderate _ or 300.02 (F41.1) Generalized Anxiety Disorder (patient has previously been diagnosed with ADHD, hyperactive type)    Psychosocial / Contextual Factors: History of anxious attachment stemming from relationship with father (he  8 years ago); is currently pregnant (high-risk due to advanced maternal age); is in couples therapy with  due to frequent arguments and his emotional affair earlier in the year; financial stress; history of ADHD (hyperactive type).    PROMIS (reviewed every 90 days): PROMIS-10 Scores (see above note)    Referral / Collaboration:  Care Coordination    Anticipated number of sessions for this episode of care: 60+  Anticipated frequency of sessions: Every 2-3 weeks  Anticipated duration of each session: 38-52 minutes  Treatment plan will be reviewed in 90 days or when goals have been changed.           Measurable Treatment Goal(s) related to diagnosis / functional impairment(s):  Patient is asking to focus treatment on her relationship with her father and past trauma.    Goal 1: Patient will tell full story of past trauma related to her relationship with her father and will identify how past feelings are impacting current relationships.    Objective #A (Patient Action)    Patient will identify how past feelings are impacting current relationships.  Status: Continued - Date(s): 2023 (partially completed)    Intervention(s)  Therapist will role-play conflict management.    Objective #B  Patient will identify strengths and weaknesses within her family system of origin.  Status: Continued - Date(s): 2023 (partially completed)    Intervention(s)  Therapist will assign homework for patient to create list.      Goal 2: Patient will learn about resilience, trauma responses, and Javon Servin's \"the story I'm making up\" (cognitive strategy to manage anxious thoughts).    Objective #A (Patient Action)    Patient will learn about and " "identify personal trauma responses.    Status: Restarted - Date: 5/4/2023      Intervention(s)  Therapist will provide educational materials on trauma responses.    Objective #B  Patient will use cognitive strategies identified in therapy to challenge anxious thoughts.  Status: Continued - Date(s): 5/4/2023     Intervention(s)  Therapist will teach about Javon Servin's power of vulnerability and \"the story I'm making up\".      Goal 3: Patient will learn about protective factors that foster resilience and attachment styles and will identify how her attachment style drives her behavior.     Objective #A (Patient Action)    Status: Partially completed: 5/4/2023     Patient will learn about protective factors and will identify her own.    Intervention(s)  Therapist will complete with patient in session.    Objective #B  Patient will identify how her attachment style drives her behavior.  Status: Completed - Date: 5/4/2023      Intervention(s)  Therapist will teach emotional regulation skills.        Patient agreed to the above plan.      Keesha Eller    "

## 2023-05-03 NOTE — TELEPHONE ENCOUNTER
Patient responding to previous message sent on 5/1/23.     Tiffanie Klely RN on 5/3/2023 at 12:38 PM

## 2023-05-04 ENCOUNTER — VIRTUAL VISIT (OUTPATIENT)
Dept: PSYCHOLOGY | Facility: CLINIC | Age: 48
End: 2023-05-04
Payer: COMMERCIAL

## 2023-05-04 DIAGNOSIS — F33.41 RECURRENT MAJOR DEPRESSIVE DISORDER, IN PARTIAL REMISSION (H): ICD-10-CM

## 2023-05-04 DIAGNOSIS — F41.1 GAD (GENERALIZED ANXIETY DISORDER): Primary | ICD-10-CM

## 2023-05-04 DIAGNOSIS — F90.9 ATTENTION DEFICIT HYPERACTIVITY DISORDER (ADHD), UNSPECIFIED ADHD TYPE: ICD-10-CM

## 2023-05-04 PROCEDURE — 90837 PSYTX W PT 60 MINUTES: CPT | Mod: VID | Performed by: MARRIAGE & FAMILY THERAPIST

## 2023-05-04 NOTE — PATIENT INSTRUCTIONS
"Patient states that her goals for the next two weeks include:    Continue taking 50 mg Paxil  Prepare for middle child's birthday party  Help oldest daughter prepare for two proms  \"Get things done\"  "

## 2023-05-08 ENCOUNTER — OFFICE VISIT (OUTPATIENT)
Dept: DERMATOLOGY | Facility: CLINIC | Age: 48
End: 2023-05-08
Payer: COMMERCIAL

## 2023-05-08 DIAGNOSIS — L82.1 SEBORRHEIC KERATOSES: ICD-10-CM

## 2023-05-08 DIAGNOSIS — D23.9 DERMATOFIBROMA: Primary | ICD-10-CM

## 2023-05-08 DIAGNOSIS — L81.4 SOLAR LENTIGINOSIS: ICD-10-CM

## 2023-05-08 DIAGNOSIS — D22.9 MULTIPLE BENIGN NEVI: ICD-10-CM

## 2023-05-08 DIAGNOSIS — D18.01 CHERRY ANGIOMA: ICD-10-CM

## 2023-05-08 DIAGNOSIS — Z12.83 SKIN CANCER SCREENING: ICD-10-CM

## 2023-05-08 PROCEDURE — 99213 OFFICE O/P EST LOW 20 MIN: CPT | Performed by: PHYSICIAN ASSISTANT

## 2023-05-08 NOTE — PATIENT INSTRUCTIONS
Patient Education     Checking for Skin Cancer  You can find cancer early by checking your skin each month. There are 3 kinds of skin cancer. They are melanoma, basal cell carcinoma, and squamous cell carcinoma. Doing monthly skin checks is the best way to find new marks or skin changes. Follow the instructions below for checking your skin.   The ABCDEs of checking moles for melanoma   Check your moles or growths for signs of melanoma using ABCDE:   Asymmetry: the sides of the mole or growth don t match  Border: the edges are ragged, notched, or blurred  Color: the color within the mole or growth varies  Diameter: the mole or growth is larger than 6 mm (size of a pencil eraser)  Evolving: the size, shape, or color of the mole or growth is changing (evolving is not shown in the images below)    Checking for other types of skin cancer  Basal cell carcinoma or squamous cell carcinoma have symptoms such as:     A spot or mole that looks different from all other marks on your skin  Changes in how an area feels, such as itching, tenderness, or pain  Changes in the skin's surface, such as oozing, bleeding, or scaliness  A sore that does not heal  New swelling or redness beyond the border of a mole    Who s at risk?  Anyone can get skin cancer. But you are at greater risk if you have:   Fair skin, light-colored hair, or light-colored eyes  Many moles or abnormal moles on your skin  A history of sunburns from sunlight or tanning beds  A family history of skin cancer  A history of exposure to radiation or chemicals  A weakened immune system  If you have had skin cancer in the past, you are at risk for recurring skin cancer.   How to check your skin  Do your monthly skin checkups in front of a full-length mirror. Check all parts of your body, including your:   Head (ears, face, neck, and scalp)  Torso (front, back, and sides)  Arms (tops, undersides, upper, and lower armpits)  Hands (palms, backs, and fingers, including  under the nails)  Buttocks and genitals  Legs (front, back, and sides)  Feet (tops, soles, toes, including under the nails, and between toes)  If you have a lot of moles, take digital photos of them each month. Make sure to take photos both up close and from a distance. These can help you see if any moles change over time.   Most skin changes are not cancer. But if you see any changes in your skin, call your doctor right away. Only he or she can diagnose a problem. If you have skin cancer, seeing your doctor can be the first step toward getting the treatment that could save your life.   [employer] last reviewed this educational content on 4/1/2019 2000-2020 [employer]. [address] [place] [address], [place] [address]. All rights reserved. This information is not intended as a substitute for professional medical care. Always follow your healthcare professional's instructions.       When should I call my doctor?  If you are worsening or not improving, please, contact us or seek urgent care as noted below.     Who should I call with questions (adults)?  [facility] (adult and pediatric): [phone]  [facility] (adult): [phone]  For urgent needs outside of business hours call the [facility] at [phone] and ask for the dermatology resident on call to be paged  If this is a medical emergency and you are unable to reach an ER, Call [phone]    Who should I call with questions (pediatric)?  [facility]- Pediatric Dermatology  Dr. Zeny Curran, Dr. Hiram Rahman, Dr. Phoebe Kenney, CRISTINA Amado, Dr. Aleida Emmanuel, Dr. Mary Damico & Dr. Dwayne Verma  Non-urgent nurse triage line; [phone]- Cassie and Rosita HICKEY Care Coordinatorgris Guerrero (/Complex ) [phone]    If you need a prescription refill, please contact your pharmacy. Refills are approved or denied by our  Physicians during normal business hours, Monday through Fridays  Per office policy, refills will not be granted if you have not been seen within the past year (or sooner depending on your child's condition)    Scheduling Information:  Pediatric Appointment Scheduling and Call Center [phone]  Radiology Scheduling- [phone]  Sedation Unit Scheduling- [phone]  [place] Scheduling- General [phone]; Pediatric Dermatology [phone]  Main  Services: [phone]  Upper sorbian: [phone]  Hungarian: [phone]  Hmong/Ukrainian/Urdu: [phone]  Preadmission Nursing Department Fax Number: [phone] (Fax all pre-operative paperwork to this number)    For urgent matters arising during evenings, weekends, or holidays that cannot wait for normal business hours please call [phone] and ask for the dermatology resident on call to be paged.

## 2023-05-08 NOTE — LETTER
5/8/2023       RE: Ellyn Mcgee  [address]  [facility] [address]     Dear Colleague,    Thank you for referring your patient, Ellyn Mcgee, to the [facility] [place] at [facility]. Please see a copy of my visit note below.    [facility] Dermatology Note  Encounter Date: May 8, 2023  Office Visit     Dermatology Problem List:  FBSE 5/8/2023   1. Family history of melanoma (father)  2. Hx of nonscarring alopecia  3. Hx of rosacea, patient uses otc treatments  4. Ink spot lentigines - right shoulder, left lower back  5. 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 Inflamed seborrheic keratosis, right posterior neck, s/p cryotherapy 6/21/2021  7. Intertrigo    ____________________________________________    Assessment & Plan:    # Hx of Intertrigo, right breast and right axilla, controlled.  Not discussed today.      # Benign lesions: Multiple benign nevi, solar lentigos, cherry angiomas. Explained to patient benign nature of lesion. No treatment is necessary at this time unless the lesion changes or becomes symptomatic.   - ABCDs of melanoma were discussed and self skin checks were advised.  - Sun precaution was advised including the use of sun screens of SPF 30 or higher, sun protective clothing, and avoidance of tanning beds.     # Family history of melanoma (father)  - Continue photoprotection - recommend SPF 30 or higher with frequent reapplication  - Continue yearly skin exams  - Advised to monitor for changing, non-healing, bleeding, painful, changing, or otherwise symptomatic lesions    Procedures Performed:   None    Follow-up: 1 year(s) in-person, or earlier for new or changing lesions    Staff and Scribe:     Scribe Disclosure:  Tessa GONZALEZ, am serving as a scribe to document services personally performed by Keke Arcos PA-C based on data collection and the  provider's statements to me.     Provider Disclosure:   The documentation recorded by the scribe accurately reflects the services I personally performed and the decisions made by me.    All risks, benefits and alternatives were discussed with patient.  Patient is in agreement and understands the assessment and plan.  All questions were answered.  Sun Screen Education was given.   Return to Clinic annually or sooner as needed.   Keke Arcos PA-C   [facility]   ____________________________________________    CC: Skin Check (Mole check, patient states her right arm exhibits pruritus  at the psots with moles and she also has two moles of concern on her back.)    HPI:  Ms. Ellyn Mcgee is a(n) 47 year old female who presents today as a return patient for FBSE. Last seen in dermatology by me on 5/16/22, at which time no spots of concern were noted.     Today, patient reports a few itchy bothersome spots as well as spots on her back that appear different.      Patient is otherwise feeling well, without additional skin concerns.    Labs Reviewed:  N/A    Physical Exam:  Vitals: There were no vitals taken for this visit.  SKIN: Total skin excluding the undergarment areas was performed. The exam included the head/face, neck, both arms, chest, back, abdomen, both legs, digits and/or nails.   - There are dome shaped bright red papules on the trunk and extremities.   - Multiple regular brown pigmented macules and papules are identified on the trunk and extremities.   - Scattered brown macules on sun exposed areas.  - There are waxy stuck on tan to brown papules on the trunk and extremities.  - There is a firm tan/flesh colored papule that dimples with lateral pressure on the right postior shoulder, central upper back, lower extremites.   - No other lesions of concern on areas examined.     Medications:  Current Outpatient Medications   Medication    amphetamine-dextroamphetamine (ADDERALL  XR) 15 MG 24 hr capsule    ibuprofen (ADVIL/MOTRIN) 200 MG tablet    LORazepam (ATIVAN) 1 MG tablet    PARoxetine (PAXIL-CR) 37.5 MG 24 hr tablet    Prenatal Vit-Fe Fumarate-FA (PRENATAL PO)    Turmeric 500 MG CAPS     No current facility-administered medications for this visit.      Past Medical History:   Patient Active Problem List   Diagnosis    Hyperlipidemia LDL goal <130    Anxiety state    PCOS (polycystic ovarian syndrome)    External hemorrhoids    Attention deficit hyperactivity disorder (ADHD), predominantly inattentive type    TEE (generalized anxiety disorder)    Cervical radiculopathy    Major depressive disorder, recurrent episode, moderate (H)     Past Medical History:   Diagnosis Date    Abnormal Pap smear     Colposcopy    Adjustment disorder with mixed anxiety and depressed mood 04/04/2012    Anemia     In Past    Anxiety     Chlamydia trachomatis infection of other specified site 1993    Depression     Depressive disorder 1979    Not diagnosed until college...later diagnosed with TEE    Fertility problem     Lyme disease 09/08/2010    ?false positive vs positve CMV - resolved    Moderate dysplasia of cervix 1995    Other and unspecified adverse effect of drug, medicinal and biological substance     insulin resistent    Varicosities     Wounds and injuries     fall down stairs, PT for back, pain meds      CC Dorothy Guaman DO  [address] [facility]  [address]   [place] [address] on close of this encounter.

## 2023-05-08 NOTE — NURSING NOTE
Dermatology Rooming Note    Ellyn Mcgee's goals for this visit include:   Chief Complaint   Patient presents with     Skin Check     Mole check, patient states her right arm exhibits pruritus  at the psots with moles and she also has two moles of concern on her back.     Jhonny Wade, EMT-B

## 2023-05-08 NOTE — PROGRESS NOTES
[facility] Dermatology Note  Encounter Date: May 8, 2023  Office Visit     Dermatology Problem List:  FBSE 5/8/2023   1. Family history of melanoma (father)  2. Hx of nonscarring alopecia  3. Hx of rosacea, patient uses otc treatments  4. Ink spot lentigines - right shoulder, left lower back  5. 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 Inflamed seborrheic keratosis, right posterior neck, s/p cryotherapy 6/21/2021  7. Intertrigo    ____________________________________________    Assessment & Plan:    # Hx of Intertrigo, right breast and right axilla, controlled.  Not discussed today.      # Benign lesions: Multiple benign nevi, solar lentigos, cherry angiomas. Explained to patient benign nature of lesion. No treatment is necessary at this time unless the lesion changes or becomes symptomatic.   - ABCDs of melanoma were discussed and self skin checks were advised.  - Sun precaution was advised including the use of sun screens of SPF 30 or higher, sun protective clothing, and avoidance of tanning beds.     # Family history of melanoma (father)  - Continue photoprotection - recommend SPF 30 or higher with frequent reapplication  - Continue yearly skin exams  - Advised to monitor for changing, non-healing, bleeding, painful, changing, or otherwise symptomatic lesions    Procedures Performed:   None    Follow-up: 1 year(s) in-person, or earlier for new or changing lesions    Staff and Scribe:     Scribe Disclosure:  I, Tessa Baltazar, am serving as a scribe to document services personally performed by Keke Arcos PA-C based on data collection and the provider's statements to me.     Provider Disclosure:   The documentation recorded by the scribe accurately reflects the services I personally performed and the decisions made by me.    All risks, benefits and alternatives were discussed with patient.  Patient is in agreement and understands the assessment and  plan.  All questions were answered.  Sun Screen Education was given.   Return to Clinic annually or sooner as needed.   Keke Arcos PA-C   [facility]   ____________________________________________    CC: Skin Check (Mole check, patient states her right arm exhibits pruritus  at the psots with moles and she also has two moles of concern on her back.)    HPI:  Ms. Ellyn Mcgee is a(n) 47 year old female who presents today as a return patient for FBSE. Last seen in dermatology by me on 5/16/22, at which time no spots of concern were noted.     Today, patient reports a few itchy bothersome spots as well as spots on her back that appear different.      Patient is otherwise feeling well, without additional skin concerns.    Labs Reviewed:  N/A    Physical Exam:  Vitals: There were no vitals taken for this visit.  SKIN: Total skin excluding the undergarment areas was performed. The exam included the head/face, neck, both arms, chest, back, abdomen, both legs, digits and/or nails.   - There are dome shaped bright red papules on the trunk and extremities.   - Multiple regular brown pigmented macules and papules are identified on the trunk and extremities.   - Scattered brown macules on sun exposed areas.  - There are waxy stuck on tan to brown papules on the trunk and extremities.  - There is a firm tan/flesh colored papule that dimples with lateral pressure on the right postior shoulder, central upper back, lower extremites.   - No other lesions of concern on areas examined.     Medications:  Current Outpatient Medications   Medication     amphetamine-dextroamphetamine (ADDERALL XR) 15 MG 24 hr capsule     ibuprofen (ADVIL/MOTRIN) 200 MG tablet     LORazepam (ATIVAN) 1 MG tablet     PARoxetine (PAXIL-CR) 37.5 MG 24 hr tablet     Prenatal Vit-Fe Fumarate-FA (PRENATAL PO)     Turmeric 500 MG CAPS     No current facility-administered medications for this visit.      Past Medical History:    Patient Active Problem List   Diagnosis     Hyperlipidemia LDL goal <130     Anxiety state     PCOS (polycystic ovarian syndrome)     External hemorrhoids     Attention deficit hyperactivity disorder (ADHD), predominantly inattentive type     TEE (generalized anxiety disorder)     Cervical radiculopathy     Major depressive disorder, recurrent episode, moderate (H)     Past Medical History:   Diagnosis Date     Abnormal Pap smear     Colposcopy     Adjustment disorder with mixed anxiety and depressed mood 04/04/2012     Anemia     In Past     Anxiety      Chlamydia trachomatis infection of other specified site 1993     Depression      Depressive disorder 1979    Not diagnosed until college...later diagnosed with TEE     Fertility problem      Lyme disease 09/08/2010    ?false positive vs positve CMV - resolved     Moderate dysplasia of cervix 1995     Other and unspecified adverse effect of drug, medicinal and biological substance     insulin resistent     Varicosities      Wounds and injuries     fall down stairs, PT for back, pain meds        CC Dorothy Guaman DO  [address] [address] on close of this encounter.

## 2023-05-16 NOTE — PROGRESS NOTES
[facility]                                               Progress Note    Patient Name: Ellyn Mcgee  Date: 5/17/2023         Service Type: Individual      Session Start Time: 4:04 p.m.  Session End Time: 5:04 p.m.     Session Length: 60 min.    Session #: 61 (with this writer; patient met previously for DA with [facility] Elena Hill and psychiatry)    Attendees: Client    Service Modality:  Video Visit:    Telemedicine Visit: The patient's condition can be safely assessed and treated via synchronous audio and visual telemedicine encounter.      Reason for Telemedicine Visit: Patient convenience (e.g. access to timely appointments / distance to available provider)    Originating Site (Patient Location): Patient's home    On-Site (Provider Location): [facility]: [place]    Consent:  The patient/guardian has verbally consented to: the potential risks and benefits of telemedicine (video visit) versus in person care; bill my insurance or make self-payment for services provided; and responsibility for payment of non-covered services.     Patient would like the video invitation sent by: Send to e-mail at: [email or web address]    Mode of Communication:  Video Conference via well    As the provider I attest to compliance with applicable laws and regulations related to telemedicine.      DATA  Extended Session (53+ minutes): PROLONGED SERVICE IN THE OUTPATIENT SETTING REQUIRING DIRECT (FACE-TO-FACE) PATIENT CONTACT BEYOND THE USUAL SERVICE:    - Longer session due to limited access to mental health appointments and necessity to address patient's distress / complexity  Interactive Complexity: No  Crisis: No       Progress Since Last Session (Related to Symptoms / Goals / Homework):  Symptoms: reports looking forward to upcoming [employer] business and has continued to have good and difficult days trying to manage family     Homework: Partially completed      Episode of Care Goals:  Satisfactory progress - ACTION (Actively working towards change); Intervened by reinforcing change plan / affirming steps taken     Current / Ongoing Stressors and Concerns:  Feeling overwhelmed by organizing home and advocating for children's needs;  and daughter are on autism spectrum    Older daughter is experiencing dizziness/lethargy and was dx with POTS; some financial and friend-related stress; is interested in a deeper dive into emotional state and core beliefs through [facility]; new son born 2020; roommate/former friend was finally evicted from their home (lived there for 3-4 years and did not paid any rent for over 1-2 years); pt has been experiencing mild panic attacks; daughter (age 10) just started ADHD medication; history of anxious attachment stemming from relationship with father (he  8 years ago); is currently pregnant (high-risk due to advanced maternal age) and baby is due Dec. 2020; is in couples therapy with  due to frequent arguments and his emotional affair earlier in the year; financial stress; history of ADHD (hyperactive type).     Treatment Objective(s) Addressed in This Session:  Needs for appreciation and validation  Self-care and barriers to this  Discussed family system issues  Participating in enjoyable activities and connecting with social and family supports  Discussed healthy boundaries and enforcement    Past/future:  Completing the stress cycle: creativity and movement  Emotional differentiation  Grounding and mindfulness  will identify how past feelings are impacting current relationships  Identified and processed recent triggers for anxiety and sadness (relationship)  Identified insecurities and how they affect patient's thoughts and actions now and in the past  Solution focused: how to prioritize and cope with interrupted sleep  Discussed work/life balance and current purpose  Addressed possible solutions to insurance and financial concerns  Identified  "personal strengths within friendships  [facility]  Identified strengths as a mother and need for positive affirmations  Management of anger  Dealing with \"big emotions\"  Self-compassion and radical acceptance  Triggers for anxiety and insecurities  tell full story of past relational issues/trust/infidelity  use cognitive strategies identified in therapy to challenge anxious thoughts   Body appreciation  Relationship repair  Discussed ambiguous loss and grief in a box analogy  Anticipatory grief and creating meaning  10:10:10 rule (10 minutes, 10 months, and 10 years)  Feeling unappreciated and the 5:1 ratio ([facility])  Javon Servin's elements of trust and relationship repair   identify how attachment style drives patient's and 's behaviors  tell full story of past trauma related to her relationship with her father (and current roommate issues)     Intervention:  Solution-Focused  Family systems theory  CBT: connect thoughts, feelings, and actions   Narrative Therapy: separate problem from person and find the bright spots     Past/future:  Management of anger and when to step away/disengage  [facility] research    Assessments completed prior to visit:  PHQ9:       9/20/2022    10:23 AM 10/26/2022    11:28 AM 12/7/2022     9:40 AM 12/20/2022     8:54 AM 2/8/2023    12:27 PM 3/7/2023     8:46 AM 4/21/2023    12:05 PM   PHQ-9 SCORE   PHQ-9 Total Score [facility] 8 (Mild depression) 8 (Mild depression) 7 (Mild depression) 4 (Minimal depression) 6 (Mild depression) 8 (Mild depression) 9 (Mild depression)   PHQ-9 Total Score 8 8 7 4 6 8 9     GAD7:       6/14/2022     9:57 AM 7/6/2022     2:01 PM 9/20/2022    10:24 AM 10/26/2022    11:29 AM 12/7/2022     9:43 AM 3/7/2023     8:47 AM 4/21/2023    12:12 PM   TEE-7 SCORE   Total Score 8 (mild anxiety) 10 (moderate anxiety) 10 (moderate anxiety) 6 (mild anxiety) 8 (mild anxiety) 5 (mild anxiety) 8 (mild anxiety)   Total Score 8 10 10 6 8 5 8     PROMIS 10-Global Health " (all questions and answers displayed):       12/15/2021    10:29 AM 3/15/2022     7:14 PM 6/14/2022     9:59 AM 7/6/2022     2:02 PM 10/26/2022    11:31 AM 2/6/2023    11:06 AM   PROMIS 10   In general, would you say your health is: Fair Fair Fair Fair Fair Fair   In general, would you say your quality of life is: Fair Good Fair Fair Fair Fair   In general, how would you rate your physical health? Fair Fair Fair Fair Fair Fair   In general, how would you rate your mental health, including your mood and your ability to think? Good Good Fair Fair Fair Fair   In general, how would you rate your satisfaction with your social activities and relationships? Fair Good Poor Fair Fair Fair   In general, please rate how well you carry out your usual social activities and roles Fair Fair Good Fair Fair Fair   To what extent are you able to carry out your everyday physical activities such as walking, climbing stairs, carrying groceries, or moving a chair? Completely Completely Completely Mostly Completely Completely   In the past 7 days, how often have you been bothered by emotional problems such as feeling anxious, depressed, or irritable? Sometimes Often Often Often Often Sometimes   In the past 7 days, how would you rate your fatigue on average? Severe Moderate Moderate Moderate Moderate Moderate   In the past 7 days, how would you rate your pain on average, where 0 means no pain, and 10 means worst imaginable pain? 5 7 7 8 3 6   In general, would you say your health is: 2    2 2 2 2 2 2    2   In general, would you say your quality of life is: 2    2 3 2 2 2 2    2   In general, how would you rate your physical health? 2    2 2 2 2 2 2    2   In general, how would you rate your mental health, including your mood and your ability to think? 3    3 3 2 2 2 2    2   In general, how would you rate your satisfaction with your social activities and relationships? 2    2 3 1 2 2 2    2   In general, please rate how well you carry  out your usual social activities and roles. (This includes activities at home, at work and in your community, and responsibilities as a parent, child, spouse, employee, friend, etc.) 2    2 2 3 2 2 2    2   To what extent are you able to carry out your everyday physical activities such as walking, climbing stairs, carrying groceries, or moving a chair? 5    5 5 5 4 5 5    5   In the past 7 days, how often have you been bothered by emotional problems such as feeling anxious, depressed, or irritable? 3    3 4 4 4 4 3    3   In the past 7 days, how would you rate your fatigue on average? 4    4 3 3 3 3 3    3   In the past 7 days, how would you rate your pain on average, where 0 means no pain, and 10 means worst imaginable pain? 5    5 7 7 8 3 6    6   Global Mental Health Score 10    10 11 7 8 8 9    9   Global Physical Health Score 12    12 12 12 11 14 13    13   PROMIS TOTAL - SUBSCORES 22    22 23 19 19 22 22    22        ASSESSMENT: Current Emotional / Mental Status (status of significant symptoms):   Risk status (Self / Other harm or suicidal ideation)   Patient denies current fears or concerns for personal safety.   Patient denies current or recent suicidal ideation or behaviors.    Patient denies current or recent homicidal ideation or behaviors.   Patient denies current or recent self injurious behavior or ideation.   Patient denies other safety concerns.   Patient reports there has been no change in risk factors since their last session.   Patient reports there has been no change in protective factors since their last session.   Recommended that patient call 911 or go to the local ED should there be a change in any of these risk factors.     Appearance:   Appropriate    Eye Contact:   Good    Psychomotor Behavior: Normal  Restless     Attitude:   Cooperative   Orientation:   All   Speech    Rate / Production: Normal/ Responsive Talkative    Volume:  Normal    Mood:    Anxious    Affect:    Appropriate   "Expansive    Thought Content:  Clear  Rumination    Thought Form:  Coherent  Neurosis   Insight:    Good      Medication Review:   No changes to current psychiatric medication(s)     Medication Compliance:   Yes     Changes in Health Issues:   May be latisha-menopausal (continued)     Recent:   Concerns about cholesterol and resting glucose levels; pain in hip  Reports having physical and having high LDL cholesterol and glucose  went to ER for testing after experiencing shooting pain in arm     Chemical Use Review:   Substance Use: Chemical use reviewed, no active concerns identified ; no current alcohol use     Tobacco Use: No current tobacco use.      Diagnosis:  1. TEE (generalized anxiety disorder)    2. Recurrent major depressive disorder, in partial remission (H)      Note: There has been demonstrated improvement in functioning while patient has been engaged in psychotherapy/psychological service- if withdrawn the patient would deteriorate and/or relapse.     Collateral Reports Completed:  N/A    PLAN: (Patient Tasks / Therapist Tasks / Other)    Patient states that her goals for the next two weeks include:    1. Homework: focus on the word and practice of \"pause\"  2. Continue working on garden and plant seeds      Keesha Eller    ______________________________________________________________________                                                           [facility]    Individual Treatment Plan    Patient's Name: Ellyn Mcgee  YOB: 1975    Date of Creation: 2020  Date Treatment Plan Last Reviewed/Revised: 2023    DSM5 Diagnoses: 296.32 (F33.1) Major Depressive Disorder, Recurrent Episode, Moderate _ or 300.02 (F41.1) Generalized Anxiety Disorder (patient has previously been diagnosed with ADHD, hyperactive type)    Psychosocial / Contextual Factors: History of anxious attachment stemming from relationship with father (he  8 years ago); is currently pregnant " "(high-risk due to advanced maternal age); is in couples therapy with  due to frequent arguments and his emotional affair earlier in the year; financial stress; history of ADHD (hyperactive type).    PROMIS (reviewed every 90 days): PROMIS-10 Scores (see above note)    Referral / Collaboration:  Care Coordination    Anticipated number of sessions for this episode of care: 60+  Anticipated frequency of sessions: Every 2-3 weeks  Anticipated duration of each session: 38-52 minutes  Treatment plan will be reviewed in 90 days or when goals have been changed.           Measurable Treatment Goal(s) related to diagnosis / functional impairment(s):  Patient is asking to focus treatment on her relationship with her father and past trauma.    Goal 1: Patient will tell full story of past trauma related to her relationship with her father and will identify how past feelings are impacting current relationships.    Objective #A (Patient Action)    Patient will identify how past feelings are impacting current relationships.  Status: Continued - Date(s): 5/4/2023 (partially completed)    Intervention(s)  Therapist will role-play conflict management.    Objective #B  Patient will identify strengths and weaknesses within her family system of origin.  Status: Continued - Date(s): 5/4/2023 (partially completed)    Intervention(s)  Therapist will assign homework for patient to create list.      Goal 2: Patient will learn about resilience, trauma responses, and Javon Servin's \"the story I'm making up\" (cognitive strategy to manage anxious thoughts).    Objective #A (Patient Action)    Patient will learn about and identify personal trauma responses.    Status: Restarted - Date: 5/4/2023      Intervention(s)  Therapist will provide educational materials on trauma responses.    Objective #B  Patient will use cognitive strategies identified in therapy to challenge anxious thoughts.  Status: Continued - Date(s): 5/4/2023 " "    Intervention(s)  Therapist will teach about Javon Servin's power of vulnerability and \"the story I'm making up\".      Goal 3: Patient will learn about protective factors that foster resilience and attachment styles and will identify how her attachment style drives her behavior.     Objective #A (Patient Action)    Status: Partially completed: 5/4/2023     Patient will learn about protective factors and will identify her own.    Intervention(s)  Therapist will complete with patient in session.    Objective #B  Patient will identify how her attachment style drives her behavior.  Status: Completed - Date: 5/4/2023      Intervention(s)  Therapist will teach emotional regulation skills.        Patient agreed to the above plan.      Keesha Eller    "

## 2023-05-17 ENCOUNTER — VIRTUAL VISIT (OUTPATIENT)
Dept: PSYCHOLOGY | Facility: OTHER | Age: 48
End: 2023-05-17
Payer: COMMERCIAL

## 2023-05-17 DIAGNOSIS — F41.1 GAD (GENERALIZED ANXIETY DISORDER): Primary | ICD-10-CM

## 2023-05-17 DIAGNOSIS — F33.41 RECURRENT MAJOR DEPRESSIVE DISORDER, IN PARTIAL REMISSION (H): ICD-10-CM

## 2023-05-17 PROCEDURE — 90837 PSYTX W PT 60 MINUTES: CPT | Mod: VID | Performed by: MARRIAGE & FAMILY THERAPIST

## 2023-05-17 NOTE — PATIENT INSTRUCTIONS
"Patient states that her goals for the next two weeks include:    Homework: focus on the word and practice of \"pause\"  Continue working on garden and plant seeds  "

## 2023-05-30 NOTE — PROGRESS NOTES
[facility]                                               Progress Note    Patient Name: Ellyn Mcgee  Date: 5/31/2023         Service Type: Individual      Session Start Time: 2:04 p.m.  Session End Time: 3:01 p.m.     Session Length: 57 min.    Session #: 62 (with this writer; patient met previously for DA with [facility] Elena Hill and psychiatry)    Attendees: Client    Service Modality:  Video Visit:    Telemedicine Visit: The patient's condition can be safely assessed and treated via synchronous audio and visual telemedicine encounter.      Reason for Telemedicine Visit: Patient convenience (e.g. access to timely appointments / distance to available provider)    Originating Site (Patient Location): Patient's home    On-Site (Provider Location): [facility]: [place]    Consent:  The patient/guardian has verbally consented to: the potential risks and benefits of telemedicine (video visit) versus in person care; bill my insurance or make self-payment for services provided; and responsibility for payment of non-covered services.     Patient would like the video invitation sent by: Send to e-mail at: [email or web address]    Mode of Communication:  Video Conference via well    As the provider I attest to compliance with applicable laws and regulations related to telemedicine.      DATA  Extended Session (53+ minutes): PROLONGED SERVICE IN THE OUTPATIENT SETTING REQUIRING DIRECT (FACE-TO-FACE) PATIENT CONTACT BEYOND THE USUAL SERVICE:    - Longer session due to limited access to mental health appointments and necessity to address patient's distress / complexity  Interactive Complexity: No  Crisis: No       Progress Since Last Session (Related to Symptoms / Goals / Homework):  Symptoms: reports feeling unappreciated and discussed difficulties in communication and emotional reciprocity when living with and having a partner on the autism spectrum.     Homework: Achieved / completed to  satisfaction      Episode of Care Goals: Moderate - satisfactory progress - ACTION (Actively working towards change); Intervened by reinforcing change plan / affirming steps taken     Current / Ongoing Stressors and Concerns:  Feeling overwhelmed by organizing home and advocating for children's needs;  and daughter are on autism spectrum    Older daughter is experiencing dizziness/lethargy and was dx with POTS; some financial and friend-related stress; is interested in a deeper dive into emotional state and core beliefs through ACT; new son born 2020; roommate/former friend was finally evicted from their home (lived there for 3-4 years and did not paid any rent for over 1-2 years); pt has been experiencing mild panic attacks; daughter (age 10) just started ADHD medication; history of anxious attachment stemming from relationship with father (he  8 years ago); is currently pregnant (high-risk due to advanced maternal age) and baby is due Dec. 2020; is in couples therapy with  due to frequent arguments and his emotional affair earlier in the year; financial stress; history of ADHD (hyperactive type).     Treatment Objective(s) Addressed in This Session:  Needs for appreciation and validation  Self-care and barriers to this  Discussed family system issues  Participating in enjoyable activities and connecting with social and family supports  Discussed healthy boundaries and enforcement    Past/future:  Completing the stress cycle: creativity and movement  Emotional differentiation  Grounding and mindfulness  will identify how past feelings are impacting current relationships  Identified and processed recent triggers for anxiety and sadness (relationship)  Identified insecurities and how they affect patient's thoughts and actions now and in the past  Solution focused: how to prioritize and cope with interrupted sleep  Discussed work/life balance and current purpose  Addressed possible solutions to  "insurance and financial concerns  Identified personal strengths within friendships  [employer] research  Identified strengths as a mother and need for positive affirmations  Management of anger  Dealing with \"big emotions\"  Self-compassion and radical acceptance  Triggers for anxiety and insecurities  tell full story of past relational issues/trust/infidelity  use cognitive strategies identified in therapy to challenge anxious thoughts   Body appreciation  Relationship repair  Discussed ambiguous loss and grief in a box analogy  Anticipatory grief and creating meaning  10:10:10 rule (10 minutes, 10 months, and 10 years)  Feeling unappreciated and the 5:1 ratio ([facility])  Javon Servin's elements of trust and relationship repair   identify how attachment style drives patient's and 's behaviors  tell full story of past trauma related to her relationship with her father (and current roommate issues)     Intervention:  Solution-Focused  Family systems theory  CBT: connect thoughts, feelings, and actions   Narrative Therapy: separate problem from person and find the bright spots     Past/future:  Management of anger and when to step away/disengage  [facility]    Assessments completed prior to visit:  PHQ9:       9/20/2022    10:23 AM 10/26/2022    11:28 AM 12/7/2022     9:40 AM 12/20/2022     8:54 AM 2/8/2023    12:27 PM 3/7/2023     8:46 AM 4/21/2023    12:05 PM   PHQ-9 SCORE   PHQ-9 Total Score [facility] 8 (Mild depression) 8 (Mild depression) 7 (Mild depression) 4 (Minimal depression) 6 (Mild depression) 8 (Mild depression) 9 (Mild depression)   PHQ-9 Total Score 8 8 7 4 6 8 9     GAD7:       6/14/2022     9:57 AM 7/6/2022     2:01 PM 9/20/2022    10:24 AM 10/26/2022    11:29 AM 12/7/2022     9:43 AM 3/7/2023     8:47 AM 4/21/2023    12:12 PM   TEE-7 SCORE   Total Score 8 (mild anxiety) 10 (moderate anxiety) 10 (moderate anxiety) 6 (mild anxiety) 8 (mild anxiety) 5 (mild anxiety) 8 (mild anxiety)   Total Score 8 " 10 10 6 8 5 8     PROMIS 10-Global Health (all questions and answers displayed):       12/15/2021    10:29 AM 3/15/2022     7:14 PM 6/14/2022     9:59 AM 7/6/2022     2:02 PM 10/26/2022    11:31 AM 2/6/2023    11:06 AM   PROMIS 10   In general, would you say your health is: Fair Fair Fair Fair Fair Fair   In general, would you say your quality of life is: Fair Good Fair Fair Fair Fair   In general, how would you rate your physical health? Fair Fair Fair Fair Fair Fair   In general, how would you rate your mental health, including your mood and your ability to think? Good Good Fair Fair Fair Fair   In general, how would you rate your satisfaction with your social activities and relationships? Fair Good Poor Fair Fair Fair   In general, please rate how well you carry out your usual social activities and roles Fair Fair Good Fair Fair Fair   To what extent are you able to carry out your everyday physical activities such as walking, climbing stairs, carrying groceries, or moving a chair? Completely Completely Completely Mostly Completely Completely   In the past 7 days, how often have you been bothered by emotional problems such as feeling anxious, depressed, or irritable? Sometimes Often Often Often Often Sometimes   In the past 7 days, how would you rate your fatigue on average? Severe Moderate Moderate Moderate Moderate Moderate   In the past 7 days, how would you rate your pain on average, where 0 means no pain, and 10 means worst imaginable pain? 5 7 7 8 3 6   In general, would you say your health is: 2    2 2 2 2 2 2    2   In general, would you say your quality of life is: 2    2 3 2 2 2 2    2   In general, how would you rate your physical health? 2    2 2 2 2 2 2    2   In general, how would you rate your mental health, including your mood and your ability to think? 3    3 3 2 2 2 2    2   In general, how would you rate your satisfaction with your social activities and relationships? 2    2 3 1 2 2 2    2   In  general, please rate how well you carry out your usual social activities and roles. (This includes activities at home, at work and in your community, and responsibilities as a parent, child, spouse, employee, friend, etc.) 2    2 2 3 2 2 2    2   To what extent are you able to carry out your everyday physical activities such as walking, climbing stairs, carrying groceries, or moving a chair? 5    5 5 5 4 5 5    5   In the past 7 days, how often have you been bothered by emotional problems such as feeling anxious, depressed, or irritable? 3    3 4 4 4 4 3    3   In the past 7 days, how would you rate your fatigue on average? 4    4 3 3 3 3 3    3   In the past 7 days, how would you rate your pain on average, where 0 means no pain, and 10 means worst imaginable pain? 5    5 7 7 8 3 6    6   Global Mental Health Score 10    10 11 7 8 8 9    9   Global Physical Health Score 12    12 12 12 11 14 13    13   PROMIS TOTAL - SUBSCORES 22    22 23 19 19 22 22    22        ASSESSMENT: Current Emotional / Mental Status (status of significant symptoms):   Risk status (Self / Other harm or suicidal ideation)   Patient denies current fears or concerns for personal safety.   Patient denies current or recent suicidal ideation or behaviors.    Patient denies current or recent homicidal ideation or behaviors.   Patient denies current or recent self injurious behavior or ideation.   Patient denies other safety concerns.   Patient reports there has been no change in risk factors since their last session.   Patient reports there has been no change in protective factors since their last session.   Recommended that patient call 911 or go to the local ED should there be a change in any of these risk factors.     Appearance:   Appropriate    Eye Contact:   Good    Psychomotor Behavior: Normal  Restless     Attitude:   Cooperative   Orientation:   All   Speech    Rate / Production: Normal/ Responsive Talkative    Volume:  Normal  "   Mood:    Anxious  Analytical   Affect:    Appropriate  Expansive    Thought Content:  Clear  Rumination    Thought Form:  Coherent  Neurosis   Insight:    Good      Medication Review:   No changes to current psychiatric medication(s)     Medication Compliance:   Yes     Changes in Health Issues:   None     Recent:  May be latisha-menopausal (continued)   Concerns about cholesterol and resting glucose levels; pain in hip  Reports having physical and having high LDL cholesterol and glucose  went to ER for testing after experiencing shooting pain in arm     Chemical Use Review:   Substance Use: Chemical use reviewed, no active concerns identified ; no current alcohol use     Tobacco Use: No current tobacco use.      Diagnosis:  1. TEE (generalized anxiety disorder)    2. Recurrent major depressive disorder, in partial remission (H)      Note: There has been demonstrated improvement in functioning while patient has been engaged in psychotherapy/psychological service- if withdrawn the patient would deteriorate and/or relapse.     Collateral Reports Completed:  N/A    PLAN: (Patient Tasks / Therapist Tasks / Other)    Patient states that her goals for the next two weeks include:    1. \"Keep from getting really stressed\"  2. \"Remember who I am\"  3. Channel confident, comfortable energy and take breaths - \"It will all be good\"  4. Host book fair    Book suggestion: The Journal of Best Practices      Keesha Eller    ______________________________________________________________________                                                           [facility]    Individual Treatment Plan    Patient's Name: Ellyn Mcgee  YOB: 1975    Date of Creation: 9/22/2020  Date Treatment Plan Last Reviewed/Revised: 5/4/2023    DSM5 Diagnoses: 296.32 (F33.1) Major Depressive Disorder, Recurrent Episode, Moderate _ or 300.02 (F41.1) Generalized Anxiety Disorder (patient has previously been diagnosed with " "ADHD, hyperactive type)    Psychosocial / Contextual Factors: History of anxious attachment stemming from relationship with father (he  8 years ago); is currently pregnant (high-risk due to advanced maternal age); is in couples therapy with  due to frequent arguments and his emotional affair earlier in the year; financial stress; history of ADHD (hyperactive type).    PROMIS (reviewed every 90 days): PROMIS-10 Scores (see above note)    Referral / Collaboration:  Care Coordination    Anticipated number of sessions for this episode of care: 60+  Anticipated frequency of sessions: Every 2-3 weeks  Anticipated duration of each session: 38-52 minutes  Treatment plan will be reviewed in 90 days or when goals have been changed.           Measurable Treatment Goal(s) related to diagnosis / functional impairment(s):  Patient is asking to focus treatment on her relationship with her father and past trauma.    Goal 1: Patient will tell full story of past trauma related to her relationship with her father and will identify how past feelings are impacting current relationships.    Objective #A (Patient Action)    Patient will identify how past feelings are impacting current relationships.  Status: Continued - Date(s): 2023 (partially completed)    Intervention(s)  Therapist will role-play conflict management.    Objective #B  Patient will identify strengths and weaknesses within her family system of origin.  Status: Continued - Date(s): 2023 (partially completed)    Intervention(s)  Therapist will assign homework for patient to create list.      Goal 2: Patient will learn about resilience, trauma responses, and Javon Servin's \"the story I'm making up\" (cognitive strategy to manage anxious thoughts).    Objective #A (Patient Action)    Patient will learn about and identify personal trauma responses.    Status: Restarted - Date: 2023      Intervention(s)  Therapist will provide educational materials on " "trauma responses.    Objective #B  Patient will use cognitive strategies identified in therapy to challenge anxious thoughts.  Status: Continued - Date(s): 5/4/2023     Intervention(s)  Therapist will teach about Robynne Gareth's power of vulnerability and \"the story I'm making up\".      Goal 3: Patient will learn about protective factors that foster resilience and attachment styles and will identify how her attachment style drives her behavior.     Objective #A (Patient Action)    Status: Partially completed: 5/4/2023     Patient will learn about protective factors and will identify her own.    Intervention(s)  Therapist will complete with patient in session.    Objective #B  Patient will identify how her attachment style drives her behavior.  Status: Completed - Date: 5/4/2023      Intervention(s)  Therapist will teach emotional regulation skills.        Patient agreed to the above plan.      Keesha Eller    "

## 2023-05-31 ENCOUNTER — VIRTUAL VISIT (OUTPATIENT)
Dept: PSYCHOLOGY | Facility: OTHER | Age: 48
End: 2023-05-31
Payer: COMMERCIAL

## 2023-05-31 DIAGNOSIS — F33.41 RECURRENT MAJOR DEPRESSIVE DISORDER, IN PARTIAL REMISSION (H): ICD-10-CM

## 2023-05-31 DIAGNOSIS — F41.1 GAD (GENERALIZED ANXIETY DISORDER): Primary | ICD-10-CM

## 2023-05-31 PROCEDURE — 90837 PSYTX W PT 60 MINUTES: CPT | Mod: VID | Performed by: MARRIAGE & FAMILY THERAPIST

## 2023-05-31 NOTE — PATIENT INSTRUCTIONS
"Patient states that her goals for the next two weeks include:    \"Keep from getting really stressed\"  \"Remember who I am\"  Channel confident, comfortable energy and take breaths - \"It will all be good\"  Host book fair    Book suggestion: The Journal of Best Practices  "

## 2023-06-13 NOTE — PROGRESS NOTES
[facility]                                               Progress Note    Patient Name: Ellyn Mcgee  Date: 6/14/2023         Service Type: Individual      Session Start Time: 2:07 p.m.  Session End Time: 3:02 p.m.     Session Length: 55 min.    Session #: 63 (with this writer; patient met previously for DA with [facility] and psychiatry)    Attendees: Client    Service Modality:  Video Visit:    Telemedicine Visit: The patient's condition can be safely assessed and treated via synchronous audio and visual telemedicine encounter.      Reason for Telemedicine Visit: Patient convenience (e.g. access to timely appointments / distance to available provider)    Originating Site (Patient Location): Patient's home    On-Site (Provider Location): [facility]: [place]    Consent:  The patient/guardian has verbally consented to: the potential risks and benefits of telemedicine (video visit) versus in person care; bill my insurance or make self-payment for services provided; and responsibility for payment of non-covered services.     Patient would like the video invitation sent by: Send to e-mail at: [email or web address]    Mode of Communication:  Video Conference via well    As the provider I attest to compliance with applicable laws and regulations related to telemedicine.      DATA  Extended Session (53+ minutes): PROLONGED SERVICE IN THE OUTPATIENT SETTING REQUIRING DIRECT (FACE-TO-FACE) PATIENT CONTACT BEYOND THE USUAL SERVICE:    - Longer session due to limited access to mental health appointments and necessity to address patient's distress / complexity  Interactive Complexity: No  Crisis: No       Progress Since Last Session (Related to Symptoms / Goals / Homework):  Symptoms: reports feeling positive about recent personal and professional development opportunities.  Mom is visiting from [place].     Homework: Achieved / completed to satisfaction      Episode of Care Goals:  Moderate - satisfactory progress - ACTION (Actively working towards change); Intervened by reinforcing change plan / affirming steps taken     Current / Ongoing Stressors and Concerns:  Feeling overwhelmed by organizing home and advocating for children's needs;  and daughter are on autism spectrum    Older daughter is experiencing dizziness/lethargy and was dx with POTS; some financial and friend-related stress; is interested in a deeper dive into emotional state and core beliefs through [facility]; new son born 2020; roommate/former friend was finally evicted from their home (lived there for 3-4 years and did not paid any rent for over 1-2 years); pt has been experiencing mild panic attacks; daughter (age 10) just started ADHD medication; history of anxious attachment stemming from relationship with father (he  8 years ago); is currently pregnant (high-risk due to advanced maternal age) and baby is due Dec. 2020; is in couples therapy with  due to frequent arguments and his emotional affair earlier in the year; financial stress; history of ADHD (hyperactive type).     Treatment Objective(s) Addressed in This Session:  Needs for appreciation and validation  Self-care and barriers to this  Discussed family system issues  Participating in enjoyable activities and connecting with social and family supports  Discussed healthy boundaries and enforcement    Past/future:  Completing the stress cycle: creativity and movement  Emotional differentiation  Grounding and mindfulness  will identify how past feelings are impacting current relationships  Identified and processed recent triggers for anxiety and sadness (relationship)  Identified insecurities and how they affect patient's thoughts and actions now and in the past  Solution focused: how to prioritize and cope with interrupted sleep  Discussed work/life balance and current purpose  Addressed possible solutions to insurance and financial  "concerns  Identified personal strengths within friendships  [facility] [place] research  Identified strengths as a mother and need for positive affirmations  Management of anger  Dealing with \"big emotions\"  Self-compassion and radical acceptance  Triggers for anxiety and insecurities  tell full story of past relational issues/trust/infidelity  use cognitive strategies identified in therapy to challenge anxious thoughts   Body appreciation  Relationship repair  Discussed ambiguous loss and grief in a box analogy  Anticipatory grief and creating meaning  10:10:10 rule (10 minutes, 10 months, and 10 years)  Feeling unappreciated and the 5:1 ratio ([facility])  Javon Servin's elements of trust and relationship repair   identify how attachment style drives patient's and 's behaviors  tell full story of past trauma related to her relationship with her father (and current roommate issues)     Intervention:  Solution-Focused  Family systems theory  CBT: connect thoughts, feelings, and actions   Narrative Therapy: separate problem from person and find the bright spots     Past/future:  Management of anger and when to step away/disengage  [place] research    Assessments completed prior to visit:  PHQ9:       9/20/2022    10:23 AM 10/26/2022    11:28 AM 12/7/2022     9:40 AM 12/20/2022     8:54 AM 2/8/2023    12:27 PM 3/7/2023     8:46 AM 4/21/2023    12:05 PM   PHQ-9 SCORE   PHQ-9 Total Score MyChart 8 (Mild depression) 8 (Mild depression) 7 (Mild depression) 4 (Minimal depression) 6 (Mild depression) 8 (Mild depression) 9 (Mild depression)   PHQ-9 Total Score 8 8 7 4 6 8 9     GAD7:       6/14/2022     9:57 AM 7/6/2022     2:01 PM 9/20/2022    10:24 AM 10/26/2022    11:29 AM 12/7/2022     9:43 AM 3/7/2023     8:47 AM 4/21/2023    12:12 PM   TEE-7 SCORE   Total Score 8 (mild anxiety) 10 (moderate anxiety) 10 (moderate anxiety) 6 (mild anxiety) 8 (mild anxiety) 5 (mild anxiety) 8 (mild anxiety)   Total Score 8 10 10 6 8 5 8 "     PROMIS 10-Global Health (all questions and answers displayed):       12/15/2021    10:29 AM 3/15/2022     7:14 PM 6/14/2022     9:59 AM 7/6/2022     2:02 PM 10/26/2022    11:31 AM 2/6/2023    11:06 AM   PROMIS 10   In general, would you say your health is: Fair Fair Fair Fair Fair Fair   In general, would you say your quality of life is: Fair Good Fair Fair Fair Fair   In general, how would you rate your physical health? Fair Fair Fair Fair Fair Fair   In general, how would you rate your mental health, including your mood and your ability to think? Good Good Fair Fair Fair Fair   In general, how would you rate your satisfaction with your social activities and relationships? Fair Good Poor Fair Fair Fair   In general, please rate how well you carry out your usual social activities and roles Fair Fair Good Fair Fair Fair   To what extent are you able to carry out your everyday physical activities such as walking, climbing stairs, carrying groceries, or moving a chair? Completely Completely Completely Mostly Completely Completely   In the past 7 days, how often have you been bothered by emotional problems such as feeling anxious, depressed, or irritable? Sometimes Often Often Often Often Sometimes   In the past 7 days, how would you rate your fatigue on average? Severe Moderate Moderate Moderate Moderate Moderate   In the past 7 days, how would you rate your pain on average, where 0 means no pain, and 10 means worst imaginable pain? 5 7 7 8 3 6   In general, would you say your health is: 2    2 2 2 2 2 2    2   In general, would you say your quality of life is: 2    2 3 2 2 2 2    2   In general, how would you rate your physical health? 2    2 2 2 2 2 2    2   In general, how would you rate your mental health, including your mood and your ability to think? 3    3 3 2 2 2 2    2   In general, how would you rate your satisfaction with your social activities and relationships? 2    2 3 1 2 2 2    2   In general,  please rate how well you carry out your usual social activities and roles. (This includes activities at home, at work and in your community, and responsibilities as a parent, child, spouse, employee, friend, etc.) 2    2 2 3 2 2 2    2   To what extent are you able to carry out your everyday physical activities such as walking, climbing stairs, carrying groceries, or moving a chair? 5    5 5 5 4 5 5    5   In the past 7 days, how often have you been bothered by emotional problems such as feeling anxious, depressed, or irritable? 3    3 4 4 4 4 3    3   In the past 7 days, how would you rate your fatigue on average? 4    4 3 3 3 3 3    3   In the past 7 days, how would you rate your pain on average, where 0 means no pain, and 10 means worst imaginable pain? 5    5 7 7 8 3 6    6   Global Mental Health Score 10    10 11 7 8 8 9    9   Global Physical Health Score 12    12 12 12 11 14 13    13   PROMIS TOTAL - SUBSCORES 22    22 23 19 19 22 22    22        ASSESSMENT: Current Emotional / Mental Status (status of significant symptoms):   Risk status (Self / Other harm or suicidal ideation)   Patient denies current fears or concerns for personal safety.   Patient denies current or recent suicidal ideation or behaviors.    Patient denies current or recent homicidal ideation or behaviors.   Patient denies current or recent self injurious behavior or ideation.   Patient denies other safety concerns.   Patient reports there has been no change in risk factors since their last session.   Patient reports there has been no change in protective factors since their last session.   Recommended that patient call 911 or go to the local ED should there be a change in any of these risk factors.     Appearance:   Appropriate    Eye Contact:   Good    Psychomotor Behavior: Normal  Restless     Attitude:   Cooperative   Orientation:   All   Speech    Rate / Production: Normal/ Responsive Talkative    Volume:  Normal    Mood:    Anxious   "Analytical   Affect:    Appropriate  Expansive    Thought Content:  Clear  Rumination    Thought Form:  Coherent  Neurosis   Insight:    Good      Medication Review:   No changes to current psychiatric medication(s)     Medication Compliance:   Yes     Changes in Health Issues:   None     Recent:  May be latisha-menopausal (continued)   Concerns about cholesterol and resting glucose levels; pain in hip  Reports having physical and having high LDL cholesterol and glucose  went to ER for testing after experiencing shooting pain in arm     Chemical Use Review:   Substance Use: Chemical use reviewed, no active concerns identified ; no current alcohol use     Tobacco Use: No current tobacco use.      Diagnosis:  1. TEE (generalized anxiety disorder)    2. Recurrent major depressive disorder, in partial remission (H)    3. Attention deficit hyperactivity disorder (ADHD), unspecified ADHD type      Note: There has been demonstrated improvement in functioning while patient has been engaged in psychotherapy/psychological service- if withdrawn the patient would deteriorate and/or relapse.     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 If Mom is able, ask her to watch toddler \"so I can clean and purge\"      Keesha Eller    ______________________________________________________________________                                                           [facility]    Individual Treatment Plan    Patient's Name: Ellyn Mcgee  YOB: 1975    Date of Creation: 9/22/2020  Date Treatment Plan Last Reviewed/Revised: 5/4/2023    DSM5 Diagnoses: 296.32 (F33.1) Major Depressive Disorder, Recurrent Episode, Moderate _ or 300.02 (F41.1) Generalized Anxiety Disorder (patient has previously been diagnosed with ADHD, hyperactive type)    Psychosocial / " "Contextual Factors: History of anxious attachment stemming from relationship with father (he  8 years ago); is currently pregnant (high-risk due to advanced maternal age); is in couples therapy with  due to frequent arguments and his emotional affair earlier in the year; financial stress; history of ADHD (hyperactive type).    PROMIS (reviewed every 90 days): PROMIS-10 Scores (see above note)    Referral / Collaboration:  Care Coordination    Anticipated number of sessions for this episode of care: 60+  Anticipated frequency of sessions: Every 2-3 weeks  Anticipated duration of each session: 38-52 minutes  Treatment plan will be reviewed in 90 days or when goals have been changed.           Measurable Treatment Goal(s) related to diagnosis / functional impairment(s):  Patient is asking to focus treatment on her relationship with her father and past trauma.    Goal 1: Patient will tell full story of past trauma related to her relationship with her father and will identify how past feelings are impacting current relationships.    Objective #A (Patient Action)    Patient will identify how past feelings are impacting current relationships.  Status: Continued - Date(s): 2023 (partially completed)    Intervention(s)  Therapist will role-play conflict management.    Objective #B  Patient will identify strengths and weaknesses within her family system of origin.  Status: Continued - Date(s): 2023 (partially completed)    Intervention(s)  Therapist will assign homework for patient to create list.      Goal 2: Patient will learn about resilience, trauma responses, and Javon Servin's \"the story I'm making up\" (cognitive strategy to manage anxious thoughts).    Objective #A (Patient Action)    Patient will learn about and identify personal trauma responses.    Status: Restarted - Date: 2023      Intervention(s)  Therapist will provide educational materials on trauma responses.    Objective #B  Patient will " "use cognitive strategies identified in therapy to challenge anxious thoughts.  Status: Continued - Date(s): 5/4/2023     Intervention(s)  Therapist will teach about Javon Servin's power of vulnerability and \"the story I'm making up\".      Goal 3: Patient will learn about protective factors that foster resilience and attachment styles and will identify how her attachment style drives her behavior.     Objective #A (Patient Action)    Status: Partially completed: 5/4/2023     Patient will learn about protective factors and will identify her own.    Intervention(s)  Therapist will complete with patient in session.    Objective #B  Patient will identify how her attachment style drives her behavior.  Status: Completed - Date: 5/4/2023      Intervention(s)  Therapist will teach emotional regulation skills.        Patient agreed to the above plan.      Keesha Eller    "

## 2023-06-14 ENCOUNTER — VIRTUAL VISIT (OUTPATIENT)
Dept: PSYCHOLOGY | Facility: OTHER | Age: 48
End: 2023-06-14
Payer: COMMERCIAL

## 2023-06-14 DIAGNOSIS — F33.41 RECURRENT MAJOR DEPRESSIVE DISORDER, IN PARTIAL REMISSION (H): ICD-10-CM

## 2023-06-14 DIAGNOSIS — F90.9 ATTENTION DEFICIT HYPERACTIVITY DISORDER (ADHD), UNSPECIFIED ADHD TYPE: ICD-10-CM

## 2023-06-14 DIAGNOSIS — F41.1 GAD (GENERALIZED ANXIETY DISORDER): Primary | ICD-10-CM

## 2023-06-14 PROCEDURE — 90837 PSYTX W PT 60 MINUTES: CPT | Mod: VID | Performed by: MARRIAGE & FAMILY THERAPIST

## 2023-06-14 NOTE — PATIENT INSTRUCTIONS
"Patient states that her goals for the next two weeks include:    Continue to get book business rolling  If Mom is able, ask her to watch toddler \"so I can clean and purge\"  " CTS team Dr. Yoon and [facility] staff

## 2023-06-27 NOTE — PROGRESS NOTES
[facility]                                               Progress Note    Patient Name: Ellyn Mcgee  Date: 6/28/2023         Service Type: Individual      Session Start Time: 2:01 p.m.  Session End Time: 3:03 p.m.     Session Length: 62 min.    Session #: 64 (with this writer; patient met previously for DA with [facility] and psychiatry)    Attendees: Client    Service Modality:  Video Visit:    Telemedicine Visit: The patient's condition can be safely assessed and treated via synchronous audio and visual telemedicine encounter.      Reason for Telemedicine Visit: Patient convenience (e.g. access to timely appointments / distance to available provider)    Originating Site (Patient Location): Patient's friend's home    On-Site (Provider Location): [facility]: [place]    Consent:  The patient/guardian has verbally consented to: the potential risks and benefits of telemedicine (video visit) versus in person care; bill my insurance or make self-payment for services provided; and responsibility for payment of non-covered services.     Patient would like the video invitation sent by: Send to e-mail at: [email or web address]    Mode of Communication:  Video Conference via well    As the provider I attest to compliance with applicable laws and regulations related to telemedicine.      DATA  Extended Session (53+ minutes): PROLONGED SERVICE IN THE OUTPATIENT SETTING REQUIRING DIRECT (FACE-TO-FACE) PATIENT CONTACT BEYOND THE USUAL SERVICE:    - Longer session due to limited access to mental health appointments and necessity to address patient's distress / complexity  Interactive Complexity: No  Crisis: No       Progress Since Last Session (Related to Symptoms / Goals / Homework):  Symptoms: reports no significant change in anxiety and reports feelings of disconnection from others in her household.     Homework: Partially completed - has read intro and first chapter of Journal of  Best Practices      Episode of Care Goals: Moderate - satisfactory progress - ACTION (Actively working towards change); Intervened by reinforcing change plan / affirming steps taken     Current / Ongoing Stressors and Concerns:  Feeling overwhelmed by organizing home and advocating for children's needs;  and daughter(s?) are on autism spectrum    Older daughter is experiencing dizziness/lethargy and was dx with POTS; some financial and friend-related stress; is interested in a deeper dive into emotional state and core beliefs through ACT; new son born 2020; roommate/former friend was finally evicted from their home (lived there for 3-4 years and did not paid any rent for over 1-2 years); pt has been experiencing mild panic attacks; daughter (age 10) just started ADHD medication; history of anxious attachment stemming from relationship with father (he  8 years ago); is currently pregnant (high-risk due to advanced maternal age) and baby is due Dec. 2020; is in couples therapy with  due to frequent arguments and his emotional affair earlier in the year; financial stress; history of ADHD (hyperactive type).     Treatment Objective(s) Addressed in This Session:  Identified personal strengths within friendships  Processed feelings of loneliness  Needs for appreciation and validation  Self-care and barriers to this  Discussed family system issues  Participating in enjoyable activities and connecting with social and family supports  Discussed healthy boundaries and enforcement    Past/future:  Completing the stress cycle: creativity and movement  Emotional differentiation  Grounding and mindfulness  will identify how past feelings are impacting current relationships  Identified and processed recent triggers for anxiety and sadness (relationship)  Identified insecurities and how they affect patient's thoughts and actions now and in the past  Solution focused: how to prioritize and cope with  "interrupted sleep  Discussed work/life balance and current purpose  Addressed possible solutions to insurance and financial concerns  Natali research  Identified strengths as a mother and need for positive affirmations  Management of anger  Dealing with \"big emotions\"  Self-compassion and radical acceptance  Triggers for anxiety and insecurities  tell full story of past relational issues/trust/infidelity  use cognitive strategies identified in therapy to challenge anxious thoughts   Body appreciation  Relationship repair  Discussed ambiguous loss and grief in a box analogy  Anticipatory grief and creating meaning  10:10:10 rule (10 minutes, 10 months, and 10 years)  Feeling unappreciated and the 5:1 ratio ([facility])  Javon Servin's elements of trust and relationship repair   identify how attachment style drives patient's and 's behaviors  tell full story of past trauma related to her relationship with her father (and current roommate issues)     Intervention:  Solution-Focused  Family systems theory  CBT: connect thoughts, feelings, and actions   Narrative Therapy: separate problem from person and find the bright spots     Past/future:  Management of anger and when to step away/disengage  Natali research    Assessments completed prior to visit:  PHQ9:       9/20/2022    10:23 AM 10/26/2022    11:28 AM 12/7/2022     9:40 AM 12/20/2022     8:54 AM 2/8/2023    12:27 PM 3/7/2023     8:46 AM 4/21/2023    12:05 PM   PHQ-9 SCORE   PHQ-9 Total Score [facility] 8 (Mild depression) 8 (Mild depression) 7 (Mild depression) 4 (Minimal depression) 6 (Mild depression) 8 (Mild depression) 9 (Mild depression)   PHQ-9 Total Score 8 8 7 4 6 8 9     GAD7:       6/14/2022     9:57 AM 7/6/2022     2:01 PM 9/20/2022    10:24 AM 10/26/2022    11:29 AM 12/7/2022     9:43 AM 3/7/2023     8:47 AM 4/21/2023    12:12 PM   TEE-7 SCORE   Total Score 8 (mild anxiety) 10 (moderate anxiety) 10 (moderate anxiety) 6 (mild anxiety) 8 (mild anxiety) " 5 (mild anxiety) 8 (mild anxiety)   Total Score 8 10 10 6 8 5 8     PROMIS 10-Global Health (all questions and answers displayed):       12/15/2021    10:29 AM 3/15/2022     7:14 PM 6/14/2022     9:59 AM 7/6/2022     2:02 PM 10/26/2022    11:31 AM 2/6/2023    11:06 AM   PROMIS 10   In general, would you say your health is: Fair Fair Fair Fair Fair Fair   In general, would you say your quality of life is: Fair Good Fair Fair Fair Fair   In general, how would you rate your physical health? Fair Fair Fair Fair Fair Fair   In general, how would you rate your mental health, including your mood and your ability to think? Good Good Fair Fair Fair Fair   In general, how would you rate your satisfaction with your social activities and relationships? Fair Good Poor Fair Fair Fair   In general, please rate how well you carry out your usual social activities and roles Fair Fair Good Fair Fair Fair   To what extent are you able to carry out your everyday physical activities such as walking, climbing stairs, carrying groceries, or moving a chair? Completely Completely Completely Mostly Completely Completely   In the past 7 days, how often have you been bothered by emotional problems such as feeling anxious, depressed, or irritable? Sometimes Often Often Often Often Sometimes   In the past 7 days, how would you rate your fatigue on average? Severe Moderate Moderate Moderate Moderate Moderate   In the past 7 days, how would you rate your pain on average, where 0 means no pain, and 10 means worst imaginable pain? 5 7 7 8 3 6   In general, would you say your health is: 2    2 2 2 2 2 2    2   In general, would you say your quality of life is: 2    2 3 2 2 2 2    2   In general, how would you rate your physical health? 2    2 2 2 2 2 2    2   In general, how would you rate your mental health, including your mood and your ability to think? 3    3 3 2 2 2 2    2   In general, how would you rate your satisfaction with your social  activities and relationships? 2    2 3 1 2 2 2    2   In general, please rate how well you carry out your usual social activities and roles. (This includes activities at home, at work and in your community, and responsibilities as a parent, child, spouse, employee, friend, etc.) 2    2 2 3 2 2 2    2   To what extent are you able to carry out your everyday physical activities such as walking, climbing stairs, carrying groceries, or moving a chair? 5    5 5 5 4 5 5    5   In the past 7 days, how often have you been bothered by emotional problems such as feeling anxious, depressed, or irritable? 3    3 4 4 4 4 3    3   In the past 7 days, how would you rate your fatigue on average? 4    4 3 3 3 3 3    3   In the past 7 days, how would you rate your pain on average, where 0 means no pain, and 10 means worst imaginable pain? 5    5 7 7 8 3 6    6   Global Mental Health Score 10    10 11 7 8 8 9    9   Global Physical Health Score 12    12 12 12 11 14 13    13   PROMIS TOTAL - SUBSCORES 22    22 23 19 19 22 22    22        ASSESSMENT: Current Emotional / Mental Status (status of significant symptoms):   Risk status (Self / Other harm or suicidal ideation)   Patient denies current fears or concerns for personal safety.   Patient denies current or recent suicidal ideation or behaviors.    Patient denies current or recent homicidal ideation or behaviors.   Patient denies current or recent self injurious behavior or ideation.   Patient denies other safety concerns.   Patient reports there has been no change in risk factors since their last session.   Patient reports there has been no change in protective factors since their last session.   Recommended that patient call 911 or go to the local ED should there be a change in any of these risk factors.     Appearance:   Appropriate    Eye Contact:   Good    Psychomotor Behavior: Normal  Restless     Attitude:   Cooperative   Orientation:   All   Speech    Rate /  Production: Normal/ Responsive Talkative    Volume:  Normal    Mood:    Anxious  Analytical   Affect:    Appropriate  Expansive    Thought Content:  Clear  Rumination    Thought Form:  Coherent  Neurosis   Insight:    Good      Medication Review:   No changes to current psychiatric medication(s)     Medication Compliance:   Yes     Changes in Health Issues:   None     Recent:  May be latisha-menopausal (continued)   Concerns about cholesterol and resting glucose levels; pain in hip  Reports having physical and having high LDL cholesterol and glucose  went to ER for testing after experiencing shooting pain in arm     Chemical Use Review:   Substance Use: Chemical use reviewed, no active concerns identified ; no current alcohol use     Tobacco Use: No current tobacco use.      Diagnosis:  1. TEE (generalized anxiety disorder)    2. Recurrent major depressive disorder, in partial remission (H)      Note: There has been demonstrated improvement in functioning while patient has been engaged in psychotherapy/psychological service- if withdrawn the patient would deteriorate and/or relapse.     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 Find time to work on [employer] business  3. Return to list of things previously put on hold and review list  4. Enjoy going out of town 7/11-7/14      Keesha Eller    ______________________________________________________________________                                                           [facility]    Individual Treatment Plan    Patient's Name: Ellyn Mcgee  YOB: 1975    Date of Creation: 9/22/2020  Date Treatment Plan Last Reviewed/Revised: 5/4/2023    DSM5 Diagnoses: 296.32 (F33.1) Major Depressive Disorder, Recurrent Episode, Moderate _ or 300.02 (F41.1) Generalized Anxiety Disorder (patient has  "previously been diagnosed with ADHD, hyperactive type)    Psychosocial / Contextual Factors: History of anxious attachment stemming from relationship with father (he  8 years ago); is currently pregnant (high-risk due to advanced maternal age); is in couples therapy with  due to frequent arguments and his emotional affair earlier in the year; financial stress; history of ADHD (hyperactive type).    PROMIS (reviewed every 90 days): PROMIS-10 Scores (see above note)    Referral / Collaboration:  Care Coordination    Anticipated number of sessions for this episode of care: 60+  Anticipated frequency of sessions: Every 2-3 weeks  Anticipated duration of each session: 38-52 minutes  Treatment plan will be reviewed in 90 days or when goals have been changed.           Measurable Treatment Goal(s) related to diagnosis / functional impairment(s):  Patient is asking to focus treatment on her relationship with her father and past trauma.    Goal 1: Patient will tell full story of past trauma related to her relationship with her father and will identify how past feelings are impacting current relationships.    Objective #A (Patient Action)    Patient will identify how past feelings are impacting current relationships.  Status: Continued - Date(s): 2023 (partially completed)    Intervention(s)  Therapist will role-play conflict management.    Objective #B  Patient will identify strengths and weaknesses within her family system of origin.  Status: Continued - Date(s): 2023 (partially completed)    Intervention(s)  Therapist will assign homework for patient to create list.      Goal 2: Patient will learn about resilience, trauma responses, and Javon Servin's \"the story I'm making up\" (cognitive strategy to manage anxious thoughts).    Objective #A (Patient Action)    Patient will learn about and identify personal trauma responses.    Status: Restarted - Date: 2023      Intervention(s)  Therapist will provide " "educational materials on trauma responses.    Objective #B  Patient will use cognitive strategies identified in therapy to challenge anxious thoughts.  Status: Continued - Date(s): 5/4/2023     Intervention(s)  Therapist will teach about Javon Gareth's power of vulnerability and \"the story I'm making up\".      Goal 3: Patient will learn about protective factors that foster resilience and attachment styles and will identify how her attachment style drives her behavior.     Objective #A (Patient Action)    Status: Partially completed: 5/4/2023     Patient will learn about protective factors and will identify her own.    Intervention(s)  Therapist will complete with patient in session.    Objective #B  Patient will identify how her attachment style drives her behavior.  Status: Completed - Date: 5/4/2023      Intervention(s)  Therapist will teach emotional regulation skills.        Patient agreed to the above plan.      Keesha Eller    "

## 2023-06-28 ENCOUNTER — VIRTUAL VISIT (OUTPATIENT)
Dept: PSYCHOLOGY | Facility: OTHER | Age: 48
End: 2023-06-28
Payer: COMMERCIAL

## 2023-06-28 DIAGNOSIS — F33.41 RECURRENT MAJOR DEPRESSIVE DISORDER, IN PARTIAL REMISSION (H): ICD-10-CM

## 2023-06-28 DIAGNOSIS — F41.1 GAD (GENERALIZED ANXIETY DISORDER): Primary | ICD-10-CM

## 2023-06-28 PROCEDURE — 90837 PSYTX W PT 60 MINUTES: CPT | Mod: VID | Performed by: MARRIAGE & FAMILY THERAPIST

## 2023-06-28 NOTE — PATIENT INSTRUCTIONS
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

## 2023-07-07 ENCOUNTER — MYC MEDICAL ADVICE (OUTPATIENT)
Dept: PSYCHIATRY | Facility: CLINIC | Age: 48
End: 2023-07-07
Payer: COMMERCIAL

## 2023-07-07 DIAGNOSIS — F33.41 RECURRENT MAJOR DEPRESSIVE DISORDER, IN PARTIAL REMISSION (H): ICD-10-CM

## 2023-07-07 DIAGNOSIS — F41.1 GAD (GENERALIZED ANXIETY DISORDER): Primary | ICD-10-CM

## 2023-07-07 RX ORDER — PAROXETINE HYDROCHLORIDE HEMIHYDRATE 25 MG/1
50 TABLET, FILM COATED, EXTENDED RELEASE ORAL EVERY MORNING
Qty: 180 TABLET | Refills: 3 | Status: SHIPPED | OUTPATIENT
Start: 2023-07-07 | End: 2024-05-14 | Stop reason: DRUGHIGH

## 2023-07-07 NOTE — TELEPHONE ENCOUNTER
Patient went back up to the 50 mg of Paxil after she was experiencing a lot of emotional issues. It seems to be helping more than the 37.5 mg. She is needing a refill if you agree to let her stay on the 50 mg.     Date of Last Office Visit: 2/6/23  Date of Next Office Visit: None  No shows since last visit: 0  Cancellations since last visit: 0    Medication requested: PARoxetine (PAXIL-CR) 37.5 MG 24 hr tablet Date last ordered: 3/23/23 Qty: 90 Refills: 3     Review of [place] ?: NA    Lapse in medication adherence greater than 5 days?: No  If yes, call patient and gather details: na  Medication refill request verified as identical to current order?: no, Wants the 50 mg daily  Result of Last DAM, VPA, Li+ Level, CBC, or Carbamazepine Level (at or since last visit): N/A      Last visit treatment plan: Continue Paxil/paroxetine CR for anxiety and mood: take 37.5 mg daily.  Message me in 2-3 weeks if you prefer to increase the dose (to 50 mg) as we discussed as a possibility, but deferred, today.    Continue lorazepam/Ativan 0.5-1 mg daily as needed for severe anxiety. To minimize exposure to infant try to breastfeed >4 hours after taking lorazepam.   Continue Adderall XR 15 mg daily as needed for ADHD symptoms.    Schedule an appointment with me in 2 months or sooner as needed.  Patient scheduled today.     []Medication refilled per  Medication Refill in Ambulatory Care  policy.  [x]Medication unable to be refilled by RN due to criteria not met as indicated below:    []Eligibility - not seen in the last year   [x]Supervision - no future appointment   []Compliance - no shows, cancellations or lapse in therapy   []Verification - order discrepancy   []Controlled medication   []Medication not included in policy   []90-day supply request   [x]Other

## 2023-07-17 ENCOUNTER — VIRTUAL VISIT (OUTPATIENT)
Dept: PSYCHOLOGY | Facility: OTHER | Age: 48
End: 2023-07-17
Payer: COMMERCIAL

## 2023-07-17 DIAGNOSIS — F90.9 ATTENTION DEFICIT HYPERACTIVITY DISORDER (ADHD), UNSPECIFIED ADHD TYPE: ICD-10-CM

## 2023-07-17 DIAGNOSIS — F41.1 GAD (GENERALIZED ANXIETY DISORDER): Primary | ICD-10-CM

## 2023-07-17 DIAGNOSIS — F33.41 RECURRENT MAJOR DEPRESSIVE DISORDER, IN PARTIAL REMISSION (H): ICD-10-CM

## 2023-07-17 PROCEDURE — 90837 PSYTX W PT 60 MINUTES: CPT | Mod: VID | Performed by: MARRIAGE & FAMILY THERAPIST

## 2023-07-17 NOTE — PATIENT INSTRUCTIONS
"Patient states that her goals for the next two weeks include:    Be thoughtful about \"when I say something I can see triggered Jimmie, be aware of it and try to communicate\"  Garden  Continue to clear out spaces/rooms  Take Adderall on regular basis  Enjoy some time in friend's pool   " I think doxepin would be reasonable for insomnia. This is a very low dose for insomnia treatment in compared to use for depression. Side effects should be minimal with this low dose.

## 2023-07-17 NOTE — PROGRESS NOTES
[facility]                                               Progress Note    Patient Name: Ellyn Mcgee  Date: 7/17/2023         Service Type: Individual      Session Start Time: 12:05 p.m.  Session End Time: 1:05 p.m.     Session Length: 60 min.    Session #: 65 (with this writer; patient met previously for DA with [facility] Elena Hill and psychiatry)    Attendees: Client    Service Modality:  Video Visit:    Telemedicine Visit: The patient's condition can be safely assessed and treated via synchronous audio and visual telemedicine encounter.      Reason for Telemedicine Visit: Patient convenience (e.g. access to timely appointments / distance to available provider)    Originating Site (Patient Location): Patient's home    On-Site (Provider Location): [facility]: [place]    Consent:  The patient/guardian has verbally consented to: the potential risks and benefits of telemedicine (video visit) versus in person care; bill my insurance or make self-payment for services provided; and responsibility for payment of non-covered services.     Patient would like the video invitation sent by: Send to e-mail at: [email or web address]    Mode of Communication:  Video Conference via well    As the provider I attest to compliance with applicable laws and regulations related to telemedicine.      DATA  Extended Session (53+ minutes): PROLONGED SERVICE IN THE OUTPATIENT SETTING REQUIRING DIRECT (FACE-TO-FACE) PATIENT CONTACT BEYOND THE USUAL SERVICE:    - Longer session due to limited access to mental health appointments and necessity to address patient's distress / complexity  Interactive Complexity: No  Crisis: No       Progress Since Last Session (Related to Symptoms / Goals / Homework):  Symptoms: reports enjoying time with her siblings in [place] this past weekend and reports less stress/anxiety.     Homework: Partially completed      Episode of Care Goals: Moderate - satisfactory progress - ACTION  (Actively working towards change); Intervened by reinforcing change plan / affirming steps taken     Current / Ongoing Stressors and Concerns:  Feeling overwhelmed by organizing home and advocating for children's needs;  and daughter(s?) are on autism spectrum    Older daughter is experiencing dizziness/lethargy and was dx with POTS; some financial and friend-related stress; is interested in a deeper dive into emotional state and core beliefs through [facility]; new son born 2020; roommate/former friend was finally evicted from their home (lived there for 3-4 years and did not paid any rent for over 1-2 years); pt has been experiencing mild panic attacks; daughter (age 10) just started ADHD medication; history of anxious attachment stemming from relationship with father (he  8 years ago); is currently pregnant (high-risk due to advanced maternal age) and baby is due Dec. 2020; is in couples therapy with  due to frequent arguments and his emotional affair earlier in the year; financial stress; history of ADHD (hyperactive type).     Treatment Objective(s) Addressed in This Session:  Addressed possible solutions to insurance and financial concerns  Identified personal strengths within friendships  Processed feelings of loneliness  Needs for appreciation and validation  Self-care and barriers to this  Discussed family system issues  Participating in enjoyable activities and connecting with social and family supports  Discussed healthy boundaries and enforcement    Past/future:  Completing the stress cycle: creativity and movement  Emotional differentiation  Grounding and mindfulness  will identify how past feelings are impacting current relationships  Identified and processed recent triggers for anxiety and sadness (relationship)  Identified insecurities and how they affect patient's thoughts and actions now and in the past  Solution focused: how to prioritize and cope with interrupted sleep  Discussed  "work/life balance and current purpose  [employer] research  Identified strengths as a mother and need for positive affirmations  Management of anger  Dealing with \"big emotions\"  Self-compassion and radical acceptance  Triggers for anxiety and insecurities  tell full story of past relational issues/trust/infidelity  use cognitive strategies identified in therapy to challenge anxious thoughts   Body appreciation  Relationship repair  Discussed ambiguous loss and grief in a box analogy  Anticipatory grief and creating meaning  10:10:10 rule (10 minutes, 10 months, and 10 years)  Feeling unappreciated and the 5:1 ratio ([employer])  Javon Servin's elements of trust and relationship repair   identify how attachment style drives patient's and 's behaviors  tell full story of past trauma related to her relationship with her father (and current roommate issues)     Intervention:  Solution-Focused  Family systems theory  CBT: connect thoughts, feelings, and actions   Narrative Therapy: separate problem from person and find the bright spots     Past/future:  Management of anger and when to step away/disengage  [employer] research    Assessments completed prior to visit:  PHQ9:       9/20/2022    10:23 AM 10/26/2022    11:28 AM 12/7/2022     9:40 AM 12/20/2022     8:54 AM 2/8/2023    12:27 PM 3/7/2023     8:46 AM 4/21/2023    12:05 PM   PHQ-9 SCORE   PHQ-9 Total Score MyChart 8 (Mild depression) 8 (Mild depression) 7 (Mild depression) 4 (Minimal depression) 6 (Mild depression) 8 (Mild depression) 9 (Mild depression)   PHQ-9 Total Score 8 8 7 4 6 8 9     GAD7:       6/14/2022     9:57 AM 7/6/2022     2:01 PM 9/20/2022    10:24 AM 10/26/2022    11:29 AM 12/7/2022     9:43 AM 3/7/2023     8:47 AM 4/21/2023    12:12 PM   TEE-7 SCORE   Total Score 8 (mild anxiety) 10 (moderate anxiety) 10 (moderate anxiety) 6 (mild anxiety) 8 (mild anxiety) 5 (mild anxiety) 8 (mild anxiety)   Total Score 8 10 10 6 8 5 8     PROMIS 10-Global Health " (all questions and answers displayed):       12/15/2021    10:29 AM 3/15/2022     7:14 PM 6/14/2022     9:59 AM 7/6/2022     2:02 PM 10/26/2022    11:31 AM 2/6/2023    11:06 AM   PROMIS 10   In general, would you say your health is: Fair Fair Fair Fair Fair Fair   In general, would you say your quality of life is: Fair Good Fair Fair Fair Fair   In general, how would you rate your physical health? Fair Fair Fair Fair Fair Fair   In general, how would you rate your mental health, including your mood and your ability to think? Good Good Fair Fair Fair Fair   In general, how would you rate your satisfaction with your social activities and relationships? Fair Good Poor Fair Fair Fair   In general, please rate how well you carry out your usual social activities and roles Fair Fair Good Fair Fair Fair   To what extent are you able to carry out your everyday physical activities such as walking, climbing stairs, carrying groceries, or moving a chair? Completely Completely Completely Mostly Completely Completely   In the past 7 days, how often have you been bothered by emotional problems such as feeling anxious, depressed, or irritable? Sometimes Often Often Often Often Sometimes   In the past 7 days, how would you rate your fatigue on average? Severe Moderate Moderate Moderate Moderate Moderate   In the past 7 days, how would you rate your pain on average, where 0 means no pain, and 10 means worst imaginable pain? 5 7 7 8 3 6   In general, would you say your health is: 2    2 2 2 2 2 2    2   In general, would you say your quality of life is: 2    2 3 2 2 2 2    2   In general, how would you rate your physical health? 2    2 2 2 2 2 2    2   In general, how would you rate your mental health, including your mood and your ability to think? 3    3 3 2 2 2 2    2   In general, how would you rate your satisfaction with your social activities and relationships? 2    2 3 1 2 2 2    2   In general, please rate how well you carry  out your usual social activities and roles. (This includes activities at home, at work and in your community, and responsibilities as a parent, child, spouse, employee, friend, etc.) 2    2 2 3 2 2 2    2   To what extent are you able to carry out your everyday physical activities such as walking, climbing stairs, carrying groceries, or moving a chair? 5    5 5 5 4 5 5    5   In the past 7 days, how often have you been bothered by emotional problems such as feeling anxious, depressed, or irritable? 3    3 4 4 4 4 3    3   In the past 7 days, how would you rate your fatigue on average? 4    4 3 3 3 3 3    3   In the past 7 days, how would you rate your pain on average, where 0 means no pain, and 10 means worst imaginable pain? 5    5 7 7 8 3 6    6   Global Mental Health Score 10    10 11 7 8 8 9    9   Global Physical Health Score 12    12 12 12 11 14 13    13   PROMIS TOTAL - SUBSCORES 22    22 23 19 19 22 22    22        ASSESSMENT: Current Emotional / Mental Status (status of significant symptoms):   Risk status (Self / Other harm or suicidal ideation)   Patient denies current fears or concerns for personal safety.   Patient denies current or recent suicidal ideation or behaviors.    Patient denies current or recent homicidal ideation or behaviors.   Patient denies current or recent self injurious behavior or ideation.   Patient denies other safety concerns.   Patient reports there has been no change in risk factors since their last session.   Patient reports there has been no change in protective factors since their last session.   Recommended that patient call 911 or go to the local ED should there be a change in any of these risk factors.     Appearance:   Appropriate    Eye Contact:   Good    Psychomotor Behavior: Normal  Restless     Attitude:   Cooperative   Orientation:   All   Speech    Rate / Production: Normal/ Responsive Talkative    Volume:  Normal    Mood:    Euthymic   Affect:    Appropriate   "Expansive    Thought Content:  Clear  Rumination    Thought Form:  Coherent  Neurosis   Insight:    Good      Medication Review:   No changes to current psychiatric medication(s)     Medication Compliance:   Yes     Changes in Health Issues:   None     Recent:  May be latihsa-menopausal (continued)   Concerns about cholesterol and resting glucose levels; pain in hip  Reports having physical and having high LDL cholesterol and glucose  went to ER for testing after experiencing shooting pain in arm     Chemical Use Review:   Substance Use: Chemical use reviewed, no active concerns identified ; no current alcohol use     Tobacco Use: No current tobacco use.      Diagnosis:  1. TEE (generalized anxiety disorder)    2. Recurrent major depressive disorder, in partial remission (H)    3. Attention deficit hyperactivity disorder (ADHD), unspecified ADHD type      Note: There has been demonstrated improvement in functioning while patient has been engaged in psychotherapy/psychological service- if withdrawn the patient would deteriorate and/or relapse.     Collateral Reports Completed:  N/A    PLAN: (Patient Tasks / Therapist Tasks / Other)    Patient states that her goals for the next two weeks include:    1. Be thoughtful about \"when I say something I can see sean Jimmie, be aware of it and try to communicate\"  2. Garden  3. Continue to clear out spaces/rooms  4. Take Adderall on regular basis  5. Enjoy some time in friend's pool       Keesha Eller    ______________________________________________________________________                                                           [facility]    Individual Treatment Plan    Patient's Name: Ellyn Mcgee  YOB: 1975    Date of Creation: 9/22/2020  Date Treatment Plan Last Reviewed/Revised: 5/4/2023    DSM5 Diagnoses: 296.32 (F33.1) Major Depressive Disorder, Recurrent Episode, Moderate _ or 300.02 (F41.1) Generalized Anxiety Disorder " "(patient has previously been diagnosed with ADHD, hyperactive type)    Psychosocial / Contextual Factors: History of anxious attachment stemming from relationship with father (he  8 years ago); is currently pregnant (high-risk due to advanced maternal age); is in couples therapy with  due to frequent arguments and his emotional affair earlier in the year; financial stress; history of ADHD (hyperactive type).    PROMIS (reviewed every 90 days): PROMIS-10 Scores (see above note)    Referral / Collaboration:  Care Coordination    Anticipated number of sessions for this episode of care: 60+  Anticipated frequency of sessions: Every 2-3 weeks  Anticipated duration of each session: 38-52 minutes  Treatment plan will be reviewed in 90 days or when goals have been changed.           Measurable Treatment Goal(s) related to diagnosis / functional impairment(s):  Patient is asking to focus treatment on her relationship with her father and past trauma.    Goal 1: Patient will tell full story of past trauma related to her relationship with her father and will identify how past feelings are impacting current relationships.    Objective #A (Patient Action)    Patient will identify how past feelings are impacting current relationships.  Status: Continued - Date(s): 2023 (partially completed)    Intervention(s)  Therapist will role-play conflict management.    Objective #B  Patient will identify strengths and weaknesses within her family system of origin.  Status: Continued - Date(s): 2023 (partially completed)    Intervention(s)  Therapist will assign homework for patient to create list.      Goal 2: Patient will learn about resilience, trauma responses, and Javon Servin's \"the story I'm making up\" (cognitive strategy to manage anxious thoughts).    Objective #A (Patient Action)    Patient will learn about and identify personal trauma responses.    Status: Restarted - Date: 2023      Intervention(s)  Therapist " "will provide educational materials on trauma responses.    Objective #B  Patient will use cognitive strategies identified in therapy to challenge anxious thoughts.  Status: Continued - Date(s): 5/4/2023     Intervention(s)  Therapist will teach about Javon Gareth's power of vulnerability and \"the story I'm making up\".      Goal 3: Patient will learn about protective factors that foster resilience and attachment styles and will identify how her attachment style drives her behavior.     Objective #A (Patient Action)    Status: Partially completed: 5/4/2023     Patient will learn about protective factors and will identify her own.    Intervention(s)  Therapist will complete with patient in session.    Objective #B  Patient will identify how her attachment style drives her behavior.  Status: Completed - Date: 5/4/2023      Intervention(s)  Therapist will teach emotional regulation skills.        Patient agreed to the above plan.      Keesha Eller    "

## 2023-07-19 ENCOUNTER — MYC MEDICAL ADVICE (OUTPATIENT)
Dept: FAMILY MEDICINE | Facility: CLINIC | Age: 48
End: 2023-07-19
Payer: COMMERCIAL

## 2023-07-19 DIAGNOSIS — E78.2 MIXED HYPERLIPIDEMIA: ICD-10-CM

## 2023-07-19 DIAGNOSIS — E78.5 ELEVATED LIPIDS: ICD-10-CM

## 2023-07-19 DIAGNOSIS — R73.09 ELEVATED GLUCOSE: Primary | ICD-10-CM

## 2023-07-19 DIAGNOSIS — M25.569 KNEE PAIN, UNSPECIFIED CHRONICITY, UNSPECIFIED LATERALITY: ICD-10-CM

## 2023-07-20 NOTE — TELEPHONE ENCOUNTER
PN,  Please see below MyChart message and advise.  Pended orders for lipid and A1C recheck.  Did you want to see patient for knee pain? Refer somewhere else?  Thanks,  Yocasta YOUNGER RN

## 2023-07-21 NOTE — TELEPHONE ENCOUNTER
Lab tests ordered -   She can schedule lab only     For knees we could refer her to sport medicine  Placed referral   Please let her know  Thanks  PN

## 2023-07-28 NOTE — PROGRESS NOTES
[facility]                                               Progress Note    Patient Name: Ellyn Mcgee  Date: 7/31/2023         Service Type: Individual      Session Start Time: 2:00 p.m.  Session End Time: 3:04 p.m.     Session Length: 64 min.    Session #: 66 (with this writer; patient met previously for DA with [facility] and psychiatry)    Attendees: Client    Service Modality:  Video Visit:    Telemedicine Visit: The patient's condition can be safely assessed and treated via synchronous audio and visual telemedicine encounter.      Reason for Telemedicine Visit: Patient convenience (e.g. access to timely appointments / distance to available provider)    Originating Site (Patient Location): Patient's home    On-Site (Provider Location): [facility]: [place]    Consent:  The patient/guardian has verbally consented to: the potential risks and benefits of telemedicine (video visit) versus in person care; bill my insurance or make self-payment for services provided; and responsibility for payment of non-covered services.     Patient would like the video invitation sent by: Send to e-mail at: [email or web address]    Mode of Communication:  Video Conference via well    As the provider I attest to compliance with applicable laws and regulations related to telemedicine.    DATA  Extended Session (53+ minutes): PROLONGED SERVICE IN THE OUTPATIENT SETTING REQUIRING DIRECT (FACE-TO-FACE) PATIENT CONTACT BEYOND THE USUAL SERVICE:    - Longer session due to limited access to mental health appointments and necessity to address patient's distress / complexity  Interactive Complexity: No  Crisis: No       Progress Since Last Session (Related to Symptoms / Goals / Homework):  Symptoms: reports some struggles finding words during recent social gathering but notes that overall she has been paying attention to her own emotional state and has been not taking things as  personally.     Homework: Achieved / completed to satisfaction      Episode of Care Goals: Moderate - satisfactory progress - ACTION (Actively working towards change); Intervened by reinforcing change plan / affirming steps taken     Current / Ongoing Stressors and Concerns:  Feeling overwhelmed by organizing home and advocating for children's needs;  and daughter(s?) are on autism spectrum    Older daughter is experiencing dizziness/lethargy and was dx with POTS; some financial and friend-related stress; is interested in a deeper dive into emotional state and core beliefs through [facility]; new son born 2020; roommate/former friend was finally evicted from their home (lived there for 3-4 years and did not paid any rent for over 1-2 years); pt has been experiencing mild panic attacks; daughter (age 10) just started ADHD medication; history of anxious attachment stemming from relationship with father (he  8 years ago); is currently pregnant (high-risk due to advanced maternal age) and baby is due Dec. 2020; is in couples therapy with  due to frequent arguments and his emotional affair earlier in the year; financial stress; history of ADHD (hyperactive type).     Treatment Objective(s) Addressed in This Session:  Addressed possible solutions to insurance and financial concerns  Identified personal strengths within friendships  Processed feelings of loneliness  Needs for appreciation and validation  Self-care and barriers to this  Discussed family system issues  Participating in enjoyable activities and connecting with social and family supports  Discussed healthy boundaries and enforcement    Past/future:  Completing the stress cycle: creativity and movement  Emotional differentiation  Grounding and mindfulness  will identify how past feelings are impacting current relationships  Identified and processed recent triggers for anxiety and sadness (relationship)  Identified insecurities and how they  "affect patient's thoughts and actions now and in the past  Solution focused: how to prioritize and cope with interrupted sleep  Discussed work/life balance and current purpose  Natali research  Identified strengths as a mother and need for positive affirmations  Management of anger  Dealing with \"big emotions\"  Self-compassion and radical acceptance  Triggers for anxiety and insecurities  tell full story of past relational issues/trust/infidelity  use cognitive strategies identified in therapy to challenge anxious thoughts   Body appreciation  Relationship repair  Discussed ambiguous loss and grief in a box analogy  Anticipatory grief and creating meaning  10:10:10 rule (10 minutes, 10 months, and 10 years)  Feeling unappreciated and the 5:1 ratio (Natali)  Javon Servin's elements of trust and relationship repair   identify how attachment style drives patient's and 's behaviors  tell full story of past trauma related to her relationship with her father (and current roommate issues)     Intervention:  Solution-Focused  Family systems theory  CBT: connect thoughts, feelings, and actions   Narrative Therapy: separate problem from person and find the bright spots     Past/future:  Management of anger and when to step away/disengage  Natali research    Assessments completed prior to visit:  PHQ9:       9/20/2022    10:23 AM 10/26/2022    11:28 AM 12/7/2022     9:40 AM 12/20/2022     8:54 AM 2/8/2023    12:27 PM 3/7/2023     8:46 AM 4/21/2023    12:05 PM   PHQ-9 SCORE   PHQ-9 Total Score MyChart 8 (Mild depression) 8 (Mild depression) 7 (Mild depression) 4 (Minimal depression) 6 (Mild depression) 8 (Mild depression) 9 (Mild depression)   PHQ-9 Total Score 8 8 7 4 6 8 9     GAD7:       6/14/2022     9:57 AM 7/6/2022     2:01 PM 9/20/2022    10:24 AM 10/26/2022    11:29 AM 12/7/2022     9:43 AM 3/7/2023     8:47 AM 4/21/2023    12:12 PM   TEE-7 SCORE   Total Score 8 (mild anxiety) 10 (moderate anxiety) 10 (moderate " anxiety) 6 (mild anxiety) 8 (mild anxiety) 5 (mild anxiety) 8 (mild anxiety)   Total Score 8 10 10 6 8 5 8     PROMIS 10-Global Health (all questions and answers displayed):       12/15/2021    10:29 AM 3/15/2022     7:14 PM 6/14/2022     9:59 AM 7/6/2022     2:02 PM 10/26/2022    11:31 AM 2/6/2023    11:06 AM   PROMIS 10   In general, would you say your health is: Fair Fair Fair Fair Fair Fair   In general, would you say your quality of life is: Fair Good Fair Fair Fair Fair   In general, how would you rate your physical health? Fair Fair Fair Fair Fair Fair   In general, how would you rate your mental health, including your mood and your ability to think? Good Good Fair Fair Fair Fair   In general, how would you rate your satisfaction with your social activities and relationships? Fair Good Poor Fair Fair Fair   In general, please rate how well you carry out your usual social activities and roles Fair Fair Good Fair Fair Fair   To what extent are you able to carry out your everyday physical activities such as walking, climbing stairs, carrying groceries, or moving a chair? Completely Completely Completely Mostly Completely Completely   In the past 7 days, how often have you been bothered by emotional problems such as feeling anxious, depressed, or irritable? Sometimes Often Often Often Often Sometimes   In the past 7 days, how would you rate your fatigue on average? Severe Moderate Moderate Moderate Moderate Moderate   In the past 7 days, how would you rate your pain on average, where 0 means no pain, and 10 means worst imaginable pain? 5 7 7 8 3 6   In general, would you say your health is: 2    2 2 2 2 2 2    2   In general, would you say your quality of life is: 2    2 3 2 2 2 2    2   In general, how would you rate your physical health? 2    2 2 2 2 2 2    2   In general, how would you rate your mental health, including your mood and your ability to think? 3    3 3 2 2 2 2    2   In general, how would you rate  your satisfaction with your social activities and relationships? 2    2 3 1 2 2 2    2   In general, please rate how well you carry out your usual social activities and roles. (This includes activities at home, at work and in your community, and responsibilities as a parent, child, spouse, employee, friend, etc.) 2    2 2 3 2 2 2    2   To what extent are you able to carry out your everyday physical activities such as walking, climbing stairs, carrying groceries, or moving a chair? 5    5 5 5 4 5 5    5   In the past 7 days, how often have you been bothered by emotional problems such as feeling anxious, depressed, or irritable? 3    3 4 4 4 4 3    3   In the past 7 days, how would you rate your fatigue on average? 4    4 3 3 3 3 3    3   In the past 7 days, how would you rate your pain on average, where 0 means no pain, and 10 means worst imaginable pain? 5    5 7 7 8 3 6    6   Global Mental Health Score 10    10 11 7 8 8 9    9   Global Physical Health Score 12    12 12 12 11 14 13    13   PROMIS TOTAL - SUBSCORES 22    22 23 19 19 22 22    22        ASSESSMENT: Current Emotional / Mental Status (status of significant symptoms):   Risk status (Self / Other harm or suicidal ideation)   Patient denies current fears or concerns for personal safety.   Patient denies current or recent suicidal ideation or behaviors.    Patient denies current or recent homicidal ideation or behaviors.   Patient denies current or recent self injurious behavior or ideation.   Patient denies other safety concerns.   Patient reports there has been no change in risk factors since their last session.   Patient reports there has been no change in protective factors since their last session.   Recommended that patient call 911 or go to the local ED should there be a change in any of these risk factors.     Appearance:   Appropriate    Eye Contact:   Good    Psychomotor Behavior: Normal  Restless      Attitude:   Cooperative   Orientation:   All   Speech    Rate / Production: Normal/ Responsive Talkative    Volume:  Normal    Mood:    Anxious  Expansive   Affect:    Appropriate  Expansive    Thought Content:  Clear  Rumination    Thought Form:  Coherent  Neurosis   Insight:    Good      Medication Review:   No changes to current psychiatric medication(s)     Medication Compliance:   Yes     Changes in Health Issues:   None     Recent:  May be latisha-menopausal (continued)   Concerns about cholesterol and resting glucose levels; pain in hip  Reports having physical and having high LDL cholesterol and glucose  went to ER for testing after experiencing shooting pain in arm     Chemical Use Review:   Substance Use: Chemical use reviewed, no active concerns identified ; no current alcohol use     Tobacco Use: No current tobacco use.      Diagnosis:  1. TEE (generalized anxiety disorder)    2. Recurrent major depressive disorder, in partial remission (H)    3. Attention deficit hyperactivity disorder (ADHD), unspecified ADHD type      Note: There has been demonstrated improvement in functioning while patient has been engaged in psychotherapy/psychological service- if withdrawn the patient would deteriorate and/or relapse.     Collateral Reports Completed:  N/A    PLAN: (Patient Tasks / Therapist Tasks / Other)    Patient states that her goals for the next two weeks include:    Try weight management program  Try to appreciate family during the day  Continue to ask for help with the kids in order to make progress on cleaning out room  Do some juan jose Eller    ______________________________________________________________________                                                           [facility]    Individual Treatment Plan    Patient's Name: Ellyn Mcgee  YOB: 1975    Date of Creation: 9/22/2020  Date Treatment Plan Last Reviewed/Revised: 5/4/2023    DSM5 Diagnoses:  "296.32 (F33.1) Major Depressive Disorder, Recurrent Episode, Moderate _ or 300.02 (F41.1) Generalized Anxiety Disorder (patient has previously been diagnosed with ADHD, hyperactive type)    Psychosocial / Contextual Factors: History of anxious attachment stemming from relationship with father (he  8 years ago); is currently pregnant (high-risk due to advanced maternal age); is in couples therapy with  due to frequent arguments and his emotional affair earlier in the year; financial stress; history of ADHD (hyperactive type).    PROMIS (reviewed every 90 days): PROMIS-10 Scores (see above note)    Referral / Collaboration:  Care Coordination    Anticipated number of sessions for this episode of care: 60+  Anticipated frequency of sessions: Every 2-3 weeks  Anticipated duration of each session: 38-52 minutes  Treatment plan will be reviewed in 90 days or when goals have been changed.           Measurable Treatment Goal(s) related to diagnosis / functional impairment(s):  Patient is asking to focus treatment on her relationship with her father and past trauma.    Goal 1: Patient will tell full story of past trauma related to her relationship with her father and will identify how past feelings are impacting current relationships.    Objective #A (Patient Action)    Patient will identify how past feelings are impacting current relationships.  Status: Continued - Date(s): 2023 (partially completed)    Intervention(s)  Therapist will role-play conflict management.    Objective #B  Patient will identify strengths and weaknesses within her family system of origin.  Status: Continued - Date(s): 2023 (partially completed)    Intervention(s)  Therapist will assign homework for patient to create list .      Goal 2: Patient will learn about resilience, trauma responses, and Javon Servin's \"the story I'm making up\" (cognitive strategy to manage anxious thoughts).    Objective #A (Patient Action)    Patient will " "learn about and identify personal trauma responses.    Status: Restarted - Date: 5/4/2023       Intervention(s)  Therapist will provide educational materials on trauma responses .    Objective #B  Patient will use cognitive strategies identified in therapy to challenge anxious thoughts.  Status: Continued - Date(s): 5/4/2023     Intervention(s)  Therapist will teach about Javon Servin's power of vulnerability and \"the story I'm making up\" .      Goal 3: Patient will learn about protective factors that foster resilience and attachment styles and will identify how her attachment style drives her behavior.     Objective #A (Patient Action)    Status:  Partially completed: 5/4/2023     Patient will learn about protective factors and will identify her own.    Intervention(s)  Therapist will complete with patient in session.    Objective #B  Patient will identify how her attachment style drives her behavior.  Status: Completed - Date: 5/4/2023       Intervention(s)  Therapist will teach emotional regulation skills.        Patient agreed to the above plan.      Keesha Eller    "

## 2023-07-31 ENCOUNTER — VIRTUAL VISIT (OUTPATIENT)
Dept: PSYCHOLOGY | Facility: OTHER | Age: 48
End: 2023-07-31
Payer: COMMERCIAL

## 2023-07-31 DIAGNOSIS — F90.9 ATTENTION DEFICIT HYPERACTIVITY DISORDER (ADHD), UNSPECIFIED ADHD TYPE: ICD-10-CM

## 2023-07-31 DIAGNOSIS — F41.1 GAD (GENERALIZED ANXIETY DISORDER): Primary | ICD-10-CM

## 2023-07-31 DIAGNOSIS — F33.41 RECURRENT MAJOR DEPRESSIVE DISORDER, IN PARTIAL REMISSION (H): ICD-10-CM

## 2023-07-31 PROCEDURE — 90837 PSYTX W PT 60 MINUTES: CPT | Mod: VID | Performed by: MARRIAGE & FAMILY THERAPIST

## 2023-07-31 NOTE — PATIENT INSTRUCTIONS
Patient states that her goals for the next two weeks include:    Try weight management program  Try to appreciate family during the day  Continue to ask for help with the kids in order to make progress on cleaning out room  Do some laundry

## 2023-08-01 ENCOUNTER — LAB (OUTPATIENT)
Dept: LAB | Facility: CLINIC | Age: 48
End: 2023-08-01
Payer: COMMERCIAL

## 2023-08-01 DIAGNOSIS — E78.5 ELEVATED LIPIDS: ICD-10-CM

## 2023-08-01 DIAGNOSIS — F33.41 RECURRENT MAJOR DEPRESSIVE DISORDER, IN PARTIAL REMISSION (H): ICD-10-CM

## 2023-08-01 DIAGNOSIS — R73.09 ELEVATED GLUCOSE: ICD-10-CM

## 2023-08-01 DIAGNOSIS — E78.2 MIXED HYPERLIPIDEMIA: ICD-10-CM

## 2023-08-01 LAB
CHOLEST SERPL-MCNC: 234 MG/DL
FASTING STATUS PATIENT QL REPORTED: YES
GLUCOSE SERPL-MCNC: 102 MG/DL (ref 70–99)
HBA1C MFR BLD: 5.3 % (ref 0–5.6)
HDLC SERPL-MCNC: 64 MG/DL
LDLC SERPL CALC-MCNC: 150 MG/DL
NONHDLC SERPL-MCNC: 170 MG/DL
TRIGL SERPL-MCNC: 99 MG/DL

## 2023-08-01 PROCEDURE — 80061 LIPID PANEL: CPT

## 2023-08-01 PROCEDURE — 36415 COLL VENOUS BLD VENIPUNCTURE: CPT

## 2023-08-01 PROCEDURE — 83036 HEMOGLOBIN GLYCOSYLATED A1C: CPT

## 2023-08-01 PROCEDURE — 82947 ASSAY GLUCOSE BLOOD QUANT: CPT

## 2023-08-01 PROCEDURE — 82306 VITAMIN D 25 HYDROXY: CPT

## 2023-08-02 ENCOUNTER — MYC MEDICAL ADVICE (OUTPATIENT)
Dept: FAMILY MEDICINE | Facility: CLINIC | Age: 48
End: 2023-08-02
Payer: COMMERCIAL

## 2023-08-02 LAB — DEPRECATED CALCIDIOL+CALCIFEROL SERPL-MC: 24 UG/L (ref 20–75)

## 2023-08-14 ENCOUNTER — MYC MEDICAL ADVICE (OUTPATIENT)
Dept: PSYCHIATRY | Facility: CLINIC | Age: 48
End: 2023-08-14
Payer: COMMERCIAL

## 2023-08-14 DIAGNOSIS — F90.0 ATTENTION DEFICIT HYPERACTIVITY DISORDER (ADHD), PREDOMINANTLY INATTENTIVE TYPE: ICD-10-CM

## 2023-08-14 NOTE — PROGRESS NOTES
[facility]                                               Progress Note    Patient Name: Ellyn Mcgee  Date: 8/18/2023         Service Type: Individual      Session Start Time: 11:05 a.m.  Session End Time: 12:04 p.m.     Session Length: 59 min.    Session #: 67 (with this writer; patient met previously for DA with [facility] and psychiatry)    Attendees: Client    Service Modality:  Video Visit:    Telemedicine Visit: The patient's condition can be safely assessed and treated via synchronous audio and visual telemedicine encounter.      Reason for Telemedicine Visit: Patient convenience (e.g. access to timely appointments / distance to available provider)    Originating Site (Patient Location): Patient's home    On-Site (Provider Location): [facility] Clinics: [place]    Consent:  The patient/guardian has verbally consented to: the potential risks and benefits of telemedicine (video visit) versus in person care; bill my insurance or make self-payment for services provided; and responsibility for payment of non-covered services.     Patient would like the video invitation sent by: Send to e-mail at: [email or web address]    Mode of Communication:  Video Conference via [facility]    As the provider I attest to compliance with applicable laws and regulations related to telemedicine.    DATA  Extended Session (53+ minutes): PROLONGED SERVICE IN THE OUTPATIENT SETTING REQUIRING DIRECT (FACE-TO-FACE) PATIENT CONTACT BEYOND THE USUAL SERVICE:    - Longer session due to limited access to mental health appointments and necessity to address patient's distress / complexity  Interactive Complexity: No  Crisis: No       Progress Since Last Session (Related to Symptoms / Goals / Homework):  Symptoms: reports improvements in emotional regulation for self and notes that she has restarted Adderall and has notices some benefits.     Homework: Achieved / completed to satisfaction      Episode of Care  "Goals: Moderate - satisfactory progress - ACTION (Actively working towards change); Intervened by reinforcing change plan / affirming steps taken     Current / Ongoing Stressors and Concerns:  Feeling overwhelmed by organizing home and advocating for children's needs;  and daughter(s?) are on autism spectrum    Older daughter is experiencing dizziness/lethargy and was dx with POTS; some financial and friend-related stress; is interested in a deeper dive into emotional state and core beliefs through [facility]; new son born 2020; roommate/former friend was finally evicted from their home (lived there for 3-4 years and did not paid any rent for over 1-2 years); pt has been experiencing mild panic attacks; daughter (age 10) just started ADHD medication; history of anxious attachment stemming from relationship with father (he  8 years ago); is currently pregnant (high-risk due to advanced maternal age) and baby is due Dec. 2020; is in couples therapy with  due to frequent arguments and his emotional affair earlier in the year; financial stress; history of ADHD (hyperactive type).     Treatment Objective(s) Addressed in This Session:  Identified strengths as a mother and need for positive affirmations; needs for appreciation and validation  Management of anger  Dealing with \"big emotions\"  Self-compassion and radical acceptance  Addressed possible solutions to insurance and financial concerns  Identified personal strengths within friendships  Processed feelings of loneliness  Self-care and barriers to this  Discussed family system issues  Participating in enjoyable activities and connecting with social and family supports  Discussed healthy boundaries and enforcement    Past/future:  Completing the stress cycle: creativity and movement  Emotional differentiation  Grounding and mindfulness  will identify how past feelings are impacting current relationships  Identified and processed recent triggers for " anxiety and sadness (relationship)  Identified insecurities and how they affect patient's thoughts and actions now and in the past  Solution focused: how to prioritize and cope with interrupted sleep  Discussed work/life balance and current purpose  Natali pradhan  Triggers for anxiety and insecurities  tell full story of past relational issues/trust/infidelity  use cognitive strategies identified in therapy to challenge anxious thoughts   Body appreciation  Relationship repair  Discussed ambiguous loss and grief in a box analogy  Anticipatory grief and creating meaning  10:10:10 rule (10 minutes, 10 months, and 10 years)  Feeling unappreciated and the 5:1 ratio ([facility]  Javon Servin's elements of trust and relationship repair   identify how attachment style drives patient's and 's behaviors  tell full story of past trauma related to her relationship with her father (and current roommate issues)     Intervention:  Solution-Focused  Family systems theory  CBT: connect thoughts, feelings, and actions   Narrative Therapy: separate problem from person and find the bright spots     Past/future:  Management of anger and when to step away/disengage  [employer]chivan research    Assessments completed prior to visit:  PHQ9:       9/20/2022    10:23 AM 10/26/2022    11:28 AM 12/7/2022     9:40 AM 12/20/2022     8:54 AM 2/8/2023    12:27 PM 3/7/2023     8:46 AM 4/21/2023    12:05 PM   PHQ-9 SCORE   PHQ-9 Total Score MyChart 8 (Mild depression) 8 (Mild depression) 7 (Mild depression) 4 (Minimal depression) 6 (Mild depression) 8 (Mild depression) 9 (Mild depression)   PHQ-9 Total Score 8 8 7 4 6 8 9     GAD7:       6/14/2022     9:57 AM 7/6/2022     2:01 PM 9/20/2022    10:24 AM 10/26/2022    11:29 AM 12/7/2022     9:43 AM 3/7/2023     8:47 AM 4/21/2023    12:12 PM   TEE-7 SCORE   Total Score 8 (mild anxiety) 10 (moderate anxiety) 10 (moderate anxiety) 6 (mild anxiety) 8 (mild anxiety) 5 (mild anxiety) 8 (mild anxiety)   Total  Score 8 10 10 6 8 5 8     PROMIS 10-Global Health (all questions and answers displayed):       12/15/2021    10:29 AM 3/15/2022     7:14 PM 6/14/2022     9:59 AM 7/6/2022     2:02 PM 10/26/2022    11:31 AM 2/6/2023    11:06 AM 8/11/2023    11:06 AM   PROMIS 10   In general, would you say your health is: Fair Fair Fair Fair Fair Fair Good   In general, would you say your quality of life is: Fair Good Fair Fair Fair Fair Fair   In general, how would you rate your physical health? Fair Fair Fair Fair Fair Fair Fair   In general, how would you rate your mental health, including your mood and your ability to think? Good Good Fair Fair Fair Fair Fair   In general, how would you rate your satisfaction with your social activities and relationships? Fair Good Poor Fair Fair Fair Fair   In general, please rate how well you carry out your usual social activities and roles Fair Fair Good Fair Fair Fair Good   To what extent are you able to carry out your everyday physical activities such as walking, climbing stairs, carrying groceries, or moving a chair? Completely Completely Completely Mostly Completely Completely Mostly   In the past 7 days, how often have you been bothered by emotional problems such as feeling anxious, depressed, or irritable? Sometimes Often Often Often Often Sometimes Sometimes   In the past 7 days, how would you rate your fatigue on average? Severe Moderate Moderate Moderate Moderate Moderate Severe   In the past 7 days, how would you rate your pain on average, where 0 means no pain, and 10 means worst imaginable pain? 5 7 7 8 3 6 7   In general, would you say your health is: 2    2 2 2 2 2 2    2 3   In general, would you say your quality of life is: 2    2 3 2 2 2 2    2 2   In general, how would you rate your physical health? 2    2 2 2 2 2 2    2 2   In general, how would you rate your mental health, including your mood and your ability to think? 3    3 3 2 2 2 2    2 2   In general, how would you  rate your satisfaction with your social activities and relationships? 2    2 3 1 2 2 2    2 2   In general, please rate how well you carry out your usual social activities and roles. (This includes activities at home, at work and in your community, and responsibilities as a parent, child, spouse, employee, friend, etc.) 2    2 2 3 2 2 2    2 3   To what extent are you able to carry out your everyday physical activities such as walking, climbing stairs, carrying groceries, or moving a chair? 5    5 5 5 4 5 5    5 4   In the past 7 days, how often have you been bothered by emotional problems such as feeling anxious, depressed, or irritable? 3    3 4 4 4 4 3    3 3   In the past 7 days, how would you rate your fatigue on average? 4    4 3 3 3 3 3    3 4   In the past 7 days, how would you rate your pain on average, where 0 means no pain, and 10 means worst imaginable pain? 5    5 7 7 8 3 6    6 7   Global Mental Health Score 10    10 11 7 8 8 9    9 9   Global Physical Health Score 12    12 12 12 11 14 13    13 10   PROMIS TOTAL - SUBSCORES 22    22 23 19 19 22 22    22 19        ASSESSMENT: Current Emotional / Mental Status (status of significant symptoms):   Risk status (Self / Other harm or suicidal ideation)   Patient denies current fears or concerns for personal safety.   Patient denies current or recent suicidal ideation or behaviors.    Patient denies current or recent homicidal ideation or behaviors.   Patient denies current or recent self injurious behavior or ideation.   Patient denies other safety concerns.   Patient reports there has been no change in risk factors since their last session.   Patient reports there has been no change in protective factors since their last session.   Recommended that patient call 911 or go to the local ED should there be a change in any of these risk factors.     Appearance:   Appropriate    Eye Contact:   Good    Psychomotor Behavior: Normal  Restless      Attitude:   Cooperative   Orientation:   All   Speech    Rate / Production: Normal/ Responsive Talkative    Volume:  Normal    Mood:    Anxious  Expansive Calm despite distractions   Affect:    Appropriate  Expansive    Thought Content:  Clear  Rumination    Thought Form:  Coherent  Neurosis   Insight:    Good      Medication Review:   Changes to psychiatric medications, see updated Medication List in EPIC.  Restarted Adderall.     Medication Compliance:   Yes     Changes in Health Issues:   None     Recent:  May be latisha-menopausal (continued)   Concerns about cholesterol and resting glucose levels; pain in hip  Reports having physical and having high LDL cholesterol and glucose  went to ER for testing after experiencing shooting pain in arm     Chemical Use Review:   Substance Use: Chemical use reviewed, no active concerns identified ; no current alcohol use     Tobacco Use: No current tobacco use.      Diagnosis:  1. TEE (generalized anxiety disorder)    2. Recurrent major depressive disorder, in partial remission (H)      Note: There has been demonstrated improvement in functioning while patient has been engaged in psychotherapy/psychological service- if withdrawn the patient would deteriorate and/or relapse.     Collateral Reports Completed:  N/A    PLAN: (Patient Tasks / Therapist Tasks / Other)    Patient states that her goals for the next 3-4 weeks include:    Keep following meal plan (with accommodations as needed)  Monitor Adderall usefulness and symptoms  Look into getting autism assessments for younger two kids  Continue weeding and spending time in the garden  Prep kids to go back to school      Keesha Eller    ______________________________________________________________________                                                           [facility]    Individual Treatment Plan    Patient's Name: Ellyn Mcgee  YOB: 1975    Date of Creation: 9/22/2020  Date  "Treatment Plan Last Reviewed/Revised: 2023    DSM5 Diagnoses: 296.32 (F33.1) Major Depressive Disorder, Recurrent Episode, Moderate _ or 300.02 (F41.1) Generalized Anxiety Disorder (patient has previously been diagnosed with ADHD, hyperactive type)    Psychosocial / Contextual Factors: History of anxious attachment stemming from relationship with father (he  8 years ago); is currently pregnant (high-risk due to advanced maternal age); is in couples therapy with  due to frequent arguments and his emotional affair earlier in the year; financial stress; history of ADHD (hyperactive type).    PROMIS (reviewed every 90 days): PROMIS-10 Scores (see above note)    Referral / Collaboration:  Care Coordination    Anticipated number of sessions for this episode of care: 100  Anticipated frequency of sessions: Every 2-3 weeks  Anticipated duration of each session: 53+ minutes  Treatment plan will be reviewed in 90 days or when goals have been changed.           Measurable Treatment Goal(s) related to diagnosis / functional impairment(s):  Patient is asking to focus treatment on her relationship with her father and past trauma.    Goal 1: Patient will tell full story of past trauma related to her relationship with her father and will identify how past feelings are impacting current relationships.    Objective #A (Patient Action)    Patient will identify how past feelings are impacting current relationships.  Status: Continued - Date(s): 2023 (partially completed)    Intervention(s)  Therapist will role-play conflict management.    Objective #B  Patient will identify strengths and weaknesses within her family system of origin.  Status: Continued - Date(s): 2023 (partially completed)    Intervention(s)  Therapist will assign homework for patient to create list .      Goal 2: Patient will learn about resilience, trauma responses, and Javon Servin's \"the story I'm making up\" (cognitive strategy to manage " "anxious thoughts).    Objective #A (Patient Action)    Patient will learn about and identify personal trauma responses.    Status: Continued - Date(s): 8/18/2023     Intervention(s)  Therapist will provide educational materials on trauma responses .    Objective #B  Patient will use cognitive strategies identified in therapy to challenge anxious thoughts.  Status: Continued - Date(s): 8/18/2023     Intervention(s)  Therapist will teach about Javon Servin's power of vulnerability and \"the story I'm making up\" .      Goal 3: Patient will learn about protective factors that foster resilience and attachment styles and will identify how her attachment style drives her behavior.     Objective #A (Patient Action)    Status:  Partially completed: 8/18/2023     Patient will learn about protective factors and will identify her own.    Intervention(s)  Therapist will complete with patient in session.    Objective #B  Patient will identify how her attachment style drives her behavior.  Status: Completed - Date: 8/18/2023       Intervention(s)  Therapist will teach emotional regulation skills.        Patient agreed to the above plan.      Keesha Eller    "

## 2023-08-15 NOTE — TELEPHONE ENCOUNTER
Patient had her Vitamin D level done about 2 weeks ago.   Vitamin D, Total (25-Hydroxy) 20 - 75 ug/L 24     She also has been taking the Adderall 15 mg 3-4 days a week and says it is really helping. She needs a refill.     Date of Last Office Visit: 2/6/23  Date of Next Office Visit: None  No shows since last visit: 0  Cancellations since last visit: 0    Medication requested: amphetamine-dextroamphetamine (ADDERALL XR) 15 MG 24 hr capsule  Date last ordered: 6/14/22 Qty: 30 Refills: 0     Review of [place] ?: Yes  Medication last sold date: Unknown, none this year Qty filled: NA  Other controlled substance on [place] ?: no    Lapse in medication adherence greater than 5 days?: Takes Prn  If yes, call patient and gather details: na  Medication refill request verified as identical to current order?: yes  Result of Last DAM, VPA, Li+ Level, CBC, or Carbamazepine Level (at or since last visit): N/A    Last visit treatment plan:   Continue Paxil/paroxetine CR for anxiety and mood: take 37.5 mg daily.  Message me in 2-3 weeks if you prefer to increase the dose (to 50 mg) as we discussed as a possibility, but deferred, today.  Continue lorazepam/Ativan 0.5-1 mg daily as needed for severe anxiety. To minimize exposure to infant try to breastfeed >4 hours after taking lorazepam.   Continue Adderall XR 15 mg daily as needed for ADHD symptoms.  Discussed risks vs benefits of taking stimulant medication while lactating/breast-feeding.  Additional information previsously sent in [employer] message.    []Medication refilled per  Medication Refill in Ambulatory Care  policy.  [x]Medication unable to be refilled by RN due to criteria not met as indicated below:    []Eligibility - not seen in the last year   []Supervision - no future appointment   []Compliance - no shows, cancellations or lapse in therapy   []Verification - order discrepancy   [x]Controlled medication   []Medication not included in policy   []90-day supply  request   []Other

## 2023-08-16 RX ORDER — DEXTROAMPHETAMINE SACCHARATE, AMPHETAMINE ASPARTATE MONOHYDRATE, DEXTROAMPHETAMINE SULFATE AND AMPHETAMINE SULFATE 3.75; 3.75; 3.75; 3.75 MG/1; MG/1; MG/1; MG/1
15 CAPSULE, EXTENDED RELEASE ORAL DAILY PRN
Qty: 30 CAPSULE | Refills: 0 | Status: SHIPPED | OUTPATIENT
Start: 2023-08-16 | End: 2023-10-13 | Stop reason: DRUGHIGH

## 2023-08-17 ENCOUNTER — TELEPHONE (OUTPATIENT)
Dept: PSYCHIATRY | Facility: CLINIC | Age: 48
End: 2023-08-17
Payer: COMMERCIAL

## 2023-08-18 ENCOUNTER — VIRTUAL VISIT (OUTPATIENT)
Dept: PSYCHOLOGY | Facility: OTHER | Age: 48
End: 2023-08-18
Payer: COMMERCIAL

## 2023-08-18 DIAGNOSIS — F41.1 GAD (GENERALIZED ANXIETY DISORDER): Primary | ICD-10-CM

## 2023-08-18 DIAGNOSIS — F33.41 RECURRENT MAJOR DEPRESSIVE DISORDER, IN PARTIAL REMISSION (H): ICD-10-CM

## 2023-08-18 PROCEDURE — 90837 PSYTX W PT 60 MINUTES: CPT | Mod: VID | Performed by: MARRIAGE & FAMILY THERAPIST

## 2023-08-18 NOTE — PATIENT INSTRUCTIONS
Patient states that her goals for the next 3-4 weeks include:    Keep following meal plan (with accommodations as needed)  Monitor Adderall usefulness and symptoms  Look into getting autism assessments for younger two kids  Continue weeding and spending time in the garden  Prep kids to go back to school

## 2023-08-18 NOTE — CONFIDENTIAL NOTE
Prior Authorization Retail Medication Request    Medication/Dose: amphetamine-dextroamphetamine (ADDERALL XR) 15 MG 24 hr capsule  ICD code (if different than what is on RX):    Previously Tried and Failed:  Concerta   Rationale:  Continue Adderall XR 15 mg daily as needed for ADHD symptoms.  Discussed risks vs benefits of taking stimulant medication while lactating/breast-feeding.  Additional information previsously sent in [employer] message.

## 2023-08-23 NOTE — TELEPHONE ENCOUNTER
Central Prior Authorization Team   Phone: [phone]    PA Initiation    Medication: AMPHETAMINE-DEXTROAMPHET ER 15 MG PO CP24  Insurance Company: [employer] Non-Specialty PA's - Phone [phone] Fax [phone]  Pharmacy Filling the Rx: [facility] #[id number] [place], [place] - [address] AT [facility]  Filling Pharmacy Phone: [phone]  Filling Pharmacy Fax:    Start Date: 8/23/2023

## 2023-08-25 NOTE — TELEPHONE ENCOUNTER
Called [employer] @ [phone] to check status. Spoke to rep Dwayne who says case was cancelled. He started a new case with me over the phone and got an immediate approval.      Prior Authorization Approval     Medication: AMPHETAMINE-DEXTROAMPHET ER 15 MG PO CP24  Authorization Effective Date: 7/26/2023  Authorization Expiration Date: 8/24/2024  Approved Dose/Quantity: 30 per 30 days  Reference #: [id number]   Insurance Company: [employer] Non-Specialty PA's - Phone [phone] Fax [phone]  Expected CoPay:       CoPay Card Available:      Financial Assistance Needed:   Which Pharmacy is filling the prescription: [facility] #[id number] [place], [place] - [address] AT [facility] OF [facility]  Pharmacy Notified: Yes  Patient Notified: No

## 2023-09-13 ENCOUNTER — VIRTUAL VISIT (OUTPATIENT)
Dept: PSYCHOLOGY | Facility: OTHER | Age: 48
End: 2023-09-13
Payer: COMMERCIAL

## 2023-09-13 DIAGNOSIS — F33.41 RECURRENT MAJOR DEPRESSIVE DISORDER, IN PARTIAL REMISSION (H): ICD-10-CM

## 2023-09-13 DIAGNOSIS — F90.9 ATTENTION DEFICIT HYPERACTIVITY DISORDER (ADHD), UNSPECIFIED ADHD TYPE: ICD-10-CM

## 2023-09-13 DIAGNOSIS — F41.1 GAD (GENERALIZED ANXIETY DISORDER): Primary | ICD-10-CM

## 2023-09-13 PROCEDURE — 90837 PSYTX W PT 60 MINUTES: CPT | Mod: VID | Performed by: MARRIAGE & FAMILY THERAPIST

## 2023-09-13 NOTE — PATIENT INSTRUCTIONS
Patient states that her goals for the next 2-3 weeks include:    Pay attention to body sensations and triggers leading to reactivity  Continue to go to the [employer]  Continue to get on track with eating plan and taking care of body  Continue to work on organizing the house

## 2023-09-13 NOTE — PROGRESS NOTES
[facility]                                               Progress Note    Patient Name: Ellyn Mcgee  Date: 9/13/2023         Service Type: Individual      Session Start Time: 11:05 a.m.  Session End Time: 11:59 a.m.     Session Length: 54 min.    Session #: 68 (with this writer; patient met previously for DA with [facility] and psychiatry)    Attendees: Client    Service Modality:  Video Visit:    Telemedicine Visit: The patient's condition can be safely assessed and treated via synchronous audio and visual telemedicine encounter.      Reason for Telemedicine Visit: Patient convenience (e.g. access to timely appointments / distance to available provider)    Originating Site (Patient Location): Patient's home    On-Site (Provider Location): [facility]: [place]    Consent:  The patient/guardian has verbally consented to: the potential risks and benefits of telemedicine (video visit) versus in person care; bill my insurance or make self-payment for services provided; and responsibility for payment of non-covered services.     Patient would like the video invitation sent by: Send to e-mail at: [email or web address]    Mode of Communication:  Video Conference via [facility]    As the provider I attest to compliance with applicable laws and regulations related to telemedicine.    DATA  Extended Session (53+ minutes): PROLONGED SERVICE IN THE OUTPATIENT SETTING REQUIRING DIRECT (FACE-TO-FACE) PATIENT CONTACT BEYOND THE USUAL SERVICE:    - Longer session due to limited access to mental health appointments and necessity to address patient's distress / complexity  Interactive Complexity: No  Crisis: No       Progress Since Last Session (Related to Symptoms / Goals / Homework):  Symptoms: reports improvements in mood and would like to slightly increase her ADHD med (pt will talk to her provider).  Patient discussed her reactivity and triggers.     Homework: Achieved / completed to  "satisfaction - improved self-care ([employer] and chiropractic care)      Episode of Care Goals: Moderate - satisfactory progress - ACTION (Actively working towards change); Intervened by reinforcing change plan / affirming steps taken     Current / Ongoing Stressors and Concerns:  Feeling overwhelmed by organizing home and advocating for children's needs;  and daughter(s?) are on autism spectrum    Older daughter is experiencing dizziness/lethargy and was dx with POTS; some financial and friend-related stress; is interested in a deeper dive into emotional state and core beliefs through ACT; new son born 2020; roommate/former friend was finally evicted from their home (lived there for 3-4 years and did not paid any rent for over 1-2 years); pt has been experiencing mild panic attacks; daughter (age 10) just started ADHD medication; history of anxious attachment stemming from relationship with father (he  8 years ago); is currently pregnant (high-risk due to advanced maternal age) and baby is due Dec. 2020; is in couples therapy with  due to frequent arguments and his emotional affair earlier in the year; financial stress; history of ADHD (hyperactive type).     Treatment Objective(s) Addressed in This Session:  Management of anger  Dealing with \"big emotions\"  Self-care and barriers to this  Discussed family system issues  Participating in enjoyable activities and connecting with social and family supports  Discussed healthy boundaries and enforcement    Past/future:  Self-compassion and radical acceptance  Addressed possible solutions to insurance and financial concerns  Identified personal strengths within friendships  Processed feelings of loneliness  Identified strengths as a mother and need for positive affirmations; needs for appreciation and validation  Completing the stress cycle: creativity and movement  Emotional differentiation  Grounding and mindfulness  will identify how past feelings " are impacting current relationships  Identified and processed recent triggers for anxiety and sadness (relationship)  Identified insecurities and how they affect patient's thoughts and actions now and in the past  Solution focused: how to prioritize and cope with interrupted sleep  Discussed work/life balance and current purpose  Natali pradhan  Triggers for anxiety and insecurities  tell full story of past relational issues/trust/infidelity  use cognitive strategies identified in therapy to challenge anxious thoughts   Body appreciation  Relationship repair  Discussed ambiguous loss and grief in a box analogy  Anticipatory grief and creating meaning  10:10:10 rule (10 minutes, 10 months, and 10 years)  Feeling unappreciated and the 5:1 ratio ([facility])  Javon Servin's elements of trust and relationship repair   identify how attachment style drives patient's and 's behaviors  tell full story of past trauma related to her relationship with her father (and current roommate issues)     Intervention:  Solution-Focused  Somatic experiencing  Management of anger and when to step away/disengage  Family systems theory  CBT: connect thoughts, feelings, and actions   Narrative Therapy: separate problem from person and find the bright spots     Past/future:  Natali research    Assessments completed prior to visit:  PHQ9:       9/20/2022    10:23 AM 10/26/2022    11:28 AM 12/7/2022     9:40 AM 12/20/2022     8:54 AM 2/8/2023    12:27 PM 3/7/2023     8:46 AM 4/21/2023    12:05 PM   PHQ-9 SCORE   PHQ[facility]9 [facility] 8 (Mild depression) 8 (Mild depression) 7 (Mild depression) 4 (Minimal depression) 6 (Mild depression) 8 (Mild depression) 9 (Mild depression)   PHQ-9 Total Score 8 8 7 4 6 8 9     GAD7:       6/14/2022     9:57 AM 7/6/2022     2:01 PM 9/20/2022    10:24 AM 10/26/2022    11:29 AM 12/7/2022     9:43 AM 3/7/2023     8:47 AM 4/21/2023    12:12 PM   TEE-7 SCORE   Total Score 8 (mild anxiety) 10 (moderate  anxiety) 10 (moderate anxiety) 6 (mild anxiety) 8 (mild anxiety) 5 (mild anxiety) 8 (mild anxiety)   Total Score 8 10 10 6 8 5 8     PROMIS 10-Global Health (all questions and answers displayed):       12/15/2021    10:29 AM 3/15/2022     7:14 PM 6/14/2022     9:59 AM 7/6/2022     2:02 PM 10/26/2022    11:31 AM 2/6/2023    11:06 AM 8/11/2023    11:06 AM   PROMIS 10   In general, would you say your health is: Fair Fair Fair Fair Fair Fair Good   In general, would you say your quality of life is: Fair Good Fair Fair Fair Fair Fair   In general, how would you rate your physical health? Fair Fair Fair Fair Fair Fair Fair   In general, how would you rate your mental health, including your mood and your ability to think? Good Good Fair Fair Fair Fair Fair   In general, how would you rate your satisfaction with your social activities and relationships? Fair Good Poor Fair Fair Fair Fair   In general, please rate how well you carry out your usual social activities and roles Fair Fair Good Fair Fair Fair Good   To what extent are you able to carry out your everyday physical activities such as walking, climbing stairs, carrying groceries, or moving a chair? Completely Completely Completely Mostly Completely Completely Mostly   In the past 7 days, how often have you been bothered by emotional problems such as feeling anxious, depressed, or irritable? Sometimes Often Often Often Often Sometimes Sometimes   In the past 7 days, how would you rate your fatigue on average? Severe Moderate Moderate Moderate Moderate Moderate Severe   In the past 7 days, how would you rate your pain on average, where 0 means no pain, and 10 means worst imaginable pain? 5 7 7 8 3 6 7   In general, would you say your health is: 2    2 2 2 2 2 2    2 3   In general, would you say your quality of life is: 2    2 3 2 2 2 2    2 2   In general, how would you rate your physical health? 2    2 2 2 2 2 2    2 2   In general, how would you rate your mental  health, including your mood and your ability to think? 3    3 3 2 2 2 2    2 2   In general, how would you rate your satisfaction with your social activities and relationships? 2    2 3 1 2 2 2    2 2   In general, please rate how well you carry out your usual social activities and roles. (This includes activities at home, at work and in your community, and responsibilities as a parent, child, spouse, employee, friend, etc.) 2    2 2 3 2 2 2    2 3   To what extent are you able to carry out your everyday physical activities such as walking, climbing stairs, carrying groceries, or moving a chair? 5    5 5 5 4 5 5    5 4   In the past 7 days, how often have you been bothered by emotional problems such as feeling anxious, depressed, or irritable? 3    3 4 4 4 4 3    3 3   In the past 7 days, how would you rate your fatigue on average? 4    4 3 3 3 3 3    3 4   In the past 7 days, how would you rate your pain on average, where 0 means no pain, and 10 means worst imaginable pain? 5    5 7 7 8 3 6    6 7   Global Mental Health Score 10    10 11 7 8 8 9    9 9   Global Physical Health Score 12    12 12 12 11 14 13    13 10   PROMIS TOTAL - SUBSCORES 22    22 23 19 19 22 22    22 19        ASSESSMENT: Current Emotional / Mental Status (status of significant symptoms):   Risk status (Self / Other harm or suicidal ideation)   Patient denies current fears or concerns for personal safety.   Patient denies current or recent suicidal ideation or behaviors.    Patient denies current or recent homicidal ideation or behaviors.   Patient denies current or recent self injurious behavior or ideation.   Patient denies other safety concerns.   Patient reports there has been no change in risk factors since their last session.   Patient reports there has been no change in protective factors since their last session.   Recommended that patient call 911 or go to the local ED should there be a change in any of these risk  factors.     Appearance:   Appropriate    Eye Contact:   Good    Psychomotor Behavior: Normal  Restless     Attitude:   Cooperative, friendly   Orientation:   All   Speech    Rate / Production: Normal/ Responsive Talkative    Volume:  Normal    Mood:    Anxious  Expansive Regulated   Affect:    Appropriate  Expansive    Thought Content:  Clear  Rumination    Thought Form:  Coherent  Neurosis   Insight:    Good      Medication Review:   No changes to current psychiatric medication(s)     Medication Compliance:   Yes     Changes in Health Issues:   None     Recent:  May be latisha-menopausal (continued)   Concerns about cholesterol and resting glucose levels; pain in hip  Reports having physical and having high LDL cholesterol and glucose  went to ER for testing after experiencing shooting pain in arm     Chemical Use Review:   Substance Use: Chemical use reviewed, no active concerns identified ; no current alcohol use     Tobacco Use: No current tobacco use.      Diagnosis:  1. TEE (generalized anxiety disorder)    2. Recurrent major depressive disorder, in partial remission (H)    3. Attention deficit hyperactivity disorder (ADHD), unspecified ADHD type      Note: There has been demonstrated improvement in functioning while patient has been engaged in psychotherapy/psychological service- if withdrawn the patient would deteriorate and/or relapse.     Collateral Reports Completed:  N/A    PLAN: (Patient Tasks / Therapist Tasks / Other)    Patient states that her goals for the next 2-3 weeks include:    Pay attention to body sensations and triggers leading to reactivity  Continue to go to the [place]  Continue to get on track with eating plan and taking care of body  Continue to work on organizing the house      Keesha Eller    ______________________________________________________________________                                                           [facility]    Individual Treatment  Plan    Patient's Name: Ellyn Mcgee  YOB: 1975    Date of Creation: 2020  Date Treatment Plan Last Reviewed/Revised: 2023    DSM5 Diagnoses: 296.32 (F33.1) Major Depressive Disorder, Recurrent Episode, Moderate _ or 300.02 (F41.1) Generalized Anxiety Disorder (patient has previously been diagnosed with ADHD, hyperactive type)    Psychosocial / Contextual Factors: History of anxious attachment stemming from relationship with father (he  8 years ago); is currently pregnant (high-risk due to advanced maternal age); is in couples therapy with  due to frequent arguments and his emotional affair earlier in the year; financial stress; history of ADHD (hyperactive type).    PROMIS (reviewed every 90 days): PROMIS-10 Scores (see above note)    Referral / Collaboration:  Care Coordination    Anticipated number of sessions for this episode of care: 100  Anticipated frequency of sessions: Every 2-3 weeks  Anticipated duration of each session: 53+ minutes  Treatment plan will be reviewed in 90 days or when goals have been changed.           Measurable Treatment Goal(s) related to diagnosis / functional impairment(s):  Patient is asking to focus treatment on her relationship with her father and past trauma.    Goal 1: Patient will tell full story of past trauma related to her relationship with her father and will identify how past feelings are impacting current relationships.    Objective #A (Patient Action)    Patient will identify how past feelings are impacting current relationships.  Status: Continued - Date(s): 2023 (partially completed)    Intervention(s)  Therapist will role-play conflict management.    Objective #B  Patient will identify strengths and weaknesses within her family system of origin.  Status: Continued - Date(s): 2023 (partially completed)    Intervention(s)  Therapist will assign homework for patient to create list .      Goal 2: Patient will learn about  "resilience, trauma responses, and Javon Servin's \"the story I'm making up\" (cognitive strategy to manage anxious thoughts).    Objective #A (Patient Action)    Patient will learn about and identify personal trauma responses.    Status: Continued - Date(s): 8/18/2023     Intervention(s)  Therapist will provide educational materials on trauma responses .    Objective #B  Patient will use cognitive strategies identified in therapy to challenge anxious thoughts.  Status: Continued - Date(s): 8/18/2023     Intervention(s)  Therapist will teach about Javon Servin's power of vulnerability and \"the story I'm making up\" .      Goal 3: Patient will learn about protective factors that foster resilience and attachment styles and will identify how her attachment style drives her behavior.     Objective #A (Patient Action)    Status:  Partially completed: 8/18/2023     Patient will learn about protective factors and will identify her own.    Intervention(s)  Therapist will complete with patient in session.    Objective #B  Patient will identify how her attachment style drives her behavior.  Status: Completed - Date: 8/18/2023       Intervention(s)  Therapist will teach emotional regulation skills.        Patient agreed to the above plan.      Keesha Eller    "

## 2023-09-26 NOTE — PROGRESS NOTES
[facility]                                               Progress Note    Patient Name: Ellyn Mcgee  Date: 10/2/2023         Service Type: Individual      Session Start Time: 11:01 a.m.  Session End Time: 12:01 p.m.     Session Length: 60 min.    Session #: 69 (with this writer; patient met previously for DA with [facility] and psychiatry)    Attendees: Client    Service Modality:  Video Visit:    Telemedicine Visit: The patient's condition can be safely assessed and treated via synchronous audio and visual telemedicine encounter.      Reason for Telemedicine Visit: Patient convenience (e.g. access to timely appointments / distance to available provider)    Originating Site (Patient Location): local [facility] outside    On-Site (Provider Location): [facility]: [place]    Consent:  The patient/guardian has verbally consented to: the potential risks and benefits of telemedicine (video visit) versus in person care; bill my insurance or make self-payment for services provided; and responsibility for payment of non-covered services.     Patient would like the video invitation sent by: Send to e-mail at: [email or web address]    Mode of Communication:  Video Conference via Amwell    As the provider I attest to compliance with applicable laws and regulations related to telemedicine.    DATA  Extended Session (53+ minutes): PROLONGED SERVICE IN THE OUTPATIENT SETTING REQUIRING DIRECT (FACE-TO-FACE) PATIENT CONTACT BEYOND THE USUAL SERVICE:    - Longer session due to limited access to mental health appointments and necessity to address patient's distress / complexity  Interactive Complexity: No  Crisis: No       Progress Since Last Session (Related to Symptoms / Goals / Homework):  Symptoms: reports some regression in ability to focus, anticipate issues at work and home, and decline in overall mood after returning from trip to [place] to see 's family.  Pt states that her 's  "close uncle  while they were on the trip.     Homework: Achieved / completed to satisfaction - improved self-care (has been getting to [facility])      Episode of Care Goals: Moderate - satisfactory progress - ACTION (Actively working towards change); Intervened by reinforcing change plan / affirming steps taken     Current / Ongoing Stressors and Concerns:  Feeling overwhelmed by organizing home and advocating for children's needs;  and daughter(s?) are on autism spectrum    Older daughter is experiencing dizziness/lethargy and was dx with POTS; some financial and friend-related stress; is interested in a deeper dive into emotional state and core beliefs through [facility]; new son born 2020; roommate/former friend was finally evicted from their home (lived there for 3-4 years and did not paid any rent for over 1-2 years); pt has been experiencing mild panic attacks; daughter (age 10) just started ADHD medication; history of anxious attachment stemming from relationship with father (he  8 years ago); is currently pregnant (high-risk due to advanced maternal age) and baby is due Dec. 2020; is in couples therapy with  due to frequent arguments and his emotional affair earlier in the year; financial stress; history of ADHD (hyperactive type).     Treatment Objective(s) Addressed in This Session:  Management of anger/irritability  Coping with NT/ND relationship dynamics  Dealing with \"big emotions\"  Self-care and barriers to this  Discussed family system issues  Participating in enjoyable activities and connecting with social and family supports  Discussed healthy boundaries and enforcement    Past/future:  Self-compassion and radical acceptance  Addressed possible solutions to insurance and financial concerns  Identified personal strengths within friendships  Processed feelings of loneliness  Identified strengths as a mother and need for positive affirmations; needs for appreciation and " validation  Completing the stress cycle: creativity and movement  Emotional differentiation  Grounding and mindfulness  will identify how past feelings are impacting current relationships  Identified and processed recent triggers for anxiety and sadness (relationship)  Identified insecurities and how they affect patient's thoughts and actions now and in the past  Solution focused: how to prioritize and cope with interrupted sleep  Discussed work/life balance and current purpose  Natali pradhan  Triggers for anxiety and insecurities  tell full story of past relational issues/trust/infidelity  use cognitive strategies identified in therapy to challenge anxious thoughts   Body appreciation  Relationship repair  Discussed ambiguous loss and grief in a box analogy  Anticipatory grief and creating meaning  10:10:10 rule (10 minutes, 10 months, and 10 years)  Feeling unappreciated and the 5:1 ratio ([facility])  Javon Servin's elements of trust and relationship repair   identify how attachment style drives patient's and 's behaviors  tell full story of past trauma related to her relationship with her father (and current roommate issues)     Intervention:  Solution-Focused  Family systems theory  CBT: connect thoughts, feelings, and actions   Narrative Therapy: separate problem from person and find the bright spots     Past/future:  Gottman research  Somatic experiencing  Management of anger and when to step away/disengage    Assessments completed prior to visit:  PHQ9:       10/26/2022    11:28 AM 12/7/2022     9:40 AM 12/20/2022     8:54 AM 2/8/2023    12:27 PM 3/7/2023     8:46 AM 4/21/2023    12:05 PM 10/2/2023    11:00 AM   PHQ-9 SCORE   PHQ-9 Total Score Manojt 8 (Mild depression) 7 (Mild depression) 4 (Minimal depression) 6 (Mild depression) 8 (Mild depression) 9 (Mild depression) 9 (Mild depression)   PHQ-9 Total Score 8 7 4 6 8 9 9     GAD7:       6/14/2022     9:57 AM 7/6/2022     2:01 PM 9/20/2022    10:24  AM 10/26/2022    11:29 AM 12/7/2022     9:43 AM 3/7/2023     8:47 AM 4/21/2023    12:12 PM   TEE-7 SCORE   Total Score 8 (mild anxiety) 10 (moderate anxiety) 10 (moderate anxiety) 6 (mild anxiety) 8 (mild anxiety) 5 (mild anxiety) 8 (mild anxiety)   Total Score 8 10 10 6 8 5 8     PROMIS 10-Global Health (all questions and answers displayed):       12/15/2021    10:29 AM 3/15/2022     7:14 PM 6/14/2022     9:59 AM 7/6/2022     2:02 PM 10/26/2022    11:31 AM 2/6/2023    11:06 AM 8/11/2023    11:06 AM   PROMIS 10   In general, would you say your health is: Fair Fair Fair Fair Fair Fair Good   In general, would you say your quality of life is: Fair Good Fair Fair Fair Fair Fair   In general, how would you rate your physical health? Fair Fair Fair Fair Fair Fair Fair   In general, how would you rate your mental health, including your mood and your ability to think? Good Good Fair Fair Fair Fair Fair   In general, how would you rate your satisfaction with your social activities and relationships? Fair Good Poor Fair Fair Fair Fair   In general, please rate how well you carry out your usual social activities and roles Fair Fair Good Fair Fair Fair Good   To what extent are you able to carry out your everyday physical activities such as walking, climbing stairs, carrying groceries, or moving a chair? Completely Completely Completely Mostly Completely Completely Mostly   In the past 7 days, how often have you been bothered by emotional problems such as feeling anxious, depressed, or irritable? Sometimes Often Often Often Often Sometimes Sometimes   In the past 7 days, how would you rate your fatigue on average? Severe Moderate Moderate Moderate Moderate Moderate Severe   In the past 7 days, how would you rate your pain on average, where 0 means no pain, and 10 means worst imaginable pain? 5 7 7 8 3 6 7   In general, would you say your health is: 2    2 2 2 2 2 2    2 3   In general, would you say your quality of life is: 2     2 3 2 2 2 2    2 2   In general, how would you rate your physical health? 2    2 2 2 2 2 2    2 2   In general, how would you rate your mental health, including your mood and your ability to think? 3    3 3 2 2 2 2    2 2   In general, how would you rate your satisfaction with your social activities and relationships? 2    2 3 1 2 2 2    2 2   In general, please rate how well you carry out your usual social activities and roles. (This includes activities at home, at work and in your community, and responsibilities as a parent, child, spouse, employee, friend, etc.) 2    2 2 3 2 2 2    2 3   To what extent are you able to carry out your everyday physical activities such as walking, climbing stairs, carrying groceries, or moving a chair? 5    5 5 5 4 5 5    5 4   In the past 7 days, how often have you been bothered by emotional problems such as feeling anxious, depressed, or irritable? 3    3 4 4 4 4 3    3 3   In the past 7 days, how would you rate your fatigue on average? 4    4 3 3 3 3 3    3 4   In the past 7 days, how would you rate your pain on average, where 0 means no pain, and 10 means worst imaginable pain? 5    5 7 7 8 3 6    6 7   Global Mental Health Score 10    10 11 7 8 8 9    9 9   Global Physical Health Score 12    12 12 12 11 14 13    13 10   PROMIS TOTAL - SUBSCORES 22    22 23 19 19 22 22    22 19        ASSESSMENT: Current Emotional / Mental Status (status of significant symptoms):   Risk status (Self / Other harm or suicidal ideation)   Patient denies current fears or concerns for personal safety.   Patient denies current or recent suicidal ideation or behaviors.    Patient denies current or recent homicidal ideation or behaviors.   Patient denies current or recent self injurious behavior or ideation.   Patient denies other safety concerns.   Patient reports there has been no change in risk factors since their last session.   Patient reports there has been no change in protective factors since  their last session.   Recommended that patient call 911 or go to the local ED should there be a change in any of these risk factors.     Appearance:   Appropriate    Eye Contact:   Good    Psychomotor Behavior: Normal  Restless     Attitude:   Cooperative, friendly   Orientation:   All   Speech    Rate / Production: Normal/ Responsive Talkative    Volume:  Normal    Mood:    Anxious  Sad  Expansive   Affect:    Appropriate  Expansive    Thought Content:  Clear  Rumination    Thought Form:  Coherent  Neurosis   Insight:    Good      Medication Review:   No changes to current psychiatric medication(s)     Medication Compliance:   Yes     Changes in Health Issues:   None     Recent:  May be latisha-menopausal (continued)   Concerns about cholesterol and resting glucose levels; pain in hip  Reports having physical and having high LDL cholesterol and glucose  went to ER for testing after experiencing shooting pain in arm     Chemical Use Review:   Substance Use: Chemical use reviewed, no active concerns identified ; no current alcohol use     Tobacco Use: No current tobacco use.      Diagnosis:  1. TEE (generalized anxiety disorder)    2. Recurrent major depressive disorder, in partial remission (H24)    3. Attention deficit hyperactivity disorder (ADHD), unspecified ADHD type      Note: There has been demonstrated improvement in functioning while patient has been engaged in psychotherapy/psychological service- if withdrawn the patient would deteriorate and/or relapse.     Collateral Reports Completed:  N/A    PLAN: (Patient Tasks / Therapist Tasks / Other)    Patient states that her goals for the next three weeks include:    Restart exercise program  Continue to go to the [facility]  Research how to cope with and deter son's undesirable behaviors  Get help for middle child      Keesha Eller    ______________________________________________________________________                                                           [facility]    Individual Treatment Plan    Patient's Name: Ellyn Mcege  YOB: 1975    Date of Creation: 2020  Date Treatment Plan Last Reviewed/Revised: 2023    DSM5 Diagnoses: 296.32 (F33.1) Major Depressive Disorder, Recurrent Episode, Moderate _ or 300.02 (F41.1) Generalized Anxiety Disorder (patient has previously been diagnosed with ADHD, hyperactive type)    Psychosocial / Contextual Factors: History of anxious attachment stemming from relationship with father (he  8 years ago); is currently pregnant (high-risk due to advanced maternal age); is in couples therapy with  due to frequent arguments and his emotional affair earlier in the year; financial stress; history of ADHD (hyperactive type).    PROMIS (reviewed every 90 days): PROMIS-10 Scores (see above note)    Referral / Collaboration:  Care Coordination    Anticipated number of sessions for this episode of care: 100  Anticipated frequency of sessions: Every 2-3 weeks  Anticipated duration of each session: 53+ minutes  Treatment plan will be reviewed in 90 days or when goals have been changed.           Measurable Treatment Goal(s) related to diagnosis / functional impairment(s):  Patient is asking to focus treatment on her relationship with her father and past trauma.    Goal 1: Patient will tell full story of past trauma related to her relationship with her father and will identify how past feelings are impacting current relationships.    Objective #A (Patient Action)    Patient will identify how past feelings are impacting current relationships.  Status: Continued - Date(s): 2023 (partially completed)    Intervention(s)  Therapist will role-play conflict management.    Objective #B  Patient will identify strengths and weaknesses within her family system of origin.  Status: Continued - Date(s): 2023 (partially completed)    Intervention(s)  Therapist will assign homework for patient to  "create list .      Goal 2: Patient will learn about resilience, trauma responses, and Javon Servin's \"the story I'm making up\" (cognitive strategy to manage anxious thoughts).    Objective #A (Patient Action)    Patient will learn about and identify personal trauma responses.    Status: Continued - Date(s): 8/18/2023     Intervention(s)  Therapist will provide educational materials on trauma responses .    Objective #B  Patient will use cognitive strategies identified in therapy to challenge anxious thoughts.  Status: Continued - Date(s): 8/18/2023     Intervention(s)  Therapist will teach about Javon Gareth's power of vulnerability and \"the story I'm making up\" .      Goal 3: Patient will learn about protective factors that foster resilience and attachment styles and will identify how her attachment style drives her behavior.     Objective #A (Patient Action)    Status:  Partially completed: 8/18/2023     Patient will learn about protective factors and will identify her own.    Intervention(s)  Therapist will complete with patient in session.    Objective #B  Patient will identify how her attachment style drives her behavior.  Status: Completed - Date: 8/18/2023       Intervention(s)  Therapist will teach emotional regulation skills.        Patient agreed to the above plan.      Keesha Eller    "

## 2023-10-02 ENCOUNTER — VIRTUAL VISIT (OUTPATIENT)
Dept: PSYCHOLOGY | Facility: OTHER | Age: 48
End: 2023-10-02
Payer: COMMERCIAL

## 2023-10-02 DIAGNOSIS — F41.1 GAD (GENERALIZED ANXIETY DISORDER): Primary | ICD-10-CM

## 2023-10-02 DIAGNOSIS — F33.41 RECURRENT MAJOR DEPRESSIVE DISORDER, IN PARTIAL REMISSION (H): ICD-10-CM

## 2023-10-02 DIAGNOSIS — F90.9 ATTENTION DEFICIT HYPERACTIVITY DISORDER (ADHD), UNSPECIFIED ADHD TYPE: ICD-10-CM

## 2023-10-02 PROCEDURE — 90837 PSYTX W PT 60 MINUTES: CPT | Mod: VID | Performed by: MARRIAGE & FAMILY THERAPIST

## 2023-10-02 NOTE — PATIENT INSTRUCTIONS
Patient states that her goals for the next three weeks include:    Restart exercise program  Continue to go to the [facility]  Research how to cope with and deter son's undesirable behaviors  Get help for middle child

## 2023-10-13 ENCOUNTER — VIRTUAL VISIT (OUTPATIENT)
Dept: PSYCHIATRY | Facility: CLINIC | Age: 48
End: 2023-10-13
Payer: COMMERCIAL

## 2023-10-13 DIAGNOSIS — F41.1 GAD (GENERALIZED ANXIETY DISORDER): ICD-10-CM

## 2023-10-13 DIAGNOSIS — F90.0 ATTENTION DEFICIT HYPERACTIVITY DISORDER (ADHD), PREDOMINANTLY INATTENTIVE TYPE: ICD-10-CM

## 2023-10-13 DIAGNOSIS — F33.41 RECURRENT MAJOR DEPRESSIVE DISORDER, IN PARTIAL REMISSION (H): Primary | ICD-10-CM

## 2023-10-13 DIAGNOSIS — G47.00 INSOMNIA, UNSPECIFIED TYPE: ICD-10-CM

## 2023-10-13 PROCEDURE — 99214 OFFICE O/P EST MOD 30 MIN: CPT | Mod: 95 | Performed by: PSYCHIATRY & NEUROLOGY

## 2023-10-13 NOTE — PATIENT INSTRUCTIONS
Treatment Plan:  Continue Paxil/paroxetine CR for anxiety and mood: take 50 mg daily.    Continue lorazepam/Ativan 0.5-1 mg daily as needed for severe anxiety.   Continue Adderall XR 25 mg daily as needed for ADHD symptoms.  Continue all other medications as reviewed per electronic medical record today.   Safety plan reviewed. To the Emergency Department as needed or call after hours crisis line at [phone] or [phone]. [place] Crisis Text Line. Text [place] to 007234 or Suicide LifeLine Chat: suicidepreventionlifeline.org/chat  Continue therapy as planned.   Schedule an appointment with me in 6 months or sooner as needed.  Patient scheduled today.  Follow up with primary care provider as planned or for acute medical concerns.  Call the psychiatric nurse line with medication questions or concerns at [phone].  PaxVaxhart may be used to communicate with your provider, but this is not intended to be used for emergencies.    Risks of stimulant medication include, but not limited to, decreased appetite, risk of tics (and that they may be lasting), trouble sleeping, cardiac risks such as increased heart rate and blood pressure, and rare risk of sudden cardiac death.  Also risk of addiction/tolerance/dependence.    Risks of benzodiazepine (Ativan, Xanax, Klonopin, Valium, etc) use including, but not limited to, sedation, tolerance, risk for addiction/dependence. Do not drink alcohol while taking benzodiazepines due to risk of trouble breathing and potential death. Do not drive or operate heavy machinery until it is known how the drug affects you. Discuss with physician or pharmacist before ever taking a benzodiazepine with a narcotic/opioid pain medication.     Therapy Resources:  Www.PsychologyToday.com  Www.NAMIMN.org    [facility]- Psychiatric Disorders During  Pregnancy  [email or web address]-[email or web address]    MothertoBaby  [email or web address]

## 2023-10-13 NOTE — PROGRESS NOTES
"Virtual Visit Details    Type of service:  Video Visit   Video Start Time: {video visit start/end time for provider to select:458678}  Video End Time:{video visit start/end time for provider to select:231543}    Originating Location (pt. Location): {video visit patient location:513173::\"Home\"}  {PROVIDER LOCATION On-site should be selected for visits conducted from your clinic location or adjoining [facility] hospital, academic office, or other nearby [facility] building. Off-site should be selected for all other provider locations, including home:216997}  Distant Location (provider location):  {virtual location provider:394178}  Platform used for Video Visit: {Virtual Visit Platforms:214889::\"ShoppinPal\"}  "

## 2023-10-13 NOTE — NURSING NOTE
Is the patient currently in the state of [place]? YES    Visit mode:VIDEO    If the visit is dropped, the patient can be reconnected by: VIDEO VISIT: Text to cell phone:   Telephone Information:   Mobile [phone]       Will anyone else be joining the visit? NO  (If patient encounters technical issues they should call [phone])    How would you like to obtain your AVS? MyChart    Are changes needed to the allergy or medication list? Yes Pt flagged Adderall 15 mg for removal, as well as PAXIL-CR 37.5mg.    Reason for visit: RECHECK    Siomara SCHMIDT

## 2023-10-13 NOTE — PROGRESS NOTES
"Telemedicine Visit: The patient's condition can be safely assessed and treated via synchronous audio and visual telemedicine encounter.      Reason for Telemedicine Visit: Patient has requested telehealth visit    Originating Site (Patient Location): [facility] located within [place]    Distant Location (provider location):  Off-site    Consent:  The patient/guardian has verbally consented to: the potential risks and benefits of telemedicine (video visit) versus in person care; bill my insurance or make self-payment for services provided; and responsibility for payment of non-covered services.     Mode of Communication:  Video Conference via [employer]    As the provider I attest to compliance with applicable laws and regulations related to telemedicine.        Outpatient Psychiatric Progress Note    Name: Ellyn Mcgee   : 1975                    Primary Care Provider: Dorothy Guaman DO   Therapist: BARBARA Schulte         3/7/2023     8:46 AM 2023    12:05 PM 10/2/2023    11:00 AM   PHQ   PHQ-9 Total Score 8 9 9   Q9: Thoughts of better off dead/self-harm past 2 weeks Not at all Not at all Not at all   TEE-7 scores:      2022     9:43 AM 3/7/2023     8:47 AM 2023    12:12 PM   TEE-7 SCORE   Total Score 8 (mild anxiety) 5 (mild anxiety) 8 (mild anxiety)   Total Score 8 5 8       Patient Identification:  Patient is a 48 year old,   White Not  or  female  who presents for return visit with me.  Patient is currently employed part time but on maternity leave. Patient attended the phone/video session alone. Patient prefers to be called: \"Ellyn\".    Interim History:  I last saw Ellyn Mcgee for outpatient psychiatry Return Visit on 2023 During that appointment, we:    Continue Paxil/paroxetine CR for anxiety and mood: take 37.5 mg daily.  Message me in 2-3 weeks if you prefer to increase the dose (to 50 mg) as we discussed as a possibility, but deferred, " today.  Continue lorazepam/Ativan 0.5-1 mg daily as needed for severe anxiety. To minimize exposure to infant try to breastfeed >4 hours after taking lorazepam.   Continue Adderall XR 15 mg daily as needed for ADHD symptoms.  Discussed risks vs benefits of taking stimulant medication while lactating/breast-feeding.  Additional information previsously sent in [employer] message.    10/13: Pt reports that things have been very busy. Had a good mentality shift late summer. Has been going to the [facility] a few days a week the last couple weeks. Re-starting the Adderall has been very helpful.  Mood relatively stable.  Anxiety manageable but still high at times.  No acute safety concerns.  No SI.  No problematic drug or alcohol use.  Working on following a diet plan.  Very little alcohol consumption.      Psychiatric ROS:  Ellyn Mcgee reports mood has been: Relatively stable  Anxiety has been: Manageable  Sleep has been: Up and down  Isabel sxs: None  Psychosis sxs: None  ADHD/ADD sxs: Improved on Adderall  PTSD sxs: None  PHQ9 and GAD7 scores were reviewed today if completed.   Medication side effects: Denies  Current stressors include: See HPI above  Coping mechanisms and supports include: Therapy, Family, Hobbies and Friends    Current medications include:   Current Outpatient Medications   Medication Sig    amphetamine-dextroamphetamine (ADDERALL XR) 15 MG 24 hr capsule Take 1 capsule (15 mg) by mouth daily as needed (ADHD)    amphetamine-dextroamphetamine (ADDERALL XR) 25 MG 24 hr capsule Take 1 capsule (25 mg) by mouth daily for 30 days    [START ON 11/11/2023] amphetamine-dextroamphetamine (ADDERALL XR) 25 MG 24 hr capsule Take 1 capsule (25 mg) by mouth daily for 30 days    [START ON 12/12/2023] amphetamine-dextroamphetamine (ADDERALL XR) 25 MG 24 hr capsule Take 1 capsule (25 mg) by mouth daily for 30 days    ibuprofen (ADVIL/MOTRIN) 200 MG tablet Take 200-400 mg by mouth every 4 hours as needed for pain OTC     LORazepam (ATIVAN) 1 MG tablet Take 0.5-1 tablets (0.5-1 mg) by mouth daily as needed (anxiety and sleep)    PARoxetine (PAXIL-CR) 25 MG 24 hr tablet Take 2 tablets (50 mg) by mouth every morning    PARoxetine (PAXIL-CR) 37.5 MG 24 hr tablet Take 1 tablet (37.5 mg) by mouth every morning    Prenatal Vit-Fe Fumarate-FA (PRENATAL PO)     Turmeric 500 MG CAPS      No current facility-administered medications for this visit.     [place]:   08/25/202308/16/20231  Dextroamp-Amphet Er 15 Mg Cap  30.0030Al [id number] (3699)0/0[place]/14/202212/14/20221  Lorazepam 1 Mg Tablet  20.0020Al [id number] (9492)0/01.00 LME[place]     Past Medical/Surgical History:  Past Medical History:   Diagnosis Date    Abnormal Pap smear     Colposcopy    Adjustment disorder with mixed anxiety and depressed mood 04/04/2012    Anemia     In Past    Anxiety     Chlamydia trachomatis infection of other specified site 1993    Depression     Depressive disorder 1979    Not diagnosed until college...later diagnosed with TEE    Fertility problem     Lyme disease 09/08/2010    ?false positive vs positve CMV - resolved    Moderate dysplasia of cervix 1995    Other and unspecified adverse effect of drug, medicinal and biological substance     insulin resistent    Varicosities     Wounds and injuries     fall down stairs, PT for back, pain meds      has a past medical history of Abnormal Pap smear, Adjustment disorder with mixed anxiety and depressed mood (04/04/2012), Anemia, Anxiety, Chlamydia trachomatis infection of other specified site (1993), Depression, Depressive disorder (1979), Fertility problem, Lyme disease (09/08/2010), Moderate dysplasia of cervix (1995), Other and unspecified adverse effect of drug, medicinal and biological substance, Varicosities, and Wounds and injuries.    She has no past medical history of Arthritis, Asthma, Asymptomatic human immunodeficiency virus (HIV) infection status (H), Breast disorder, Cancer  (H), Cerebral infarction (H), Chronic kidney disease, Complication of anesthesia, Congestive heart failure (H), COPD (chronic obstructive pulmonary disease) (H), Diabetes (H), Diabetes mellitus (H), Heart disease, Herpes simplex without mention of complication, History of blood transfusion, Hypertension, Liver disease, Postpartum depression, Rh incompatibility, Seizures (H), Sickle cell anemia (H), Systemic lupus erythematosus (H), Thyroid disease, or Uncomplicated asthma.    Social History:  Reviewed. No changes to social history except as noted in HPI above.     Vital Signs:   None. This is phone/video visit.     Labs:  Most recent laboratory results reviewed and the pertinent results include:   Lab Results   Component Value Date    A1C 5.3 08/01/2023    A1C 5.4 03/07/2023    A1C 5.3 04/30/2019    A1C 5.2 04/17/2018      Recent Labs   Lab Test 08/01/23  0902 03/07/23  0954   CHOL 234* 313*   HDL 64 72   * 221*   TRIG 99 102        Review of Systems:  10 systems (general, cardiovascular, respiratory, eyes, ENT, endocrine, GI, , M/S, neurological) were reviewed. Most pertinent finding(s) is/are: denies fever, cough, headaches, shortness of breath, chest pain, N/V, constipation/diarrhea, trouble urinating, aches and pains. The remaining systems are all unremarkable.    Mental Status Examination (limited as this is by phone/video):  Appearance: Awake, alert, appears stated age, no acute distress, well-groomed  Attitude:  Cooperative, pleasant  Motor: No obvious abnormalities observed via video, not formally tested  Oriented to:  person, place, time, and situation  Attention Span and Concentration:  normal  Speech:  clear, coherent, regular rate, rhythm, and volume  Language: intact  Mood: Pretty good  Affect: Overall appropriate and mood congruent  Associations:  no loose associations  Thought Process:  logical, linear and goal oriented  Thought Content:  no evidence of suicidal ideation or homicidal  ideation, no evidence of psychotic thought, no auditory hallucinations present and no visual hallucinations present  Recent and Remote Memory:  Intact to interview. Not formally assessed. No amnesia.  Fund of Knowledge: appropriate  Insight:  good  Judgment:  intact, adequate for safety  Impulse Control:  intact    Suicide Risk Assessment:  Today Ellyn Mcgee reports no suicidal ideation. Based on all available evidence including the factors cited above, Ellyn Mcgee does not appear to be at imminent risk for self-harm, does not meet criteria for a 72-hr hold, and therefore remains appropriate for ongoing outpatient level of care.  A thorough assessment of risk factors related to suicide and self-harm have been reviewed and are noted above. The patient convincingly denies suicidality on several occasions. Local community safety resources reviewed for patient to use if needed. There was no deceit detected, and the patient presented in a manner that was believable.     DSM5 Diagnosis:  Major Depressive Disorder, Recurrent Episode, In Partial Remission  300.02 (F41.1) Generalized Anxiety Disorder   ADHD, Unspecified  Insomnia-unspecified (mostly related to infant)    Medical comorbidities include:  Patient Active Problem List    Diagnosis Date Noted    Major depressive disorder, recurrent episode, moderate (H) 10/08/2020     Priority: Medium    TEE (generalized anxiety disorder) 10/10/2018     Priority: Medium    Cervical radiculopathy 04/16/2018     Priority: Medium    Attention deficit hyperactivity disorder (ADHD), predominantly inattentive type 10/04/2017     Priority: Medium     Patient is followed by Dr. Wang for ongoing prescription of stimulants.  All refills should be approved by this provider, or covering partner.        External hemorrhoids 04/25/2012     Priority: Medium    PCOS (polycystic ovarian syndrome) 02/22/2011     Priority: Medium    Hyperlipidemia LDL goal <130 09/05/2008     Priority: Medium     Anxiety state 09/05/2008     Priority: Medium     Infrequent prn use of lorazepam         Psychosocial & Contextual Factors: See HPI above    Assessment:  Per Intake Note with me 9/8/20:  Ellyn Mcgee is a 45-year-old female who is 23 weeks pregnant with a past psychiatric history including depression, anxiety, and ADHD who presents today for psychiatric evaluation.  Her depression and anxiety date back several years and she has also had postpartum depression/anxiety with her now 8-year-old (she also has a 14-year-old but did not experience postpartum mental health issues at that time).  She started sertraline during her second pregnancy and then ended up being maintained on Paxil-CR for several years.  She is also more recently diagnosed with ADHD and maintained on Concerta.  She weaned off both Paxil and Concerta when she found out she was pregnant and replaced these medications with her current regimen of Lexapro and Effexor-XR to decrease risk of fetal defects.  For the most part, she is feeling a little bit better on her current doses of Lexapro 10 mg and Effexor-XR 75 mg.  It is an unconventional combination as we discussed, but since she is doing quite well, I am hesitant to make many changes.  She feels as though her anxiety could be a little bit better controlled and so we will further increase Effexor-XR to 112.5 mg daily to be taken with her Lexapro 10 mg daily.  If she does a lot better, we can consider decreasing Lexapro to 5 mg daily. We discussed the risk of serotonin syndrome and she will continue to be vigilant for any signs or symptoms of this condition.  We did discuss the possibility of restarting a stimulant medication if necessary.  She does not feel as though her ADHD symptoms are too severe at this time.    4/20/21:   Today patient reports overall some ongoing ups and downs regarding her symptoms since last visit.  She is thinking much of it might be hormonal and related to sleep  deprivation.  She still has not yet had her menses return since having her baby.  She has had some feelings of panic.  Used to take propranolol in the past when she felt this way.  She would like to start this medication again.  Discussed risks and benefits while breast-feeding.  Limited risks for taking low-dose propranolol while breast-feeding although the infant does have some exposure through breast milk.  Recommended taking propranolol at least 3-4 hours prior to breast-feeding to decrease risks.  No other medication changes at this time.    12/15/2021:   Patient has overall been feeling quite stable mood wise.  Has some ups and downs that are more related to feeling overwhelmed and exhausted regarding responsibilities for her children, home life, and work.  Does not feel depressed.  Feels like medications are working well.  Propranolol has been helpful.  No medication changes today.  No safety concerns or SI.  No problematic drug or alcohol use.    6/14/2022:  Patient overall struggling lately with mood, anxiety, ADHD symptoms.  Stopped Lexapro since felt like it was making her symptoms worse.  We have considered for quite some time about starting her back on an ADHD medication.  I think it is worth a trial at this time.  Discussed risks and benefits of a stimulant medication while breast-feeding.  She will likely be weaning soon.  Still has not been getting great sleep due to cosleeping and nighttime feedings.  No acute safety concerns.  No SI.  No problematic drug or alcohol use.  If she has too much irritability on Adderall, we could consider either going back to Concerta or a trial with Vyvanse.    12/14/2022:  Akanksha overall struggling more with anxiety and panic related symptoms.  Stress has been higher.  Patient wondering about starting Paxil-CR again.  Has tolerated well in the past.  We will add a low dose with venlafaxine.  We will also decrease venlafaxine some.  May continue cross taper/titration.   Lorazepam/Ativan provided for current acute symptoms and for any severe discomfort that may arise with this transition.  Therapy encouraged.  No acute safety concerns.  No SI.  No problematic drug or alcohol use.  Patient will be weaning her 2-year-old from breast-feeding.  We discussed risks and benefits of current medication regimen while breast-feeding.    1/5/2023:  Doing well on Paxil.  Symptoms improved quite quickly.  Tolerating well with no negative side effects.  Was able to decrease Effexor with ease as well.  We will continue tapering off Effexor and further optimizing Paxil.  Encouraged to continue in therapy.  No acute safety concerns.  No SI.  No problematic drug or alcohol use.    2/6/2023:  Patient overall doing relatively okay.  Symptoms of anxiety and depression have improved.  Irritability still lingering some.  Feeling a little emotional as well at times.  Some symptoms could be remaining from stopping venlafaxine.  We will wait increase Paxil and patient will observe her symptoms further/longer.  Patient can message in a few weeks if wants to increase Paxil.  No acute safety concerns.  No SI.  No problematic drug or alcohol use.    10/13/2023:  Patient overall doing relatively well.  No acute safety concerns.  No SI.  No problematic drug or alcohol use.  ADHD better controlled on Adderall.  Tolerating meds okay.  Follow up in 6 months.    Medication side effects and alternatives were reviewed. Health promotion activities recommended and reviewed today. All questions addressed. Education and counseling completed regarding risks and benefits of medications and psychotherapy options. Recommend ongoing therapy for additional support.     Treatment Plan:  Continue Paxil/paroxetine CR for anxiety and mood: take 50 mg daily.    Continue lorazepam/Ativan 0.5-1 mg daily as needed for severe anxiety.   Continue Adderall XR 25 mg daily as needed for ADHD symptoms.  Continue all other medications as  reviewed per electronic medical record today.   Safety plan reviewed. To the Emergency Department as needed or call after hours crisis line at [phone] or [phone]. [place] Crisis Text Line. Text [place] to 464899 or Suicide LifeLine Chat: suicidepreventionlifeline.org/chat  Continue therapy as planned.   Schedule an appointment with me in 6 months or sooner as needed.  Patient scheduled today.  Follow up with primary care provider as planned or for acute medical concerns.  Call the psychiatric nurse line with medication questions or concerns at [phone].  Moovlyhart may be used to communicate with your provider, but this is not intended to be used for emergencies.    Risks of stimulant medication include, but not limited to, decreased appetite, risk of tics (and that they may be lasting), trouble sleeping, cardiac risks such as increased heart rate and blood pressure, and rare risk of sudden cardiac death.  Also risk of addiction/tolerance/dependence.    Risks of benzodiazepine (Ativan, Xanax, Klonopin, Valium, etc) use including, but not limited to, sedation, tolerance, risk for addiction/dependence. Do not drink alcohol while taking benzodiazepines due to risk of trouble breathing and potential death. Do not drive or operate heavy machinery until it is known how the drug affects you. Discuss with physician or pharmacist before ever taking a benzodiazepine with a narcotic/opioid pain medication.     Therapy Resources:  Www.PsychologyToday.com  Www.NAMIMN.org    [facility]- Psychiatric Disorders During Pregnancy  https://w[facility]ensmentalhealth.org/specialty-clinics/psychiatric-disorders-during-pregnancy    [employer]  [email or web address]    Administrative Billing:   Phone Call/Video Duration: 25 Minutes  Start: 11:33a  Stop: 11:58a    Patient Status:  Patient is a continuous/long-term care patient and refills will continue to come from this provider until otherwise noted.     Signed:  "  Sherlyn Wang, DO  [facility]    This note was created with voice recognition software. Inadvertent grammatical errors, typographical errors, and \"sound-a-like\" substitutions may occur due to limitations of the software.  Read the note carefully and apply context when erroneous substitutions have occurred. Thank you.   "

## 2023-10-23 NOTE — TELEPHONE ENCOUNTER
DIAGNOSIS: Bilateral Knee pain, unspecified chronicity / no img / Dorothy Guaman DO / [facility]   APPOINTMENT DATE: 10/25/23   NOTES STATUS DETAILS   OFFICE NOTE from referring provider Internal 07/20/23 - Dorothy Guaman DO    MEDICATION LIST Internal

## 2023-10-23 NOTE — PROGRESS NOTES
[facility]                                               Progress Note    Patient Name: Ellyn Mcgee  Date: 10/24/2023         Service Type: Individual      Session Start Time: 10:33 a.m.  Session End Time: 11:32 a.m.     Session Length: 59 min.    Session #: 70 (with this writer; patient met previously for DA with [facility] and psychiatry)    Attendees: Client    Service Modality:  Video Visit:    Telemedicine Visit: The patient's condition can be safely assessed and treated via synchronous audio and visual telemedicine encounter.      Reason for Telemedicine Visit: Patient convenience (e.g. access to timely appointments / distance to available provider)    Originating Site (Patient Location): patient's home    On-Site (Provider Location): [facility]: [place]    Consent:  The patient/guardian has verbally consented to: the potential risks and benefits of telemedicine (video visit) versus in person care; bill my insurance or make self-payment for services provided; and responsibility for payment of non-covered services.     Patient would like the video invitation sent by: Send to e-mail at: [email or web address]    Mode of Communication:  Video Conference via well    As the provider I attest to compliance with applicable laws and regulations related to telemedicine.    DATA  Extended Session (53+ minutes): PROLONGED SERVICE IN THE OUTPATIENT SETTING REQUIRING DIRECT (FACE-TO-FACE) PATIENT CONTACT BEYOND THE USUAL SERVICE:    - Longer session due to limited access to mental health appointments and necessity to address patient's distress / complexity  Interactive Complexity: No  Crisis: No       Progress Since Last Session (Related to Symptoms / Goals / Homework):  Symptoms: reports some continued irritability and frustration; somewhat high anxiety but reports less depressive symptoms.  Continues to try to work out at [facility] but toddler son has been uncooperative  "recently.     Homework: Achieved / completed to satisfaction - improved self-care (has been getting to [facility])      Episode of Care Goals: Moderate - satisfactory progress - ACTION (Actively working towards change); Intervened by reinforcing change plan / affirming steps taken     Current / Ongoing Stressors and Concerns:  Feeling overwhelmed by organizing home and advocating for children's needs;  and daughter(s?) are on autism spectrum    Older daughter is experiencing dizziness/lethargy and was dx with POTS; some financial and friend-related stress; is interested in a deeper dive into emotional state and core beliefs through [facility]; new son born 2020; roommate/former friend was finally evicted from their home (lived there for 3-4 years and did not paid any rent for over 1-2 years); pt has been experiencing mild panic attacks; daughter (age 10) just started ADHD medication; history of anxious attachment stemming from relationship with father (he  8 years ago); is currently pregnant (high-risk due to advanced maternal age) and baby is due Dec. 2020; is in couples therapy with  due to frequent arguments and his emotional affair earlier in the year; financial stress; history of ADHD (hyperactive type).     Treatment Objective(s) Addressed in This Session:  Management of anger/irritability  Coping with NT/ND relationship dynamics  Dealing with \"big emotions\"  Self-care and barriers to this  Discussed family system issues  Participating in enjoyable activities and connecting with social and family supports  Discussed healthy boundaries and enforcement    Past/future:  Self-compassion and radical acceptance  Addressed possible solutions to insurance and financial concerns  Identified personal strengths within friendships  Processed feelings of loneliness  Identified strengths as a mother and need for positive affirmations; needs for appreciation and validation  Completing the stress cycle: creativity " and movement  Emotional differentiation  Grounding and mindfulness  will identify how past feelings are impacting current relationships  Identified and processed recent triggers for anxiety and sadness (relationship)  Identified insecurities and how they affect patient's thoughts and actions now and in the past  Solution focused: how to prioritize and cope with interrupted sleep  Discussed work/life balance and current purpose  Natali pradhan  Triggers for anxiety and insecurities  tell full story of past relational issues/trust/infidelity  use cognitive strategies identified in therapy to challenge anxious thoughts   Body appreciation  Relationship repair  Discussed ambiguous loss and grief in a box analogy  Anticipatory grief and creating meaning  10:10:10 rule (10 minutes, 10 months, and 10 years)  Feeling unappreciated and the 5:1 ratio ([facility])  Javon Servin's elements of trust and relationship repair   identify how attachment style drives patient's and 's behaviors  tell full story of past trauma related to her relationship with her father (and current roommate issues)     Intervention:  Solution-Focused  Family systems theory  CBT: connect thoughts, feelings, and actions   Narrative Therapy: separate problem from person and find the bright spots     Past/future:  Gottman research  Somatic experiencing  Management of anger and when to step away/disengage    Assessments completed prior to visit:  PHQ9:       10/26/2022    11:28 AM 12/7/2022     9:40 AM 12/20/2022     8:54 AM 2/8/2023    12:27 PM 3/7/2023     8:46 AM 4/21/2023    12:05 PM 10/2/2023    11:00 AM   PHQ-9 SCORE   PHQ-9 Total Score MyChart 8 (Mild depression) 7 (Mild depression) 4 (Minimal depression) 6 (Mild depression) 8 (Mild depression) 9 (Mild depression) 9 (Mild depression)   PHQ-9 Total Score 8 7 4 6 8 9 9     GAD7:       6/14/2022     9:57 AM 7/6/2022     2:01 PM 9/20/2022    10:24 AM 10/26/2022    11:29 AM 12/7/2022     9:43 AM  3/7/2023     8:47 AM 4/21/2023    12:12 PM   TEE-7 SCORE   Total Score 8 (mild anxiety) 10 (moderate anxiety) 10 (moderate anxiety) 6 (mild anxiety) 8 (mild anxiety) 5 (mild anxiety) 8 (mild anxiety)   Total Score 8 10 10 6 8 5 8     PROMIS 10-Global Health (all questions and answers displayed):       12/15/2021    10:29 AM 3/15/2022     7:14 PM 6/14/2022     9:59 AM 7/6/2022     2:02 PM 10/26/2022    11:31 AM 2/6/2023    11:06 AM 8/11/2023    11:06 AM   PROMIS 10   In general, would you say your health is: Fair Fair Fair Fair Fair Fair Good   In general, would you say your quality of life is: Fair Good Fair Fair Fair Fair Fair   In general, how would you rate your physical health? Fair Fair Fair Fair Fair Fair Fair   In general, how would you rate your mental health, including your mood and your ability to think? Good Good Fair Fair Fair Fair Fair   In general, how would you rate your satisfaction with your social activities and relationships? Fair Good Poor Fair Fair Fair Fair   In general, please rate how well you carry out your usual social activities and roles Fair Fair Good Fair Fair Fair Good   To what extent are you able to carry out your everyday physical activities such as walking, climbing stairs, carrying groceries, or moving a chair? Completely Completely Completely Mostly Completely Completely Mostly   In the past 7 days, how often have you been bothered by emotional problems such as feeling anxious, depressed, or irritable? Sometimes Often Often Often Often Sometimes Sometimes   In the past 7 days, how would you rate your fatigue on average? Severe Moderate Moderate Moderate Moderate Moderate Severe   In the past 7 days, how would you rate your pain on average, where 0 means no pain, and 10 means worst imaginable pain? 5 7 7 8 3 6 7   In general, would you say your health is: 2    2 2 2 2 2 2    2 3   In general, would you say your quality of life is: 2    2 3 2 2 2 2    2 2   In general, how would  you rate your physical health? 2    2 2 2 2 2 2    2 2   In general, how would you rate your mental health, including your mood and your ability to think? 3    3 3 2 2 2 2    2 2   In general, how would you rate your satisfaction with your social activities and relationships? 2    2 3 1 2 2 2    2 2   In general, please rate how well you carry out your usual social activities and roles. (This includes activities at home, at work and in your community, and responsibilities as a parent, child, spouse, employee, friend, etc.) 2    2 2 3 2 2 2    2 3   To what extent are you able to carry out your everyday physical activities such as walking, climbing stairs, carrying groceries, or moving a chair? 5    5 5 5 4 5 5    5 4   In the past 7 days, how often have you been bothered by emotional problems such as feeling anxious, depressed, or irritable? 3    3 4 4 4 4 3    3 3   In the past 7 days, how would you rate your fatigue on average? 4    4 3 3 3 3 3    3 4   In the past 7 days, how would you rate your pain on average, where 0 means no pain, and 10 means worst imaginable pain? 5    5 7 7 8 3 6    6 7   Global Mental Health Score 10    10 11 7 8 8 9    9 9   Global Physical Health Score 12    12 12 12 11 14 13    13 10   PROMIS TOTAL - SUBSCORES 22    22 23 19 19 22 22    22 19        ASSESSMENT: Current Emotional / Mental Status (status of significant symptoms):   Risk status (Self / Other harm or suicidal ideation)   Patient denies current fears or concerns for personal safety.   Patient denies current or recent suicidal ideation or behaviors.    Patient denies current or recent homicidal ideation or behaviors.   Patient denies current or recent self injurious behavior or ideation.   Patient denies other safety concerns.   Patient reports there has been no change in risk factors since their last session.   Patient reports there has been no change in protective factors since their last session.   Recommended that  "patient call 911 or go to the local ED should there be a change in any of these risk factors.     Appearance:   Appropriate    Eye Contact:   Good    Psychomotor Behavior: Normal  Restless ; busy trying to help others   Attitude:   Cooperative, friendly   Orientation:   All   Speech    Rate / Production: Normal/ Responsive Talkative    Volume:  Normal    Mood:    Anxious  tired   Affect:    Appropriate  Expansive    Thought Content:  Clear  Rumination    Thought Form:  Coherent  Neurosis   Insight:    Good      Medication Review:   No changes to current psychiatric medication(s)     Medication Compliance:   Yes     Changes in Health Issues:   None     Recent:  May be latisha-menopausal (continued)   Concerns about cholesterol and resting glucose levels; pain in hip  Reports having physical and having high LDL cholesterol and glucose  went to ER for testing after experiencing shooting pain in arm     Chemical Use Review:   Substance Use: Chemical use reviewed, no active concerns identified ; no current alcohol use     Tobacco Use: No current tobacco use.      Diagnosis:  1. TEE (generalized anxiety disorder)    2. Recurrent major depressive disorder, in partial remission (H24)    3. Attention deficit hyperactivity disorder (ADHD), unspecified ADHD type      Note: There has been demonstrated improvement in functioning while patient has been engaged in psychotherapy/psychological service- if withdrawn the patient would deteriorate and/or relapse.     Collateral Reports Completed:  N/A    PLAN: (Patient Tasks / Therapist Tasks / Other)    Patient states that her goals for the next three weeks include:    Be more clear with spouse about some things  Continue to try to go to the [facility]  \"Focus on what I can do\"      Keesha Eller    ______________________________________________________________________                                                           [facility]    Individual Treatment " Plan    Patient's Name: Ellyn Mcgee  YOB: 1975    Date of Creation: 2020  Date Treatment Plan Last Reviewed/Revised: 2023    DSM5 Diagnoses: 296.32 (F33.1) Major Depressive Disorder, Recurrent Episode, Moderate _ or 300.02 (F41.1) Generalized Anxiety Disorder (patient has previously been diagnosed with ADHD, hyperactive type)    Psychosocial / Contextual Factors: History of anxious attachment stemming from relationship with father (he  8 years ago); is currently pregnant (high-risk due to advanced maternal age); is in couples therapy with  due to frequent arguments and his emotional affair earlier in the year; financial stress; history of ADHD (hyperactive type).    PROMIS (reviewed every 90 days): PROMIS-10 Scores (see above note)    Referral / Collaboration:  Care Coordination    Anticipated number of sessions for this episode of care: 100  Anticipated frequency of sessions: Every 2-3 weeks  Anticipated duration of each session: 53+ minutes  Treatment plan will be reviewed in 90 days or when goals have been changed.           Measurable Treatment Goal(s) related to diagnosis / functional impairment(s):  Patient is asking to focus treatment on her relationship with her father and past trauma.    Goal 1: Patient will tell full story of past trauma related to her relationship with her father and will identify how past feelings are impacting current relationships.    Objective #A (Patient Action)    Patient will identify how past feelings are impacting current relationships.  Status: Continued - Date(s): 2023 (partially completed)    Intervention(s)  Therapist will role-play conflict management.    Objective #B  Patient will identify strengths and weaknesses within her family system of origin.  Status: Continued - Date(s): 2023 (partially completed)    Intervention(s)  Therapist will assign homework for patient to create list .      Goal 2: Patient will learn about  "resilience, trauma responses, and Javon Servin's \"the story I'm making up\" (cognitive strategy to manage anxious thoughts).    Objective #A (Patient Action)    Patient will learn about and identify personal trauma responses.    Status: Continued - Date(s): 8/18/2023     Intervention(s)  Therapist will provide educational materials on trauma responses .    Objective #B  Patient will use cognitive strategies identified in therapy to challenge anxious thoughts.  Status: Continued - Date(s): 8/18/2023     Intervention(s)  Therapist will teach about Javon Servin's power of vulnerability and \"the story I'm making up\" .      Goal 3: Patient will learn about protective factors that foster resilience and attachment styles and will identify how her attachment style drives her behavior.     Objective #A (Patient Action)    Status:  Partially completed: 8/18/2023     Patient will learn about protective factors and will identify her own.    Intervention(s)  Therapist will complete with patient in session.    Objective #B  Patient will identify how her attachment style drives her behavior.  Status: Completed - Date: 8/18/2023       Intervention(s)  Therapist will teach emotional regulation skills.        Patient agreed to the above plan.      Keesha Eller    "

## 2023-10-24 ENCOUNTER — VIRTUAL VISIT (OUTPATIENT)
Dept: PSYCHOLOGY | Facility: CLINIC | Age: 48
End: 2023-10-24
Payer: COMMERCIAL

## 2023-10-24 DIAGNOSIS — F33.41 RECURRENT MAJOR DEPRESSIVE DISORDER, IN PARTIAL REMISSION (H): ICD-10-CM

## 2023-10-24 DIAGNOSIS — F90.9 ATTENTION DEFICIT HYPERACTIVITY DISORDER (ADHD), UNSPECIFIED ADHD TYPE: ICD-10-CM

## 2023-10-24 DIAGNOSIS — F41.1 GAD (GENERALIZED ANXIETY DISORDER): Primary | ICD-10-CM

## 2023-10-24 PROCEDURE — 90837 PSYTX W PT 60 MINUTES: CPT | Mod: VID | Performed by: MARRIAGE & FAMILY THERAPIST

## 2023-10-25 ENCOUNTER — ANCILLARY PROCEDURE (OUTPATIENT)
Dept: GENERAL RADIOLOGY | Facility: CLINIC | Age: 48
End: 2023-10-25
Attending: FAMILY MEDICINE
Payer: COMMERCIAL

## 2023-10-25 ENCOUNTER — PRE VISIT (OUTPATIENT)
Dept: ORTHOPEDICS | Facility: CLINIC | Age: 48
End: 2023-10-25

## 2023-10-25 ENCOUNTER — OFFICE VISIT (OUTPATIENT)
Dept: ORTHOPEDICS | Facility: CLINIC | Age: 48
End: 2023-10-25
Payer: COMMERCIAL

## 2023-10-25 DIAGNOSIS — M22.2X1 PATELLOFEMORAL ARTHRALGIA OF BOTH KNEES: Primary | ICD-10-CM

## 2023-10-25 DIAGNOSIS — M25.569 KNEE PAIN: Primary | ICD-10-CM

## 2023-10-25 DIAGNOSIS — M25.569 KNEE PAIN: ICD-10-CM

## 2023-10-25 DIAGNOSIS — M22.2X2 PATELLOFEMORAL ARTHRALGIA OF BOTH KNEES: Primary | ICD-10-CM

## 2023-10-25 PROCEDURE — 73562 X-RAY EXAM OF KNEE 3: CPT | Mod: RT | Performed by: RADIOLOGY

## 2023-10-25 PROCEDURE — 99203 OFFICE O/P NEW LOW 30 MIN: CPT | Performed by: FAMILY MEDICINE

## 2023-10-25 NOTE — LETTER
10/25/2023      RE: Ellyn Mcgee  [address] [address]     Dear Colleague,    Thank you for referring your patient, Ellyn Mcgee, to the [facility] [place]. Please see a copy of my visit note below.    Bilateral knee pain    Today, the patient reports that she often has more left knee than right knee pain. She has had bilateral knee pain for over 1-2 years without injury or change in activity. Pain is located over the anterior knee/ distal quadriceps on bilateral knees. Pain is worse weightbearing activities. Denies noticing any swelling. She will occasionally take ibuprofen. She has not been to physical therapy or received any injections into the bilateral knees. She has been going to the gym. She states she was pregnant at 45 years old and also overweight and states that she was about 190 lbs, she has recently lost about 20 lbs. 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       I personally reviewed the patient standing x-rays of the bilateral knees today that show no degenerative change or bony abnormality.      Imaging study below reviewed by me:  MRI LUMBAR SPINE WITHOUT CONTRAST 2/21/08 5:51 PM      HISTORY: Low back pain after fall radiating into the right leg.      COMPARISON: None.      TECHNIQUE: Routine multiplanar, multisequence imaging was performed.      FINDINGS: Sagittal images demonstrate normal vertebral body height.   There a few scattered Schmorl's nodes incidentally noted. Tip of the   conus medullaris is normal. Mild disc bulge at L4-L5. Axial scans were   performed from L3 to sacrum.      L3-L4: Small Schmorl's node. No disc herniation, central, or foraminal   stenosis. Facet joints are unremarkable.      L4-L5: Very mild disc bulge that lateralizes slightly to the left.   This results in very mild inferior foraminal narrowing on the left. No   nerve root compression. Right neural foramen is patent. No central   stenosis.  Facet joints are unremarkable.      L5-S1: Normal.      IMPRESSION:  Mild disc bulge at L4-L5 that lateralizes slightly to the   left with mild inferior left foraminal narrowing as a result. This is   minimal. There is no nerve root compression           PMH:  Past Medical History:   Diagnosis Date    Abnormal Pap smear     Colposcopy    Adjustment disorder with mixed anxiety and depressed mood 2012    Anemia     In Past    Anxiety     Chlamydia trachomatis infection of other specified site     Depression     Depressive disorder     Not diagnosed until college...later diagnosed with TEE    Fertility problem     Lyme disease 2010    ?false positive vs positve CMV - resolved    Moderate dysplasia of cervix     Other and unspecified adverse effect of drug, medicinal and biological substance     insulin resistent    Varicosities     Wounds and injuries     fall down stairs, PT for back, pain meds       Active problem list:  Patient Active Problem List   Diagnosis    Hyperlipidemia LDL goal <130    Anxiety state    PCOS (polycystic ovarian syndrome)    External hemorrhoids    Attention deficit hyperactivity disorder (ADHD), predominantly inattentive type    TEE (generalized anxiety disorder)    Cervical radiculopathy    Major depressive disorder, recurrent episode, moderate (H)       FH:  Family History   Problem Relation Age of Onset    Cancer Mother         vocal cord cancer    Lipids Mother     Myocardial Infarction Mother     Hyperlipidemia Mother     Other Cancer Mother         throat    Depression Mother     Anxiety Disorder Mother     Melanoma Father     Cancer Father         B-cell lymphoma, melanoma    Lipids Father     Heart Disease Father         open heart surgery    Cerebrovascular Disease Father         mini strokes, multiple    Hyperlipidemia Father             Other Cancer Father         melanoma, b-cell lymphoma    Depression Father         undiagnosed    Anxiety Disorder  Father         undiagnosed    Mental Illness Father         Undiagnosed Narcissitic Personality Disorder    Substance Abuse Father         alcohol abuse    Alcohol/Drug Maternal Grandfather     Substance Abuse Maternal Grandfather         Alcoholic    Diabetes Paternal Grandmother         think it was type II    Obesity Paternal Grandmother     Thyroid Disease Brother     Crohn's Disease Sister     Diabetes Paternal Uncle     Anxiety Disorder Paternal Half-Sister     Diabetes Other         paternal uncle has, not sure of type       SH:  Social History     Socioeconomic History    Marital status:      Spouse name: Not on file    Number of children: 1    Years of education: Associates    Highest education level: Not on file   Occupational History    Occupation: stay at home mom    Occupation: student   Tobacco Use    Smoking status: Never    Smokeless tobacco: Never   Vaping Use    Vaping Use: Never used   Substance and Sexual Activity    Alcohol use: Yes     Types: 1 Standard drinks or equivalent per week     Comment: Very Occasional    Drug use: Not Currently     Types: Marijuana, Psilocybin     Comment: I added one, but not because it's recent, been 20+ years...    Sexual activity: Yes     Partners: Male     Birth control/protection: Male Surgical   Other Topics Concern    Parent/sibling w/ CABG, MI or angioplasty before 65F 55M? No   Social History Narrative    Social Documentation:        Balanced Diet: YES    Calcium intake: less than 2 per day    Caffeine: 2 cups per day    Exercise:  type of activity not much lately    Sunscreen: Yes    Seatbelts:  Yes    Self Breast Exam:  No    Self Testicular Exam: n/a    Physical/Emotional/Sexual Abuse: No    Do you feel safe in your environment? Yes        Cholesterol screen up to date: Yes-labs drawn today    Eye Exam up to date: Yes    Dental Exam up to date: Yes    Pap smear up to date: No: will do today    Mammogram up to date: Does Not Apply    Dexa Scan up to  date: Does Not Apply    Colonoscopy up to date: Does Not Apply    Immunizations up to date: No    Glucose screen if over 40:  Yes                     Social Determinants of Health     Financial Resource Strain: High Risk (8/6/2020)    Overall Financial Resource Strain (CARDIA)     Difficulty of Paying Living Expenses: Hard   Food Insecurity: No Food Insecurity (8/6/2020)    Hunger Vital Sign     Worried About Running Out of Food in the Last Year: Never true     Ran Out of Food in the Last Year: Never true   Transportation Needs: No Transportation Needs (8/6/2020)    PRAPARE - Transportation     Lack of Transportation (Medical): No     Lack of Transportation (Non-Medical): No   Physical Activity: Not on file   Stress: Not on file   Social Connections: Not on file   Interpersonal Safety: Not on file   Housing Stability: Not on file       MEDS:  See EMR, reviewed  ALL:  See EMR, reviewed    REVIEW OF SYSTEMS:  CONSTITUTIONAL:NEGATIVE for fever, chills, change in weight  INTEGUMENTARY/SKIN: NEGATIVE for worrisome rashes, moles or lesions  EYES: NEGATIVE for vision changes or irritation  ENT/MOUTH: NEGATIVE for ear, mouth and throat problems  RESP:NEGATIVE for significant cough or SOB  BREAST: NEGATIVE for masses, tenderness or discharge  CV: NEGATIVE for chest pain, palpitations or peripheral edema  GI: NEGATIVE for nausea, abdominal pain, heartburn, or change in bowel habits  :NEGATIVE for frequency, dysuria, or hematuria  :NEGATIVE for frequency, dysuria, or hematuria  NEURO: NEGATIVE for weakness, dizziness or paresthesias  ENDOCRINE: NEGATIVE for temperature intolerance, skin/hair changes  HEME/ALLERGY/IMMUNE: NEGATIVE for bleeding problems  PSYCHIATRIC: NEGATIVE for changes in mood or affect      The bilateral knees reveal no effusion.  I can flex and extend the knees fully.  Symmetrical amount of medial and lateral patellar excursion bilaterally.  She has no tenderness today over the medial or lateral patellar  facet of either knee.  Nontender over the medial or lateral joint line of either knee.  Normal range of motion of the bilateral hips.  Nontender over the patellar tendon or distal IT band of either knee.  Nontender over the quadriceps tendon of either knee.  Anterior posterior drawer is negative bilaterally.  No swelling the popliteal space or tenderness in the calves bilaterally.  Appropriate conversation and affect.    Assessment: Patellofemoral discomfort bilateral knees    Plan: Physical therapy referral for pelvi femoral alignment, with a set of exercises she can add to her current exercise protocol.  Teaching of proper squat and lunge form.  Follow-up as needed.                        Again, thank you for allowing me to participate in the care of your patient.      Sincerely,    Colby Prado MD

## 2023-10-25 NOTE — PROGRESS NOTES
Bilateral knee pain    Today, the patient reports that she often has more left knee than right knee pain. She has had bilateral knee pain for over 1-2 years without injury or change in activity. Pain is located over the anterior knee/ distal quadriceps on bilateral knees. Pain is worse weightbearing activities. Denies noticing any swelling. She will occasionally take ibuprofen. She has not been to physical therapy or received any injections into the bilateral knees. She has been going to the gym. She states she was pregnant at 45 years old and also overweight and states that she was about 190 lbs, she has recently lost about 20 lbs. 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       I personally reviewed the patient standing x-rays of the bilateral knees today that show no degenerative change or bony abnormality.      Imaging study below reviewed by me:  MRI LUMBAR SPINE WITHOUT CONTRAST 2/21/08 5:51 PM      HISTORY: Low back pain after fall radiating into the right leg.      COMPARISON: None.      TECHNIQUE: Routine multiplanar, multisequence imaging was performed.      FINDINGS: Sagittal images demonstrate normal vertebral body height.   There a few scattered Schmorl's nodes incidentally noted. Tip of the   conus medullaris is normal. Mild disc bulge at L4-L5. Axial scans were   performed from L3 to sacrum.      L3-L4: Small Schmorl's node. No disc herniation, central, or foraminal   stenosis. Facet joints are unremarkable.      L4-L5: Very mild disc bulge that lateralizes slightly to the left.   This results in very mild inferior foraminal narrowing on the left. No   nerve root compression. Right neural foramen is patent. No central   stenosis. Facet joints are unremarkable.      L5-S1: Normal.      IMPRESSION:  Mild disc bulge at L4-L5 that lateralizes slightly to the   left with mild inferior left foraminal narrowing as a result. This is   minimal. There is no nerve root compression           PMH:  Past  Medical History:   Diagnosis Date    Abnormal Pap smear     Colposcopy    Adjustment disorder with mixed anxiety and depressed mood 2012    Anemia     In Past    Anxiety     Chlamydia trachomatis infection of other specified site     Depression     Depressive disorder     Not diagnosed until college...later diagnosed with TEE    Fertility problem     Lyme disease 2010    ?false positive vs positve CMV - resolved    Moderate dysplasia of cervix     Other and unspecified adverse effect of drug, medicinal and biological substance     insulin resistent    Varicosities     Wounds and injuries     fall down stairs, PT for back, pain meds       Active problem list:  Patient Active Problem List   Diagnosis    Hyperlipidemia LDL goal <130    Anxiety state    PCOS (polycystic ovarian syndrome)    External hemorrhoids    Attention deficit hyperactivity disorder (ADHD), predominantly inattentive type    TEE (generalized anxiety disorder)    Cervical radiculopathy    Major depressive disorder, recurrent episode, moderate (H)       FH:  Family History   Problem Relation Age of Onset    Cancer Mother         vocal cord cancer    Lipids Mother     Myocardial Infarction Mother     Hyperlipidemia Mother     Other Cancer Mother         throat    Depression Mother     Anxiety Disorder Mother     Melanoma Father     Cancer Father         B-cell lymphoma, melanoma    Lipids Father     Heart Disease Father         open heart surgery    Cerebrovascular Disease Father         mini strokes, multiple    Hyperlipidemia Father             Other Cancer Father         melanoma, b-cell lymphoma    Depression Father         undiagnosed    Anxiety Disorder Father         undiagnosed    Mental Illness Father         Undiagnosed Narcissitic Personality Disorder    Substance Abuse Father         alcohol abuse    Alcohol/Drug Maternal Grandfather     Substance Abuse Maternal Grandfather         Alcoholic    Diabetes  Paternal Grandmother         think it was type II    Obesity Paternal Grandmother     Thyroid Disease Brother     Crohn's Disease Sister     Diabetes Paternal Uncle     Anxiety Disorder Paternal Half-Sister     Diabetes Other         paternal uncle has, not sure of type       SH:  Social History     Socioeconomic History    Marital status:      Spouse name: Not on file    Number of children: 1    Years of education: Associates    Highest education level: Not on file   Occupational History    Occupation: stay at home mom    Occupation: student   Tobacco Use    Smoking status: Never    Smokeless tobacco: Never   Vaping Use    Vaping Use: Never used   Substance and Sexual Activity    Alcohol use: Yes     Types: 1 Standard drinks or equivalent per week     Comment: Very Occasional    Drug use: Not Currently     Types: Marijuana, Psilocybin     Comment: I added one, but not because it's recent, been 20+ years...    Sexual activity: Yes     Partners: Male     Birth control/protection: Male Surgical   Other Topics Concern    Parent/sibling w/ CABG, MI or angioplasty before 65F 55M? No   Social History Narrative    Social Documentation:        Balanced Diet: YES    Calcium intake: less than 2 per day    Caffeine: 2 cups per day    Exercise:  type of activity not much lately    Sunscreen: Yes    Seatbelts:  Yes    Self Breast Exam:  No    Self Testicular Exam: n/a    Physical/Emotional/Sexual Abuse: No    Do you feel safe in your environment? Yes        Cholesterol screen up to date: Yes-labs drawn today    Eye Exam up to date: Yes    Dental Exam up to date: Yes    Pap smear up to date: No: will do today    Mammogram up to date: Does Not Apply    Dexa Scan up to date: Does Not Apply    Colonoscopy up to date: Does Not Apply    Immunizations up to date: No    Glucose screen if over 40:  Yes                     Social Determinants of Health     Financial Resource Strain: High Risk (8/6/2020)    Overall Financial  Resource Strain (CARDIA)     Difficulty of Paying Living Expenses: Hard   Food Insecurity: No Food Insecurity (8/6/2020)    Hunger Vital Sign     Worried About Running Out of Food in the Last Year: Never true     Ran Out of Food in the Last Year: Never true   Transportation Needs: No Transportation Needs (8/6/2020)    PRAPARE - Transportation     Lack of Transportation (Medical): No     Lack of Transportation (Non-Medical): No   Physical Activity: Not on file   Stress: Not on file   Social Connections: Not on file   Interpersonal Safety: Not on file   Housing Stability: Not on file       MEDS:  See EMR, reviewed  ALL:  See EMR, reviewed    REVIEW OF SYSTEMS:  CONSTITUTIONAL:NEGATIVE for fever, chills, change in weight  INTEGUMENTARY/SKIN: NEGATIVE for worrisome rashes, moles or lesions  EYES: NEGATIVE for vision changes or irritation  ENT/MOUTH: NEGATIVE for ear, mouth and throat problems  RESP:NEGATIVE for significant cough or SOB  BREAST: NEGATIVE for masses, tenderness or discharge  CV: NEGATIVE for chest pain, palpitations or peripheral edema  GI: NEGATIVE for nausea, abdominal pain, heartburn, or change in bowel habits  :NEGATIVE for frequency, dysuria, or hematuria  :NEGATIVE for frequency, dysuria, or hematuria  NEURO: NEGATIVE for weakness, dizziness or paresthesias  ENDOCRINE: NEGATIVE for temperature intolerance, skin/hair changes  HEME/ALLERGY/IMMUNE: NEGATIVE for bleeding problems  PSYCHIATRIC: NEGATIVE for changes in mood or affect      The bilateral knees reveal no effusion.  I can flex and extend the knees fully.  Symmetrical amount of medial and lateral patellar excursion bilaterally.  She has no tenderness today over the medial or lateral patellar facet of either knee.  Nontender over the medial or lateral joint line of either knee.  Normal range of motion of the bilateral hips.  Nontender over the patellar tendon or distal IT band of either knee.  Nontender over the quadriceps tendon of either  knee.  Anterior posterior drawer is negative bilaterally.  No swelling the popliteal space or tenderness in the calves bilaterally.  Appropriate conversation and affect.    Assessment: Patellofemoral discomfort bilateral knees    Plan: Physical therapy referral for pelvi femoral alignment, with a set of exercises she can add to her current exercise protocol.  Teaching of proper squat and lunge form.  Follow-up as needed.

## 2023-11-09 ASSESSMENT — ACTIVITIES OF DAILY LIVING (ADL)
STIFFNESS: THE SYMPTOM AFFECTS MY ACTIVITY SLIGHTLY
HOW_WOULD_YOU_RATE_THE_CURRENT_FUNCTION_OF_YOUR_KNEE_DURING_YOUR_USUAL_DAILY_ACTIVITIES_ON_A_SCALE_FROM_0_TO_100_WITH_100_BEING_YOUR_LEVEL_OF_KNEE_FUNCTION_PRIOR_TO_YOUR_INJURY_AND_0_BEING_THE_INABILITY_TO_PERFORM_ANY_OF_YOUR_USUAL_DAILY_ACTIVITIES?: 65
LIMPING: THE SYMPTOM AFFECTS MY ACTIVITY SLIGHTLY
WALK: ACTIVITY IS SOMEWHAT DIFFICULT
KNEEL ON THE FRONT OF YOUR KNEE: ACTIVITY IS NOT DIFFICULT
GO DOWN STAIRS: ACTIVITY IS SOMEWHAT DIFFICULT
PAIN: THE SYMPTOM AFFECTS MY ACTIVITY MODERATELY
KNEE_ACTIVITY_OF_DAILY_LIVING_SCORE: 77.14
STAND: ACTIVITY IS MINIMALLY DIFFICULT
RAW_SCORE: 54
GO UP STAIRS: ACTIVITY IS SOMEWHAT DIFFICULT
GIVING WAY, BUCKLING OR SHIFTING OF KNEE: I DO NOT HAVE THE SYMPTOM
RISE FROM A CHAIR: ACTIVITY IS MINIMALLY DIFFICULT
SQUAT: ACTIVITY IS MINIMALLY DIFFICULT
HOW_WOULD_YOU_RATE_THE_OVERALL_FUNCTION_OF_YOUR_KNEE_DURING_YOUR_USUAL_DAILY_ACTIVITIES?: NEARLY NORMAL
AS_A_RESULT_OF_YOUR_KNEE_INJURY,_HOW_WOULD_YOU_RATE_YOUR_CURRENT_LEVEL_OF_DAILY_ACTIVITY?: NEARLY NORMAL
SWELLING: I DO NOT HAVE THE SYMPTOM
SIT WITH YOUR KNEE BENT: ACTIVITY IS NOT DIFFICULT
KNEE_ACTIVITY_OF_DAILY_LIVING_SUM: 54
WEAKNESS: I DO NOT HAVE THE SYMPTOM

## 2023-11-10 ENCOUNTER — THERAPY VISIT (OUTPATIENT)
Dept: PHYSICAL THERAPY | Facility: CLINIC | Age: 48
End: 2023-11-10
Attending: FAMILY MEDICINE
Payer: COMMERCIAL

## 2023-11-10 DIAGNOSIS — M22.2X2 PATELLOFEMORAL ARTHRALGIA OF BOTH KNEES: ICD-10-CM

## 2023-11-10 DIAGNOSIS — M22.2X1 PATELLOFEMORAL ARTHRALGIA OF BOTH KNEES: ICD-10-CM

## 2023-11-10 PROCEDURE — 97110 THERAPEUTIC EXERCISES: CPT | Mod: GP | Performed by: PHYSICAL THERAPIST

## 2023-11-10 PROCEDURE — 97530 THERAPEUTIC ACTIVITIES: CPT | Mod: GP | Performed by: PHYSICAL THERAPIST

## 2023-11-10 PROCEDURE — 97161 PT EVAL LOW COMPLEX 20 MIN: CPT | Mod: GP | Performed by: PHYSICAL THERAPIST

## 2023-11-10 NOTE — PROGRESS NOTES
PHYSICAL THERAPY EVALUATION  Type of Visit: Evaluation    Therapist Impression:   Ellyn presents with a few findings that may contribute to PFPS including, limited hip IR ROM, impaired quadriceps strength, and slight lateral tracking.    NEXT: consider taping, IR stretching, progress quadriceps stretching    PTRX: both    See electronic medical record for Abuse and Falls Screening details.    Subjective       Presenting condition or subjective complaint: Not an injury to my knowledge. Knee pain varies, but standing and walking irritate it, have to wear supports or pain requires medication. Work requires standing/walking as does every aspect of life. Have been in pain years.  Date of onset: 11/10/22    Relevant medical history: Depression; History of fractures; Incontinence; Migraines or headaches; Neck injury; Overweight; Pregnant or breastfeeding   Dates & types of surgery: Nothing in over 25 years    Prior diagnostic imaging/testing results: X-ray     Prior therapy history for the same diagnosis, illness or injury: No      Prior Level of Function  Transfers: Independent  Ambulation: Independent  ADL: Independent  IADL:     Living Environment  Social support: With a significant other or spouse   Type of home: House; 2-story; Basement   Stairs to enter the home: Yes 5 Is there a railing: Yes   Ramp: No   Stairs inside the home: Yes 100 Is there a railing: Yes   Help at home: Medication and/or finances  Equipment owned:       Employment: Yes Retail  Hobbies/Interests: Gardening, research, health and nutrition    Patient goals for therapy: Exercise, go on walks and work a 5 hour work shift without pain    Pain assessment: Pain present around knee caps, sometimes pain at rest     Objective     Dynamic Movement Screen  Single leg stance observations: Eyes open no significant findings   Double limb squat observations: Good technique/no significant findings  Single limb squat observations: Good technique/no significant  findings  Gait: No significant findings    Range of Motion  Hip Joint Screen: Limited IR B      Knee ROM Extension Flexion   Left wnl wnl   Right wnl wnl      Flexibility Left Right   Quadriceps none/WNL none/WNL     Hip and Knee Strength   MMT Left Right   Hip Abduction 30/5 26/5   Hip Extension 34/5 35/5   Hip ER na/5 na/5   Knee ext 77 71     Knee MMT Quadriceps set Straight Leg Raise   Left Good Able   Right Good Able     Knee ligaments and meniscus: Not assessed    Patellofemoral assessment: Lateral patellar tilt slight B      Assessment & Plan   CLINICAL IMPRESSIONS  Medical Diagnosis: B PFPS    Treatment Diagnosis: B PFPS   Impression/Assessment: Patient is a 48 year old female with B PFPS  complaints.  The following significant findings have been identified: Pain, Decreased ROM/flexibility, Decreased joint mobility, Decreased strength, Impaired balance, Impaired muscle performance, and Impaired posture. These impairments interfere with their ability to perform self care tasks, work tasks, recreational activities, and household chores as compared to previous level of function.     Clinical Decision Making (Complexity):  Clinical Presentation: Stable/Uncomplicated  Clinical Presentation Rationale: based on medical and personal factors listed in PT evaluation  Clinical Decision Making (Complexity): Low complexity    PLAN OF CARE  Treatment Interventions:  Interventions: Manual Therapy, Neuromuscular Re-education, Therapeutic Activity, Therapeutic Exercise, Self-Care/Home Management    Long Term Goals     PT Goal 1  Goal Identifier: Standing  Goal Description: Pain with prolonged standing and time on feet  Rationale: to maximize safety and independence with performance of ADLs and functional tasks;to maximize safety and independence within the home;to maximize safety and independence within the community  Target Date: 02/07/24      Frequency of Treatment: 2 x month  Duration of Treatment: 3 months    Recommended  Referrals to Other Professionals:   Education Assessment:        Risks and benefits of evaluation/treatment have been explained.   Patient/Family/caregiver agrees with Plan of Care.     Evaluation Time:     PT Eval, Low Complexity Minutes (77017): 20       Signing Clinician: ZULEMA Ghosh [facility]                                                                                   OUTPATIENT PHYSICAL THERAPY      PLAN OF TREATMENT FOR OUTPATIENT REHABILITATION   Patient's Last Name, First Name, Ellyn Zimmer YOB: 1975   Provider's Name   [facility]   Medical Record No.  [id number]     Onset Date: 11/10/22  Start of Care Date: 11/10/23     Medical Diagnosis:  B PFPS      PT Treatment Diagnosis:  B PFPS Plan of Treatment  Frequency/Duration: 2 x month/ 3 months    Certification date from 11/10/23 to 02/07/24         See note for plan of treatment details and functional goals     Alton Frazier, PT                         I CERTIFY THE NEED FOR THESE SERVICES FURNISHED UNDER        THIS PLAN OF TREATMENT AND WHILE UNDER MY CARE     (Physician attestation of this document indicates review and certification of the therapy plan).              Referring Provider:  Colby Prado    Initial Assessment  See Epic Evaluation- Start of Care Date: 11/10/23

## 2023-12-04 NOTE — PROGRESS NOTES
[facility]                                               Progress Note    Patient Name: Ellyn Mcgee  Date: 12/5/2023         Service Type: Individual      Session Start Time: 12:30 p.m.  Session End Time: 1:36 p.m.     Session Length: 66 min.    Session #: 71 (with this writer; patient met previously for DA with [facility] and psychiatry)    Attendees: Client    Service Modality:  Video Visit:    Telemedicine Visit: The patient's condition can be safely assessed and treated via synchronous audio and visual telemedicine encounter.      Reason for Telemedicine Visit: Patient convenience (e.g. access to timely appointments / distance to available provider)    Originating Site (Patient Location): patient's home    On-Site (Provider Location): [facility]: [place]    Consent:  The patient/guardian has verbally consented to: the potential risks and benefits of telemedicine (video visit) versus in person care; bill my insurance or make self-payment for services provided; and responsibility for payment of non-covered services.     Patient would like the video invitation sent by: Send to e-mail at: [email or web address]    Mode of Communication:  Video Conference via well    As the provider I attest to compliance with applicable laws and regulations related to telemedicine.    DATA  Extended Session (53+ minutes): PROLONGED SERVICE IN THE OUTPATIENT SETTING REQUIRING DIRECT (FACE-TO-FACE) PATIENT CONTACT BEYOND THE USUAL SERVICE:    - Longer session due to limited access to mental health appointments and necessity to address patient's distress / complexity  Interactive Complexity: No  Crisis: No       Progress Since Last Session (Related to Symptoms / Goals / Homework):  Symptoms: reports a worsening of symptoms [facility]; reports irritability, low energy, and frustration.     Homework: Partially completed      Episode of Care  "Goals: Moderate - satisfactory progress - ACTION (Actively working towards change); Intervened by reinforcing change plan / affirming steps taken     Current / Ongoing Stressors and Concerns:  Feeling overwhelmed by organizing home and advocating for children's needs;  and daughter(s?) are on autism spectrum    Older daughter is experiencing dizziness/lethargy and was dx with POTS; some financial and friend-related stress; is interested in a deeper dive into emotional state and core beliefs through [facility]; new son born 2020; roommate/former friend was finally evicted from their home (lived there for 3-4 years and did not paid any rent for over 1-2 years); pt has been experiencing mild panic attacks; daughter (age 10) just started ADHD medication; history of anxious attachment stemming from relationship with father (he  8 years ago); is currently pregnant (high-risk due to advanced maternal age) and baby is due Dec. 2020; is in couples therapy with  due to frequent arguments and his emotional affair earlier in the year; financial stress; history of ADHD (hyperactive type).     Treatment Objective(s) Addressed in This Session:  [facility] and [facility]  Management of anger/irritability  Coping with NT/ND relationship dynamics  Dealing with \"big emotions\"  Self-care and barriers to this  Discussed family system issues  Participating in enjoyable activities and connecting with social and family supports  Discussed healthy boundaries and enforcement    Past/future:  Self-compassion and radical acceptance  Addressed possible solutions to insurance and financial concerns  Identified personal strengths within friendships  Processed feelings of loneliness  Identified strengths as a mother and need for positive affirmations; needs for appreciation and validation  Completing the stress cycle: creativity and movement  Emotional differentiation  Grounding and " mindfulness  will identify how past feelings are impacting current relationships  Identified and processed recent triggers for anxiety and sadness (relationship)  Identified insecurities and how they affect patient's thoughts and actions now and in the past  Solution focused: how to prioritize and cope with interrupted sleep  Discussed work/life balance and current purpose  Natali pradhan  Triggers for anxiety and insecurities  tell full story of past relational issues/trust/infidelity  use cognitive strategies identified in therapy to challenge anxious thoughts   Body appreciation  Relationship repair  Discussed ambiguous loss and grief in a box analogy  Anticipatory grief and creating meaning  10:10:10 rule (10 minutes, 10 months, and 10 years)  Feeling unappreciated and the 5:1 ratio (Natali)  Javon Servin's elements of trust and relationship repair   identify how attachment style drives patient's and 's behaviors  tell full story of past trauma related to her relationship with her father (and current roommate issues)     Intervention:  Solution-Focused  Family systems theory  CBT: connect thoughts, feelings, and actions   Narrative Therapy: separate problem from person and find the bright spots     Past/future:  Gottman research  Somatic experiencing  Management of anger and when to step away/disengage    Assessments completed prior to visit:  PHQ9:       10/26/2022    11:28 AM 12/7/2022     9:40 AM 12/20/2022     8:54 AM 2/8/2023    12:27 PM 3/7/2023     8:46 AM 4/21/2023    12:05 PM 10/2/2023    11:00 AM   PHQ-9 SCORE   PHQ-9 Total Score [facility] 8 (Mild depression) 7 (Mild depression) 4 (Minimal depression) 6 (Mild depression) 8 (Mild depression) 9 (Mild depression) 9 (Mild depression)   PHQ-9 Total Score 8 7 4 6 8 9 9     GAD7:       6/14/2022     9:57 AM 7/6/2022     2:01 PM 9/20/2022    10:24 AM 10/26/2022    11:29 AM 12/7/2022     9:43 AM 3/7/2023     8:47 AM 4/21/2023    12:12 PM   TEE-7 SCORE    Total Score 8 (mild anxiety) 10 (moderate anxiety) 10 (moderate anxiety) 6 (mild anxiety) 8 (mild anxiety) 5 (mild anxiety) 8 (mild anxiety)   Total Score 8 10 10 6 8 5 8     PROMIS 10-Global Health (all questions and answers displayed):       12/15/2021    10:29 AM 3/15/2022     7:14 PM 6/14/2022     9:59 AM 7/6/2022     2:02 PM 10/26/2022    11:31 AM 2/6/2023    11:06 AM 8/11/2023    11:06 AM   PROMIS 10   In general, would you say your health is: Fair Fair Fair Fair Fair Fair Good   In general, would you say your quality of life is: Fair Good Fair Fair Fair Fair Fair   In general, how would you rate your physical health? Fair Fair Fair Fair Fair Fair Fair   In general, how would you rate your mental health, including your mood and your ability to think? Good Good Fair Fair Fair Fair Fair   In general, how would you rate your satisfaction with your social activities and relationships? Fair Good Poor Fair Fair Fair Fair   In general, please rate how well you carry out your usual social activities and roles Fair Fair Good Fair Fair Fair Good   To what extent are you able to carry out your everyday physical activities such as walking, climbing stairs, carrying groceries, or moving a chair? Completely Completely Completely Mostly Completely Completely Mostly   In the past 7 days, how often have you been bothered by emotional problems such as feeling anxious, depressed, or irritable? Sometimes Often Often Often Often Sometimes Sometimes   In the past 7 days, how would you rate your fatigue on average? Severe Moderate Moderate Moderate Moderate Moderate Severe   In the past 7 days, how would you rate your pain on average, where 0 means no pain, and 10 means worst imaginable pain? 5 7 7 8 3 6 7   In general, would you say your health is: 2    2 2 2 2 2 2    2 3   In general, would you say your quality of life is: 2    2 3 2 2 2 2    2 2   In general, how would you rate your physical health? 2    2 2 2 2 2 2    2 2   In  general, how would you rate your mental health, including your mood and your ability to think? 3    3 3 2 2 2 2    2 2   In general, how would you rate your satisfaction with your social activities and relationships? 2    2 3 1 2 2 2    2 2   In general, please rate how well you carry out your usual social activities and roles. (This includes activities at home, at work and in your community, and responsibilities as a parent, child, spouse, employee, friend, etc.) 2    2 2 3 2 2 2    2 3   To what extent are you able to carry out your everyday physical activities such as walking, climbing stairs, carrying groceries, or moving a chair? 5    5 5 5 4 5 5    5 4   In the past 7 days, how often have you been bothered by emotional problems such as feeling anxious, depressed, or irritable? 3    3 4 4 4 4 3    3 3   In the past 7 days, how would you rate your fatigue on average? 4    4 3 3 3 3 3    3 4   In the past 7 days, how would you rate your pain on average, where 0 means no pain, and 10 means worst imaginable pain? 5    5 7 7 8 3 6    6 7   Global Mental Health Score 10    10 11 7 8 8 9    9 9   Global Physical Health Score 12    12 12 12 11 14 13    13 10   PROMIS TOTAL - SUBSCORES 22    22 23 19 19 22 22    22 19        ASSESSMENT: Current Emotional / Mental Status (status of significant symptoms):   Risk status (Self / Other harm or suicidal ideation)   Patient denies current fears or concerns for personal safety.   Patient denies current or recent suicidal ideation or behaviors.    Patient denies current or recent homicidal ideation or behaviors.   Patient denies current or recent self injurious behavior or ideation.   Patient denies other safety concerns.   Patient reports there has been no change in risk factors since their last session.   Patient reports there has been no change in protective factors since their last session.   Recommended that patient call 911 or go to the local ED should there be a change in  any of these risk factors.     Appearance:   Appropriate    Eye Contact:   Good    Psychomotor Behavior: Normal  Restless ; busy trying to help others   Attitude:   Cooperative, friendly   Orientation:   All   Speech    Rate / Production: Normal/ Responsive Talkative    Volume:  Normal    Mood:    Anxious  tired   Affect:    Appropriate  Expansive    Thought Content:  Clear  Rumination    Thought Form:  Coherent  Neurosis   Insight:    Good      Medication Review:   No changes to current psychiatric medication(s)     Medication Compliance:   Yes     Changes in Health Issues:   None     Recent:  May be latisha-menopausal (continued)   Concerns about cholesterol and resting glucose levels; pain in hip  Reports having physical and having high LDL cholesterol and glucose  went to ER for testing after experiencing shooting pain in arm     Chemical Use Review:   Substance Use: Chemical use reviewed, no active concerns identified ; no current alcohol use     Tobacco Use: No current tobacco use.      Diagnosis:  1. TEE (generalized anxiety disorder)    2. Recurrent major depressive disorder, in partial remission (H24)    3. Attention deficit hyperactivity disorder (ADHD), unspecified ADHD type      Note: There has been demonstrated improvement in functioning while patient has been engaged in psychotherapy/psychological service- if withdrawn the patient would deteriorate and/or relapse.     Collateral Reports Completed:  N/A    PLAN: (Patient Tasks / Therapist Tasks / Other)    Patient states that her goals for the next 2-3 weeks include:    Continue to clean and de-clutter areas of home and prepare son's bedroom  Continue to try to go to the [facility] (goal is at least 2x/week)      Keesha Eller    ______________________________________________________________________                                                           [facility]    Individual Treatment Plan    Patient's Name: Ellyn Mcgee  Date Of  Birth:  1975    Date of Creation: 2020  Date Treatment Plan Last Reviewed/Revised: 2023    DSM5 Diagnoses: 296.32 (F33.1) Major Depressive Disorder, Recurrent Episode, Moderate _ or 300.02 (F41.1) Generalized Anxiety Disorder (patient has previously been diagnosed with ADHD, hyperactive type)    Psychosocial / Contextual Factors: History of anxious attachment stemming from relationship with father (he  8 years ago); is currently pregnant (high-risk due to advanced maternal age); is in couples therapy with  due to frequent arguments and his emotional affair earlier in the year; financial stress; history of ADHD (hyperactive type).    PROMIS (reviewed every 90 days): PROMIS-10 Scores: 19 (see above note)    Referral / Collaboration:  Care Coordination    Anticipated number of sessions for this episode of care: 100  Anticipated frequency of sessions: Every 2-3 weeks  Anticipated duration of each session: 53+ minutes  Treatment plan will be reviewed in 90 days or when goals have been changed.           Measurable Treatment Goal(s) related to diagnosis / functional impairment(s):  Patient is asking to focus treatment on her relationship with her father and past trauma.    Goal 1: Patient will tell full story of past trauma related to her relationship with her father and will identify how past feelings are impacting current relationships.    Objective #A (Patient Action)    Patient will identify how past feelings are impacting current relationships.  Status: Continued - Date(s): 2023 (partially completed)    Intervention(s)  Therapist will role-play conflict management.    Objective #B  Patient will identify strengths and weaknesses within her family system of origin.  Status: Continued - Date(s): 2023 (partially completed)    Intervention(s)  Therapist will assign homework for patient to create list .      Goal 2: Patient will learn about resilience, trauma responses, and Javon Servin's  "\"the story I'm making up\" (cognitive strategy to manage anxious thoughts).    Objective #A (Patient Action)    Patient will learn about and identify personal trauma responses.    Status: Continued - Date(s): 12/5/2023     Intervention(s)  Therapist will provide educational materials on trauma responses .    Objective #B  Patient will use cognitive strategies identified in therapy to challenge anxious thoughts.  Status: Continued - Date(s): 12/5/2023     Intervention(s)  Therapist will teach about Javon Gareth's power of vulnerability and \"the story I'm making up\" .      Goal 3: Patient will learn about protective factors that foster resilience and attachment styles and will identify how her attachment style drives her behavior.     Objective #A (Patient Action)    Status:  Partially completed: 12/5/2023     Patient will learn about protective factors and will identify her own.    Intervention(s)  Therapist will complete with patient in session.    Objective #B  Patient will identify how her attachment style drives her behavior.  Status: Completed - Date: 12/5/2023       Intervention(s)  Therapist will teach emotional regulation skills.        Patient agreed to the above plan.      Keesha Eller    "

## 2023-12-05 ENCOUNTER — VIRTUAL VISIT (OUTPATIENT)
Dept: PSYCHOLOGY | Facility: CLINIC | Age: 48
End: 2023-12-05
Payer: COMMERCIAL

## 2023-12-05 DIAGNOSIS — F33.41 RECURRENT MAJOR DEPRESSIVE DISORDER, IN PARTIAL REMISSION (H): ICD-10-CM

## 2023-12-05 DIAGNOSIS — F41.1 GAD (GENERALIZED ANXIETY DISORDER): Primary | ICD-10-CM

## 2023-12-05 DIAGNOSIS — F90.9 ATTENTION DEFICIT HYPERACTIVITY DISORDER (ADHD), UNSPECIFIED ADHD TYPE: ICD-10-CM

## 2023-12-05 PROCEDURE — 90837 PSYTX W PT 60 MINUTES: CPT | Mod: VID | Performed by: MARRIAGE & FAMILY THERAPIST

## 2023-12-05 NOTE — PATIENT INSTRUCTIONS
Patient states that her goals for the next 2-3 weeks include:    Continue to clean and de-clutter areas of home and prepare son's bedroom  Continue to [facility] to go to the [facility] (goal is at least 2x/week)

## 2023-12-19 NOTE — PROGRESS NOTES
[facility]                                               Progress Note    Patient Name: Ellyn Mcgee  Date: 12/22/2023         Service Type: Individual      Session Start Time: 1:09 p.m.  Session End Time: 2:06 p.m.     Session Length: 57 min.    Session #: 72 (with this writer; patient met previously for DA with [facility] Elena Hill and psychiatry)    Attendees: Client    Service Modality:  Video Visit:    Telemedicine Visit: The patient's condition can be safely assessed and treated via synchronous audio and visual telemedicine encounter.      Reason for Telemedicine Visit: Patient convenience (e.g. access to timely appointments / distance to available provider)    Originating Site (Patient Location): patient's home    On-Site (Provider Location): [facility]: [place]    Consent:  The patient/guardian has verbally consented to: the potential risks and benefits of telemedicine (video visit) versus in person care; bill my insurance or make self-payment for services provided; and responsibility for payment of non-covered services.     Patient would like the video invitation sent by: Send to e-mail at: [email or web address]    Mode of Communication:  Video Conference via [employer]well    As the provider I attest to compliance with applicable laws and regulations related to telemedicine.    DATA  Extended Session (53+ minutes): PROLONGED SERVICE IN THE OUTPATIENT SETTING REQUIRING DIRECT (FACE-TO-FACE) PATIENT CONTACT BEYOND THE USUAL SERVICE:    - Longer session due to limited access to mental health appointments and necessity to address patient's distress / complexity  Interactive Complexity: No  Crisis: No       Progress Since Last Session (Related to Symptoms / Goals / Homework):  Symptoms: reports no change in symptoms and ongoing depression and anxiety; feels lack of appreciation and notes difficult communication between family members.  Discussed boundaries.     Homework: Partially  "completed      Episode of Care Goals: Moderate - satisfactory progress - ACTION (Actively working towards change); Intervened by reinforcing change plan / affirming steps taken     Current / Ongoing Stressors and Concerns:  Feeling overwhelmed by organizing home and advocating for children's needs;  and daughter(s?) are on autism spectrum    Older daughter is experiencing dizziness/lethargy and was dx with POTS; some financial and friend-related stress; is interested in a deeper dive into emotional state and core beliefs through [facility]; new son born 2020; roommate/former friend was finally evicted from their home (lived there for 3-4 years and did not paid any rent for over 1-2 years); pt has been experiencing mild panic attacks; daughter (age 10) just started ADHD medication; history of anxious attachment stemming from relationship with father (he  8 years ago); is currently pregnant (high-risk due to advanced maternal age) and baby is due Dec. 2020; is in couples therapy with  due to frequent arguments and his emotional affair earlier in the year; financial stress; history of ADHD (hyperactive type).     Treatment Objective(s) Addressed in This Session:  Identified [facility] and possible [facility]  Management of anger/irritability  Coping with NT/ND relationship dynamics  Dealing with \"big emotions\"  Self-care and barriers to this  Discussed family system issues  Participating in enjoyable activities and connecting with social and family supports  Discussed healthy boundaries and enforcement    Past/future:  Self-compassion and radical acceptance  Addressed possible solutions to insurance and financial concerns  Identified personal strengths within friendships  Processed feelings of loneliness  Identified strengths as a mother and need for positive affirmations; needs for appreciation and validation  Completing the stress cycle: creativity and movement  Emotional " differentiation  Grounding and mindfulness  will identify how past feelings are impacting current relationships  Identified and processed recent triggers for anxiety and sadness (relationship)  Identified insecurities and how they affect patient's thoughts and actions now and in the past  Solution focused: how to prioritize and cope with interrupted sleep  Discussed work/life balance and current purpose  Natali pradhan  Triggers for anxiety and insecurities  tell full story of past relational issues/trust/infidelity  use cognitive strategies identified in therapy to challenge anxious thoughts   Body appreciation  Relationship repair  Discussed ambiguous loss and grief in a box analogy  Anticipatory grief and creating meaning  10:10:10 rule (10 minutes, 10 months, and 10 years)  Feeling unappreciated and the 5:1 ratio (Natali)  Javon Servin's elements of trust and relationship repair   identify how attachment style drives patient's and 's behaviors  tell full story of past trauma related to her relationship with her father (and current roommate issues)     Intervention:  Solution-Focused  Family systems theory  CBT: connect thoughts, feelings, and actions   Narrative Therapy: separate problem from person and find the bright spots     Past/future:  Gottman research  Somatic experiencing  Management of anger and when to step away/disengage    Assessments completed prior to visit:  PHQ9:       10/26/2022    11:28 AM 12/7/2022     9:40 AM 12/20/2022     8:54 AM 2/8/2023    12:27 PM 3/7/2023     8:46 AM 4/21/2023    12:05 PM 10/2/2023    11:00 AM   PHQ-9 SCORE   PHQ-9 Total Score MyChart 8 (Mild depression) 7 (Mild depression) 4 (Minimal depression) 6 (Mild depression) 8 (Mild depression) 9 (Mild depression) 9 (Mild depression)   PHQ-9 Total Score 8 7 4 6 8 9 9     GAD7:       7/6/2022     2:01 PM 9/20/2022    10:24 AM 10/26/2022    11:29 AM 12/7/2022     9:43 AM 3/7/2023     8:47 AM 4/21/2023    12:12 PM  12/22/2023     1:09 PM   TEE-7 SCORE   Total Score 10 (moderate anxiety) 10 (moderate anxiety) 6 (mild anxiety) 8 (mild anxiety) 5 (mild anxiety) 8 (mild anxiety) 9 (mild anxiety)   Total Score 10 10 6 8 5 8 9     PROMIS 10-Global Health (all questions and answers displayed):       12/15/2021    10:29 AM 3/15/2022     7:14 PM 6/14/2022     9:59 AM 7/6/2022     2:02 PM 10/26/2022    11:31 AM 2/6/2023    11:06 AM 8/11/2023    11:06 AM   PROMIS 10   In general, would you say your health is: Fair Fair Fair Fair Fair Fair Good   In general, would you say your quality of life is: Fair Good Fair Fair Fair Fair Fair   In general, how would you rate your physical health? Fair Fair Fair Fair Fair Fair Fair   In general, how would you rate your mental health, including your mood and your ability to think? Good Good Fair Fair Fair Fair Fair   In general, how would you rate your satisfaction with your social activities and relationships? Fair Good Poor Fair Fair Fair Fair   In general, please rate how well you carry out your usual social activities and roles Fair Fair Good Fair Fair Fair Good   To what extent are you able to carry out your everyday physical activities such as walking, climbing stairs, carrying groceries, or moving a chair? Completely Completely Completely Mostly Completely Completely Mostly   In the past 7 days, how often have you been bothered by emotional problems such as feeling anxious, depressed, or irritable? Sometimes Often Often Often Often Sometimes Sometimes   In the past 7 days, how would you rate your fatigue on average? Severe Moderate Moderate Moderate Moderate Moderate Severe   In the past 7 days, how would you rate your pain on average, where 0 means no pain, and 10 means worst imaginable pain? 5 7 7 8 3 6 7   In general, would you say your health is: 2 2 2 2 2 2 3   In general, would you say your quality of life is: 2 3 2 2 2 2 2   In general, how would you rate your physical health? 2 2 2 2 2 2  2   In general, how would you rate your mental health, including your mood and your ability to think? 3 3 2 2 2 2 2   In general, how would you rate your satisfaction with your social activities and relationships? 2 3 1 2 2 2 2   In general, please rate how well you carry out your usual social activities and roles. (This includes activities at home, at work and in your community, and responsibilities as a parent, child, spouse, employee, friend, etc.) 2 2 3 2 2 2 3   To what extent are you able to carry out your everyday physical activities such as walking, climbing stairs, carrying groceries, or moving a chair? 5 5 5 4 5 5 4   In the past 7 days, how often have you been bothered by emotional problems such as feeling anxious, depressed, or irritable? 3 4 4 4 4 3 3   In the past 7 days, how would you rate your fatigue on average? 4 3 3 3 3 3 4   In the past 7 days, how would you rate your pain on average, where 0 means no pain, and 10 means worst imaginable pain? 5 7 7 8 3 6 7   Global Mental Health Score 10    10 11 7 8 8 9    9 9   Global Physical Health Score 12    12 12 12 11 14 13    13 10   PROMIS TOTAL - SUBSCORES 22    22 23 19 19 22 22    22 19        ASSESSMENT: Current Emotional / Mental Status (status of significant symptoms):   Risk status (Self / Other harm or suicidal ideation)   Patient denies current fears or concerns for personal safety.   Patient denies current or recent suicidal ideation or behaviors.    Patient denies current or recent homicidal ideation or behaviors.   Patient denies current or recent self injurious behavior or ideation.   Patient denies other safety concerns.   Patient reports there has been no change in risk factors since their last session.   Patient reports there has been no change in protective factors since their last session.   Recommended that patient call 911 or go to the local ED should there be a change in any of these risk factors.     Appearance:   Appropriate  "   Eye Contact:   Good    Psychomotor Behavior: Normal    Attitude:   Cooperative, friendly   Orientation:   All   Speech    Rate / Production: Normal/ Responsive Talkative    Volume:  Normal    Mood:    Anxious  tired ; reports feeling unappreciated   Affect:    Appropriate  Expansive    Thought Content:  Clear  Rumination    Thought Form:  Coherent  Neurosis   Insight:    Good      Medication Review:   No changes to current psychiatric medication(s)     Medication Compliance:   Yes     Changes in Health Issues:   None     Recent:  May be latisha-menopausal (continued)   Concerns about cholesterol and resting glucose levels; pain in hip  Reports having physical and having high LDL cholesterol and glucose  went to ER for testing after experiencing shooting pain in arm     Chemical Use Review:   Substance Use: Chemical use reviewed, no active concerns identified ; no current alcohol use     Tobacco Use: No current tobacco use.      Diagnosis:  1. TEE (generalized anxiety disorder)    2. Recurrent major depressive disorder, in partial remission (H24)    3. Attention deficit hyperactivity disorder (ADHD), unspecified ADHD type      Note: There has been demonstrated improvement in functioning while patient has been engaged in psychotherapy/psychological service- if withdrawn the patient would deteriorate and/or relapse.     Collateral Reports Completed:  N/A    PLAN: (Patient Tasks / Therapist Tasks / Other)    Patient states that her goals for the next three weeks include:    \"Get through the next few days\"  Try to get to the gym  Get to work and deal with the chaos      Keesha FRANKMaciel Eller    ______________________________________________________________________                                                           [facility]    Individual Treatment Plan    Patient's Name: Ellyn Mcgee  YOB: 1975    Date of Creation: 9/22/2020  Date Treatment Plan Last Reviewed/Revised: " "2023    DSM5 Diagnoses: 296.32 (F33.1) Major Depressive Disorder, Recurrent Episode, Moderate _ or 300.02 (F41.1) Generalized Anxiety Disorder (patient has previously been diagnosed with ADHD, hyperactive type)    Psychosocial / Contextual Factors: History of anxious attachment stemming from relationship with father (he  8 years ago); is currently pregnant (high-risk due to advanced maternal age); is in couples therapy with  due to frequent arguments and his emotional affair earlier in the year; financial stress; history of ADHD (hyperactive type).    PROMIS (reviewed every 90 days): PROMIS-10 Scores: 19 (see above note)    Referral / Collaboration:  Care Coordination    Anticipated number of sessions for this episode of care: 100  Anticipated frequency of sessions: Every 2-3 weeks  Anticipated duration of each session: 53+ minutes  Treatment plan will be reviewed in 90 days or when goals have been changed.           Measurable Treatment Goal(s) related to diagnosis / functional impairment(s):  Patient is asking to focus treatment on her relationship with her father and past trauma.    Goal 1: Patient will tell full story of past trauma related to her relationship with her father and will identify how past feelings are impacting current relationships.    Objective #A (Patient Action)    Patient will identify how past feelings are impacting current relationships.  Status: Continued - Date(s): 2023 (partially completed)    Intervention(s)  Therapist will role-play conflict management.    Objective #B  Patient will identify strengths and weaknesses within her family system of origin.  Status: Continued - Date(s): 2023 (partially completed)    Intervention(s)  Therapist will assign homework for patient to create list .      Goal 2: Patient will learn about resilience, trauma responses, and Javon Servin's \"the story I'm making up\" (cognitive strategy to manage anxious thoughts).    Objective #A " "(Patient Action)    Patient will learn about and identify personal trauma responses.    Status: Continued - Date(s): 12/5/2023     Intervention(s)  Therapist will provide educational materials on trauma responses .    Objective #B  Patient will use cognitive strategies identified in therapy to challenge anxious thoughts.  Status: Continued - Date(s): 12/5/2023     Intervention(s)  Therapist will teach about Javon Servin's power of vulnerability and \"the story I'm making up\" .      Goal 3: Patient will learn about protective factors that foster resilience and attachment styles and will identify how her attachment style drives her behavior.     Objective #A (Patient Action)    Status:  Partially completed: 12/5/2023     Patient will learn about protective factors and will identify her own.    Intervention(s)  Therapist will complete with patient in session.    Objective #B  Patient will identify how her attachment style drives her behavior.  Status: Completed - Date: 12/5/2023       Intervention(s)  Therapist will teach emotional regulation skills.        Patient agreed to the above plan.      Keesha Eller    "

## 2023-12-22 ENCOUNTER — VIRTUAL VISIT (OUTPATIENT)
Dept: PSYCHOLOGY | Facility: OTHER | Age: 48
End: 2023-12-22
Payer: COMMERCIAL

## 2023-12-22 DIAGNOSIS — F90.9 ATTENTION DEFICIT HYPERACTIVITY DISORDER (ADHD), UNSPECIFIED ADHD TYPE: ICD-10-CM

## 2023-12-22 DIAGNOSIS — F41.1 GAD (GENERALIZED ANXIETY DISORDER): Primary | ICD-10-CM

## 2023-12-22 DIAGNOSIS — F33.41 RECURRENT MAJOR DEPRESSIVE DISORDER, IN PARTIAL REMISSION (H): ICD-10-CM

## 2023-12-22 PROCEDURE — 90837 PSYTX W PT 60 MINUTES: CPT | Mod: VID | Performed by: MARRIAGE & FAMILY THERAPIST

## 2023-12-22 ASSESSMENT — ANXIETY QUESTIONNAIRES
1. FEELING NERVOUS, ANXIOUS, OR ON EDGE: MORE THAN HALF THE DAYS
2. NOT BEING ABLE TO STOP OR CONTROL WORRYING: SEVERAL DAYS
5. BEING SO RESTLESS THAT IT IS HARD TO SIT STILL: NOT AT ALL
7. FEELING AFRAID AS IF SOMETHING AWFUL MIGHT HAPPEN: SEVERAL DAYS
GAD7 TOTAL SCORE: 9
3. WORRYING TOO MUCH ABOUT DIFFERENT THINGS: SEVERAL DAYS
6. BECOMING EASILY ANNOYED OR IRRITABLE: MORE THAN HALF THE DAYS
IF YOU CHECKED OFF ANY PROBLEMS ON THIS QUESTIONNAIRE, HOW DIFFICULT HAVE THESE PROBLEMS MADE IT FOR YOU TO DO YOUR WORK, TAKE CARE OF THINGS AT HOME, OR GET ALONG WITH OTHER PEOPLE: SOMEWHAT DIFFICULT
4. TROUBLE RELAXING: MORE THAN HALF THE DAYS
GAD7 TOTAL SCORE: 9

## 2023-12-22 NOTE — PATIENT INSTRUCTIONS
"Patient states that her goals for the next three weeks include:    \"Get through the next few days\"  Try to get to the gym  Get to work and deal with the chaos  "

## 2023-12-26 ENCOUNTER — MYC MEDICAL ADVICE (OUTPATIENT)
Dept: PSYCHIATRY | Facility: CLINIC | Age: 48
End: 2023-12-26
Payer: COMMERCIAL

## 2023-12-26 DIAGNOSIS — F90.0 ATTENTION DEFICIT HYPERACTIVITY DISORDER (ADHD), PREDOMINANTLY INATTENTIVE TYPE: ICD-10-CM

## 2023-12-26 RX ORDER — DEXTROAMPHETAMINE SACCHARATE, AMPHETAMINE ASPARTATE MONOHYDRATE, DEXTROAMPHETAMINE SULFATE AND AMPHETAMINE SULFATE 6.25; 6.25; 6.25; 6.25 MG/1; MG/1; MG/1; MG/1
25 CAPSULE, EXTENDED RELEASE ORAL DAILY
Qty: 30 CAPSULE | Refills: 0 | Status: SHIPPED | OUTPATIENT
Start: 2023-12-26 | End: 2024-02-05

## 2023-12-26 NOTE — TELEPHONE ENCOUNTER
1) RN reviewed patient request that she needs amphetamine-dextroamphetamine (ADDERALL XR) 25 MG 24 hr capsule sent to mail order pharmacy as insurance will pay for only a few refills at non mail pharmacies     2) RN reviewed the [place]-  This medication was sold on 11/24/2023 #30    3) RN routing to Dr. Wang for review - 30 day refill was pended for review.    Margarita Braun RN on 12/26/2023 at 1:35 PM

## 2024-01-02 ENCOUNTER — ALLIED HEALTH/NURSE VISIT (OUTPATIENT)
Dept: FAMILY MEDICINE | Facility: CLINIC | Age: 49
End: 2024-01-02
Payer: COMMERCIAL

## 2024-01-02 DIAGNOSIS — Z23 NEED FOR PROPHYLACTIC VACCINATION AND INOCULATION AGAINST INFLUENZA: Primary | ICD-10-CM

## 2024-01-02 PROCEDURE — 90471 IMMUNIZATION ADMIN: CPT

## 2024-01-02 PROCEDURE — 99207 PR NO CHARGE NURSE ONLY: CPT

## 2024-01-02 PROCEDURE — 90686 IIV4 VACC NO PRSV 0.5 ML IM: CPT

## 2024-01-02 NOTE — PROGRESS NOTES
Prior to immunization administration, verified patients identity using patient s name and date of birth. Please see Immunization Activity for additional information.     Screening Questionnaire for Adult Immunization    Are you sick today?   No   Do you have allergies to medications, food, a vaccine component or latex?   No   Have you ever had a serious reaction after receiving a vaccination?   No   Do you have a long-term health problem with heart, lung, kidney, or metabolic disease (e.g., diabetes), asthma, a blood disorder, no spleen, complement component deficiency, a cochlear implant, or a spinal fluid leak?  Are you on long-term aspirin therapy?   No   Do you have cancer, leukemia, HIV/AIDS, or any other immune system problem?   No   Do you have a parent, brother, or sister with an immune system problem?   No   In the past 3 months, have you taken medications that affect  your immune system, such as prednisone, other steroids, or anticancer drugs; drugs for the treatment of rheumatoid arthritis, Crohn s disease, or psoriasis; or have you had radiation treatments?   No   Have you had a seizure, or a brain or other nervous system problem?   No   During the past year, have you received a transfusion of blood or blood    products, or been given immune (gamma) globulin or antiviral drug?   No   For women: Are you pregnant or is there a chance you could become       pregnant during the next month?   No   Have you received any vaccinations in the past 4 weeks?   No     Immunization questionnaire answers were all negative.    I have reviewed the following standing orders:   This patient is due and qualifies for the Influenza vaccine.    Click here for Influenza Vaccine Standing Order    I have reviewed the vaccines inclusion and exclusion criteria; No concerns regarding eligibility.     Patient instructed to remain in clinic for 15 minutes afterwards, and to report any adverse reactions.     Screening performed by  Floyd Matias, LEX on 1/2/2024 at 3:00 PM.

## 2024-01-07 ENCOUNTER — MYC MEDICAL ADVICE (OUTPATIENT)
Dept: PSYCHIATRY | Facility: CLINIC | Age: 49
End: 2024-01-07
Payer: COMMERCIAL

## 2024-01-08 NOTE — TELEPHONE ENCOUNTER
Patient is stating that the paroxetine 50 mg is making her tired about 20 minutes after taking it. She is wondering if she should decrease the dose or just take it at bedtime.     Last visit: Continue Paxil/paroxetine CR for anxiety and mood: take 50 mg daily.    Continue lorazepam/Ativan 0.5-1 mg daily as needed for severe anxiety.   Continue Adderall XR 25 mg daily as needed for ADHD symptoms.    Tiffanie Kelly RN on 1/8/2024 at 9:01 AM

## 2024-01-08 NOTE — PROGRESS NOTES
[facility]                                               Progress Note    Patient Name: Ellyn Mcgee  Date: 1/11/2024         Service Type: Individual      Session Start Time: 11:32 a.m.  Session End Time: 12:32 p.m.     Session Length: 60 min.    Session #: 73 (with this writer; patient met previously for DA with [facility] and psychiatry)    Attendees: Client and toddler son was present    Service Modality:  Video Visit:    Telemedicine Visit: The patient's condition can be safely assessed and treated via synchronous audio and visual telemedicine encounter.      Reason for Telemedicine Visit: Patient convenience (e.g. access to timely appointments / distance to available provider)    Originating Site (Patient Location): patient's home    On-Site (Provider Location): [facility]: [place]    Consent:  The patient/guardian has verbally consented to: the potential risks and benefits of telemedicine (video visit) versus in person care; bill my insurance or make self-payment for services provided; and responsibility for payment of non-covered services.     Patient would like the video invitation sent by: Send to e-mail at: [email or web address]    Mode of Communication:  Video Conference via [facility]    As the provider I attest to compliance with applicable laws and regulations related to telemedicine.    DATA  Extended Session (53+ minutes): PROLONGED SERVICE IN THE OUTPATIENT SETTING REQUIRING DIRECT (FACE-TO-FACE) PATIENT CONTACT BEYOND THE USUAL SERVICE:    - Longer session due to limited access to mental health appointments and necessity to address patient's distress / complexity  Interactive Complexity: No  Crisis: No       Progress Since Last Session (Related to Symptoms / Goals / Homework):  Symptoms: reports improvements in family relationships and some frustrations related to parenting a toddler.     Homework: Partially completed      Episode of Care Goals: Moderate -  "satisfactory progress - ACTION (Actively working towards change); Intervened by reinforcing change plan / affirming steps taken     Current / Ongoing Stressors and Concerns:  Feeling overwhelmed by organizing home and advocating for children's needs;  and daughter(s?) are on autism spectrum    Older daughter is experiencing dizziness/lethargy and was dx with POTS; some financial and friend-related stress; is interested in a deeper dive into emotional state and core beliefs through [facility]; new son born 2020; roommate/former friend was finally evicted from their home (lived there for 3-4 years and did not paid any rent for over 1-2 years); pt has been experiencing mild panic attacks; daughter (age 10) just started ADHD medication; history of anxious attachment stemming from relationship with father (he  8 years ago); is currently pregnant (high-risk due to advanced maternal age) and baby is due Dec. 2020; is in couples therapy with  due to frequent arguments and his emotional affair earlier in the year; financial stress; history of ADHD (hyperactive type).     Treatment Objective(s) Addressed in This Session:  [facility] and [facility]  Parenting challenges  Management of anger/irritability  Coping with NT/ND relationship dynamics and noted recent improvements  Dealing with \"big emotions\"  Self-care and barriers to this  Discussed family system issues  Participating in enjoyable activities and connecting with social and family supports  Discussed healthy boundaries and enforcement    Past/future:  Self-compassion and radical acceptance  Addressed possible solutions to insurance and financial concerns  Identified personal strengths within friendships  Processed feelings of loneliness  Identified strengths as a mother and need for positive affirmations; needs for appreciation and validation  Completing the stress cycle: creativity and movement  Emotional " differentiation  Grounding and mindfulness  will identify how past feelings are impacting current relationships  Identified and processed recent triggers for anxiety and sadness (relationship)  Identified insecurities and how they affect patient's thoughts and actions now and in the past  Solution focused: how to prioritize and cope with interrupted sleep  Discussed work/life balance and current purpose  Natali pradhan  Triggers for anxiety and insecurities  tell full story of past relational issues/trust/infidelity  use cognitive strategies identified in therapy to challenge anxious thoughts   Body appreciation  Relationship repair  Discussed ambiguous loss and grief in a box analogy  Anticipatory grief and creating meaning  10:10:10 rule (10 minutes, 10 months, and 10 years)  Feeling unappreciated and the 5:1 ratio (Natali)  Javon Servin's elements of trust and relationship repair   identify how attachment style drives patient's and 's behaviors  tell full story of past trauma related to her relationship with her father (and current roommate issues)     Intervention:  Solution-Focused  Family systems theory  CBT: connect thoughts, feelings, and actions   Narrative Therapy: separate problem from person and find the bright spots     Past/future:  Gottman research  Somatic experiencing  Management of anger and when to step away/disengage    Assessments completed prior to visit:  PHQ9:       10/26/2022    11:28 AM 12/7/2022     9:40 AM 12/20/2022     8:54 AM 2/8/2023    12:27 PM 3/7/2023     8:46 AM 4/21/2023    12:05 PM 10/2/2023    11:00 AM   PHQ-9 SCORE   PHQ-9 Total Score MyChart 8 (Mild depression) 7 (Mild depression) 4 (Minimal depression) 6 (Mild depression) 8 (Mild depression) 9 (Mild depression) 9 (Mild depression)   PHQ-9 Total Score 8 7 4 6 8 9 9     GAD7:       7/6/2022     2:01 PM 9/20/2022    10:24 AM 10/26/2022    11:29 AM 12/7/2022     9:43 AM 3/7/2023     8:47 AM 4/21/2023    12:12 PM  12/22/2023     1:09 PM   TEE-7 SCORE   Total Score 10 (moderate anxiety) 10 (moderate anxiety) 6 (mild anxiety) 8 (mild anxiety) 5 (mild anxiety) 8 (mild anxiety) 9 (mild anxiety)   Total Score 10 10 6 8 5 8 9     PROMIS 10-Global Health (all questions and answers displayed):       12/15/2021    10:29 AM 3/15/2022     7:14 PM 6/14/2022     9:59 AM 7/6/2022     2:02 PM 10/26/2022    11:31 AM 2/6/2023    11:06 AM 8/11/2023    11:06 AM   PROMIS 10   In general, would you say your health is: Fair Fair Fair Fair Fair Fair Good   In general, would you say your quality of life is: Fair Good Fair Fair Fair Fair Fair   In general, how would you rate your physical health? Fair Fair Fair Fair Fair Fair Fair   In general, how would you rate your mental health, including your mood and your ability to think? Good Good Fair Fair Fair Fair Fair   In general, how would you rate your satisfaction with your social activities and relationships? Fair Good Poor Fair Fair Fair Fair   In general, please rate how well you carry out your usual social activities and roles Fair Fair Good Fair Fair Fair Good   To what extent are you able to carry out your everyday physical activities such as walking, climbing stairs, carrying groceries, or moving a chair? Completely Completely Completely Mostly Completely Completely Mostly   In the past 7 days, how often have you been bothered by emotional problems such as feeling anxious, depressed, or irritable? Sometimes Often Often Often Often Sometimes Sometimes   In the past 7 days, how would you rate your fatigue on average? Severe Moderate Moderate Moderate Moderate Moderate Severe   In the past 7 days, how would you rate your pain on average, where 0 means no pain, and 10 means worst imaginable pain? 5 7 7 8 3 6 7   In general, would you say your health is: 2 2 2 2 2 2 3   In general, would you say your quality of life is: 2 3 2 2 2 2 2   In general, how would you rate your physical health? 2 2 2 2 2 2  2   In general, how would you rate your mental health, including your mood and your ability to think? 3 3 2 2 2 2 2   In general, how would you rate your satisfaction with your social activities and relationships? 2 3 1 2 2 2 2   In general, please rate how well you carry out your usual social activities and roles. (This includes activities at home, at work and in your community, and responsibilities as a parent, child, spouse, employee, friend, etc.) 2 2 3 2 2 2 3   To what extent are you able to carry out your everyday physical activities such as walking, climbing stairs, carrying groceries, or moving a chair? 5 5 5 4 5 5 4   In the past 7 days, how often have you been bothered by emotional problems such as feeling anxious, depressed, or irritable? 3 4 4 4 4 3 3   In the past 7 days, how would you rate your fatigue on average? 4 3 3 3 3 3 4   In the past 7 days, how would you rate your pain on average, where 0 means no pain, and 10 means worst imaginable pain? 5 7 7 8 3 6 7   Global Mental Health Score 10    10 11 7 8 8 9    9 9   Global Physical Health Score 12    12 12 12 11 14 13    13 10   PROMIS TOTAL - SUBSCORES 22    22 23 19 19 22 22    22 19        ASSESSMENT: Current Emotional / Mental Status (status of significant symptoms):   Risk status (Self / Other harm or suicidal ideation)   Patient denies current fears or concerns for personal safety.   Patient denies current or recent suicidal ideation or behaviors.    Patient denies current or recent homicidal ideation or behaviors.   Patient denies current or recent self injurious behavior or ideation.   Patient denies other safety concerns.   Patient reports there has been no change in risk factors since their last session.   Patient reports there has been no change in protective factors since their last session.   Recommended that patient call 911 or go to the local ED should there be a change in any of these risk factors.     Appearance:   Appropriate     Eye Contact:   Good    Psychomotor Behavior: Normal    Attitude:   Cooperative, friendly, pleasant   Orientation:   All   Speech    Rate / Production: Normal/ Responsive Talkative    Volume:  Normal    Mood:    Normal Euthymic   Affect:    Appropriate  Expansive    Thought Content:  Clear  Rumination    Thought Form:  Coherent  Neurosis   Insight:    Good      Medication Review:   No changes to current psychiatric medication(s)     Medication Compliance:   Yes     Changes in Health Issues:   None     Recent:  May be latisha-menopausal (continued)   Concerns about cholesterol and resting glucose levels; pain in hip  Reports having physical and having high LDL cholesterol and glucose  went to ER for testing after experiencing shooting pain in arm     Chemical Use Review:   Substance Use: Chemical use reviewed, no active concerns identified ; no current alcohol use     Tobacco Use: No current tobacco use.      Diagnosis:  1. TEE (generalized anxiety disorder)    2. Recurrent major depressive disorder, in partial remission (H24)    3. Attention deficit hyperactivity disorder (ADHD), unspecified ADHD type      Note: There has been demonstrated improvement in functioning while patient has been engaged in psychotherapy/psychological service- if withdrawn the patient would deteriorate and/or relapse.     Collateral Reports Completed:  N/A    PLAN: (Patient Tasks / Therapist Tasks / Other)    Patient states that her goals for the next three weeks include:    Continue to cook for family more often  Talk to friend about possibly getting into real estate      Keesha Eller    ______________________________________________________________________                                                           [facility]    Individual Treatment Plan    Patient's Name: Ellyn Mcgee  YOB: 1975    Date of Creation: 9/22/2020  Date Treatment Plan Last Reviewed/Revised: 12/5/2023    DSM5 Diagnoses:  "296.32 (F33.1) Major Depressive Disorder, Recurrent Episode, Moderate _ or 300.02 (F41.1) Generalized Anxiety Disorder (patient has previously been diagnosed with ADHD, hyperactive type)    Psychosocial / Contextual Factors: History of anxious attachment stemming from relationship with father (he  8 years ago); is currently pregnant (high-risk due to advanced maternal age); is in couples therapy with  due to frequent arguments and his emotional affair earlier in the year; financial stress; history of ADHD (hyperactive type).    PROMIS (reviewed every 90 days): PROMIS-10 Scores: 19 (see above note)    Referral / Collaboration:  Care Coordination    Anticipated number of sessions for this episode of care: 100  Anticipated frequency of sessions: Every 2-3 weeks  Anticipated duration of each session: 53+ minutes  Treatment plan will be reviewed in 90 days or when goals have been changed.           Measurable Treatment Goal(s) related to diagnosis / functional impairment(s):  Patient is asking to focus treatment on her relationship with her father and past trauma.    Goal 1: Patient will tell full story of past trauma related to her relationship with her father and will identify how past feelings are impacting current relationships.    Objective #A (Patient Action)    Patient will identify how past feelings are impacting current relationships.  Status: Continued - Date(s): 2023 (partially completed)    Intervention(s)  Therapist will role-play conflict management.    Objective #B  Patient will identify strengths and weaknesses within her family system of origin.  Status: Continued - Date(s): 2023 (partially completed)    Intervention(s)  Therapist will assign homework for patient to create list .      Goal 2: Patient will learn about resilience, trauma responses, and Javon Servin's \"the story I'm making up\" (cognitive strategy to manage anxious thoughts).    Objective #A (Patient Action)    Patient " "will learn about and identify personal trauma responses.    Status: Continued - Date(s): 12/5/2023     Intervention(s)  Therapist will provide educational materials on trauma responses .    Objective #B  Patient will use cognitive strategies identified in therapy to challenge anxious thoughts.  Status: Continued - Date(s): 12/5/2023     Intervention(s)  Therapist will teach about Javon Servin's power of vulnerability and \"the story I'm making up\" .      Goal 3: Patient will learn about protective factors that foster resilience and attachment styles and will identify how her attachment style drives her behavior.     Objective #A (Patient Action)    Status:  Partially completed: 12/5/2023     Patient will learn about protective factors and will identify her own.    Intervention(s)  Therapist will complete with patient in session.    Objective #B  Patient will identify how her attachment style drives her behavior.  Status: Completed - Date: 12/5/2023       Intervention(s)  Therapist will teach emotional regulation skills.        Patient agreed to the above plan.      Keesha Eller    "

## 2024-01-11 ENCOUNTER — VIRTUAL VISIT (OUTPATIENT)
Dept: PSYCHOLOGY | Facility: CLINIC | Age: 49
End: 2024-01-11
Payer: COMMERCIAL

## 2024-01-11 DIAGNOSIS — F90.9 ATTENTION DEFICIT HYPERACTIVITY DISORDER (ADHD), UNSPECIFIED ADHD TYPE: ICD-10-CM

## 2024-01-11 DIAGNOSIS — F41.1 GAD (GENERALIZED ANXIETY DISORDER): Primary | ICD-10-CM

## 2024-01-11 DIAGNOSIS — F33.41 RECURRENT MAJOR DEPRESSIVE DISORDER, IN PARTIAL REMISSION (H): ICD-10-CM

## 2024-01-11 PROCEDURE — 90837 PSYTX W PT 60 MINUTES: CPT | Mod: 95 | Performed by: MARRIAGE & FAMILY THERAPIST

## 2024-01-11 NOTE — PATIENT INSTRUCTIONS
Patient states that her goals for the next three weeks include:    Continue to cook for family more often  Talk to friend about possibly getting into real estate

## 2024-01-26 ENCOUNTER — THERAPY VISIT (OUTPATIENT)
Dept: PHYSICAL THERAPY | Facility: CLINIC | Age: 49
End: 2024-01-26
Payer: COMMERCIAL

## 2024-01-26 DIAGNOSIS — M22.2X1 PATELLOFEMORAL ARTHRALGIA OF BOTH KNEES: Primary | ICD-10-CM

## 2024-01-26 DIAGNOSIS — M22.2X2 PATELLOFEMORAL ARTHRALGIA OF BOTH KNEES: Primary | ICD-10-CM

## 2024-01-26 PROCEDURE — 97530 THERAPEUTIC ACTIVITIES: CPT | Mod: GP | Performed by: PHYSICAL THERAPIST

## 2024-01-26 NOTE — PROGRESS NOTES
Therapist Impression:   Emphasize quadriceps strengthening 12 sets per week.    NEXT: consider taping, IR stretching, progress quadriceps stretching    PTRX: both    Subjective:  Knees have been doing ok. Does some reports some headaches.      Objective:  None

## 2024-01-27 NOTE — PROGRESS NOTES
[facility]                                               Progress Note    Patient Name: Ellyn Mcgee  Date: 1/29/2024         Service Type: Individual      Session Start Time: 11:03 a.m.  Session End Time: 12:05 p.m.     Session Length: 62 min.    Session #: 74 (with this writer; patient met previously for DA with [facility] and psychiatry)    Attendees: Client    Service Modality:  Video Visit:    Telemedicine Visit: The patient's condition can be safely assessed and treated via synchronous audio and visual telemedicine encounter.      Reason for Telemedicine Visit: Patient convenience (e.g. access to timely appointments / distance to available provider)    Originating Site (Patient Location): patient's [facility]    On-Site (Provider Location): [facility]: [place]    Consent:  The patient/guardian has verbally consented to: the potential risks and benefits of telemedicine (video visit) versus in person care; bill my insurance or make self-payment for services provided; and responsibility for payment of non-covered services.     Patient would like the video invitation sent by: Send to e-mail at: [email or web address]    Mode of Communication:  Video Conference via [facility]    As the provider I attest to compliance with applicable laws and regulations related to telemedicine.    DATA  Extended Session (53+ minutes): PROLONGED SERVICE IN THE OUTPATIENT SETTING REQUIRING DIRECT (FACE-TO-FACE) PATIENT CONTACT BEYOND THE USUAL SERVICE:    - Longer session due to limited access to mental health appointments and necessity to address patient's distress / complexity  Interactive Complexity: No  Crisis: No       Progress Since Last Session (Related to Symptoms / Goals / Homework):  Symptoms: reports navigating some recent stressors well and has continued to focus on her own responses and triggers (especially in relationship with ).     Homework: Achieved / completed to  "satisfaction      Episode of Care Goals: Moderate - satisfactory progress - ACTION (Actively working towards change); Intervened by reinforcing change plan / affirming steps taken     Current / Ongoing Stressors and Concerns:  Feeling overwhelmed by organizing home and advocating for children's needs;  and daughter(s?) are on autism spectrum    Older daughter is experiencing dizziness/lethargy and was dx with POTS; some financial and friend-related stress; is interested in a deeper dive into emotional state and core beliefs through [facility]; new son born 2020; roommate/former friend was finally evicted from their home (lived there for 3-4 years and did not paid any rent for over 1-2 years); pt has been experiencing mild panic attacks; daughter (age 10) just started ADHD medication; history of anxious attachment stemming from relationship with father (he  8 years ago); is currently pregnant (high-risk due to advanced maternal age) and baby is due Dec. 2020; is in couples therapy with  due to frequent arguments and his emotional affair earlier in the year; financial stress; history of ADHD (hyperactive type).     Treatment Objective(s) Addressed in This Session:  Discussed family system issues  Connection and vulnerability  [facility] and [facility]  Parenting challenges  Management of anger/irritability  Coping with NT/ND relationship dynamics and noted recent improvements  Dealing with \"big emotions\"  Self-care and barriers to this  Participating in enjoyable activities and connecting with social and family supports  Discussed healthy boundaries and enforcement    Past/future:  Self-compassion and radical acceptance  Addressed possible solutions to insurance and financial concerns  Identified personal strengths within friendships  Processed feelings of loneliness  Identified strengths as a mother and need for positive affirmations; needs for appreciation and " validation  Completing the stress cycle: creativity and movement  Emotional differentiation  Grounding and mindfulness  will identify how past feelings are impacting current relationships  Identified and processed recent triggers for anxiety and sadness (relationship)  Identified insecurities and how they affect patient's thoughts and actions now and in the past  Solution focused: how to prioritize and cope with interrupted sleep  Discussed work/life balance and current purpose  Natali pradhan  Triggers for anxiety and insecurities  tell full story of past relational issues/trust/infidelity  use cognitive strategies identified in therapy to challenge anxious thoughts   Body appreciation  Relationship repair  Discussed ambiguous loss and grief in a box analogy  Anticipatory grief and creating meaning  10:10:10 rule (10 minutes, 10 months, and 10 years)  Feeling unappreciated and the 5:1 ratio ([facility])  Javon Servin's elements of trust and relationship repair   identify how attachment style drives patient's and 's behaviors  tell full story of past trauma related to her relationship with her father (and current roommate issues)     Intervention:  Solution-Focused  Family systems theory  CBT: connect thoughts, feelings, and actions   Narrative Therapy: separate problem from person and find the bright spots     Past/future:  Gottman research  Somatic experiencing  Management of anger and when to step away/disengage    Assessments completed prior to visit:  PHQ9:       10/26/2022    11:28 AM 12/7/2022     9:40 AM 12/20/2022     8:54 AM 2/8/2023    12:27 PM 3/7/2023     8:46 AM 4/21/2023    12:05 PM 10/2/2023    11:00 AM   PHQ-9 SCORE   PHQ-9 Total Score Iggyhart 8 (Mild depression) 7 (Mild depression) 4 (Minimal depression) 6 (Mild depression) 8 (Mild depression) 9 (Mild depression) 9 (Mild depression)   PHQ-9 Total Score 8 7 4 6 8 9 9     GAD7:       7/6/2022     2:01 PM 9/20/2022    10:24 AM 10/26/2022    11:29  AM 12/7/2022     9:43 AM 3/7/2023     8:47 AM 4/21/2023    12:12 PM 12/22/2023     1:09 PM   TEE-7 SCORE   Total Score 10 (moderate anxiety) 10 (moderate anxiety) 6 (mild anxiety) 8 (mild anxiety) 5 (mild anxiety) 8 (mild anxiety) 9 (mild anxiety)   Total Score 10 10 6 8 5 8 9     PROMIS 10-Global Health (all questions and answers displayed):       12/15/2021    10:29 AM 3/15/2022     7:14 PM 6/14/2022     9:59 AM 7/6/2022     2:02 PM 10/26/2022    11:31 AM 2/6/2023    11:06 AM 8/11/2023    11:06 AM   PROMIS 10   In general, would you say your health is: Fair Fair Fair Fair Fair Fair Good   In general, would you say your quality of life is: Fair Good Fair Fair Fair Fair Fair   In general, how would you rate your physical health? Fair Fair Fair Fair Fair Fair Fair   In general, how would you rate your mental health, including your mood and your ability to think? Good Good Fair Fair Fair Fair Fair   In general, how would you rate your satisfaction with your social activities and relationships? Fair Good Poor Fair Fair Fair Fair   In general, please rate how well you carry out your usual social activities and roles Fair Fair Good Fair Fair Fair Good   To what extent are you able to carry out your everyday physical activities such as walking, climbing stairs, carrying groceries, or moving a chair? Completely Completely Completely Mostly Completely Completely Mostly   In the past 7 days, how often have you been bothered by emotional problems such as feeling anxious, depressed, or irritable? Sometimes Often Often Often Often Sometimes Sometimes   In the past 7 days, how would you rate your fatigue on average? Severe Moderate Moderate Moderate Moderate Moderate Severe   In the past 7 days, how would you rate your pain on average, where 0 means no pain, and 10 means worst imaginable pain? 5 7 7 8 3 6 7   In general, would you say your health is: 2 2 2 2 2 2 3   In general, would you say your quality of life is: 2 3 2 2 2 2  2   In general, how would you rate your physical health? 2 2 2 2 2 2 2   In general, how would you rate your mental health, including your mood and your ability to think? 3 3 2 2 2 2 2   In general, how would you rate your satisfaction with your social activities and relationships? 2 3 1 2 2 2 2   In general, please rate how well you carry out your usual social activities and roles. (This includes activities at home, at work and in your community, and responsibilities as a parent, child, spouse, employee, friend, etc.) 2 2 3 2 2 2 3   To what extent are you able to carry out your everyday physical activities such as walking, climbing stairs, carrying groceries, or moving a chair? 5 5 5 4 5 5 4   In the past 7 days, how often have you been bothered by emotional problems such as feeling anxious, depressed, or irritable? 3 4 4 4 4 3 3   In the past 7 days, how would you rate your fatigue on average? 4 3 3 3 3 3 4   In the past 7 days, how would you rate your pain on average, where 0 means no pain, and 10 means worst imaginable pain? 5 7 7 8 3 6 7   Global Mental Health Score 10    10 11 7 8 8 9    9 9   Global Physical Health Score 12    12 12 12 11 14 13    13 10   PROMIS TOTAL - SUBSCORES 22    22 23 19 19 22 22    22 19        ASSESSMENT: Current Emotional / Mental Status (status of significant symptoms):   Risk status (Self / Other harm or suicidal ideation)   Patient denies current fears or concerns for personal safety.   Patient denies current or recent suicidal ideation or behaviors.    Patient denies current or recent homicidal ideation or behaviors.   Patient denies current or recent self injurious behavior or ideation.   Patient denies other safety concerns.   Patient reports there has been no change in risk factors since their last session.   Patient reports there has been no change in protective factors since their last session.   Recommended that patient call 911 or go to the local ED should there be a  change in any of these risk factors.     Appearance:   Appropriate    Eye Contact:   Good    Psychomotor Behavior: Normal    Attitude:   Cooperative, friendly, pleasant   Orientation:   All   Speech    Rate / Production: Normal/ Responsive Talkative    Volume:  Normal    Mood:    Anxious  Normal   Affect:    Appropriate  Expansive    Thought Content:  Clear  Rumination    Thought Form:  Coherent  Neurosis   Insight:    Good      Medication Review:   No changes to current psychiatric medication(s)     Medication Compliance:   Yes     Changes in Health Issues:   None     Recent:  May be latisha-menopausal (continued)   Concerns about cholesterol and resting glucose levels; pain in hip  Reports having physical and having high LDL cholesterol and glucose  went to ER for testing after experiencing shooting pain in arm     Chemical Use Review:   Substance Use: Chemical use reviewed, no active concerns identified ; no current alcohol use     Tobacco Use: No current tobacco use.      Diagnosis:  1. TEE (generalized anxiety disorder)    2. Recurrent major depressive disorder, in partial remission (H24)    3. Attention deficit hyperactivity disorder (ADHD), unspecified ADHD type      Note: There has been demonstrated improvement in functioning while patient has been engaged in psychotherapy/psychological service- if withdrawn the patient would deteriorate and/or relapse.     Collateral Reports Completed:  N/A - suggested patient look up the Help Me Grow program to get services/evaluation for her son    PLAN: (Patient Tasks / Therapist Tasks / Other)    Patient states that her goals for the next three weeks include:    Continue to cook for family more often  Ask  to send her jobs he would be interested in, and she will help craft resumes to fit the jobs  Continue to look for ways to have the children evaluated  Get to the 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      Keesha SON  Rafita    ______________________________________________________________________                                                           [facility]    Individual Treatment Plan    Patient's Name: Ellyn Mcgee  YOB: 1975    Date of Creation: 2020  Date Treatment Plan Last Reviewed/Revised: 2023    DSM5 Diagnoses: 296.32 (F33.1) Major Depressive Disorder, Recurrent Episode, Moderate _ or 300.02 (F41.1) Generalized Anxiety Disorder (patient has previously been diagnosed with ADHD, hyperactive type)    Psychosocial / Contextual Factors: History of anxious attachment stemming from relationship with father (he  8 years ago); is currently pregnant (high-risk due to advanced maternal age); is in couples therapy with  due to frequent arguments and his emotional affair earlier in the year; financial stress; history of ADHD (hyperactive type).    PROMIS (reviewed every 90 days): PROMIS-10 Scores: 19 (see above note)    Referral / Collaboration:  Care Coordination    Anticipated number of sessions for this episode of care: 100  Anticipated frequency of sessions: Every 2-3 weeks  Anticipated duration of each session: 53+ minutes  Treatment plan will be reviewed in 90 days or when goals have been changed.           Measurable Treatment Goal(s) related to diagnosis / functional impairment(s):  Patient is asking to focus treatment on her relationship with her father and past trauma.    Goal 1: Patient will tell full story of past trauma related to her relationship with her father and will identify how past feelings are impacting current relationships.    Objective #A (Patient Action)    Patient will identify how past feelings are impacting current relationships.  Status: Continued - Date(s): 2023 (partially completed)    Intervention(s)  Therapist will role-play conflict management.    Objective #B  Patient will identify strengths and weaknesses within her family  "system of origin.  Status: Continued - Date(s): 12/5/2023 (partially completed)    Intervention(s)  Therapist will assign homework for patient to create list .      Goal 2: Patient will learn about resilience, trauma responses, and Javon Servin's \"the story I'm making up\" (cognitive strategy to manage anxious thoughts).    Objective #A (Patient Action)    Patient will learn about and identify personal trauma responses.    Status: Continued - Date(s): 12/5/2023     Intervention(s)  Therapist will provide educational materials on trauma responses .    Objective #B  Patient will use cognitive strategies identified in therapy to challenge anxious thoughts.  Status: Continued - Date(s): 12/5/2023     Intervention(s)  Therapist will teach about Javon Servin's power of vulnerability and \"the story I'm making up\" .      Goal 3: Patient will learn about protective factors that foster resilience and attachment styles and will identify how her attachment style drives her behavior.     Objective #A (Patient Action)    Status:  Partially completed: 12/5/2023     Patient will learn about protective factors and will identify her own.    Intervention(s)  Therapist will complete with patient in session.    Objective #B  Patient will identify how her attachment style drives her behavior.  Status: Completed - Date: 12/5/2023       Intervention(s)  Therapist will teach emotional regulation skills.        Patient agreed to the above plan.      Keesha Eller    "

## 2024-01-29 ENCOUNTER — VIRTUAL VISIT (OUTPATIENT)
Dept: PSYCHOLOGY | Facility: OTHER | Age: 49
End: 2024-01-29
Payer: COMMERCIAL

## 2024-01-29 DIAGNOSIS — F90.9 ATTENTION DEFICIT HYPERACTIVITY DISORDER (ADHD), UNSPECIFIED ADHD TYPE: ICD-10-CM

## 2024-01-29 DIAGNOSIS — F41.1 GAD (GENERALIZED ANXIETY DISORDER): Primary | ICD-10-CM

## 2024-01-29 DIAGNOSIS — F33.41 RECURRENT MAJOR DEPRESSIVE DISORDER, IN PARTIAL REMISSION (H): ICD-10-CM

## 2024-01-29 PROCEDURE — 90837 PSYTX W PT 60 MINUTES: CPT | Mod: 95 | Performed by: MARRIAGE & FAMILY THERAPIST

## 2024-01-29 NOTE — PATIENT INSTRUCTIONS
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

## 2024-02-05 RX ORDER — DEXTROAMPHETAMINE SACCHARATE, AMPHETAMINE ASPARTATE MONOHYDRATE, DEXTROAMPHETAMINE SULFATE AND AMPHETAMINE SULFATE 6.25; 6.25; 6.25; 6.25 MG/1; MG/1; MG/1; MG/1
25 CAPSULE, EXTENDED RELEASE ORAL DAILY
Qty: 90 CAPSULE | Refills: 0 | Status: SHIPPED | OUTPATIENT
Start: 2024-02-05 | End: 2024-04-30

## 2024-02-05 NOTE — TELEPHONE ENCOUNTER
Date of Last Office Visit: 10/13/23  Date of Next Office Visit: 3/13/24  No shows since last visit: 0  Cancellations since last visit: 0    Medication requested: amphetamine-dextroamphetamine (ADDERALL XR) 25 MG 24 hr capsule Date last ordered: 12/26/23 Qty: 30 Refills: 0     Review of [place] ?: yes  Medication last sold date: 1/5/24 Qty filled: 30  Other controlled substance on [place] ?: no    Lapse in medication adherence greater than 5 days?: NA    Medication refill request verified as identical to current order?: yes  Result of Last DAM, VPA, Li+ Level, CBC, or Carbamazepine Level (at or since last visit): N/A    Last visit treatment plan:   Continue Paxil/paroxetine CR for anxiety and mood: take 50 mg daily.    Continue lorazepam/Ativan 0.5-1 mg daily as needed for severe anxiety.   Continue Adderall XR 25 mg daily as needed for ADHD symptoms.  Continue all other medications as reviewed per electronic medical record today.   Safety plan reviewed. To the Emergency Department as needed or call after hours crisis line at [phone] or [phone]. [place] Crisis Text Line. Text [place] to 073430 or Suicide LifeLine Chat: suicidepreventionlifeline.org/chat  Continue therapy as planned.   Schedule an appointment with me in 6 months or sooner as needed.  Patient scheduled today.  Follow up with primary care provider as planned or for acute medical concerns.  Call the psychiatric nurse line with medication questions or concerns at [phone].  In[employer]ar[employer]st may be used to communicate with your provider, but this is not intended to be used for emergencies.    Patient Status:  Patient is a continuous/long-term care patient and refills will continue to come from this provider until otherwise noted.     []Medication refilled per  Medication Refill in Ambulatory Care  policy.  [x]Medication unable to be refilled by RN due to criteria not met as indicated below:    []Eligibility - not seen in the last year   []Supervision -  no future appointment   []Compliance - no shows, cancellations or lapse in therapy   []Verification - order discrepancy   [x]Controlled medication   []Medication not included in policy   []90-day supply request   []Other

## 2024-02-06 ENCOUNTER — PATIENT OUTREACH (OUTPATIENT)
Dept: CARE COORDINATION | Facility: CLINIC | Age: 49
End: 2024-02-06
Payer: COMMERCIAL

## 2024-02-07 ENCOUNTER — THERAPY VISIT (OUTPATIENT)
Dept: PHYSICAL THERAPY | Facility: CLINIC | Age: 49
End: 2024-02-07
Payer: COMMERCIAL

## 2024-02-07 DIAGNOSIS — M22.2X2 PATELLOFEMORAL ARTHRALGIA OF BOTH KNEES: Primary | ICD-10-CM

## 2024-02-07 DIAGNOSIS — M22.2X1 PATELLOFEMORAL ARTHRALGIA OF BOTH KNEES: Primary | ICD-10-CM

## 2024-02-07 PROCEDURE — 97530 THERAPEUTIC ACTIVITIES: CPT | Mod: GP | Performed by: PHYSICAL THERAPIST

## 2024-02-07 PROCEDURE — 97110 THERAPEUTIC EXERCISES: CPT | Mod: GP | Performed by: PHYSICAL THERAPIST

## 2024-02-07 NOTE — PROGRESS NOTES
Therapist Impression:   Emphasize quadriceps strengthening 12 sets per week..  Trial DN next session for knee pain.  Discussed extension exercises for low back discomfort throughout the day    NEXT: consider taping, IR stretching, progress quadriceps stretching    PTRX: both    Subjective:  Reports compliance with exercises.  Having some pain in low back and hips.      Objective:  Full hip ROM  Pain with R hip flexion in L low back  Negative SLR

## 2024-02-14 ENCOUNTER — THERAPY VISIT (OUTPATIENT)
Dept: PHYSICAL THERAPY | Facility: CLINIC | Age: 49
End: 2024-02-14
Payer: COMMERCIAL

## 2024-02-14 DIAGNOSIS — M22.2X2 PATELLOFEMORAL ARTHRALGIA OF BOTH KNEES: Primary | ICD-10-CM

## 2024-02-14 DIAGNOSIS — M22.2X1 PATELLOFEMORAL ARTHRALGIA OF BOTH KNEES: Primary | ICD-10-CM

## 2024-02-14 PROCEDURE — 97530 THERAPEUTIC ACTIVITIES: CPT | Mod: GP | Performed by: PHYSICAL THERAPIST

## 2024-02-14 PROCEDURE — 97110 THERAPEUTIC EXERCISES: CPT | Mod: GP | Performed by: PHYSICAL THERAPIST

## 2024-02-14 PROCEDURE — 20560 NDL INSJ W/O NJX 1 OR 2 MUSC: CPT | Mod: GA | Performed by: PHYSICAL THERAPIST

## 2024-02-14 NOTE — PROGRESS NOTES
[facility]                                                                                   OUTPATIENT PHYSICAL THERAPY    PLAN OF TREATMENT FOR OUTPATIENT REHABILITATION   Patient's Last Name, First Name, Ellyn Zimmer YOB: 1975   Provider's Name   [facility]   Medical Record No.  [id number]     Onset Date: 11/10/22  Start of Care Date: 11/10/23     Medical Diagnosis:  B PFPS      PT Treatment Diagnosis:  B PFPS Plan of Treatment  Frequency/Duration: 2 x month/ 2 months    Certification date from 02/08/24 to 04/04/24         See note for plan of treatment details and functional goals     Alton Frazier, PT                         I CERTIFY THE NEED FOR THESE SERVICES FURNISHED UNDER        THIS PLAN OF TREATMENT AND WHILE UNDER MY CARE     (Physician attestation of this document indicates review and certification of the therapy plan).              Referring Provider:  Colby Prado    Initial Assessment  See Epic Evaluation- Start of Care Date: 11/10/23        Therapist Impression:   Did well getting her 12 sets of exercises for her quadriceps per week.    Good response to dry needling for pain relief.     Discussed extension exercises for low back discomfort throughout the day    NEXT: dry needling as needed, IR stretching, progress quadriceps stretching    PTRX: both    Subjective:  Reports good compliance with exercises.  Knees were a little sore yesterday, but doing ok today.  Less intense pain days.        Objective:  Good squat and step up technique

## 2024-02-16 NOTE — PROGRESS NOTES
[facility]                                               Progress Note    Patient Name: Ellyn Mcgee  Date: 2/20/2024         Service Type: Individual      Session Start Time: 2:34 p.m.  Session End Time: 3:35 p.m.     Session Length: 59 min.    Session #: 75 (with this writer; patient met previously for DA with [facility] Elena Hill and psychiatry)    Attendees: Client    Service Modality:  Video Visit:    Telemedicine Visit: The patient's condition can be safely assessed and treated via synchronous audio and visual telemedicine encounter.      Reason for Telemedicine Visit: Patient convenience (e.g. access to timely appointments / distance to available provider)    Originating Site (Patient Location): patient's parked car    On-Site (Provider Location): [facility]: [place]    Consent:  The patient/guardian has verbally consented to: the potential risks and benefits of telemedicine (video visit) versus in person care; bill my insurance or make self-payment for services provided; and responsibility for payment of non-covered services.     Patient would like the video invitation sent by: Send to e-mail at: [email or web address]    Mode of Communication:  Video Conference via [facility]    As the provider I attest to compliance with applicable laws and regulations related to telemedicine.    DATA  Extended Session (53+ minutes): PROLONGED SERVICE IN THE OUTPATIENT SETTING REQUIRING DIRECT (FACE-TO-FACE) PATIENT CONTACT BEYOND THE USUAL SERVICE:    - Longer session due to limited access to mental health appointments and necessity to address patient's distress / complexity  Interactive Complexity: No  Crisis: No       Progress Since Last Session (Related to Symptoms / Goals / Homework):  Symptoms: reports increased depression due to decrease in self-care and gym time. Fears middle daughter's behaviors need intervention.     Homework: Partially completed      Episode of Care Goals: Moderate -  "satisfactory progress - ACTION (Actively working towards change); Intervened by reinforcing change plan / affirming steps taken     Current / Ongoing Stressors and Concerns:  Feeling overwhelmed by organizing home and advocating for children's needs;  and daughter(s?) are on autism spectrum    Older daughter is experiencing dizziness/lethargy and was dx with POTS; some financial and friend-related stress; is interested in a deeper dive into emotional state and core beliefs through [facility]; new son born 2020; roommate/former friend was finally evicted from their home (lived there for 3-4 years and did not paid any rent for over 1-2 years); pt has been experiencing mild panic attacks; daughter (age 10) just started ADHD medication; history of anxious attachment stemming from relationship with father (he  8 years ago); is currently pregnant (high-risk due to advanced maternal age) and baby is due Dec. 2020; is in couples therapy with  due to frequent arguments and his emotional affair earlier in the year; financial stress; history of ADHD (hyperactive type).     Treatment Objective(s) Addressed in This Session:  Discussed family system issues  Connection and vulnerability  [facility] and [facility]  Parenting challenges  Management of anger/irritability  Coping with NT/ND relationship dynamics and noted recent improvements  Dealing with \"big emotions\"  Self-care and barriers to this  Participating in enjoyable activities and connecting with social and family supports  Discussed healthy boundaries and enforcement  (Continued)    Past/future:  Self-compassion and radical acceptance  Addressed possible solutions to insurance and financial concerns  Identified personal strengths within friendships  Processed feelings of loneliness  Identified strengths as a mother and need for positive affirmations; needs for appreciation and validation  Completing the stress cycle: " creativity and movement  Emotional differentiation  Grounding and mindfulness  will identify how past feelings are impacting current relationships  Identified and processed recent triggers for anxiety and sadness (relationship)  Identified insecurities and how they affect patient's thoughts and actions now and in the past  Solution focused: how to prioritize and cope with interrupted sleep  Discussed work/life balance and current purpose  Natali pradhan  Triggers for anxiety and insecurities  tell full story of past relational issues/trust/infidelity  use cognitive strategies identified in therapy to challenge anxious thoughts   Body appreciation  Relationship repair  Discussed ambiguous loss and grief in a box analogy  Anticipatory grief and creating meaning  10:10:10 rule (10 minutes, 10 months, and 10 years)  Feeling unappreciated and the 5:1 ratio ([facility])  Javon Servin's elements of trust and relationship repair   identify how attachment style drives patient's and 's behaviors  tell full story of past trauma related to her relationship with her father (and current roommate issues)     Intervention:  Solution-Focused  Family systems theory  CBT: connect thoughts, feelings, and actions   Narrative Therapy: separate problem from person and find the bright spots     Past/future:  Gottman research  Somatic experiencing  Management of anger and when to step away/disengage    Assessments completed prior to visit:  PHQ9:       10/26/2022    11:28 AM 12/7/2022     9:40 AM 12/20/2022     8:54 AM 2/8/2023    12:27 PM 3/7/2023     8:46 AM 4/21/2023    12:05 PM 10/2/2023    11:00 AM   PHQ-9 SCORE   PHQ-9 Total Score MyChart 8 (Mild depression) 7 (Mild depression) 4 (Minimal depression) 6 (Mild depression) 8 (Mild depression) 9 (Mild depression) 9 (Mild depression)   PHQ-9 Total Score 8 7 4 6 8 9 9     GAD7:       7/6/2022     2:01 PM 9/20/2022    10:24 AM 10/26/2022    11:29 AM 12/7/2022     9:43 AM 3/7/2023      8:47 AM 4/21/2023    12:12 PM 12/22/2023     1:09 PM   TEE-7 SCORE   Total Score 10 (moderate anxiety) 10 (moderate anxiety) 6 (mild anxiety) 8 (mild anxiety) 5 (mild anxiety) 8 (mild anxiety) 9 (mild anxiety)   Total Score 10 10 6 8 5 8 9     PROMIS 10-Global Health (all questions and answers displayed):       12/15/2021    10:29 AM 3/15/2022     7:14 PM 6/14/2022     9:59 AM 7/6/2022     2:02 PM 10/26/2022    11:31 AM 2/6/2023    11:06 AM 8/11/2023    11:06 AM   PROMIS 10   In general, would you say your health is: Fair Fair Fair Fair Fair Fair Good   In general, would you say your quality of life is: Fair Good Fair Fair Fair Fair Fair   In general, how would you rate your physical health? Fair Fair Fair Fair Fair Fair Fair   In general, how would you rate your mental health, including your mood and your ability to think? Good Good Fair Fair Fair Fair Fair   In general, how would you rate your satisfaction with your social activities and relationships? Fair Good Poor Fair Fair Fair Fair   In general, please rate how well you carry out your usual social activities and roles Fair Fair Good Fair Fair Fair Good   To what extent are you able to carry out your everyday physical activities such as walking, climbing stairs, carrying groceries, or moving a chair? Completely Completely Completely Mostly Completely Completely Mostly   In the past 7 days, how often have you been bothered by emotional problems such as feeling anxious, depressed, or irritable? Sometimes Often Often Often Often Sometimes Sometimes   In the past 7 days, how would you rate your fatigue on average? Severe Moderate Moderate Moderate Moderate Moderate Severe   In the past 7 days, how would you rate your pain on average, where 0 means no pain, and 10 means worst imaginable pain? 5 7 7 8 3 6 7   In general, would you say your health is: 2 2 2 2 2 2 3   In general, would you say your quality of life is: 2 3 2 2 2 2 2   In general, how would you rate  your physical health? 2 2 2 2 2 2 2   In general, how would you rate your mental health, including your mood and your ability to think? 3 3 2 2 2 2 2   In general, how would you rate your satisfaction with your social activities and relationships? 2 3 1 2 2 2 2   In general, please rate how well you carry out your usual social activities and roles. (This includes activities at home, at work and in your community, and responsibilities as a parent, child, spouse, employee, friend, etc.) 2 2 3 2 2 2 3   To what extent are you able to carry out your everyday physical activities such as walking, climbing stairs, carrying groceries, or moving a chair? 5 5 5 4 5 5 4   In the past 7 days, how often have you been bothered by emotional problems such as feeling anxious, depressed, or irritable? 3 4 4 4 4 3 3   In the past 7 days, how would you rate your fatigue on average? 4 3 3 3 3 3 4   In the past 7 days, how would you rate your pain on average, where 0 means no pain, and 10 means worst imaginable pain? 5 7 7 8 3 6 7   Global Mental Health Score 10    10 11 7 8 8 9    9 9   Global Physical Health Score 12    12 12 12 11 14 13    13 10   PROMIS TOTAL - SUBSCORES 22    22 23 19 19 22 22    22 19        ASSESSMENT: Current Emotional / Mental Status (status of significant symptoms):   Risk status (Self / Other harm or suicidal ideation)   Patient denies current fears or concerns for personal safety.   Patient denies current or recent suicidal ideation or behaviors.    Patient denies current or recent homicidal ideation or behaviors.   Patient denies current or recent self injurious behavior or ideation.   Patient denies other safety concerns.   Patient reports there has been no change in risk factors since their last session.   Patient reports there has been no change in protective factors since their last session.   Recommended that patient call 911 or go to the local ED should there be a change in any of these risk  factors.     Appearance:   Appropriate    Eye Contact:   Good    Psychomotor Behavior: Normal    Attitude:   Cooperative   Orientation:   All   Speech    Rate / Production: Normal/ Responsive Talkative    Volume:  Normal    Mood:    Anxious  Sad  Fearful   Affect:    Appropriate  Expansive    Thought Content:  Clear  Rumination    Thought Form:  Coherent  Neurosis; flight of ideas   Insight:    Good      Medication Review:   No changes to current psychiatric medication(s)     Medication Compliance:   Yes     Changes in Health Issues:   None     Recent:  May be latisha-menopausal (continued)   Concerns about cholesterol and resting glucose levels; pain in hip  Reports having physical and having high LDL cholesterol and glucose  went to ER for testing after experiencing shooting pain in arm     Chemical Use Review:   Substance Use: Chemical use reviewed, no active concerns identified ; no current alcohol use     Tobacco Use: No current tobacco use.      Diagnosis:  1. TEE (generalized anxiety disorder)    2. Recurrent major depressive disorder, in partial remission (H24)    3. Attention deficit hyperactivity disorder (ADHD), unspecified ADHD type      Note: There has been demonstrated improvement in functioning while patient has been engaged in psychotherapy/psychological service- if withdrawn the patient would deteriorate and/or relapse.     Collateral Reports Completed:  N/A - suggested patient look up the Help Me Grow program to get services/evaluation for her son    PLAN: (Patient Tasks / Therapist Tasks / Other)    Patient states that her goals for the next three weeks include:    [facility]  Prioritize small self-care wins (showers, nutritious food, etc.)  Sleep a little bit more; wake earlier  Try to find supportive resources for middle child      Keesha MMaciel Eller    ______________________________________________________________________                                                            [facility]    Individual Treatment Plan    Patient's Name: Ellyn Mcgee  YOB: 1975    Date of Creation: 2020  Date Treatment Plan Last Reviewed/Revised: 2023    DSM5 Diagnoses: 296.32 (F33.1) Major Depressive Disorder, Recurrent Episode, Moderate _ or 300.02 (F41.1) Generalized Anxiety Disorder (patient has previously been diagnosed with ADHD, hyperactive type)    Psychosocial / Contextual Factors: History of anxious attachment stemming from relationship with father (he  8 years ago); is currently pregnant (high-risk due to advanced maternal age); is in couples therapy with  due to frequent arguments and his emotional affair earlier in the year; financial stress; history of ADHD (hyperactive type).    PROMIS (reviewed every 90 days): PROMIS-10 Scores: 19 (see above note)    Referral / Collaboration:  Care Coordination    Anticipated number of sessions for this episode of care: 100  Anticipated frequency of sessions: Every 2-3 weeks  Anticipated duration of each session: 53+ minutes  Treatment plan will be reviewed in 90 days or when goals have been changed.           Measurable Treatment Goal(s) related to diagnosis / functional impairment(s):  Patient is asking to focus treatment on her relationship with her father and past trauma.    Goal 1: Patient will tell full story of past trauma related to her relationship with her father and will identify how past feelings are impacting current relationships.    Objective #A (Patient Action)    Patient will identify how past feelings are impacting current relationships.  Status: Continued - Date(s): 2023 (partially completed)    Intervention(s)  Therapist will role-play conflict management.    Objective #B  Patient will identify strengths and weaknesses within her family system of origin.  Status: Continued - Date(s): 2023 (partially completed)    Intervention(s)  Therapist will assign homework for  "patient to create list .      Goal 2: Patient will learn about resilience, trauma responses, and Javon Servin's \"the story I'm making up\" (cognitive strategy to manage anxious thoughts).    Objective #A (Patient Action)    Patient will learn about and identify personal trauma responses.    Status: Continued - Date(s): 12/5/2023     Intervention(s)  Therapist will provide educational materials on trauma responses .    Objective #B  Patient will use cognitive strategies identified in therapy to challenge anxious thoughts.  Status: Continued - Date(s): 12/5/2023     Intervention(s)  Therapist will teach about Javon Gareth's power of vulnerability and \"the story I'm making up\" .      Goal 3: Patient will learn about protective factors that foster resilience and attachment styles and will identify how her attachment style drives her behavior.     Objective #A (Patient Action)    Status:  Partially completed: 12/5/2023     Patient will learn about protective factors and will identify her own.    Intervention(s)  Therapist will complete with patient in session.    Objective #B  Patient will identify how her attachment style drives her behavior.  Status: Completed - Date: 12/5/2023       Intervention(s)  Therapist will teach emotional regulation skills.        Patient agreed to the above plan.      Keesha Eller    "

## 2024-02-20 ENCOUNTER — PATIENT OUTREACH (OUTPATIENT)
Dept: CARE COORDINATION | Facility: CLINIC | Age: 49
End: 2024-02-20
Payer: COMMERCIAL

## 2024-02-20 ENCOUNTER — VIRTUAL VISIT (OUTPATIENT)
Dept: PSYCHOLOGY | Facility: CLINIC | Age: 49
End: 2024-02-20
Payer: COMMERCIAL

## 2024-02-20 DIAGNOSIS — F33.41 RECURRENT MAJOR DEPRESSIVE DISORDER, IN PARTIAL REMISSION (H): ICD-10-CM

## 2024-02-20 DIAGNOSIS — F41.1 GAD (GENERALIZED ANXIETY DISORDER): Primary | ICD-10-CM

## 2024-02-20 DIAGNOSIS — F90.9 ATTENTION DEFICIT HYPERACTIVITY DISORDER (ADHD), UNSPECIFIED ADHD TYPE: ICD-10-CM

## 2024-02-20 PROCEDURE — 90837 PSYTX W PT 60 MINUTES: CPT | Mod: 95 | Performed by: MARRIAGE & FAMILY THERAPIST

## 2024-02-20 NOTE — PATIENT INSTRUCTIONS
Patient states that her goals for the next three weeks include:    Continue to try to get to the [employer]  Prioritize small self-care wins (showers, nutritious food, etc.)  Sleep a little bit more; wake earlier  Try to find supportive resources for middle child

## 2024-03-01 ENCOUNTER — THERAPY VISIT (OUTPATIENT)
Dept: PHYSICAL THERAPY | Facility: CLINIC | Age: 49
End: 2024-03-01
Payer: COMMERCIAL

## 2024-03-01 DIAGNOSIS — M22.2X2 PATELLOFEMORAL ARTHRALGIA OF BOTH KNEES: Primary | ICD-10-CM

## 2024-03-01 DIAGNOSIS — M22.2X1 PATELLOFEMORAL ARTHRALGIA OF BOTH KNEES: Primary | ICD-10-CM

## 2024-03-01 PROCEDURE — 97110 THERAPEUTIC EXERCISES: CPT | Mod: GP | Performed by: PHYSICAL THERAPIST

## 2024-03-01 PROCEDURE — 97530 THERAPEUTIC ACTIVITIES: CPT | Mod: GP | Performed by: PHYSICAL THERAPIST

## 2024-03-01 NOTE — PROGRESS NOTES
Therapist Impression:   Progressed HEP for core program    Discussed extension exercises for low back discomfort throughout the day    NEXT: dry needling as needed, IR stretching, progress quadriceps stretching    PTRX: both    Subjective:  Knees have not have hurt as bad.  They are actually feeling good.       Objective:

## 2024-03-08 ENCOUNTER — MYC MEDICAL ADVICE (OUTPATIENT)
Dept: DERMATOLOGY | Facility: CLINIC | Age: 49
End: 2024-03-08
Payer: COMMERCIAL

## 2024-03-08 DIAGNOSIS — L30.9 DERMATITIS OF LIP: Primary | ICD-10-CM

## 2024-03-12 RX ORDER — TRIAMCINOLONE ACETONIDE 0.25 MG/G
OINTMENT TOPICAL 2 TIMES DAILY
Qty: 15 G | Refills: 0 | Status: SHIPPED | OUTPATIENT
Start: 2024-03-12 | End: 2024-05-06

## 2024-03-13 ENCOUNTER — VIRTUAL VISIT (OUTPATIENT)
Dept: PSYCHIATRY | Facility: CLINIC | Age: 49
End: 2024-03-13
Payer: COMMERCIAL

## 2024-03-13 DIAGNOSIS — F90.0 ATTENTION DEFICIT HYPERACTIVITY DISORDER (ADHD), PREDOMINANTLY INATTENTIVE TYPE: Primary | ICD-10-CM

## 2024-03-13 DIAGNOSIS — F33.41 RECURRENT MAJOR DEPRESSIVE DISORDER, IN PARTIAL REMISSION (H): ICD-10-CM

## 2024-03-13 DIAGNOSIS — F41.1 GAD (GENERALIZED ANXIETY DISORDER): ICD-10-CM

## 2024-03-13 PROCEDURE — 99214 OFFICE O/P EST MOD 30 MIN: CPT | Mod: 95 | Performed by: PSYCHIATRY & NEUROLOGY

## 2024-03-13 PROCEDURE — G2211 COMPLEX E/M VISIT ADD ON: HCPCS | Mod: 95 | Performed by: PSYCHIATRY & NEUROLOGY

## 2024-03-13 RX ORDER — DEXTROAMPHETAMINE SACCHARATE, AMPHETAMINE ASPARTATE MONOHYDRATE, DEXTROAMPHETAMINE SULFATE AND AMPHETAMINE SULFATE 3.75; 3.75; 3.75; 3.75 MG/1; MG/1; MG/1; MG/1
15 CAPSULE, EXTENDED RELEASE ORAL DAILY
Qty: 30 CAPSULE | Refills: 0 | Status: SHIPPED | OUTPATIENT
Start: 2024-05-14 | End: 2024-03-19

## 2024-03-13 RX ORDER — DEXTROAMPHETAMINE SACCHARATE, AMPHETAMINE ASPARTATE MONOHYDRATE, DEXTROAMPHETAMINE SULFATE AND AMPHETAMINE SULFATE 3.75; 3.75; 3.75; 3.75 MG/1; MG/1; MG/1; MG/1
15 CAPSULE, EXTENDED RELEASE ORAL DAILY
Qty: 30 CAPSULE | Refills: 0 | Status: SHIPPED | OUTPATIENT
Start: 2024-04-13 | End: 2024-03-19

## 2024-03-13 RX ORDER — DEXTROAMPHETAMINE SACCHARATE, AMPHETAMINE ASPARTATE MONOHYDRATE, DEXTROAMPHETAMINE SULFATE AND AMPHETAMINE SULFATE 3.75; 3.75; 3.75; 3.75 MG/1; MG/1; MG/1; MG/1
15 CAPSULE, EXTENDED RELEASE ORAL DAILY
Qty: 30 CAPSULE | Refills: 0 | Status: SHIPPED | OUTPATIENT
Start: 2024-03-13 | End: 2024-03-19

## 2024-03-13 NOTE — NURSING NOTE
Is the patient currently in the state of [place]? YES    Visit mode:VIDEO    If the visit is dropped, the patient can be reconnected by: VIDEO VISIT: Text to cell phone:   Telephone Information:   Mobile [phone]       Will anyone else be joining the visit? NO  (If patient encounters technical issues they should call [phone]    How would you like to obtain your AVS? MyChart    Are changes needed to the allergy or medication list? Pt stated no changes to allergies and Pt stated no med changes    Reason for visit: ENMANUEL SCHMIDT

## 2024-03-13 NOTE — PROGRESS NOTES
"Telemedicine Visit: The patient's condition can be safely assessed and treated via synchronous audio and visual telemedicine encounter.      Reason for Telemedicine Visit: Patient has requested telehealth visit    Originating Site (Patient Location): Patient's home in [place]    Distant Location (provider location):  Off-site    Consent:  The patient/guardian has verbally consented to: the potential risks and benefits of telemedicine (video visit) versus in person care; bill my insurance or make self-payment for services provided; and responsibility for payment of non-covered services.     Mode of Communication:  Video Conference via IID    As the provider I attest to compliance with applicable laws and regulations related to telemedicine.        Outpatient Psychiatric Progress Note    Name: Ellyn Collado Everardo   : 1975                    Primary Care Provider: Dorothy Guaman DO   Therapist: BARBARA Schulte         3/7/2023     8:46 AM 2023    12:05 PM 10/2/2023    11:00 AM   PHQ   PHQ-9 Total Score 8 9 9   Q9: Thoughts of better off dead/self-harm past 2 weeks Not at all Not at all Not at all   TEE-7 scores:      3/7/2023     8:47 AM 2023    12:12 PM 2023     1:09 PM   TEE-7 SCORE   Total Score 5 (mild anxiety) 8 (mild anxiety) 9 (mild anxiety)   Total Score 5 8 9       Patient Identification:  Patient is a 48 year old,   White Not  or  female  who presents for return visit with me.  Patient is currently employed part time but on maternity leave. Patient attended the phone/video session alone. Patient prefers to be called: \"Ellyn\".    Interim History:  I last saw Ellyn Collado Everardo for outpatient psychiatry Return Visit on 10/13/2023 During that appointment, we:    Continue Paxil/paroxetine CR for anxiety and mood: take 50 mg daily.    Continue lorazepam/Ativan 0.5-1 mg daily as needed for severe anxiety.   Continue Adderall XR 25 mg daily as needed for ADHD " symptoms.      3/13: Patient overall doing relatively okay.  High stress in the home at times.  Working hard to manage.  Somehow struggling with motivation, task completion, organization, and other ADHD symptoms.  Accidentally took 2 Adderall XR capsules 1 morning.  Thought she was taking her Paxil but then realized it was her Adderall XR after she had already taken her Adderall XR.  Patient tolerated relatively well and found it more helpful for ADHD symptoms.  Wondering about a dose change.  Patient may also want to decrease her Paxil some to see if that helps reduce fatigue and sedation.  No acute safety concerns.  No SI.  No problematic drug or alcohol use.    Per [facility], Keesha Conner, [facility], during today's team-based visit:  No [facility] appointment    Psychiatric ROS:  Ellyn Mcgee reports mood has been: Relatively stable  Anxiety has been: Manageable  Sleep has been: Up and down  Isabel sxs: None  Psychosis sxs: None  ADHD/ADD sxs: Up-and-down  PTSD sxs: None  PHQ9 and GAD7 scores were reviewed today if completed.   Medication side effects: Fatigue/sedation?  Current stressors include: See HPI above  Coping mechanisms and supports include: Therapy, Family, Hobbies and Friends    Current medications include:   Current Outpatient Medications   Medication Sig    amphetamine-dextroamphetamine (ADDERALL XR) 25 MG 24 hr capsule Take 1 capsule (25 mg) by mouth daily    ibuprofen (ADVIL/MOTRIN) 200 MG tablet Take 200-400 mg by mouth every 4 hours as needed for pain OTC    LORazepam (ATIVAN) 1 MG tablet Take 0.5-1 tablets (0.5-1 mg) by mouth daily as needed (anxiety and sleep)    PARoxetine (PAXIL-CR) 25 MG 24 hr tablet Take 2 tablets (50 mg) by mouth every morning    Prenatal Vit-Fe Fumarate-FA (PRENATAL PO)     triamcinolone (KENALOG) 0.025 % external ointment Apply topically 2 times daily To affected areas on the lips until resolved.    Turmeric 500 MG CAPS      No current facility-administered medications for this visit.      [place]:   02/14/2024 02/05/2024 1 Dextroamp-Amphet Er 25 Mg Cap 90.00 90 Al u [id number] Exp (4317) 0/0  [place]   01/05/2024 12/26/2023 1 Dextroamp-Amphet Er 25 Mg Cap 30.00 30 Al Bau [id number] Exp (4317) 0/0  [place]   11/21/2023 10/11/2023 1 Dextroamp-Amphet Er 25 Mg Cap 30.00 30 Al Bau [id number] Wal (9499) 0/0  [place]       Past Medical/Surgical History:  Past Medical History:   Diagnosis Date    Abnormal Pap smear     Colposcopy    Adjustment disorder with mixed anxiety and depressed mood 04/04/2012    Anemia     In Past    Anxiety     Chlamydia trachomatis infection of other specified site 1993    Depression     Depressive disorder 1979    Not diagnosed until college...later diagnosed with TEE    Fertility problem     Lyme disease 09/08/2010    ?false positive vs positve CMV - resolved    Moderate dysplasia of cervix 1995    Other and unspecified adverse effect of drug, medicinal and biological substance     insulin resistent    Varicosities     Wounds and injuries     fall down stairs, PT for back, pain meds      has a past medical history of Abnormal Pap smear, Adjustment disorder with mixed anxiety and depressed mood (04/04/2012), Anemia, Anxiety, Chlamydia trachomatis infection of other specified site (1993), Depression, Depressive disorder (1979), Fertility problem, Lyme disease (09/08/2010), Moderate dysplasia of cervix (1995), Other and unspecified adverse effect of drug, medicinal and biological substance, Varicosities, and Wounds and injuries.    She has no past medical history of Arthritis, Asthma, Asymptomatic human immunodeficiency virus (HIV) infection status (H), Breast disorder, Cancer (H), Cerebral infarction (H), Chronic kidney disease, Complication of anesthesia, Congestive heart failure (H), COPD (chronic obstructive pulmonary disease) (H), Diabetes (H), Diabetes mellitus (H), Heart disease, Herpes simplex without mention of complication, History of blood  transfusion, Hypertension, Liver disease, Postpartum depression, Rh incompatibility, Seizures (H), Sickle cell anemia (H), Systemic lupus erythematosus (H), Thyroid disease, or Uncomplicated asthma.    Social History:  Reviewed. No changes to social history except as noted in HPI above.     Vital Signs:   None. This is phone/video visit.     Labs:  Most recent laboratory results reviewed and the pertinent results include:   Lab Results   Component Value Date    A1C 5.3 08/01/2023    A1C 5.4 03/07/2023    A1C 5.3 04/30/2019    A1C 5.2 04/17/2018      Recent Labs   Lab Test 08/01/23  0902 03/07/23  0954   CHOL 234* 313*   HDL 64 72   * 221*   TRIG 99 102        Review of Systems:  10 systems (general, cardiovascular, respiratory, eyes, ENT, endocrine, GI, , M/S, neurological) were reviewed. Most pertinent finding(s) is/are: denies fever, cough, headaches, shortness of breath, chest pain, N/V, constipation/diarrhea, trouble urinating, aches and pains. The remaining systems are all unremarkable.    Mental Status Examination (limited as this is by phone/video):  Appearance: Awake, alert, appears stated age, no acute distress, well-groomed  Attitude:  Cooperative, pleasant  Motor: No obvious abnormalities observed via video, not formally tested  Oriented to:  person, place, time, and situation  Attention Span and Concentration:  normal  Speech:  clear, coherent, regular rate, rhythm, and volume  Language: intact  Mood: ok  Affect: Overall appropriate and mood congruent  Associations:  no loose associations  Thought Process:  logical, linear and goal oriented  Thought Content:  no evidence of suicidal ideation or homicidal ideation, no evidence of psychotic thought, no auditory hallucinations present and no visual hallucinations present  Recent and Remote Memory:  Intact to interview. Not formally assessed. No amnesia.  Fund of Knowledge: appropriate  Insight:  good  Judgment:  intact, adequate for safety  Impulse  Control:  intact    Suicide Risk Assessment:  Today Ellyn Mcgee reports no suicidal ideation. Based on all available evidence including the factors cited above, Ellyn Mcgee does not appear to be at imminent risk for self-harm, does not meet criteria for a 72-hr hold, and therefore remains appropriate for ongoing outpatient level of care.  A thorough assessment of risk factors related to suicide and self-harm have been reviewed and are noted above. The patient convincingly denies suicidality on several occasions. Local community safety resources reviewed for patient to use if needed. There was no deceit detected, and the patient presented in a manner that was believable.     DSM5 Diagnosis:  Major Depressive Disorder, Recurrent Episode, In Partial Remission  300.02 (F41.1) Generalized Anxiety Disorder   ADHD, Unspecified  Insomnia-unspecified (mostly related to infant)    Medical comorbidities include:  Patient Active Problem List    Diagnosis Date Noted    Patellofemoral arthralgia of both knees 11/10/2023     Priority: Medium    Major depressive disorder, recurrent episode, moderate (H) 10/08/2020     Priority: Medium    TEE (generalized anxiety disorder) 10/10/2018     Priority: Medium    Cervical radiculopathy 04/16/2018     Priority: Medium    Attention deficit hyperactivity disorder (ADHD), predominantly inattentive type 10/04/2017     Priority: Medium     Patient is followed by Dr. Wang for ongoing prescription of stimulants.  All refills should be approved by this provider, or covering partner.        External hemorrhoids 04/25/2012     Priority: Medium    PCOS (polycystic ovarian syndrome) 02/22/2011     Priority: Medium    Hyperlipidemia LDL goal <130 09/05/2008     Priority: Medium    Anxiety state 09/05/2008     Priority: Medium     Infrequent prn use of lorazepam         Psychosocial & Contextual Factors: See HPI above    Assessment:  Per Intake Note with me 9/8/20:  Ellyn Mcgee is a  45-year-old female who is 23 weeks pregnant with a past psychiatric history including depression, anxiety, and ADHD who presents today for psychiatric evaluation.  Her depression and anxiety date back several years and she has also had postpartum depression/anxiety with her now 8-year-old (she also has a 14-year-old but did not experience postpartum mental health issues at that time).  She started sertraline during her second pregnancy and then ended up being maintained on Paxil-CR for several years.  She is also more recently diagnosed with ADHD and maintained on Concerta.  She weaned off both Paxil and Concerta when she found out she was pregnant and replaced these medications with her current regimen of Lexapro and Effexor-XR to decrease risk of fetal defects.  For the most part, she is feeling a little bit better on her current doses of Lexapro 10 mg and Effexor-XR 75 mg.  It is an unconventional combination as we discussed, but since she is doing quite well, I am hesitant to make many changes.  She feels as though her anxiety could be a little bit better controlled and so we will further increase Effexor-XR to 112.5 mg daily to be taken with her Lexapro 10 mg daily.  If she does a lot better, we can consider decreasing Lexapro to 5 mg daily. We discussed the risk of serotonin syndrome and she will continue to be vigilant for any signs or symptoms of this condition.  We did discuss the possibility of restarting a stimulant medication if necessary.  She does not feel as though her ADHD symptoms are too severe at this time.    4/20/21:   Today patient reports overall some ongoing ups and downs regarding her symptoms since last visit.  She is thinking much of it might be hormonal and related to sleep deprivation.  She still has not yet had her menses return since having her baby.  She has had some feelings of panic.  Used to take propranolol in the past when she felt this way.  She would like to start this  medication again.  Discussed risks and benefits while breast-feeding.  Limited risks for taking low-dose propranolol while breast-feeding although the infant does have some exposure through breast milk.  Recommended taking propranolol at least 3-4 hours prior to breast-feeding to decrease risks.  No other medication changes at this time.    12/15/2021:   Patient has overall been feeling quite stable mood wise.  Has some ups and downs that are more related to feeling overwhelmed and exhausted regarding responsibilities for her children, home life, and work.  Does not feel depressed.  Feels like medications are working well.  Propranolol has been helpful.  No medication changes today.  No safety concerns or SI.  No problematic drug or alcohol use.    6/14/2022:  Patient overall struggling lately with mood, anxiety, ADHD symptoms.  Stopped Lexapro since felt like it was making her symptoms worse.  We have considered for quite some time about starting her back on an ADHD medication.  I think it is worth a trial at this time.  Discussed risks and benefits of a stimulant medication while breast-feeding.  She will likely be weaning soon.  Still has not been getting great sleep due to cosleeping and nighttime feedings.  No acute safety concerns.  No SI.  No problematic drug or alcohol use.  If she has too much irritability on Adderall, we could consider either going back to Concerta or a trial with Vyvanse.    12/14/2022:  Akanksha overall struggling more with anxiety and panic related symptoms.  Stress has been higher.  Patient wondering about starting Paxil-CR again.  Has tolerated well in the past.  We will add a low dose with venlafaxine.  We will also decrease venlafaxine some.  May continue cross taper/titration.  Lorazepam/Ativan provided for current acute symptoms and for any severe discomfort that may arise with this transition.  Therapy encouraged.  No acute safety concerns.  No SI.  No problematic drug or alcohol  use.  Patient will be weaning her 2-year-old from breast-feeding.  We discussed risks and benefits of current medication regimen while breast-feeding.    1/5/2023:  Doing well on Paxil.  Symptoms improved quite quickly.  Tolerating well with no negative side effects.  Was able to decrease Effexor with ease as well.  We will continue tapering off Effexor and further optimizing Paxil.  Encouraged to continue in therapy.  No acute safety concerns.  No SI.  No problematic drug or alcohol use.    2/6/2023:  Patient overall doing relatively okay.  Symptoms of anxiety and depression have improved.  Irritability still lingering some.  Feeling a little emotional as well at times.  Some symptoms could be remaining from stopping venlafaxine.  We will wait increase Paxil and patient will observe her symptoms further/longer.  Patient can message in a few weeks if wants to increase Paxil.  No acute safety concerns.  No SI.  No problematic drug or alcohol use.    10/13/2023:  Patient overall doing relatively well.  No acute safety concerns.  No SI.  No problematic drug or alcohol use.  ADHD better controlled on Adderall.  Tolerating meds okay.  Follow up in 6 months.    3/13/2024:  Patient reports some struggles with task completion, motivation, organization.  Incidentally trialed Adderall XR 50 mg and found she was more productive and had further improvement of ADHD symptoms with decent tolerability.  We will increase Adderall XR to 40 mg daily to see if more helpful and well-tolerated.  Patient may also trial decreased dose of Paxil CR.  No acute safety concerns.  No SI.  Psychotherapy encouraged.  No problematic drug or alcohol use.    Medication side effects and alternatives were reviewed. Health promotion activities recommended and reviewed today. All questions addressed. Education and counseling completed regarding risks and benefits of medications and psychotherapy options. Recommend ongoing therapy for additional support.      Treatment Plan:  Continue Paxil/paroxetine CR for anxiety and mood: take 50 mg daily.  OK to trial decreasing to 37.5 mg or even 25 mg as discussed today.   Continue lorazepam/Ativan 0.5-1 mg daily as needed for severe anxiety.   INcrease Adderall XR to 25 mg + 15 mg daily (for total dose 40 mg) as needed for ADHD symptoms.  Continue all other medications as reviewed per electronic medical record today.   Safety plan reviewed. To the Emergency Department as needed or call after hours crisis line at [phone] or [phone]. [place] Crisis Text Line. Text [place] to 181509 or Suicide LifeLine Chat: suicidepreventionSignpostline.org/chat  Continue therapy as planned.   Schedule an appointment with me in 3 months or sooner as needed.  Patient scheduled today.  Follow up with primary care provider as planned or for acute medical concerns.  Call the psychiatric nurse line with medication questions or concerns at [phone].  [employer] may be used to communicate with your provider, but this is not intended to be used for emergencies.    Risks of stimulant medication include, but not limited to, decreased appetite, risk of tics (and that they may be lasting), trouble sleeping, cardiac risks such as increased heart rate and blood pressure, and rare risk of sudden cardiac death.  Also risk of addiction/tolerance/dependence.    Risks of benzodiazepine (Ativan, Xanax, Klonopin, Valium, etc) use including, but not limited to, sedation, tolerance, risk for addiction/dependence. Do not drink alcohol while taking benzodiazepines due to risk of trouble breathing and potential death. Do not drive or operate heavy machinery until it is known how the drug affects you. Discuss with physician or pharmacist before ever taking a benzodiazepine with a narcotic/opioid pain medication.     Therapy Resources:  Www.PsychologyToday.com  Www.NAMIMN.org    Administrative Billing:   Phone Call/Video Duration: 23 Minutes  Start: 11:03a  Stop:  "11:26a    Patient Status:  Patient is a continuous/long-term care patient and refills will continue to come from this provider until otherwise noted.     Signed:   Sherlyn Wang DO  [facility] Psychiatry    This note was created with voice recognition software. Inadvertent grammatical errors, typographical errors, and \"sound-a-like\" substitutions may occur due to limitations of the software.  Read the note carefully and apply context when erroneous substitutions have occurred. Thank you.   "

## 2024-03-13 NOTE — PATIENT INSTRUCTIONS
Treatment Plan:  Continue Paxil/paroxetine CR for anxiety and mood: take 50 mg daily.  OK to trial decreasing to 37.5 mg or even 25 mg as discussed today.   Continue lorazepam/Ativan 0.5-1 mg daily as needed for severe anxiety.   INcrease Adderall XR to 25 mg + 15 mg daily (for total dose 40 mg) as needed for ADHD symptoms.  Continue all other medications as reviewed per electronic medical record today.   Safety plan reviewed. To the Emergency Department as needed or call after hours crisis line at [phone] or [phone]. [place] Crisis Text Line. Text [place] to 298096 or Suicide LifeLine Chat: suicidepreventionEXTRABANCAline.org/chat  Continue therapy as planned.   Schedule an appointment with me in 3 months or sooner as needed.  Patient scheduled today.  Follow up with primary care provider as planned or for acute medical concerns.  Call the psychiatric nurse line with medication questions or concerns at [phone].  [employer] may be used to communicate with your provider, but this is not intended to be used for emergencies.    Risks of stimulant medication include, but not limited to, decreased appetite, risk of tics (and that they may be lasting), trouble sleeping, cardiac risks such as increased heart rate and blood pressure, and rare risk of sudden cardiac death.  Also risk of addiction/tolerance/dependence.    Risks of benzodiazepine (Ativan, Xanax, Klonopin, Valium, etc) use including, but not limited to, sedation, tolerance, risk for addiction/dependence. Do not drink alcohol while taking benzodiazepines due to risk of trouble breathing and potential death. Do not drive or operate heavy machinery until it is known how the drug affects you. Discuss with physician or pharmacist before ever taking a benzodiazepine with a narcotic/opioid pain medication.     Therapy Resources:  Www.PsychologyToday.com  Www.NAMIMN.org    Patient Education   Collaborative Care Psychiatry Service  What to Expect  Here's what to  "expect from your Collaborative Care Psychiatry Service (CCPS).   About CCPS  CCPS means 2 people work together to help you get better. You'll meet with a behavioral health clinician and a psychiatric doctor. A behavioral health clinician helps people with mental health problems by talking with them. A psychiatric doctor helps people by giving them medicine.  How it works  At every visit, you'll see the behavioral health clinician (BHC) first. They'll talk with you about how you're doing and teach you how to feel better.   Then you'll see the psychiatric doctor. This doctor can help you deal with troubling thoughts and feelings by giving you medicine. They'll make sure you know the plan for your care.   CCPS usually takes 3 to 6 visits. If you need more visits, we may have you start seeing a different psychiatric doctor for ongoing care.  If you have any questions or concerns, we'll be glad to talk with you.  About visits  Be open  At your visits, please talk openly about your problems. It may feel hard, but it's the best way for us to help you.  Cancelling visits  If you can't come to your visit, please call us right away at [phone]. If you don't cancel at least 24 hours (1 full day) before your visit, that's \"late cancellation.\"  Being late to visits  Being very late is the same as not showing up. You will be a \"no show\" if:  Your appointment starts with a BHC, and you're more than 15 minutes late for a 30-minute (half hour) visit. This will also cancel your appointment with the psychiatric doctor.  Your appointment is with a psychiatric doctor only, and you're more than 15 minutes late for a 30-minute (half hour) visit.  Your appointment is with a psychiatric doctor only, and you're more than 30 minutes late for a 60-minute (full hour) visit.  If you cancel late or don't show up 2 times within 6 months, we may end your care.   Getting help between visits  If you need help between visits, you can call us " Monday to Friday from 8 a.m. to 4:30 p.m. at [phone].  Emergency care  Call 911 or go to the nearest emergency department if your life or someone else's life is in danger.  Call 988 anytime to reach the national Suicide and Crisis hotline.  Medicine refills  To refill your medicine, call your pharmacy. You can also call [facility] at [phone], Monday to Friday, 8 a.m. to 4:30 p.m. It can take 1 to 3 business days to get a refill.   Forms, letters, and tests  You may have papers to fill out, like FMLA, short-term disability, and workability. We can help you with these forms at your visits, but you must have an appointment. You may need more than 1 visit for this, to be in an intensive therapy program, or both.  Before we can give you medicine for ADHD, we may refer you to get tested for it or confirm it another way.  We may not be able to give you an emotional support animal letter.  We don't do mental health checks ordered by the court.   We don't do mental health testing, but we can refer you to get tested.   Thank you for choosing us for your care.  For informational purposes only. Not to replace the advice of your health care provider. Copyright   2022 [facility]. All rights reserved. [employer] [id number] - 12/22.

## 2024-03-19 ENCOUNTER — MYC MEDICAL ADVICE (OUTPATIENT)
Dept: PSYCHIATRY | Facility: CLINIC | Age: 49
End: 2024-03-19
Payer: COMMERCIAL

## 2024-03-19 DIAGNOSIS — F90.0 ATTENTION DEFICIT HYPERACTIVITY DISORDER (ADHD), PREDOMINANTLY INATTENTIVE TYPE: ICD-10-CM

## 2024-03-19 RX ORDER — DEXTROAMPHETAMINE SACCHARATE, AMPHETAMINE ASPARTATE MONOHYDRATE, DEXTROAMPHETAMINE SULFATE AND AMPHETAMINE SULFATE 3.75; 3.75; 3.75; 3.75 MG/1; MG/1; MG/1; MG/1
15 CAPSULE, EXTENDED RELEASE ORAL DAILY
Qty: 30 CAPSULE | Refills: 0 | Status: SHIPPED | OUTPATIENT
Start: 2024-03-19 | End: 2024-04-18

## 2024-03-19 RX ORDER — DEXTROAMPHETAMINE SACCHARATE, AMPHETAMINE ASPARTATE MONOHYDRATE, DEXTROAMPHETAMINE SULFATE AND AMPHETAMINE SULFATE 3.75; 3.75; 3.75; 3.75 MG/1; MG/1; MG/1; MG/1
15 CAPSULE, EXTENDED RELEASE ORAL DAILY
Qty: 30 CAPSULE | Refills: 0 | Status: SHIPPED | OUTPATIENT
Start: 2024-04-19 | End: 2024-04-17

## 2024-03-19 RX ORDER — DEXTROAMPHETAMINE SACCHARATE, AMPHETAMINE ASPARTATE MONOHYDRATE, DEXTROAMPHETAMINE SULFATE AND AMPHETAMINE SULFATE 3.75; 3.75; 3.75; 3.75 MG/1; MG/1; MG/1; MG/1
15 CAPSULE, EXTENDED RELEASE ORAL DAILY
Qty: 30 CAPSULE | Refills: 0 | Status: SHIPPED | OUTPATIENT
Start: 2024-05-20 | End: 2024-04-17

## 2024-03-19 NOTE — TELEPHONE ENCOUNTER
"1) Pt MyC message -  Sherlyn Villagomez. I went to  the adderall from [facility] as we discussed and it wasn t there. I logged into [facility] and I see it s there. I m not sure if there was a miscommunication or what, but I really wanted to do the 30 mg to [facility] so I didn t have to wait forever to get it and it would be easier to change. Just to give you an idea, [facility] says  we received your order on 3/13/2024. We ll start processing your order soon  so by the time I receive it, 2 weeks will have passed since the appointment. There are two other monthly orders set up to go through and be processed automatically in the next couple of months and I ll see if I can cancel those. If the additional 15mg IS good then the RX will have to go to [email or web address] because of how they pay/don t pay, but then I would want the 30 days due to how freaking long it takes them to pay and because it s a little cheaper      I m thinking by the time someone reads this it may be too late to cancel the order with [facility] and move it so I ll just leave it, but I wanted to let you know in case someone else placed the order for you and didn t understand (you can let them know)     Thanks!   Ellyn\"    2)  RN called [facility] - [address] phone [phone] and cancelled all 3 months of prescriptions for   amphetamine-dextroamphetamine (ADDERALL XR) 15 MG 24 hr capsule, this will allow for prescriptions to be sent to [facility]     3) 3/13/2024 treatment plan -   Treatment Plan:  Continue Paxil/paroxetine CR for anxiety and mood: take 50 mg daily.  OK to trial decreasing to 37.5 mg or even 25 mg as discussed today.   Continue lorazepam/Ativan 0.5-1 mg daily as needed for severe anxiety.   INcrease Adderall XR to 25 mg + 15 mg daily (for total dose 40 mg) as needed for ADHD symptoms.    4) RN forwarding to Dr Wang for review as the patient is asking for 30 mg dose of " amphetamine-dextroamphetamine (ADDERALL XR) 24 hr capsule.    Margarita Braun RN on 3/19/2024 at 4:04 PM

## 2024-03-21 ENCOUNTER — FCC EXTENDED DOCUMENTATION (OUTPATIENT)
Dept: PSYCHOLOGY | Facility: CLINIC | Age: 49
End: 2024-03-21
Payer: COMMERCIAL

## 2024-03-21 ENCOUNTER — VIRTUAL VISIT (OUTPATIENT)
Dept: PSYCHOLOGY | Facility: CLINIC | Age: 49
End: 2024-03-21
Payer: COMMERCIAL

## 2024-03-21 DIAGNOSIS — F41.1 GAD (GENERALIZED ANXIETY DISORDER): Primary | ICD-10-CM

## 2024-03-21 DIAGNOSIS — F33.41 RECURRENT MAJOR DEPRESSIVE DISORDER, IN PARTIAL REMISSION (H): ICD-10-CM

## 2024-03-21 DIAGNOSIS — F90.9 ATTENTION DEFICIT HYPERACTIVITY DISORDER (ADHD), UNSPECIFIED ADHD TYPE: ICD-10-CM

## 2024-03-21 PROCEDURE — 90837 PSYTX W PT 60 MINUTES: CPT | Mod: 95 | Performed by: MARRIAGE & FAMILY THERAPIST

## 2024-03-21 ASSESSMENT — ANXIETY QUESTIONNAIRES
7. FEELING AFRAID AS IF SOMETHING AWFUL MIGHT HAPPEN: SEVERAL DAYS
5. BEING SO RESTLESS THAT IT IS HARD TO SIT STILL: MORE THAN HALF THE DAYS
8. IF YOU CHECKED OFF ANY PROBLEMS, HOW DIFFICULT HAVE THESE MADE IT FOR YOU TO DO YOUR WORK, TAKE CARE OF THINGS AT HOME, OR GET ALONG WITH OTHER PEOPLE?: VERY DIFFICULT
2. NOT BEING ABLE TO STOP OR CONTROL WORRYING: SEVERAL DAYS
3. WORRYING TOO MUCH ABOUT DIFFERENT THINGS: MORE THAN HALF THE DAYS
4. TROUBLE RELAXING: NEARLY EVERY DAY
GAD7 TOTAL SCORE: 15
6. BECOMING EASILY ANNOYED OR IRRITABLE: NEARLY EVERY DAY
7. FEELING AFRAID AS IF SOMETHING AWFUL MIGHT HAPPEN: SEVERAL DAYS
IF YOU CHECKED OFF ANY PROBLEMS ON THIS QUESTIONNAIRE, HOW DIFFICULT HAVE THESE PROBLEMS MADE IT FOR YOU TO DO YOUR WORK, TAKE CARE OF THINGS AT HOME, OR GET ALONG WITH OTHER PEOPLE: VERY DIFFICULT
GAD7 TOTAL SCORE: 15
GAD7 TOTAL SCORE: 15
1. FEELING NERVOUS, ANXIOUS, OR ON EDGE: NEARLY EVERY DAY

## 2024-03-21 ASSESSMENT — PATIENT HEALTH QUESTIONNAIRE - PHQ9
SUM OF ALL RESPONSES TO PHQ QUESTIONS 1-9: 12
SUM OF ALL RESPONSES TO PHQ QUESTIONS 1-9: 12
10. IF YOU CHECKED OFF ANY PROBLEMS, HOW DIFFICULT HAVE THESE PROBLEMS MADE IT FOR YOU TO DO YOUR WORK, TAKE CARE OF THINGS AT HOME, OR GET ALONG WITH OTHER PEOPLE: VERY DIFFICULT

## 2024-03-21 NOTE — PROGRESS NOTES
[facility]                                               Progress Note    Patient Name: Ellyn Mcgee  Date: 3/21/2024         Service Type: Individual (pt's toddler son was present and playing in background)      Session Start Time: 11:36 a.m.  Session End Time: 12:32 p.m.     Session Length: 56 min.    Session #: 76 (with this writer; patient met previously for DA with [facility] and psychiatry)    Attendees: Client    Service Modality:  Video Visit:    Telemedicine Visit: The patient's condition can be safely assessed and treated via synchronous audio and visual telemedicine encounter.      Reason for Telemedicine Visit: Patient convenience (e.g. access to timely appointments / distance to available provider)    Originating Site (Patient Location): pt's [facility]    On-Site (Provider Location): [facility]: [place]    Consent:  The patient/guardian has verbally consented to: the potential risks and benefits of telemedicine (video visit) versus in person care; bill my insurance or make self-payment for services provided; and responsibility for payment of non-covered services.     Patient would like the video invitation sent by: Send to e-mail at: [email or web address]    Mode of Communication:  Video Conference via [facility]    As the provider I attest to compliance with applicable laws and regulations related to telemedicine.    DATA  Extended Session (53+ minutes): PROLONGED SERVICE IN THE OUTPATIENT SETTING REQUIRING DIRECT (FACE-TO-FACE) PATIENT CONTACT BEYOND THE USUAL SERVICE:    - Longer session due to limited access to mental health appointments and necessity to address patient's distress / complexity  Interactive Complexity: No  Crisis: No       Progress Since Last Session (Related to Symptoms / Goals / Homework):  Symptoms: reports increased depression and anxiety; notes difficulty functioning recently and more conflict with partner. Requested referral for EMDR. Mother  "will visit in May, and patient feels pressure to get home in order.     Homework: Partially completed      Episode of Care Goals: Moderate - satisfactory progress - ACTION (Actively working towards change); Intervened by reinforcing change plan / affirming steps taken     Current / Ongoing Stressors and Concerns:  Feeling overwhelmed by organizing home and advocating for children's needs;  and daughter(s?) are on autism spectrum    Older daughter is experiencing dizziness/lethargy and was dx with POTS; some financial and friend-related stress; is interested in a deeper dive into emotional state and core beliefs through [facility]; new son born 2020; roommate/former friend was finally evicted from their home (lived there for 3-4 years and did not paid any rent for over 1-2 years); pt has been experiencing mild panic attacks; daughter (age 10) just started ADHD medication; history of anxious attachment stemming from relationship with father (he  8 years ago); is currently pregnant (high-risk due to advanced maternal age) and baby is due Dec. 2020; is in couples therapy with  due to frequent arguments and his emotional affair earlier in the year; financial stress; history of ADHD (hyperactive type).     Treatment Objective(s) Addressed in This Session:  Referral for EMDR  Discussed family system issues  Connection and vulnerability  [facility] and possible solutions  Parenting challenges  Management of anger/irritability  Coping with NT/ND relationship dynamics and noted recent improvements  Dealing with \"big emotions\"  Self-care and barriers to this  Participating in enjoyable activities and connecting with social and family supports  Discussed healthy boundaries and enforcement    Past/future:  Self-compassion and radical acceptance  Addressed possible solutions to insurance and financial concerns  Identified personal strengths within friendships  Processed feelings of " loneliness  Identified strengths as a mother and need for positive affirmations; needs for appreciation and validation  Completing the stress cycle: creativity and movement  Emotional differentiation  Grounding and mindfulness  will identify how past feelings are impacting current relationships  Identified and processed recent triggers for anxiety and sadness (relationship)  Identified insecurities and how they affect patient's thoughts and actions now and in the past  Solution focused: how to prioritize and cope with interrupted sleep  Discussed work/life balance and current purpose  Natali pradhan  Triggers for anxiety and insecurities  tell full story of past relational issues/trust/infidelity  use cognitive strategies identified in therapy to challenge anxious thoughts   Body appreciation  Relationship repair  Discussed ambiguous loss and grief in a box analogy  Anticipatory grief and creating meaning  10:10:10 rule (10 minutes, 10 months, and 10 years)  Feeling unappreciated and the 5:1 ratio ([facility])  Javon Servin's elements of trust and relationship repair   identify how attachment style drives patient's and 's behaviors  tell full story of past trauma related to her relationship with her father (and current roommate issues)     Intervention:  Solution-Focused  Family systems theory  CBT: connect thoughts, feelings, and actions   Narrative Therapy: separate problem from person and find the bright spots     Past/future:  [facility]  Somatic experiencing  Management of anger and when to step away/disengage    Assessments completed prior to visit:  PHQ9:       12/7/2022     9:40 AM 12/20/2022     8:54 AM 2/8/2023    12:27 PM 3/7/2023     8:46 AM 4/21/2023    12:05 PM 10/2/2023    11:00 AM 3/21/2024    11:32 AM   PHQ-9 SCORE   PHQ-9 Total Score Shahram 7 (Mild depression) 4 (Minimal depression) 6 (Mild depression) 8 (Mild depression) 9 (Mild depression) 9 (Mild depression) 12 (Moderate depression)    PHQ-9 Total Score 7 4 6 8 9 9 12     GAD7:       9/20/2022    10:24 AM 10/26/2022    11:29 AM 12/7/2022     9:43 AM 3/7/2023     8:47 AM 4/21/2023    12:12 PM 12/22/2023     1:09 PM 3/21/2024    11:33 AM   TEE-7 SCORE   Total Score 10 (moderate anxiety) 6 (mild anxiety) 8 (mild anxiety) 5 (mild anxiety) 8 (mild anxiety) 9 (mild anxiety) 15 (severe anxiety)   Total Score 10 6 8 5 8 9 15     PROMIS 10-Global Health (all questions and answers displayed):       3/15/2022     7:14 PM 6/14/2022     9:59 AM 7/6/2022     2:02 PM 10/26/2022    11:31 AM 2/6/2023    11:06 AM 8/11/2023    11:06 AM 3/21/2024    11:35 AM   PROMIS 10   In general, would you say your health is: Fair Fair Fair Fair Fair Good Fair   In general, would you say your quality of life is: Good Fair Fair Fair Fair Fair Fair   In general, how would you rate your physical health? Fair Fair Fair Fair Fair Fair Fair   In general, how would you rate your mental health, including your mood and your ability to think? Good Fair Fair Fair Fair Fair Poor   In general, how would you rate your satisfaction with your social activities and relationships? Good Poor Fair Fair Fair Fair Fair   In general, please rate how well you carry out your usual social activities and roles Fair Good Fair Fair Fair Good Good   To what extent are you able to carry out your everyday physical activities such as walking, climbing stairs, carrying groceries, or moving a chair? Completely Completely Mostly Completely Completely Mostly Completely   In the past 7 days, how often have you been bothered by emotional problems such as feeling anxious, depressed, or irritable? Often Often Often Often Sometimes Sometimes Always   In the past 7 days, how would you rate your fatigue on average? Moderate Moderate Moderate Moderate Moderate Severe Moderate   In the past 7 days, how would you rate your pain on average, where 0 means no pain, and 10 means worst imaginable pain? 7 7 8 3 6 7 6   In  general, would you say your health is: 2 2 2 2 2 3 2   In general, would you say your quality of life is: 3 2 2 2 2 2 2   In general, how would you rate your physical health? 2 2 2 2 2 2 2   In general, how would you rate your mental health, including your mood and your ability to think? 3 2 2 2 2 2 1   In general, how would you rate your satisfaction with your social activities and relationships? 3 1 2 2 2 2 2   In general, please rate how well you carry out your usual social activities and roles. (This includes activities at home, at work and in your community, and responsibilities as a parent, child, spouse, employee, friend, etc.) 2 3 2 2 2 3 3   To what extent are you able to carry out your everyday physical activities such as walking, climbing stairs, carrying groceries, or moving a chair? 5 5 4 5 5 4 5   In the past 7 days, how often have you been bothered by emotional problems such as feeling anxious, depressed, or irritable? 4 4 4 4 3 3 5   In the past 7 days, how would you rate your fatigue on average? 3 3 3 3 3 4 3   In the past 7 days, how would you rate your pain on average, where 0 means no pain, and 10 means worst imaginable pain? 7 7 8 3 6 7 6   Global Mental Health Score 11 7 8 8 9    9 9 6   Global Physical Health Score 12 12 11 14 13    13 10 13   PROMIS TOTAL - SUBSCORES 23 19 19 22 22    22 19 19        ASSESSMENT: Current Emotional / Mental Status (status of significant symptoms):   Risk status (Self / Other harm or suicidal ideation)   Patient denies current fears or concerns for personal safety.   Patient denies current or recent suicidal ideation or behaviors.    Patient denies current or recent homicidal ideation or behaviors.   Patient denies current or recent self injurious behavior or ideation.   Patient denies other safety concerns.   Patient reports there has been no change in risk factors since their last session.   Patient reports there has been no change in protective factors since  "their last session.   Recommended that patient call 911 or go to the local ED should there be a change in any of these risk factors.     Appearance:   Appropriate    Eye Contact:   Good    Psychomotor Behavior: Normal    Attitude:   Cooperative; friendly   Orientation:   All   Speech    Rate / Production: Normal/ Responsive Talkative    Volume:  Normal    Mood:    Anxious  Grieving Frustrated   Affect:    Appropriate  Expansive    Thought Content:  Clear  Rumination    Thought Form:  Coherent ; flight of ideas   Insight:    Good      Medication Review:   No changes to current psychiatric medication(s)     Medication Compliance:   Yes     Changes in Health Issues:   None     Recent:  May be latisha-menopausal (continued)   Concerns about cholesterol and resting glucose levels; pain in hip  Reports having physical and having high LDL cholesterol and glucose  went to ER for testing after experiencing shooting pain in arm     Chemical Use Review:   Substance Use: Chemical use reviewed, no active concerns identified ; no current alcohol use     Tobacco Use: No current tobacco use.      Diagnosis:  1. TEE (generalized anxiety disorder)    2. Recurrent major depressive disorder, in partial remission (H24)    3. Attention deficit hyperactivity disorder (ADHD), unspecified ADHD type      Note: There has been demonstrated improvement in functioning while patient has been engaged in psychotherapy/psychological service- if withdrawn the patient would deteriorate and/or relapse.     Collateral Reports Completed:  N/A - suggested patient look up the Help Me Grow program to get services/evaluation for her son    PLAN: (Patient Tasks / Therapist Tasks / Other)    Patient states that her goals for the next three weeks include:    Try to \"keep my sanity\"  Try to get back to healthy eating  Continue to function without \"melting down\"  Plan to take off Saturdays for next five weeks    Placed referral for EMDR on this date.      Keesha SON" Rafita    ______________________________________________________________________                                                           [facility]    Individual Treatment Plan    Patient's Name: Ellyn Mcgee  YOB: 1975    Date of Creation: 2020  Date Treatment Plan Last Reviewed/Revised: 3/21/2024    DSM5 Diagnoses: 296.32 (F33.1) Major Depressive Disorder, Recurrent Episode, Moderate _ or 300.02 (F41.1) Generalized Anxiety Disorder (patient has previously been diagnosed with ADHD, hyperactive type)    Psychosocial / Contextual Factors: History of anxious attachment stemming from relationship with father (he  8 years ago); is currently pregnant (high-risk due to advanced maternal age); is in couples therapy with  due to frequent arguments and his emotional affair earlier in the year; financial stress; history of ADHD (hyperactive type).    PROMIS (reviewed every 90 days): PROMIS-10 Scores: 19 (see above note)    Referral / Collaboration:  EMDR    Anticipated number of sessions for this episode of care: 100  Anticipated frequency of sessions: Every 2-3 weeks  Anticipated duration of each session: 53+ minutes  Treatment plan will be reviewed in 90 days or when goals have been changed.           Measurable Treatment Goal(s) related to diagnosis / functional impairment(s):  Patient is asking to focus treatment on her relationship with her father and past trauma.    Goal 1: Patient will tell full story of past trauma related to her relationship with her father and will identify how past feelings are impacting current relationships.    Objective #A (Patient Action)    Patient will identify how past feelings are impacting current relationships.  Status: Continued - Date(s): 3/21/2024 (partially completed)    Intervention(s)  Therapist will role-play conflict management.    Objective #B  Patient will identify strengths and weaknesses within her family system of  "origin.  Status: Continued - Date(s): 3/21/2024 (partially completed)    Intervention(s)  Therapist will assign homework for patient to create list .      Goal 2: Patient will learn about resilience, trauma responses, and Javon Servin's \"the story I'm making up\" (cognitive strategy to manage anxious thoughts).    Objective #A (Patient Action)    Patient will learn about and identify personal trauma responses.    Status: Continued - Date(s): 3/21/2024     Intervention(s)  Therapist will provide educational materials on trauma responses .    Objective #B  Patient will use cognitive strategies identified in therapy to challenge anxious thoughts.  Status: Continued - Date(s): 3/21/2024     Intervention(s)  Therapist will teach about Javon Servin's power of vulnerability and \"the story I'm making up\" .      Goal 3: Patient will learn about protective factors that foster resilience and attachment styles and will identify how her attachment style drives her behavior.     Objective #A (Patient Action)    Status:  Partially completed: 3/21/2024     Patient will learn about protective factors and will identify her own.    Intervention(s)  Therapist will complete with patient in session.    Objective #B  Patient will identify how her attachment style drives her behavior.  Status: Completed - Date: 3/21/2024       Intervention(s)  Therapist will teach emotional regulation skills.        Patient agreed to the above plan.      Keesha Eller    "

## 2024-03-21 NOTE — PATIENT INSTRUCTIONS
"Patient states that her goals for the next three weeks include:    Try to \"keep my sanity\"  Try to get back to healthy eating  Continue to function without \"melting down\"  Plan to take off Saturdays for next five weeks    Placed referral for EMDR on this date.  "

## 2024-03-21 NOTE — PROGRESS NOTES
EMDR Referral of an [facility] Client to EMDR [facility] Therapist    CLIENT'S NAME: Ellyn Mcgee    DATE of Referral Request:  2024    Referral Request Information    Referral for a new service    Client Phone #:  [phone] (home)        Telephone Information:   Mobile [phone]         Facilitating Referral/Transfer: JAMAL     Current Therapist: Keesha Eller      Therapist Receiving Referral/Transfer: EMDR Therapist JAMAL    Referral/Transfer is for:  EMDR    Rationale for Referral/Transfer: (to be completed by [facility] staff person initiating the referral)  Situation: (What is going on with the client?)    Patient is requesting EMDR due to a recommendation from her couples therapist.  Patient has a history of a significant attachment injury related to her relationship with her father (who has since ).  Patient believes some of her marital problems could be helped is she is able to effectively work through her family of origin trauma.      Background: (What is the clinical background or context?)    Patient has been diagnosed with ADHD, anxiety, and depression, and is being treated for her ADHD symptoms with Adderall.  Patient's  and oldest daughter (age 17) may be on the autism spectrum; her middle daughter (13) has ADHD and emotional regulation struggles; patient also has a highly active and attached 3-year-old son (she is having difficulty weaning him, and he is being assessed for special education services through her school district).  Pt works part-time and lives in [place].      Assessment: (What do you think the problem is?)      Patient would like to process trauma related to her relationship with her father.      Recommendation: (What would you do to correct it?)    EMDR      Referral/Transfer Status:    Dissociative Experiences Scale score: 11.79 (some answers stemmed from patient's ADHD  symptoms)     Referral sent to [facility] to be reviewed by EMDR group.      Keesha Eller  March 21, 2024

## 2024-04-10 ENCOUNTER — THERAPY VISIT (OUTPATIENT)
Dept: PHYSICAL THERAPY | Facility: CLINIC | Age: 49
End: 2024-04-10
Payer: COMMERCIAL

## 2024-04-10 DIAGNOSIS — M22.2X2 PATELLOFEMORAL ARTHRALGIA OF BOTH KNEES: Primary | ICD-10-CM

## 2024-04-10 DIAGNOSIS — M22.2X1 PATELLOFEMORAL ARTHRALGIA OF BOTH KNEES: Primary | ICD-10-CM

## 2024-04-10 PROCEDURE — 97110 THERAPEUTIC EXERCISES: CPT | Mod: 59 | Performed by: PHYSICAL THERAPIST

## 2024-04-10 PROCEDURE — 97530 THERAPEUTIC ACTIVITIES: CPT | Mod: GP | Performed by: PHYSICAL THERAPIST

## 2024-04-13 ENCOUNTER — HEALTH MAINTENANCE LETTER (OUTPATIENT)
Age: 49
End: 2024-04-13

## 2024-04-15 NOTE — PROGRESS NOTES
"    [facility]                                               Progress Note    Patient Name: Ellyn Mcgee  Date: 4/16/2024         Service Type: Individual (pt's toddler son was present and playing in background)      Session Start Time: 10:39 a.m.  Session End Time: 11:32 a.m.     Session Length: 53 min.    Session #: 77 (with this writer; patient met previously for DA with [facility] Elena Hill and psychiatry)    Attendees: Client    Service Modality:  Video Visit:    Telemedicine Visit: The patient's condition can be safely assessed and treated via synchronous audio and visual telemedicine encounter.      Reason for Telemedicine Visit: Patient convenience (e.g. access to timely appointments / distance to available provider)    Originating Site (Patient Location): pt's car and home    On-Site (Provider Location): [facility]: [place]    Consent:  The patient/guardian has verbally consented to: the potential risks and benefits of telemedicine (video visit) versus in person care; bill my insurance or make self-payment for services provided; and responsibility for payment of non-covered services.     Patient would like the video invitation sent by: Send to e-mail at: [email or web address]    Mode of Communication:  Video Conference via [employer]    As the provider I attest to compliance with applicable laws and regulations related to telemedicine.    DATA  Extended Session (53+ minutes): PROLONGED SERVICE IN THE OUTPATIENT SETTING REQUIRING DIRECT (FACE-TO-FACE) PATIENT CONTACT BEYOND THE USUAL SERVICE:    - Longer session due to limited access to mental health appointments and necessity to address patient's distress / complexity  Interactive Complexity: No  Crisis: No       Progress Since Last Session (Related to Symptoms / Goals / Homework):  Symptoms: reports having a very difficult period of time two weeks ago after \"blowing up\" in her couples therapy session; patient continues to report " "feelings of high anxiety and overwhelm but has also taken steps recently to move towards the life she wants: has been gardening, made plans to meet with a realtor about a possible future job opportunity, and has been reading and researching more on trauma and relationship dynamics. Has evaluation for son through school district.     Homework: made progress      Episode of Care Goals: Moderate - satisfactory progress - ACTION (Actively working towards change); Intervened by reinforcing change plan / affirming steps taken     Current / Ongoing Stressors and Concerns:  Feeling overwhelmed by organizing home and advocating for children's needs;  and daughter(s?) are on autism spectrum    Older daughter is experiencing dizziness/lethargy and was dx with POTS; some financial and friend-related stress; is interested in a deeper dive into emotional state and core beliefs through [facility]; new son born 2020; roommate/former friend was finally evicted from their home (lived there for 3-4 years and did not paid any rent for over 1-2 years); pt has been experiencing mild panic attacks; daughter (age 10) just started ADHD medication; history of anxious attachment stemming from relationship with father (he  8 years ago); is currently pregnant (high-risk due to advanced maternal age) and baby is due Dec. 2020; is in couples therapy with  due to frequent arguments and his emotional affair earlier in the year; financial stress; history of ADHD (hyperactive type).     Treatment Objective(s) Addressed in This Session:  Referral for EMDR - scheduled this in session  Completing the stress cycle: creativity and movement  Emotional differentiation  Discussed family system issues  Connection and vulnerability  Identified [facility] and possible solutions  Parenting challenges  Management of anger/irritability  Coping with NT/ND relationship dynamics and noted recent improvements  Dealing with \"big " "emotions\"  Self-care and barriers to this  Participating in enjoyable activities and connecting with social and family supports  Discussed healthy boundaries and enforcement    Past/future:  Self-compassion and radical acceptance  Addressed possible solutions to insurance and financial concerns  Identified personal strengths within friendships  Processed feelings of loneliness  Identified strengths as a mother and need for positive affirmations; needs for appreciation and validation  Grounding and mindfulness  will identify how past feelings are impacting current relationships  Identified and processed recent triggers for anxiety and sadness (relationship)  Identified insecurities and how they affect patient's thoughts and actions now and in the past  Solution focused: how to prioritize and cope with interrupted sleep  Discussed work/life balance and current purpose  Natali pradhan  Triggers for anxiety and insecurities  tell full story of past relational issues/trust/infidelity  use cognitive strategies identified in therapy to challenge anxious thoughts   Body appreciation  Relationship repair  Discussed ambiguous loss and grief in a box analogy  Anticipatory grief and creating meaning  10:10:10 rule (10 minutes, 10 months, and 10 years)  Feeling unappreciated and the 5:1 ratio (Natali)  Javon Servin's elements of trust and relationship repair   identify how attachment style drives patient's and 's behaviors  tell full story of past trauma related to her relationship with her father (and current roommate issues)     Intervention:  Solution-Focused  Family systems theory  CBT: connect thoughts, feelings, and actions   Narrative Therapy: separate problem from person and find the bright spots     Past/future:  Gottman research  Somatic experiencing  Management of anger and when to step away/disengage    Assessments completed prior to visit:  PHQ9:       12/7/2022     9:40 AM 12/20/2022     8:54 AM 2/8/2023 "    12:27 PM 3/7/2023     8:46 AM 4/21/2023    12:05 PM 10/2/2023    11:00 AM 3/21/2024    11:32 AM   PHQ-9 SCORE   PHQ-9 Total Score MyChart 7 (Mild depression) 4 (Minimal depression) 6 (Mild depression) 8 (Mild depression) 9 (Mild depression) 9 (Mild depression) 12 (Moderate depression)   PHQ-9 Total Score 7 4 6 8 9 9 12     GAD7:       9/20/2022    10:24 AM 10/26/2022    11:29 AM 12/7/2022     9:43 AM 3/7/2023     8:47 AM 4/21/2023    12:12 PM 12/22/2023     1:09 PM 3/21/2024    11:33 AM   TEE-7 SCORE   Total Score 10 (moderate anxiety) 6 (mild anxiety) 8 (mild anxiety) 5 (mild anxiety) 8 (mild anxiety) 9 (mild anxiety) 15 (severe anxiety)   Total Score 10 6 8 5 8 9 15     PROMIS 10-Global Health (all questions and answers displayed):       3/15/2022     7:14 PM 6/14/2022     9:59 AM 7/6/2022     2:02 PM 10/26/2022    11:31 AM 2/6/2023    11:06 AM 8/11/2023    11:06 AM 3/21/2024    11:35 AM   PROMIS 10   In general, would you say your health is: Fair Fair Fair Fair Fair Good Fair   In general, would you say your quality of life is: Good Fair Fair Fair Fair Fair Fair   In general, how would you rate your physical health? Fair Fair Fair Fair Fair Fair Fair   In general, how would you rate your mental health, including your mood and your ability to think? Good Fair Fair Fair Fair Fair Poor   In general, how would you rate your satisfaction with your social activities and relationships? Good Poor Fair Fair Fair Fair Fair   In general, please rate how well you carry out your usual social activities and roles Fair Good Fair Fair Fair Good Good   To what extent are you able to carry out your everyday physical activities such as walking, climbing stairs, carrying groceries, or moving a chair? Completely Completely Mostly Completely Completely Mostly Completely   In the past 7 days, how often have you been bothered by emotional problems such as feeling anxious, depressed, or irritable? Often Often Often Often Sometimes  Sometimes Always   In the past 7 days, how would you rate your fatigue on average? Moderate Moderate Moderate Moderate Moderate Severe Moderate   In the past 7 days, how would you rate your pain on average, where 0 means no pain, and 10 means worst imaginable pain? 7 7 8 3 6 7 6   In general, would you say your health is: 2 2 2 2 2 3 2   In general, would you say your quality of life is: 3 2 2 2 2 2 2   In general, how would you rate your physical health? 2 2 2 2 2 2 2   In general, how would you rate your mental health, including your mood and your ability to think? 3 2 2 2 2 2 1   In general, how would you rate your satisfaction with your social activities and relationships? 3 1 2 2 2 2 2   In general, please rate how well you carry out your usual social activities and roles. (This includes activities at home, at work and in your community, and responsibilities as a parent, child, spouse, employee, friend, etc.) 2 3 2 2 2 3 3   To what extent are you able to carry out your everyday physical activities such as walking, climbing stairs, carrying groceries, or moving a chair? 5 5 4 5 5 4 5   In the past 7 days, how often have you been bothered by emotional problems such as feeling anxious, depressed, or irritable? 4 4 4 4 3 3 5   In the past 7 days, how would you rate your fatigue on average? 3 3 3 3 3 4 3   In the past 7 days, how would you rate your pain on average, where 0 means no pain, and 10 means worst imaginable pain? 7 7 8 3 6 7 6   Global Mental Health Score 11 7 8 8 9    9 9 6   Global Physical Health Score 12 12 11 14 13    13 10 13   PROMIS TOTAL - SUBSCORES 23 19 19 22 22    22 19 19     ASSESSMENT: Current Emotional / Mental Status (status of significant symptoms):   Risk status (Self / Other harm or suicidal ideation)   Patient denies current fears or concerns for personal safety.   Patient denies current or recent suicidal ideation or behaviors.    Patient denies current or recent homicidal ideation  or behaviors.   Patient denies current or recent self injurious behavior or ideation.   Patient denies other safety concerns.   Patient reports there has been no change in risk factors since their last session.   Patient reports there has been no change in protective factors since their last session.   Recommended that patient call 911 or go to the local ED should there be a change in any of these risk factors.     Appearance:   Appropriate    Eye Contact:   Good    Psychomotor Behavior: Normal    Attitude:   Cooperative; friendly   Orientation:   All   Speech    Rate / Production: Normal/ Responsive Talkative    Volume:  Normal    Mood:    Anxious  Expansive   Affect:    Appropriate  Expansive    Thought Content:  Clear  Rumination    Thought Form:  Coherent ; flight of ideas at times   Insight:    Good      Medication Review:   No changes to current psychiatric medication(s)     Medication Compliance:   Yes     Changes in Health Issues:   None     Recent:  May be latisha-menopausal (continued)   Concerns about cholesterol and resting glucose levels; pain in hip  Reports having physical and having high LDL cholesterol and glucose  went to ER for testing after experiencing shooting pain in arm     Chemical Use Review:   Substance Use: Chemical use reviewed, no active concerns identified ; no current alcohol use     Tobacco Use: No current tobacco use.      Diagnosis:  1. TEE (generalized anxiety disorder)    2. Recurrent major depressive disorder, in partial remission (H24)    3. Attention deficit hyperactivity disorder (ADHD), unspecified ADHD type      Note: There has been demonstrated improvement in functioning while patient has been engaged in psychotherapy/psychological service- if withdrawn the patient would deteriorate and/or relapse.     Collateral Reports Completed:  Made EMDR appt with Sheng Gotti on this date for 6/20/24 12:30 p.m.    PLAN: (Patient Tasks / Therapist Tasks / Other)    Patient states that  "her goals for the next 1-2 months include:    Work on cleaning the house and switching bedrooms  Plan for son's evaluations  Continue to find time for self outside in the garden: \"let my brain be my own\"    Patient is scheduled to start EMDR on  at 12:30 p.m.      Keesha SON Rafita    ______________________________________________________________________                                                           [facility]    Individual Treatment Plan    Patient's Name: Ellyn Mcgee  YOB: 1975    Date of Creation: 2020  Date Treatment Plan Last Reviewed/Revised: 3/21/2024    DSM5 Diagnoses: 296.32 (F33.1) Major Depressive Disorder, Recurrent Episode, Moderate _ or 300.02 (F41.1) Generalized Anxiety Disorder (patient has previously been diagnosed with ADHD, hyperactive type)    Psychosocial / Contextual Factors: History of anxious attachment stemming from relationship with father (he  8 years ago); is currently pregnant (high-risk due to advanced maternal age); is in couples therapy with  due to frequent arguments and his emotional affair earlier in the year; financial stress; history of ADHD (hyperactive type).    PROMIS (reviewed every 90 days): PROMIS-10 Scores: 19 (see above note)    Referral / Collaboration:  EMDR    Anticipated number of sessions for this episode of care: 100  Anticipated frequency of sessions: Every 2-3 weeks  Anticipated duration of each session: 53+ minutes  Treatment plan will be reviewed in 90 days or when goals have been changed.           Measurable Treatment Goal(s) related to diagnosis / functional impairment(s):  Patient is asking to focus treatment on her relationship with her father and past trauma.    Goal 1: Patient will tell full story of past trauma related to her relationship with her father and will identify how past feelings are impacting current relationships.    Objective #A (Patient Action)    Patient will identify " "how past feelings are impacting current relationships.  Status: Continued - Date(s): 3/21/2024 (partially completed)    Intervention(s)  Therapist will role-play conflict management.    Objective #B  Patient will identify strengths and weaknesses within her family system of origin.  Status: Continued - Date(s): 3/21/2024 (partially completed)    Intervention(s)  Therapist will assign homework for patient to create list .      Goal 2: Patient will learn about resilience, trauma responses, and Javon Servin's \"the story I'm making up\" (cognitive strategy to manage anxious thoughts).    Objective #A (Patient Action)    Patient will learn about and identify personal trauma responses.    Status: Continued - Date(s): 3/21/2024     Intervention(s)  Therapist will provide educational materials on trauma responses .    Objective #B  Patient will use cognitive strategies identified in therapy to challenge anxious thoughts.  Status: Continued - Date(s): 3/21/2024     Intervention(s)  Therapist will teach about Javon Servin's power of vulnerability and \"the story I'm making up\" .      Goal 3: Patient will learn about protective factors that foster resilience and attachment styles and will identify how her attachment style drives her behavior.     Objective #A (Patient Action)    Status:  Partially completed: 3/21/2024     Patient will learn about protective factors and will identify her own.    Intervention(s)  Therapist will complete with patient in session.    Objective #B  Patient will identify how her attachment style drives her behavior.  Status: Completed - Date: 3/21/2024       Intervention(s)  Therapist will teach emotional regulation skills.        Patient agreed to the above plan.      Keesha Eller    "

## 2024-04-16 ENCOUNTER — VIRTUAL VISIT (OUTPATIENT)
Dept: PSYCHOLOGY | Facility: CLINIC | Age: 49
End: 2024-04-16
Payer: COMMERCIAL

## 2024-04-16 ENCOUNTER — FCC EXTENDED DOCUMENTATION (OUTPATIENT)
Dept: PSYCHOLOGY | Facility: CLINIC | Age: 49
End: 2024-04-16
Payer: COMMERCIAL

## 2024-04-16 DIAGNOSIS — F33.41 RECURRENT MAJOR DEPRESSIVE DISORDER, IN PARTIAL REMISSION (H): ICD-10-CM

## 2024-04-16 DIAGNOSIS — F90.9 ATTENTION DEFICIT HYPERACTIVITY DISORDER (ADHD), UNSPECIFIED ADHD TYPE: ICD-10-CM

## 2024-04-16 DIAGNOSIS — F41.1 GAD (GENERALIZED ANXIETY DISORDER): Primary | ICD-10-CM

## 2024-04-16 PROCEDURE — 90837 PSYTX W PT 60 MINUTES: CPT | Mod: 95 | Performed by: MARRIAGE & FAMILY THERAPIST

## 2024-04-16 NOTE — PATIENT INSTRUCTIONS
"Patient states that her goals for the next 1-2 months include:    Work on cleaning the house and switching bedrooms  Plan for son's evaluations  Continue to find time for self outside in the garden: \"let my brain be my own\"    Patient is scheduled to start EMDR on June 20th at 12:30 p.m..  "

## 2024-04-16 NOTE — PROGRESS NOTES
Received emailed JUNG on this date (digitally signed by patient on 4/9/2024) from patient's couples therapist Geneva Jansen of the [facility].

## 2024-04-17 ENCOUNTER — HOSPITAL ENCOUNTER (OUTPATIENT)
Dept: GENERAL RADIOLOGY | Facility: HOSPITAL | Age: 49
Discharge: HOME | End: 2024-04-17
Attending: PHYSICIAN ASSISTANT | Admitting: PHYSICIAN ASSISTANT
Payer: COMMERCIAL

## 2024-04-17 ENCOUNTER — OFFICE VISIT (OUTPATIENT)
Dept: FAMILY MEDICINE | Facility: CLINIC | Age: 49
End: 2024-04-17
Payer: COMMERCIAL

## 2024-04-17 VITALS
HEART RATE: 82 BPM | SYSTOLIC BLOOD PRESSURE: 121 MMHG | RESPIRATION RATE: 16 BRPM | TEMPERATURE: 98.9 F | OXYGEN SATURATION: 99 % | BODY MASS INDEX: 30.76 KG/M2 | WEIGHT: 178 LBS | DIASTOLIC BLOOD PRESSURE: 78 MMHG

## 2024-04-17 DIAGNOSIS — B34.9 VIRAL ILLNESS: ICD-10-CM

## 2024-04-17 DIAGNOSIS — Z11.52 ENCOUNTER FOR SCREENING FOR COVID-19: ICD-10-CM

## 2024-04-17 DIAGNOSIS — R05.1 ACUTE COUGH: Primary | ICD-10-CM

## 2024-04-17 LAB
BASOPHILS # BLD AUTO: 0 10E3/UL (ref 0–0.2)
BASOPHILS NFR BLD AUTO: 0 %
DEPRECATED S PYO AG THROAT QL EIA: NEGATIVE
EOSINOPHIL # BLD AUTO: 0.1 10E3/UL (ref 0–0.7)
EOSINOPHIL NFR BLD AUTO: 2 %
ERYTHROCYTE [DISTWIDTH] IN BLOOD BY AUTOMATED COUNT: 12.3 % (ref 10–15)
FLUAV AG SPEC QL IA: NEGATIVE
FLUBV AG SPEC QL IA: NEGATIVE
GROUP A STREP BY PCR: NOT DETECTED
HCT VFR BLD AUTO: 37.4 % (ref 35–47)
HGB BLD-MCNC: 12.5 G/DL (ref 11.7–15.7)
IMM GRANULOCYTES # BLD: 0 10E3/UL
IMM GRANULOCYTES NFR BLD: 0 %
LYMPHOCYTES # BLD AUTO: 1.6 10E3/UL (ref 0.8–5.3)
LYMPHOCYTES NFR BLD AUTO: 20 %
MCH RBC QN AUTO: 28.9 PG (ref 26.5–33)
MCHC RBC AUTO-ENTMCNC: 33.4 G/DL (ref 31.5–36.5)
MCV RBC AUTO: 87 FL (ref 78–100)
MONOCYTES # BLD AUTO: 0.8 10E3/UL (ref 0–1.3)
MONOCYTES NFR BLD AUTO: 10 %
NEUTROPHILS # BLD AUTO: 5.6 10E3/UL (ref 1.6–8.3)
NEUTROPHILS NFR BLD AUTO: 68 %
PLATELET # BLD AUTO: 320 10E3/UL (ref 150–450)
RBC # BLD AUTO: 4.32 10E6/UL (ref 3.8–5.2)
SARS-COV-2 RNA RESP QL NAA+PROBE: NEGATIVE
WBC # BLD AUTO: 8.2 10E3/UL (ref 4–11)

## 2024-04-17 PROCEDURE — 87651 STREP A DNA AMP PROBE: CPT | Performed by: PHYSICIAN ASSISTANT

## 2024-04-17 PROCEDURE — 85025 COMPLETE CBC W/AUTO DIFF WBC: CPT | Performed by: PHYSICIAN ASSISTANT

## 2024-04-17 PROCEDURE — 36415 COLL VENOUS BLD VENIPUNCTURE: CPT | Performed by: PHYSICIAN ASSISTANT

## 2024-04-17 PROCEDURE — 99214 OFFICE O/P EST MOD 30 MIN: CPT | Performed by: PHYSICIAN ASSISTANT

## 2024-04-17 PROCEDURE — 87635 SARS-COV-2 COVID-19 AMP PRB: CPT | Performed by: PHYSICIAN ASSISTANT

## 2024-04-17 PROCEDURE — 71046 X-RAY EXAM CHEST 2 VIEWS: CPT

## 2024-04-17 PROCEDURE — 87804 INFLUENZA ASSAY W/OPTIC: CPT | Performed by: PHYSICIAN ASSISTANT

## 2024-04-17 RX ORDER — ALBUTEROL SULFATE 90 UG/1
2 AEROSOL, METERED RESPIRATORY (INHALATION) EVERY 6 HOURS
Qty: 18 G | Refills: 0 | Status: SHIPPED | OUTPATIENT
Start: 2024-04-17 | End: 2024-05-14

## 2024-04-17 RX ORDER — BENZONATATE 100 MG/1
100 CAPSULE ORAL 3 TIMES DAILY PRN
Qty: 30 CAPSULE | Refills: 0 | Status: SHIPPED | OUTPATIENT
Start: 2024-04-17 | End: 2024-04-27

## 2024-04-17 NOTE — LETTER
April 17, 2024      Ellyn Mcgee  [address] [id number]        To Whom It May Concern:    Ellyn Mcgee  was seen on 04/17/24 .  Please excuse her  until 4/18/24 due to illness.        Sincerely,        Alton Orantes PA-C

## 2024-04-17 NOTE — PROGRESS NOTES
Patient presents with:  Cough: Productive cough, headache, chills,  congested, fatigue x 2 days       Clinical Decision Making:  Strep test was obtained and was negative.  Culture is to follow.  Influenza testing is negative COVID-19 screening test is pending.  CBC is within normal limits, chest x-ray was obtained and was normal. Symptomatic care was gone over. Expected course of resolution and indication for return was gone over and questions were answered to patient/parent's satisfaction before discharge.       ICD-10-CM    1. Acute cough  R05.1 Symptomatic COVID-19 Virus (Coronavirus) by PCR Nose     Streptococcus A Rapid Screen w/Reflex to PCR     Influenza A & B Antigen     XR Chest 2 Views     CBC with platelets and differential     Group A Streptococcus PCR Throat Swab     albuterol (PROAIR HFA/PROVENTIL HFA/VENTOLIN HFA) 108 (90 Base) MCG/ACT inhaler     benzonatate (TESSALON) 100 MG capsule      2. Viral illness  B34.9 Influenza A & B Antigen     XR Chest 2 Views     CBC with platelets and differential     albuterol (PROAIR HFA/PROVENTIL HFA/VENTOLIN HFA) 108 (90 Base) MCG/ACT inhaler     benzonatate (TESSALON) 100 MG capsule      3. Encounter for screening for COVID-19  Z11.52 Symptomatic COVID-19 Virus (Coronavirus) by PCR Nose          Patient Instructions   You were seen today for acute bronchitis. This is likely due to a viral illness.    Symptom management:  - Get plenty of rest  - Avoid smoking and second hand smoke  - May take tylenol or ibuprofen for fever/discomfort  - Drink plenty of non-caffeinated fluids  - Use nasal steroid spray for sinus congestion  - Albuterol inhaler may be used every 6 hours as needed for chest tightness      Reasons to be seen in the emergency room:  - Develop a fever of 100.4 or higher  - Cough changes, coughing up blood, or become short of breath  - Neck stiffness  - Chest pain  - Severe headache  - Unable to tolerate eating or drinking fluids    Otherwise, if no  "symptom improvement after 5 days, follow-up with your primary care provider.    How can I protect others? Discharge Instructions for COVID-19 precaustions:  If you have symptoms (fever, cough, body aches or trouble breathing):  Stay home and away from others (self-isolate) until:  At least 10 days have passed since your symptoms started. And   You've had no fever--and no medicine that reduces fever--for 1 full day (24 hours). And   Your other symptoms have resolved (gotten better) OR you have a negative covid test.     Call [phone] for treatment if the COVID 19 test is positive.     HPI:  Ellyn Mcgee is a 48 year old female who presents today for 3-week history of cough.  Over the last 2 days the patient had new symptoms that started in addition to the cough to include sore throat shortness of breath loss of appetite fatigue myalgias and arthralgias.  Patient states that the cough is also worsened over the last 2 days.  She has had a 3-1/2-year-old at home that was \"sick\".  Patient is ostensibly here to rule out bronchitis or pneumonia that would \"require antibiotic treatment\".  At this time the patient is denying headache vomiting diarrhea loss of taste or smell shortness of breath dyspnea on exertion or pleuritic chest pain.  Patient tried treatment at home with NyQuil with mild relief.  She states that her temperature is usually lower and at home she has had 8 to degree elevation of her temperature with a temperature max of 98.9.  No COVID testing was performed at home.    History obtained from chart review and the patient.    Problem List:  2023-11: Patellofemoral arthralgia of both knees  2021-03: Pelvic floor dysfunction  2021-03: Urinary incontinence  2020-12: Labor and delivery, indication for care  2020-10: Major depressive disorder, recurrent episode, moderate (H)  2020-06: High-risk pregnancy, elderly multigravida, unspecified   trimester  2019-12: Somatic dysfunction of pelvic region  2019-12: " Female stress incontinence  2018-10: TEE (generalized anxiety disorder)  2018: Cervical radiculopathy  2017-10: Attention deficit hyperactivity disorder (ADHD),   predominantly inattentive type  2012:  (spontaneous vaginal delivery)  2012: External hemorrhoids  2012: Adjustment disorder with mixed anxiety and depressed mood  2012: Abnormal glucose tolerance test during pregnancy, not yet   delivered  2011: Encounter for supervision of other normal pregnancy  2011: PCOS (polycystic ovarian syndrome)  2010: Lyme disease  2010: Missed ab  2010: Encounter for supervision of other normal pregnancy  2009: Back pain  2008: Hyperlipidemia LDL goal <130  2008: Anxiety state  2008: Sprain of lumbar region  2006: Supervision of normal first pregnancy      Past Medical History:   Diagnosis Date    Abnormal Pap smear     Colposcopy    Adjustment disorder with mixed anxiety and depressed mood 2012    Anemia     In Past    Anxiety     Chlamydia trachomatis infection of other specified site     Depression     Depressive disorder 1979    Not diagnosed until college...later diagnosed with TEE    Fertility problem     Lyme disease 2010    ?false positive vs positve CMV - resolved    Moderate dysplasia of cervix     Other and unspecified adverse effect of drug, medicinal and biological substance     insulin resistent    Varicosities     Wounds and injuries     fall down stairs, PT for back, pain meds       Social History     Tobacco Use    Smoking status: Never    Smokeless tobacco: Never   Substance Use Topics    Alcohol use: Yes     Types: 1 Standard drinks or equivalent per week     Comment: Very Occasional       Review of Systems  As above in HPI otherwise negative.    Vitals:    24 1414   BP: 121/78   Pulse: 82   Resp: 16   Temp: 98.9  F (37.2  C)   SpO2: 99%   Weight: 80.7 kg (178 lb)       General: Patient is resting comfortably no acute distress is  afebrile  HEENT: Head is normocephalic atraumatic   eyes are PERRL EOMI sclera anicteric   TMs are clear bilaterally  Throat is with mild pharyngeal wall erythema and no exudate  No cervical lymphadenopathy present  LUNGS: Clear to auscultation bilaterally normal respiratory effort and excursion  HEART: Regular rate and rhythm  Skin: Without rash non-diaphoretic    Physical Exam      Labs:  Results for orders placed or performed in visit on 04/17/24   XR Chest 2 Views     Status: None    Narrative    EXAM: XR CHEST 2 VIEWS  LOCATION: [facility]  DATE: 4/17/2024    INDICATION: Cough.  COMPARISON: 10/06/2012.      Impression    IMPRESSION: Negative chest. Lungs are clear.     CBC with platelets and differential     Status: None   Result Value Ref Range    WBC Count 8.2 4.0 - 11.0 10e3/uL    RBC Count 4.32 3.80 - 5.20 10e6/uL    Hemoglobin 12.5 11.7 - 15.7 g/dL    Hematocrit 37.4 35.0 - 47.0 %    MCV 87 78 - 100 fL    MCH 28.9 26.5 - 33.0 pg    MCHC 33.4 31.5 - 36.5 g/dL    RDW 12.3 10.0 - 15.0 %    Platelet Count 320 150 - 450 10e3/uL    % Neutrophils 68 %    % Lymphocytes 20 %    % Monocytes 10 %    % Eosinophils 2 %    % Basophils 0 %    % Immature Granulocytes 0 %    Absolute Neutrophils 5.6 1.6 - 8.3 10e3/uL    Absolute Lymphocytes 1.6 0.8 - 5.3 10e3/uL    Absolute Monocytes 0.8 0.0 - 1.3 10e3/uL    Absolute Eosinophils 0.1 0.0 - 0.7 10e3/uL    Absolute Basophils 0.0 0.0 - 0.2 10e3/uL    Absolute Immature Granulocytes 0.0 <=0.4 10e3/uL   Streptococcus A Rapid Screen w/Reflex to PCR     Status: Normal    Specimen: Throat; Swab   Result Value Ref Range    Group A Strep antigen Negative Negative   Influenza A & B Antigen     Status: Normal    Specimen: Nose; Swab   Result Value Ref Range    Influenza A antigen Negative Negative    Influenza B antigen Negative Negative    Narrative    Test results must be correlated with clinical data. If necessary, results should be confirmed by a molecular  assay or viral culture.   CBC with platelets and differential     Status: None    Narrative    The following orders were created for panel order CBC with platelets and differential.  Procedure                               Abnormality         Status                     ---------                               -----------         ------                     CBC with platelets and d...[[id number]]                      Final result                 Please view results for these tests on the individual orders.     Covid testing is pending.    Radiology:  I have personally ordered and preliminarily reviewed the following xray, I have noted no infiltrates or consolidations    At the end of the encounter, I discussed results, diagnosis, medications. Discussed red flags for immediate return to clinic/ER, as well as indications for follow up if no improvement. Patient understood and agreed to plan. Patient was stable for discharge.

## 2024-04-17 NOTE — PATIENT INSTRUCTIONS
You were seen today for acute bronchitis. This is likely due to a viral illness.    Symptom management:  - Get plenty of rest  - Avoid smoking and second hand smoke  - May take tylenol or ibuprofen for fever/discomfort  - Drink plenty of non-caffeinated fluids  - Use nasal steroid spray for sinus congestion  - Albuterol inhaler may be used every 6 hours as needed for chest tightness      Reasons to be seen in the emergency room:  - Develop a fever of 100.4 or higher  - Cough changes, coughing up blood, or become short of breath  - Neck stiffness  - Chest pain  - Severe headache  - Unable to tolerate eating or drinking fluids    Otherwise, if no symptom improvement after 5 days, follow-up with your primary care provider.    How can I protect others? Discharge Instructions for COVID-19 precaustions:  If you have symptoms (fever, cough, body aches or trouble breathing):  Stay home and away from others (self-isolate) until:  At least 10 days have passed since your symptoms started. And   You've had no fever--and no medicine that reduces fever--for 1 full day (24 hours). And   Your other symptoms have resolved (gotten better) OR you have a negative covid test.     Call [phone] for treatment if the COVID 19 test is positive.

## 2024-04-27 ENCOUNTER — MYC MEDICAL ADVICE (OUTPATIENT)
Dept: PSYCHIATRY | Facility: CLINIC | Age: 49
End: 2024-04-27
Payer: COMMERCIAL

## 2024-04-27 DIAGNOSIS — F41.1 GAD (GENERALIZED ANXIETY DISORDER): ICD-10-CM

## 2024-04-27 DIAGNOSIS — F90.0 ATTENTION DEFICIT HYPERACTIVITY DISORDER (ADHD), PREDOMINANTLY INATTENTIVE TYPE: ICD-10-CM

## 2024-04-27 DIAGNOSIS — F33.41 RECURRENT MAJOR DEPRESSIVE DISORDER, IN PARTIAL REMISSION (H): Primary | ICD-10-CM

## 2024-04-29 NOTE — TELEPHONE ENCOUNTER
Addended by: GUTIERREZ MONROY on: 8/23/2023 10:58 AM     Modules accepted: Orders     Reviewed patient's [facility] message. She is requesting a refill of Paxil and Adderall.     3/13/2024:  Patient reports some struggles with task completion, motivation, organization.  Incidentally trialed Adderall XR 50 mg and found she was more productive and had further improvement of ADHD symptoms with decent tolerability.  We will increase Adderall XR to 40 mg daily to see if more helpful and well-tolerated.  Patient may also trial decreased dose of Paxil CR.  No acute safety concerns.  No SI.  Psychotherapy encouraged.  No problematic drug or alcohol use.       Treatment Plan:  Continue Paxil/paroxetine CR for anxiety and mood: take 50 mg daily.  OK to trial decreasing to 37.5 mg or even 25 mg as discussed today.   Continue lorazepam/Ativan 0.5-1 mg daily as needed for severe anxiety.   INcrease Adderall XR to 25 mg + 15 mg daily (for total dose 40 mg) as needed for ADHD symptoms.        1) RN received refill request from [facility] for amphetamine-dextroamphetamine (ADDERALL XR) 25 MG 24 hr capsule     2) This medication was last prescribed on 2/5/24 for qty of 90 with 0 refill(s).     3) Per [place] , amphetamine-dextroamphetamine (ADDERALL XR) 25 MG 24 hr capsule  was last sold on 2/14/24, for quantity of 90.     4) Pt should not need a new prescription until around 5/14/24.    5) Therefore, RN sent reply back to pt that refill request will be DENIED.

## 2024-04-30 RX ORDER — DEXTROAMPHETAMINE SACCHARATE, AMPHETAMINE ASPARTATE MONOHYDRATE, DEXTROAMPHETAMINE SULFATE AND AMPHETAMINE SULFATE 6.25; 6.25; 6.25; 6.25 MG/1; MG/1; MG/1; MG/1
25 CAPSULE, EXTENDED RELEASE ORAL DAILY
Qty: 90 CAPSULE | Refills: 0 | Status: SHIPPED | OUTPATIENT
Start: 2024-04-30 | End: 2024-05-08

## 2024-04-30 RX ORDER — PAROXETINE HYDROCHLORIDE HEMIHYDRATE 37.5 MG/1
37.5 TABLET, FILM COATED, EXTENDED RELEASE ORAL EVERY MORNING
Qty: 90 TABLET | Refills: 3 | Status: SHIPPED | OUTPATIENT
Start: 2024-04-30 | End: 2024-06-13

## 2024-04-30 NOTE — TELEPHONE ENCOUNTER
1) Reviewed [employer] message. Patient has reduced Paxil to 37.5mg and states that it is going well. She will need a refill.    2) Requesting an early refill of Adderall because she uses [facility] and it takes 2 weeks to process per patient report.          Date of Last Office Visit: 3/13/24  Date of Next Office Visit: 6/13/24  No shows since last visit: 0  Cancellations since last visit: 0    Medication requested: amphetamine-dextroamphetamine (ADDERALL XR) 25 MG 24 hr capsule  Date last ordered: 2/5/24 Qty: 90 Refills: 0    Medication requested: PARoxetine (PAXIL-CR) 37.5 MG 24 hr tablet   Date last ordered: 3/23/23 Qty: 90 Refills: 3        Review of [place] ?: amphetamine-dextroamphetamine (ADDERALL XR) 25 MG 24 hr capsule   Medication last sold date: 2/14/24 Qty filled: 90  Other controlled substance on [place] ?: yes    Lapse in medication adherence greater than 5 days?: no  If yes, call patient and gather details: na  Medication refill request verified as identical to current order?:   No - 37.5mg of Paxil is not on current med list  Result of Last DAM, VPA, Li+ Level, CBC, or Carbamazepine Level (at or since last visit): N/A    Last visit treatment plan:     Treatment Plan:  Continue Paxil/paroxetine CR for anxiety and mood: take 50 mg daily.  OK to trial decreasing to 37.5 mg or even 25 mg as discussed today.   Continue lorazepam/Ativan 0.5-1 mg daily as needed for severe anxiety.   INcrease Adderall XR to 25 mg + 15 mg daily (for total dose 40 mg) as needed for ADHD symptoms.  Continue all other medications as reviewed per electronic medical record today.   Safety plan reviewed. To the Emergency Department as needed or call after hours crisis line at [phone] or [phone]. [place] Crisis Text Line. Text [place] to 877981 or Suicide LifeLine Chat: suicidepreventionlifeline.org/chat  Continue therapy as planned.   Schedule an appointment with me in 3 months or sooner as needed.     []Medication refilled  per  Medication Refill in Ambulatory Care  policy.  [x]Medication unable to be refilled by RN due to criteria not met as indicated below:    []Eligibility - not seen in the last year   []Supervision - no future appointment   []Compliance - no shows, cancellations or lapse in therapy   []Verification - order discrepancy   [x]Controlled medication   []Medication not included in policy   []90-day supply request   [x]Other

## 2024-05-06 ENCOUNTER — OFFICE VISIT (OUTPATIENT)
Dept: DERMATOLOGY | Facility: CLINIC | Age: 49
End: 2024-05-06
Payer: COMMERCIAL

## 2024-05-06 DIAGNOSIS — L82.1 SEBORRHEIC KERATOSES: ICD-10-CM

## 2024-05-06 DIAGNOSIS — L81.4 SOLAR LENTIGINOSIS: ICD-10-CM

## 2024-05-06 DIAGNOSIS — Z80.8 FAMILY HISTORY OF MELANOMA: ICD-10-CM

## 2024-05-06 DIAGNOSIS — Z12.83 SKIN CANCER SCREENING: ICD-10-CM

## 2024-05-06 DIAGNOSIS — L30.9 DERMATITIS OF LIP: Primary | ICD-10-CM

## 2024-05-06 DIAGNOSIS — D49.2 NEOPLASM OF UNSPECIFIED BEHAVIOR OF BONE, SOFT TISSUE, AND SKIN: ICD-10-CM

## 2024-05-06 DIAGNOSIS — D22.9 MULTIPLE PIGMENTED NEVI: ICD-10-CM

## 2024-05-06 DIAGNOSIS — D18.01 CHERRY ANGIOMA: ICD-10-CM

## 2024-05-06 PROCEDURE — 11103 TANGNTL BX SKIN EA SEP/ADDL: CPT | Performed by: PHYSICIAN ASSISTANT

## 2024-05-06 PROCEDURE — 99214 OFFICE O/P EST MOD 30 MIN: CPT | Mod: 25 | Performed by: PHYSICIAN ASSISTANT

## 2024-05-06 PROCEDURE — 11102 TANGNTL BX SKIN SINGLE LES: CPT | Performed by: PHYSICIAN ASSISTANT

## 2024-05-06 PROCEDURE — 88305 TISSUE EXAM BY PATHOLOGIST: CPT | Mod: TC | Performed by: PHYSICIAN ASSISTANT

## 2024-05-06 PROCEDURE — 88305 TISSUE EXAM BY PATHOLOGIST: CPT | Mod: 26 | Performed by: DERMATOLOGY

## 2024-05-06 RX ORDER — TRIAMCINOLONE ACETONIDE 0.25 MG/G
OINTMENT TOPICAL 2 TIMES DAILY
Qty: 15 G | Refills: 1 | Status: SHIPPED | OUTPATIENT
Start: 2024-05-06

## 2024-05-06 ASSESSMENT — PAIN SCALES - GENERAL: PAINLEVEL: NO PAIN (0)

## 2024-05-06 NOTE — PATIENT INSTRUCTIONS
Wound Care After a Biopsy    What is a skin biopsy?  A skin biopsy allows the doctor to examine a very small piece of tissue under the microscope to determine the diagnosis and the best treatment for the skin condition. A local anesthetic (numbing medicine) is injected with a very small needle into the skin area to be tested. A small piece of skin is taken from the area. Sometimes a suture (stitch) is used.     What are the risks of a skin biopsy?  I will experience scar, bleeding, swelling, pain, crusting and redness. I may experience incomplete removal or recurrence. Risks of this procedure are excessive bleeding, bruising, infection, nerve damage, numbness, thick (hypertrophic or keloidal) scar and non-diagnostic biopsy.    How should I care for my wound for the first 24 hours?  Keep the wound dry and covered for 24 hours  If it bleeds, hold direct pressure on the area for 15 minutes. If bleeding does not stop, call us or go to the emergency room  Avoid strenuous exercise the first 1-2 days or as your doctor instructs you    How should I care for the wound after 24 hours?  After 24 hours, remove the bandage  You may bathe or shower as normal  If you had a scalp biopsy, you can shampoo as usual and can use shower water to clean the biopsy site daily  Clean the wound once a day with gentle soap and water  Do not scrub, be gentle  Apply white petroleum/Vaseline after cleaning the wound with a cotton swab or a clean finger, and keep the site covered with a Bandaid /bandage. Bandages are not necessary with a scalp biopsy  If you are unable to cover the site with a Bandaid /bandage, re-apply ointment 2-3 times a day to keep the site moist. Moisture will help with healing  Avoid strenuous activity for first 1-2 days  Avoid lakes, rivers, pools, and oceans until the stitches are removed or the site is healed    How do I clean my wound?  Wash hands thoroughly with soap or use hand  before all wound care  Clean  the wound with gentle soap and water  Apply white petroleum/Vaseline  to wound after it is clean  Replace the Bandaid /bandage to keep the wound covered for the first few days or as instructed by your doctor  If you had a scalp biopsy, warm shower water to the area on a daily basis should suffice    What should I use to clean my wound?   Cotton-tipped applicators (Qtips )  White petroleum jelly (Vaseline ). Use a clean new container and use Q-tips to apply.  Bandaids  as needed  Gentle soap     How should I care for my wound long term?  Do not get your wound dirty  Keep up with wound care for one week or until the area is healed.    A small scab will form and fall off by itself when the area is completely healed. The area will be red and will become pink in color as it heals. Sun protection is very important for how your scar will turn out. Sunscreen with an SPF 30 or greater is recommended once the area is healed.  You should have some soreness but it should be mild and slowly go away over several days. Talk to your doctor about using tylenol for pain,    When should I call my doctor?  If you have increased:   Pain or swelling  Pus or drainage (clear or slightly yellow drainage is ok)  Temperature over 100F  Spreading redness or warmth around wound    When will I hear about my results?  The biopsy results can take 2 weeks to come back.  Your results will automatically release to [employer] before your provider has even reviewed them.  The clinic will call you with the results, send you a [employer] message, or have you schedule a follow-up clinic or phone time to discuss the results.  Contact our clinics if you do not hear from us in 2 weeks.    Who should I call with questions?  [facility]: [phone]  [facility]: [phone]  For urgent needs outside of business hours call the [facility] at [phone] and ask for the dermatology resident on  call

## 2024-05-06 NOTE — NURSING NOTE
Dermatology Rooming Note    Ellyn Mcgee's goals for this visit include:   Chief Complaint   Patient presents with    Skin Check     FBSE.  Spots of concern on the back     Edie Khoury CMA

## 2024-05-06 NOTE — NURSING NOTE
Lidocaine-epinephrine 1-1:560925 % injection   1.5mL once for one use, starting 5/6/2024 ending 5/6/2024,  2mL disp, R-0, injection  Injected by Edie Khoury CMA

## 2024-05-06 NOTE — LETTER
5/6/2024       RE: Ellyn Mcgee  [address]  [facility] [address]     Dear Colleague,    Thank you for referring your patient, Ellyn Mcgee, to the [facility] [place] at [facility]. Please see a copy of my visit note below.    [facility] Dermatology Note  Encounter Date: May 6, 2024  Office Visit       Dermatology Problem List:  1. FBSE 5/8/2023   1. Family history of melanoma (father)  2. Hx of nonscarring alopecia  3. Hx of rosacea, patient uses otc treatments  4. Ink spot lentigines - right shoulder, left lower back  5. 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 Inflamed seborrheic keratosis, right posterior neck, s/p cryotherapy 6/21/2021  7. Intertrigo  8. NUB, upper back & left post.shoulder-r/o Isk vs excorited DF vs othe  - shave biopsy 5/6/24  9. Irritant Contact dermatitis vs allergic contact vs liplicking dermatitis  - Referral to allergy clinic  - current tx: triamcinolone ointment, Vaseline  10. DF-right shoulder  ____________________________________________    Assessment & Plan:    # Hx of Intertrigo, right breast and right axilla, controlled.  Not discussed today.     # Family history of melanoma (father)  - Continue photoprotection - recommend SPF 30 or higher with frequent reapplication  - Continue yearly skin exams  - Advised to monitor for changing, non-healing, bleeding, painful, changing, or otherwise symptomatic lesions    # DF-Right post. Shoulder  Benign.      # Irritant Contact dermatitis vs allergic contact vs lip licking dermatitis   - Refer to allergist for patch testing.   - Recommend avoid lip products  - Recommend using plain Vaseline  - Start triamcinolone ointment twice a day to lips until rashes are gone.     # Neoplasm of unspecified behavior of the skin (D49.2) on the upper back. The differential diagnosis includes Isk vs excorited DF vs other. .   -  Shave biopsy performed today (see procedure note(s) below).       # Neoplasm of unspecified behavior of the skin (D49.2) on the left post. shoulder. The differential diagnosis includes Isk vs excorited DF vs other. .   - Shave biopsy performed today (see procedure note(s) below).    # Skin cancer screening with multiple benign nevi, solar lentigines    - ABCDEs: Counseled ABCDEs of melanoma: Asymmetry, Border (irregularity), Color (not uniform, changes in color), Diameter (greater than 6 mm which is about the size of a pencil eraser), and Evolving (any changes in preexisting moles).  - Sun protection: Counseled SPF30+ sunscreen, UPF clothing, sun avoidance, tanning bed avoidance.    # Cherry angiomas   Benign, reassurance given.      Procedures Performed:   - Shave biopsy procedure note, location(s): upper back. After discussion of benefits and risks including but not limited to bleeding, infection, scar, incomplete removal, recurrence, and non-diagnostic biopsy, verbal consent and photographs were obtained. The area was cleaned with isopropyl alcohol. 0.5mL of 1% lidocaine with epinephrine was injected to obtain adequate anesthesia of lesion(s). Shave biopsy at site(s) performed. Hemostasis was achieved with aluminium chloride. Petrolatum ointment and a sterile dressing were applied. The patient tolerated the procedure and no complications were noted. The patient was provided with verbal and written post care instructions.     - Shave biopsy procedure note, location(s): left post shoulder. After discussion of benefits and risks including but not limited to bleeding, infection, scar, incomplete removal, recurrence, and non-diagnostic biopsy, verbal consent and photographs were obtained. The area was cleaned with isopropyl alcohol. 0.5mL of 1% lidocaine with epinephrine was injected to obtain adequate anesthesia of lesion(s). Shave biopsy at site(s) performed. Hemostasis was achieved with aluminium chloride.  Petrolatum ointment and a sterile dressing were applied. The patient tolerated the procedure and no complications were noted. The patient was provided with verbal and written post care instructions.       Follow -up if lips are not better in 1 month.   Follow-up: pending path results  Follow-up: 1 year(s) in-person,for skin exam    Staff and Scribe:     Scribe Disclosure:   I, Essie Briseno, am serving as a scribe; to document services personally performed by Keke Arcos PA-C -based on data collection and the provider's statements to me.     Provider Disclosure:  I agree with above History, Review of Systems, Physical exam and Plan.  I have reviewed the content of the documentation and have edited it as needed. I have personally performed the services documented here and the documentation accurately represents those services and the decisions I have made.      Electronically signed by:  All risks, benefits and alternatives were discussed with patient.  Patient is in agreement and understands the assessment and plan.  All questions were answered.  Sun Screen Education was given.   Return to Clinic annually or sooner as needed.   Keke Arcos PA-C      _______________________________________    CC: Skin Check (FBSE.  Spots of concern on the back)    HPI:  Ms. Ellyn Mcgee is a(n) 48 year old female who presents today as a return patient for skin check  Last seen by me on 5/08/2023    Today he presents with spot of concern on her back, that she has picked and caused it to bleed   Her rash on her lips has improved, but has not resolved. She is using unscented brawners and Vaseline to her dry lips. Reports, the only new item is a new straw for a water bottle that she is using.     Patient is otherwise feeling well, without additional skin concerns.    Labs Reviewed:  N/A    Physical Exam:  Vitals: There were no vitals taken for this visit.  SKIN: Full skin, which includes the head/face, both arms, chest,  back, abdomen,both legs, genitalia and/or groin buttocks, digits and/or nails, was examined.  - right post shoulder-DF  - Excoriated papules central upper back and left post.shoulder  - There are dome shaped bright red papules on the head/neck, trunk, extremities.   - Multiple regular brown pigmented macules and papules are identified on the head/neck, trunk, extremities.   - Scattered brown macules on sun exposed areas.  - There are waxy stuck on tan to brown papules on the head/neck, trunk, extremities.   - Faint pink scaly plaques to lips extending past rim and border  - No other lesions of concern on areas examined.                       Medications:  Current Outpatient Medications   Medication Sig Dispense Refill    albuterol (PROAIR HFA/PROVENTIL HFA/VENTOLIN HFA) 108 (90 Base) MCG/ACT inhaler Inhale 2 puffs into the lungs every 6 hours for 30 days 18 g 0    amphetamine-dextroamphetamine (ADDERALL XR) 25 MG 24 hr capsule Take 1 capsule (25 mg) by mouth daily 90 capsule 0    ibuprofen (ADVIL/MOTRIN) 200 MG tablet Take 200-400 mg by mouth every 4 hours as needed for pain OTC      LORazepam (ATIVAN) 1 MG tablet Take 0.5-1 tablets (0.5-1 mg) by mouth daily as needed (anxiety and sleep) 20 tablet 0    PARoxetine (PAXIL-CR) 25 MG 24 hr tablet Take 2 tablets (50 mg) by mouth every morning 180 tablet 3    PARoxetine (PAXIL-CR) 37.5 MG 24 hr tablet Take 1 tablet (37.5 mg) by mouth every morning 90 tablet 3    Prenatal Vit-Fe Fumarate-FA (PRENATAL PO)       triamcinolone (KENALOG) 0.025 % external ointment Apply topically 2 times daily To affected areas on the lips until resolved. 15 g 0    Turmeric 500 MG CAPS        No current facility-administered medications for this visit.      Past Medical History:   Patient Active Problem List   Diagnosis    Hyperlipidemia LDL goal <130    Anxiety state    PCOS (polycystic ovarian syndrome)    External hemorrhoids    Attention deficit hyperactivity disorder (ADHD), predominantly  inattentive type    TEE (generalized anxiety disorder)    Cervical radiculopathy    Major depressive disorder, recurrent episode, moderate (H)    Patellofemoral arthralgia of both knees     Past Medical History:   Diagnosis Date    Abnormal Pap smear     Colposcopy    Adjustment disorder with mixed anxiety and depressed mood 04/04/2012    Anemia     In Past    Anxiety     Chlamydia trachomatis infection of other specified site 1993    Depression     Depressive disorder 1979    Not diagnosed until college...later diagnosed with TEE    Fertility problem     Lyme disease 09/08/2010    ?false positive vs positve CMV - resolved    Moderate dysplasia of cervix 1995    Other and unspecified adverse effect of drug, medicinal and biological substance     insulin resistent    Varicosities     Wounds and injuries     fall down stairs, PT for back, pain meds        CC No referring provider defined for this encounter. on close of this encounter.

## 2024-05-06 NOTE — PROGRESS NOTES
[facility] Dermatology Note  Encounter Date: May 6, 2024  Office Visit       Dermatology Problem List:  1. FBSE 5/8/2023   1. Family history of melanoma (father)  2. Hx of nonscarring alopecia  3. Hx of rosacea, patient uses otc treatments  4. Ink spot lentigines - right shoulder, left lower back  5. 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 Inflamed seborrheic keratosis, right posterior neck, s/p cryotherapy 6/21/2021  7. Intertrigo  8. NUB, upper back & left post.shoulder-r/o Isk vs excorited DF vs othe  - shave biopsy 5/6/24  9. Irritant Contact dermatitis vs allergic contact vs liplicking dermatitis  - Referral to allergy clinic  - current tx: triamcinolone ointment, Vaseline  10. DF-right shoulder  ____________________________________________    Assessment & Plan:    # Hx of Intertrigo, right breast and right axilla, controlled.  Not discussed today.     # Family history of melanoma (father)  - Continue photoprotection - recommend SPF 30 or higher with frequent reapplication  - Continue yearly skin exams  - Advised to monitor for changing, non-healing, bleeding, painful, changing, or otherwise symptomatic lesions    # DF-Right post. Shoulder  Benign.      # Irritant Contact dermatitis vs allergic contact vs lip licking dermatitis   - Refer to allergist for patch testing.   - Recommend avoid lip products  - Recommend using plain Vaseline  - Start triamcinolone ointment twice a day to lips until rashes are gone.     # Neoplasm of unspecified behavior of the skin (D49.2) on the upper back. The differential diagnosis includes Isk vs excorited DF vs other. .   - Shave biopsy performed today (see procedure note(s) below).       # Neoplasm of unspecified behavior of the skin (D49.2) on the left post. shoulder. The differential diagnosis includes Isk vs excorited DF vs other. .   - Shave biopsy performed today (see procedure note(s) below).    # Skin cancer screening with  multiple benign nevi, solar lentigines    - ABCDEs: Counseled ABCDEs of melanoma: Asymmetry, Border (irregularity), Color (not uniform, changes in color), Diameter (greater than 6 mm which is about the size of a pencil eraser), and Evolving (any changes in preexisting moles).  - Sun protection: Counseled SPF30+ sunscreen, UPF clothing, sun avoidance, tanning bed avoidance.    # Cherry angiomas   Benign, reassurance given.      Procedures Performed:   - Shave biopsy procedure note, location(s): upper back. After discussion of benefits and risks including but not limited to bleeding, infection, scar, incomplete removal, recurrence, and non-diagnostic biopsy, verbal consent and photographs were obtained. The area was cleaned with isopropyl alcohol. 0.5mL of 1% lidocaine with epinephrine was injected to obtain adequate anesthesia of lesion(s). Shave biopsy at site(s) performed. Hemostasis was achieved with aluminium chloride. Petrolatum ointment and a sterile dressing were applied. The patient tolerated the procedure and no complications were noted. The patient was provided with verbal and written post care instructions.     - Shave biopsy procedure note, location(s): left post shoulder. After discussion of benefits and risks including but not limited to bleeding, infection, scar, incomplete removal, recurrence, and non-diagnostic biopsy, verbal consent and photographs were obtained. The area was cleaned with isopropyl alcohol. 0.5mL of 1% lidocaine with epinephrine was injected to obtain adequate anesthesia of lesion(s). Shave biopsy at site(s) performed. Hemostasis was achieved with aluminium chloride. Petrolatum ointment and a sterile dressing were applied. The patient tolerated the procedure and no complications were noted. The patient was provided with verbal and written post care instructions.       Follow -up if lips are not better in 1 month.   Follow-up: pending path results  Follow-up: 1 year(s) in-person,for  skin exam    Staff and Scribe:     Scribe Disclosure:   I, Essie Finn, am serving as a scribe; to document services personally performed by Keke Arcos PA-C -based on data collection and the provider's statements to me.     Provider Disclosure:  I agree with above History, Review of Systems, Physical exam and Plan.  I have reviewed the content of the documentation and have edited it as needed. I have personally performed the services documented here and the documentation accurately represents those services and the decisions I have made.      Electronically signed by:  All risks, benefits and alternatives were discussed with patient.  Patient is in agreement and understands the assessment and plan.  All questions were answered.  Sun Screen Education was given.   Return to Clinic annually or sooner as needed.   Keke Arcos PA-C      _______________________________________    CC: Skin Check (FBSE.  Spots of concern on the back)    HPI:  Ms. Ellyn Mcgee is a(n) 48 year old female who presents today as a return patient for skin check  Last seen by me on 5/08/2023    Today he presents with spot of concern on her back, that she has picked and caused it to bleed   Her rash on her lips has improved, but has not resolved. She is using unscented brawners and Vaseline to her dry lips. Reports, the only new item is a new straw for a water bottle that she is using.     Patient is otherwise feeling well, without additional skin concerns.    Labs Reviewed:  N/A    Physical Exam:  Vitals: There were no vitals taken for this visit.  SKIN: Full skin, which includes the head/face, both arms, chest, back, abdomen,both legs, genitalia and/or groin buttocks, digits and/or nails, was examined.  - right post shoulder-DF  - Excoriated papules central upper back and left post.shoulder  - There are dome shaped bright red papules on the head/neck, trunk, extremities.   - Multiple regular brown pigmented macules and  papules are identified on the head/neck, trunk, extremities.   - Scattered brown macules on sun exposed areas.  - There are waxy stuck on tan to brown papules on the head/neck, trunk, extremities.   - Faint pink scaly plaques to lips extending past rim and border  - No other lesions of concern on areas examined.                       Medications:  Current Outpatient Medications   Medication Sig Dispense Refill    albuterol (PROAIR HFA/PROVENTIL HFA/VENTOLIN HFA) 108 (90 Base) MCG/ACT inhaler Inhale 2 puffs into the lungs every 6 hours for 30 days 18 g 0    amphetamine-dextroamphetamine (ADDERALL XR) 25 MG 24 hr capsule Take 1 capsule (25 mg) by mouth daily 90 capsule 0    ibuprofen (ADVIL/MOTRIN) 200 MG tablet Take 200-400 mg by mouth every 4 hours as needed for pain OTC      LORazepam (ATIVAN) 1 MG tablet Take 0.5-1 tablets (0.5-1 mg) by mouth daily as needed (anxiety and sleep) 20 tablet 0    PARoxetine (PAXIL-CR) 25 MG 24 hr tablet Take 2 tablets (50 mg) by mouth every morning 180 tablet 3    PARoxetine (PAXIL-CR) 37.5 MG 24 hr tablet Take 1 tablet (37.5 mg) by mouth every morning 90 tablet 3    Prenatal Vit-Fe Fumarate-FA (PRENATAL PO)       triamcinolone (KENALOG) 0.025 % external ointment Apply topically 2 times daily To affected areas on the lips until resolved. 15 g 0    Turmeric 500 MG CAPS        No current facility-administered medications for this visit.      Past Medical History:   Patient Active Problem List   Diagnosis    Hyperlipidemia LDL goal <130    Anxiety state    PCOS (polycystic ovarian syndrome)    External hemorrhoids    Attention deficit hyperactivity disorder (ADHD), predominantly inattentive type    TEE (generalized anxiety disorder)    Cervical radiculopathy    Major depressive disorder, recurrent episode, moderate (H)    Patellofemoral arthralgia of both knees     Past Medical History:   Diagnosis Date    Abnormal Pap smear     Colposcopy    Adjustment disorder with mixed anxiety and  depressed mood 04/04/2012    Anemia     In Past    Anxiety     Chlamydia trachomatis infection of other specified site 1993    Depression     Depressive disorder 1979    Not diagnosed until college...later diagnosed with TEE    Fertility problem     Lyme disease 09/08/2010    ?false positive vs positve CMV - resolved    Moderate dysplasia of cervix 1995    Other and unspecified adverse effect of drug, medicinal and biological substance     insulin resistent    Varicosities     Wounds and injuries     fall down stairs, PT for back, pain meds        CC No referring provider defined for this encounter. on close of this encounter.    No indicators present

## 2024-05-07 ENCOUNTER — MYC MEDICAL ADVICE (OUTPATIENT)
Dept: FAMILY MEDICINE | Facility: CLINIC | Age: 49
End: 2024-05-07
Payer: COMMERCIAL

## 2024-05-08 LAB
PATH REPORT.COMMENTS IMP SPEC: NORMAL
PATH REPORT.COMMENTS IMP SPEC: NORMAL
PATH REPORT.FINAL DX SPEC: NORMAL
PATH REPORT.GROSS SPEC: NORMAL
PATH REPORT.MICROSCOPIC SPEC OTHER STN: NORMAL
PATH REPORT.RELEVANT HX SPEC: NORMAL

## 2024-05-08 RX ORDER — DEXTROAMPHETAMINE SACCHARATE, AMPHETAMINE ASPARTATE MONOHYDRATE, DEXTROAMPHETAMINE SULFATE AND AMPHETAMINE SULFATE 6.25; 6.25; 6.25; 6.25 MG/1; MG/1; MG/1; MG/1
25 CAPSULE, EXTENDED RELEASE ORAL DAILY
Qty: 30 CAPSULE | Refills: 0 | Status: SHIPPED | OUTPATIENT
Start: 2024-05-08 | End: 2024-06-10

## 2024-05-08 NOTE — TELEPHONE ENCOUNTER
Patient hasn't heard if [facility] has Adderall XR 25 mg in stock yet. She is running low on her Adderall so she would like a 30 day sent to the [facility].     amphetamine-dextroamphetamine (ADDERALL XR) 25 MG 24 hr capsule 90 capsule 0 4/30/2024 -- No   Sig - Route: Take 1 capsule (25 mg) by mouth daily - Oral     [place] : last sold on 4/13/24 for #30.     Tiffanie Kelly RN on 5/8/2024 at 11:03 AM

## 2024-05-13 SDOH — HEALTH STABILITY: PHYSICAL HEALTH: ON AVERAGE, HOW MANY MINUTES DO YOU ENGAGE IN EXERCISE AT THIS LEVEL?: PATIENT DECLINED

## 2024-05-13 SDOH — HEALTH STABILITY: PHYSICAL HEALTH
ON AVERAGE, HOW MANY DAYS PER WEEK DO YOU ENGAGE IN MODERATE TO STRENUOUS EXERCISE (LIKE A BRISK WALK)?: PATIENT DECLINED

## 2024-05-13 ASSESSMENT — SOCIAL DETERMINANTS OF HEALTH (SDOH): HOW OFTEN DO YOU GET TOGETHER WITH FRIENDS OR RELATIVES?: NEVER

## 2024-05-14 ENCOUNTER — OFFICE VISIT (OUTPATIENT)
Dept: FAMILY MEDICINE | Facility: CLINIC | Age: 49
End: 2024-05-14
Payer: COMMERCIAL

## 2024-05-14 VITALS
WEIGHT: 176 LBS | DIASTOLIC BLOOD PRESSURE: 70 MMHG | TEMPERATURE: 97.2 F | HEIGHT: 64 IN | SYSTOLIC BLOOD PRESSURE: 113 MMHG | HEART RATE: 70 BPM | BODY MASS INDEX: 30.05 KG/M2 | OXYGEN SATURATION: 100 % | RESPIRATION RATE: 16 BRPM

## 2024-05-14 DIAGNOSIS — Z11.59 NEED FOR HEPATITIS B SCREENING TEST: ICD-10-CM

## 2024-05-14 DIAGNOSIS — R73.09 ELEVATED GLUCOSE: ICD-10-CM

## 2024-05-14 DIAGNOSIS — Z12.11 SCREEN FOR COLON CANCER: ICD-10-CM

## 2024-05-14 DIAGNOSIS — Z12.31 VISIT FOR SCREENING MAMMOGRAM: ICD-10-CM

## 2024-05-14 DIAGNOSIS — E78.2 MIXED HYPERLIPIDEMIA: ICD-10-CM

## 2024-05-14 DIAGNOSIS — Z00.00 ROUTINE GENERAL MEDICAL EXAMINATION AT A HEALTH CARE FACILITY: Primary | ICD-10-CM

## 2024-05-14 LAB
ALBUMIN SERPL BCG-MCNC: 4.6 G/DL (ref 3.5–5.2)
ALP SERPL-CCNC: 59 U/L (ref 40–150)
ALT SERPL W P-5'-P-CCNC: 19 U/L (ref 0–50)
ANION GAP SERPL CALCULATED.3IONS-SCNC: 12 MMOL/L (ref 7–15)
AST SERPL W P-5'-P-CCNC: 21 U/L (ref 0–45)
BILIRUB SERPL-MCNC: 0.3 MG/DL
BUN SERPL-MCNC: 15.7 MG/DL (ref 6–20)
CALCIUM SERPL-MCNC: 9.3 MG/DL (ref 8.6–10)
CHLORIDE SERPL-SCNC: 105 MMOL/L (ref 98–107)
CHOLEST SERPL-MCNC: 256 MG/DL
CREAT SERPL-MCNC: 0.74 MG/DL (ref 0.51–0.95)
DEPRECATED HCO3 PLAS-SCNC: 23 MMOL/L (ref 22–29)
EGFRCR SERPLBLD CKD-EPI 2021: >90 ML/MIN/1.73M2
FASTING STATUS PATIENT QL REPORTED: NO
FASTING STATUS PATIENT QL REPORTED: NO
FSH SERPL IRP2-ACNC: 5 MIU/ML
GLUCOSE SERPL-MCNC: 98 MG/DL (ref 70–99)
HBA1C MFR BLD: 5.5 % (ref 0–5.6)
HDLC SERPL-MCNC: 56 MG/DL
LDLC SERPL CALC-MCNC: 182 MG/DL
NONHDLC SERPL-MCNC: 200 MG/DL
POTASSIUM SERPL-SCNC: 4.2 MMOL/L (ref 3.4–5.3)
PROT SERPL-MCNC: 7.3 G/DL (ref 6.4–8.3)
SODIUM SERPL-SCNC: 140 MMOL/L (ref 135–145)
T3FREE SERPL-MCNC: 2.8 PG/ML (ref 2–4.4)
T4 FREE SERPL-MCNC: 0.97 NG/DL (ref 0.9–1.7)
TRIGL SERPL-MCNC: 91 MG/DL
TSH SERPL DL<=0.005 MIU/L-ACNC: 2.03 UIU/ML (ref 0.3–4.2)

## 2024-05-14 PROCEDURE — 91320 SARSCV2 VAC 30MCG TRS-SUC IM: CPT | Performed by: FAMILY MEDICINE

## 2024-05-14 PROCEDURE — 83001 ASSAY OF GONADOTROPIN (FSH): CPT | Performed by: FAMILY MEDICINE

## 2024-05-14 PROCEDURE — 86706 HEP B SURFACE ANTIBODY: CPT | Performed by: FAMILY MEDICINE

## 2024-05-14 PROCEDURE — 80061 LIPID PANEL: CPT | Performed by: FAMILY MEDICINE

## 2024-05-14 PROCEDURE — 99396 PREV VISIT EST AGE 40-64: CPT | Mod: 25 | Performed by: FAMILY MEDICINE

## 2024-05-14 PROCEDURE — 83036 HEMOGLOBIN GLYCOSYLATED A1C: CPT | Performed by: FAMILY MEDICINE

## 2024-05-14 PROCEDURE — 90480 ADMN SARSCOV2 VAC 1/ONLY CMP: CPT | Performed by: FAMILY MEDICINE

## 2024-05-14 PROCEDURE — 84443 ASSAY THYROID STIM HORMONE: CPT | Performed by: FAMILY MEDICINE

## 2024-05-14 PROCEDURE — 84439 ASSAY OF FREE THYROXINE: CPT | Performed by: FAMILY MEDICINE

## 2024-05-14 PROCEDURE — 84481 FREE ASSAY (FT-3): CPT | Performed by: FAMILY MEDICINE

## 2024-05-14 PROCEDURE — 80053 COMPREHEN METABOLIC PANEL: CPT | Performed by: FAMILY MEDICINE

## 2024-05-14 PROCEDURE — 36415 COLL VENOUS BLD VENIPUNCTURE: CPT | Performed by: FAMILY MEDICINE

## 2024-05-14 ASSESSMENT — PAIN SCALES - GENERAL: PAINLEVEL: NO PAIN (0)

## 2024-05-14 NOTE — LETTER
My Depression Action Plan  Name: Ellyn Mcgee   Date of Birth 1975  Date: 5/14/2024    My doctor: Dorothy Guaman   My clinic: [facility]  [address]  [facility] [address] [phone]            GREEN    ZONE   Good Control    What it looks like:   Things are going generally well. You have normal ups and downs. You may even feel depressed from time to time, but bad moods usually last less than a day.   What you need to do:  Continue to care for yourself (see self care plan)  Check your depression survival kit and update it as needed  Follow your physician s recommendations including any medication.  Do not stop taking medication unless you consult with your physician first.             YELLOW         ZONE Getting Worse    What it looks like:   Depression is starting to interfere with your life.   It may be hard to get out of bed; you may be starting to isolate yourself from others.  Symptoms of depression are starting to last most all day and this has happened for several days.   You may have suicidal thoughts but they are not constant.   What you need to do:     Call your care team. Your response to treatment will improve if you keep your care team informed of your progress. Yellow periods are signs an adjustment may need to be made.     Continue your self-care.  Just get dressed and ready for the day.  Don't give yourself time to talk yourself out of it.    Talk to someone in your support network.    Open up your Depression Self-Care Plan/Wellness Kit.             RED    ZONE Medical Alert - Get Help    What it looks like:   Depression is seriously interfering with your life.   You may experience these or other symptoms: You can t get out of bed most days, can t work or engage in other necessary activities, you have trouble taking care of basic hygiene, or basic responsibilities, thoughts of suicide or death that will not go away,  self-injurious behavior.     What you need to do:  Call your care team and request a same-day appointment. If they are not available (weekends or after hours) call your local crisis line, emergency room or 911.          Depression Self-Care Plan / Wellness Kit    Many people find that medication and therapy are helpful treatments for managing depression. In addition, making small changes to your everyday life can help to boost your mood and improve your wellbeing. Below are some tips for you to consider. Be sure to talk with your medical provider and/or behavioral health consultant if your symptoms are worsening or not improving.     Sleep   Sleep hygiene  means all of the habits that support good, restful sleep. It includes maintaining a consistent bedtime and wake time, using your bedroom only for sleeping or sex, and keeping the bedroom dark and free of distractions like a computer, smartphone, or television.     Develop a Healthy Routine  Maintain good hygiene. Get out of bed in the morning, make your bed, brush your teeth, take a shower, and get dressed. Don t spend too much time viewing media that makes you feel stressed. Find time to relax each day.    Exercise  Get some form of exercise every day. This will help reduce pain and release endorphins, the  feel good  chemicals in your brain. It can be as simple as just going for a walk or doing some gardening, anything that will get you moving.      Diet  Strive to eat healthy foods, including fruits and vegetables. Drink plenty of water. Avoid excessive sugar, caffeine, alcohol, and other mood-altering substances.     Stay Connected with Others  Stay in touch with friends and family members.    Manage Your Mood  Try deep breathing, massage therapy, biofeedback, or meditation. Take part in fun activities when you can. Try to find something to smile about each day.     Psychotherapy  Be open to working with a therapist if your provider recommends it.      Medication  Be sure to take your medication as prescribed. Most anti-depressants need to be taken every day. It usually takes several weeks for medications to work. Not all medicines work for all people. It is important to follow-up with your provider to make sure you have a treatment plan that is working for you. Do not stop your medication abruptly without first discussing it with your provider.    Crisis Resources   These hotlines are for both adults and children. They and are open 24 hours a day, 7 days a week unless noted otherwise.    National Suicide Prevention Lifeline   988 or 4-994-093-OIAV (4223)    Crisis Text Line    www.crisistextline.org  Text HOME to 729676 from anywhere in the [place], anytime, about any type of crisis. A live, trained crisis counselor will receive the text and respond quickly.    [place] Lifeline for LGBTQ Youth  A national crisis intervention and suicide lifeline for LGBTQ youth under 25. Provides a safe place to talk without judgement. Call 1-855.835.8884; text START to 511958 or visit www.thetrevorproject.org to talk to a trained counselor.    For [facility] crisis numbers, visit the [facility] website at:  https://[place].gov/dhs/people-we-serve/adults/health-care/mental-health/resources/crisis-contacts.jsp

## 2024-05-14 NOTE — PROGRESS NOTES
"Preventive Care Visit  [facility]  Dorothy Guaman DO, Family Medicine  May 14, 2024      Assessment & Plan     Routine general medical examination at a health care facility     - Follicle stimulating hormone; Future  - Follicle stimulating hormone    Screen for colon cancer     - Colonoscopy Screening  Referral; Future    Visit for screening mammogram       Elevated glucose     - Comprehensive metabolic panel (BMP + Alb, Alk Phos, ALT, AST, Total. Bili, TP); Future  - Hemoglobin A1c; Future  - Comprehensive metabolic panel (BMP + Alb, Alk Phos, ALT, AST, Total. Bili, TP)  - Hemoglobin A1c    Mixed hyperlipidemia     - Lipid panel reflex to direct LDL Non-fasting; Future  - Comprehensive metabolic panel (BMP + Alb, Alk Phos, ALT, AST, Total. Bili, TP); Future  - TSH with free T4 reflex; Future  - T4, free; Future  - T3, Free; Future  - Lipid panel reflex to direct LDL Non-fasting  - Comprehensive metabolic panel (BMP + Alb, Alk Phos, ALT, AST, Total. Bili, TP)  - T4, free  - T3, Free  - TSH    Need for hepatitis B screening test     - Hepatitis B Surface Antibody; Future  - Hepatitis B Surface Antibody              BMI  Estimated body mass index is 30.69 kg/m  as calculated from the following:    Height as of this encounter: 1.613 m (5' 3.5\").    Weight as of this encounter: 79.8 kg (176 lb).   Weight management plan: Discussed healthy diet and exercise guidelines    Counseling  Appropriate preventive services were discussed with this patient, including applicable screening as appropriate for fall prevention, nutrition, physical activity, Tobacco-use cessation, weight loss and cognition.  Checklist reviewing preventive services available has been given to the patient.  Reviewed patient's diet, addressing concerns and/or questions.   Patient is at risk for social isolation and has been provided with information about the benefit of social connection.   The patient was instructed to see " the dentist every 6 months.   The patient's PHQ-9 score is consistent with mild depression. She was provided with information regarding depression.           Juan Ramon Ragland is a 48 year old, presenting for the following:  Physical         Health Care Directive  Patient does not have a Health Care Directive or Living Will:     HPI              5/13/2024   General Health   How would you rate your overall physical health? (!) FAIR   Feel stress (tense, anxious, or unable to sleep) Very much   (!) STRESS CONCERN      5/13/2024   Nutrition   Three or more servings of calcium each day? (!) NO   Diet: Regular (no restrictions)    Carbohydrate counting    Gluten-free/reduced    Other    I don't know   If other, please elaborate: I try to eat as healthy as possible, but dont have much time so try to prep usually dont eat, Much earlier in day, eat too much and high fat and carbs after work when hungry and tired   How many servings of fruit and vegetables per day? (!) 2-3   How many sweetened beverages each day? 0-1         5/13/2024   Exercise   Days per week of moderate/strenous exercise Patient declined   Average minutes spent exercising at this level Patient declined         5/13/2024   Social Factors   Frequency of gathering with friends or relatives Never   Worry food won't last until get money to buy more No   Food not last or not have enough money for food? No   Do you have housing?  Yes   Are you worried about losing your housing? No   Lack of transportation? No   Unable to get utilities (heat,electricity)? No   (!) SOCIAL CONNECTIONS CONCERN      5/13/2024   Dental   Dentist two times every year? (!) NO         5/13/2024   TB Screening   Were you born outside of the [place]? No       Today's PHQ-9 Score:       5/13/2024    10:11 PM   PHQ-9 SCORE   PHQ-9 Total Score MyChart 8 (Mild depression)   PHQ-9 Total Score 8         5/13/2024   Substance Use   Alcohol more than 3/day or more than 7/wk No   Do you use any other  substances recreationally? (!) ALCOHOL    (!) CANNABIS PRODUCTS     Social History     Tobacco Use    Smoking status: Never    Smokeless tobacco: Never   Vaping Use    Vaping status: Never Used   Substance Use Topics    Alcohol use: Yes     Types: 1 Standard drinks or equivalent per week     Comment: Very Occasional    Drug use: Not Currently     Types: Marijuana, Psilocybin     Comment: I added one, but not because it's recent, been 20+ years...           3/7/2023   LAST FHS-7 RESULTS   1st degree relative breast or ovarian cancer Unknown   Any relative bilateral breast cancer Unknown   Any male have breast cancer No   Any ONE woman have BOTH breast AND ovarian cancer Unknown   Any woman with breast cancer before 50yrs No   2 or more relatives with breast AND/OR ovarian cancer Yes   2 or more relatives with breast AND/OR bowel cancer Unknown        Mammogram Screening - Mammogram every 1-2 years updated in Health Maintenance based on mutual decision making  Pt breastfeeding so wishes to hold off at this time - encouraged her to get in the near future      5/13/2024   STI Screening   New sexual partner(s) since last STI/HIV test? No     History of abnormal Pap smear: NO - age 30-65 PAP every 5 years with negative HPV co-testing recommended        Latest Ref Rng & Units 2/9/2021     2:05 PM 2/9/2021     2:00 PM 4/1/2016     8:44 AM   PAP / HPV   PAP (Historical)  OTHER-NIL, See Result      HPV 16 DNA NEG^Negative  Negative  Negative    HPV 18 DNA NEG^Negative  Negative  Negative    Other HR HPV NEG^Negative  Negative  Negative      ASCVD Risk   The 10-year ASCVD risk score (Samuel BALLESTEROS, et al., 2019) is: 1.2%    Values used to calculate the score:      Age: 48 years      Sex: Female      Is Non- : No      Diabetic: No      Tobacco smoker: No      Systolic Blood Pressure: 113 mmHg      Is BP treated: No      HDL Cholesterol: 56 mg/dL      Total Cholesterol: 256 mg/dL        5/13/2024    Contraception/Family Planning   Questions about contraception or family planning No        Reviewed and updated as needed this visit by Provider   Tobacco  Allergies  Meds  Problems  Med Hx  Surg Hx  Fam Hx            Patient Active Problem List   Diagnosis    Hyperlipidemia LDL goal <130    Anxiety state    PCOS (polycystic ovarian syndrome)    External hemorrhoids    Attention deficit hyperactivity disorder (ADHD), predominantly inattentive type    TEE (generalized anxiety disorder)    Cervical radiculopathy    Major depressive disorder, recurrent episode, moderate (H)    Patellofemoral arthralgia of both knees     Past Surgical History:   Procedure Laterality Date    BIOPSY  , , ,     mole on stomach and mole on back of thigh    CONIZATION CERVIX,KNIFE/LASER  1995    SURGICAL HISTORY OF -       [place] teeth       Social History     Tobacco Use    Smoking status: Never    Smokeless tobacco: Never   Substance Use Topics    Alcohol use: Yes     Types: 1 Standard drinks or equivalent per week     Comment: Very Occasional     Family History   Problem Relation Age of Onset    Cancer Mother         vocal cord cancer    Lipids Mother     Myocardial Infarction Mother     Hyperlipidemia Mother     Other Cancer Mother         throat    Depression Mother     Anxiety Disorder Mother     Melanoma Father     Cancer Father         B-cell lymphoma, melanoma    Lipids Father     Heart Disease Father         open heart surgery    Cerebrovascular Disease Father         mini strokes, multiple    Hyperlipidemia Father             Other Cancer Father         melanoma, b-cell lymphoma    Depression Father         undiagnosed    Anxiety Disorder Father         undiagnosed    Mental Illness Father         Undiagnosed Narcissitic Personality Disorder    Substance Abuse Father         alcohol abuse    Alcohol/Drug Maternal Grandfather     Substance Abuse Maternal Grandfather         Alcoholic     "Diabetes Paternal Grandmother         think it was type II    Obesity Paternal Grandmother     Thyroid Disease Brother     Crohn's Disease Sister     Diabetes Paternal Uncle     Anxiety Disorder Paternal Half-Sister     Diabetes Other         paternal uncle has, not sure of type             Review of Systems  Constitutional, HEENT, cardiovascular, pulmonary, GI, , musculoskeletal, neuro, skin, endocrine and psych systems are negative, except as otherwise noted.     Objective    Exam  /70   Pulse 70   Temp 97.2  F (36.2  C) (Temporal)   Resp 16   Ht 1.613 m (5' 3.5\")   Wt 79.8 kg (176 lb)   LMP 04/16/2024 (Within Days)   SpO2 100%   BMI 30.69 kg/m     Estimated body mass index is 30.69 kg/m  as calculated from the following:    Height as of this encounter: 1.613 m (5' 3.5\").    Weight as of this encounter: 79.8 kg (176 lb).    Physical Exam  GENERAL: alert and no distress  EYES: Eyes grossly normal to inspection, PERRL and conjunctivae and sclerae normal  HENT: ear canals and TM's normal, nose and mouth without ulcers or lesions  NECK: no adenopathy, no asymmetry, masses, or scars  RESP: lungs clear to auscultation - no rales, rhonchi or wheezes  BREAST: normal without masses, tenderness or nipple discharge and no palpable axillary masses or adenopathy  CV: regular rate and rhythm, normal S1 S2, no S3 or S4, no murmur, click or rub, no peripheral edema  ABDOMEN: soft, nontender, no hepatosplenomegaly, no masses and bowel sounds normal  MS: no gross musculoskeletal defects noted, no edema  SKIN: no suspicious lesions or rashes  NEURO: Normal strength and tone, mentation intact and speech normal  PSYCH: mentation appears normal, affect normal/bright        Signed Electronically by: Dorothy Guaman, DO    Answers submitted by the patient for this visit:  Patient Health Questionnaire (Submitted on 5/13/2024)  If you checked off any problems, how difficult have these problems made it for you to do your " work, take care of things at home, or get along with other people?: Somewhat difficult  PHQ9 TOTAL SCORE: 8

## 2024-05-14 NOTE — PATIENT INSTRUCTIONS
"Preventive Care Advice   This is general advice we often give to help people stay healthy. Your care team may have specific advice just for you. Please talk to your care team about your own preventive care needs.  Lifestyle  Exercise at least 150 minutes each week (30 minutes a day, 5 days a week).  Do muscle strengthening activities 2 days a week. These help control your weight and prevent disease.  No smoking.  Wear sunscreen to prevent skin cancer.  Have your home tested for radon every 2 to 5 years. Radon is a colorless, odorless gas that can harm your lungs. To learn more, go to www.health.[facility].mn. and search for \"Radon in Homes.\"  Keep guns unloaded and locked up in a safe place like a safe or gun vault, or, use a gun lock and hide the keys. Always lock away bullets separately. To learn more, visit Health: [place].gov and search for \"safe gun storage.\"  Nutrition  Eat 5 or more servings of fruits and vegetables each day.  Try wheat bread, brown rice and whole grain pasta (instead of white bread, rice, and pasta).  Get enough calcium and vitamin D. Check the label on foods and aim for 100% of the RDA (recommended daily allowance).  Regular exams  Have a dental exam and cleaning every 6 months.  See your health care team every year to talk about:  Any changes in your health.  Any medicines your care team has prescribed.  Preventive care, family planning, and ways to prevent chronic diseases.  Shots (vaccines)   HPV shots (up to age 26), if you've never had them before.  Hepatitis B shots (up to age 59), if you've never had them before.  COVID-19 shot: Get this shot when it's due.  Flu shot: Get a flu shot every year.  Tetanus shot: Get a tetanus shot every 10 years.  Pneumococcal, hepatitis A, and RSV shots: Ask your care team if you need these based on your risk.  Shingles shot (for age 50 and up).  General health tests  Diabetes screening:  Starting at age 35, Get screened for diabetes at least every 3 years.  If " you are younger than age 35, ask your care team if you should be screened for diabetes.  Cholesterol test: At age 39, start having a cholesterol test every 5 years, or more often if advised.  Bone density scan (DEXA): At age 50, ask your care team if you should have this scan for osteoporosis (brittle bones).  Hepatitis C: Get tested at least once in your life.  Abdominal aortic aneurysm screening: Talk to your doctor about having this screening if you:  Have ever smoked; and  Are biologically male; and  Are between the ages of 65 and 75.  STIs (sexually transmitted infections)  Before age 24: Ask your care team if you should be screened for STIs.  After age 24: Get screened for STIs if you're at risk. You are at risk for STIs (including HIV) if:  You are sexually active with more than one person.  You don't use condoms every time.  You or a partner was diagnosed with a sexually transmitted infection.  If you are at risk for HIV, ask about PrEP medicine to prevent HIV.  Get tested for HIV at least once in your life, whether you are at risk for HIV or not.  Cancer screening tests  Cervical cancer screening: If you have a cervix, begin getting regular cervical cancer screening tests at age 21. Most people who have regular screenings with normal results can stop after age 65. Talk about this with your provider.  Breast cancer scan (mammogram): If you've ever had breasts, begin having regular mammograms starting at age 40. This is a scan to check for breast cancer.  Colon cancer screening: It is important to start screening for colon cancer at age 45.  Have a colonoscopy test every 10 years (or more often if you're at risk) Or, ask your provider about stool tests like a FIT test every year or Cologuard test every 3 years.  To learn more about your testing options, visit: www.Huaat/957952.pdf.  For help making a decision, visit: ofrd/qt03887.  Prostate cancer screening test: If you have a prostate and are age 55  to 69, ask your provider if you would benefit from a yearly prostate cancer screening test.  Lung cancer screening: If you are a current or former smoker age 50 to 80, ask your care team if ongoing lung cancer screenings are right for you.  For informational purposes only. Not to replace the advice of your health care provider. Copyright   2023 [employer]. All rights reserved. Clinically reviewed by the [facility] Transitions Program. DÃ³nde 165501 - REV 04/24.    Relationships for Good Health  Relationships are important for our health and happiness. Social isolation, loneliness and lack of support are bad for your health. Studies show that loneliness can harm health and limit your life span as much as high blood pressure and smoking.   Take some time to reflect on your relationships. Then answer these questions:  Are there people in your life that cause you stress or drain your energy? What can you do to set limits?  ________________________________________________________________________________________________________________________________________________________________________________________________________________________________________________________________________________________________________________________________________________  Who do you enjoy spending time with? Who can you go to for support?  ________________________________________________________________________________________________________________________________________________________________________________________________________________________________________________________________________________________________________________________________________________  What can you do to improve your relationships with  others?  __________________________________________________________________________________________________________________________________________________________________________________________________________________  ______________________________________________________________________________________________________________________________  What do you like most about your relationships with others?  ________________________________________________________________________________________________________________________________________________________________________________________________________________________________________________________________________________________________________________________________________________  My goal: ______________________________________________________________________  I will ______________________________________________________________________________________________________________________________________________________________________________________________    For informational purposes only. Not to replace the advice of your health care provider. Copyright   2018 [facility]. All rights reserved. Clinically reviewed by [facility]  Team. SMARTworks 901915 - Rev 04/21.    Learning About Stress  What is stress?     Stress is your body's response to a hard situation. Your body can have a physical, emotional, or mental response. Stress is a fact of life for most people, and it affects everyone differently. What causes stress for you may not be stressful for someone else.  A lot of things can cause stress. You may feel stress when you go on a job interview, take a test, or run a race. This kind of short-term stress is normal and even useful. It can help you if you need to work hard or react quickly. For example, stress can help you finish an important job on time.  Long-term stress is caused by ongoing stressful situations or events. Examples  of long-term stress include long-term health problems, ongoing problems at work, or conflicts in your family. Long-term stress can harm your health.  How does stress affect your health?  When you are stressed, your body responds as though you are in danger. It makes hormones that speed up your heart, make you breathe faster, and give you a burst of energy. This is called the fight-or-flight stress response. If the stress is over quickly, your body goes back to normal and no harm is done.  But if stress happens too often or lasts too long, it can have bad effects. Long-term stress can make you more likely to get sick, and it can make symptoms of some diseases worse. If you tense up when you are stressed, you may develop neck, shoulder, or low back pain. Stress is linked to high blood pressure and heart disease.  Stress also harms your emotional health. It can make you phillips, tense, or depressed. Your relationships may suffer, and you may not do well at work or school.  What can you do to manage stress?  You can try these things to help manage stress:   Do something active. Exercise or activity can help reduce stress. Walking is a great way to get started. Even everyday activities such as housecleaning or yard work can help.  Try yoga or annette chi. These techniques combine exercise and meditation. You may need some training at first to learn them.  Do something you enjoy. For example, listen to music or go to a movie. Practice your hobby or do volunteer work.  Meditate. This can help you relax, because you are not worrying about what happened before or what may happen in the future.  Do guided imagery. Imagine yourself in any setting that helps you feel calm. You can use online videos, books, or a teacher to guide you.  Do breathing exercises. For example:  From a standing position, bend forward from the waist with your knees slightly bent. Let your arms dangle close to the floor.  Breathe in slowly and deeply as you  "return to a standing position. Roll up slowly and lift your head last.  Hold your breath for just a few seconds in the standing position.  Breathe out slowly and bend forward from the waist.  Let your feelings out. Talk, laugh, cry, and express anger when you need to. Talking with supportive friends or family, a counselor, or a saad leader about your feelings is a healthy way to relieve stress. Avoid discussing your feelings with people who make you feel worse.  Write. It may help to write about things that are bothering you. This helps you find out how much stress you feel and what is causing it. When you know this, you can find better ways to cope.  What can you do to prevent stress?  You might try some of these things to help prevent stress:  Manage your time. This helps you find time to do the things you want and need to do.  Get enough sleep. Your body recovers from the stresses of the day while you are sleeping.  Get support. Your family, friends, and community can make a difference in how you experience stress.  Limit your news feed. Avoid or limit time on social media or news that may make you feel stressed.  Do something active. Exercise or activity can help reduce stress. Walking is a great way to get started.  Where can you learn more?  Go to https://www.Netlog.net/patiented  Enter N032 in the search box to learn more about \"Learning About Stress.\"  Current as of: October 24, 2023               Content Version: 14.0    2941-4434 [employer].   Care instructions adapted under license by your healthcare professional. If you have questions about a medical condition or this instruction, always ask your healthcare professional. [employer] disclaims any warranty or liability for your use of this information.      Learning About Depression Screening  What is depression screening?  Depression screening is a way to see if you have depression symptoms. It may be done by a doctor or " "counselor. It's often part of a routine checkup. That's because your mental health is just as important as your physical health.  Depression is a mental health condition that affects how you feel, think, and act. You may:  Have less energy.  Lose interest in your daily activities.  Feel sad and grouchy for a long time.  Depression is very common. It affects people of all ages.  Many things can lead to depression. Some people become depressed after they have a stroke or find out they have a major illness like cancer or heart disease. The death of a loved one or a breakup may lead to depression. It can run in families. Most experts believe that a combination of inherited genes and stressful life events can cause it.  What happens during screening?  You may be asked to fill out a form about your depression symptoms. You and the doctor will discuss your answers. The doctor may ask you more questions to learn more about how you think, act, and feel.  What happens after screening?  If you have symptoms of depression, your doctor will talk to you about your options.  Doctors usually treat depression with medicines or counseling. Often, combining the two works best. Many people don't get help because they think that they'll get over the depression on their own. But people with depression may not get better unless they get treatment.  The cause of depression is not well understood. There may be many factors involved. But if you have depression, it's not your fault.  A serious symptom of depression is thinking about death or suicide. If you or someone you care about talks about this or about feeling hopeless, get help right away.  It's important to know that depression can be treated. Medicine, counseling, and self-care may help.  Where can you learn more?  Go to https://www.healthwise.net/patiented  Enter T185 in the search box to learn more about \"Learning About Depression Screening.\"  Current as of: June 24, " 2023               Content Version: 14.0    8993-2133 [employer].   Care instructions adapted under license by your healthcare professional. If you have questions about a medical condition or this instruction, always ask your healthcare professional. [employer] disclaims any warranty or liability for your use of this information.      Substance Use Disorder: Care Instructions  Overview     You can improve your life and health by stopping your use of alcohol or drugs. When you don't drink or use drugs, you may feel and sleep better. You may get along better with your family, friends, and coworkers. There are medicines and programs that can help with substance use disorder.  How can you care for yourself at home?  Here are some ways to help you stay sober and prevent relapse.  If you have been given medicine to help keep you sober or reduce your cravings, be sure to take it exactly as prescribed.  Talk to your doctor about programs that can help you stop using drugs or drinking alcohol.  Do not keep alcohol or drugs in your home.  Plan ahead. Think about what you'll say if other people ask you to drink or use drugs. Try not to spend time with people who drink or use drugs.  Use the time and money spent on drinking or drugs to do something that's important to you.  Preventing a relapse  Have a plan to deal with relapse. Learn to recognize changes in your thinking that lead you to drink or use drugs. Get help before you start to drink or use drugs again.  Try to stay away from situations, friends, or places that may lead you to drink or use drugs.  If you feel the need to drink alcohol or use drugs again, seek help right away. Call a trusted friend or family member. Some people get support from organizations such as Narcotics Anonymous or [employer] or from treatment facilities.  If you relapse, get help as soon as you can. Some people make a plan with another person that outlines what  they want that person to do for them if they relapse. The plan usually includes how to handle the relapse and who to notify in case of relapse.  Don't give up. Remember that a relapse doesn't mean that you have failed. Use the experience to learn the triggers that lead you to drink or use drugs. Then quit again. Recovery is a lifelong process. Many people have several relapses before they are able to quit for good.  Follow-up care is a key part of your treatment and safety. Be sure to make and go to all appointments, and call your doctor if you are having problems. It's also a good idea to know your test results and keep a list of the medicines you take.  When should you call for help?   Call 911  anytime you think you may need emergency care. For example, call if you or someone else:    Has overdosed or has withdrawal signs. Be sure to tell the emergency workers that you are or someone else is using or trying to quit using drugs. Overdose or withdrawal signs may include:  Losing consciousness.  Seizure.  Seeing or hearing things that aren't there (hallucinations).     Is thinking or talking about suicide or harming others.   Where to get help 24 hours a day, 7 days a week   If you or someone you know talks about suicide, self-harm, a mental health crisis, a substance use crisis, or any other kind of emotional distress, get help right away. You can:    Call the Suicide and Crisis Lifeline at 120.     Call 0-134-516-ZGRZ (1-564.951.7353).     Text HOME to 830003 to access the Crisis Text Line.   Consider saving these numbers in your phone.  Go to Tredline.org for more information or to chat online.  Call your doctor now or seek immediate medical care if:    You are having withdrawal symptoms. These may include nausea or vomiting, sweating, shakiness, and anxiety.   Watch closely for changes in your health, and be sure to contact your doctor if:    You have a relapse.     You need more help or support to stop.  "  Where can you learn more?  Go to https://www.healthBitbar.net/patiented  Enter H573 in the search box to learn more about \"Substance Use Disorder: Care Instructions.\"  Current as of: November 15, 2023               Content Version: 14.0    6984-5394 [employer].   Care instructions adapted under license by your healthcare professional. If you have questions about a medical condition or this instruction, always ask your healthcare professional. [employer] disclaims any warranty or liability for your use of this information.      "

## 2024-05-15 ENCOUNTER — MYC MEDICAL ADVICE (OUTPATIENT)
Dept: FAMILY MEDICINE | Facility: CLINIC | Age: 49
End: 2024-05-15
Payer: COMMERCIAL

## 2024-05-15 LAB
HBV SURFACE AB SERPL IA-ACNC: <3.5 M[IU]/ML
HBV SURFACE AB SERPL IA-ACNC: NONREACTIVE M[IU]/ML

## 2024-05-17 ENCOUNTER — MYC MEDICAL ADVICE (OUTPATIENT)
Dept: PSYCHIATRY | Facility: CLINIC | Age: 49
End: 2024-05-17
Payer: COMMERCIAL

## 2024-06-01 ENCOUNTER — MYC MEDICAL ADVICE (OUTPATIENT)
Dept: FAMILY MEDICINE | Facility: CLINIC | Age: 49
End: 2024-06-01
Payer: COMMERCIAL

## 2024-06-10 ENCOUNTER — MYC MEDICAL ADVICE (OUTPATIENT)
Dept: PSYCHIATRY | Facility: CLINIC | Age: 49
End: 2024-06-10
Payer: COMMERCIAL

## 2024-06-10 DIAGNOSIS — F90.0 ATTENTION DEFICIT HYPERACTIVITY DISORDER (ADHD), PREDOMINANTLY INATTENTIVE TYPE: ICD-10-CM

## 2024-06-10 NOTE — TELEPHONE ENCOUNTER
Date of Last Office Visit: 3/13/2024  Date of Next Office Visit: 6/13/2024  No shows since last visit: none  Cancellations since last visit: none    Medication requested: amphetamine-dextroamphetamine (ADDERALL XR) 25 MG 24 hr capsule  Date last ordered: 5/8/2024 Qty: 30 Refills: 0  Take 1 capsule (25 mg) by mouth daily     Medication requested: amphetamine-dextroamphetamine (ADDERALL XR) 15 MG 24 hr capsule  Date last ordered: 3/19/2024 Qty: 30 Refills: 2 with start dates of 4/19/2024 and 5/20/2024     Review of [place] ?: yes  Filled  Written  Sold  ID  Drug  QTY  Days  Prescriber  RX #  Dispenser  Refill  Daily Dose*  Pymt Type      05/08/2024 05/08/2024 05/12/2024 1 Dextroamp-Amphet Er 25 Mg Cap 30.00 30 Al Bau [id number] [facility] ([id number]) 0/0  [place]   04/13/2024 03/19/2024 04/13/2024 1 Dextroamp-Amphet Er 15 Mg Cap 30.00 30 Al Bau [id number] [facility] ([id number]) 0/0  [place]   02/14/2024 02/05/2024 02/14/2024 1 Dextroamp-Amphet Er 25 Mg Cap 90.00 90 Al Bau [id number] Exp ([id number]) 0/0  [place]   01/05/2024 12/26/2023 01/05/2024 1 Dextroamp-Amphet Er 25 Mg Cap 30.00 30 Al Bau             Lapse in medication adherence greater than 5 days?: no  If yes, call patient and gather details: n/a  Medication refill request verified as identical to current order?: yes  Result of Last DAM, VPA, Li+ Level, CBC, or Carbamazepine Level (at or since last visit): N/A    Last visit treatment plan:   3/13/2024:  Patient reports some struggles with task completion, motivation, organization.  Incidentally trialed Adderall XR 50 mg and found she was more productive and had further improvement of ADHD symptoms with decent tolerability.  We will increase Adderall XR to 40 mg daily to see if more helpful and well-tolerated.  Patient may also trial decreased dose of Paxil CR.  No acute safety concerns.  No SI.  Psychotherapy encouraged.  No problematic drug or alcohol use.     Medication side effects and alternatives were reviewed. Health  promotion activities recommended and reviewed today. All questions addressed. Education and counseling completed regarding risks and benefits of medications and psychotherapy options. Recommend ongoing therapy for additional support.      Treatment Plan:  Continue Paxil/paroxetine CR for anxiety and mood: take 50 mg daily.  OK to trial decreasing to 37.5 mg or even 25 mg as discussed today.   Continue lorazepam/Ativan 0.5-1 mg daily as needed for severe anxiety.   INcrease Adderall XR to 25 mg + 15 mg daily (for total dose 40 mg) as needed for ADHD symptoms.  Continue all other medications as reviewed per electronic medical record today.   Safety plan reviewed. To the Emergency Department as needed or call after hours crisis line at [phone] or [phone]. [place] Crisis Text Line. Text [place] to 645231 or Suicide LifeLine Chat: suicidepreventionlifeline.org/chat  Continue therapy as planned.   Schedule an appointment with me in 3 months or sooner as needed.  Patient scheduled today.  Follow up with primary care provider as planned or for acute medical concerns.  Call the psychiatric nurse line with medication questions or concerns at [phone].  JosephICan LLCt may be used to communicate with your provider, but this is not intended to be used for emergencies.    []Medication refilled per  Medication Refill in Ambulatory Care  policy.  [x]Medication unable to be refilled by RN due to criteria not met as indicated below:    []Eligibility - not seen in the last year   []Supervision - no future appointment   []Compliance - no shows, cancellations or lapse in therapy   []Verification - order discrepancy   [x]Controlled medication   []Medication not included in policy   []90-day supply request   []Other      25 mg pended but 15 mg not showing up on med list.     Margarita Braun, RN on 6/10/2024 at 12:23 PM

## 2024-06-11 RX ORDER — DEXTROAMPHETAMINE SACCHARATE, AMPHETAMINE ASPARTATE MONOHYDRATE, DEXTROAMPHETAMINE SULFATE AND AMPHETAMINE SULFATE 6.25; 6.25; 6.25; 6.25 MG/1; MG/1; MG/1; MG/1
25 CAPSULE, EXTENDED RELEASE ORAL DAILY
Qty: 30 CAPSULE | Refills: 0 | Status: SHIPPED | OUTPATIENT
Start: 2024-06-11 | End: 2024-06-21

## 2024-06-13 ENCOUNTER — VIRTUAL VISIT (OUTPATIENT)
Dept: PSYCHIATRY | Facility: CLINIC | Age: 49
End: 2024-06-13
Payer: COMMERCIAL

## 2024-06-13 VITALS — BODY MASS INDEX: 31.04 KG/M2 | WEIGHT: 178 LBS

## 2024-06-13 DIAGNOSIS — F41.1 GAD (GENERALIZED ANXIETY DISORDER): ICD-10-CM

## 2024-06-13 DIAGNOSIS — F90.0 ATTENTION DEFICIT HYPERACTIVITY DISORDER (ADHD), PREDOMINANTLY INATTENTIVE TYPE: Primary | ICD-10-CM

## 2024-06-13 DIAGNOSIS — G47.00 INSOMNIA, UNSPECIFIED TYPE: ICD-10-CM

## 2024-06-13 DIAGNOSIS — F33.41 RECURRENT MAJOR DEPRESSIVE DISORDER, IN PARTIAL REMISSION (H): ICD-10-CM

## 2024-06-13 PROCEDURE — 99214 OFFICE O/P EST MOD 30 MIN: CPT | Mod: 95 | Performed by: PSYCHIATRY & NEUROLOGY

## 2024-06-13 PROCEDURE — G2211 COMPLEX E/M VISIT ADD ON: HCPCS | Mod: 95 | Performed by: PSYCHIATRY & NEUROLOGY

## 2024-06-13 RX ORDER — DEXTROAMPHETAMINE SACCHARATE, AMPHETAMINE ASPARTATE MONOHYDRATE, DEXTROAMPHETAMINE SULFATE AND AMPHETAMINE SULFATE 6.25; 6.25; 6.25; 6.25 MG/1; MG/1; MG/1; MG/1
25 CAPSULE, EXTENDED RELEASE ORAL DAILY
Qty: 30 CAPSULE | Refills: 0 | Status: SHIPPED | OUTPATIENT
Start: 2024-07-13 | End: 2024-06-21

## 2024-06-13 RX ORDER — DEXTROAMPHETAMINE SACCHARATE, AMPHETAMINE ASPARTATE MONOHYDRATE, DEXTROAMPHETAMINE SULFATE AND AMPHETAMINE SULFATE 6.25; 6.25; 6.25; 6.25 MG/1; MG/1; MG/1; MG/1
25 CAPSULE, EXTENDED RELEASE ORAL DAILY
Qty: 30 CAPSULE | Refills: 0 | Status: SHIPPED | OUTPATIENT
Start: 2024-06-13 | End: 2024-07-13

## 2024-06-13 RX ORDER — DEXTROAMPHETAMINE SACCHARATE, AMPHETAMINE ASPARTATE MONOHYDRATE, DEXTROAMPHETAMINE SULFATE AND AMPHETAMINE SULFATE 6.25; 6.25; 6.25; 6.25 MG/1; MG/1; MG/1; MG/1
25 CAPSULE, EXTENDED RELEASE ORAL DAILY
Qty: 30 CAPSULE | Refills: 0 | Status: SHIPPED | OUTPATIENT
Start: 2024-08-12 | End: 2024-06-21

## 2024-06-13 RX ORDER — PAROXETINE HYDROCHLORIDE HEMIHYDRATE 25 MG/1
50 TABLET, FILM COATED, EXTENDED RELEASE ORAL EVERY MORNING
Qty: 180 TABLET | Refills: 3 | Status: SHIPPED | OUTPATIENT
Start: 2024-06-13

## 2024-06-13 RX ORDER — DEXTROAMPHETAMINE SACCHARATE, AMPHETAMINE ASPARTATE MONOHYDRATE, DEXTROAMPHETAMINE SULFATE AND AMPHETAMINE SULFATE 3.75; 3.75; 3.75; 3.75 MG/1; MG/1; MG/1; MG/1
15 CAPSULE, EXTENDED RELEASE ORAL DAILY
Qty: 30 CAPSULE | Refills: 0 | Status: SHIPPED | OUTPATIENT
Start: 2024-06-13 | End: 2024-07-13

## 2024-06-13 RX ORDER — DEXTROAMPHETAMINE SACCHARATE, AMPHETAMINE ASPARTATE MONOHYDRATE, DEXTROAMPHETAMINE SULFATE AND AMPHETAMINE SULFATE 3.75; 3.75; 3.75; 3.75 MG/1; MG/1; MG/1; MG/1
15 CAPSULE, EXTENDED RELEASE ORAL DAILY
Qty: 30 CAPSULE | Refills: 0 | Status: SHIPPED | OUTPATIENT
Start: 2024-07-13 | End: 2024-06-21

## 2024-06-13 RX ORDER — DEXTROAMPHETAMINE SACCHARATE, AMPHETAMINE ASPARTATE MONOHYDRATE, DEXTROAMPHETAMINE SULFATE AND AMPHETAMINE SULFATE 3.75; 3.75; 3.75; 3.75 MG/1; MG/1; MG/1; MG/1
15 CAPSULE, EXTENDED RELEASE ORAL DAILY
Qty: 30 CAPSULE | Refills: 0 | Status: SHIPPED | OUTPATIENT
Start: 2024-08-12 | End: 2024-06-21

## 2024-06-13 RX ORDER — LORAZEPAM 1 MG/1
.5-1 TABLET ORAL DAILY PRN
Qty: 20 TABLET | Refills: 0 | Status: SHIPPED | OUTPATIENT
Start: 2024-06-13

## 2024-06-13 ASSESSMENT — PAIN SCALES - GENERAL: PAINLEVEL: NO PAIN (0)

## 2024-06-13 NOTE — PROGRESS NOTES
"Virtual Visit Details    Type of service:  Video Visit     Originating Location (pt. Location): {video visit patient location:833652::\"Home\"}  {PROVIDER LOCATION On-site should be selected for visits conducted from your clinic location or adjoining [facility] hospital, academic office, or other nearby [facility] building. Off-site should be selected for all other provider locations, including home:114445}  Distant Location (provider location):  {virtual location provider:205488}  Platform used for Video Visit: {Virtual Visit Platforms:472793::\"RUSBASE\"}    "

## 2024-06-13 NOTE — PROGRESS NOTES
"Telemedicine Visit: The patient's condition can be safely assessed and treated via synchronous audio and visual telemedicine encounter.      Reason for Telemedicine Visit: Patient has requested telehealth visit    Originating Site (Patient Location): Patient's home in [place]    Distant Location (provider location):  Off-site    Consent:  The patient/guardian has verbally consented to: the potential risks and benefits of telemedicine (video visit) versus in person care; bill my insurance or make self-payment for services provided; and responsibility for payment of non-covered services.     Mode of Communication:  Video Conference via Thing[employer]    As the provider I attest to compliance with applicable laws and regulations related to telemedicine.        Outpatient Psychiatric Progress Note    Name: Ellyn Mcgee   : 1975                    Primary Care Provider: Dorothy Guaman DO   Therapist: BARBARA Schulte         10/2/2023    11:00 AM 3/21/2024    11:32 AM 2024    10:11 PM   PHQ   PHQ-9 Total Score 9 12 8   Q9: Thoughts of better off dead/self-harm past 2 weeks Not at all Not at all Not at all   TEE-7 scores:      2023    12:12 PM 2023     1:09 PM 3/21/2024    11:33 AM   TEE-7 SCORE   Total Score 8 (mild anxiety) 9 (mild anxiety) 15 (severe anxiety)   Total Score 8 9 15       Patient Identification:  Patient is a 48 year old,   White Not  or  female  who presents for return visit with me.  Patient is currently employed part time but on maternity leave. Patient attended the phone/video session alone. Patient prefers to be called: \"Ellyn\".    Interim History:  I last saw Ellyn Mcgee for outpatient psychiatry Return Visit on 3/13/2024. During that appointment, we:    Continue Paxil/paroxetine CR for anxiety and mood: take 50 mg daily.  OK to trial decreasing to 37.5 mg or even 25 mg as discussed today.   Continue lorazepam/Ativan 0.5-1 mg daily as needed for " severe anxiety.   INcrease Adderall XR to 25 mg + 15 mg daily (for total dose 40 mg) as needed for ADHD symptoms.  Continue all other medications as reviewed per electronic medical record today.       6/13: Patient taking 50 mg of Paxil CR.  Tried to drop the dose down but felt much more anxious.  Relief came when increased dose back to 50 mg daily.  Feels increased dose of Adderall XR has been helpful.  Takes Adderall XR 25 mg most days and uses Adderall XR 15 mg as a booster.  Tolerating well with no major negative side effects.  Her youngest son will be starting part-time  in the fall.  Eldest daughter will live at home while starting community College.  No acute safety concerns.  No thoughts of suicide.  No changes in drug or alcohol use.    Per [facility], [facility], during today's team-based visit:  NO [facility] visit      Psychiatric ROS:  Ellyn Mcgee reports mood has been: Relatively stable  Anxiety has been: Manageable  Sleep has been: Up and down  Isabel sxs: None  Psychosis sxs: None  ADHD/ADD sxs: better with increased Adderall   PTSD sxs: None  PHQ9 and GAD7 scores were reviewed today if completed.   Medication side effects: Denies  Current stressors include: See HPI above  Coping mechanisms and supports include: Therapy, Family, Hobbies and Friends    Current medications include:   Current Outpatient Medications   Medication Sig Dispense Refill    amphetamine-dextroamphetamine (ADDERALL XR) 25 MG 24 hr capsule Take 1 capsule (25 mg) by mouth daily 30 capsule 0    ibuprofen (ADVIL/MOTRIN) 200 MG tablet Take 200-400 mg by mouth every 4 hours as needed for pain OTC      LORazepam (ATIVAN) 1 MG tablet Take 0.5-1 tablets (0.5-1 mg) by mouth daily as needed (anxiety and sleep) 20 tablet 0    PARoxetine (PAXIL-CR) 37.5 MG 24 hr tablet Take 1 tablet (37.5 mg) by mouth every morning 90 tablet 3    Prenatal Vit-Fe Fumarate-FA (PRENATAL PO)       triamcinolone (KENALOG) 0.025 % external ointment Apply  topically 2 times daily To affected areas on the lips until resolved. 15 g 1    Turmeric 500 MG CAPS        No current facility-administered medications for this visit.     [place]:   05/08/2024 05/08/2024 1 Dextroamp-Amphet Er 25 Mg Cap 30.00 30 Al Bau [id number] Wal ([id number]) 0/0  [place]   04/13/2024 03/19/2024 1 Dextroamp-Amphet Er 15 Mg Cap 30.00 30 Al Bau [id number] [facility] ([id number]) 0/0  [place]   02/14/2024 02/05/2024 1 Dextroamp-Amphet Er 25 Mg Cap 90.00 90 Al Bau [id number] Exp ([id number]) 0/0  [place]   01/05/2024 12/26/2023 1 Dextroamp-Amphet Er 25 Mg Cap 30.00 30 Al Bau [id number] Exp ([id number]) 0/0  [place]       Past Medical/Surgical History:  Past Medical History:   Diagnosis Date    Abnormal Pap smear     Colposcopy    Adjustment disorder with mixed anxiety and depressed mood 04/04/2012    Anemia     In Past    Anxiety     Chlamydia trachomatis infection of other specified site 1993    Depression     Depressive disorder 1979    Not diagnosed until college...later diagnosed with TEE    Fertility problem     Lyme disease 09/08/2010    ?false positive vs positve CMV - resolved    Moderate dysplasia of cervix 1995    Other and unspecified adverse effect of drug, medicinal and biological substance     insulin resistent    Varicosities     Wounds and injuries     fall down stairs, PT for back, pain meds      has a past medical history of Abnormal Pap smear, Adjustment disorder with mixed anxiety and depressed mood (04/04/2012), Anemia, Anxiety, Chlamydia trachomatis infection of other specified site (1993), Depression, Depressive disorder (1979), Fertility problem, Lyme disease (09/08/2010), Moderate dysplasia of cervix (1995), Other and unspecified adverse effect of drug, medicinal and biological substance, Varicosities, and Wounds and injuries.    She has no past medical history of Arthritis, Asthma, Asymptomatic human immunodeficiency virus (HIV) infection status (H), Breast disorder, Cancer (H),  Cerebral infarction (H), Chronic kidney disease, Complication of anesthesia, Congestive heart failure (H), COPD (chronic obstructive pulmonary disease) (H), Diabetes (H), Diabetes mellitus (H), Heart disease, Herpes simplex without mention of complication, History of blood transfusion, Hypertension, Liver disease, Postpartum depression, Rh incompatibility, Seizures (H), Sickle cell anemia (H), Systemic lupus erythematosus (H), Thyroid disease, or Uncomplicated asthma.    Social History:  Reviewed. No changes to social history except as noted in HPI above.     Vital Signs:   None. This is phone/video visit.     Labs:  Most recent laboratory results reviewed and the pertinent results include:   Lab Results   Component Value Date    A1C 5.3 08/01/2023    A1C 5.4 03/07/2023    A1C 5.3 04/30/2019    A1C 5.2 04/17/2018      Recent Labs   Lab Test 08/01/23  0902 03/07/23  0954   CHOL 234* 313*   HDL 64 72   * 221*   TRIG 99 102        Review of Systems:  10 systems (general, cardiovascular, respiratory, eyes, ENT, endocrine, GI, , M/S, neurological) were reviewed. Most pertinent finding(s) is/are: denies fever, cough, headaches, shortness of breath, chest pain, N/V, constipation/diarrhea, trouble urinating, aches and pains. The remaining systems are all unremarkable.    Mental Status Examination (limited as this is by phone/video):  Appearance: Awake, alert, appears stated age, no acute distress, well-groomed  Attitude:  Cooperative, pleasant  Motor: No obvious abnormalities observed via video, not formally tested  Oriented to:  person, place, time, and situation  Attention Span and Concentration:  normal  Speech:  clear, coherent, regular rate, rhythm, and volume  Language: intact  Mood: ok  Affect: Overall appropriate and mood congruent  Associations:  no loose associations  Thought Process:  logical, linear and goal oriented  Thought Content:  no evidence of suicidal ideation or homicidal ideation, no evidence  of psychotic thought, no auditory hallucinations present and no visual hallucinations present  Recent and Remote Memory:  Intact to interview. Not formally assessed. No amnesia.  Fund of Knowledge: appropriate  Insight:  good  Judgment:  intact, adequate for safety  Impulse Control:  intact    Suicide Risk Assessment:  Today Ellyn Mcgee reports no suicidal ideation. Based on all available evidence including the factors cited above, Ellyn Mcgee does not appear to be at imminent risk for self-harm, does not meet criteria for a 72-hr hold, and therefore remains appropriate for ongoing outpatient level of care.  A thorough assessment of risk factors related to suicide and self-harm have been reviewed and are noted above. The patient convincingly denies suicidality on several occasions. Local community safety resources reviewed for patient to use if needed. There was no deceit detected, and the patient presented in a manner that was believable.     DSM5 Diagnosis:  Major Depressive Disorder, Recurrent Episode, In Partial Remission  300.02 (F41.1) Generalized Anxiety Disorder   ADHD, Unspecified  Insomnia-unspecified (mostly related to infant)    Medical comorbidities include:  Patient Active Problem List    Diagnosis Date Noted    Patellofemoral arthralgia of both knees 11/10/2023     Priority: Medium    Major depressive disorder, recurrent episode, moderate (H) 10/08/2020     Priority: Medium    TEE (generalized anxiety disorder) 10/10/2018     Priority: Medium    Cervical radiculopathy 04/16/2018     Priority: Medium    Attention deficit hyperactivity disorder (ADHD), predominantly inattentive type 10/04/2017     Priority: Medium     Patient is followed by Dr. Wang for ongoing prescription of stimulants.  All refills should be approved by this provider, or covering partner.        External hemorrhoids 04/25/2012     Priority: Medium    PCOS (polycystic ovarian syndrome) 02/22/2011     Priority: Medium     Hyperlipidemia LDL goal <130 09/05/2008     Priority: Medium    Anxiety state 09/05/2008     Priority: Medium     Infrequent prn use of lorazepam         Psychosocial & Contextual Factors: See HPI above    Assessment:  Per Intake Note with me 9/8/20:  Ellyn Mcgee is a 45-year-old female who is 23 weeks pregnant with a past psychiatric history including depression, anxiety, and ADHD who presents today for psychiatric evaluation.  Her depression and anxiety date back several years and she has also had postpartum depression/anxiety with her now 8-year-old (she also has a 14-year-old but did not experience postpartum mental health issues at that time).  She started sertraline during her second pregnancy and then ended up being maintained on Paxil-CR for several years.  She is also more recently diagnosed with ADHD and maintained on Concerta.  She weaned off both Paxil and Concerta when she found out she was pregnant and replaced these medications with her current regimen of Lexapro and Effexor-XR to decrease risk of fetal defects.  For the most part, she is feeling a little bit better on her current doses of Lexapro 10 mg and Effexor-XR 75 mg.  It is an unconventional combination as we discussed, but since she is doing quite well, I am hesitant to make many changes.  She feels as though her anxiety could be a little bit better controlled and so we will further increase Effexor-XR to 112.5 mg daily to be taken with her Lexapro 10 mg daily.  If she does a lot better, we can consider decreasing Lexapro to 5 mg daily. We discussed the risk of serotonin syndrome and she will continue to be vigilant for any signs or symptoms of this condition.  We did discuss the possibility of restarting a stimulant medication if necessary.  She does not feel as though her ADHD symptoms are too severe at this time.    4/20/21:   Today patient reports overall some ongoing ups and downs regarding her symptoms since last visit.  She is  thinking much of it might be hormonal and related to sleep deprivation.  She still has not yet had her menses return since having her baby.  She has had some feelings of panic.  Used to take propranolol in the past when she felt this way.  She would like to start this medication again.  Discussed risks and benefits while breast-feeding.  Limited risks for taking low-dose propranolol while breast-feeding although the infant does have some exposure through breast milk.  Recommended taking propranolol at least 3-4 hours prior to breast-feeding to decrease risks.  No other medication changes at this time.    12/15/2021:   Patient has overall been feeling quite stable mood wise.  Has some ups and downs that are more related to feeling overwhelmed and exhausted regarding responsibilities for her children, home life, and work.  Does not feel depressed.  Feels like medications are working well.  Propranolol has been helpful.  No medication changes today.  No safety concerns or SI.  No problematic drug or alcohol use.    6/14/2022:  Patient overall struggling lately with mood, anxiety, ADHD symptoms.  Stopped Lexapro since felt like it was making her symptoms worse.  We have considered for quite some time about starting her back on an ADHD medication.  I think it is worth a trial at this time.  Discussed risks and benefits of a stimulant medication while breast-feeding.  She will likely be weaning soon.  Still has not been getting great sleep due to cosleeping and nighttime feedings.  No acute safety concerns.  No SI.  No problematic drug or alcohol use.  If she has too much irritability on Adderall, we could consider either going back to Concerta or a trial with Vyvanse.    12/14/2022:  Akanksha overall struggling more with anxiety and panic related symptoms.  Stress has been higher.  Patient wondering about starting Paxil-CR again.  Has tolerated well in the past.  We will add a low dose with venlafaxine.  We will also  decrease venlafaxine some.  May continue cross taper/titration.  Lorazepam/Ativan provided for current acute symptoms and for any severe discomfort that may arise with this transition.  Therapy encouraged.  No acute safety concerns.  No SI.  No problematic drug or alcohol use.  Patient will be weaning her 2-year-old from breast-feeding.  We discussed risks and benefits of current medication regimen while breast-feeding.    1/5/2023:  Doing well on Paxil.  Symptoms improved quite quickly.  Tolerating well with no negative side effects.  Was able to decrease Effexor with ease as well.  We will continue tapering off Effexor and further optimizing Paxil.  Encouraged to continue in therapy.  No acute safety concerns.  No SI.  No problematic drug or alcohol use.    2/6/2023:  Patient overall doing relatively okay.  Symptoms of anxiety and depression have improved.  Irritability still lingering some.  Feeling a little emotional as well at times.  Some symptoms could be remaining from stopping venlafaxine.  We will wait increase Paxil and patient will observe her symptoms further/longer.  Patient can message in a few weeks if wants to increase Paxil.  No acute safety concerns.  No SI.  No problematic drug or alcohol use.    10/13/2023:  Patient overall doing relatively well.  No acute safety concerns.  No SI.  No problematic drug or alcohol use.  ADHD better controlled on Adderall.  Tolerating meds okay.  Follow up in 6 months.    3/13/2024:  Patient reports some struggles with task completion, motivation, organization.  Incidentally trialed Adderall XR 50 mg and found she was more productive and had further improvement of ADHD symptoms with decent tolerability.  We will increase Adderall XR to 40 mg daily to see if more helpful and well-tolerated.  Patient may also trial decreased dose of Paxil CR.  No acute safety concerns.  No SI.  Psychotherapy encouraged.  No problematic drug or alcohol use.    6/13/2024:  Patient  overall doing well.  Stable on Paxil CR 50 mg daily.  Feels increased Adderall XR well-tolerated and helpful for symptoms when taken.  No acute safety concerns.  No SI.  No problematic drug or alcohol use.  Medication regimen will be continued with no changes today.    Medication side effects and alternatives were reviewed. Health promotion activities recommended and reviewed today. All questions addressed. Education and counseling completed regarding risks and benefits of medications and psychotherapy options. Recommend ongoing therapy for additional support.     Treatment Plan:  Continue Paxil/paroxetine CR 50 mg daily for anxiety and mood.  Continue lorazepam/Ativan 0.5-1 mg daily as needed for severe anxiety.   Continue Adderall XR 25 mg + Adderall XR 15 mg daily (for total dose 40 mg) as needed for ADHD symptoms.  Continue all other medications as reviewed per electronic medical record today.   Safety plan reviewed. To the Emergency Department as needed or call after hours crisis line at [phone] or [phone]. [place] Crisis Text Line. Text [place] to 136907 or Suicide LifeLine Chat: suicidepreProximal Dataline.org/chat  Continue therapy as planned.   Schedule an appointment with me in 3.5-4 months or sooner as needed.  Patient scheduled today.  Follow up with primary care provider as planned or for acute medical concerns.  Call the psychiatric nurse line with medication questions or concerns at [phone].  MyChart may be used to communicate with your provider, but this is not intended to be used for emergencies.    Risks of stimulant medication include, but not limited to, decreased appetite, risk of tics (and that they may be lasting), trouble sleeping, cardiac risks such as increased heart rate and blood pressure, and rare risk of sudden cardiac death.  Also risk of addiction/tolerance/dependence.    Risks of benzodiazepine (Ativan, Xanax, Klonopin, Valium, etc) use including, but not limited to,  "sedation, tolerance, risk for addiction/dependence. Do not drink alcohol while taking benzodiazepines due to risk of trouble breathing and potential death. Do not drive or operate heavy machinery until it is known how the drug affects you. Discuss with physician or pharmacist before ever taking a benzodiazepine with a narcotic/opioid pain medication.     Therapy Resources:  Www.[facility]com  Www.[facility]ologyToday.com  Www.[facility].org    Administrative Billing:   Phone Call/Video Duration: 23 Minutes  Start: 11:03a  Stop: 11:26a    The longitudinal plan of care for the diagnosis(es)/condition(s) as documented were addressed during this visit. Due to the added complexity in care, I will continue to support Ellyn in the subsequent management and with ongoing continuity of care.    Patient Status:  Patient is a continuous/long-term care patient and refills will continue to come from this provider until otherwise noted.     Signed:   Sherlyn Wang DO  [facility]    This note was created with voice recognition software. Inadvertent grammatical errors, typographical errors, and \"sound-a-like\" substitutions may occur due to limitations of the software.  Read the note carefully and apply context when erroneous substitutions have occurred. Thank you.   "

## 2024-06-13 NOTE — PATIENT INSTRUCTIONS
Treatment Plan:  Continue Paxil/paroxetine CR 50 mg daily for anxiety and mood.  Continue lorazepam/Ativan 0.5-1 mg daily as needed for severe anxiety.   Continue Adderall XR 25 mg + Adderall XR 15 mg daily (for total dose 40 mg) as needed for ADHD symptoms.  Continue all other medications as reviewed per electronic medical record today.   Safety plan reviewed. To the Emergency Department as needed or call after hours crisis line at [phone] or [phone]. [place] Crisis Text Line. Text [place] to 430816 or Suicide LifeLine Chat: suicidepreventionFund Recsline.org/chat  Continue therapy as planned.   Schedule an appointment with me in 3.5-4 months or sooner as needed.  Patient scheduled today.  Follow up with primary care provider as planned or for acute medical concerns.  Call the psychiatric nurse line with medication questions or concerns at [phone].  Abinehart may be used to communicate with your provider, but this is not intended to be used for emergencies.    Risks of stimulant medication include, but not limited to, decreased appetite, risk of tics (and that they may be lasting), trouble sleeping, cardiac risks such as increased heart rate and blood pressure, and rare risk of sudden cardiac death.  Also risk of addiction/tolerance/dependence.    Risks of benzodiazepine (Ativan, Xanax, Klonopin, Valium, etc) use including, but not limited to, sedation, tolerance, risk for addiction/dependence. Do not drink alcohol while taking benzodiazepines due to risk of trouble breathing and potential death. Do not drive or operate heavy machinery until it is known how the drug affects you. Discuss with physician or pharmacist before ever taking a benzodiazepine with a narcotic/opioid pain medication.     Therapy Resources:  Www.[facility].com  Www.PsychologyToday.com  Www.[facility]IM[facility].org    Patient Education   Collaborative Care Psychiatry Service  What to Expect  Here's what to expect from your Collaborative Care  "Psychiatry Service (CCPS).   About CCPS  CCPS means 2 people work together to help you get better. You'll meet with a behavioral health clinician and a psychiatric doctor. A behavioral health clinician helps people with mental health problems by talking with them. A psychiatric doctor helps people by giving them medicine.  How it works  At every visit, you'll see the behavioral health clinician (BHC) first. They'll talk with you about how you're doing and teach you how to feel better.   Then you'll see the psychiatric doctor. This doctor can help you deal with troubling thoughts and feelings by giving you medicine. They'll make sure you know the plan for your care.   CCPS usually takes 3 to 6 visits. If you need more visits, we may have you start seeing a different psychiatric doctor for ongoing care.  If you have any questions or concerns, we'll be glad to talk with you.  About visits  Be open  At your visits, please talk openly about your problems. It may feel hard, but it's the best way for us to help you.  Cancelling visits  If you can't come to your visit, please call us right away at [phone]. If you don't cancel at least 24 hours (1 full day) before your visit, that's \"late cancellation.\"  Being late to visits  Being very late is the same as not showing up. You will be a \"no show\" if:  Your appointment starts with a BHC, and you're more than 15 minutes late for a 30-minute (half hour) visit. This will also cancel your appointment with the psychiatric doctor.  Your appointment is with a psychiatric doctor only, and you're more than 15 minutes late for a 30-minute (half hour) visit.  Your appointment is with a psychiatric doctor only, and you're more than 30 minutes late for a 60-minute (full hour) visit.  If you cancel late or don't show up 2 times within 6 months, we may end your care.   Getting help between visits  If you need help between visits, you can call us Monday to Friday from 8 a.m. to 4:30 " p.m. at [phone].  Emergency care  Call 911 or go to the nearest emergency department if your life or someone else's life is in danger.  Call 848 anytime to reach the national Suicide and Crisis hotline.  Medicine refills  To refill your medicine, call your pharmacy. You can also call [facility]'s Behavioral Access at [phone], Monday to Friday, 8 a.m. to 4:30 p.m. It can take 1 to 3 business days to get a refill.   Forms, letters, and tests  You may have papers to fill out, like FMLA, short-term disability, and workability. We can help you with these forms at your visits, but you must have an appointment. You may need more than 1 visit for this, to be in an intensive therapy program, or both.  Before we can give you medicine for ADHD, we may refer you to get tested for it or confirm it another way.  We may not be able to give you an emotional support animal letter.  We don't do mental health checks ordered by the court.   We don't do mental health testing, but we can refer you to get tested.   Thank you for choosing us for your care.  For informational purposes only. Not to replace the advice of your health care provider. Copyright   2022 [facility]. All rights reserved. [employer] 249444 - 12/22.

## 2024-06-13 NOTE — NURSING NOTE
Is the patient currently in the state of [place]? YES    Visit mode:VIDEO    If the visit is dropped, the patient can be reconnected by: VIDEO VISIT: Text to cell phone:   Telephone Information:   Mobile [phone]       Will anyone else be joining the visit? NO  (If patient encounters technical issues they should call [phone]    How would you like to obtain your AVS? MyChart    Are changes needed to the allergy or medication list? No    Are refills needed on medications prescribed by this physician? NO    Reason for visit: RECHECK    Gomez SCHMIDT

## 2024-06-19 NOTE — TELEPHONE ENCOUNTER
Received MyC message from the pt -  Hernando Grimmcy. It s been a crazy  Well, it s been a crazy couple of years honestly but I m just getting back to you. I couldn t figure out what was going on and I couldn t get a hold of a person and I just went back to one of the emails and it said there were instructions to not fill until June 19 which is today. I am completely out and I am taking my daughters 30 mg Because she is being switched to another medication. I am almost out of those as well. If you can cancel the 30 day with them and send a 90 day to be filled immediately that would be greatly appreciated  Thank you    RN called the patient and she indicated that this only pertains to the amphetamine-dextroamphetamine (ADDERALL XR) 25 MG 24 hr capsule dose that she wants the 3 prescriptions of #30 cancelled and one #90 prescription sent to [facility].     RN called [facility] at [phone] and spoke with Eryn and discussed the 30-day scripts for amphetamine-dextroamphetamine (ADDERALL XR) 25 MG 24 hr capsule dated for 6/13, 7/13 and 8/12. Eryn indicated that that script dated 6/13/2024 is in process and will ship to the patient within 7 calendar days.   Eryn confirmed that her plan does cover a full 90-day supply (#90) at a cost of $87.74    RN called the patient again to let her know the script dated 6/13/2024 was in process and would arrive to her within 7 calendar days per Eyrn at [facility].   The patient verbalized understanding and is okay with this.     RN forwarding to Dr. Wang for review if the 7/13/2024 and 8/12/2024 scripts if it is okay to cancel them  and if she wants to send in a quantity of 90 to [facility] .    Margarita Braun RN on 6/19/2024 at 11:14 AM

## 2024-06-20 ENCOUNTER — OFFICE VISIT (OUTPATIENT)
Dept: PSYCHOLOGY | Facility: CLINIC | Age: 49
End: 2024-06-20
Payer: COMMERCIAL

## 2024-06-20 DIAGNOSIS — F41.1 GAD (GENERALIZED ANXIETY DISORDER): Primary | ICD-10-CM

## 2024-06-20 DIAGNOSIS — F33.41 RECURRENT MAJOR DEPRESSIVE DISORDER, IN PARTIAL REMISSION (H): ICD-10-CM

## 2024-06-20 DIAGNOSIS — F90.9 ATTENTION DEFICIT HYPERACTIVITY DISORDER (ADHD), UNSPECIFIED ADHD TYPE: ICD-10-CM

## 2024-06-20 PROCEDURE — 90837 PSYTX W PT 60 MINUTES: CPT | Performed by: MARRIAGE & FAMILY THERAPIST

## 2024-06-20 ASSESSMENT — ANXIETY QUESTIONNAIRES
7. FEELING AFRAID AS IF SOMETHING AWFUL MIGHT HAPPEN: SEVERAL DAYS
1. FEELING NERVOUS, ANXIOUS, OR ON EDGE: SEVERAL DAYS
5. BEING SO RESTLESS THAT IT IS HARD TO SIT STILL: NOT AT ALL
GAD7 TOTAL SCORE: 4
GAD7 TOTAL SCORE: 4
3. WORRYING TOO MUCH ABOUT DIFFERENT THINGS: NOT AT ALL
8. IF YOU CHECKED OFF ANY PROBLEMS, HOW DIFFICULT HAVE THESE MADE IT FOR YOU TO DO YOUR WORK, TAKE CARE OF THINGS AT HOME, OR GET ALONG WITH OTHER PEOPLE?: NOT DIFFICULT AT ALL
GAD7 TOTAL SCORE: 4
IF YOU CHECKED OFF ANY PROBLEMS ON THIS QUESTIONNAIRE, HOW DIFFICULT HAVE THESE PROBLEMS MADE IT FOR YOU TO DO YOUR WORK, TAKE CARE OF THINGS AT HOME, OR GET ALONG WITH OTHER PEOPLE: NOT DIFFICULT AT ALL
2. NOT BEING ABLE TO STOP OR CONTROL WORRYING: NOT AT ALL
4. TROUBLE RELAXING: SEVERAL DAYS
7. FEELING AFRAID AS IF SOMETHING AWFUL MIGHT HAPPEN: SEVERAL DAYS
6. BECOMING EASILY ANNOYED OR IRRITABLE: SEVERAL DAYS

## 2024-06-20 ASSESSMENT — PATIENT HEALTH QUESTIONNAIRE - PHQ9
10. IF YOU CHECKED OFF ANY PROBLEMS, HOW DIFFICULT HAVE THESE PROBLEMS MADE IT FOR YOU TO DO YOUR WORK, TAKE CARE OF THINGS AT HOME, OR GET ALONG WITH OTHER PEOPLE: SOMEWHAT DIFFICULT
SUM OF ALL RESPONSES TO PHQ QUESTIONS 1-9: 6
SUM OF ALL RESPONSES TO PHQ QUESTIONS 1-9: 6

## 2024-06-20 NOTE — PROGRESS NOTES
[facility]                                               Progress Note    Patient Name: Ellyn Mcgee  Date: 6/20/2024         Service Type: Individual      Session Start Time: 12:42 p.m.  Session End Time: 1:40 p.m.     Session Length: 58 min.    Session #: 1 (with this therapist)    Attendees: Client    Service Modality:  In-person Visit:    DATA  Extended Session (53+ minutes): PROLONGED SERVICE IN THE OUTPATIENT SETTING REQUIRING DIRECT (FACE-TO-FACE) PATIENT CONTACT BEYOND THE USUAL SERVICE:    - Longer session due to limited access to mental health appointments and necessity to address patient's distress / complexity  Interactive Complexity: No  Crisis: No       Progress Since Last Session (Related to Symptoms / Goals / Homework):  Symptoms: Improving decreased PHQ and TEE scores.     Homework: made progress      Episode of Care Goals: Satisfactory progress - ACTION (Actively working towards change); Intervened by reinforcing change plan / affirming steps taken     Current / Ongoing Stressors and Concerns:  Patient reports seeking trauma-focused therapy for herself and her spouse and child(basil) due to significant past trauma experiences.  Patient reported regarding history of marital conflict rooted largely in her spouse's past trauma.  Patient identified wanting to engage in trauma-focused therapy as her desired therapy goal, and to consider her options for trauma-focused therapy.     Treatment Objective(s) Addressed in This Session:  Patient will identify past traumatic events/memories which are causing current distress.     Intervention:  EMDR/Brainspotting:  oriented/reviewed with patient EMDR and Brainspotting therapy processes.  Motivational Interviewing:  reviewed with patient her desired continued and new therapy goals.    Assessments completed prior to visit:  PHQ9:       2/8/2023    12:27 PM 3/7/2023     8:46 AM 4/21/2023    12:05 PM 10/2/2023    11:00 AM 3/21/2024    11:32 AM  5/13/2024    10:11 PM 6/20/2024    12:17 AM   PHQ-9 SCORE   PHQ-9 Total Score MyChart 6 (Mild depression) 8 (Mild depression) 9 (Mild depression) 9 (Mild depression) 12 (Moderate depression) 8 (Mild depression) 6 (Mild depression)   PHQ-9 Total Score 6 8 9 9 12 8 6     GAD7:       10/26/2022    11:29 AM 12/7/2022     9:43 AM 3/7/2023     8:47 AM 4/21/2023    12:12 PM 12/22/2023     1:09 PM 3/21/2024    11:33 AM 6/20/2024    12:18 AM   TEE-7 SCORE   Total Score 6 (mild anxiety) 8 (mild anxiety) 5 (mild anxiety) 8 (mild anxiety) 9 (mild anxiety) 15 (severe anxiety) 4 (minimal anxiety)   Total Score 6 8 5 8 9 15 4     PROMIS 10-Global Health (all questions and answers displayed):       6/14/2022     9:59 AM 7/6/2022     2:02 PM 10/26/2022    11:31 AM 2/6/2023    11:06 AM 8/11/2023    11:06 AM 3/21/2024    11:35 AM 6/20/2024    12:21 AM   PROMIS 10   In general, would you say your health is: Fair Fair Fair Fair Good Fair Fair   In general, would you say your quality of life is: Fair Fair Fair Fair Fair Fair Fair   In general, how would you rate your physical health? Fair Fair Fair Fair Fair Fair Fair   In general, how would you rate your mental health, including your mood and your ability to think? Fair Fair Fair Fair Fair Poor Fair   In general, how would you rate your satisfaction with your social activities and relationships? Poor Fair Fair Fair Fair Fair Poor   In general, please rate how well you carry out your usual social activities and roles Good Fair Fair Fair Good Good Good   To what extent are you able to carry out your everyday physical activities such as walking, climbing stairs, carrying groceries, or moving a chair? Completely Mostly Completely Completely Mostly Completely Mostly   In the past 7 days, how often have you been bothered by emotional problems such as feeling anxious, depressed, or irritable? Often Often Often Sometimes Sometimes Always Often   In the past 7 days, how would you rate your  fatigue on average? Moderate Moderate Moderate Moderate Severe Moderate Moderate   In the past 7 days, how would you rate your pain on average, where 0 means no pain, and 10 means worst imaginable pain? 7 8 3 6 7 6 7   In general, would you say your health is: 2 2 2 2 3 2 2   In general, would you say your quality of life is: 2 2 2 2 2 2 2   In general, how would you rate your physical health? 2 2 2 2 2 2 2   In general, how would you rate your mental health, including your mood and your ability to think? 2 2 2 2 2 1 2   In general, how would you rate your satisfaction with your social activities and relationships? 1 2 2 2 2 2 1   In general, please rate how well you carry out your usual social activities and roles. (This includes activities at home, at work and in your community, and responsibilities as a parent, child, spouse, employee, friend, etc.) 3 2 2 2 3 3 3   To what extent are you able to carry out your everyday physical activities such as walking, climbing stairs, carrying groceries, or moving a chair? 5 4 5 5 4 5 4   In the past 7 days, how often have you been bothered by emotional problems such as feeling anxious, depressed, or irritable? 4 4 4 3 3 5 4   In the past 7 days, how would you rate your fatigue on average? 3 3 3 3 4 3 3   In the past 7 days, how would you rate your pain on average, where 0 means no pain, and 10 means worst imaginable pain? 7 8 3 6 7 6 7   Global Mental Health Score 7 8 8 9    9 9 6 7   Global Physical Health Score 12 11 14 13    13 10 13 11   PROMIS TOTAL - SUBSCORES 19 19 22 22    22 19 19 18     ASSESSMENT: Current Emotional / Mental Status (status of significant symptoms):   Risk status (Self / Other harm or suicidal ideation)   Patient denies current fears or concerns for personal safety.   Patient denies current or recent suicidal ideation or behaviors.    Patient denies current or recent homicidal ideation or behaviors.   Patient denies current or recent self injurious  behavior or ideation.   Patient denies other safety concerns.   Patient reports there has been no change in risk factors since their last session.   Patient reports there has been no change in protective factors since their last session.   Recommended that patient call 911 or go to the local ED should there be a change in any of these risk factors.     Appearance:   Appropriate    Eye Contact:   Good    Psychomotor Behavior: Normal    Attitude:   Cooperative; friendly   Orientation:   All   Speech    Rate / Production: Normal/ Responsive Talkative    Volume:  Normal    Mood:    Anxious  Normal Expansive   Affect:    Appropriate  Expansive    Thought Content:  Clear  Rumination    Thought Form:  Coherent  Goal Directed  Logical    Insight:    Good      Medication Review:   No changes to current psychiatric medication(s)     Medication Compliance:   Yes     Changes in Health Issues:   None     Chemical Use Review:   Substance Use: Chemical use reviewed, no active concerns identified ; no current alcohol use     Tobacco Use: No current tobacco use.      Diagnosis:  1. TEE (generalized anxiety disorder)    2. Recurrent major depressive disorder, in partial remission (H24)    3. Attention deficit hyperactivity disorder (ADHD), unspecified ADHD type        Note: There has been demonstrated improvement in functioning while patient has been engaged in psychotherapy/psychological service- if withdrawn the patient would deteriorate and/or relapse.     Collateral Reports Completed:  N/A..    PLAN: (Patient Tasks / Therapist Tasks / Other)    Patient agreed to work on researching her trauma therapy options (EMDR and Brainspotting).      Jonh Gotti, LMFT 6/20/24    ______________________________________________________________________                                                           [facility]    Individual Treatment Plan    Patient's Name: Ellyn Mcgee  YOB: 1975    Date  "of Creation: 2020  Date Treatment Plan Last Reviewed/Revised: 3/21/2024    DSM5 Diagnoses: 296.32 (F33.1) Major Depressive Disorder, Recurrent Episode, Moderate _ or 300.02 (F41.1) Generalized Anxiety Disorder (patient has previously been diagnosed with ADHD, hyperactive type)    Psychosocial / Contextual Factors: History of anxious attachment stemming from relationship with father (he  8 years ago); is currently pregnant (high-risk due to advanced maternal age); is in couples therapy with  due to frequent arguments and his emotional affair earlier in the year; financial stress; history of ADHD (hyperactive type).    PROMIS (reviewed every 90 days): PROMIS-10 Scores: 19 (see above note)    Referral / Collaboration:  EMDR    Anticipated number of sessions for this episode of care: 100  Anticipated frequency of sessions: Every 2-3 weeks  Anticipated duration of each session: 53+ minutes  Treatment plan will be reviewed in 90 days or when goals have been changed.           Measurable Treatment Goal(s) related to diagnosis / functional impairment(s):  Patient is asking to focus treatment on her relationship with her father and past trauma.    Goal 1: Patient will tell full story of past trauma related to her relationship with her father and will identify how past feelings are impacting current relationships.    Objective #A (Patient Action)    Patient will identify how past feelings are impacting current relationships.  Status: Continued - Date(s): 2024 (partially completed)    Intervention(s)  Therapist will role-play conflict management.    Objective #B  Patient will identify strengths and weaknesses within her family system of origin.  Status: Continued - Date(s): 2024 (partially completed)    Intervention(s)  Therapist will assign homework for patient to create list .      Goal 2: Patient will learn about resilience, trauma responses, and Javon Servin's \"the story I'm making up\" (cognitive " "strategy to manage anxious thoughts).    Objective #A (Patient Action)    Patient will learn about and identify personal trauma responses.    Status: Continued - Date(s): 6/20/2024     Intervention(s)  Therapist will provide educational materials on trauma responses .    Objective #B  Patient will use cognitive strategies identified in therapy to challenge anxious thoughts.  Status: Continued - Date(s): 6/20/2024     Intervention(s)  Therapist will teach about Javon Servin's power of vulnerability and \"the story I'm making up\" .      Goal 3: Patient will learn about protective factors that foster resilience and attachment styles and will identify how her attachment style drives her behavior.     Objective #A (Patient Action)    Status:  Partially completed: 6/20/2024     Patient will learn about protective factors and will identify her own.    Intervention(s)  Therapist will complete with patient in session.    Objective #B  Patient will identify how her attachment style drives her behavior.  Status: Completed - Date: 3/21/2024       Intervention(s)  Therapist will teach emotional regulation skills.      Goal 4:  Patient will successfully process through past trauma defined as reporting 0 Subjective Units of Distress related to trauma on 0-10 scale and clear body scan.   I will know I've met my goal when I am less impacted by my past trauma experience.       Objective #A (Client Action)  Status: New - Date: 6/20/24      Patient will identify past traumatic events/memories which are causing current distress.     Intervention(s)  Therapist will take client's history and facilitate client's identification of targets for trauma-focused therapy.     Objective #B  Patient will complete needed assessment(s) to confirm readiness for trauma-focused therapy.    Status: New - Date: 6/20/24      Intervention(s)  Therapist will administer Dissociative Experiences Scale, Multidimensional Inventory of Dissociation as needed.   "   Objective #C  Status: New - Date: 6/20/24      Patient will engage in (re-)processing all past traumatic event/memory targets.     Intervention(s)   Therapist will complete trauma-focused therapy (re-)processing with client.     Patient agreed to the above plan.      Jonh Gotti, BARBARA  6/20/24

## 2024-06-21 RX ORDER — DEXTROAMPHETAMINE SACCHARATE, AMPHETAMINE ASPARTATE MONOHYDRATE, DEXTROAMPHETAMINE SULFATE AND AMPHETAMINE SULFATE 3.75; 3.75; 3.75; 3.75 MG/1; MG/1; MG/1; MG/1
15 CAPSULE, EXTENDED RELEASE ORAL DAILY
Qty: 90 CAPSULE | Refills: 0 | Status: SHIPPED | OUTPATIENT
Start: 2024-07-13

## 2024-06-21 RX ORDER — DEXTROAMPHETAMINE SACCHARATE, AMPHETAMINE ASPARTATE MONOHYDRATE, DEXTROAMPHETAMINE SULFATE AND AMPHETAMINE SULFATE 6.25; 6.25; 6.25; 6.25 MG/1; MG/1; MG/1; MG/1
25 CAPSULE, EXTENDED RELEASE ORAL DAILY
Qty: 90 CAPSULE | Refills: 0 | Status: SHIPPED | OUTPATIENT
Start: 2024-07-13 | End: 2024-08-13

## 2024-07-09 ENCOUNTER — VIRTUAL VISIT (OUTPATIENT)
Dept: PSYCHOLOGY | Facility: CLINIC | Age: 49
End: 2024-07-09
Payer: COMMERCIAL

## 2024-07-09 DIAGNOSIS — F41.1 GAD (GENERALIZED ANXIETY DISORDER): Primary | ICD-10-CM

## 2024-07-09 DIAGNOSIS — F90.9 ATTENTION DEFICIT HYPERACTIVITY DISORDER (ADHD), UNSPECIFIED ADHD TYPE: ICD-10-CM

## 2024-07-09 DIAGNOSIS — F33.41 RECURRENT MAJOR DEPRESSIVE DISORDER, IN PARTIAL REMISSION (H): ICD-10-CM

## 2024-07-09 PROCEDURE — 90834 PSYTX W PT 45 MINUTES: CPT | Mod: 95 | Performed by: MARRIAGE & FAMILY THERAPIST

## 2024-07-09 ASSESSMENT — ANXIETY QUESTIONNAIRES
3. WORRYING TOO MUCH ABOUT DIFFERENT THINGS: SEVERAL DAYS
GAD7 TOTAL SCORE: 12
2. NOT BEING ABLE TO STOP OR CONTROL WORRYING: SEVERAL DAYS
GAD7 TOTAL SCORE: 12
1. FEELING NERVOUS, ANXIOUS, OR ON EDGE: NEARLY EVERY DAY
7. FEELING AFRAID AS IF SOMETHING AWFUL MIGHT HAPPEN: MORE THAN HALF THE DAYS
GAD7 TOTAL SCORE: 12
8. IF YOU CHECKED OFF ANY PROBLEMS, HOW DIFFICULT HAVE THESE MADE IT FOR YOU TO DO YOUR WORK, TAKE CARE OF THINGS AT HOME, OR GET ALONG WITH OTHER PEOPLE?: SOMEWHAT DIFFICULT
7. FEELING AFRAID AS IF SOMETHING AWFUL MIGHT HAPPEN: MORE THAN HALF THE DAYS
5. BEING SO RESTLESS THAT IT IS HARD TO SIT STILL: SEVERAL DAYS
4. TROUBLE RELAXING: MORE THAN HALF THE DAYS
6. BECOMING EASILY ANNOYED OR IRRITABLE: MORE THAN HALF THE DAYS
IF YOU CHECKED OFF ANY PROBLEMS ON THIS QUESTIONNAIRE, HOW DIFFICULT HAVE THESE PROBLEMS MADE IT FOR YOU TO DO YOUR WORK, TAKE CARE OF THINGS AT HOME, OR GET ALONG WITH OTHER PEOPLE: SOMEWHAT DIFFICULT

## 2024-07-09 NOTE — PROGRESS NOTES
[facility]                                               Progress Note    Patient Name: Ellyn Mcgee  Date: 2024         Service Type: Individual      Session Start Time: 2:04 p.m.  Session End Time: 2:56 p.m.     Session Length: 52 min.    Session #: 2 (with this therapist)    Attendees: Client    Service Modality:  Video Visit:    Telemedicine Visit: The patient's condition can be safely assessed and treated via synchronous audio and visual telemedicine encounter.      Reason for Telemedicine Visit: Services only offered telehealth    Originating Site (Patient Location): Patient's home      Distant Location (provider location):  Off-site    Consent:  The patient/guardian has verbally consented to: the potential risks and benefits of telemedicine (video visit) versus in person care; bill my insurance or make self-payment for services provided; and responsibility for payment of non-covered services.     Mode of Communication:  Video Conference via [employer]    As the provider I attest to compliance with applicable laws and regulations related to telemedicine.     DATA  Extended Session (53+ minutes): No  Interactive Complexity: No  Crisis: No       Progress Since Last Session (Related to Symptoms / Goals / Homework):  Symptoms: Worsening increased PHQ and TEE scores.     Homework: made progress      Episode of Care Goals: Minimal progress - ACTION (Actively working towards change); Intervened by reinforcing change plan / affirming steps taken     Current / Ongoing Stressors and Concerns:  Patient identified increased anxiety and depressive symptoms related to current stressors.  Patient reported regarding recent experiences people with whom she is connected going missing, the first of which was found .  Patient reported taking off work today to engage in the search for the second missing child.     Treatment Objective(s) Addressed in This Session:  Patient will use cognitive  strategies identified in therapy to challenge anxious thoughts.  Patient will identify past traumatic events/memories which are causing current distress.     Intervention:  CBT:  reviewed with patient focusing on being effective in her responses to stressors, focusing on doing what she can/control/what the situation calls for.  EMDR/Brainspotting:  reviewed with patient continuing to research EMDR and Brainspotting therapies and journaling to identify her desired targets for trauma therapy and to externalize thoughts/feeling for relief.    Assessments completed prior to visit:  PHQ9:       3/7/2023     8:46 AM 4/21/2023    12:05 PM 10/2/2023    11:00 AM 3/21/2024    11:32 AM 5/13/2024    10:11 PM 6/20/2024    12:17 AM 7/9/2024     1:36 PM   PHQ-9 SCORE   PHQ-9 Total Score MyChart 8 (Mild depression) 9 (Mild depression) 9 (Mild depression) 12 (Moderate depression) 8 (Mild depression) 6 (Mild depression) 8 (Mild depression)   PHQ-9 Total Score 8 9 9 12 8 6 8     GAD7:       12/7/2022     9:43 AM 3/7/2023     8:47 AM 4/21/2023    12:12 PM 12/22/2023     1:09 PM 3/21/2024    11:33 AM 6/20/2024    12:18 AM 7/9/2024     1:37 PM   TEE-7 SCORE   Total Score 8 (mild anxiety) 5 (mild anxiety) 8 (mild anxiety) 9 (mild anxiety) 15 (severe anxiety) 4 (minimal anxiety) 12 (moderate anxiety)   Total Score 8 5 8 9 15 4 12     PROMIS 10-Global Health (all questions and answers displayed):       6/14/2022     9:59 AM 7/6/2022     2:02 PM 10/26/2022    11:31 AM 2/6/2023    11:06 AM 8/11/2023    11:06 AM 3/21/2024    11:35 AM 6/20/2024    12:21 AM   PROMIS 10   In general, would you say your health is: Fair Fair Fair Fair Good Fair Fair   In general, would you say your quality of life is: Fair Fair Fair Fair Fair Fair Fair   In general, how would you rate your physical health? Fair Fair Fair Fair Fair Fair Fair   In general, how would you rate your mental health, including your mood and your ability to think? Fair Fair Fair Fair Fair  Poor Fair   In general, how would you rate your satisfaction with your social activities and relationships? Poor Fair Fair Fair Fair Fair Poor   In general, please rate how well you carry out your usual social activities and roles Good Fair Fair Fair Good Good Good   To what extent are you able to carry out your everyday physical activities such as walking, climbing stairs, carrying groceries, or moving a chair? Completely Mostly Completely Completely Mostly Completely Mostly   In the past 7 days, how often have you been bothered by emotional problems such as feeling anxious, depressed, or irritable? Often Often Often Sometimes Sometimes Always Often   In the past 7 days, how would you rate your fatigue on average? Moderate Moderate Moderate Moderate Severe Moderate Moderate   In the past 7 days, how would you rate your pain on average, where 0 means no pain, and 10 means worst imaginable pain? 7 8 3 6 7 6 7   In general, would you say your health is: 2 2 2 2 3 2 2   In general, would you say your quality of life is: 2 2 2 2 2 2 2   In general, how would you rate your physical health? 2 2 2 2 2 2 2   In general, how would you rate your mental health, including your mood and your ability to think? 2 2 2 2 2 1 2   In general, how would you rate your satisfaction with your social activities and relationships? 1 2 2 2 2 2 1   In general, please rate how well you carry out your usual social activities and roles. (This includes activities at home, at work and in your community, and responsibilities as a parent, child, spouse, employee, friend, etc.) 3 2 2 2 3 3 3   To what extent are you able to carry out your everyday physical activities such as walking, climbing stairs, carrying groceries, or moving a chair? 5 4 5 5 4 5 4   In the past 7 days, how often have you been bothered by emotional problems such as feeling anxious, depressed, or irritable? 4 4 4 3 3 5 4   In the past 7 days, how would you rate your fatigue on  average? 3 3 3 3 4 3 3   In the past 7 days, how would you rate your pain on average, where 0 means no pain, and 10 means worst imaginable pain? 7 8 3 6 7 6 7   Global Mental Health Score 7 8 8 9    9 9 6 7   Global Physical Health Score 12 11 14 13    13 10 13 11   PROMIS TOTAL - SUBSCORES 19 19 22 22    22 19 19 18     ASSESSMENT: Current Emotional / Mental Status (status of significant symptoms):   Risk status (Self / Other harm or suicidal ideation)   Patient denies current fears or concerns for personal safety.   Patient denies current or recent suicidal ideation or behaviors.    Patient denies current or recent homicidal ideation or behaviors.   Patient denies current or recent self injurious behavior or ideation.   Patient denies other safety concerns.   Patient reports there has been no change in risk factors since their last session.   Patient reports there has been no change in protective factors since their last session.   Recommended that patient call 911 or go to the local ED should there be a change in any of these risk factors.     Appearance:   Appropriate    Eye Contact:   Good    Psychomotor Behavior: Normal    Attitude:   Cooperative; friendly   Orientation:   All   Speech    Rate / Production: Normal/ Responsive Emotional Talkative    Volume:  Normal    Mood:    Anxious  Depressed  Normal Expansive   Affect:    Appropriate  Expansive  Worrisome    Thought Content:  Clear  Rumination    Thought Form:  Coherent  Goal Directed  Logical    Insight:    Good  and Intellectual Insight     Medication Review:   No changes to current psychiatric medication(s)     Medication Compliance:   Yes     Changes in Health Issues:   None     Chemical Use Review:   Substance Use: Chemical use reviewed, no active concerns identified ; no current alcohol use     Tobacco Use: No current tobacco use.      Diagnosis:  1. TEE (generalized anxiety disorder)    2. Recurrent major depressive disorder, in partial remission  (H24)    3. Attention deficit hyperactivity disorder (ADHD), unspecified ADHD type        Note: There has been demonstrated improvement in functioning while patient has been engaged in psychotherapy/psychological service- if withdrawn the patient would deteriorate and/or relapse.     Collateral Reports Completed:  N/A..    PLAN: (Patient Tasks / Therapist Tasks / Other)    Patient agreed to work on focusing on being effective in her responses to stressors, focusing on doing what she can/control/what the situation calls for, continuing to research EMDR and Brainspotting therapies and journaling to identify her desired targets for trauma therapy and to externalize thoughts/feeling for relief.      Jonh Gotti, LMFT 24    ______________________________________________________________________                                                           [facility]    Individual Treatment Plan    Patient's Name: Ellyn Mcgee  YOB: 1975    Date of Creation: 2020  Date Treatment Plan Last Reviewed/Revised: 3/21/2024    DSM5 Diagnoses: 296.32 (F33.1) Major Depressive Disorder, Recurrent Episode, Moderate _ or 300.02 (F41.1) Generalized Anxiety Disorder (patient has previously been diagnosed with ADHD, hyperactive type)    Psychosocial / Contextual Factors: History of anxious attachment stemming from relationship with father (he  8 years ago); is currently pregnant (high-risk due to advanced maternal age); is in couples therapy with  due to frequent arguments and his emotional affair earlier in the year; financial stress; history of ADHD (hyperactive type).    PROMIS (reviewed every 90 days): PROMIS-10 Scores: 19 (see above note)    Referral / Collaboration:  EMDR    Anticipated number of sessions for this episode of care: 100  Anticipated frequency of sessions: Every 2-3 weeks  Anticipated duration of each session: 53+ minutes  Treatment plan will be reviewed in  "90 days or when goals have been changed.           Measurable Treatment Goal(s) related to diagnosis / functional impairment(s):  Patient is asking to focus treatment on her relationship with her father and past trauma.    Goal 1: Patient will tell full story of past trauma related to her relationship with her father and will identify how past feelings are impacting current relationships.    Objective #A (Patient Action)    Patient will identify how past feelings are impacting current relationships.  Status: Continued - Date(s): 6/20/2024 (partially completed)    Intervention(s)  Therapist will role-play conflict management.    Objective #B  Patient will identify strengths and weaknesses within her family system of origin.  Status: Continued - Date(s): 6/20/2024 (partially completed)    Intervention(s)  Therapist will assign homework for patient to create list .      Goal 2: Patient will learn about resilience, trauma responses, and Javon Servin's \"the story I'm making up\" (cognitive strategy to manage anxious thoughts).    Objective #A (Patient Action)    Patient will learn about and identify personal trauma responses.    Status: Continued - Date(s): 6/20/2024     Intervention(s)  Therapist will provide educational materials on trauma responses .    Objective #B  Patient will use cognitive strategies identified in therapy to challenge anxious thoughts.  Status: Continued - Date(s): 6/20/2024     Intervention(s)  Therapist will teach about Javon Servin's power of vulnerability and \"the story I'm making up\" .      Goal 3: Patient will learn about protective factors that foster resilience and attachment styles and will identify how her attachment style drives her behavior.     Objective #A (Patient Action)    Status:  Partially completed: 6/20/2024     Patient will learn about protective factors and will identify her own.    Intervention(s)  Therapist will complete with patient in session.    Objective #B  Patient will " identify how her attachment style drives her behavior.  Status: Completed - Date: 3/21/2024       Intervention(s)  Therapist will teach emotional regulation skills.      Goal 4:  Patient will successfully process through past trauma defined as reporting 0 Subjective Units of Distress related to trauma on 0-10 scale and clear body scan.   I will know I've met my goal when I am less impacted by my past trauma experience.       Objective #A (Client Action)  Status: New - Date: 6/20/24      Patient will identify past traumatic events/memories which are causing current distress.     Intervention(s)  Therapist will take client's history and facilitate client's identification of targets for trauma-focused therapy.     Objective #B  Patient will complete needed assessment(s) to confirm readiness for trauma-focused therapy.    Status: New - Date: 6/20/24      Intervention(s)  Therapist will administer Dissociative Experiences Scale, Multidimensional Inventory of Dissociation as needed.     Objective #C  Status: New - Date: 6/20/24      Patient will engage in (re-)processing all past traumatic event/memory targets.     Intervention(s)   Therapist will complete trauma-focused therapy (re-)processing with client.     Patient agreed to the above plan.      Jonh Gotti, BARBARA  6/20/24

## 2024-08-02 ENCOUNTER — MYC MEDICAL ADVICE (OUTPATIENT)
Dept: FAMILY MEDICINE | Facility: CLINIC | Age: 49
End: 2024-08-02
Payer: COMMERCIAL

## 2024-08-02 ENCOUNTER — TELEPHONE (OUTPATIENT)
Dept: GASTROENTEROLOGY | Facility: CLINIC | Age: 49
End: 2024-08-02
Payer: COMMERCIAL

## 2024-08-02 NOTE — TELEPHONE ENCOUNTER
"Endoscopy Scheduling Screen    Have you had a positive Covid test in the last 14 days?  No    What is your communication preference for Instructions and/or Bowel Prep?   MyChart    What insurance is in the chart?  Other:  [employer]    Ordering/Referring Provider:     Dorothy Guaman, DO in [facility]      (If ordering provider performs procedure, schedule with ordering provider unless otherwise instructed. )    BMI: Estimated body mass index is 31.04 kg/m  as calculated from the following:    Height as of 5/14/24: 1.613 m (5' 3.5\").    Weight as of 6/13/24: 80.7 kg (178 lb).     Sedation Ordered  moderate sedation.   If patient BMI > 50 do not schedule in [facility].    If patient BMI > 45 do not schedule at [facility].    Are you taking methadone or Suboxone?  No    Have you had difficulties, pain, or discomfort during past endoscopy procedures?  No    Are you taking any prescription medications for pain 3 or more times per week?   NO, No RN review required.    Do you have a history of malignant hyperthermia?  No    (Females) Are you currently pregnant?   No     Have you been diagnosed or told you have pulmonary hypertension?   No    Do you have an LVAD?  No    Have you been told you have moderate to severe sleep apnea?  No    Have you been told you have COPD, asthma, or any other lung disease?  No    Do you have any heart conditions?  No     Have you ever had or are you waiting for an organ transplant?  No. Continue scheduling, no site restrictions.    Have you had a stroke or transient ischemic attack (TIA aka \"mini stroke\" in the last 6 months?   No    Have you been diagnosed with or been told you have cirrhosis of the liver?   No    Are you currently on dialysis?   No    Do you need assistance transferring?   No    BMI: Estimated body mass index is 31.04 kg/m  as calculated from the following:    Height as of 5/14/24: 1.613 m (5' 3.5\").    Weight as of 6/13/24: 80.7 kg (178 lb).     Is patients BMI > 40 and scheduling " location [facility]?  No    Do you take an injectable medication for weight loss or diabetes (excluding insulin)?  No    Do you take the medication Naltrexone?  No    Do you take blood thinners?  No       Prep   Are you currently on dialysis or do you have chronic kidney disease?  No    Do you have a diagnosis of diabetes?  No    Do you have a diagnosis of cystic fibrosis (CF)?  No    On a regular basis do you go 3 -5 days between bowel movements?  No    BMI > 40?  No    Preferred Pharmacy:    [id number] [place] [id number] [address] AT [facility]  [address] [place] [address]  Phone: [phone] Fax: [phone]      Final Scheduling Details     Procedure scheduled  Colonoscopy    Surgeon:  Angely     Date of procedure:  11/ 18/2024    Pre-OP / PAC:   No - Not required for this site.    Location  [facility] - Patient preference.    Sedation   MAC/Deep Sedation  Per Location      Patient Reminders:   You will receive a call from a Nurse to review instructions and health history.  This assessment must be completed prior to your procedure.  Failure to complete the Nurse assessment may result in the procedure being cancelled.      On the day of your procedure, please designate an adult(s) who can drive you home stay with you for the next 24 hours. The medicines used in the exam will make you sleepy. You will not be able to drive.      You cannot take public transportation, ride share services, or non-medical taxi service without a responsible caregiver.  Medical transport services are allowed with the requirement that a responsible caregiver will receive you at your destination.  We require that drivers and caregivers are confirmed prior to your procedure.   Clindamycin Pregnancy And Lactation Text: This medication can be used in pregnancy if certain situations. Clindamycin is also present in breast milk.

## 2024-08-07 ENCOUNTER — VIRTUAL VISIT (OUTPATIENT)
Dept: PSYCHOLOGY | Facility: CLINIC | Age: 49
End: 2024-08-07
Payer: COMMERCIAL

## 2024-08-07 DIAGNOSIS — F33.41 RECURRENT MAJOR DEPRESSIVE DISORDER, IN PARTIAL REMISSION (H): ICD-10-CM

## 2024-08-07 DIAGNOSIS — F41.1 GAD (GENERALIZED ANXIETY DISORDER): Primary | ICD-10-CM

## 2024-08-07 DIAGNOSIS — F90.9 ATTENTION DEFICIT HYPERACTIVITY DISORDER (ADHD), UNSPECIFIED ADHD TYPE: ICD-10-CM

## 2024-08-07 PROCEDURE — 90837 PSYTX W PT 60 MINUTES: CPT | Mod: 95 | Performed by: MARRIAGE & FAMILY THERAPIST

## 2024-08-07 ASSESSMENT — PATIENT HEALTH QUESTIONNAIRE - PHQ9
SUM OF ALL RESPONSES TO PHQ QUESTIONS 1-9: 8
10. IF YOU CHECKED OFF ANY PROBLEMS, HOW DIFFICULT HAVE THESE PROBLEMS MADE IT FOR YOU TO DO YOUR WORK, TAKE CARE OF THINGS AT HOME, OR GET ALONG WITH OTHER PEOPLE: SOMEWHAT DIFFICULT
SUM OF ALL RESPONSES TO PHQ QUESTIONS 1-9: 8

## 2024-08-07 ASSESSMENT — ANXIETY QUESTIONNAIRES
7. FEELING AFRAID AS IF SOMETHING AWFUL MIGHT HAPPEN: NOT AT ALL
7. FEELING AFRAID AS IF SOMETHING AWFUL MIGHT HAPPEN: NOT AT ALL
2. NOT BEING ABLE TO STOP OR CONTROL WORRYING: SEVERAL DAYS
IF YOU CHECKED OFF ANY PROBLEMS ON THIS QUESTIONNAIRE, HOW DIFFICULT HAVE THESE PROBLEMS MADE IT FOR YOU TO DO YOUR WORK, TAKE CARE OF THINGS AT HOME, OR GET ALONG WITH OTHER PEOPLE: SOMEWHAT DIFFICULT
5. BEING SO RESTLESS THAT IT IS HARD TO SIT STILL: SEVERAL DAYS
3. WORRYING TOO MUCH ABOUT DIFFERENT THINGS: SEVERAL DAYS
4. TROUBLE RELAXING: SEVERAL DAYS
GAD7 TOTAL SCORE: 8
1. FEELING NERVOUS, ANXIOUS, OR ON EDGE: MORE THAN HALF THE DAYS
8. IF YOU CHECKED OFF ANY PROBLEMS, HOW DIFFICULT HAVE THESE MADE IT FOR YOU TO DO YOUR WORK, TAKE CARE OF THINGS AT HOME, OR GET ALONG WITH OTHER PEOPLE?: SOMEWHAT DIFFICULT
GAD7 TOTAL SCORE: 8
GAD7 TOTAL SCORE: 8
6. BECOMING EASILY ANNOYED OR IRRITABLE: MORE THAN HALF THE DAYS

## 2024-08-07 NOTE — PROGRESS NOTES
[facility]                                               Progress Note    Patient Name: Ellyn Mcgee  Date: 8/7/2024         Service Type: Individual      Session Start Time: 12:00 p.m.  Session End Time: 12:54 p.m.     Session Length: 54 min.    Extended Session (53+ minutes): PROLONGED SERVICE IN THE OUTPATIENT SETTING REQUIRING DIRECT (FACE-TO-FACE) PATIENT CONTACT BEYOND THE USUAL SERVICE:    - Patient's presenting concerns require more intensive intervention than could be completed within the usual service  Interactive Complexity: No  Crisis: No     Session #: 3 (with this therapist)    Attendees: Client    Service Modality:  Video Visit:    Telemedicine Visit: The patient's condition can be safely assessed and treated via synchronous audio and visual telemedicine encounter.      Reason for Telemedicine Visit: Services only offered telehealth    Originating Site (Patient Location): Patient's home      Distant Location (provider location):  Off-site    Consent:  The patient/guardian has verbally consented to: the potential risks and benefits of telemedicine (video visit) versus in person care; bill my insurance or make self-payment for services provided; and responsibility for payment of non-covered services.     Mode of Communication:  Video Conference via [employer]    As the provider I attest to compliance with applicable laws and regulations related to telemedicine.     DATA  Extended Session (53+ minutes): No  Interactive Complexity: No  Crisis: No       Progress Since Last Session (Related to Symptoms / Goals / Homework):  Symptoms: Improving decreased TEE score.     Homework: Partially completed      Episode of Care Goals: Satisfactory progress - ACTION (Actively working towards change); Intervened by reinforcing change plan / affirming steps taken     Current / Ongoing Stressors and Concerns:  Patient identified ongoing though decreased anxiety and depressive symptoms related to  "current stressors.  Patient reported regarding missing child being found and playing a part in this, about which she feels good.  Patient reported identifying theme of \"enough\" coming up for her when considering targets, and opting to start with Brainspotting therapy.  Patient identified her spouse's lack of time consciousness and feeling disrespected and lack of trust due to this.     Treatment Objective(s) Addressed in This Session:  Patient will use cognitive strategies identified in therapy to challenge anxious thoughts.  Patient will identify past traumatic events/memories which are causing current distress.     Intervention:  CBT:  reviewed with patient considering what she needs to rebuild trust.  EMDR/Brainspotting:  re-reviewed with patient continuing to research EMDR and Brainspotting therapies and journaling to identify her desired targets for trauma therapy and to externalize thoughts/feeling for relief, focusing on her identified \"enough\" target.    Assessments completed prior to visit:  PHQ9:       4/21/2023    12:05 PM 10/2/2023    11:00 AM 3/21/2024    11:32 AM 5/13/2024    10:11 PM 6/20/2024    12:17 AM 7/9/2024     1:36 PM 8/7/2024    12:01 PM   PHQ-9 SCORE   PHQ-9 Total Score MyChart 9 (Mild depression) 9 (Mild depression) 12 (Moderate depression) 8 (Mild depression) 6 (Mild depression) 8 (Mild depression) 8 (Mild depression)   PHQ-9 Total Score 9 9 12 8 6 8 8    8     GAD7:       3/7/2023     8:47 AM 4/21/2023    12:12 PM 12/22/2023     1:09 PM 3/21/2024    11:33 AM 6/20/2024    12:18 AM 7/9/2024     1:37 PM 8/7/2024    12:02 PM   TEE-7 SCORE   Total Score 5 (mild anxiety) 8 (mild anxiety) 9 (mild anxiety) 15 (severe anxiety) 4 (minimal anxiety) 12 (moderate anxiety) 8 (mild anxiety)   Total Score 5 8 9 15 4 12 8    8     PROMIS 10-Global Health (all questions and answers displayed):       6/14/2022     9:59 AM 7/6/2022     2:02 PM 10/26/2022    11:31 AM 2/6/2023    11:06 AM 8/11/2023    11:06 AM " 3/21/2024    11:35 AM 6/20/2024    12:21 AM   PROMIS 10   In general, would you say your health is: Fair Fair Fair Fair Good Fair Fair   In general, would you say your quality of life is: Fair Fair Fair Fair Fair Fair Fair   In general, how would you rate your physical health? Fair Fair Fair Fair Fair Fair Fair   In general, how would you rate your mental health, including your mood and your ability to think? Fair Fair Fair Fair Fair Poor Fair   In general, how would you rate your satisfaction with your social activities and relationships? Poor Fair Fair Fair Fair Fair Poor   In general, please rate how well you carry out your usual social activities and roles Good Fair Fair Fair Good Good Good   To what extent are you able to carry out your everyday physical activities such as walking, climbing stairs, carrying groceries, or moving a chair? Completely Mostly Completely Completely Mostly Completely Mostly   In the past 7 days, how often have you been bothered by emotional problems such as feeling anxious, depressed, or irritable? Often Often Often Sometimes Sometimes Always Often   In the past 7 days, how would you rate your fatigue on average? Moderate Moderate Moderate Moderate Severe Moderate Moderate   In the past 7 days, how would you rate your pain on average, where 0 means no pain, and 10 means worst imaginable pain? 7 8 3 6 7 6 7   In general, would you say your health is: 2 2 2 2 3 2 2   In general, would you say your quality of life is: 2 2 2 2 2 2 2   In general, how would you rate your physical health? 2 2 2 2 2 2 2   In general, how would you rate your mental health, including your mood and your ability to think? 2 2 2 2 2 1 2   In general, how would you rate your satisfaction with your social activities and relationships? 1 2 2 2 2 2 1   In general, please rate how well you carry out your usual social activities and roles. (This includes activities at home, at work and in your community, and  responsibilities as a parent, child, spouse, employee, friend, etc.) 3 2 2 2 3 3 3   To what extent are you able to carry out your everyday physical activities such as walking, climbing stairs, carrying groceries, or moving a chair? 5 4 5 5 4 5 4   In the past 7 days, how often have you been bothered by emotional problems such as feeling anxious, depressed, or irritable? 4 4 4 3 3 5 4   In the past 7 days, how would you rate your fatigue on average? 3 3 3 3 4 3 3   In the past 7 days, how would you rate your pain on average, where 0 means no pain, and 10 means worst imaginable pain? 7 8 3 6 7 6 7   Global Mental Health Score 7 8 8 9    9 9 6 7   Global Physical Health Score 12 11 14 13    13 10 13 11   PROMIS TOTAL - SUBSCORES 19 19 22 22    22 19 19 18     ASSESSMENT: Current Emotional / Mental Status (status of significant symptoms):   Risk status (Self / Other harm or suicidal ideation)   Patient denies current fears or concerns for personal safety.   Patient denies current or recent suicidal ideation or behaviors.    Patient denies current or recent homicidal ideation or behaviors.   Patient denies current or recent self injurious behavior or ideation.   Patient denies other safety concerns.   Patient reports there has been no change in risk factors since their last session.   Patient reports there has been no change in protective factors since their last session.   Recommended that patient call 911 or go to the local ED should there be a change in any of these risk factors.     Appearance:   Appropriate    Eye Contact:   Good    Psychomotor Behavior: Normal    Attitude:   Cooperative; friendly   Orientation:   All   Speech    Rate / Production: Normal/ Responsive Talkative    Volume:  Normal    Mood:    Anxious  Depressed  Normal Expansive   Affect:    Appropriate  Expansive  Worrisome    Thought Content:  Clear  Rumination    Thought Form:  Coherent  Goal Directed  Logical    Insight:    Good  and Intellectual  "Insight     Medication Review:   No changes to current psychiatric medication(s)     Medication Compliance:   Yes     Changes in Health Issues:   None     Chemical Use Review:   Substance Use: Chemical use reviewed, no active concerns identified ; no current alcohol use     Tobacco Use: No current tobacco use.      Diagnosis:  1. TEE (generalized anxiety disorder)    2. Recurrent major depressive disorder, in partial remission (H24)    3. Attention deficit hyperactivity disorder (ADHD), unspecified ADHD type          Note: There has been demonstrated improvement in functioning while patient has been engaged in psychotherapy/psychological service- if withdrawn the patient would deteriorate and/or relapse.     Collateral Reports Completed:  N/A..    PLAN: (Patient Tasks / Therapist Tasks / Other)    Patient agreed to work on considering what she needs to rebuild trust and continuing to identify her desired targets for Brainspotting therapy and to externalize thoughts/feeling for relief, focusing on her identified \"enough\" target.      Jonh Gotti, [facility]    ______________________________________________________________________                                                           [facility]    Individual Treatment Plan    Patient's Name: Ellyn Mcgee  YOB: 1975    Date of Creation: 2020  Date Treatment Plan Last Reviewed/Revised: 2024, next due 24.    DSM5 Diagnoses: 296.32 (F33.1) Major Depressive Disorder, Recurrent Episode, Moderate _ or 300.02 (F41.1) Generalized Anxiety Disorder (patient has previously been diagnosed with ADHD, hyperactive type)    Psychosocial / Contextual Factors: History of anxious attachment stemming from relationship with father (he  8 years ago); is currently pregnant (high-risk due to advanced maternal age); is in couples therapy with  due to frequent arguments and his emotional affair earlier in the year; " "financial stress; history of ADHD (hyperactive type).    PROMIS (reviewed every 90 days): PROMIS-10 Scores: 19 (see above note)    Referral / Collaboration:  EMDR    Anticipated number of sessions for this episode of care: 100  Anticipated frequency of sessions: Every 2-3 weeks  Anticipated duration of each session: 53+ minutes  Treatment plan will be reviewed in 90 days or when goals have been changed.           Measurable Treatment Goal(s) related to diagnosis / functional impairment(s):  Patient is asking to focus treatment on her relationship with her father and past trauma.    Goal 1: Patient will tell full story of past trauma related to her relationship with her father and will identify how past feelings are impacting current relationships.    Objective #A (Patient Action)    Patient will identify how past feelings are impacting current relationships.  Status: Continued - Date(s): 6/20/2024 (partially completed)    Intervention(s)  Therapist will role-play conflict management.    Objective #B  Patient will identify strengths and weaknesses within her family system of origin.  Status: Continued - Date(s): 6/20/2024 (partially completed)    Intervention(s)  Therapist will assign homework for patient to create list .      Goal 2: Patient will learn about resilience, trauma responses, and Javon Servin's \"the story I'm making up\" (cognitive strategy to manage anxious thoughts).    Objective #A (Patient Action)    Patient will learn about and identify personal trauma responses.    Status: Continued - Date(s): 6/20/2024     Intervention(s)  Therapist will provide educational materials on trauma responses .    Objective #B  Patient will use cognitive strategies identified in therapy to challenge anxious thoughts.  Status: Continued - Date(s): 6/20/2024     Intervention(s)  Therapist will teach about Javon Servin's power of vulnerability and \"the story I'm making up\" .      Goal 3: Patient will learn about protective " factors that foster resilience and attachment styles and will identify how her attachment style drives her behavior.     Objective #A (Patient Action)    Status:  Partially completed: 6/20/2024     Patient will learn about protective factors and will identify her own.    Intervention(s)  Therapist will complete with patient in session.    Objective #B  Patient will identify how her attachment style drives her behavior.  Status: Completed - Date: 3/21/2024       Intervention(s)  Therapist will teach emotional regulation skills.      Goal 4:  Patient will successfully process through past trauma defined as reporting 0 Subjective Units of Distress related to trauma on 0-10 scale and clear body scan.   I will know I've met my goal when I am less impacted by my past trauma experience.       Objective #A (Client Action)  Status: New - Date: 6/20/24      Patient will identify past traumatic events/memories which are causing current distress.     Intervention(s)  Therapist will take client's history and facilitate client's identification of targets for trauma-focused therapy.     Objective #B  Patient will complete needed assessment(s) to confirm readiness for trauma-focused therapy.    Status: New - Date: 6/20/24      Intervention(s)  Therapist will administer Dissociative Experiences Scale, Multidimensional Inventory of Dissociation as needed.     Objective #C  Status: New - Date: 6/20/24      Patient will engage in (re-)processing all past traumatic event/memory targets.     Intervention(s)   Therapist will complete trauma-focused therapy (re-)processing with client.     Patient agreed to the above plan.      Jonh Gotti, BARBARA  6/20/24

## 2024-08-11 ENCOUNTER — MYC MEDICAL ADVICE (OUTPATIENT)
Dept: PSYCHIATRY | Facility: CLINIC | Age: 49
End: 2024-08-11
Payer: COMMERCIAL

## 2024-08-11 DIAGNOSIS — F90.0 ATTENTION DEFICIT HYPERACTIVITY DISORDER (ADHD), PREDOMINANTLY INATTENTIVE TYPE: ICD-10-CM

## 2024-08-13 RX ORDER — DEXTROAMPHETAMINE SACCHARATE, AMPHETAMINE ASPARTATE MONOHYDRATE, DEXTROAMPHETAMINE SULFATE AND AMPHETAMINE SULFATE 6.25; 6.25; 6.25; 6.25 MG/1; MG/1; MG/1; MG/1
25 CAPSULE, EXTENDED RELEASE ORAL DAILY
Qty: 90 CAPSULE | Refills: 0 | Status: SHIPPED | OUTPATIENT
Start: 2024-08-13

## 2024-08-13 RX ORDER — DEXTROAMPHETAMINE SACCHARATE, AMPHETAMINE ASPARTATE MONOHYDRATE, DEXTROAMPHETAMINE SULFATE AND AMPHETAMINE SULFATE 6.25; 6.25; 6.25; 6.25 MG/1; MG/1; MG/1; MG/1
25 CAPSULE, EXTENDED RELEASE ORAL DAILY
Qty: 90 CAPSULE | Refills: 0 | OUTPATIENT
Start: 2024-08-13

## 2024-08-13 NOTE — TELEPHONE ENCOUNTER
Date of Last Office Visit: 6/13/24  Date of Next Office Visit:  10/9/24  No shows since last visit: No  More than one patient-initiated cancellation (with reschedule) since last seen in clinic? No    []Medication refilled per  Medication Refill in Ambulatory Care  policy.  [x]Medication unable to be refilled by RN due to criteria not met as indicated below:    []Eligibility: has not had a provider visit within last 6 months   []Supervision: no future appointment; < 7 days before next appointment   []Compliance: no shows; cancellations; lapse in therapy   []Verification: order discrepancy; may need modification...   [x] > 30-day supply request   []Advanced refill request: > 7 days before refill date   [x]Controlled medication   []Medication not included in policy   []Review: new med; med adjusted ? 30 days; safety alert; requires lab monitoring...   []Scope of Practice: refill request processed by LPN/MA   []Other:      Medication(s) requested:     -  amphetamine-dextroamphetamine (ADDERALL XR) 25 MG 24 hr capsule  Date last ordered: 7/13/24  Qty: 90  Refills: 0  Appropriate for refill? Provider to review.      Any Controlled Substance(s)? Yes   [place]  checked? Yes   amphetamine-dextroamphetamine (ADDERALL XR) 25 MG 24 hr capsule was last sold on 6/23/24 for quantity of 30.    Other controlled substance on [place] ?: Yes   If yes, are any new medications? No        Requested medication(s) verified as identical to current order? No: requesting 90-day.    Any lapse in adherence to medication(s) greater than 5 days? No      Additional action taken? Sending request for pharmacy to cancel 7/13/24 prescription, cuing up new script, and routed encounter to provider for review.    Last visit treatment plan:   Continue Paxil/paroxetine CR 50 mg daily for anxiety and mood.  Continue lorazepam/Ativan 0.5-1 mg daily as needed for severe anxiety.   Continue Adderall XR 25 mg + Adderall XR 15 mg daily (for total dose 40 mg) as  needed for ADHD symptoms.  Continue all other medications as reviewed per electronic medical record today.   Safety plan reviewed. To the Emergency Department as needed or call after hours crisis line at [phone] or [phone]. [place] Crisis Text Line. Text [place] to 858636 or Suicide LifeLine Chat: suicidepreventionTBLNFilms.comline.org/chat  Continue therapy as planned.   Schedule an appointment with me in 3.5-4 months or sooner as needed.  Patient scheduled today.  Follow up with primary care provider as planned or for acute medical concerns.  Call the psychiatric nurse line with medication questions or concerns at [phone].  Pay with a Tweet may be used to communicate with your provider, but this is not intended to be used for emergencies.    Patient Status:  Patient is a continuous/long-term care patient and refills will continue to come from this provider until otherwise noted.      Any medication(s) require lab monitoring? No

## 2024-08-26 ENCOUNTER — MYC MEDICAL ADVICE (OUTPATIENT)
Dept: FAMILY MEDICINE | Facility: CLINIC | Age: 49
End: 2024-08-26
Payer: COMMERCIAL

## 2024-08-29 NOTE — TELEPHONE ENCOUNTER
PN,      Please see below MyChart message and advise.  Patient message already routed    Thanks,  Db BOYD RN

## 2024-09-04 ASSESSMENT — PATIENT HEALTH QUESTIONNAIRE - PHQ9: SUM OF ALL RESPONSES TO PHQ QUESTIONS 1-9: 6

## 2024-09-05 ENCOUNTER — VIRTUAL VISIT (OUTPATIENT)
Dept: PSYCHOLOGY | Facility: CLINIC | Age: 49
End: 2024-09-05
Payer: COMMERCIAL

## 2024-09-05 DIAGNOSIS — F90.9 ATTENTION DEFICIT HYPERACTIVITY DISORDER (ADHD), UNSPECIFIED ADHD TYPE: ICD-10-CM

## 2024-09-05 DIAGNOSIS — F41.1 GAD (GENERALIZED ANXIETY DISORDER): Primary | ICD-10-CM

## 2024-09-05 DIAGNOSIS — F33.41 RECURRENT MAJOR DEPRESSIVE DISORDER, IN PARTIAL REMISSION (H): ICD-10-CM

## 2024-09-05 PROCEDURE — 90785 PSYTX COMPLEX INTERACTIVE: CPT | Mod: 95 | Performed by: MARRIAGE & FAMILY THERAPIST

## 2024-09-05 PROCEDURE — 90837 PSYTX W PT 60 MINUTES: CPT | Mod: 95 | Performed by: MARRIAGE & FAMILY THERAPIST

## 2024-09-05 ASSESSMENT — PATIENT HEALTH QUESTIONNAIRE - PHQ9
10. IF YOU CHECKED OFF ANY PROBLEMS, HOW DIFFICULT HAVE THESE PROBLEMS MADE IT FOR YOU TO DO YOUR WORK, TAKE CARE OF THINGS AT HOME, OR GET ALONG WITH OTHER PEOPLE: SOMEWHAT DIFFICULT
SUM OF ALL RESPONSES TO PHQ QUESTIONS 1-9: 6

## 2024-09-05 NOTE — PROGRESS NOTES
[facility]                                               Progress Note    Patient Name: Ellyn Mcgee  Date: 9/5/2024         Service Type: Individual      Session Start Time: 10:30 a.m.  Session End Time: 11:32 a.m.     Session Length: 62 min.    Extended Session (53+ minutes): PROLONGED SERVICE IN THE OUTPATIENT SETTING REQUIRING DIRECT (FACE-TO-FACE) PATIENT CONTACT BEYOND THE USUAL SERVICE:    - Patient's presenting concerns require more intensive intervention than could be completed within the usual service  Interactive Complexity: Yes, visit entailed Interactive Complexity evidenced by:  -Use of play equipment or physical devices to overcome barriers to diagnostic or therapeutic interaction with a patient who is not fluent in the same language or who has not developed or lost expressive or receptive language skills to use or understand typical language  Crisis: No     Session #: 4 (with this therapist)    Attendees: Client    Service Modality:  Video Visit:    Telemedicine Visit: The patient's condition can be safely assessed and treated via synchronous audio and visual telemedicine encounter.      Reason for Telemedicine Visit: Services only offered telehealth    Originating Site (Patient Location): Patient's home      Distant Location (provider location):  Off-site    Consent:  The patient/guardian has verbally consented to: the potential risks and benefits of telemedicine (video visit) versus in person care; bill my insurance or make self-payment for services provided; and responsibility for payment of non-covered services.     Mode of Communication:  Video Conference via [address]    As the provider I attest to compliance with applicable laws and regulations related to telemedicine.     DATA  Extended Session (53+ minutes): No  Interactive Complexity: No  Crisis: No       Progress Since Last Session (Related to Symptoms / Goals / Homework):  Symptoms: Improving decreased PHQ and  "TEE scores.     Homework: Partially completed      Episode of Care Goals: Satisfactory progress - ACTION (Actively working towards change); Intervened by reinforcing change plan / affirming steps taken     Current / Ongoing Stressors and Concerns:  Patient identified ongoing though decreased anxiety but felt panic and depressive symptoms related to current stressors.  Patient reported regarding conflict with friend.  Patient reported identifying theme of never feeling \"enough\" and feeling alone related to past and current experiences with family members, and opting to start with Brainspotting therapy in today's session with that target theme.     Treatment Objective(s) Addressed in This Session:  Patient will engage in (re-)processing all past traumatic event/memory targets.     Intervention:  Brainspotting:  processed with patient her identified not feeling \"enough\" and feeling alone target theme using Gazespotting setup.    Assessments completed prior to visit:  PHQ9:       10/2/2023    11:00 AM 3/21/2024    11:32 AM 5/13/2024    10:11 PM 6/20/2024    12:17 AM 7/9/2024     1:36 PM 8/7/2024    12:01 PM 9/4/2024     9:15 PM   PHQ-9 SCORE   PHQ-9 Total Score MyChart 9 (Mild depression) 12 (Moderate depression) 8 (Mild depression) 6 (Mild depression) 8 (Mild depression) 8 (Mild depression) 6 (Mild depression)   PHQ-9 Total Score 9 12 8 6 8 8    8 6     GAD7:       3/7/2023     8:47 AM 4/21/2023    12:12 PM 12/22/2023     1:09 PM 3/21/2024    11:33 AM 6/20/2024    12:18 AM 7/9/2024     1:37 PM 8/7/2024    12:02 PM   TEE-7 SCORE   Total Score 5 (mild anxiety) 8 (mild anxiety) 9 (mild anxiety) 15 (severe anxiety) 4 (minimal anxiety) 12 (moderate anxiety) 8 (mild anxiety)   Total Score 5 8 9 15 4 12 8    8     PROMIS 10-Global Health (all questions and answers displayed):       6/14/2022     9:59 AM 7/6/2022     2:02 PM 10/26/2022    11:31 AM 2/6/2023    11:06 AM 8/11/2023    11:06 AM 3/21/2024    11:35 AM 6/20/2024    " 12:21 AM   PROMIS 10   In general, would you say your health is: Fair Fair Fair Fair Good Fair Fair   In general, would you say your quality of life is: Fair Fair Fair Fair Fair Fair Fair   In general, how would you rate your physical health? Fair Fair Fair Fair Fair Fair Fair   In general, how would you rate your mental health, including your mood and your ability to think? Fair Fair Fair Fair Fair Poor Fair   In general, how would you rate your satisfaction with your social activities and relationships? Poor Fair Fair Fair Fair Fair Poor   In general, please rate how well you carry out your usual social activities and roles Good Fair Fair Fair Good Good Good   To what extent are you able to carry out your everyday physical activities such as walking, climbing stairs, carrying groceries, or moving a chair? Completely Mostly Completely Completely Mostly Completely Mostly   In the past 7 days, how often have you been bothered by emotional problems such as feeling anxious, depressed, or irritable? Often Often Often Sometimes Sometimes Always Often   In the past 7 days, how would you rate your fatigue on average? Moderate Moderate Moderate Moderate Severe Moderate Moderate   In the past 7 days, how would you rate your pain on average, where 0 means no pain, and 10 means worst imaginable pain? 7 8 3 6 7 6 7   In general, would you say your health is: 2 2 2 2 3 2 2   In general, would you say your quality of life is: 2 2 2 2 2 2 2   In general, how would you rate your physical health? 2 2 2 2 2 2 2   In general, how would you rate your mental health, including your mood and your ability to think? 2 2 2 2 2 1 2   In general, how would you rate your satisfaction with your social activities and relationships? 1 2 2 2 2 2 1   In general, please rate how well you carry out your usual social activities and roles. (This includes activities at home, at work and in your community, and responsibilities as a parent, child, spouse,  employee, friend, etc.) 3 2 2 2 3 3 3   To what extent are you able to carry out your everyday physical activities such as walking, climbing stairs, carrying groceries, or moving a chair? 5 4 5 5 4 5 4   In the past 7 days, how often have you been bothered by emotional problems such as feeling anxious, depressed, or irritable? 4 4 4 3 3 5 4   In the past 7 days, how would you rate your fatigue on average? 3 3 3 3 4 3 3   In the past 7 days, how would you rate your pain on average, where 0 means no pain, and 10 means worst imaginable pain? 7 8 3 6 7 6 7   Global Mental Health Score 7 8 8 9    9 9 6 7   Global Physical Health Score 12 11 14 13    13 10 13 11   PROMIS TOTAL - SUBSCORES 19 19 22 22    22 19 19 18     ASSESSMENT: Current Emotional / Mental Status (status of significant symptoms):   Risk status (Self / Other harm or suicidal ideation)   Patient denies current fears or concerns for personal safety.   Patient denies current or recent suicidal ideation or behaviors.    Patient denies current or recent homicidal ideation or behaviors.   Patient denies current or recent self injurious behavior or ideation.   Patient denies other safety concerns.   Patient reports there has been no change in risk factors since their last session.   Patient reports there has been no change in protective factors since their last session.   Recommended that patient call 911 or go to the local ED should there be a change in any of these risk factors.     Appearance:   Appropriate    Eye Contact:   Good    Psychomotor Behavior: Normal    Attitude:   Cooperative; friendly   Orientation:   All   Speech    Rate / Production: Normal/ Responsive Emotional Talkative    Volume:  Normal    Mood:    Anxious  Depressed  Normal Sad  Expansive Dysphoric   Affect:    Appropriate  Expansive  Subdued  Tearful Worrisome    Thought Content:  Clear  Rumination    Thought Form:  Coherent  Goal Directed  Logical    Insight:    Good  and Intellectual  "Insight     Medication Review:   No changes to current psychiatric medication(s)     Medication Compliance:   Yes     Changes in Health Issues:   None     Chemical Use Review:   Substance Use: Chemical use reviewed, no active concerns identified ; no current alcohol use     Tobacco Use: No current tobacco use.      Diagnosis:  1. TEE (generalized anxiety disorder)    2. Recurrent major depressive disorder, in partial remission (H24)    3. Attention deficit hyperactivity disorder (ADHD), unspecified ADHD type          Note: There has been demonstrated improvement in functioning while patient has been engaged in psychotherapy/psychological service- if withdrawn the patient would deteriorate and/or relapse.     Collateral Reports Completed:  N/A..    PLAN: (Patient Tasks / Therapist Tasks / Other)    Patient agreed to continue to work on processing her identified not feeling \"enough\" and feeling alone target theme using Gazespotting setup, noticing/journaling regarding her outcomes/takeaways, and engaging in self-care and considering asking others for help.      Jonh Gotti, [facility]    ______________________________________________________________________                                                           [facility]    Individual Treatment Plan    Patient's Name: Ellyn Mcgee  YOB: 1975    Date of Creation: 2020  Date Treatment Plan Last Reviewed/Revised: 2024, next due 24.    DSM5 Diagnoses: 296.32 (F33.1) Major Depressive Disorder, Recurrent Episode, Moderate _ or 300.02 (F41.1) Generalized Anxiety Disorder (patient has previously been diagnosed with ADHD, hyperactive type)    Psychosocial / Contextual Factors: History of anxious attachment stemming from relationship with father (he  8 years ago); is currently pregnant (high-risk due to advanced maternal age); is in couples therapy with  due to frequent arguments and his emotional " "affair earlier in the year; financial stress; history of ADHD (hyperactive type).    PROMIS (reviewed every 90 days): PROMIS-10 Scores: 19 (see above note)    Referral / Collaboration:  EMDR    Anticipated number of sessions for this episode of care: 100  Anticipated frequency of sessions: Every 2-3 weeks  Anticipated duration of each session: 53+ minutes  Treatment plan will be reviewed in 90 days or when goals have been changed.           Measurable Treatment Goal(s) related to diagnosis / functional impairment(s):  Patient is asking to focus treatment on her relationship with her father and past trauma.    Goal 1: Patient will tell full story of past trauma related to her relationship with her father and will identify how past feelings are impacting current relationships.    Objective #A (Patient Action)    Patient will identify how past feelings are impacting current relationships.  Status: Continued - Date(s): 6/20/2024 (partially completed)    Intervention(s)  Therapist will role-play conflict management.    Objective #B  Patient will identify strengths and weaknesses within her family system of origin.  Status: Continued - Date(s): 6/20/2024 (partially completed)    Intervention(s)  Therapist will assign homework for patient to create list .      Goal 2: Patient will learn about resilience, trauma responses, and Javon Servin's \"the story I'm making up\" (cognitive strategy to manage anxious thoughts).    Objective #A (Patient Action)    Patient will learn about and identify personal trauma responses.    Status: Continued - Date(s): 6/20/2024     Intervention(s)  Therapist will provide educational materials on trauma responses .    Objective #B  Patient will use cognitive strategies identified in therapy to challenge anxious thoughts.  Status: Continued - Date(s): 6/20/2024     Intervention(s)  Therapist will teach about Javon Servin's power of vulnerability and \"the story I'm making up\" .      Goal 3: Patient " will learn about protective factors that foster resilience and attachment styles and will identify how her attachment style drives her behavior.     Objective #A (Patient Action)    Status:  Partially completed: 6/20/2024     Patient will learn about protective factors and will identify her own.    Intervention(s)  Therapist will complete with patient in session.    Objective #B  Patient will identify how her attachment style drives her behavior.  Status: Completed - Date: 3/21/2024       Intervention(s)  Therapist will teach emotional regulation skills.      Goal 4:  Patient will successfully process through past trauma defined as reporting 0 Subjective Units of Distress related to trauma on 0-10 scale and clear body scan.   I will know I've met my goal when I am less impacted by my past trauma experience.       Objective #A (Client Action)  Status: New - Date: 6/20/24      Patient will identify past traumatic events/memories which are causing current distress.     Intervention(s)  Therapist will take client's history and facilitate client's identification of targets for trauma-focused therapy.     Objective #B  Patient will complete needed assessment(s) to confirm readiness for trauma-focused therapy.    Status: New - Date: 6/20/24      Intervention(s)  Therapist will administer Dissociative Experiences Scale, Multidimensional Inventory of Dissociation as needed.     Objective #C  Status: New - Date: 6/20/24      Patient will engage in (re-)processing all past traumatic event/memory targets.     Intervention(s)   Therapist will complete trauma-focused therapy (re-)processing with client.     Patient agreed to the above plan.      Jonh Gotti, [facility]  6/20/24

## 2024-09-10 ENCOUNTER — MYC MEDICAL ADVICE (OUTPATIENT)
Dept: PSYCHIATRY | Facility: CLINIC | Age: 49
End: 2024-09-10
Payer: COMMERCIAL

## 2024-09-19 ENCOUNTER — TELEPHONE (OUTPATIENT)
Dept: PSYCHOLOGY | Facility: CLINIC | Age: 49
End: 2024-09-19
Payer: COMMERCIAL

## 2024-09-23 ENCOUNTER — MYC MEDICAL ADVICE (OUTPATIENT)
Dept: PSYCHIATRY | Facility: CLINIC | Age: 49
End: 2024-09-23
Payer: COMMERCIAL

## 2024-10-02 ENCOUNTER — MYC MEDICAL ADVICE (OUTPATIENT)
Dept: FAMILY MEDICINE | Facility: CLINIC | Age: 49
End: 2024-10-02
Payer: COMMERCIAL

## 2024-10-04 NOTE — TELEPHONE ENCOUNTER
Patient calling to follow-up.  Hoping to start prescription.  RN discussed recommendation from PN.  Patient feels should be able to start this because she has taken in the past.  RN offered visit with provider today or visit with PN next week.     Patient would like to meet with PN next week.  Discussed symptoms- ongoing cough, possible fever.  Has not been measuring, but max temp fluctuation appears to be 2.5 degrees above her baseline per patient.  Encouraged continued rest, fluids, etc.    Yocasta BOYD RN

## 2024-10-06 ASSESSMENT — ANXIETY QUESTIONNAIRES
5. BEING SO RESTLESS THAT IT IS HARD TO SIT STILL: NOT AT ALL
8. IF YOU CHECKED OFF ANY PROBLEMS, HOW DIFFICULT HAVE THESE MADE IT FOR YOU TO DO YOUR WORK, TAKE CARE OF THINGS AT HOME, OR GET ALONG WITH OTHER PEOPLE?: SOMEWHAT DIFFICULT
6. BECOMING EASILY ANNOYED OR IRRITABLE: SEVERAL DAYS
IF YOU CHECKED OFF ANY PROBLEMS ON THIS QUESTIONNAIRE, HOW DIFFICULT HAVE THESE PROBLEMS MADE IT FOR YOU TO DO YOUR WORK, TAKE CARE OF THINGS AT HOME, OR GET ALONG WITH OTHER PEOPLE: SOMEWHAT DIFFICULT
GAD7 TOTAL SCORE: 9
4. TROUBLE RELAXING: SEVERAL DAYS
GAD7 TOTAL SCORE: 9
7. FEELING AFRAID AS IF SOMETHING AWFUL MIGHT HAPPEN: SEVERAL DAYS
2. NOT BEING ABLE TO STOP OR CONTROL WORRYING: MORE THAN HALF THE DAYS
3. WORRYING TOO MUCH ABOUT DIFFERENT THINGS: SEVERAL DAYS
1. FEELING NERVOUS, ANXIOUS, OR ON EDGE: NEARLY EVERY DAY
7. FEELING AFRAID AS IF SOMETHING AWFUL MIGHT HAPPEN: SEVERAL DAYS
GAD7 TOTAL SCORE: 9

## 2024-10-07 ENCOUNTER — VIRTUAL VISIT (OUTPATIENT)
Dept: PSYCHOLOGY | Facility: CLINIC | Age: 49
End: 2024-10-07
Payer: COMMERCIAL

## 2024-10-07 DIAGNOSIS — F33.41 RECURRENT MAJOR DEPRESSIVE DISORDER, IN PARTIAL REMISSION (H): ICD-10-CM

## 2024-10-07 DIAGNOSIS — F90.9 ATTENTION DEFICIT HYPERACTIVITY DISORDER (ADHD), UNSPECIFIED ADHD TYPE: ICD-10-CM

## 2024-10-07 DIAGNOSIS — F41.1 GAD (GENERALIZED ANXIETY DISORDER): Primary | ICD-10-CM

## 2024-10-07 PROCEDURE — 90837 PSYTX W PT 60 MINUTES: CPT | Mod: 95 | Performed by: MARRIAGE & FAMILY THERAPIST

## 2024-10-07 NOTE — TELEPHONE ENCOUNTER
Dr. Guaman,     Please see below Tutum message and advise.     Pt has visit with you scheduled for Wednesday 10/09.   Pt requesting doctor's note for leave of absence.     Thanks,   Lady GOFF RN

## 2024-10-07 NOTE — PROGRESS NOTES
[facility]                                               Progress Note    Patient Name: Ellyn Mcgee  Date: 10/7/2024         Service Type: Individual      Session Start Time: 3:00 p.m.  Session End Time: 4:00 p.m.     Session Length: 60 min.    Extended Session (53+ minutes): PROLONGED SERVICE IN THE OUTPATIENT SETTING REQUIRING DIRECT (FACE-TO-FACE) PATIENT CONTACT BEYOND THE USUAL SERVICE:    - Patient's presenting concerns require more intensive intervention than could be completed within the usual service  Interactive Complexity: No  Crisis: No     Session #: 5 (with this therapist)    Attendees: Client    Service Modality:  Video Visit:    Telemedicine Visit: The patient's condition can be safely assessed and treated via synchronous audio and visual telemedicine encounter.      Reason for Telemedicine Visit: Services only offered telehealth    Originating Site (Patient Location): Patient's home      Distant Location (provider location):  Off-site    Consent:  The patient/guardian has verbally consented to: the potential risks and benefits of telemedicine (video visit) versus in person care; bill my insurance or make self-payment for services provided; and responsibility for payment of non-covered services.     Mode of Communication:  Video Conference via MakeLeaps    As the provider I attest to compliance with applicable laws and regulations related to telemedicine.     DATA  Extended Session (53+ minutes): No  Interactive Complexity: No  Crisis: No       Progress Since Last Session (Related to Symptoms / Goals / Homework):  Symptoms: Worsening increased PHQ and TEE scores.     Homework: Partially completed      Episode of Care Goals: Minimal progress - ACTION (Actively working towards change); Intervened by reinforcing change plan / affirming steps taken     Current / Ongoing Stressors and Concerns:  Patient identified increased anxiety and depressive symptoms related to current  stressors.  Patient reported regarding having contracted Covid and having been sick for 16 days, going on leave and experiencing work stress related to this.  Patient reported identifying theme of feeling unloved related to past and current experiences with family members, and wondering about her diagnosis related to this.     Treatment Objective(s) Addressed in This Session:  Patient will identify past traumatic events/memories which are causing current distress.     Intervention:  Brainspotting:  processed with patient regarding her experience of feeling unloved.  Interpersonal Therapy:  reviewed with patient being effective in her interpersonal interactions, focusing on doing what works/what the situation calls for to get beneficial results.  Motivational Interviewing:  reviewed with patient her desired continued therapy goals.    Assessments completed prior to visit:  PHQ9:       5/13/2024    10:11 PM 6/20/2024    12:17 AM 7/9/2024     1:36 PM 8/7/2024    12:01 PM 9/4/2024     9:15 PM 9/18/2024     6:02 PM 10/6/2024    11:16 PM   PHQ-9 SCORE   PHQ-9 Total Score MyChart 8 (Mild depression) 6 (Mild depression) 8 (Mild depression) 8 (Mild depression) 6 (Mild depression) 7 (Mild depression) 8 (Mild depression)   PHQ-9 Total Score 8 6 8 8    8 6 7 8     GAD7:       12/22/2023     1:09 PM 3/21/2024    11:33 AM 6/20/2024    12:18 AM 7/9/2024     1:37 PM 8/7/2024    12:02 PM 9/18/2024     6:03 PM 10/6/2024    11:16 PM   TEE-7 SCORE   Total Score 9 (mild anxiety) 15 (severe anxiety) 4 (minimal anxiety) 12 (moderate anxiety) 8 (mild anxiety) 6 (mild anxiety) 9 (mild anxiety)   Total Score 9 15 4 12 8    8 6 9     PROMIS 10-Global Health (all questions and answers displayed):       10/26/2022    11:31 AM 2/6/2023    11:06 AM 8/11/2023    11:06 AM 3/21/2024    11:35 AM 6/20/2024    12:21 AM 9/18/2024     6:05 PM 10/6/2024    11:18 PM   PROMIS 10   In general, would you say your health is: Fair Fair Good Fair Fair Fair Fair    In general, would you say your quality of life is: Fair Fair Fair Fair Fair Fair Fair   In general, how would you rate your physical health? Fair Fair Fair Fair Fair Fair Good   In general, how would you rate your mental health, including your mood and your ability to think? Fair Fair Fair Poor Fair Good Good   In general, how would you rate your satisfaction with your social activities and relationships? Fair Fair Fair Fair Poor Fair Fair   In general, please rate how well you carry out your usual social activities and roles Fair Fair Good Good Good Good Good   To what extent are you able to carry out your everyday physical activities such as walking, climbing stairs, carrying groceries, or moving a chair? Completely Completely Mostly Completely Mostly Mostly Mostly   In the past 7 days, how often have you been bothered by emotional problems such as feeling anxious, depressed, or irritable? Often Sometimes Sometimes Always Often Often Often   In the past 7 days, how would you rate your fatigue on average? Moderate Moderate Severe Moderate Moderate Moderate Severe   In the past 7 days, how would you rate your pain on average, where 0 means no pain, and 10 means worst imaginable pain? 3 6 7 6 7 6 7   In general, would you say your health is: 2 2 3 2 2 2 2   In general, would you say your quality of life is: 2 2 2 2 2 2 2   In general, how would you rate your physical health? 2 2 2 2 2 2 3   In general, how would you rate your mental health, including your mood and your ability to think? 2 2 2 1 2 3 3   In general, how would you rate your satisfaction with your social activities and relationships? 2 2 2 2 1 2 2   In general, please rate how well you carry out your usual social activities and roles. (This includes activities at home, at work and in your community, and responsibilities as a parent, child, spouse, employee, friend, etc.) 2 2 3 3 3 3 3   To what extent are you able to carry out your everyday physical  activities such as walking, climbing stairs, carrying groceries, or moving a chair? 5 5 4 5 4 4 4   In the past 7 days, how often have you been bothered by emotional problems such as feeling anxious, depressed, or irritable? 4 3 3 5 4 4 4   In the past 7 days, how would you rate your fatigue on average? 3 3 4 3 3 3 4   In the past 7 days, how would you rate your pain on average, where 0 means no pain, and 10 means worst imaginable pain? 3 6 7 6 7 6 7   Global Mental Health Score 8 9    9 9 6 7 9 9    9   Global Physical Health Score 14 13    13 10 13 11 12 11    11   PROMIS TOTAL - SUBSCORES 22 22    22 19 19 18 21 20    20     ASSESSMENT: Current Emotional / Mental Status (status of significant symptoms):   Risk status (Self / Other harm or suicidal ideation)   Patient denies current fears or concerns for personal safety.   Patient denies current or recent suicidal ideation or behaviors.    Patient denies current or recent homicidal ideation or behaviors.   Patient denies current or recent self injurious behavior or ideation.   Patient denies other safety concerns.   Patient reports there has been no change in risk factors since their last session.   Patient reports there has been no change in protective factors since their last session.   Recommended that patient call 911 or go to the local ED should there be a change in any of these risk factors.     Appearance:   Appropriate    Eye Contact:   Good    Psychomotor Behavior: Normal    Attitude:   Cooperative; friendly, interested   Orientation:   All   Speech    Rate / Production: Normal/ Responsive Talkative    Volume:  Normal    Mood:    Anxious  Depressed  Normal Sad  Expansive   Affect:    Appropriate  Expansive  Subdued  Worrisome    Thought Content:  Clear  Rumination    Thought Form:  Coherent  Goal Directed  Logical    Insight:    Good  and Intellectual Insight     Medication Review:   No changes to current psychiatric medication(s)     Medication  Compliance:   Yes     Changes in Health Issues:   None     Chemical Use Review:   Substance Use: Chemical use reviewed, no active concerns identified ; no current alcohol use     Tobacco Use: No current tobacco use.      Diagnosis:  1. TEE (generalized anxiety disorder)    2. Recurrent major depressive disorder, in partial remission (H)    3. Attention deficit hyperactivity disorder (ADHD), unspecified ADHD type          Note: There has been demonstrated improvement in functioning while patient has been engaged in psychotherapy/psychological service- if withdrawn the patient would deteriorate and/or relapse.     Collateral Reports Completed:  N/A..    PLAN: (Patient Tasks / Therapist Tasks / Other)    Patient agreed to continue to work on processing her experience of feeling unloved, and being effective in her interpersonal interactions, focusing on doing what works/what the situation calls for to get beneficial results.      Jonh Gotti, [facility] 10/7/24    ______________________________________________________________________                                                           [facility]    Individual Treatment Plan    Patient's Name: Ellyn Mcgee  YOB: 1975    Date of Creation: 2020  Date Treatment Plan Last Reviewed/Revised: 10/7/2024, next due 25.    DSM5 Diagnoses: 296.32 (F33.1) Major Depressive Disorder, Recurrent Episode, Moderate _ or 300.02 (F41.1) Generalized Anxiety Disorder (patient has previously been diagnosed with ADHD, hyperactive type)    Psychosocial / Contextual Factors: History of anxious attachment stemming from relationship with father (he  8 years ago); is currently pregnant (high-risk due to advanced maternal age); is in couples therapy with  due to frequent arguments and his emotional affair earlier in the year; financial stress; history of ADHD (hyperactive type).    PROMIS (reviewed every 90 days): PROMIS-10 Scores: 20  "(10/6/24)    Referral / Collaboration:  EMDR    Anticipated number of sessions for this episode of care: 100  Anticipated frequency of sessions: Every 2-3 weeks  Anticipated duration of each session: 53+ minutes  Treatment plan will be reviewed in 90 days or when goals have been changed.           Measurable Treatment Goal(s) related to diagnosis / functional impairment(s):  Patient is asking to focus treatment on her relationship with her father and past trauma.    Goal 1: Patient will tell full story of past trauma related to her relationship with her father and will identify how past feelings are impacting current relationships.    Objective #A (Patient Action)    Patient will identify how past feelings are impacting current relationships.  Status: Continued - Date(s): 10/7/2024 (partially completed)    Intervention(s)  Therapist will role-play conflict management.    Objective #B  Patient will identify strengths and weaknesses within her family system of origin.  Status: Continued - Date(s): 10/7/2024 (partially completed)    Intervention(s)  Therapist will assign homework for patient to create list .      Goal 2: Patient will learn about resilience, trauma responses, and Javon Servin's \"the story I'm making up\" (cognitive strategy to manage anxious thoughts).    Objective #A (Patient Action)    Patient will learn about and identify personal trauma responses.    Status: Continued - Date(s): 10/7/2024     Intervention(s)  Therapist will provide educational materials on trauma responses .    Objective #B  Patient will use cognitive strategies identified in therapy to challenge anxious thoughts.  Status: Continued - Date(s): 10/7/2024     Intervention(s)  Therapist will teach about Javon Servin's power of vulnerability and \"the story I'm making up\" .      Goal 3: Patient will learn about protective factors that foster resilience and attachment styles and will identify how her attachment style drives her behavior. "     Objective #A (Patient Action)    Status:  Partially completed: 10/7/2024     Patient will learn about protective factors and will identify her own.    Intervention(s)  Therapist will complete with patient in session.    Objective #B  Patient will identify how her attachment style drives her behavior.  Status: Completed - Date: 3/21/2024       Intervention(s)  Therapist will teach emotional regulation skills.      Goal 4:  Patient will successfully process through past trauma defined as reporting 0 Subjective Units of Distress related to trauma on 0-10 scale and clear body scan.   I will know I've met my goal when I am less impacted by my past trauma experience.       Objective #A (Client Action)  Status: Continued - Date: 10/7/24      Patient will identify past traumatic events/memories which are causing current distress.     Intervention(s)  Therapist will take client's history and facilitate client's identification of targets for trauma-focused therapy.     Objective #B  Patient will complete needed assessment(s) to confirm readiness for trauma-focused therapy.    Status: Continued - Date: 10/7/24      Intervention(s)  Therapist will administer Dissociative Experiences Scale, Multidimensional Inventory of Dissociation as needed.     Objective #C  Status: Continued - Date: 10/7/24      Patient will engage in (re-)processing all past traumatic event/memory targets.     Intervention(s)   Therapist will complete trauma-focused therapy (re-)processing with client.     Patient agreed to the above plan.      Jonh Gotti, BARBARA  10/7/24

## 2024-10-08 ASSESSMENT — PATIENT HEALTH QUESTIONNAIRE - PHQ9
10. IF YOU CHECKED OFF ANY PROBLEMS, HOW DIFFICULT HAVE THESE PROBLEMS MADE IT FOR YOU TO DO YOUR WORK, TAKE CARE OF THINGS AT HOME, OR GET ALONG WITH OTHER PEOPLE: SOMEWHAT DIFFICULT
SUM OF ALL RESPONSES TO PHQ QUESTIONS 1-9: 9
SUM OF ALL RESPONSES TO PHQ QUESTIONS 1-9: 9

## 2024-10-09 ENCOUNTER — VIRTUAL VISIT (OUTPATIENT)
Dept: PSYCHIATRY | Facility: CLINIC | Age: 49
End: 2024-10-09
Payer: COMMERCIAL

## 2024-10-09 ENCOUNTER — VIRTUAL VISIT (OUTPATIENT)
Dept: FAMILY MEDICINE | Facility: CLINIC | Age: 49
End: 2024-10-09
Payer: COMMERCIAL

## 2024-10-09 DIAGNOSIS — F90.0 ATTENTION DEFICIT HYPERACTIVITY DISORDER (ADHD), PREDOMINANTLY INATTENTIVE TYPE: Primary | ICD-10-CM

## 2024-10-09 DIAGNOSIS — G47.00 INSOMNIA, UNSPECIFIED TYPE: ICD-10-CM

## 2024-10-09 DIAGNOSIS — F41.1 GAD (GENERALIZED ANXIETY DISORDER): ICD-10-CM

## 2024-10-09 DIAGNOSIS — U07.1 INFECTION DUE TO 2019 NOVEL CORONAVIRUS: Primary | ICD-10-CM

## 2024-10-09 DIAGNOSIS — F33.41 RECURRENT MAJOR DEPRESSIVE DISORDER, IN PARTIAL REMISSION (H): ICD-10-CM

## 2024-10-09 DIAGNOSIS — E28.2 PCOS (POLYCYSTIC OVARIAN SYNDROME): ICD-10-CM

## 2024-10-09 PROCEDURE — 99214 OFFICE O/P EST MOD 30 MIN: CPT | Mod: 95 | Performed by: FAMILY MEDICINE

## 2024-10-09 PROCEDURE — G2211 COMPLEX E/M VISIT ADD ON: HCPCS | Mod: 95 | Performed by: PSYCHIATRY & NEUROLOGY

## 2024-10-09 PROCEDURE — 99214 OFFICE O/P EST MOD 30 MIN: CPT | Mod: 95 | Performed by: PSYCHIATRY & NEUROLOGY

## 2024-10-09 RX ORDER — METFORMIN HYDROCHLORIDE 500 MG/1
500 TABLET, EXTENDED RELEASE ORAL
Qty: 30 TABLET | Refills: 0 | Status: SHIPPED | OUTPATIENT
Start: 2024-10-09 | End: 2024-11-12

## 2024-10-09 NOTE — PROGRESS NOTES
Ellyn is a 49 year old who is being evaluated via a billable video visit.    How would you like to obtain your AVS? MyChart  If the video visit is dropped, the invitation should be resent by: Text to cell phone: [phone]  Will anyone else be joining your video visit? No      Assessment & Plan     Infection due to 2019 novel coronavirus  Pt first tested positive for COVID on 9/21/24   Was doing OK but then symptoms worsened on 10/2/24 with fluctuating temperatures, exertional dyspnea and cough.  Discussed possible bacterial or viral infection.  Feel that this is still within normal recovery of COVID and not necessarily long COVID.  Will continue to monitor  Wrote letter for leave from work.  Advised warning signs and symptoms     PCOS (polycystic ovarian syndrome)  Previously on metformin   Will start once daily for one month then increase to 2 tablets daily   Discussed potential side effects  - metFORMIN (GLUCOPHAGE XR) 500 MG 24 hr tablet; Take 1 tablet (500 mg) by mouth daily (with dinner).                Subjective   Ellyn is a 49 year old, presenting for the following health issues:  Covid Concern    History of Present Illness       Reason for visit:  Have covid. Yesterday was 16 days after first positive antigen test, was still positive  Symptom onset:  3-4 weeks ago  Symptoms include:  Currently mostly i get exhausted after shortish bursts of any kind of exertion, mild lung unhappiness (cough if talk too much), headache and body temp that fluctuates bt 97.4 and 99.9 in a day, mostly between 98.1 and 98.9  Symptom intensity:  Moderate  Symptom progression:  Staying the same  Had these symptoms before:  Yes  Has tried/received treatment for these symptoms:  Yes  Previous treatment was successful:  No  What makes it worse:  Standing and moving for more than about 15 minutes (some days more like 30)  What makes it better:  Sleeping, physically resting, ibuprofen for the headache (i try not to take if not too  much pain so fever can do its work)   She is taking medications regularly.     Symptoms started - September 21st - started with sore throat  Felt worsening symptoms  Tested positive -9/21/24  Fatigue, lungs mild cough, exhaustion    On Wednesday October 2 started to feel worse again -   Since that time temp has been up and down    Last night was first negative antigen test  Temperature fluctuates 97F -100F  Gets winded with exertion  Cough if talking     Took leave of absence from work - started 10/4/2024 - unpaid  But missed work at onset of COVID - had already time off first weekend but missed starting 9/26/24  Doesn't qualify for FMLA due to hours worked prior year.            Review of Systems  Constitutional, HEENT, cardiovascular, pulmonary, GI, , musculoskeletal, neuro, skin, endocrine and psych systems are negative, except as otherwise noted.      Objective           Vitals:  No vitals were obtained today due to virtual visit.    Physical Exam   GENERAL: alert and no distress  EYES: Eyes grossly normal to inspection.  No discharge or erythema, or obvious scleral/conjunctival abnormalities.  RESP: No audible wheeze, cough, or visible cyanosis.    SKIN: Visible skin clear. No significant rash, abnormal pigmentation or lesions.  NEURO: Cranial nerves grossly intact.  Mentation and speech appropriate for age.  PSYCH: Appropriate affect, tone, and pace of words          Video-Visit Details    Type of service:  Video Visit   Originating Location (pt. Location): Home    Distant Location (provider location):  On-site  Platform used for Video Visit: Bailee  Signed Electronically by: Dorothy Guaman DO

## 2024-10-09 NOTE — PROGRESS NOTES
"Virtual Visit Details    Type of service:  Video Visit     Originating Location (pt. Location): {video visit patient location:765988::\"Home\"}  {PROVIDER LOCATION On-site should be selected for visits conducted from your clinic location or adjoining [facility] hospital, academic office, or other nearby [facility] building. Off-site should be selected for all other provider locations, including home:[id number]}  Distant Location (provider location):  {virtual location provider:[id number]}  Platform used for Video Visit: {Virtual Visit Platforms:078655::\"[email or web address]\"}  "

## 2024-10-09 NOTE — NURSING NOTE
Current patient location: [address] [address]    Is the patient currently in the state of [place]? YES    Visit mode:VIDEO    If the visit is dropped, the patient can be reconnected by: VIDEO VISIT: Text to cell phone:   Telephone Information:   Mobile [phone]       Will anyone else be joining the visit? NO  (If patient encounters technical issues they should call [phone])    Are changes needed to the allergy or medication list? Pt stated no changes to allergies and Pt stated no med changes    Are refills needed on medications prescribed by this physician? Discuss with provider    Rooming Documentation:  Questionnaire(s) completed    Reason for visit: RECHJUANA SCHMIDT

## 2024-10-09 NOTE — PROGRESS NOTES
"Telemedicine Visit: The patient's condition can be safely assessed and treated via synchronous audio and visual telemedicine encounter.      Reason for Telemedicine Visit: Patient has requested telehealth visit    Originating Site (Patient Location): Patient's home in [place]    Distant Location (provider location):  Off-site    Consent:  The patient/guardian has verbally consented to: the potential risks and benefits of telemedicine (video visit) versus in person care; bill my insurance or make self-payment for services provided; and responsibility for payment of non-covered services.     Mode of Communication:  Video Conference via [employer]    As the provider I attest to compliance with applicable laws and regulations related to telemedicine.        Outpatient Psychiatric Progress Note    Name: Ellyn Mcgee   : 1975                    Primary Care Provider: Dorothy Guaman DO   Therapist: BARBARA Schulte         2024     6:02 PM 10/6/2024    11:16 PM 10/8/2024     8:10 PM   PHQ   PHQ-9 Total Score 7 8 9   Q9: Thoughts of better off dead/self-harm past 2 weeks Not at all Not at all Not at all   TEE-7 scores:      2024    12:02 PM 2024     6:03 PM 10/6/2024    11:16 PM   TEE-7 SCORE   Total Score 8 (mild anxiety) 6 (mild anxiety) 9 (mild anxiety)   Total Score 8    8 6 9       Patient Identification:  Patient is a 49 year old,   White Not  or  female  who presents for return visit with me.   Patient attended the phone/video session alone. Patient prefers to be called: \"Ellyn\".    Interim History:  I last saw Ellyn Mcgee for outpatient psychiatry Return Visit on 2024. During that appointment, we:    Continue Paxil/paroxetine CR 50 mg daily for anxiety and mood.  Continue lorazepam/Ativan 0.5-1 mg daily as needed for severe anxiety.   Continue Adderall XR 25 mg + Adderall XR 15 mg daily (for total dose 40 mg) as needed for ADHD symptoms.  Continue all other " medications as reviewed per electronic medical record today.     10/09: Patient overall doing okay.  Often feeling incredibly overwhelmed with family, household, work responsibilities.  Finds medication helpful.  Son, Karel, is in  for a few hours some of the days each week.  This has been helpful for patient to get a break.  Taking medication as prescribed.  No new negative side effects.  No acute safety concerns.  No problematic drug or alcohol use.    Per [facility], [facility], during today's team-based visit:  No [facility] appt    Psychiatric ROS:  Ellyn Mcgee reports mood has been: Relatively stable  Anxiety has been: High at times   Sleep has been: Sleep often poor- has not used Ativan since forgot she had some  Isabel sxs: None  Psychosis sxs: None  ADHD/ADD sxs: ok, meds helpful  PTSD sxs: None  PHQ9 and GAD7 scores were reviewed today if completed.   Medication side effects: Denies  Current stressors include: See HPI above  Coping mechanisms and supports include: Therapy, Family, Hobbies and Friends    Current medications include:   Current Outpatient Medications   Medication Sig Dispense Refill    amphetamine-dextroamphetamine (ADDERALL XR) 15 MG 24 hr capsule Take 1 capsule (15 mg) by mouth daily 90 capsule 0    amphetamine-dextroamphetamine (ADDERALL XR) 25 MG 24 hr capsule Take 1 capsule (25 mg) by mouth daily 90 capsule 0    ibuprofen (ADVIL/MOTRIN) 200 MG tablet Take 200-400 mg by mouth every 4 hours as needed for pain OTC      LORazepam (ATIVAN) 1 MG tablet Take 0.5-1 tablets (0.5-1 mg) by mouth daily as needed (anxiety and sleep) 20 tablet 0    metFORMIN (GLUCOPHAGE XR) 500 MG 24 hr tablet Take 1 tablet (500 mg) by mouth daily (with dinner). 30 tablet 0    PARoxetine (PAXIL-CR) 25 MG 24 hr tablet Take 2 tablets (50 mg) by mouth every morning 180 tablet 3    triamcinolone (KENALOG) 0.025 % external ointment Apply topically 2 times daily To affected areas on the lips until resolved. 15 g 1     Turmeric 500 MG CAPS        No current facility-administered medications for this visit.     [place]:   10/01/2024 06/13/2024 1 Dextroamp-Amphet Er 15 Mg Cap 30.00 30 Al Bau [id number] Exp (4317) 0/0  [place]   08/22/2024 08/13/2024 1 Dextroamp-Amphet Er 25 Mg Cap 90.00 90 Al Bau [id number] Exp (4317) 0/[place]  [place]   08/18/2024 06/13/2024 1 Dextroamp-Amphet Er 25 Mg Cap 30.00 30 Al Bau [id number] Exp (4317) 0/0  [place]   07/23/2024 06/21/2024 1 Dextroamp-Amphet Er 15 Mg Cap 90.00 90 Al Bau [id number] Exp (4317) 0/0  [place]       Past Medical/Surgical History:  Past Medical History:   Diagnosis Date    Abnormal Pap smear     Colposcopy    Adjustment disorder with mixed anxiety and depressed mood 04/04/2012    Anemia     In Past    Anxiety     Chlamydia trachomatis infection of other specified site 1993    Depression     Depressive disorder 1979    Not diagnosed until college...later diagnosed with TEE    Fertility problem     Lyme disease 09/08/2010    ?false positive vs positve CMV - resolved    Moderate dysplasia of cervix 1995    Other and unspecified adverse effect of drug, medicinal and biological substance     insulin resistent    Varicosities     Wounds and injuries     fall down stairs, PT for back, pain meds      has a past medical history of Abnormal Pap smear, Adjustment disorder with mixed anxiety and depressed mood (04/04/2012), Anemia, Anxiety, Chlamydia trachomatis infection of other specified site (1993), Depression, Depressive disorder (1979), Fertility problem, Lyme disease (09/08/2010), Moderate dysplasia of cervix (1995), Other and unspecified adverse effect of drug, medicinal and biological substance, Varicosities, and Wounds and injuries.    She has no past medical history of Arthritis, Asthma, Asymptomatic human immunodeficiency virus (HIV) infection status (H), Breast disorder, Cancer (H), Cerebral infarction (H), Chronic kidney disease, Complication of  anesthesia, Congestive heart failure (H), COPD (chronic obstructive pulmonary disease) (H), Diabetes (H), Diabetes mellitus (H), Heart disease, Herpes simplex without mention of complication, History of blood transfusion, Hypertension, Liver disease, Postpartum depression, Rh incompatibility, Seizures (H), Sickle cell anemia (H), Systemic lupus erythematosus (H), Thyroid disease, or Uncomplicated asthma.    Social History:  Reviewed. No changes to social history except as noted in HPI above.     Vital Signs:   None. This is phone/video visit.     Labs:  Most recent laboratory results reviewed and the pertinent results include:   Lab Results   Component Value Date    A1C 5.3 08/01/2023    A1C 5.4 03/07/2023    A1C 5.3 04/30/2019    A1C 5.2 04/17/2018      Recent Labs   Lab Test 08/01/23  0902 03/07/23  0954   CHOL 234* 313*   HDL 64 72   * 221*   TRIG 99 102        Review of Systems:  10 systems (general, cardiovascular, respiratory, eyes, ENT, endocrine, GI, , M/S, neurological) were reviewed. Most pertinent finding(s) is/are: denies fever, cough, headaches, shortness of breath, chest pain, N/V, constipation/diarrhea, trouble urinating, aches and pains. The remaining systems are all unremarkable.    Mental Status Examination (limited as this is by phone/video):  Appearance: Awake, alert, appears stated age, no acute distress, well-groomed  Attitude:  Cooperative, pleasant  Motor: No obvious abnormalities observed via video, not formally tested  Oriented to:  person, place, time, and situation  Attention Span and Concentration:  normal  Speech:  clear, coherent, regular rate, rhythm, and volume  Language: intact  Mood: ok, anxious  Affect: Overall appropriate and mood congruent  Associations:  no loose associations  Thought Process:  logical, linear and goal oriented  Thought Content:  no evidence of suicidal ideation or homicidal ideation, no evidence of psychotic thought, no auditory hallucinations present  and no visual hallucinations present  Recent and Remote Memory:  Intact to interview. Not formally assessed. No amnesia.  Fund of Knowledge: appropriate  Insight:  good  Judgment:  intact, adequate for safety  Impulse Control:  intact    Suicide Risk Assessment:  Today Ellyn Mcgee reports no suicidal ideation. Based on all available evidence including the factors cited above, Ellyn Mcgee does not appear to be at imminent risk for self-harm, does not meet criteria for a 72-hr hold, and therefore remains appropriate for ongoing outpatient level of care.  A thorough assessment of risk factors related to suicide and self-harm have been reviewed and are noted above. The patient convincingly denies suicidality on several occasions. Local community safety resources reviewed for patient to use if needed. There was no deceit detected, and the patient presented in a manner that was believable.     DSM5 Diagnosis:  Major Depressive Disorder, Recurrent Episode, In Partial Remission  300.02 (F41.1) Generalized Anxiety Disorder   ADHD, Unspecified  Insomnia-unspecified    Medical comorbidities include:  Patient Active Problem List    Diagnosis Date Noted    Patellofemoral arthralgia of both knees 11/10/2023     Priority: Medium    Major depressive disorder, recurrent episode, moderate (H) 10/08/2020     Priority: Medium    TEE (generalized anxiety disorder) 10/10/2018     Priority: Medium    Cervical radiculopathy 04/16/2018     Priority: Medium    Attention deficit hyperactivity disorder (ADHD), predominantly inattentive type 10/04/2017     Priority: Medium     Patient is followed by Dr. Wang for ongoing prescription of stimulants.  All refills should be approved by this provider, or covering partner.        External hemorrhoids 04/25/2012     Priority: Medium    PCOS (polycystic ovarian syndrome) 02/22/2011     Priority: Medium    Hyperlipidemia LDL goal <130 09/05/2008     Priority: Medium    Anxiety state 09/05/2008      Priority: Medium     Infrequent prn use of lorazepam         Psychosocial & Contextual Factors: See HPI above    Assessment:  Per Intake Note with me 9/8/20:  Ellyn Mcgee is a 45-year-old female who is 23 weeks pregnant with a past psychiatric history including depression, anxiety, and ADHD who presents today for psychiatric evaluation.  Her depression and anxiety date back several years and she has also had postpartum depression/anxiety with her now 8-year-old (she also has a 14-year-old but did not experience postpartum mental health issues at that time).  She started sertraline during her second pregnancy and then ended up being maintained on Paxil-CR for several years.  She is also more recently diagnosed with ADHD and maintained on Concerta.  She weaned off both Paxil and Concerta when she found out she was pregnant and replaced these medications with her current regimen of Lexapro and Effexor-XR to decrease risk of fetal defects.  For the most part, she is feeling a little bit better on her current doses of Lexapro 10 mg and Effexor-XR 75 mg.  It is an unconventional combination as we discussed, but since she is doing quite well, I am hesitant to make many changes.  She feels as though her anxiety could be a little bit better controlled and so we will further increase Effexor-XR to 112.5 mg daily to be taken with her Lexapro 10 mg daily.  If she does a lot better, we can consider decreasing Lexapro to 5 mg daily. We discussed the risk of serotonin syndrome and she will continue to be vigilant for any signs or symptoms of this condition.  We did discuss the possibility of restarting a stimulant medication if necessary.  She does not feel as though her ADHD symptoms are too severe at this time.    4/20/21:   Today patient reports overall some ongoing ups and downs regarding her symptoms since last visit.  She is thinking much of it might be hormonal and related to sleep deprivation.  She still has not yet  had her menses return since having her baby.  She has had some feelings of panic.  Used to take propranolol in the past when she felt this way.  She would like to start this medication again.  Discussed risks and benefits while breast-feeding.  Limited risks for taking low-dose propranolol while breast-feeding although the infant does have some exposure through breast milk.  Recommended taking propranolol at least 3-4 hours prior to breast-feeding to decrease risks.  No other medication changes at this time.    12/15/2021:   Patient has overall been feeling quite stable mood wise.  Has some ups and downs that are more related to feeling overwhelmed and exhausted regarding responsibilities for her children, home life, and work.  Does not feel depressed.  Feels like medications are working well.  Propranolol has been helpful.  No medication changes today.  No safety concerns or SI.  No problematic drug or alcohol use.    6/14/2022:  Patient overall struggling lately with mood, anxiety, ADHD symptoms.  Stopped Lexapro since felt like it was making her symptoms worse.  We have considered for quite some time about starting her back on an ADHD medication.  I think it is worth a trial at this time.  Discussed risks and benefits of a stimulant medication while breast-feeding.  She will likely be weaning soon.  Still has not been getting great sleep due to cosleeping and nighttime feedings.  No acute safety concerns.  No SI.  No problematic drug or alcohol use.  If she has too much irritability on Adderall, we could consider either going back to Concerta or a trial with Vyvanse.    12/14/2022:  Akanksha overall struggling more with anxiety and panic related symptoms.  Stress has been higher.  Patient wondering about starting Paxil-CR again.  Has tolerated well in the past.  We will add a low dose with venlafaxine.  We will also decrease venlafaxine some.  May continue cross taper/titration.  Lorazepam/Ativan provided for  current acute symptoms and for any severe discomfort that may arise with this transition.  Therapy encouraged.  No acute safety concerns.  No SI.  No problematic drug or alcohol use.  Patient will be weaning her 2-year-old from breast-feeding.  We discussed risks and benefits of current medication regimen while breast-feeding.    1/5/2023:  Doing well on Paxil.  Symptoms improved quite quickly.  Tolerating well with no negative side effects.  Was able to decrease Effexor with ease as well.  We will continue tapering off Effexor and further optimizing Paxil.  Encouraged to continue in therapy.  No acute safety concerns.  No SI.  No problematic drug or alcohol use.    2/6/2023:  Patient overall doing relatively okay.  Symptoms of anxiety and depression have improved.  Irritability still lingering some.  Feeling a little emotional as well at times.  Some symptoms could be remaining from stopping venlafaxine.  We will wait increase Paxil and patient will observe her symptoms further/longer.  Patient can message in a few weeks if wants to increase Paxil.  No acute safety concerns.  No SI.  No problematic drug or alcohol use.    10/13/2023:  Patient overall doing relatively well.  No acute safety concerns.  No SI.  No problematic drug or alcohol use.  ADHD better controlled on Adderall.  Tolerating meds okay.  Follow up in 6 months.    3/13/2024:  Patient reports some struggles with task completion, motivation, organization.  Incidentally trialed Adderall XR 50 mg and found she was more productive and had further improvement of ADHD symptoms with decent tolerability.  We will increase Adderall XR to 40 mg daily to see if more helpful and well-tolerated.  Patient may also trial decreased dose of Paxil CR.  No acute safety concerns.  No SI.  Psychotherapy encouraged.  No problematic drug or alcohol use.    6/13/2024:  Patient overall doing well.  Stable on Paxil CR 50 mg daily.  Feels increased Adderall XR well-tolerated  and helpful for symptoms when taken.  No acute safety concerns.  No SI.  No problematic drug or alcohol use.  Medication regimen will be continued with no changes today.    10/09/2024:  Ongoing high psychosocial stressors.  Patient encouraged to use lorazepam when needed for severe anxiety and when significantly struggling with sleep.  No medication changes today.  Encouraged to continue in psychotherapy.  No problematic drug or alcohol use.  No acute safety concerns.    Medication side effects and alternatives were reviewed. Health promotion activities recommended and reviewed today. All questions addressed. Education and counseling completed regarding risks and benefits of medications and psychotherapy options. Recommend ongoing therapy for additional support.     Treatment Plan:  Continue Paxil/paroxetine CR 50 mg daily for anxiety and mood.  Continue lorazepam/Ativan 0.5-1 mg daily as needed for severe anxiety.   Continue Adderall XR 25 mg + Adderall XR 15 mg daily (for total dose 40 mg) as needed for ADHD symptoms.  Continue all other medications as reviewed per electronic medical record today.   Safety plan reviewed. To the Emergency Department as needed or call after hours crisis line at [phone] or [phone]. [place] Crisis Text Line. Text [place] to 114653 or Suicide LifeLine Chat: suicidepreventionlifeline.org/chat  Continue therapy as planned.   Schedule an appointment with me in 4 months or sooner as needed.  Patient scheduled today.  Follow up with primary care provider as planned or for acute medical concerns.  Call the psychiatric nurse line with medication questions or concerns at [phone].  MyChart may be used to communicate with your provider, but this is not intended to be used for emergencies.    Risks of stimulant medication include, but not limited to, decreased appetite, risk of tics (and that they may be lasting), trouble sleeping, cardiac risks such as increased heart rate and blood  "pressure, and rare risk of sudden cardiac death.  Also risk of addiction/tolerance/dependence.    Risks of benzodiazepine (Ativan, Xanax, Klonopin, Valium, etc) use including, but not limited to, sedation, tolerance, risk for addiction/dependence. Do not drink alcohol while taking benzodiazepines due to risk of trouble breathing and potential death. Do not drive or operate heavy machinery until it is known how the drug affects you. Discuss with physician or pharmacist before ever taking a benzodiazepine with a narcotic/opioid pain medication.     Administrative Billing:   Phone Call/Video Duration: 23 Minutes  Start: 11:03a  Stop: 11:26a    The longitudinal plan of care for the diagnosis(es)/condition(s) as documented were addressed during this visit. Due to the added complexity in care, I will continue to support Ellyn in the subsequent management and with ongoing continuity of care.    Patient Status:  Patient is a continuous/long-term care patient and refills will continue to come from this provider until otherwise noted.     Signed:   Sherlyn Wang DO  [facility]    This note was created with voice recognition software. Inadvertent grammatical errors, typographical errors, and \"sound-a-like\" substitutions may occur due to limitations of the software.  Read the note carefully and apply context when erroneous substitutions have occurred. Thank you.   "

## 2024-10-10 RX ORDER — DEXTROAMPHETAMINE SACCHARATE, AMPHETAMINE ASPARTATE MONOHYDRATE, DEXTROAMPHETAMINE SULFATE AND AMPHETAMINE SULFATE 6.25; 6.25; 6.25; 6.25 MG/1; MG/1; MG/1; MG/1
25 CAPSULE, EXTENDED RELEASE ORAL EVERY MORNING
Qty: 90 CAPSULE | Refills: 0 | Status: SHIPPED | OUTPATIENT
Start: 2024-11-13

## 2024-10-10 RX ORDER — DEXTROAMPHETAMINE SACCHARATE, AMPHETAMINE ASPARTATE MONOHYDRATE, DEXTROAMPHETAMINE SULFATE AND AMPHETAMINE SULFATE 3.75; 3.75; 3.75; 3.75 MG/1; MG/1; MG/1; MG/1
15 CAPSULE, EXTENDED RELEASE ORAL DAILY PRN
Qty: 90 CAPSULE | Refills: 0 | Status: SHIPPED | OUTPATIENT
Start: 2024-10-31

## 2024-10-10 NOTE — PATIENT INSTRUCTIONS
Treatment Plan:  Continue Paxil/paroxetine CR 50 mg daily for anxiety and mood.  Continue lorazepam/Ativan 0.5-1 mg daily as needed for severe anxiety.   Continue Adderall XR 25 mg + Adderall XR 15 mg daily (for total dose 40 mg) as needed for ADHD symptoms.  Continue all other medications as reviewed per electronic medical record today.   Safety plan reviewed. To the Emergency Department as needed or call after hours crisis line at [phone] or [phone]. [place] Crisis Text Line. Text [place] to 018527 or Suicide LifeLine Chat: suicidepreventionTropical [facility].org/chat  Continue therapy as planned.   Schedule an appointment with me in 4 months or sooner as needed.  Patient scheduled today.  Follow up with primary care provider as planned or for acute medical concerns.  Call the psychiatric nurse line with medication questions or concerns at [phone].  [email or web address] may be used to communicate with your provider, but this is not intended to be used for emergencies.    Risks of stimulant medication include, but not limited to, decreased appetite, risk of tics (and that they may be lasting), trouble sleeping, cardiac risks such as increased heart rate and blood pressure, and rare risk of sudden cardiac death.  Also risk of addiction/tolerance/dependence.    Risks of benzodiazepine (Ativan, Xanax, Klonopin, Valium, etc) use including, but not limited to, sedation, tolerance, risk for addiction/dependence. Do not drink alcohol while taking benzodiazepines due to risk of trouble breathing and potential death. Do not drive or operate heavy machinery until it is known how the drug affects you. Discuss with physician or pharmacist before ever taking a benzodiazepine with a narcotic/opioid pain medication.     Patient Education   Collaborative Care Psychiatry Service  What to Expect  Here's what to expect from your Collaborative Care Psychiatry Service (CCPS).   About CCPS  CCPS means 2 people work together to help you get better.  "You'll meet with a behavioral health clinician and a psychiatric doctor. A behavioral health clinician helps people with mental health problems by talking with them. A psychiatric doctor helps people by giving them medicine.  How it works  At every visit, you'll see the behavioral health clinician (BHC) first. They'll talk with you about how you're doing and teach you how to feel better.   Then you'll see the psychiatric doctor. This doctor can help you deal with troubling thoughts and feelings by giving you medicine. They'll make sure you know the plan for your care.   CCPS usually takes 3 to 6 visits. If you need more visits, we may have you start seeing a different psychiatric doctor for ongoing care.  If you have any questions or concerns, we'll be glad to talk with you.  About visits  Be open  At your visits, please talk openly about your problems. It may feel hard, but it's the best way for us to help you.  Cancelling visits  If you can't come to your visit, please call us right away at [phone]. If you don't cancel at least 24 hours (1 full day) before your visit, that's \"late cancellation.\"  Being late to visits  Being very late is the same as not showing up. You will be a \"no show\" if:  Your appointment starts with a BHC, and you're more than 15 minutes late for a 30-minute (half hour) visit. This will also cancel your appointment with the psychiatric doctor.  Your appointment is with a psychiatric doctor only, and you're more than 15 minutes late for a 30-minute (half hour) visit.  Your appointment is with a psychiatric doctor only, and you're more than 30 minutes late for a 60-minute (full hour) visit.  If you cancel late or don't show up 2 times within 6 months, we may end your care.   Getting help between visits  If you need help between visits, you can call us Monday to Friday from 8 a.m. to 4:30 p.m. at [phone].  Emergency care  Call 911 or go to the nearest emergency department if your " life or someone else's life is in danger.  Call 988 anytime to reach the national Suicide and Crisis hotline.  Medicine refills  To refill your medicine, call your pharmacy. You can also call [facility]'s Behavioral Access at [phone], Monday to Friday, 8 a.m. to 4:30 p.m. It can take 1 to 3 business days to get a refill.   Forms, letters, and tests  You may have papers to fill out, like FMLA, short-term disability, and workability. We can help you with these forms at your visits, but you must have an appointment. You may need more than 1 visit for this, to be in an intensive therapy program, or both.  Before we can give you medicine for ADHD, we may refer you to get tested for it or confirm it another way.  We may not be able to give you an emotional support animal letter.  We don't do mental health checks ordered by the court.   We don't do mental health testing, but we can refer you to get tested.   Thank you for choosing us for your care.  For informational purposes only. Not to replace the advice of your health care provider. Copyright   2022 [facility]. All rights reserved. [employer] [id number] - 12/22.

## 2024-10-20 ENCOUNTER — MYC MEDICAL ADVICE (OUTPATIENT)
Dept: FAMILY MEDICINE | Facility: CLINIC | Age: 49
End: 2024-10-20
Payer: COMMERCIAL

## 2024-10-21 ENCOUNTER — MYC MEDICAL ADVICE (OUTPATIENT)
Dept: PSYCHIATRY | Facility: CLINIC | Age: 49
End: 2024-10-21
Payer: COMMERCIAL

## 2024-10-21 ASSESSMENT — ANXIETY QUESTIONNAIRES
3. WORRYING TOO MUCH ABOUT DIFFERENT THINGS: SEVERAL DAYS
8. IF YOU CHECKED OFF ANY PROBLEMS, HOW DIFFICULT HAVE THESE MADE IT FOR YOU TO DO YOUR WORK, TAKE CARE OF THINGS AT HOME, OR GET ALONG WITH OTHER PEOPLE?: SOMEWHAT DIFFICULT
7. FEELING AFRAID AS IF SOMETHING AWFUL MIGHT HAPPEN: SEVERAL DAYS
6. BECOMING EASILY ANNOYED OR IRRITABLE: NEARLY EVERY DAY
GAD7 TOTAL SCORE: 13
IF YOU CHECKED OFF ANY PROBLEMS ON THIS QUESTIONNAIRE, HOW DIFFICULT HAVE THESE PROBLEMS MADE IT FOR YOU TO DO YOUR WORK, TAKE CARE OF THINGS AT HOME, OR GET ALONG WITH OTHER PEOPLE: SOMEWHAT DIFFICULT
4. TROUBLE RELAXING: NEARLY EVERY DAY
5. BEING SO RESTLESS THAT IT IS HARD TO SIT STILL: SEVERAL DAYS
1. FEELING NERVOUS, ANXIOUS, OR ON EDGE: NEARLY EVERY DAY
7. FEELING AFRAID AS IF SOMETHING AWFUL MIGHT HAPPEN: SEVERAL DAYS
2. NOT BEING ABLE TO STOP OR CONTROL WORRYING: SEVERAL DAYS

## 2024-10-21 ASSESSMENT — PATIENT HEALTH QUESTIONNAIRE - PHQ9
10. IF YOU CHECKED OFF ANY PROBLEMS, HOW DIFFICULT HAVE THESE PROBLEMS MADE IT FOR YOU TO DO YOUR WORK, TAKE CARE OF THINGS AT HOME, OR GET ALONG WITH OTHER PEOPLE: VERY DIFFICULT
SUM OF ALL RESPONSES TO PHQ QUESTIONS 1-9: 11
SUM OF ALL RESPONSES TO PHQ QUESTIONS 1-9: 11

## 2024-10-22 NOTE — TELEPHONE ENCOUNTER
Patient wants to know if marijuana is contraindicated while taking a stimulant.     Tiffanie Kelly RN on 10/22/2024 at 8:23 AM

## 2024-10-23 ENCOUNTER — ANCILLARY PROCEDURE (OUTPATIENT)
Dept: GENERAL RADIOLOGY | Facility: CLINIC | Age: 49
End: 2024-10-23
Attending: FAMILY MEDICINE
Payer: COMMERCIAL

## 2024-10-23 ENCOUNTER — OFFICE VISIT (OUTPATIENT)
Dept: FAMILY MEDICINE | Facility: CLINIC | Age: 49
End: 2024-10-23
Payer: COMMERCIAL

## 2024-10-23 VITALS
SYSTOLIC BLOOD PRESSURE: 125 MMHG | HEART RATE: 80 BPM | TEMPERATURE: 98.5 F | DIASTOLIC BLOOD PRESSURE: 81 MMHG | WEIGHT: 180.7 LBS | BODY MASS INDEX: 31.51 KG/M2 | OXYGEN SATURATION: 100 % | RESPIRATION RATE: 18 BRPM

## 2024-10-23 DIAGNOSIS — J18.9 PNEUMONIA OF LEFT LUNG DUE TO INFECTIOUS ORGANISM, UNSPECIFIED PART OF LUNG: ICD-10-CM

## 2024-10-23 DIAGNOSIS — R05.3 POST-COVID CHRONIC COUGH: ICD-10-CM

## 2024-10-23 DIAGNOSIS — U09.9 POST-COVID CHRONIC DYSPNEA: ICD-10-CM

## 2024-10-23 DIAGNOSIS — R06.09 POST-COVID CHRONIC DYSPNEA: ICD-10-CM

## 2024-10-23 DIAGNOSIS — U09.9 POST-COVID CHRONIC COUGH: Primary | ICD-10-CM

## 2024-10-23 DIAGNOSIS — U09.9 POST-COVID CHRONIC COUGH: ICD-10-CM

## 2024-10-23 DIAGNOSIS — R05.3 POST-COVID CHRONIC COUGH: Primary | ICD-10-CM

## 2024-10-23 PROCEDURE — 71046 X-RAY EXAM CHEST 2 VIEWS: CPT | Mod: TC | Performed by: RADIOLOGY

## 2024-10-23 PROCEDURE — 99214 OFFICE O/P EST MOD 30 MIN: CPT | Performed by: FAMILY MEDICINE

## 2024-10-23 RX ORDER — AZITHROMYCIN 250 MG/1
TABLET, FILM COATED ORAL
Qty: 6 TABLET | Refills: 0 | Status: SHIPPED | OUTPATIENT
Start: 2024-10-23 | End: 2024-10-28

## 2024-10-23 RX ORDER — BUDESONIDE AND FORMOTEROL FUMARATE DIHYDRATE 160; 4.5 UG/1; UG/1
2 AEROSOL RESPIRATORY (INHALATION) 2 TIMES DAILY
Qty: 10.2 G | Refills: 1 | Status: SHIPPED | OUTPATIENT
Start: 2024-10-23 | End: 2024-10-30

## 2024-10-23 NOTE — PROGRESS NOTES
Assessment & Plan     Post-COVID chronic cough  Covid one month ago with persisting cough   On exam left end expiratory wheeze -   CXR shows possible left lingular haziness -   Will Rx abx and symbicort   Pt will call or RTC if symptoms worsen or do not improve.   - XR Chest 2 Views; Future  - budesonide-formoterol (SYMBICORT) 160-4.5 MCG/ACT Inhaler; Inhale 2 puffs into the lungs 2 times daily.    Post-COVID chronic dyspnea     - XR Chest 2 Views; Future  - budesonide-formoterol (SYMBICORT) 160-4.5 MCG/ACT Inhaler; Inhale 2 puffs into the lungs 2 times daily.    Pneumonia of left lung due to infectious organism, unspecified part of lung   As above   - budesonide-formoterol (SYMBICORT) 160-4.5 MCG/ACT Inhaler; Inhale 2 puffs into the lungs 2 times daily.  - azithromycin (ZITHROMAX) 250 MG tablet; Take 2 tablets (500 mg) by mouth daily for 1 day, THEN 1 tablet (250 mg) daily for 4 days.                Juan Ramon Ragland is a 49 year old, presenting for the following health issues:  Covid Concern        10/23/2024     7:33 AM   Additional Questions   Roomed by Ayleen     History of Present Illness       Reason for visit:  Follow up for covid, plus new illness affecting lungs more than covid did (want them listened to)    She eats 2-3 servings of fruits and vegetables daily.She consumes 0 sweetened beverage(s) daily.She exercises with enough effort to increase her heart rate 20 to 29 minutes per day.  She exercises with enough effort to increase her heart rate 5 days per week.   She is taking medications regularly.     Discuss about new symptoms that have developed that may be possible rsv symptoms   -Stuffy nose, Congestion in the lungs, Fatigue and fluctuating body temperature, Body sweats   - Tightness/Achiness in the neck that radiating down the right shoulder             Review of Systems  Constitutional, HEENT, cardiovascular, pulmonary, GI, , musculoskeletal, neuro, skin, endocrine and psych systems are  negative, except as otherwise noted.      Objective    /81 (BP Location: Right arm, Patient Position: Sitting, Cuff Size: Adult Regular)   Pulse 80   Temp 98.5  F (36.9  C) (Oral)   Resp 18   Wt 82 kg (180 lb 11.2 oz)   LMP 10/08/2024   SpO2 100%   Breastfeeding No   BMI 31.51 kg/m    Body mass index is 31.51 kg/m .  Physical Exam   GENERAL: alert and no distress  HENT: ear canals and TM's normal, nose and mouth without ulcers or lesions  NECK: no adenopathy, no asymmetry, masses, or scars  RESP: expiratory wheezes throughout  CV: regular rate and rhythm, normal S1 S2, no S3 or S4, no murmur, click or rub, no peripheral edema     CXR - Reviewed and interpreted by me haziness over left lingula area         Signed Electronically by: Dorothy Guaman DO

## 2024-10-27 ENCOUNTER — MYC MEDICAL ADVICE (OUTPATIENT)
Dept: FAMILY MEDICINE | Facility: CLINIC | Age: 49
End: 2024-10-27
Payer: COMMERCIAL

## 2024-10-27 DIAGNOSIS — R05.3 POST-COVID CHRONIC COUGH: Primary | ICD-10-CM

## 2024-10-27 DIAGNOSIS — U09.9 POST-COVID CHRONIC COUGH: Primary | ICD-10-CM

## 2024-10-27 DIAGNOSIS — L50.9 HIVES: ICD-10-CM

## 2024-10-28 ENCOUNTER — MYC MEDICAL ADVICE (OUTPATIENT)
Dept: FAMILY MEDICINE | Facility: CLINIC | Age: 49
End: 2024-10-28
Payer: COMMERCIAL

## 2024-10-28 NOTE — TELEPHONE ENCOUNTER
Dr Guaman    See pt Mychart message regarding  breyna (budesonide 80 mcg and fumarate dihydrate 4.5 mcg     Darlin Underwood RN   [facility]

## 2024-10-29 ENCOUNTER — MYC MEDICAL ADVICE (OUTPATIENT)
Dept: PSYCHIATRY | Facility: CLINIC | Age: 49
End: 2024-10-29
Payer: COMMERCIAL

## 2024-10-29 DIAGNOSIS — F90.0 ATTENTION DEFICIT HYPERACTIVITY DISORDER (ADHD), PREDOMINANTLY INATTENTIVE TYPE: Primary | ICD-10-CM

## 2024-10-29 RX ORDER — PREDNISONE 20 MG/1
TABLET ORAL
Qty: 5 TABLET | Refills: 0 | Status: SHIPPED | OUTPATIENT
Start: 2024-10-29

## 2024-10-29 NOTE — TELEPHONE ENCOUNTER
Confirmed the previous MyC message regarding inhaler has been routed to Dr. Guaman.    Lady GOFF RN

## 2024-10-30 ENCOUNTER — VIRTUAL VISIT (OUTPATIENT)
Dept: FAMILY MEDICINE | Facility: CLINIC | Age: 49
End: 2024-10-30
Payer: COMMERCIAL

## 2024-10-30 DIAGNOSIS — T78.40XA ALLERGIC REACTION, INITIAL ENCOUNTER: ICD-10-CM

## 2024-10-30 DIAGNOSIS — R05.3 POST-COVID CHRONIC COUGH: Primary | ICD-10-CM

## 2024-10-30 DIAGNOSIS — U09.9 POST-COVID CHRONIC COUGH: Primary | ICD-10-CM

## 2024-10-30 PROCEDURE — 99443 PR PHYSICIAN TELEPHONE EVALUATION 21-30 MIN: CPT | Mod: 93 | Performed by: FAMILY MEDICINE

## 2024-10-30 RX ORDER — PREDNISONE 20 MG/1
TABLET ORAL
Qty: 11 TABLET | Refills: 0 | Status: SHIPPED | OUTPATIENT
Start: 2024-10-30

## 2024-10-30 NOTE — LETTER
October 30, 2024      Ellyn Mcgee  [address] [address]        To Whom It May Concern:    Ellyn Mcgee was seen in our clinic for prior illness and is not longer contagious. She may return to work with the following:     Limited to light duty - No bending/stooping activities,                                      Allow stool to rest when standing or walking too long,              No lifting or pushing > 20 pounds.    Restrictions are recommended for 11/7/24 - 11/16/24.        Sincerely,           Dorothy Guaman, DO  Family Medicine

## 2024-10-30 NOTE — PROGRESS NOTES
Ellyn is a 49 year old who is being evaluated via a billable telephone visit.    What phone number would you like to be contacted at? [phone]  How would you like to obtain your AVS? Shahram  Originating Location (pt. Location): Home    Distant Location (provider location):  On-site    Assessment & Plan     Post-COVID chronic cough  Post covid cough and wheeze -   She tried the Symbicort but had allergic reaction   Will start prednisone taper instead  She has been out of work this time -   Wrote letter for restrictions for her to RTW - see letter in Epic  - predniSONE (DELTASONE) 20 MG tablet; Take  2 tabs daily x 3 days, then 1 tab daily x 3 days, then 1/2 tab daily x 3 days.    Allergic reaction, initial encounter  As above                 Subjective   Ellyn is a 49 year old, presenting for the following health issues:  No chief complaint on file.        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 shifts 5:30-10:30 x2 and hour shift   Will need to walk and will be bending over - this is what causes some dizziness   Tends to need to rest after 15-20 minutes of activity (such as picking up toys or other)    Answers submitted by the patient for this visit:  Patient Health Questionnaire (Submitted on 10/29/2024)  If you checked off any problems, how difficult have these problems made it for you to do your work, take care of things at home, or get along with other people?: Somewhat difficult  PHQ9 TOTAL SCORE: 8            Review of  Systems  Constitutional, HEENT, cardiovascular, pulmonary, GI, , musculoskeletal, neuro, skin, endocrine and psych systems are negative, except as otherwise noted.      Objective           Vitals:  No vitals were obtained today due to virtual visit.    Physical Exam   General: Alert and no distress //Respiratory: No audible wheeze, but she does have coughing throughout call or shortness of breath // Psychiatric:  Appropriate affect, tone, and pace of words            Phone call duration: 26 minutes  Signed Electronically by: Doorthy Guaman DO

## 2024-10-31 RX ORDER — DEXTROAMPHETAMINE SACCHARATE, AMPHETAMINE ASPARTATE, DEXTROAMPHETAMINE SULFATE AND AMPHETAMINE SULFATE 3.75; 3.75; 3.75; 3.75 MG/1; MG/1; MG/1; MG/1
15 TABLET ORAL DAILY PRN
Qty: 30 TABLET | Refills: 0 | Status: SHIPPED | OUTPATIENT
Start: 2024-10-31

## 2024-11-05 ENCOUNTER — MYC MEDICAL ADVICE (OUTPATIENT)
Dept: FAMILY MEDICINE | Facility: CLINIC | Age: 49
End: 2024-11-05
Payer: COMMERCIAL

## 2024-11-05 ENCOUNTER — FCC EXTENDED DOCUMENTATION (OUTPATIENT)
Dept: PSYCHOLOGY | Facility: CLINIC | Age: 49
End: 2024-11-05
Payer: COMMERCIAL

## 2024-11-05 NOTE — PROGRESS NOTES
Discharge Summary  Multiple Sessions    Client Name: Ellyn Mcgee MRN#: [id number] YOB: 1975      Intake / Discharge Date: 2020 - 2024; discharged 2024      DSM5 Diagnoses: (Sustained by DSM5 Criteria Listed Above)  Diagnoses: Attention-Deficit/Hyperactivity Disorder  314.01 (F90.1) Predominantly hyperactive / impulsive presentation Severity: Moderate  296.35 (F33.41)  Major Depressive Disorder, Recurrent Episode, In partial remission _  300.02 (F41.1) Generalized Anxiety Disorder    Psychosocial & Contextual Factors: Feeling overwhelmed by organizing home and advocating for children's needs;  and daughter(s?) are on autism spectrum    Older daughter is experiencing dizziness/lethargy and was dx with POTS; some financial and friend-related stress; is interested in a deeper dive into emotional state and core beliefs through ACT; new son born 2020; roommate/former friend was finally evicted from their home (lived there for 3-4 years and did not paid any rent for over 1-2 years); pt has been experiencing mild panic attacks; daughter (age 10) just started ADHD medication; history of anxious attachment stemming from relationship with father (he  8 years ago); is currently pregnant (high-risk due to advanced maternal age) and baby is due Dec. 2020; is in couples therapy with  due to frequent arguments and his emotional affair earlier in the year; financial stress; history of ADHD (hyperactive type).    PROMIS-10 Scores      2024    12:21 AM 2024     6:05 PM 10/6/2024    11:18 PM   PROMIS-10 Total Score w/o Sub Scores   PROMIS TOTAL - SUBSCORES 18 21 20    20       Presenting Concern:  Childhood trauma and wanted to work through relationship with father; anxiety, depression, mood swings, executive functioning deficits, and impulsivity      Reason for Discharge:  Client did not return and Referred to EMDR with Sheng Garcia  at Time of Last Encounter:   Comments:   Patient was engaged in structured, meaningful activity, and was engaged in couple's therapy; ongoing depression and anxiety     Risk Management:    [place] Suicide Severity Rating Scale (Short Version)      9/8/2020    10:28 AM 12/11/2020     1:00 PM 12/23/2020     8:05 AM 1/12/2023     5:30 PM   [place] Suicide Severity Rating (Short Version)   Over the past 2 weeks have you felt down, depressed, or hopeless? yes yes yes yes   Comments    on medication   Over the past 2 weeks have you had thoughts of killing yourself? no no no no   Have you ever attempted to kill yourself? no no no no        Client has had a history of suicidal ideation: passive, fleeting  Recommended that patient call 911 or go to the local ED should there be a change in any of these risk factors      Referred To:  EMDR with Sheng Gotti and to continue psychiatry and PCP visits        Keesha Eller   11/5/2024

## 2024-11-06 ENCOUNTER — MYC MEDICAL ADVICE (OUTPATIENT)
Dept: FAMILY MEDICINE | Facility: CLINIC | Age: 49
End: 2024-11-06
Payer: COMMERCIAL

## 2024-11-06 NOTE — LETTER
11/6/2024      Ellyn Mcgee  [address] [address]           To Whom It May Concern:     Ellyn Mcgee was seen in our clinic for prior illness and is not longer contagious. She may return to work with the following:     -She will need to work no more than 5.5 hours with a 30 minute break  -Limited to light duty - No bending/stooping activities,                                      Allow stool to rest when standing or walking too long,              No lifting or pushing > 20 pounds.       Restrictions are recommended for 11/7/24 - 11/16/24.           Sincerely,              Dorothy Guaman, DO

## 2024-11-06 NOTE — TELEPHONE ENCOUNTER
PN,  Please see below [employer] message and advise.  Pended updated letter- not sure what you want to include for accomodation.  Please see encounter from yesterday 11/5 as well  Thanks,  Yocasta YOUNGER RN

## 2024-11-12 ENCOUNTER — MYC MEDICAL ADVICE (OUTPATIENT)
Dept: FAMILY MEDICINE | Facility: CLINIC | Age: 49
End: 2024-11-12
Payer: COMMERCIAL

## 2024-11-12 DIAGNOSIS — E28.2 PCOS (POLYCYSTIC OVARIAN SYNDROME): ICD-10-CM

## 2024-11-12 RX ORDER — METFORMIN HYDROCHLORIDE 500 MG/1
500 TABLET, EXTENDED RELEASE ORAL
Qty: 90 TABLET | Refills: 1 | Status: SHIPPED | OUTPATIENT
Start: 2024-11-12

## 2024-11-12 NOTE — TELEPHONE ENCOUNTER
PN,  Please see below MyChart message and advise.  Pended order if approved  Thanks,  Hillary ERNANDEZ RN

## 2024-11-15 ENCOUNTER — ANESTHESIA EVENT (OUTPATIENT)
Dept: SURGERY | Facility: AMBULATORY SURGERY CENTER | Age: 49
End: 2024-11-15
Payer: COMMERCIAL

## 2024-11-18 ENCOUNTER — HOSPITAL ENCOUNTER (OUTPATIENT)
Facility: AMBULATORY SURGERY CENTER | Age: 49
Discharge: HOME | End: 2024-11-18
Attending: SPECIALIST
Payer: COMMERCIAL

## 2024-11-18 ENCOUNTER — ANESTHESIA (OUTPATIENT)
Dept: SURGERY | Facility: AMBULATORY SURGERY CENTER | Age: 49
End: 2024-11-18
Payer: COMMERCIAL

## 2024-11-18 VITALS
SYSTOLIC BLOOD PRESSURE: 106 MMHG | DIASTOLIC BLOOD PRESSURE: 67 MMHG | TEMPERATURE: 97.2 F | RESPIRATION RATE: 12 BRPM | OXYGEN SATURATION: 99 % | HEART RATE: 70 BPM

## 2024-11-18 LAB — COLONOSCOPY: NORMAL

## 2024-11-18 RX ORDER — LIDOCAINE 40 MG/G
CREAM TOPICAL
Status: DISCONTINUED | OUTPATIENT
Start: 2024-11-18 | End: 2024-11-19 | Stop reason: HOSPADM

## 2024-11-18 RX ORDER — PROPOFOL 10 MG/ML
INJECTION, EMULSION INTRAVENOUS CONTINUOUS PRN
Status: DISCONTINUED | OUTPATIENT
Start: 2024-11-18 | End: 2024-11-18

## 2024-11-18 RX ORDER — PROPOFOL 10 MG/ML
INJECTION, EMULSION INTRAVENOUS PRN
Status: DISCONTINUED | OUTPATIENT
Start: 2024-11-18 | End: 2024-11-18

## 2024-11-18 RX ORDER — ONDANSETRON 2 MG/ML
4 INJECTION INTRAMUSCULAR; INTRAVENOUS EVERY 30 MIN PRN
Status: DISCONTINUED | OUTPATIENT
Start: 2024-11-18 | End: 2024-11-19 | Stop reason: HOSPADM

## 2024-11-18 RX ORDER — LIDOCAINE HYDROCHLORIDE 20 MG/ML
INJECTION, SOLUTION INFILTRATION; PERINEURAL PRN
Status: DISCONTINUED | OUTPATIENT
Start: 2024-11-18 | End: 2024-11-18

## 2024-11-18 RX ORDER — ACETAMINOPHEN 325 MG/1
975 TABLET ORAL ONCE
Status: DISCONTINUED | OUTPATIENT
Start: 2024-11-18 | End: 2024-11-19 | Stop reason: HOSPADM

## 2024-11-18 RX ORDER — ONDANSETRON 4 MG/1
4 TABLET, ORALLY DISINTEGRATING ORAL EVERY 30 MIN PRN
Status: DISCONTINUED | OUTPATIENT
Start: 2024-11-18 | End: 2024-11-19 | Stop reason: HOSPADM

## 2024-11-18 RX ORDER — OXYCODONE HYDROCHLORIDE 10 MG/1
10 TABLET ORAL EVERY 4 HOURS PRN
Status: DISCONTINUED | OUTPATIENT
Start: 2024-11-18 | End: 2024-11-19 | Stop reason: HOSPADM

## 2024-11-18 RX ORDER — NALOXONE HYDROCHLORIDE 0.4 MG/ML
0.1 INJECTION, SOLUTION INTRAMUSCULAR; INTRAVENOUS; SUBCUTANEOUS
Status: DISCONTINUED | OUTPATIENT
Start: 2024-11-18 | End: 2024-11-19 | Stop reason: HOSPADM

## 2024-11-18 RX ORDER — DEXAMETHASONE SODIUM PHOSPHATE 4 MG/ML
4 INJECTION, SOLUTION INTRA-ARTICULAR; INTRALESIONAL; INTRAMUSCULAR; INTRAVENOUS; SOFT TISSUE
Status: DISCONTINUED | OUTPATIENT
Start: 2024-11-18 | End: 2024-11-19 | Stop reason: HOSPADM

## 2024-11-18 RX ORDER — GLYCOPYRROLATE 0.2 MG/ML
INJECTION, SOLUTION INTRAMUSCULAR; INTRAVENOUS PRN
Status: DISCONTINUED | OUTPATIENT
Start: 2024-11-18 | End: 2024-11-18

## 2024-11-18 RX ORDER — FENTANYL CITRATE 0.05 MG/ML
50 INJECTION, SOLUTION INTRAMUSCULAR; INTRAVENOUS
Status: DISCONTINUED | OUTPATIENT
Start: 2024-11-18 | End: 2024-11-19 | Stop reason: HOSPADM

## 2024-11-18 RX ORDER — OXYCODONE HYDROCHLORIDE 5 MG/1
5 TABLET ORAL EVERY 4 HOURS PRN
Status: DISCONTINUED | OUTPATIENT
Start: 2024-11-18 | End: 2024-11-19 | Stop reason: HOSPADM

## 2024-11-18 RX ADMIN — LIDOCAINE HYDROCHLORIDE 2 ML: 20 INJECTION, SOLUTION INFILTRATION; PERINEURAL at 10:43

## 2024-11-18 RX ADMIN — PROPOFOL 30 MG: 10 INJECTION, EMULSION INTRAVENOUS at 10:43

## 2024-11-18 RX ADMIN — GLYCOPYRROLATE 0.2 MG: 0.2 INJECTION, SOLUTION INTRAMUSCULAR; INTRAVENOUS at 10:43

## 2024-11-18 RX ADMIN — PROPOFOL 200 MCG/KG/MIN: 10 INJECTION, EMULSION INTRAVENOUS at 10:43

## 2024-11-18 NOTE — ANESTHESIA PREPROCEDURE EVALUATION
Anesthesia Pre-Procedure Evaluation    Patient: Ellyn Mcgee   MRN: [id number] : 1975        Procedure : Procedure(s):  COLONOSCOPY          Past Medical History:   Diagnosis Date    Abnormal Pap smear     Colposcopy    Adjustment disorder with mixed anxiety and depressed mood 2012    Anemia     In Past    Anxiety     Chlamydia trachomatis infection of other specified site     Depression     Depressive disorder     Not diagnosed until college...later diagnosed with TEE    Fertility problem     Gastroesophageal reflux disease     Lyme disease 2010    ?false positive vs positve CMV - resolved    Moderate dysplasia of cervix     Other and unspecified adverse effect of drug, medicinal and biological substance     insulin resistent    Pneumonia     Varicosities     Wounds and injuries     fall down stairs, PT for back, pain meds      Past Surgical History:   Procedure Laterality Date    BIOPSY  , , ,     mole on stomach and mole on back of thigh    CONIZATION CERVIX,KNIFE/LASER  1995    SURGICAL HISTORY OF -       [facility] teeth      Allergies   Allergen Reactions    Cyclogyl [Cyclopentolate Hcl] Nausea and Vomiting    Cyclopentolate     Hydrocodone      anxiety  itching    Hydrocodone-Acetaminophen     Morphine And Codeine Itching    Seasonal Allergies     Symbicort [Budesonide-Formoterol Fumarate] Rash      Social History     Tobacco Use    Smoking status: Never    Smokeless tobacco: Never   Substance Use Topics    Alcohol use: Yes     Types: 1 Standard drinks or equivalent per week     Comment: Very Occasional      Wt Readings from Last 1 Encounters:   10/23/24 82 kg (180 lb 11.2 oz)        Anesthesia Evaluation   Pt has had prior anesthetic.         ROS/MED HX  ENT/Pulmonary:     (+)                              recent URI,          Neurologic:       Cardiovascular:       METS/Exercise Tolerance:     Hematologic:       Musculoskeletal:       GI/Hepatic:     (+)  "GERD,                   Renal/Genitourinary:       Endo:     (+)  type II DM,                    Psychiatric/Substance Use:     (+) psychiatric history        Infectious Disease:       Malignancy:       Other:            Physical Exam    Airway        Mallampati: II    Neck ROM: full     Respiratory Devices and Support         Dental       (+) Minor Abnormalities - some fillings, tiny chips      Cardiovascular   cardiovascular exam normal          Pulmonary   pulmonary exam normal                OUTSIDE LABS:  CBC:   Lab Results   Component Value Date    WBC 8.2 04/17/2024    WBC 9.4 01/12/2023    HGB 12.5 04/17/2024    HGB 12.8 01/12/2023    HCT 37.4 04/17/2024    HCT 39.1 01/12/2023     04/17/2024     01/12/2023     BMP:   Lab Results   Component Value Date     05/14/2024     03/07/2023    POTASSIUM 4.2 05/14/2024    POTASSIUM 4.3 03/07/2023    CHLORIDE 105 05/14/2024    CHLORIDE 102 03/07/2023    CO2 23 05/14/2024    CO2 23 03/07/2023    BUN 15.7 05/14/2024    BUN 11.3 03/07/2023    CR 0.74 05/14/2024    CR 0.69 03/07/2023    GLC 98 05/14/2024     (H) 08/01/2023     COAGS: No results found for: \"PTT\", \"INR\", \"FIBR\"  POC:   Lab Results   Component Value Date     (H) 05/13/2010    HCG Negative 02/22/2011     HEPATIC:   Lab Results   Component Value Date    ALBUMIN 4.6 05/14/2024    PROTTOTAL 7.3 05/14/2024    ALT 19 05/14/2024    AST 21 05/14/2024    ALKPHOS 59 05/14/2024    BILITOTAL 0.3 05/14/2024     OTHER:   Lab Results   Component Value Date    A1C 5.5 05/14/2024    RAJINDER 9.3 05/14/2024    LIPASE 69 02/08/2011    TSH 2.03 05/14/2024    T4 0.97 05/14/2024    CRP 9.6 (H) 08/24/2010    SED 22 (H) 08/24/2010       Anesthesia Plan    ASA Status:  2       Anesthesia Type: MAC.              Consents    Anesthesia Plan(s) and associated risks, benefits, and realistic alternatives discussed. Questions answered and patient/representative(s) expressed understanding.     - Discussed: " Risks, Benefits and Alternatives for the PROCEDURE were discussed     - Discussed with:  Patient      - Extended Intubation/Ventilatory Support Discussed: No.      - Patient is DNR/DNI Status: No     Use of blood products discussed: No .     Postoperative Care            Comments:               Rachana Jacobs MD    I have reviewed the pertinent notes and labs in the chart from the past 30 days and (re)examined the patient.  Any updates or changes from those notes are reflected in this note.

## 2024-11-18 NOTE — ANESTHESIA CARE TRANSFER NOTE
Patient: Ellyn Mcgee    Procedure: Procedure(s):  COLONOSCOPY       Diagnosis: Screen for colon cancer [Z12.11]  Diagnosis Additional Information: No value filed.    Anesthesia Type:   MAC     Note:    Oropharynx: oropharynx clear of all foreign objects and spontaneously breathing  Level of Consciousness: drowsy  Oxygen Supplementation: face mask  Level of Supplemental Oxygen (L/min / FiO2): 10  Independent Airway: airway patency satisfactory and stable  Dentition: dentition unchanged  Vital Signs Stable: post-procedure vital signs reviewed and stable  Report to RN Given: handoff report given  Patient transferred to: Phase II    Handoff Report: Identifed the Patient, Identified the Reponsible Provider, Reviewed the pertinent medical history, Discussed the surgical course, Reviewed Intra-OP anesthesia mangement and issues during anesthesia, Set expectations for post-procedure period and Allowed opportunity for questions and acknowledgement of understanding      Vitals:  Vitals Value Taken Time   /60 11/18/24 1115   Temp 97.2  F (36.2  C) 11/18/24 1115   Pulse 76 11/18/24 1115   Resp 16 11/18/24 1115   SpO2 100 % 11/18/24 1115       Electronically Signed By: BENJY Haider CRNA  November 18, 2024  11:16 AM

## 2024-11-18 NOTE — H&P
General Surgery H&P  Ellyn Mcgee MRN# [id number]   Age/Sex: 49 year old female YOB: 1975     Reason for visit: Colonoscopy       Referring physician: Dorothy Guaman                   Assessment and Plan:   Assessment:  colonoscopy    Plan:  -To the OR for colonoscopy  -Risk and benefits of procedure explained detail to the patient.  Risks include infection, bleeding, damage to the surrounding structures and possible perforation of the hollow organs.  Patient verbalized understanding provided consent to undergo the procedure above.          Chief Complaint:   Presenting for colonoscopy     History is obtained from the patient    HPI:   Ellyn Mcgee is a 49 year old female who presents for colonoscopy.  Patient tolerated the prep well.    This is patient's first colonoscopy.  Family history is  negative for colon cancer.  No new complaints.           Past Medical History:     Past Medical History:   Diagnosis Date    Abnormal Pap smear     Colposcopy    Adjustment disorder with mixed anxiety and depressed mood 04/04/2012    Anemia     In Past    Anxiety     Chlamydia trachomatis infection of other specified site 1993    Depression     Depressive disorder 1979    Not diagnosed until college...later diagnosed with TEE    Fertility problem     Gastroesophageal reflux disease     Lyme disease 09/08/2010    ?false positive vs positve CMV - resolved    Moderate dysplasia of cervix 1995    Other and unspecified adverse effect of drug, medicinal and biological substance     insulin resistent    Pneumonia     Varicosities     Wounds and injuries     fall down stairs, PT for back, pain meds              Past Surgical History:     Past Surgical History:   Procedure Laterality Date    BIOPSY  1989, 2017, 2019, 2021    mole on stomach and mole on back of thigh    CONIZATION CERVIX,KNIFE/LASER  01/01/1995    SURGICAL HISTORY OF -       [place] teeth             Social History:    reports that she has never  smoked. She has never used smokeless tobacco. She reports current alcohol use. She reports that she does not currently use drugs after having used the following drugs: Marijuana and Psilocybin.           Family History:     Family History   Problem Relation Age of Onset    Cancer Mother         vocal cord cancer    Lipids Mother     Myocardial Infarction Mother     Hyperlipidemia Mother     Other Cancer Mother         throat    Depression Mother     Anxiety Disorder Mother     Melanoma Father     Cancer Father         B-cell lymphoma, melanoma    Lipids Father     Heart Disease Father         open heart surgery    Cerebrovascular Disease Father         mini strokes, multiple    Hyperlipidemia Father             Other Cancer Father         melanoma, b-cell lymphoma    Depression Father         undiagnosed    Anxiety Disorder Father         undiagnosed    Mental Illness Father         Undiagnosed Narcissitic Personality Disorder    Substance Abuse Father         alcohol abuse    Alcohol/Drug Maternal Grandfather     Substance Abuse Maternal Grandfather         Alcoholic    Diabetes Paternal Grandmother         think it was type II    Obesity Paternal Grandmother     Thyroid Disease Brother     Crohn's Disease Sister     Diabetes Paternal Uncle     Anxiety Disorder Paternal Half-Sister     Diabetes Other         paternal uncle has, not sure of type              Allergies:     Allergies   Allergen Reactions    Cyclogyl [Cyclopentolate Hcl] Nausea and Vomiting    Cyclopentolate     Hydrocodone      anxiety  itching    Hydrocodone-Acetaminophen     Morphine And Codeine Itching    Seasonal Allergies     Symbicort [Budesonide-Formoterol Fumarate] Rash              Medications:     Prior to Admission medications    Medication Sig Start Date End Date Taking? Authorizing Provider   amphetamine-dextroamphetamine (ADDERALL XR) 25 MG 24 hr capsule Take 1 capsule (25 mg) by mouth every morning. 24  Yes Sherlyn Wang  DO   ibuprofen (ADVIL/MOTRIN) 200 MG tablet Take 200-400 mg by mouth every 4 hours as needed for pain OTC   Yes Reported, Patient   LORazepam (ATIVAN) 1 MG tablet Take 0.5-1 tablets (0.5-1 mg) by mouth daily as needed (anxiety and sleep) 6/13/24  Yes Sherlyn Wang DO   PARoxetine (PAXIL-CR) 25 MG 24 hr tablet Take 2 tablets (50 mg) by mouth every morning 6/13/24  Yes Sherlyn Wang DO   amphetamine-dextroamphetamine (ADDERALL XR) 15 MG 24 hr capsule Take 1 capsule (15 mg) by mouth daily as needed (ADHD). 10/31/24   Sherlyn Wang DO   amphetamine-dextroamphetamine (ADDERALL XR) 25 MG 24 hr capsule Take 1 capsule (25 mg) by mouth daily 8/13/24   Sherlyn Wang DO   amphetamine-dextroamphetamine (ADDERALL) 15 MG tablet Take 1 tablet (15 mg) by mouth daily as needed (ADHD). 10/31/24   Sherlyn Wang DO   metFORMIN (GLUCOPHAGE XR) 500 MG 24 hr tablet Take 1 tablet (500 mg) by mouth daily (with dinner). 11/12/24   Dorothy Guaman DO   predniSONE (DELTASONE) 20 MG tablet Take  2 tabs daily x 3 days, then 1 tab daily x 3 days, then 1/2 tab daily x 3 days. 10/30/24   Dorothy Guaman DO   predniSONE (DELTASONE) 20 MG tablet Take 20mg daily for 3 days then decrease dose to 10mg daily for 4 days 10/29/24   Dorothy Guaman DO   triamcinolone (KENALOG) 0.025 % external ointment Apply topically 2 times daily To affected areas on the lips until resolved.  Patient not taking: Reported on 10/30/2024 5/6/24   Keke Arcos PA-C   Turmeric 500 MG CAPS  3/7/23   Dorothy Guaman DO              Review of Systems:   A 12 point Review of Systems is negative other than noted in the HPI            Physical Exam:   Patient Vitals for the past 24 hrs:   BP Temp Temp src Pulse Resp SpO2   11/18/24 1018 126/67 96.9  F (36.1  C) Temporal 71 16 99 %        No intake or output data in the 24 hours ending 11/18/24 1031   Constitutional:   awake, alert, cooperative, no apparent distress, and appears stated age       Lungs:   Normal  respiratory effort, no accessory muscle use, breath sounds bilaterally on auscultation       Cardiovascular:   Regular rate and rhythm               Data:          MD Eliana Mujica MD  General Surgeon  [facility]  Surgery [facility] - [address] [address]?  Office: [phone]  Employed by - [employer]  Pager: [phone]

## 2024-11-18 NOTE — DISCHARGE INSTRUCTIONS
If you have any questions or concerns regarding your procedure, please contact Dr. Schaffer, her office number is [phone].     Adult Discharge Orders & Instructions     For 24 hours after surgery    Get plenty of rest.  A responsible adult must stay with you for at least 24 hours after you leave the hospital.   Do not drive or use heavy equipment.  If you have weakness or tingling, don't drive or use heavy equipment until this feeling goes away.  Do not drink alcohol.  Avoid strenuous or risky activities.  Ask for help when climbing stairs.   You may feel lightheaded.  IF so, sit for a few minutes before standing.  Have someone help you get up.   If you have nausea (feel sick to your stomach): Drink only clear liquids such as apple juice, ginger ale, broth or 7-Up.  Rest may also help.  Be sure to drink enough fluids.  Move to a regular diet as you feel able.  You may have a slight fever. Call the doctor if your fever is over 100 F (37.7 C) (taken under the tongue) or lasts longer than 24 hours.  You may have a dry mouth, a sore throat, muscle aches or trouble sleeping.  These should go away after 24 hours.  Do not make important or legal decisions.     Call your doctor for any of the followin.  Signs of infection (fever, growing tenderness at the surgery site, a large amount of drainage or bleeding, severe pain, foul-smelling drainage, redness, swelling).    2. It has been over 8 to 10 hours since surgery and you are still not able to urinate (pass water).    3.  Headache for over 24 hours.    Discharge Instructions:    Take you medications as directed and follow up with you primary provider as scheduled.   You should expect to pass air from your rectum after the procedure.   Follow these care guidelines during your recovery for the next 24 hours.   If you have any questions or concerns please contact the provider that performed your procedure.     You were given medicine for sedation:  You have been given  medicines during your procedure that might make you sleepy and weak. To prevent problems:    *Rest for the rest of the day after you are home. You should be back to your normal activity tomorrow.  *For the next 24 hours:   -Do not drink alcoholic beverages.   -Do not make any important decisions or sign any legal forms.   -Do not work around machinery or power equipment.     The medicines used for sedation may make you feel nauseated.   *Start with clear liquids, such as tea, jell-o, broth and ginger ale. As you feel better you may add soft foods such as pudding and ice cream.   *When you no longer feel nauseated, you may try your normal diet.     You should be back to eating your normal after 24 hours.     Call if you have any of these problems:  *Fever of 101 degree F or 38 degrees C  *Bleeding from the rectum  *Black stool or blood in your bowel movements  *Nausea with vomiting that does not ease after a few hours.  *Abdominal pain or bloating  *Fainting

## 2024-11-18 NOTE — ANESTHESIA POSTPROCEDURE EVALUATION
Patient: Ellyn Mcgee    Procedure: Procedure(s):  COLONOSCOPY       Anesthesia Type:  MAC    Note:  Disposition: Outpatient   Postop Pain Control: Uneventful            Sign Out: Well controlled pain   PONV: No   Neuro/Psych: Uneventful            Sign Out: Acceptable/Baseline neuro status   Airway/Respiratory: Uneventful            Sign Out: Acceptable/Baseline resp. status   CV/Hemodynamics: Uneventful            Sign Out: Acceptable CV status; No obvious hypovolemia; No obvious fluid overload   Other NRE: NONE   DID A NON-ROUTINE EVENT OCCUR? No           Last vitals:  Vitals Value Taken Time   /67 11/18/24 1145   Temp 97.2  F (36.2  C) 11/18/24 1115   Pulse 70 11/18/24 1156   Resp 12 11/18/24 1156   SpO2 99 % 11/18/24 1156       Electronically Signed By: Rachana Jacobs MD  November 18, 2024  1:07 PM

## 2024-11-21 ENCOUNTER — OFFICE VISIT (OUTPATIENT)
Dept: PSYCHOLOGY | Facility: CLINIC | Age: 49
End: 2024-11-21
Payer: COMMERCIAL

## 2024-11-21 DIAGNOSIS — F33.41 RECURRENT MAJOR DEPRESSIVE DISORDER, IN PARTIAL REMISSION (H): ICD-10-CM

## 2024-11-21 DIAGNOSIS — F90.9 ATTENTION DEFICIT HYPERACTIVITY DISORDER (ADHD), UNSPECIFIED ADHD TYPE: ICD-10-CM

## 2024-11-21 DIAGNOSIS — F41.1 GAD (GENERALIZED ANXIETY DISORDER): Primary | ICD-10-CM
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
## 2024-11-26 ENCOUNTER — MYC MEDICAL ADVICE (OUTPATIENT)
Dept: PSYCHIATRY | Facility: CLINIC | Age: 49
End: 2024-11-26
Payer: COMMERCIAL

## 2024-11-26 DIAGNOSIS — F41.1 GAD (GENERALIZED ANXIETY DISORDER): ICD-10-CM

## 2024-12-02 RX ORDER — LORAZEPAM 1 MG/1
.5-1 TABLET ORAL DAILY PRN
Qty: 20 TABLET | Refills: 0 | Status: SHIPPED | OUTPATIENT
Start: 2024-12-02

## 2024-12-12 ENCOUNTER — OFFICE VISIT (OUTPATIENT)
Dept: PSYCHOLOGY | Facility: CLINIC | Age: 49
End: 2024-12-12
Payer: COMMERCIAL

## 2024-12-12 DIAGNOSIS — F41.1 GAD (GENERALIZED ANXIETY DISORDER): ICD-10-CM

## 2024-12-12 DIAGNOSIS — F90.9 ATTENTION DEFICIT HYPERACTIVITY DISORDER (ADHD), UNSPECIFIED ADHD TYPE: ICD-10-CM

## 2024-12-12 DIAGNOSIS — F33.41 RECURRENT MAJOR DEPRESSIVE DISORDER, IN PARTIAL REMISSION (H): Primary | ICD-10-CM

## 2024-12-12 NOTE — PROGRESS NOTES
[facility]                                               Progress Note    Patient Name: Ellyn Mcgee  Date: 12/12/2024         Service Type: Individual      Session Start Time: 1:30 p.m.  Session End Time: 2:34 p.m.     Session Length: 64 min.    Extended Session (53+ minutes): PROLONGED SERVICE IN THE OUTPATIENT SETTING REQUIRING DIRECT (FACE-TO-FACE) PATIENT CONTACT BEYOND THE USUAL SERVICE:    - Patient's presenting concerns require more intensive intervention than could be completed within the usual service  Interactive Complexity: Yes, visit entailed Interactive Complexity evidenced by:  -Use of play equipment or physical devices to overcome barriers to diagnostic or therapeutic interaction with a patient who is not fluent in the same language or who has not developed or lost expressive or receptive language skills to use or understand typical language  Crisis: No     Session #: 7 (with this therapist)    Attendees: Client    Service Modality:  In-Person Visit:    DATA  Extended Session (53+ minutes): PROLONGED SERVICE IN THE OUTPATIENT SETTING REQUIRING DIRECT (FACE-TO-FACE) PATIENT CONTACT BEYOND THE USUAL SERVICE:    - Treatment protocol required additional time to complete, due to the nature of diagnosis being treated.  See Interventions section for details  Interactive Complexity: Yes, visit entailed Interactive Complexity evidenced by:  -Use of play equipment or physical devices to overcome barriers to diagnostic or therapeutic interaction with a patient who is not fluent in the same language or who has not developed or lost expressive or receptive language skills to use or understand typical language  Crisis: No       Progress Since Last Session (Related to Symptoms / Goals / Homework):  Symptoms: ongoing significant anxiety and depression.     Homework: Partially completed      Episode of Care Goals: Minimal progress - ACTION (Actively working towards change); Intervened by  reinforcing change plan / affirming steps taken     Current / Ongoing Stressors and Concerns:  Patient identified experiencing ongoing significant anxiety and depressive symptoms related to current stressors.  Patient opted to engage in Brainspotting therapy of felt disrespect/devalued target during today's session.     Treatment Objective(s) Addressed in This Session:  Patient will engage in (re-)processing all past traumatic event/memory targets.     Intervention:  Brainspotting:  used Inside Window setup to reprocess with patient regarding her disrespect/devalued target.    Assessments completed prior to visit:  PHQ9:       8/7/2024    12:01 PM 9/4/2024     9:15 PM 9/18/2024     6:02 PM 10/6/2024    11:16 PM 10/8/2024     8:10 PM 10/21/2024     9:22 PM 10/29/2024     8:23 PM   PHQ-9 SCORE   PHQ-9 Total Score MyChart 8 (Mild depression) 6 (Mild depression) 7 (Mild depression) 8 (Mild depression) 9 (Mild depression) 11 (Moderate depression) 8 (Mild depression)   PHQ-9 Total Score 8    8 6 7 8 9 11 8        Patient-reported    Multiple values from one day are sorted in reverse-chronological order     GAD7:       3/21/2024    11:33 AM 6/20/2024    12:18 AM 7/9/2024     1:37 PM 8/7/2024    12:02 PM 9/18/2024     6:03 PM 10/6/2024    11:16 PM 10/21/2024     9:23 PM   TEE-7 SCORE   Total Score 15 (severe anxiety) 4 (minimal anxiety) 12 (moderate anxiety) 8 (mild anxiety) 6 (mild anxiety) 9 (mild anxiety) 13 (moderate anxiety)   Total Score 15 4 12 8    8 6 9 13     PROMIS 10-Global Health (all questions and answers displayed):       10/26/2022    11:31 AM 2/6/2023    11:06 AM 8/11/2023    11:06 AM 3/21/2024    11:35 AM 6/20/2024    12:21 AM 9/18/2024     6:05 PM 10/6/2024    11:18 PM   PROMIS 10   In general, would you say your health is: Fair Fair Good Fair Fair Fair Fair   In general, would you say your quality of life is: Fair Fair Fair Fair Fair Fair Fair   In general, how would you rate your physical health? Fair  Fair Fair Fair Fair Fair Good   In general, how would you rate your mental health, including your mood and your ability to think? Fair Fair Fair Poor Fair Good Good   In general, how would you rate your satisfaction with your social activities and relationships? Fair Fair Fair Fair Poor Fair Fair   In general, please rate how well you carry out your usual social activities and roles Fair Fair Good Good Good Good Good   To what extent are you able to carry out your everyday physical activities such as walking, climbing stairs, carrying groceries, or moving a chair? Completely Completely Mostly Completely Mostly Mostly Mostly   In the past 7 days, how often have you been bothered by emotional problems such as feeling anxious, depressed, or irritable? Often Sometimes Sometimes Always Often Often Often   In the past 7 days, how would you rate your fatigue on average? Moderate Moderate Severe Moderate Moderate Moderate Severe   In the past 7 days, how would you rate your pain on average, where 0 means no pain, and 10 means worst imaginable pain? 3 6 7 6 7 6 7   In general, would you say your health is: 2  2  3  2  2  2  2    In general, would you say your quality of life is: 2  2  2  2  2  2  2    In general, how would you rate your physical health? 2  2  2  2  2  2  3    In general, how would you rate your mental health, including your mood and your ability to think? 2  2  2  1  2  3  3    In general, how would you rate your satisfaction with your social activities and relationships? 2  2  2  2  1  2  2    In general, please rate how well you carry out your usual social activities and roles. (This includes activities at home, at work and in your community, and responsibilities as a parent, child, spouse, employee, friend, etc.) 2  2  3  3  3  3  3    To what extent are you able to carry out your everyday physical activities such as walking, climbing stairs, carrying groceries, or moving a chair? 5  5  4  5  4  4  4     In the past 7 days, how often have you been bothered by emotional problems such as feeling anxious, depressed, or irritable? 4  3  3  5  4  4  4    In the past 7 days, how would you rate your fatigue on average? 3  3  4  3  3  3  4    In the past 7 days, how would you rate your pain on average, where 0 means no pain, and 10 means worst imaginable pain? 3  6  7  6  7  6  7    Global Mental Health Score 8 9    9 9 6 7 9 9    9   Global Physical Health Score 14 13    13 10 13 11 12 11    11   PROMIS TOTAL - SUBSCORES 22 22    22 19 19 18 21 20    20       Patient-reported    Multiple values from one day are sorted in reverse-chronological order     ASSESSMENT: Current Emotional / Mental Status (status of significant symptoms):   Risk status (Self / Other harm or suicidal ideation)   Patient denies current fears or concerns for personal safety.   Patient denies current or recent suicidal ideation or behaviors.    Patient denies current or recent homicidal ideation or behaviors.   Patient denies current or recent self injurious behavior or ideation.   Patient denies other safety concerns.   Patient reports there has been no change in risk factors since their last session.   Patient reports there has been no change in protective factors since their last session.   Recommended that patient call 911 or go to the local ED should there be a change in any of these risk factors     Appearance:   Appropriate    Eye Contact:   Good    Psychomotor Behavior: Normal    Attitude:   Cooperative; friendly, interested   Orientation:   All   Speech    Rate / Production: Normal/ Responsive Emotional Talkative    Volume:  Normal    Mood:    Anxious  Depressed  Normal Sad  Expansive   Affect:    Appropriate  Expansive  Subdued  Tearful Worrisome    Thought Content:  Clear  Rumination    Thought Form:  Coherent  Goal Directed  Logical    Insight:    Good  and Intellectual Insight     Medication Review:   No changes to current psychiatric  medication(s)     Medication Compliance:   Yes     Changes in Health Issues:   None     Chemical Use Review:   Substance Use: Chemical use reviewed, no active concerns identified ; no current alcohol use     Tobacco Use: No current tobacco use.      Diagnosis:  1. Recurrent major depressive disorder, in partial remission (H)    2. TEE (generalized anxiety disorder)    3. Attention deficit hyperactivity disorder (ADHD), unspecified ADHD type            Note: There has been demonstrated improvement in functioning while patient has been engaged in psychotherapy/psychological service- if withdrawn the patient would deteriorate and/or relapse.     Collateral Reports Completed:  N/A..    PLAN: (Patient Tasks / Therapist Tasks / Other)    Patient agreed to continue to work on reprocessing her disrespect/devalued target, focusing on her self-validation, and noting any ongoing shift/takeaways.      Jonh Gotti, LMFT 24    ______________________________________________________________________                                                           [facility]    Individual Treatment Plan    Patient's Name: Ellyn Mcgee  YOB: 1975    Date of Creation: 2020  Date Treatment Plan Last Reviewed/Revised: 10/7/2024, next due 25.    DSM5 Diagnoses: 296.32 (F33.1) Major Depressive Disorder, Recurrent Episode, Moderate _ or 300.02 (F41.1) Generalized Anxiety Disorder (patient has previously been diagnosed with ADHD, hyperactive type)    Psychosocial / Contextual Factors: History of anxious attachment stemming from relationship with father (he  8 years ago); is currently pregnant (high-risk due to advanced maternal age); is in couples therapy with  due to frequent arguments and his emotional affair earlier in the year; financial stress; history of ADHD (hyperactive type).    PROMIS (reviewed every 90 days): PROMIS-10 Scores: 20 (10/6/24)    Referral /  "Collaboration:  EMDR    Anticipated number of sessions for this episode of care: 100  Anticipated frequency of sessions: Every 2-3 weeks  Anticipated duration of each session: 53+ minutes  Treatment plan will be reviewed in 90 days or when goals have been changed.           Measurable Treatment Goal(s) related to diagnosis / functional impairment(s):  Patient is asking to focus treatment on her relationship with her father and past trauma.    Goal 1: Patient will tell full story of past trauma related to her relationship with her father and will identify how past feelings are impacting current relationships.    Objective #A (Patient Action)    Patient will identify how past feelings are impacting current relationships.  Status: Continued - Date(s): 10/7/2024 (partially completed)    Intervention(s)  Therapist will role-play conflict management.    Objective #B  Patient will identify strengths and weaknesses within her family system of origin.  Status: Continued - Date(s): 10/7/2024 (partially completed)    Intervention(s)  Therapist will assign homework for patient to create list .      Goal 2: Patient will learn about resilience, trauma responses, and Javon Servin's \"the story I'm making up\" (cognitive strategy to manage anxious thoughts).    Objective #A (Patient Action)    Patient will learn about and identify personal trauma responses.    Status: Continued - Date(s): 10/7/2024     Intervention(s)  Therapist will provide educational materials on trauma responses .    Objective #B  Patient will use cognitive strategies identified in therapy to challenge anxious thoughts.  Status: Continued - Date(s): 10/7/2024     Intervention(s)  Therapist will teach about Javon Servin's power of vulnerability and \"the story I'm making up\" .      Goal 3: Patient will learn about protective factors that foster resilience and attachment styles and will identify how her attachment style drives her behavior.     Objective #A (Patient " Action)    Status:  Partially completed: 10/7/2024     Patient will learn about protective factors and will identify her own.    Intervention(s)  Therapist will complete with patient in session.    Objective #B  Patient will identify how her attachment style drives her behavior.  Status: Completed - Date: 3/21/2024       Intervention(s)  Therapist will teach emotional regulation skills.      Goal 4:  Patient will successfully process through past trauma defined as reporting 0 Subjective Units of Distress related to trauma on 0-10 scale and clear body scan.   I will know I've met my goal when I am less impacted by my past trauma experience.       Objective #A (Client Action)  Status: Continued - Date: 10/7/24      Patient will identify past traumatic events/memories which are causing current distress.     Intervention(s)  Therapist will take client's history and facilitate client's identification of targets for trauma-focused therapy.     Objective #B  Patient will complete needed assessment(s) to confirm readiness for trauma-focused therapy.    Status: Continued - Date: 10/7/24      Intervention(s)  Therapist will administer Dissociative Experiences Scale, Multidimensional Inventory of Dissociation as needed.     Objective #C  Status: Continued - Date: 10/7/24      Patient will engage in (re-)processing all past traumatic event/memory targets.     Intervention(s)   Therapist will complete trauma-focused therapy (re-)processing with client.     Patient agreed to the above plan.      Jonh Gotti, BARBARA  10/7/24

## 2024-12-26 ENCOUNTER — OFFICE VISIT (OUTPATIENT)
Dept: PSYCHOLOGY | Facility: CLINIC | Age: 49
End: 2024-12-26
Payer: COMMERCIAL

## 2024-12-26 DIAGNOSIS — F90.9 ATTENTION DEFICIT HYPERACTIVITY DISORDER (ADHD), UNSPECIFIED ADHD TYPE: ICD-10-CM

## 2024-12-26 DIAGNOSIS — F33.41 RECURRENT MAJOR DEPRESSIVE DISORDER, IN PARTIAL REMISSION (H): Primary | ICD-10-CM

## 2024-12-26 DIAGNOSIS — F41.1 GAD (GENERALIZED ANXIETY DISORDER): ICD-10-CM

## 2024-12-26 NOTE — PROGRESS NOTES
[facility]                                               Progress Note    Patient Name: Ellyn Mcgee  Date: 12/26/2024         Service Type: Individual      Session Start Time: 1:37 p.m.  Session End Time: 2:41 p.m.     Session Length: 64 min.    Extended Session (53+ minutes): PROLONGED SERVICE IN THE OUTPATIENT SETTING REQUIRING DIRECT (FACE-TO-FACE) PATIENT CONTACT BEYOND THE USUAL SERVICE:    - Patient's presenting concerns require more intensive intervention than could be completed within the usual service  Interactive Complexity: No  Crisis: No     Session #: 8 (with this therapist)    Attendees: Client    Service Modality:  In-Person Visit:    DATA  Extended Session (53+ minutes): PROLONGED SERVICE IN THE OUTPATIENT SETTING REQUIRING DIRECT (FACE-TO-FACE) PATIENT CONTACT BEYOND THE USUAL SERVICE:    - Patient's presenting concerns require more intensive intervention than could be completed within the usual service  Interactive Complexity: No  Crisis: No       Progress Since Last Session (Related to Symptoms / Goals / Homework):  Symptoms: ongoing significant anxiety and depression.     Homework: Partially completed      Episode of Care Goals: Minimal progress - ACTION (Actively working towards change); Intervened by reinforcing change plan / affirming steps taken     Current / Ongoing Stressors and Concerns:  Patient identified experiencing ongoing significant anxiety and depressive symptoms related to current stressors.  Patient reported experiencing vivid dreams related to her father's death, and feeling unsettled about this.  Patient reported regarding researching fascia and its possible links to her body tension and her daughter's POTS and joint hypermobility.     Treatment Objective(s) Addressed in This Session:  Patient will identify past traumatic events/memories which are causing current distress.     Intervention:  CBT:  processed with patient regarding her dreams about her  father.  DBT:  reviewed with patient focusing on being effective in her responses to stressors, focusing on doing what she can/control/what works/what the situation calls for, focusing on continuing her fascia research.    Assessments completed prior to visit:  PHQ9:       8/7/2024    12:01 PM 9/4/2024     9:15 PM 9/18/2024     6:02 PM 10/6/2024    11:16 PM 10/8/2024     8:10 PM 10/21/2024     9:22 PM 10/29/2024     8:23 PM   PHQ-9 SCORE   PHQ-9 Total Score MyChart 8 (Mild depression) 6 (Mild depression) 7 (Mild depression) 8 (Mild depression) 9 (Mild depression) 11 (Moderate depression) 8 (Mild depression)   PHQ-9 Total Score 8    8 6 7 8 9 11 8        Patient-reported     GAD7:       3/21/2024    11:33 AM 6/20/2024    12:18 AM 7/9/2024     1:37 PM 8/7/2024    12:02 PM 9/18/2024     6:03 PM 10/6/2024    11:16 PM 10/21/2024     9:23 PM   TEE-7 SCORE   Total Score 15 (severe anxiety) 4 (minimal anxiety) 12 (moderate anxiety) 8 (mild anxiety) 6 (mild anxiety) 9 (mild anxiety) 13 (moderate anxiety)   Total Score 15 4 12 8    8 6 9 13     PROMIS 10-Global Health (all questions and answers displayed):       10/26/2022    11:31 AM 2/6/2023    11:06 AM 8/11/2023    11:06 AM 3/21/2024    11:35 AM 6/20/2024    12:21 AM 9/18/2024     6:05 PM 10/6/2024    11:18 PM   PROMIS 10   In general, would you say your health is: Fair Fair Good Fair Fair Fair Fair   In general, would you say your quality of life is: Fair Fair Fair Fair Fair Fair Fair   In general, how would you rate your physical health? Fair Fair Fair Fair Fair Fair Good   In general, how would you rate your mental health, including your mood and your ability to think? Fair Fair Fair Poor Fair Good Good   In general, how would you rate your satisfaction with your social activities and relationships? Fair Fair Fair Fair Poor Fair Fair   In general, please rate how well you carry out your usual social activities and roles Fair Fair Good Good Good Good Good   To what  extent are you able to carry out your everyday physical activities such as walking, climbing stairs, carrying groceries, or moving a chair? Completely Completely Mostly Completely Mostly Mostly Mostly   In the past 7 days, how often have you been bothered by emotional problems such as feeling anxious, depressed, or irritable? Often Sometimes Sometimes Always Often Often Often   In the past 7 days, how would you rate your fatigue on average? Moderate Moderate Severe Moderate Moderate Moderate Severe   In the past 7 days, how would you rate your pain on average, where 0 means no pain, and 10 means worst imaginable pain? 3 6 7 6 7 6 7   In general, would you say your health is: 2 2 3 2 2 2 2   In general, would you say your quality of life is: 2 2 2 2 2 2 2   In general, how would you rate your physical health? 2 2 2 2 2 2 3   In general, how would you rate your mental health, including your mood and your ability to think? 2 2 2 1 2 3 3   In general, how would you rate your satisfaction with your social activities and relationships? 2 2 2 2 1 2 2   In general, please rate how well you carry out your usual social activities and roles. (This includes activities at home, at work and in your community, and responsibilities as a parent, child, spouse, employee, friend, etc.) 2 2 3 3 3 3 3   To what extent are you able to carry out your everyday physical activities such as walking, climbing stairs, carrying groceries, or moving a chair? 5 5 4 5 4 4 4   In the past 7 days, how often have you been bothered by emotional problems such as feeling anxious, depressed, or irritable? 4 3 3 5 4 4 4   In the past 7 days, how would you rate your fatigue on average? 3 3 4 3 3 3 4   In the past 7 days, how would you rate your pain on average, where 0 means no pain, and 10 means worst imaginable pain? 3 6 7 6 7 6 7   Global Mental Health Score 8 9    9 9 6 7 9 9    9   Global Physical Health Score 14 13    13 10 13 11 12 11    11   [facility]  TOTAL - SUBSCORES 22 22    22 19 19 18 21 20    20     ASSESSMENT: Current Emotional / Mental Status (status of significant symptoms):   Risk status (Self / Other harm or suicidal ideation)   Patient denies current fears or concerns for personal safety.   Patient denies current or recent suicidal ideation or behaviors.    Patient denies current or recent homicidal ideation or behaviors.   Patient denies current or recent self injurious behavior or ideation.   Patient denies other safety concerns.   Patient reports there has been no change in risk factors since their last session.   Patient reports there has been no change in protective factors since their last session.   Recommended that patient call 911 or go to the local ED should there be a change in any of these risk factors     Appearance:   Appropriate    Eye Contact:   Good    Psychomotor Behavior: Normal    Attitude:   Cooperative; friendly, interested   Orientation:   All   Speech    Rate / Production: Normal/ Responsive Emotional Talkative    Volume:  Normal    Mood:    Anxious  Depressed  Normal Sad  Expansive Grieving   Affect:    Appropriate  Expansive  Subdued  Tearful Worrisome    Thought Content:  Clear  Rumination    Thought Form:  Coherent  Goal Directed  Logical    Insight:    Good  and Intellectual Insight     Medication Review:   No changes to current psychiatric medication(s)     Medication Compliance:   Yes     Changes in Health Issues:   None     Chemical Use Review:   Substance Use: Chemical use reviewed, no active concerns identified ; no current alcohol use     Tobacco Use: No current tobacco use.      Diagnosis:  1. Recurrent major depressive disorder, in partial remission (H)    2. TEE (generalized anxiety disorder)    3. Attention deficit hyperactivity disorder (ADHD), unspecified ADHD type              Note: There has been demonstrated improvement in functioning while patient has been engaged in psychotherapy/psychological service- if  withdrawn the patient would deteriorate and/or relapse.     Collateral Reports Completed:  N/A..    PLAN: (Patient Tasks / Therapist Tasks / Other)    Patient agreed to continue to work on processing her dreams about her father, and focusing on being effective in her responses to stressors, focusing on doing what she can/control/what works/what the situation calls for, focusing on continuing her fascia research.      Jonh Oquendo Shen, LMFT 24    ______________________________________________________________________                                                           [facility]    Individual Treatment Plan    Patient's Name: Ellyn Mcgee  YOB: 1975    Date of Creation: 2020  Date Treatment Plan Last Reviewed/Revised: 10/7/2024, next due 25.    DSM5 Diagnoses: 296.32 (F33.1) Major Depressive Disorder, Recurrent Episode, Moderate _ or 300.02 (F41.1) Generalized Anxiety Disorder (patient has previously been diagnosed with ADHD, hyperactive type)    Psychosocial / Contextual Factors: History of anxious attachment stemming from relationship with father (he  8 years ago); is currently pregnant (high-risk due to advanced maternal age); is in couples therapy with  due to frequent arguments and his emotional affair earlier in the year; financial stress; history of ADHD (hyperactive type).    PROMIS (reviewed every 90 days): PROMIS-10 Scores: 20 (10/6/24)    Referral / Collaboration:  EMDR    Anticipated number of sessions for this episode of care: 100  Anticipated frequency of sessions: Every 2-3 weeks  Anticipated duration of each session: 53+ minutes  Treatment plan will be reviewed in 90 days or when goals have been changed.           Measurable Treatment Goal(s) related to diagnosis / functional impairment(s):  Patient is asking to focus treatment on her relationship with her father and past trauma.    Goal 1: Patient will tell full story of past  "trauma related to her relationship with her father and will identify how past feelings are impacting current relationships.    Objective #A (Patient Action)    Patient will identify how past feelings are impacting current relationships.  Status: Continued - Date(s): 10/7/2024 (partially completed)    Intervention(s)  Therapist will role-play conflict management.    Objective #B  Patient will identify strengths and weaknesses within her family system of origin.  Status: Continued - Date(s): 10/7/2024 (partially completed)    Intervention(s)  Therapist will assign homework for patient to create list .      Goal 2: Patient will learn about resilience, trauma responses, and Javon Servin's \"the story I'm making up\" (cognitive strategy to manage anxious thoughts).    Objective #A (Patient Action)    Patient will learn about and identify personal trauma responses.    Status: Continued - Date(s): 10/7/2024     Intervention(s)  Therapist will provide educational materials on trauma responses .    Objective #B  Patient will use cognitive strategies identified in therapy to challenge anxious thoughts.  Status: Continued - Date(s): 10/7/2024     Intervention(s)  Therapist will teach about Javon Servin's power of vulnerability and \"the story I'm making up\" .      Goal 3: Patient will learn about protective factors that foster resilience and attachment styles and will identify how her attachment style drives her behavior.     Objective #A (Patient Action)    Status:  Partially completed: 10/7/2024     Patient will learn about protective factors and will identify her own.    Intervention(s)  Therapist will complete with patient in session.    Objective #B  Patient will identify how her attachment style drives her behavior.  Status: Completed - Date: 3/21/2024       Intervention(s)  Therapist will teach emotional regulation skills.      Goal 4:  Patient will successfully process through past trauma defined as reporting 0 Subjective " Units of Distress related to trauma on 0-10 scale and clear body scan.   I will know I've met my goal when I am less impacted by my past trauma experience.       Objective #A (Client Action)  Status: Continued - Date: 10/7/24      Patient will identify past traumatic events/memories which are causing current distress.     Intervention(s)  Therapist will take client's history and facilitate client's identification of targets for trauma-focused therapy.     Objective #B  Patient will complete needed assessment(s) to confirm readiness for trauma-focused therapy.    Status: Continued - Date: 10/7/24      Intervention(s)  Therapist will administer Dissociative Experiences Scale, Multidimensional Inventory of Dissociation as needed.     Objective #C  Status: Continued - Date: 10/7/24      Patient will engage in (re-)processing all past traumatic event/memory targets.     Intervention(s)   Therapist will complete trauma-focused therapy (re-)processing with client.     Patient agreed to the above plan.      Jonh Gotti, BARBARA  10/7/24

## 2025-01-30 ENCOUNTER — OFFICE VISIT (OUTPATIENT)
Dept: PSYCHOLOGY | Facility: CLINIC | Age: 50
End: 2025-01-30
Payer: COMMERCIAL

## 2025-01-30 DIAGNOSIS — F41.1 GAD (GENERALIZED ANXIETY DISORDER): Primary | ICD-10-CM

## 2025-01-30 DIAGNOSIS — F90.9 ATTENTION DEFICIT HYPERACTIVITY DISORDER (ADHD), UNSPECIFIED ADHD TYPE: ICD-10-CM

## 2025-01-30 DIAGNOSIS — F33.41 RECURRENT MAJOR DEPRESSIVE DISORDER, IN PARTIAL REMISSION: ICD-10-CM

## 2025-01-30 ASSESSMENT — ANXIETY QUESTIONNAIRES
GAD7 TOTAL SCORE: 13
GAD7 TOTAL SCORE: 13
2. NOT BEING ABLE TO STOP OR CONTROL WORRYING: MORE THAN HALF THE DAYS
7. FEELING AFRAID AS IF SOMETHING AWFUL MIGHT HAPPEN: SEVERAL DAYS
7. FEELING AFRAID AS IF SOMETHING AWFUL MIGHT HAPPEN: SEVERAL DAYS
5. BEING SO RESTLESS THAT IT IS HARD TO SIT STILL: MORE THAN HALF THE DAYS
IF YOU CHECKED OFF ANY PROBLEMS ON THIS QUESTIONNAIRE, HOW DIFFICULT HAVE THESE PROBLEMS MADE IT FOR YOU TO DO YOUR WORK, TAKE CARE OF THINGS AT HOME, OR GET ALONG WITH OTHER PEOPLE: SOMEWHAT DIFFICULT
3. WORRYING TOO MUCH ABOUT DIFFERENT THINGS: SEVERAL DAYS
1. FEELING NERVOUS, ANXIOUS, OR ON EDGE: NEARLY EVERY DAY
4. TROUBLE RELAXING: MORE THAN HALF THE DAYS
GAD7 TOTAL SCORE: 13
6. BECOMING EASILY ANNOYED OR IRRITABLE: MORE THAN HALF THE DAYS
8. IF YOU CHECKED OFF ANY PROBLEMS, HOW DIFFICULT HAVE THESE MADE IT FOR YOU TO DO YOUR WORK, TAKE CARE OF THINGS AT HOME, OR GET ALONG WITH OTHER PEOPLE?: SOMEWHAT DIFFICULT

## 2025-01-30 ASSESSMENT — PATIENT HEALTH QUESTIONNAIRE - PHQ9
SUM OF ALL RESPONSES TO PHQ QUESTIONS 1-9: 10
10. IF YOU CHECKED OFF ANY PROBLEMS, HOW DIFFICULT HAVE THESE PROBLEMS MADE IT FOR YOU TO DO YOUR WORK, TAKE CARE OF THINGS AT HOME, OR GET ALONG WITH OTHER PEOPLE: SOMEWHAT DIFFICULT
SUM OF ALL RESPONSES TO PHQ QUESTIONS 1-9: 10

## 2025-01-30 NOTE — PROGRESS NOTES
[facility]                                               Progress Note    Patient Name: Ellyn Mcgee  Date: 12/26/2024         Service Type: Individual      Session Start Time: 10:36 a.m.  Session End Time: 11:29 a.m.     Session Length: 53 min.    Extended Session (53+ minutes): PROLONGED SERVICE IN THE OUTPATIENT SETTING REQUIRING DIRECT (FACE-TO-FACE) PATIENT CONTACT BEYOND THE USUAL SERVICE:    - Patient's presenting concerns require more intensive intervention than could be completed within the usual service  Interactive Complexity: No  Crisis: No     Session #: 9 (with this therapist)    Attendees: Client    Service Modality:  In-Person Visit:    DATA  Extended Session (53+ minutes): PROLONGED SERVICE IN THE OUTPATIENT SETTING REQUIRING DIRECT (FACE-TO-FACE) PATIENT CONTACT BEYOND THE USUAL SERVICE:    - Patient's presenting concerns require more intensive intervention than could be completed within the usual service  Interactive Complexity: No  Crisis: No       Progress Since Last Session (Related to Symptoms / Goals / Homework):  Symptoms: Worsening increased PHQ score.     Homework: Partially completed      Episode of Care Goals: Minimal progress - ACTION (Actively working towards change); Intervened by reinforcing change plan / affirming steps taken     Current / Ongoing Stressors and Concerns:  Patient identified experiencing worsening depressive and ongoing significant anxiety symptoms related to current stressors.  Patient reported regarding changes made at work and related current political changes.  Patient reported regarding regarding past trauma and focusing on not allowing it to determine her leslie.     Treatment Objective(s) Addressed in This Session:  Patient will identify past traumatic events/memories which are causing current distress.  Patient identified her desired continued therapy goals.     Intervention:  CBT:  processed with patient regarding her past trauma and  journaling regarding it to organize and externalize her thoughts and emotions.  DBT:  re-reviewed with patient focusing on being effective in her responses to stressors, focusing on doing what she can/control/what works/what the situation calls for.  Motivational Interviewing:  reviewed with patient her desired continued therapy goals.    Assessments completed prior to visit:  PHQ9:       9/4/2024     9:15 PM 9/18/2024     6:02 PM 10/6/2024    11:16 PM 10/8/2024     8:10 PM 10/21/2024     9:22 PM 10/29/2024     8:23 PM 1/30/2025    10:34 AM   PHQ-9 SCORE   PHQ-9 Total Score MyChart 6 (Mild depression) 7 (Mild depression) 8 (Mild depression) 9 (Mild depression) 11 (Moderate depression) 8 (Mild depression) 10 (Moderate depression)   PHQ-9 Total Score 6 7 8 9 11 8  10        Patient-reported     GAD7:       6/20/2024    12:18 AM 7/9/2024     1:37 PM 8/7/2024    12:02 PM 9/18/2024     6:03 PM 10/6/2024    11:16 PM 10/21/2024     9:23 PM 1/30/2025    10:36 AM   TEE-7 SCORE   Total Score 4 (minimal anxiety) 12 (moderate anxiety) 8 (mild anxiety) 6 (mild anxiety) 9 (mild anxiety) 13 (moderate anxiety) 13 (moderate anxiety)   Total Score 4 12 8    8 6 9 13 13        Patient-reported     PROMIS 10-Global Health (all questions and answers displayed):       2/6/2023    11:06 AM 8/11/2023    11:06 AM 3/21/2024    11:35 AM 6/20/2024    12:21 AM 9/18/2024     6:05 PM 10/6/2024    11:18 PM 1/30/2025    10:37 AM   PROMIS 10   In general, would you say your health is: Fair Good Fair Fair Fair Fair Fair   In general, would you say your quality of life is: Fair Fair Fair Fair Fair Fair Fair   In general, how would you rate your physical health? Fair Fair Fair Fair Fair Good Fair   In general, how would you rate your mental health, including your mood and your ability to think? Fair Fair Poor Fair Good Good Good   In general, how would you rate your satisfaction with your social activities and relationships? Fair Fair Fair Poor Fair  Fair Fair   In general, please rate how well you carry out your usual social activities and roles Fair Good Good Good Good Good Very good   To what extent are you able to carry out your everyday physical activities such as walking, climbing stairs, carrying groceries, or moving a chair? Completely Mostly Completely Mostly Mostly Mostly Mostly   In the past 7 days, how often have you been bothered by emotional problems such as feeling anxious, depressed, or irritable? Sometimes Sometimes Always Often Often Often Often   In the past 7 days, how would you rate your fatigue on average? Moderate Severe Moderate Moderate Moderate Severe Moderate   In the past 7 days, how would you rate your pain on average, where 0 means no pain, and 10 means worst imaginable pain? 6 7 6 7 6 7 7   In general, would you say your health is: 2 3 2 2 2 2 2   In general, would you say your quality of life is: 2 2 2 2 2 2 2   In general, how would you rate your physical health? 2 2 2 2 2 3 2   In general, how would you rate your mental health, including your mood and your ability to think? 2 2 1 2 3 3 3   In general, how would you rate your satisfaction with your social activities and relationships? 2 2 2 1 2 2 2   In general, please rate how well you carry out your usual social activities and roles. (This includes activities at home, at work and in your community, and responsibilities as a parent, child, spouse, employee, friend, etc.) 2 3 3 3 3 3 4   To what extent are you able to carry out your everyday physical activities such as walking, climbing stairs, carrying groceries, or moving a chair? 5 4 5 4 4 4 4   In the past 7 days, how often have you been bothered by emotional problems such as feeling anxious, depressed, or irritable? 3 3 5 4 4 4 4   In the past 7 days, how would you rate your fatigue on average? 3 4 3 3 3 4 3   In the past 7 days, how would you rate your pain on average, where 0 means no pain, and 10 means worst imaginable  pain? 6 7 6 7 6 7 7   Global Mental Health Score 9    9 9 6 7 9 9    9 9    Global Physical Health Score 13    13 10 13 11 12 11    11 11    PROMIS TOTAL - SUBSCORES 22    22 19 19 18 21 20    20 20        Patient-reported     ASSESSMENT: Current Emotional / Mental Status (status of significant symptoms):   Risk status (Self / Other harm or suicidal ideation)   Patient denies current fears or concerns for personal safety.   Patient denies current or recent suicidal ideation or behaviors.    Patient denies current or recent homicidal ideation or behaviors.   Patient denies current or recent self injurious behavior or ideation.   Patient denies other safety concerns.   Patient reports there has been no change in risk factors since their last session.   Patient reports there has been no change in protective factors since their last session.   Recommended that patient call 911 or go to the local ED should there be a change in any of these risk factors     Appearance:   Appropriate    Eye Contact:   Good    Psychomotor Behavior: Normal    Attitude:   Cooperative; friendly, interested   Orientation:   All   Speech    Rate / Production: Normal/ Responsive Emotional Talkative    Volume:  Normal    Mood:    Anxious  Elevated  Normal Sad  Expansive   Affect:    Appropriate  Expansive  Worrisome    Thought Content:  Clear  Rumination    Thought Form:  Coherent  Logical    Insight:    Good  and Intellectual Insight     Medication Review:   No changes to current psychiatric medication(s)     Medication Compliance:   Yes     Changes in Health Issues:   None     Chemical Use Review:   Substance Use: Chemical use reviewed, no active concerns identified ; no current alcohol use     Tobacco Use: No current tobacco use.      Diagnosis:  1. TEE (generalized anxiety disorder)    2. Recurrent major depressive disorder, in partial remission    3. Attention deficit hyperactivity disorder (ADHD), unspecified ADHD type                Note:  There has been demonstrated improvement in functioning while patient has been engaged in psychotherapy/psychological service- if withdrawn the patient would deteriorate and/or relapse.     Collateral Reports Completed:  N/A..    PLAN: (Patient Tasks / Therapist Tasks / Other)    Patient agreed to continue to work on processing regarding her past trauma and journaling regarding it to organize and externalize her thoughts and emotions, and focusing on being effective in her responses to stressors, focusing on doing what she can/control/what works/what the situation calls for.      Jonh Gotti, LMFT 25    ______________________________________________________________________                                                            [facility]    Individual Treatment Plan    Patient's Name: Ellyn Mcgee  YOB: 1975    Date of Creation: 2020  Date Treatment Plan Last Reviewed/Revised: 2025, next due 25.    DSM5 Diagnoses: 296.32 (F33.1) Major Depressive Disorder, Recurrent Episode, Moderate _ or 300.02 (F41.1) Generalized Anxiety Disorder (patient has previously been diagnosed with ADHD, hyperactive type)    Psychosocial / Contextual Factors: History of anxious attachment stemming from relationship with father (he  8 years ago); is currently pregnant (high-risk due to advanced maternal age); is in couples therapy with  due to frequent arguments and his emotional affair earlier in the year; financial stress; history of ADHD (hyperactive type).    PROMIS (reviewed every 90 days): PROMIS-10 Scores: 20 (10/6/24)    Referral / Collaboration:  EMDR    Anticipated number of sessions for this episode of care: 100  Anticipated frequency of sessions: Every 2-3 weeks  Anticipated duration of each session: 53+ minutes  Treatment plan will be reviewed in 90 days or when goals have been changed.           Measurable Treatment Goal(s) related to diagnosis /  "functional impairment(s):  Patient is asking to focus treatment on her relationship with her father and past trauma.    Goal 1: Patient will tell full story of past trauma related to her relationship with her father and will identify how past feelings are impacting current relationships.    Objective #A (Patient Action)    Patient will identify how past feelings are impacting current relationships.  Status: Continued - Date(s): 1/30/2025 (partially completed)    Intervention(s)  Therapist will role-play conflict management.    Objective #B  Patient will identify strengths and weaknesses within her family system of origin.  Status: Continued - Date(s): 1/30/2025 (partially completed)    Intervention(s)  Therapist will assign homework for patient to create list .      Goal 2: Patient will learn about resilience, trauma responses, and Javon Servin's \"the story I'm making up\" (cognitive strategy to manage anxious thoughts).    Objective #A (Patient Action)    Patient will learn about and identify personal trauma responses.    Status: Continued - Date(s): 1/20/2025     Intervention(s)  Therapist will provide educational materials on trauma responses .    Objective #B  Patient will use cognitive strategies identified in therapy to challenge anxious thoughts.  Status: Continued - Date(s): 1/30/2025     Intervention(s)  Therapist will teach about Javon Servin's power of vulnerability and \"the story I'm making up\" .      Goal 3: Patient will learn about protective factors that foster resilience and attachment styles and will identify how her attachment style drives her behavior.     Objective #A (Patient Action)    Status:  Partially completed: 1/30/2025    Patient will learn about protective factors and will identify her own.    Intervention(s)  Therapist will complete with patient in session.    Objective #B  Patient will identify how her attachment style drives her behavior.  Status: Completed - Date: 3/21/2024   "     Intervention(s)  Therapist will teach emotional regulation skills.      Goal 4:  Patient will successfully process through past trauma defined as reporting 0 Subjective Units of Distress related to trauma on 0-10 scale and clear body scan.   I will know I've met my goal when I am less impacted by my past trauma experience.       Objective #A (Client Action)  Status: Continued - Date: 1/30/25      Patient will identify past traumatic events/memories which are causing current distress.     Intervention(s)  Therapist will take client's history and facilitate client's identification of targets for trauma-focused therapy.     Objective #B  Patient will complete needed assessment(s) to confirm readiness for trauma-focused therapy.    Status: Continued - Date: 1/30/25      Intervention(s)  Therapist will administer Dissociative Experiences Scale, Multidimensional Inventory of Dissociation as needed.     Objective #C  Status: Continued - Date: 1/30/25      Patient will engage in (re-)processing all past traumatic event/memory targets.     Intervention(s)   Therapist will complete trauma-focused therapy (re-)processing with client.     Patient agreed to the above plan.      BARBARA Bowen  1/30/25

## 2025-02-04 ASSESSMENT — PATIENT HEALTH QUESTIONNAIRE - PHQ9
SUM OF ALL RESPONSES TO PHQ QUESTIONS 1-9: 5
10. IF YOU CHECKED OFF ANY PROBLEMS, HOW DIFFICULT HAVE THESE PROBLEMS MADE IT FOR YOU TO DO YOUR WORK, TAKE CARE OF THINGS AT HOME, OR GET ALONG WITH OTHER PEOPLE: SOMEWHAT DIFFICULT
SUM OF ALL RESPONSES TO PHQ QUESTIONS 1-9: 5

## 2025-02-04 NOTE — PROGRESS NOTES
"    [facility] - [place]         PATIENT'S NAME: Ellyn Mcgee  PREFERRED NAME: Ellyn  PRONOUNS:       MRN: [id number]  : 1975  ADDRESS: [address]  [facility] [address]  ACCT. NUMBER:  [id number]  DATE OF SERVICE: 25  START TIME: 130pm  END TIME: 201pm  PREFERRED PHONE: [phone]  May we leave a program related message: Yes  EMERGENCY CONTACT: was obtained DAYANAELI SHEEHAN (Spouse)  [phone] (Mobile)  .  SERVICE MODALITY:  Video Visit:      Provider verified identity through the following two step process.  Patient provided:  Patient  and Patient address    Telemedicine Visit: The patient's condition can be safely assessed and treated via synchronous audio and visual telemedicine encounter.      Reason for Telemedicine Visit: Services only offered telehealth    Originating Site (Patient Location): Patient's home    Distant Site (Provider Location): Provider Remote Setting- Home Office    Consent:  The patient/guardian has verbally consented to: the potential risks and benefits of telemedicine (video visit) versus in person care; bill my insurance or make self-payment for services provided; and responsibility for payment of non-covered services.     Patient would like the video invitation sent by:  My Chart    Mode of Communication:  Video Conference via Amwell    Distant Location (Provider):  Off-site    As the provider I attest to compliance with applicable laws and regulations related to telemedicine.    UNIVERSAL ADULT Mental Health DIAGNOSTIC ASSESSMENT    Identifying Information:  Patient is a 49 year old,   individual.  Patient was referred for an assessment by self.  Patient attended the session alone.    Chief Complaint:   The reason for seeking services at this time is: \"Mostly just meeting arron prior to andrews appointment. A lot of anxiety, panic, adhd and ASD in family which is stressful. Hoping for resources\".  The " "problem(s) began 02/14/12.    Patient has attempted to resolve these concerns in the past through previous outpatient care, on going [facility] mgmt .    Social/Family History:  Patient reported they grew up in  [place].  They were raised by biological mother  .  Parents  /  when Pt was 8.  Pt has 1 step sibling (whom family previous fostered before adoption) and 3 half siblings.  Patient reported that their childhood was Patient reported that \"there were a lot of issues with my dad\". She stated that she felt neglected by him and never felt loved by him. She stated that her parents  when she was 8 years old, but recalls their fighting starting when she was 4 years old .  Patient described their current relationships with family of origin as see below.     The patient describes their cultural background as .  Cultural influences and impact on patient's life structure, values, norms, and healthcare: Father was 2nd generation [place] Anabaptist. A lot of that has affected my cultural being from knowing and connecting with history of holocaust and antisemitism to language and food. Mom is non Taoism (her brothers are Mandaen) who grew up in [place], [place], [place] and [place] [facility]. She has NEVER been typical for those areas. Artistic.;Both parents college educated, fairly liberal and left wing. Until dad had to retire early due to non hodgkins lymphona he was a left wing versionof trump: power-hungry, insecure, narcissistic, always looking outside of himself to relieve his inner pain. [place] all his [place] helped to mellow him  i went to elite private school in [place] from 1-8 grades ans then a public highschool in [place]. Experienced extreme wealth and extreme poverty among friends, racism, addiction & police dishonesty  lived w mom as only child after brother moved out when i was 8. Family complex.  Contextual influences " on patient's health include: n/a.    These factors will be addressed in the Preliminary Treatment plan. Patient identified their preferred language to be English. Patient reported they does not need the assistance of an  or other support involved in therapy.     Patient reported had no significant delays in developmental tasks.   Patient's highest education level was college graduate  .  Patient identified the following learning problems: none reported.  Modifications will not be used to assist communication in therapy.  Patient reports they are  able to understand written materials.    Patient reported the following relationship history .  Patient's current relationship status is  .   Patient identified their sexual orientation as bi-sexual.  Patient reported having 3 child(basil). Patient identified mother; adult child; pets; therapist; spouse; co-worker as part of their support system.  Patient identified the quality of these relationships as good,  .      Patient's current living/housing situation involves staying in own home/apartment.  The immediate members of family and household include Jimmie, 54,Spouse  and they report that housing is stable.    Patient is currently employed part time at [employer] as well as [employer] and [email or web address].  Patient reports their finances are obtained through employment; spouse. Patient does identify finances as a current stressor.      Patient reported that they have not been involved with the legal system.    . Patient does not report being under probation/ parole/ jurisdiction. .    Patient's Strengths and Limitations:  Patient identified the following strengths or resources that will help them succeed in treatment: friends / good social support, family support, insight, intelligence, positive work environment, and motivation. Things that may interfere with the patient's success in treatment include: financial hardship and physical health concerns.      Assessments:  The following assessments were completed by patient for this visit:  PHQ2:   Phq2 (   1999 [employer],All Rights Reserved. Used With Permission. Developed By Daniel Raymond,Roger David And Colleagues,With An Educational Arash From [employer].)    10/9/2024  9:58 AM CDT - Filed by Patient   The following questionnaire should only be answered by the patient. Are you the patient? Yes   Over the last 2 weeks, how often have you been bothered by any of the following problems?    Q1: Little interest or pleasure in doing things Not at all   Q2: Feeling down, depressed or hopeless More than half the days   PHQ2 (   1999 [employer],ALL RIGHTS RESERVED. USED WITH PERMISSION. DEVELOPED BY DANILE RAYMOND,ROGER DAVID AND COLLEAGUES,WITH AN EDUCATIONAL ARASH FROM [employer].)    PHQ-2 Score (range: 0 - 6) 2       PHQ9:       9/18/2024     6:02 PM 10/6/2024    11:16 PM 10/8/2024     8:10 PM 10/21/2024     9:22 PM 10/29/2024     8:23 PM 1/30/2025    10:34 AM 2/4/2025    10:06 AM   PHQ-9 SCORE   PHQ-9 Total Score [facility] 7 (Mild depression) 8 (Mild depression) 9 (Mild depression) 11 (Moderate depression) 8 (Mild depression) 10 (Moderate depression) 5 (Mild depression)   PHQ-9 Total Score 7 8 9 11 8  10  5        Patient-reported     GAD2:       8/11/2023    11:04 AM 9/26/2023     1:10 PM 12/22/2023     1:09 PM 3/21/2024    11:33 AM 9/3/2024     1:48 PM 10/8/2024     8:24 PM 2/4/2025    10:41 AM   TEE-2   Feeling nervous, anxious, or on edge 2 1 2 3 1 3 2   Not being able to stop or control worrying 0 0 1 1 1 1 1   TEE-2 Total Score 2 1 3 4 2 4 3        Patient-reported     GAD7:       6/20/2024    12:18 AM 7/9/2024     1:37 PM 8/7/2024    12:02 PM 9/18/2024     6:03 PM 10/6/2024    11:16 PM 10/21/2024     9:23 PM 1/30/2025    10:36 AM   TEE-7 SCORE   Total Score 4 (minimal anxiety) 12 (moderate anxiety) 8 (mild anxiety) 6 (mild anxiety) 9 (mild anxiety) 13  "(moderate anxiety) 13 (moderate anxiety)   Total Score 4 12 8    8 6 9 13 13        Patient-reported     CAGE-AID:       9/8/2020     9:52 AM 2/4/2025    10:41 AM   CAGE-AID Total Score   Total Score 0 1    Total Score MyChart 0 (A total score of 2 or greater is considered clinically significant) 1 (A total score of 2 or greater is considered clinically significant)       Patient-reported     PROMIS 10-Global Health (only subscores and total score):       2/6/2023    11:06 AM 8/11/2023    11:06 AM 3/21/2024    11:35 AM 6/20/2024    12:21 AM 9/18/2024     6:05 PM 10/6/2024    11:18 PM 1/30/2025    10:37 AM   PROMIS-10 Scores Only   Global Mental Health Score 9    9 9 6 7 9 9    9 9    Global Physical Health Score 13    13 10 13 11 12 11    11 11    PROMIS TOTAL - SUBSCORES 22    22 19 19 18 21 20    20 20        Patient-reported     [place] Suicide Severity Rating Scale (Lifetime/Recent)      8/9/2020     8:26 PM 2/5/2025     3:09 PM   [place] Suicide Severity Rating (Lifetime/Recent)   Q1 Wish to be Dead (Lifetime) Yes    Comments as a small child    Q2 Non-Specific Active Suicidal Thoughts (Lifetime) No    Most Severe Ideation Rating (Lifetime) --    Most Severe Ideation Description (Lifetime) unable to recall from childhood    Frequency (Lifetime) NA    Duration (Lifetime) NA    Controllability (Lifetime) NA    Protective Factors  (Lifetime) NA    Reasons for Ideation (Lifetime) NA    RETIRED: 1. Wish to be Dead (Recent) No    Comments has thoughts of wanting the stress/symptoms to \"stop\" , but denied wanting to die    RETIRED: 2. Non-Specific Active Suicidal Thoughts (Recent) No    3. Active Suicidal Ideation with any Methods (Not Plan) Without Intent to Act (Lifetime) No    RETIRED: 3. Active Suicidal Ideation with any Methods (Not Plan) Without Intent to Act (Recent) No    RETIRE: 4. Active Suicidal Ideation with Some Intent to Act, Without Specific Plan (Lifetime) No    4. Active Suicidal Ideation with Some " Intent to Act, Without Specific Plan (Recent) No    RETIRE: 5. Active Suicidal Ideation with Specific Plan and Intent (Lifetime) No    RETIRED: 5. Active Suicidal Ideation with Specific Plan and Intent (Recent) No    Most Severe Ideation Rating (Past Month) NA    Frequency (Past Month) NA    Duration (Past Month) NA    Controllability (Past Month) NA    Protective Factors (Past Month) NA    Reasons for Ideation (Past Month) NA    Actual Attempt (Lifetime) No    Actual Attempt (Past 3 Months) No    Has subject engaged in non-suicidal self-injurious behavior? (Lifetime) No    Has subject engaged in non-suicidal self-injurious behavior? (Past 3 Months) No    Interrupted Attempts (Lifetime) No    Interrupted Attempts (Past 3 Months) No    Aborted or Self-Interrupted Attempt (Lifetime) No    Aborted or Self-Interrupted Attempt (Past 3 Months) No    Preparatory Acts or Behavior (Lifetime) No    Preparatory Acts or Behavior (Past 3 Months) No    Most Recent Attempt Actual Lethality Code NA    Most Lethal Attempt Actual Lethality Code NA    Initial/First Attempt Actual Lethality Code NA    1. Wish to be Dead (Lifetime)  Y   1. Wish to be Dead (Past 1 Month)  N   2. Non-Specific Active Suicidal Thoughts (Lifetime)  N   Actual Attempt (Lifetime)  N   Has subject engaged in non-suicidal self-injurious behavior? (Lifetime)  N   Interrupted Attempts (Lifetime)  N   Aborted or Self-Interrupted Attempt (Lifetime)  N   Preparatory Acts or Behavior (Lifetime)  N   Calculated C-SSRS Risk Score (Lifetime/Recent)  No Risk Indicated       Personal and Family Medical History:  Patient does report a family history of mental health concerns.  Patient reports family history includes Alcohol/Drug in her maternal grandfather; Anxiety Disorder in her father, mother, and paternal half-sister; Cancer in her father and mother; Cerebrovascular Disease in her father; Crohn's Disease in her sister; Depression in her father and mother; Diabetes in  her paternal grandmother, paternal uncle, and another family member; Heart Disease in her father; Hyperlipidemia in her father and mother; Lipids in her father and mother; Melanoma in her father; Mental Illness in her father; Myocardial Infarction in her mother; Obesity in her paternal grandmother; Other Cancer in her father and mother; Substance Abuse in her father and maternal grandfather; Thyroid Disease in her brother..     Patient does report Mental Health Diagnosis and/or Treatment.  Patient reported the following previous diagnoses which include(s): ADHD; an anxiety disorder; depression; otherCptsd or BPD, told doesnt matter which as treatment thr same - address the trauma.  Patient reported symptoms began years ago.  Patient has received mental health services in the past:  therapy; psychiatry  .  Psychiatric Hospitalizations: none   Patient denies a history of civil commitment.      Currently, patient is receiving other mental health services.  These include psychotherapy with Sheng Gotti .       Patient has had a physical exam to rule out medical causes for current symptoms.  Date of last physical exam was within the past year. Client was encouraged to follow up with PCP if symptoms were to develop. The patient has a [place] Primary Care Provider, who is named Dorothy Guaman.  Patient reports the following current medical concerns: menopause? .  Patient reports pain concerns including on going low back pain.  Patient does not want help addressing pain concerns..   There are not significant appetite / nutritional concerns / weight changes.   Patient does not report a history of head injury / trauma / cognitive impairment.      Current Outpatient Medications   Medication Sig Dispense Refill    amphetamine-dextroamphetamine (ADDERALL XR) 25 MG 24 hr capsule Take 1 capsule (25 mg) by mouth every morning. 90 capsule 0    amphetamine-dextroamphetamine (ADDERALL) 15 MG tablet Take 1 tablet (15 mg) by mouth  daily as needed (ADHD). 30 tablet 0    ibuprofen (ADVIL/MOTRIN) 200 MG tablet Take 200-400 mg by mouth every 4 hours as needed for pain OTC      LORazepam (ATIVAN) 1 MG tablet Take 0.5-1 tablets (0.5-1 mg) by mouth daily as needed (anxiety and sleep). 20 tablet 0    metFORMIN (GLUCOPHAGE XR) 500 MG 24 hr tablet Take 1 tablet (500 mg) by mouth daily (with dinner). 90 tablet 1    PARoxetine (PAXIL-CR) 25 MG 24 hr tablet Take 2 tablets (50 mg) by mouth every morning 180 tablet 3    predniSONE (DELTASONE) 20 MG tablet Take  2 tabs daily x 3 days, then 1 tab daily x 3 days, then 1/2 tab daily x 3 days. 11 tablet 0    predniSONE (DELTASONE) 20 MG tablet Take 20mg daily for 3 days then decrease dose to 10mg daily for 4 days 5 tablet 0    triamcinolone (KENALOG) 0.025 % external ointment Apply topically 2 times daily To affected areas on the lips until resolved. (Patient not taking: Reported on 10/30/2024) 15 g 1    Turmeric 500 MG CAPS        No current facility-administered medications for this visit.         Medication Adherence:  Patient reports taking.  taking psychiatric medications as prescribed.    Patient Allergies:    Allergies   Allergen Reactions    Cyclogyl [Cyclopentolate Hcl] Nausea and Vomiting    Cyclopentolate     Hydrocodone      anxiety  itching    Hydrocodone-Acetaminophen     Morphine And Codeine Itching    Seasonal Allergies     Symbicort [Budesonide-Formoterol Fumarate] Rash       Medical History:    Past Medical History:   Diagnosis Date    Abnormal Pap smear     Colposcopy    Adjustment disorder with mixed anxiety and depressed mood 04/04/2012    Anemia     In Past    Anxiety     Chlamydia trachomatis infection of other specified site 1993    Depression     Depressive disorder 1979    Not diagnosed until college...later diagnosed with TEE    Fertility problem     Gastroesophageal reflux disease     Lyme disease 09/08/2010    ?false positive vs positve CMV - resolved    Moderate dysplasia of cervix  1995    Other and unspecified adverse effect of drug, medicinal and biological substance     insulin resistent    Pneumonia     Varicosities     Wounds and injuries     fall down stairs, PT for back, pain meds         Current Mental Status Exam:   Appearance:  Appropriate    Eye Contact:  Good   Psychomotor:  Normal       Gait / station:  no problem  Attitude / Demeanor: Cooperative   Speech      Rate / Production: Normal/ Responsive      Volume:  Normal  volume      Language:  intact  Mood:   Anxious   Affect:   Appropriate    Thought Content: Clear   Thought Process: Coherent  Goal Directed       Associations: No loosening of associations  Insight:   Good   Judgment:  Intact   Orientation:  Person Place Time Situation  Attention/concentration: Good    Substance Use:   Patient did report a family history of substance use concerns; see medical history section for details.  Dad struggled with ETOH and Maternal grandfather with ETOH.  Patient has not received chemical dependency treatment in the past.  Patient has not ever been to detox.      Patient is not currently receiving any chemical dependency treatment.           Substance History of use Age of first use Date of last use     Pattern and duration of use (include amounts and frequency)   Alcohol currently use   13 01/26/25 Occasional social use   Cannabis   currently use 15 02/03/25 Occasional social use     Amphetamines   never used     REPORTS SUBSTANCE USE: N/A   Cocaine/crack    used in the past That was the only time 21?  01/15/97  REPORTS SUBSTANCE USE: N/A   Hallucinogens used in the past   17  11/01/96  REPORTS SUBSTANCE USE: N/A   Inhalants never used         REPORTS SUBSTANCE USE: N/A   Heroin never used         REPORTS SUBSTANCE USE: N/A   Other Opiates never used     REPORTS SUBSTANCE USE: N/A   Benzodiazepine   never used     REPORTS SUBSTANCE USE: N/A   Barbiturates never used     REPORTS SUBSTANCE USE: N/A   Over the counter meds never used      REPORTS SUBSTANCE USE: N/A   Caffeine currently use 5   daily   Nicotine  used in the past 21 01/15/00 REPORTS SUBSTANCE USE: N/A   Other substances not listed above:  Identify:  currently use 6 02/03/25 REPORTS SUBSTANCE USE: N/A     Patient reported the following problems as a result of their substance use: other (sugar).  Patient reports obtaining substances from n/a.    Substance Use: No symptoms    Based on the CAGE score of 1 and clinical interview there  are not indications of drug or alcohol abuse.    Significant Losses / Trauma / Abuse / Neglect Issues:   Patient did not  serve in the .  There are indications or report of significant loss, trauma, abuse or neglect issues related to:.  Concerns of emotional neglect.  Hx of previous miscarriage. Hx of childhood trauma and learning about parental behavior that led to divorce.  Concerns of poor sexual boundaries from adults.  Relationship with father.  Late pregnancy.   Patient has not been a victim of exploitation.  Concerns for possible neglect are not present.     Safety Assessment:   Patient denies current or past homicidal ideation and behaviors.  Patient denies current/recent suicide ideation, plans, intent, or attempts but reports a history of remote hx of passive suicidal ideation without plan/intent; or previous attempts  Patient denies current or past self-injurious behaviors.  Patient denied risk behaviors associated with substance use.  Patient denies any high risk behaviors associated with mental health symptoms.  Patient denied current or past personal safety concerns.    Patient denies past of current/recent assaultive behaviors.    Patient denied a history of sexual assault behaviors.     Patient reports there are not   firearms in the house.    Patient reports the following protective factors: hopeful, identifies reason for living, access to and engagement with healthcare, current engagement in treatment and/or motivation to establish  therapeutic relationship, strong bond to family unit, community, job, school, etc, supportive social network or family, lives in a responsibly safe environment, and responsibilities to others dedication to family or friends; purpose; sense of meaning; other    Risk Plan:  See Recommendations for Safety and Risk Management Plan    Review of Symptoms per patient report:   Depression: Lack of interest or pleasure in doing things, Feeling sad, down, or depressed, Feelings of hopelessness, Change in energy level, Change in sleep, Low self-worth, Difficulties concentrating, Excessive or inappropriate guilt, Feelings of helplessness, and Ruminations  Hx of remote passive suicidal ideation without planning or intent.  Denies previous attempts.  Isabel:  No Symptoms  Psychosis: No Symptoms  Anxiety: Excessive worry, Nervousness, Physical complaints, such as headaches, stomachaches, muscle tension, Ruminations, Poor concentration, and Irritability  Panic:  Palpitations, Shortness of breath, and Sense of impending doom  Post Traumatic Stress Disorder:  Reexperiencing of trauma, Hypervigilance, Increased arousal, and Impaired functioning kiddos trigger trauma sx  Eating Disorder: No Symptoms  ADD / ADHD:  Difficulties listening, Poor task completion, Poor organizational skills, Distractibility, Impulsive, Restlessness/fidgety, and Hyperverbal  Conduct Disorder: No symptoms  Autism Spectrum Disorder: No symptoms  Obsessive Compulsive Disorder: No Symptoms  Personality Disorders:   n/a    Current Stressors / Issues:  MH update: trauma sx, anxiety and panic increased  Stresses:  kiddos neurodiv, financial stressors, political and work stress  Appetite: n/a  Sleep: anxiety impacts  Outpatient Provider updates:   Sheng Gotti brain spotting [facility]  SI/SIB/HI:  Denies previous suicide attempts.  Remote hx of passive ideation without planning or intent.  Denies current.  DIMA:  Social ETOH THC  Side effects/compliance:  Interventions:   [facility] engaged in completion of DA with psychoeducation and tx planning.  [facility] discussed TIP skills.   Most important:  Doing okay.  Possible menopause      Patient reports the following compulsive behaviors and treatment history: None.      Diagnostic Criteria:   Generalized Anxiety Disorder  A. Excessive anxiety and worry about a number of events or activities (such as work or school performance).   B. The person finds it difficult to control the worry.  C. Select 3 or more symptoms (required for diagnosis). Only one item is required in children.   - Restlessness or feeling keyed up or on edge.    - Being easily fatigued.    - Difficulty concentrating or mind going blank.    - Irritability.    - Sleep disturbance (difficulty falling or staying asleep, or restless unsatisfying sleep).   D. The focus of the anxiety and worry is not confined to features of an Axis I disorder.  E. The anxiety, worry, or physical symptoms cause clinically significant distress or impairment in social, occupational, or other important areas of functioning.   F. The disturbance is not due to the direct physiological effects of a substance (e.g., a drug of abuse, a medication) or a general medical condition (e.g., hyperthyroidism) and does not occur exclusively during a Mood Disorder, a Psychotic Disorder, or a Pervasive Developmental Disorder.    Functional Status:  Patient reports the following functional impairments:  chronic disease management, health maintenance, management of the household and or completion of tasks, organization, relationship(s), self-care, and work / vocational responsibilities.     Nonprogrammatic care:  Patient is requesting basic services to address current mental health concerns.    Clinical Summary:  1. Psychosocial Factors:  Trauma history, Relationship concerns, Occupational Issues, Interpersonal concerns.  Cultural and Contextual Factors: as noted above  2. Principal DSM5 Diagnoses  (Sustained by DSM5 Criteria  Listed Above):   300.02 (F41.1) Generalized Anxiety Disorder.  3. Other Diagnoses that is relevant to services:   Attention-Deficit/Hyperactivity Disorder  314.01 (F90.9) Unspecified Attention -Deficit / Hyperactivity Disorder  296.32 (F33.1) Major Depressive Disorder, Recurrent Episode, Moderate _ and With anxious distress  309.9 (F43.9) Unspecified Trauma and Stressor Related Disorder.  4. Provisional Diagnosis:  n/a  5. Prognosis: Relieve Acute Symptoms.  6. Likely consequences of symptoms if not treated: decompensation of MH.  7. Patient strengths include:  educated, empathetic, employed, goal-focused, good listener, has a previous history of therapy, insightful, intelligent, motivated, and open to suggestions / feedback .     Recommendations:     1. Plan for Safety and Risk Management:   Safety and Risk: Recommended that patient call 911 or go to the local ED should there be a change in any of these risk factors        Report to child / adult protection services was NA.     2. Patient's identified mental health concerns with a cultural influence will be addressed by per Pt request .     3. Initial Treatment will focus on:    Depressed Mood - .  Anxiety - . .     4. Resources/Service Plan:    services are not indicated.   Modifications to assist communication are not indicated.   Additional disability accommodations are not indicated.      5. Collaboration:   Collaboration / coordination of treatment will be initiated with the following  support professionals: psychiatry.      6.  Referrals:   The following referral(s) will be initiated:  n/a .       A Release of Information has been obtained for the following:  n/a .     Clinical Substantiation/medical necessity for the above recommendations:  Pt has a hx of depression, anxiety and ADHD symptoms that are impacting daily functioning in daily living and social settings. Through receiving support through [facility] model for medication and C checking on use  of coping skills and therapy to help combat these symptoms may provide Pt with relief. Pt reports that they are struggling to manage depressive and ADHD symptoms and again [facility] model can assist with providing coping skills, following up that pt is using these skills, safety plan or other interventions along with medication to have the best impact to manage symptoms and provide relief. At this time pt's symptoms are able to be managed with OP services and pt will be referred to a higher level of care if there are abrupt changes in presentation or risk of harm  .    7. DIMA:    DIMA:  Discussed the general effects of drugs and alcohol on health and well-being. Provider gave patient printed information about the  effects of chemical use on their health and well being. Recommendations:  n/a .     8. Records:   These were reviewed at time of assessment.   Information in this assessment was obtained from the medical record and  provided by patient who is a good historian.    Patient will have open access to their mental health medical record.    9.   Interactive Complexity: No    10. Safety Plan: No Safety plan indicated    Provider Name/ Credentials:  Keesha Conner MA [facility]  February 5, 2025

## 2025-02-05 ENCOUNTER — VIRTUAL VISIT (OUTPATIENT)
Dept: BEHAVIORAL HEALTH | Facility: CLINIC | Age: 50
End: 2025-02-05
Payer: COMMERCIAL

## 2025-02-05 ENCOUNTER — VIRTUAL VISIT (OUTPATIENT)
Dept: PSYCHIATRY | Facility: CLINIC | Age: 50
End: 2025-02-05
Payer: COMMERCIAL

## 2025-02-05 DIAGNOSIS — F41.1 GAD (GENERALIZED ANXIETY DISORDER): Primary | ICD-10-CM

## 2025-02-05 DIAGNOSIS — F33.41 RECURRENT MAJOR DEPRESSIVE DISORDER, IN PARTIAL REMISSION: ICD-10-CM

## 2025-02-05 DIAGNOSIS — F90.0 ATTENTION DEFICIT HYPERACTIVITY DISORDER (ADHD), PREDOMINANTLY INATTENTIVE TYPE: Primary | ICD-10-CM

## 2025-02-05 DIAGNOSIS — G47.00 INSOMNIA, UNSPECIFIED TYPE: ICD-10-CM

## 2025-02-05 DIAGNOSIS — F90.0 ATTENTION DEFICIT HYPERACTIVITY DISORDER (ADHD), PREDOMINANTLY INATTENTIVE TYPE: ICD-10-CM

## 2025-02-05 DIAGNOSIS — F33.1 MAJOR DEPRESSIVE DISORDER, RECURRENT EPISODE, MODERATE (H): ICD-10-CM

## 2025-02-05 DIAGNOSIS — F43.9 TRAUMA AND STRESSOR-RELATED DISORDER: ICD-10-CM

## 2025-02-05 DIAGNOSIS — F41.1 GAD (GENERALIZED ANXIETY DISORDER): ICD-10-CM

## 2025-02-05 PROCEDURE — 90791 PSYCH DIAGNOSTIC EVALUATION: CPT | Mod: 95 | Performed by: COUNSELOR

## 2025-02-05 PROCEDURE — 98006 SYNCH AUDIO-VIDEO EST MOD 30: CPT | Performed by: PSYCHIATRY & NEUROLOGY

## 2025-02-05 PROCEDURE — G2211 COMPLEX E/M VISIT ADD ON: HCPCS | Performed by: PSYCHIATRY & NEUROLOGY

## 2025-02-05 RX ORDER — GUANFACINE 1 MG/1
1 TABLET, EXTENDED RELEASE ORAL AT BEDTIME
Qty: 14 TABLET | Refills: 0 | Status: SHIPPED | OUTPATIENT
Start: 2025-02-05 | End: 2025-02-19

## 2025-02-05 RX ORDER — DEXTROAMPHETAMINE SACCHARATE, AMPHETAMINE ASPARTATE MONOHYDRATE, DEXTROAMPHETAMINE SULFATE AND AMPHETAMINE SULFATE 6.25; 6.25; 6.25; 6.25 MG/1; MG/1; MG/1; MG/1
25 CAPSULE, EXTENDED RELEASE ORAL EVERY MORNING
Qty: 90 CAPSULE | Refills: 0 | Status: SHIPPED | OUTPATIENT
Start: 2025-02-27

## 2025-02-05 RX ORDER — LORAZEPAM 1 MG/1
.5-1 TABLET ORAL DAILY PRN
Qty: 20 TABLET | Refills: 0 | Status: SHIPPED | OUTPATIENT
Start: 2025-02-05

## 2025-02-05 ASSESSMENT — COLUMBIA-SUICIDE SEVERITY RATING SCALE - C-SSRS
2. HAVE YOU ACTUALLY HAD ANY THOUGHTS OF KILLING YOURSELF?: NO
6. HAVE YOU EVER DONE ANYTHING, STARTED TO DO ANYTHING, OR PREPARED TO DO ANYTHING TO END YOUR LIFE?: NO
TOTAL  NUMBER OF ABORTED OR SELF INTERRUPTED ATTEMPTS LIFETIME: NO
ATTEMPT LIFETIME: NO
TOTAL  NUMBER OF INTERRUPTED ATTEMPTS LIFETIME: NO
1. HAVE YOU WISHED YOU WERE DEAD OR WISHED YOU COULD GO TO SLEEP AND NOT WAKE UP?: YES
1. IN THE PAST MONTH, HAVE YOU WISHED YOU WERE DEAD OR WISHED YOU COULD GO TO SLEEP AND NOT WAKE UP?: NO

## 2025-02-05 NOTE — PROGRESS NOTES
"Telemedicine Visit: The patient's condition can be safely assessed and treated via synchronous audio and visual telemedicine encounter.      Reason for Telemedicine Visit: Patient has requested telehealth visit    Originating Site (Patient Location): Patient's home in [place]    Distant Location (provider location):  Off-site    Consent:  The patient/guardian has verbally consented to: the potential risks and benefits of telemedicine (video visit) versus in person care; bill my insurance or make self-payment for services provided; and responsibility for payment of non-covered services.     Mode of Communication:  Video Conference via [id number]    As the provider I attest to compliance with applicable laws and regulations related to telemedicine.        Outpatient Psychiatric Progress Note    Name: Ellyn Mcgee   : 1975                    Primary Care Provider: Dorothy Guaman DO   Therapist: Jonh Gotti LMFT         10/29/2024     8:23 PM 2025    10:34 AM 2025    10:06 AM   PHQ   PHQ-9 Total Score 8  10  5    Q9: Thoughts of better off dead/self-harm past 2 weeks Not at all Not at all Not at all       Patient-reported   TEE-7 scores:      10/6/2024    11:16 PM 10/21/2024     9:23 PM 2025    10:36 AM   TEE-7 SCORE   Total Score 9 (mild anxiety) 13 (moderate anxiety) 13 (moderate anxiety)   Total Score 9 13 13        Patient-reported       Patient Identification:  Patient is a 49 year old,   White Not  or  female  who presents for return visit with me.   Patient attended the phone/video session alone. Patient prefers to be called: \"Ellyn\".    Interim History:  I last saw Ellyn Mcgee for outpatient psychiatry Return Visit on 10/09/2024. During that appointment, we:    Continue Paxil/paroxetine CR 50 mg daily for anxiety and mood.  Continue lorazepam/Ativan 0.5-1 mg daily as needed for severe anxiety.   Continue Adderall XR 25 mg + Adderall XR 15 mg daily " (for total dose 40 mg) as needed for ADHD symptoms.  Continue all other medications as reviewed per electronic medical record today.     2/5: Pt tried to reduce Paxil-CR to 37.5 mg daily between visits and felt worse so increased back to 50 mg daily. Got sick with the flu right after Eduardo. High anxiety. High irritability. Trauma triggers related to son Karel and some of his behaviors. Continues in psychotherapy. Met Keesha today for resources and ongoing processing. No acute safety concerns. No SI. No problematic drug or alcohol use. Rare use of lorazepam - mostly just at night when having a very tough time sleeping. Does wonder about perimenopause as a contributor to some heightened mental health sxs.     Per [facility], Keesha Conner [facility], during today's team-based visit:  MH update: trauma sx, anxiety and panic increased  Stresses:  kiddos neurodiv, financial stressors, political and work stress  Appetite: n/a  Sleep: anxiety impacts  Outpatient Provider updates:   Sheng Gotti brain spotting [facility]  SI/SIB/HI:  Denies previous suicide attempts.  Remote hx of passive ideation without planning or intent.  Denies current.  DIMA:  Social ETOH THC  Side effects/compliance:  Interventions:  [facility] engaged in completion of DA with psychoeducation and tx planning.  [facility] discussed TIP skills.   Most important:  Doing okay.  Possible menopause        Patient reports the following compulsive behaviors and treatment history: None.        Psychiatric ROS:  Ellyn Mcgee reports mood has been: see HPI above  Anxiety has been: see HPI above  Sleep has been: see HPI above   Isabel sxs: None  Psychosis sxs: None  ADHD/ADD sxs: see HPI above  PTSD sxs: None  PHQ9 and GAD7 scores were reviewed today if completed.   Medication side effects: Denies  Current stressors include: See HPI above  Coping mechanisms and supports include: Therapy, Family, Hobbies and Friends    Current medications include:   Current Outpatient Medications    Medication Sig Dispense Refill    amphetamine-dextroamphetamine (ADDERALL XR) 25 MG 24 hr capsule Take 1 capsule (25 mg) by mouth every morning. 90 capsule 0    amphetamine-dextroamphetamine (ADDERALL) 15 MG tablet Take 1 tablet (15 mg) by mouth daily as needed (ADHD). 30 tablet 0    ibuprofen (ADVIL/MOTRIN) 200 MG tablet Take 200-400 mg by mouth every 4 hours as needed for pain OTC      LORazepam (ATIVAN) 1 MG tablet Take 0.5-1 tablets (0.5-1 mg) by mouth daily as needed (anxiety and sleep). 20 tablet 0    metFORMIN (GLUCOPHAGE XR) 500 MG 24 hr tablet Take 1 tablet (500 mg) by mouth daily (with dinner). 90 tablet 1    PARoxetine (PAXIL-CR) 25 MG 24 hr tablet Take 2 tablets (50 mg) by mouth every morning 180 tablet 3    predniSONE (DELTASONE) 20 MG tablet Take  2 tabs daily x 3 days, then 1 tab daily x 3 days, then 1/2 tab daily x 3 days. 11 tablet 0    predniSONE (DELTASONE) 20 MG tablet Take 20mg daily for 3 days then decrease dose to 10mg daily for 4 days 5 tablet 0    triamcinolone (KENALOG) 0.025 % external ointment Apply topically 2 times daily To affected areas on the lips until resolved. (Patient not taking: Reported on 10/30/2024) 15 g 1    Turmeric 500 MG CAPS        No current facility-administered medications for this visit.     [place]:   10/01/2024 06/13/2024 1 Dextroamp-Amphet Er 15 Mg Cap 30.00 30 Al [facility] [id number] Exp (4317) 0/0  [place]   08/22/2024 08/13/2024 1 Dextroamp-Amphet Er 25 Mg Cap 90.00 90 Al u [id number] Exp (4317) 0/[place]  [place]   08/18/2024 06/13/2024 1 Dextroamp-Amphet Er 25 Mg Cap 30.00 30 Al u [id number] Exp (4317) 0/0  [place]   07/23/2024 06/21/2024 1 Dextroamp-Amphet Er 15 Mg Cap 90.00 90 Al [facility] [id number] Exp (4317) 0/0  [place]       Past Medical/Surgical History:  Past Medical History:   Diagnosis Date    Abnormal Pap smear     Colposcopy    Adjustment disorder with mixed anxiety and depressed mood 04/04/2012    Anemia     In Past     Anxiety     Chlamydia trachomatis infection of other specified site 1993    Depression     Depressive disorder 1979    Not diagnosed until college...later diagnosed with TEE    Fertility problem     Gastroesophageal reflux disease     Lyme disease 09/08/2010    ?false positive vs positve CMV - resolved    Moderate dysplasia of cervix 1995    Other and unspecified adverse effect of drug, medicinal and biological substance     insulin resistent    Pneumonia     Varicosities     Wounds and injuries     fall down stairs, PT for back, pain meds      has a past medical history of Abnormal Pap smear, Adjustment disorder with mixed anxiety and depressed mood (04/04/2012), Anemia, Anxiety, Chlamydia trachomatis infection of other specified site (1993), Depression, Depressive disorder (1979), Fertility problem, Gastroesophageal reflux disease, Lyme disease (09/08/2010), Moderate dysplasia of cervix (1995), Other and unspecified adverse effect of drug, medicinal and biological substance, Pneumonia, Varicosities, and Wounds and injuries.    She has no past medical history of Arrhythmia, Arthritis, Asthma, Asymptomatic human immunodeficiency virus (HIV) infection status (H), Breast disorder, Cancer (H), Cerebral infarction (H), Chronic kidney disease, Complication of anesthesia, Congestive heart failure (H), COPD (chronic obstructive pulmonary disease) (H), Diabetes (H), Diabetes mellitus (H), Difficulty walking, Heart attack (H), Heart disease, Herpes simplex without mention of complication, History of angina, History of blood transfusion, Hypertension, Irregular heart beat, Liver disease, Malignant hyperthermia, Pacemaker, Postpartum depression, Rh incompatibility, Seizures (H), Sickle cell anemia (H), Sleep apnea, Stented coronary artery, Systemic lupus erythematosus (H), Thyroid disease, or Uncomplicated asthma.    Social History:  Reviewed. No changes to social history except as noted in HPI above.     Vital Signs:   None.  This is phone/video visit.     Labs:  Most recent laboratory results reviewed and the pertinent results include:   Lab Results   Component Value Date    A1C 5.5 05/14/2024    A1C 5.3 08/01/2023    A1C 5.4 03/07/2023    A1C 5.3 04/30/2019    A1C 5.2 04/17/2018      TSH   Date Value Ref Range Status   05/14/2024 2.03 0.30 - 4.20 uIU/mL Final   03/03/2011 1.91 0.4 - 5.0 mU/L Final     Recent Labs   Lab Test 05/14/24  1119 08/01/23  0902   CHOL 256* 234*   HDL 56 64   * 150*   TRIG 91 99       Last Comprehensive Metabolic Panel:  Sodium   Date Value Ref Range Status   05/14/2024 140 135 - 145 mmol/L Final     Comment:     Reference intervals for this test were updated on 09/26/2023 to more accurately reflect our healthy population. There may be differences in the flagging of prior results with similar values performed with this method. Interpretation of those prior results can be made in the context of the updated reference intervals.    04/30/2019 140 133 - 144 mmol/L Final     Potassium   Date Value Ref Range Status   05/14/2024 4.2 3.4 - 5.3 mmol/L Final   01/12/2023 4.0 3.4 - 5.3 mmol/L Final   04/30/2019 3.7 3.4 - 5.3 mmol/L Final     Chloride   Date Value Ref Range Status   05/14/2024 105 98 - 107 mmol/L Final   01/12/2023 106 94 - 109 mmol/L Final   04/30/2019 106 94 - 109 mmol/L Final     Carbon Dioxide   Date Value Ref Range Status   04/30/2019 28 20 - 32 mmol/L Final     Carbon Dioxide (CO2)   Date Value Ref Range Status   05/14/2024 23 22 - 29 mmol/L Final   01/12/2023 27 20 - 32 mmol/L Final     Anion Gap   Date Value Ref Range Status   05/14/2024 12 7 - 15 mmol/L Final   01/12/2023 6 3 - 14 mmol/L Final   04/30/2019 6 3 - 14 mmol/L Final     Glucose   Date Value Ref Range Status   05/14/2024 98 70 - 99 mg/dL Final   01/12/2023 89 70 - 99 mg/dL Final   04/30/2019 89 70 - 99 mg/dL Final     Comment:     Fasting specimen     Urea Nitrogen   Date Value Ref Range Status   05/14/2024 15.7 6.0 - 20.0 mg/dL  Final   01/12/2023 14 7 - 30 mg/dL Final   04/30/2019 9 7 - 30 mg/dL Final     Creatinine   Date Value Ref Range Status   05/14/2024 0.74 0.51 - 0.95 mg/dL Final   12/24/2020 0.40 (L) 0.52 - 1.04 mg/dL Final     GFR Estimate   Date Value Ref Range Status   05/14/2024 >90 >60 mL/min/1.73m2 Final   12/24/2020 >90 >60 mL/min/[1.73_m2] Final     Comment:     Non  GFR Calc  Starting 12/18/2018, serum creatinine based estimated GFR (eGFR) will be   calculated using the Chronic Kidney Disease Epidemiology Collaboration   (CKD-EPI) equation.       Calcium   Date Value Ref Range Status   05/14/2024 9.3 8.6 - 10.0 mg/dL Final   04/30/2019 9.2 8.5 - 10.1 mg/dL Final     Bilirubin Total   Date Value Ref Range Status   05/14/2024 0.3 <=1.2 mg/dL Final   04/30/2019 0.3 0.2 - 1.3 mg/dL Final     Alkaline Phosphatase   Date Value Ref Range Status   05/14/2024 59 40 - 150 U/L Final     Comment:     Reference intervals for this test were updated on 11/14/2023 to more accurately reflect our healthy population. There may be differences in the flagging of prior results with similar values performed with this method. Interpretation of those prior results can be made in the context of the updated reference intervals.   04/30/2019 54 40 - 150 U/L Final     ALT   Date Value Ref Range Status   05/14/2024 19 0 - 50 U/L Final     Comment:     Reference intervals for this test were updated on 6/12/2023 to more accurately reflect our healthy population. There may be differences in the flagging of prior results with similar values performed with this method. Interpretation of those prior results can be made in the context of the updated reference intervals.     12/24/2020 18 0 - 50 U/L Final     AST   Date Value Ref Range Status   05/14/2024 21 0 - 45 U/L Final     Comment:     Reference intervals for this test were updated on 6/12/2023 to more accurately reflect our healthy population. There may be differences in the flagging of  prior results with similar values performed with this method. Interpretation of those prior results can be made in the context of the updated reference intervals.   12/24/2020 23 0 - 45 U/L Final       Review of Systems:  10 systems (general, cardiovascular, respiratory, eyes, ENT, endocrine, GI, , M/S, neurological) were reviewed. Most pertinent finding(s) is/are: denies fever, cough, headaches, shortness of breath, chest pain, N/V, constipation/diarrhea, trouble urinating, aches and pains. The remaining systems are all unremarkable.    Mental Status Examination (limited as this is by phone/video):  Appearance: Awake, alert, appears stated age, no acute distress, well-groomed  Attitude:  Cooperative, pleasant  Motor: No obvious abnormalities observed via video, not formally tested  Oriented to:  person, place, time, and situation  Attention Span and Concentration:  normal  Speech:  clear, coherent, regular rate, rhythm, and volume  Language: intact  Mood: anxious  Affect: Overall appropriate and mood congruent  Associations:  no loose associations  Thought Process:  logical, linear and goal oriented  Thought Content:  no evidence of suicidal ideation or homicidal ideation, no evidence of psychotic thought, no auditory hallucinations present and no visual hallucinations present  Recent and Remote Memory:  Intact to interview. Not formally assessed. No amnesia.  Fund of Knowledge: appropriate  Insight:  good  Judgment:  intact, adequate for safety  Impulse Control:  intact    Suicide Risk Assessment:  Today Ellyn Mcgee reports no suicidal ideation. Based on all available evidence including the factors cited above, Ellyn Mcgee does not appear to be at imminent risk for self-harm, does not meet criteria for a 72-hr hold, and therefore remains appropriate for ongoing outpatient level of care.  A thorough assessment of risk factors related to suicide and self-harm have been reviewed and are noted above. The  patient convincingly denies suicidality on several occasions. Local community safety resources reviewed for patient to use if needed. There was no deceit detected, and the patient presented in a manner that was believable.     DSM5 Diagnosis:  Major Depressive Disorder, Recurrent Episode, In Partial Remission  300.02 (F41.1) Generalized Anxiety Disorder   ADHD, Unspecified  Insomnia-unspecified    Medical comorbidities include:  Patient Active Problem List    Diagnosis Date Noted    Patellofemoral arthralgia of both knees 11/10/2023     Priority: Medium    Major depressive disorder, recurrent episode, moderate (H) 10/08/2020     Priority: Medium    TEE (generalized anxiety disorder) 10/10/2018     Priority: Medium    Cervical radiculopathy 04/16/2018     Priority: Medium    Attention deficit hyperactivity disorder (ADHD), predominantly inattentive type 10/04/2017     Priority: Medium     Patient is followed by Dr. Wang for ongoing prescription of stimulants.  All refills should be approved by this provider, or covering partner.        External hemorrhoids 04/25/2012     Priority: Medium    PCOS (polycystic ovarian syndrome) 02/22/2011     Priority: Medium    Hyperlipidemia LDL goal <130 09/05/2008     Priority: Medium    Anxiety state 09/05/2008     Priority: Medium     Infrequent prn use of lorazepam         Psychosocial & Contextual Factors: See HPI above    Assessment:  Per Intake Note with me 9/8/20:  Ellyn Mcgee is a 45-year-old female who is 23 weeks pregnant with a past psychiatric history including depression, anxiety, and ADHD who presents today for psychiatric evaluation.  Her depression and anxiety date back several years and she has also had postpartum depression/anxiety with her now 8-year-old (she also has a 14-year-old but did not experience postpartum mental health issues at that time).  She started sertraline during her second pregnancy and then ended up being maintained on Paxil-CR for several  years.  She is also more recently diagnosed with ADHD and maintained on Concerta.  She weaned off both Paxil and Concerta when she found out she was pregnant and replaced these medications with her current regimen of Lexapro and Effexor-XR to decrease risk of fetal defects.  For the most part, she is feeling a little bit better on her current doses of Lexapro 10 mg and Effexor-XR 75 mg.  It is an unconventional combination as we discussed, but since she is doing quite well, I am hesitant to make many changes.  She feels as though her anxiety could be a little bit better controlled and so we will further increase Effexor-XR to 112.5 mg daily to be taken with her Lexapro 10 mg daily.  If she does a lot better, we can consider decreasing Lexapro to 5 mg daily. We discussed the risk of serotonin syndrome and she will continue to be vigilant for any signs or symptoms of this condition.  We did discuss the possibility of restarting a stimulant medication if necessary.  She does not feel as though her ADHD symptoms are too severe at this time.    4/20/21:   Today patient reports overall some ongoing ups and downs regarding her symptoms since last visit.  She is thinking much of it might be hormonal and related to sleep deprivation.  She still has not yet had her menses return since having her baby.  She has had some feelings of panic.  Used to take propranolol in the past when she felt this way.  She would like to start this medication again.  Discussed risks and benefits while breast-feeding.  Limited risks for taking low-dose propranolol while breast-feeding although the infant does have some exposure through breast milk.  Recommended taking propranolol at least 3-4 hours prior to breast-feeding to decrease risks.  No other medication changes at this time.    12/15/2021:   Patient has overall been feeling quite stable mood wise.  Has some ups and downs that are more related to feeling overwhelmed and exhausted regarding  responsibilities for her children, home life, and work.  Does not feel depressed.  Feels like medications are working well.  Propranolol has been helpful.  No medication changes today.  No safety concerns or SI.  No problematic drug or alcohol use.    6/14/2022:  Patient overall struggling lately with mood, anxiety, ADHD symptoms.  Stopped Lexapro since felt like it was making her symptoms worse.  We have considered for quite some time about starting her back on an ADHD medication.  I think it is worth a trial at this time.  Discussed risks and benefits of a stimulant medication while breast-feeding.  She will likely be weaning soon.  Still has not been getting great sleep due to cosleeping and nighttime feedings.  No acute safety concerns.  No SI.  No problematic drug or alcohol use.  If she has too much irritability on Adderall, we could consider either going back to Concerta or a trial with Vyvanse.    12/14/2022:  Akanksha overall struggling more with anxiety and panic related symptoms.  Stress has been higher.  Patient wondering about starting Paxil-CR again.  Has tolerated well in the past.  We will add a low dose with venlafaxine.  We will also decrease venlafaxine some.  May continue cross taper/titration.  Lorazepam/Ativan provided for current acute symptoms and for any severe discomfort that may arise with this transition.  Therapy encouraged.  No acute safety concerns.  No SI.  No problematic drug or alcohol use.  Patient will be weaning her 2-year-old from breast-feeding.  We discussed risks and benefits of current medication regimen while breast-feeding.    1/5/2023:  Doing well on Paxil.  Symptoms improved quite quickly.  Tolerating well with no negative side effects.  Was able to decrease Effexor with ease as well.  We will continue tapering off Effexor and further optimizing Paxil.  Encouraged to continue in therapy.  No acute safety concerns.  No SI.  No problematic drug or alcohol  use.    2/6/2023:  Patient overall doing relatively okay.  Symptoms of anxiety and depression have improved.  Irritability still lingering some.  Feeling a little emotional as well at times.  Some symptoms could be remaining from stopping venlafaxine.  We will wait increase Paxil and patient will observe her symptoms further/longer.  Patient can message in a few weeks if wants to increase Paxil.  No acute safety concerns.  No SI.  No problematic drug or alcohol use.    10/13/2023:  Patient overall doing relatively well.  No acute safety concerns.  No SI.  No problematic drug or alcohol use.  ADHD better controlled on Adderall.  Tolerating meds okay.  Follow up in 6 months.    3/13/2024:  Patient reports some struggles with task completion, motivation, organization.  Incidentally trialed Adderall XR 50 mg and found she was more productive and had further improvement of ADHD symptoms with decent tolerability.  We will increase Adderall XR to 40 mg daily to see if more helpful and well-tolerated.  Patient may also trial decreased dose of Paxil CR.  No acute safety concerns.  No SI.  Psychotherapy encouraged.  No problematic drug or alcohol use.    6/13/2024:  Patient overall doing well.  Stable on Paxil CR 50 mg daily.  Feels increased Adderall XR well-tolerated and helpful for symptoms when taken.  No acute safety concerns.  No SI.  No problematic drug or alcohol use.  Medication regimen will be continued with no changes today.    10/09/2024:  Ongoing high psychosocial stressors.  Patient encouraged to use lorazepam when needed for severe anxiety and when significantly struggling with sleep.  No medication changes today.  Encouraged to continue in psychotherapy.  No problematic drug or alcohol use.  No acute safety concerns.    2/5/2025:  Pt did not do well on reduced Paxil-CR when trialed 37.5 mg on her own between visits. Back to 50 mg and feeling better but still struggling significantly with irritability and  anxiety. Also some more recent trauma triggers. Continues in psychotherapy. Agreeable to trial with guanfacine ER to target additional anxiety, irritability, and overall system over-activity. No acute safety concerns. No acute suicidality. No problematic drug or alcohol use.      Medication side effects and alternatives were reviewed. Health promotion activities recommended and reviewed today. All questions addressed. Education and counseling completed regarding risks and benefits of medications and psychotherapy options. Recommend ongoing therapy for additional support.     Treatment Plan:  Continue Paxil/paroxetine CR 50 mg daily for anxiety and mood.  Continue lorazepam/Ativan 0.5-1 mg daily as needed for severe anxiety.   Continue Adderall XR 25 mg + Adderall XR 15 mg daily (for total dose 40 mg) as needed for ADHD symptoms.  Start guanfacine ER for ADHD, anxiety, irritability: take 1 mg at bedtime for 2 weeks then can increase by 1 mg every 2-3 weeks as tolerated to max dose 3 mg before next appt.    Continue all other medications as reviewed per electronic medical record today.   Safety plan reviewed. To the Emergency Department as needed or call after hours crisis line at [phone] or [phone]. [place] Crisis Text Line. Text [place] to 618720 or Suicide LifeLine Chat: suicidepreventionlifeline.org/chat  Continue therapy as planned.   Schedule an appointment with me in 6 weeks or sooner as needed.  Patient scheduled today.  Follow up with primary care provider as planned or for acute medical concerns.  Call the psychiatric nurse line with medication questions or concerns at [phone].  Externauticshart may be used to communicate with your provider, but this is not intended to be used for emergencies.    Risks of stimulant medication include, but not limited to, decreased appetite, risk of tics (and that they may be lasting), trouble sleeping, cardiac risks such as increased heart rate and blood pressure, and  "rare risk of sudden cardiac death.  Also risk of addiction/tolerance/dependence.    Risks of benzodiazepine (Ativan, Xanax, Klonopin, Valium, etc) use including, but not limited to, sedation, tolerance, risk for addiction/dependence. Do not drink alcohol while taking benzodiazepines due to risk of trouble breathing and potential death. Do not drive or operate heavy machinery until it is known how the drug affects you. Discuss with physician or pharmacist before ever taking a benzodiazepine with a narcotic/opioid pain medication.     Administrative Billing:   Phone Call/Video Duration: 23 Minutes  Start: 2:05p  Stop: 2:28p    The longitudinal plan of care for the diagnosis(es)/condition(s) as documented were addressed during this visit. Due to the added complexity in care, I will continue to support Ellyn in the subsequent management and with ongoing continuity of care.    Patient Status:  Patient is a continuous/long-term care patient and refills will continue to come from this provider until otherwise noted.     Signed:   Sherlyn Wang DO  [facility]    This note was created with voice recognition software. Inadvertent grammatical errors, typographical errors, and \"sound-a-like\" substitutions may occur due to limitations of the software.  Read the note carefully and apply context when erroneous substitutions have occurred. Thank you.   "

## 2025-02-05 NOTE — PATIENT INSTRUCTIONS
Treatment Plan:  Continue Paxil/paroxetine CR 50 mg daily for anxiety and mood.  Continue lorazepam/Ativan 0.5-1 mg daily as needed for severe anxiety.   Continue Adderall XR 25 mg + Adderall XR 15 mg daily (for total dose 40 mg) as needed for ADHD symptoms.  Start guanfacine ER for ADHD, anxiety, irritability: take 1 mg at bedtime for 2 weeks then can increase by 1 mg every 2-3 weeks as tolerated to max dose 3 mg before next appt.    Continue all other medications as reviewed per electronic medical record today.   Safety plan reviewed. To the Emergency Department as needed or call after hours crisis line at [phone] or [phone]. [place] Crisis Text Line. Text [place] to 543629 or Suicide LifeLine Chat: suicidepreventionPT [place] [facility].org/chat  Continue therapy as planned.   Schedule an appointment with me in 6 weeks or sooner as needed.  Patient scheduled today.  Follow up with primary care provider as planned or for acute medical concerns.  Call the psychiatric nurse line with medication questions or concerns at [phone].  [place] may be used to communicate with your provider, but this is not intended to be used for emergencies.    Risks of stimulant medication include, but not limited to, decreased appetite, risk of tics (and that they may be lasting), trouble sleeping, cardiac risks such as increased heart rate and blood pressure, and rare risk of sudden cardiac death.  Also risk of addiction/tolerance/dependence.    Risks of benzodiazepine (Ativan, Xanax, Klonopin, Valium, etc) use including, but not limited to, sedation, tolerance, risk for addiction/dependence. Do not drink alcohol while taking benzodiazepines due to risk of trouble breathing and potential death. Do not drive or operate heavy machinery until it is known how the drug affects you. Discuss with physician or pharmacist before ever taking a benzodiazepine with a narcotic/opioid pain medication.     Patient Education   Collaborative Care  "Psychiatry Service  What to Expect  Here's what to expect from your Collaborative Care Psychiatry Service (CCPS).   About CCPS  CCPS means 2 people work together to help you get better. You'll meet with a behavioral health clinician and a psychiatric doctor. A behavioral health clinician helps people with mental health problems by talking with them. A psychiatric doctor helps people by giving them medicine.  How it works  At every visit, you'll see the behavioral health clinician (BHC) first. They'll talk with you about how you're doing and teach you how to feel better.   Then you'll see the psychiatric doctor. This doctor can help you deal with troubling thoughts and feelings by giving you medicine. They'll make sure you know the plan for your care.   CCPS usually takes 3 to 6 visits. If you need more visits, we may have you start seeing a different psychiatric doctor for ongoing care.  If you have any questions or concerns, we'll be glad to talk with you.  About visits  Be open  At your visits, please talk openly about your problems. It may feel hard, but it's the best way for us to help you.  Cancelling visits  If you can't come to your visit, please call us right away at [phone]. If you don't cancel at least 24 hours (1 full day) before your visit, that's \"late cancellation.\"  Being late to visits  Being very late is the same as not showing up. You will be a \"no show\" if:  Your appointment starts with a BHC, and you're more than 15 minutes late for a 30-minute (half hour) visit. This will also cancel your appointment with the psychiatric doctor.  Your appointment is with a psychiatric doctor only, and you're more than 15 minutes late for a 30-minute (half hour) visit.  Your appointment is with a psychiatric doctor only, and you're more than 30 minutes late for a 60-minute (full hour) visit.  If you cancel late or don't show up 2 times within 6 months, we may end your care.   Getting help between " visits  If you need help between visits, you can call us Monday to Friday from 8 a.m. to 4:30 p.m. at [phone].  Emergency care  Call 911 or go to the nearest emergency department if your life or someone else's life is in danger.  Call 988 anytime to reach the national Suicide and Crisis hotline.  Medicine refills  To refill your medicine, call your pharmacy. You can also call [facility]'s Behavioral Access at [phone], Monday to Friday, 8 a.m. to 4:30 p.m. It can take 1 to 3 business days to get a refill.   Forms, letters, and tests  You may have papers to fill out, like FMLA, short-term disability, and workability. We can help you with these forms at your visits, but you must have an appointment. You may need more than 1 visit for this, to be in an intensive therapy program, or both.  Before we can give you medicine for ADHD, we may refer you to get tested for it or confirm it another way.  We may not be able to give you an emotional support animal letter.  We don't do mental health checks ordered by the court.   We don't do mental health testing, but we can refer you to get tested.   Thank you for choosing us for your care.  For informational purposes only. Not to replace the advice of your health care provider. Copyright   2022 [facility]. All rights reserved. [employer] [id number] - Rev 11/24.

## 2025-02-05 NOTE — NURSING NOTE
Current patient location: [address] [address]    Is the patient currently in the state of [place]? YES    Visit mode: VIDEO    If the visit is dropped, the patient can be reconnected by:VIDEO VISIT: Text to cell phone:   Telephone Information:   Mobile [phone]       Will anyone else be joining the visit? NO  (If patient encounters technical issues they should call [phone])    Are changes needed to the allergy or medication list? No    Are refills needed on medications prescribed by this physician? Discuss with provider    Rooming Documentation:  Questionnaire(s) completed    Reason for visit: RECHECK    Gomez SCHMIDT

## 2025-02-05 NOTE — PROGRESS NOTES
"Virtual Visit Details    Type of service:  Video Visit     Originating Location (pt. Location): {video visit patient location:415422::\"Home\"}  {PROVIDER LOCATION On-site should be selected for visits conducted from your clinic location or adjoining [facility] hospital, academic office, or other nearby [facility] building. Off-site should be selected for all other provider locations, including home:[id number]}  Distant Location (provider location):  {virtual location provider:579317}  Platform used for Video Visit: {Virtual Visit Platforms:560140::\"HITbills\"}  "

## 2025-02-06 ENCOUNTER — TELEPHONE (OUTPATIENT)
Dept: PSYCHIATRY | Facility: CLINIC | Age: 50
End: 2025-02-06
Payer: COMMERCIAL

## 2025-02-06 ENCOUNTER — OFFICE VISIT (OUTPATIENT)
Dept: PSYCHOLOGY | Facility: CLINIC | Age: 50
End: 2025-02-06
Payer: COMMERCIAL

## 2025-02-06 DIAGNOSIS — F41.1 GAD (GENERALIZED ANXIETY DISORDER): ICD-10-CM

## 2025-02-06 DIAGNOSIS — F33.41 RECURRENT MAJOR DEPRESSIVE DISORDER, IN PARTIAL REMISSION: Primary | ICD-10-CM

## 2025-02-06 DIAGNOSIS — F90.9 ATTENTION DEFICIT HYPERACTIVITY DISORDER (ADHD), UNSPECIFIED ADHD TYPE: ICD-10-CM

## 2025-02-06 NOTE — PROGRESS NOTES
[facility]                                               Progress Note    Patient Name: Ellyn Mcgee  Date: 2/6/2025         Service Type: Individual      Session Start Time: 10:30 a.m.  Session End Time: 11:30 a.m.     Session Length: 60 min.    Extended Session (53+ minutes): PROLONGED SERVICE IN THE OUTPATIENT SETTING REQUIRING DIRECT (FACE-TO-FACE) PATIENT CONTACT BEYOND THE USUAL SERVICE:    - Patient's presenting concerns require more intensive intervention than could be completed within the usual service  Interactive Complexity: Yes, visit entailed Interactive Complexity evidenced by:  -Use of play equipment or physical devices to overcome barriers to diagnostic or therapeutic interaction with a patient who is not fluent in the same language or who has not developed or lost expressive or receptive language skills to use or understand typical language  Crisis: No     Session #: 10 (with this therapist)    Attendees: Client    Service Modality:  In-Person Visit:    DATA  Extended Session (53+ minutes): PROLONGED SERVICE IN THE OUTPATIENT SETTING REQUIRING DIRECT (FACE-TO-FACE) PATIENT CONTACT BEYOND THE USUAL SERVICE:    - Patient's presenting concerns require more intensive intervention than could be completed within the usual service  Interactive Complexity: No  Crisis: No       Progress Since Last Session (Related to Symptoms / Goals / Homework):  Symptoms: increased PHQ and decreased TEE scores.     Homework: Partially completed      Episode of Care Goals: Minimal progress - ACTION (Actively working towards change); Intervened by reinforcing change plan / affirming steps taken     Current / Ongoing Stressors and Concerns:  Patient identified experiencing worsening depressive and ongoing significant anxiety symptoms related to current stressors.  Patient opted to engage in Brainspotting reprocessing of felt lack of control during today's session.     Treatment Objective(s) Addressed  in This Session:  Patient will engage in (re-)processing all past traumatic event/memory targets.     Intervention:  Brainspotting:  used Inside Window setup to reprocess with patient her felt lack of control target (incomplete).    Assessments completed prior to visit:  PHQ9:       10/6/2024    11:16 PM 10/8/2024     8:10 PM 10/21/2024     9:22 PM 10/29/2024     8:23 PM 1/30/2025    10:34 AM 2/4/2025    10:06 AM 2/6/2025    10:27 AM   PHQ-9 SCORE   PHQ-9 Total Score MyChart 8 (Mild depression) 9 (Mild depression) 11 (Moderate depression) 8 (Mild depression) 10 (Moderate depression) 5 (Mild depression) 6 (Mild depression)   PHQ-9 Total Score 8 9 11 8  10  5  6        Patient-reported     GAD7:       6/20/2024    12:18 AM 7/9/2024     1:37 PM 8/7/2024    12:02 PM 9/18/2024     6:03 PM 10/6/2024    11:16 PM 10/21/2024     9:23 PM 1/30/2025    10:36 AM   TEE-7 SCORE   Total Score 4 (minimal anxiety) 12 (moderate anxiety) 8 (mild anxiety) 6 (mild anxiety) 9 (mild anxiety) 13 (moderate anxiety) 13 (moderate anxiety)   Total Score 4 12 8    8 6 9 13 13        Patient-reported     PROMIS 10-Global Health (all questions and answers displayed):       2/6/2023    11:06 AM 8/11/2023    11:06 AM 3/21/2024    11:35 AM 6/20/2024    12:21 AM 9/18/2024     6:05 PM 10/6/2024    11:18 PM 1/30/2025    10:37 AM   PROMIS 10   In general, would you say your health is: Fair Good Fair Fair Fair Fair Fair   In general, would you say your quality of life is: Fair Fair Fair Fair Fair Fair Fair   In general, how would you rate your physical health? Fair Fair Fair Fair Fair Good Fair   In general, how would you rate your mental health, including your mood and your ability to think? Fair Fair Poor Fair Good Good Good   In general, how would you rate your satisfaction with your social activities and relationships? Fair Fair Fair Poor Fair Fair Fair   In general, please rate how well you carry out your usual social activities and roles Fair Good  Good Good Good Good Very good   To what extent are you able to carry out your everyday physical activities such as walking, climbing stairs, carrying groceries, or moving a chair? Completely Mostly Completely Mostly Mostly Mostly Mostly   In the past 7 days, how often have you been bothered by emotional problems such as feeling anxious, depressed, or irritable? Sometimes Sometimes Always Often Often Often Often   In the past 7 days, how would you rate your fatigue on average? Moderate Severe Moderate Moderate Moderate Severe Moderate   In the past 7 days, how would you rate your pain on average, where 0 means no pain, and 10 means worst imaginable pain? 6 7 6 7 6 7 7   In general, would you say your health is: 2 3 2 2 2 2 2   In general, would you say your quality of life is: 2 2 2 2 2 2 2   In general, how would you rate your physical health? 2 2 2 2 2 3 2   In general, how would you rate your mental health, including your mood and your ability to think? 2 2 1 2 3 3 3   In general, how would you rate your satisfaction with your social activities and relationships? 2 2 2 1 2 2 2   In general, please rate how well you carry out your usual social activities and roles. (This includes activities at home, at work and in your community, and responsibilities as a parent, child, spouse, employee, friend, etc.) 2 3 3 3 3 3 4   To what extent are you able to carry out your everyday physical activities such as walking, climbing stairs, carrying groceries, or moving a chair? 5 4 5 4 4 4 4   In the past 7 days, how often have you been bothered by emotional problems such as feeling anxious, depressed, or irritable? 3 3 5 4 4 4 4   In the past 7 days, how would you rate your fatigue on average? 3 4 3 3 3 4 3   In the past 7 days, how would you rate your pain on average, where 0 means no pain, and 10 means worst imaginable pain? 6 7 6 7 6 7 7   Global Mental Health Score 9    9 9 6 7 9 9    9 9    Global Physical Health Score 13     13 10 13 11 12 11    11 11    PROMIS TOTAL - SUBSCORES 22    22 19 19 18 21 20    20 20        Patient-reported     ASSESSMENT: Current Emotional / Mental Status (status of significant symptoms):   Risk status (Self / Other harm or suicidal ideation)   Patient denies current fears or concerns for personal safety.   Patient denies current or recent suicidal ideation or behaviors.    Patient denies current or recent homicidal ideation or behaviors.   Patient denies current or recent self injurious behavior or ideation.   Patient denies other safety concerns.   Patient reports there has been no change in risk factors since their last session.   Patient reports there has been no change in protective factors since their last session.   Recommended that patient call 911 or go to the local ED should there be a change in any of these risk factors     Appearance:   Appropriate    Eye Contact:   Good    Psychomotor Behavior: Normal    Attitude:   Cooperative; friendly, interested   Orientation:   All   Speech    Rate / Production: Normal/ Responsive Emotional Talkative    Volume:  Normal    Mood:    Anxious  Depressed  Normal Sad  Expansive Dysphoric   Affect:    Appropriate  Expansive  Subdued  Worrisome    Thought Content:  Clear  Rumination    Thought Form:  Coherent  Logical    Insight:    Good  and Intellectual Insight     Medication Review:   No changes to current psychiatric medication(s)     Medication Compliance:   Yes     Changes in Health Issues:   None     Chemical Use Review:   Substance Use: Chemical use reviewed, no active concerns identified ; no current alcohol use     Tobacco Use: No current tobacco use.      Diagnosis:  1. Recurrent major depressive disorder, in partial remission    2. TEE (generalized anxiety disorder)    3. Attention deficit hyperactivity disorder (ADHD), unspecified ADHD type                  Note: There has been demonstrated improvement in functioning while patient has been engaged in  psychotherapy/psychological service- if withdrawn the patient would deteriorate and/or relapse.     Collateral Reports Completed:  N/A..    PLAN: (Patient Tasks / Therapist Tasks / Other)    Patient agreed to continue to work on reprocessing her felt lack of control target, focusing on her power to choose her response, journaling/noting her takeaways.      Jonh Oquendo Shen, LMFT 25    ______________________________________________________________________                                                           [facility]    Individual Treatment Plan    Patient's Name: Ellyn Mcgee  YOB: 1975    Date of Creation: 2020  Date Treatment Plan Last Reviewed/Revised: 2025, next due 25.    DSM5 Diagnoses: 296.32 (F33.1) Major Depressive Disorder, Recurrent Episode, Moderate _ or 300.02 (F41.1) Generalized Anxiety Disorder (patient has previously been diagnosed with ADHD, hyperactive type)    Psychosocial / Contextual Factors: History of anxious attachment stemming from relationship with father (he  8 years ago); is currently pregnant (high-risk due to advanced maternal age); is in couples therapy with  due to frequent arguments and his emotional affair earlier in the year; financial stress; history of ADHD (hyperactive type).    PROMIS (reviewed every 90 days): PROMIS-10 Scores: 20 (10/6/24)    Referral / Collaboration:  EMDR    Anticipated number of sessions for this episode of care: 100  Anticipated frequency of sessions: Every 2-3 weeks  Anticipated duration of each session: 53+ minutes  Treatment plan will be reviewed in 90 days or when goals have been changed.           Measurable Treatment Goal(s) related to diagnosis / functional impairment(s):  Patient is asking to focus treatment on her relationship with her father and past trauma.    Goal 1: Patient will tell full story of past trauma related to her relationship with her father and will  "identify how past feelings are impacting current relationships.    Objective #A (Patient Action)    Patient will identify how past feelings are impacting current relationships.  Status: Continued - Date(s): 1/30/2025 (partially completed)    Intervention(s)  Therapist will role-play conflict management.    Objective #B  Patient will identify strengths and weaknesses within her family system of origin.  Status: Continued - Date(s): 1/30/2025 (partially completed)    Intervention(s)  Therapist will assign homework for patient to create list .      Goal 2: Patient will learn about resilience, trauma responses, and Javon Servin's \"the story I'm making up\" (cognitive strategy to manage anxious thoughts).    Objective #A (Patient Action)    Patient will learn about and identify personal trauma responses.    Status: Continued - Date(s): 1/20/2025     Intervention(s)  Therapist will provide educational materials on trauma responses .    Objective #B  Patient will use cognitive strategies identified in therapy to challenge anxious thoughts.  Status: Continued - Date(s): 1/30/2025     Intervention(s)  Therapist will teach about Javon Servin's power of vulnerability and \"the story I'm making up\" .      Goal 3: Patient will learn about protective factors that foster resilience and attachment styles and will identify how her attachment style drives her behavior.     Objective #A (Patient Action)    Status:  Partially completed: 1/30/2025    Patient will learn about protective factors and will identify her own.    Intervention(s)  Therapist will complete with patient in session.    Objective #B  Patient will identify how her attachment style drives her behavior.  Status: Completed - Date: 3/21/2024       Intervention(s)  Therapist will teach emotional regulation skills.      Goal 4:  Patient will successfully process through past trauma defined as reporting 0 Subjective Units of Distress related to trauma on 0-10 scale and clear " body scan.   I will know I've met my goal when I am less impacted by my past trauma experience.       Objective #A (Client Action)  Status: Continued - Date: 1/30/25      Patient will identify past traumatic events/memories which are causing current distress.     Intervention(s)  Therapist will take client's history and facilitate client's identification of targets for trauma-focused therapy.     Objective #B  Patient will complete needed assessment(s) to confirm readiness for trauma-focused therapy.    Status: Continued - Date: 1/30/25      Intervention(s)  Therapist will administer Dissociative Experiences Scale, Multidimensional Inventory of Dissociation as needed.     Objective #C  Status: Continued - Date: 1/30/25      Patient will engage in (re-)processing all past traumatic event/memory targets.     Intervention(s)   Therapist will complete trauma-focused therapy (re-)processing with client.     Patient agreed to the above plan.      Jonh Gotti, BARBARA  1/30/25

## 2025-02-06 NOTE — TELEPHONE ENCOUNTER
Prior Authorization Retail Medication Request    Medication/Dose: guanFACINE (INTUNIV) 1 MG TB24 24 hr tablet

## 2025-02-11 NOTE — TELEPHONE ENCOUNTER
PA Initiation    Medication: GUANFACINE HCL ER 1 MG PO TB24  Insurance Company: [employer] Non-Specialty PA's - Phone [phone] Fax [phone]  Pharmacy Filling the Rx: [id number] [place], [place] - [address] AT [facility] [address] [facility]  Filling Pharmacy Phone: [phone]  Filling Pharmacy Fax: [phone]  Start Date: 2/11/2025

## 2025-02-11 NOTE — TELEPHONE ENCOUNTER
Prior Authorization Approval    Medication: GUANFACINE HCL ER 1 MG PO TB24  Authorization Effective Date: 1/12/2025  Authorization Expiration Date: 2/11/2026  Approved Dose/Quantity:   Reference #:     Insurance Company: [employer] Non-Specialty PA's - Phone [phone] Fax [phone]  Expected CoPay: $    CoPay Card Available:      Financial Assistance Needed:   Which Pharmacy is filling the prescription: [id number] [address] AT [facility] [address] [facility]  Pharmacy Notified: YES  Patient Notified: **Instructed pharmacy to notify patient when script is ready to /ship.**

## 2025-03-14 ENCOUNTER — MYC MEDICAL ADVICE (OUTPATIENT)
Dept: PSYCHIATRY | Facility: CLINIC | Age: 50
End: 2025-03-14
Payer: COMMERCIAL

## 2025-03-14 DIAGNOSIS — F90.0 ATTENTION DEFICIT HYPERACTIVITY DISORDER (ADHD), PREDOMINANTLY INATTENTIVE TYPE: Primary | ICD-10-CM

## 2025-03-17 RX ORDER — GUANFACINE 2 MG/1
2 TABLET, EXTENDED RELEASE ORAL AT BEDTIME
Qty: 90 TABLET | Refills: 1 | Status: SHIPPED | OUTPATIENT
Start: 2025-03-17

## 2025-03-27 ENCOUNTER — OFFICE VISIT (OUTPATIENT)
Dept: PSYCHOLOGY | Facility: CLINIC | Age: 50
End: 2025-03-27
Payer: COMMERCIAL

## 2025-03-27 DIAGNOSIS — F41.1 GAD (GENERALIZED ANXIETY DISORDER): ICD-10-CM

## 2025-03-27 DIAGNOSIS — F90.9 ATTENTION DEFICIT HYPERACTIVITY DISORDER (ADHD), UNSPECIFIED ADHD TYPE: ICD-10-CM

## 2025-03-27 DIAGNOSIS — F33.41 RECURRENT MAJOR DEPRESSIVE DISORDER, IN PARTIAL REMISSION: Primary | ICD-10-CM

## 2025-03-27 ASSESSMENT — ANXIETY QUESTIONNAIRES
8. IF YOU CHECKED OFF ANY PROBLEMS, HOW DIFFICULT HAVE THESE MADE IT FOR YOU TO DO YOUR WORK, TAKE CARE OF THINGS AT HOME, OR GET ALONG WITH OTHER PEOPLE?: SOMEWHAT DIFFICULT
7. FEELING AFRAID AS IF SOMETHING AWFUL MIGHT HAPPEN: SEVERAL DAYS
5. BEING SO RESTLESS THAT IT IS HARD TO SIT STILL: SEVERAL DAYS
1. FEELING NERVOUS, ANXIOUS, OR ON EDGE: NEARLY EVERY DAY
2. NOT BEING ABLE TO STOP OR CONTROL WORRYING: MORE THAN HALF THE DAYS
GAD7 TOTAL SCORE: 11
GAD7 TOTAL SCORE: 11
3. WORRYING TOO MUCH ABOUT DIFFERENT THINGS: SEVERAL DAYS
4. TROUBLE RELAXING: SEVERAL DAYS
7. FEELING AFRAID AS IF SOMETHING AWFUL MIGHT HAPPEN: SEVERAL DAYS
IF YOU CHECKED OFF ANY PROBLEMS ON THIS QUESTIONNAIRE, HOW DIFFICULT HAVE THESE PROBLEMS MADE IT FOR YOU TO DO YOUR WORK, TAKE CARE OF THINGS AT HOME, OR GET ALONG WITH OTHER PEOPLE: SOMEWHAT DIFFICULT
6. BECOMING EASILY ANNOYED OR IRRITABLE: MORE THAN HALF THE DAYS
GAD7 TOTAL SCORE: 11

## 2025-03-27 ASSESSMENT — PATIENT HEALTH QUESTIONNAIRE - PHQ9
SUM OF ALL RESPONSES TO PHQ QUESTIONS 1-9: 11
SUM OF ALL RESPONSES TO PHQ QUESTIONS 1-9: 11
10. IF YOU CHECKED OFF ANY PROBLEMS, HOW DIFFICULT HAVE THESE PROBLEMS MADE IT FOR YOU TO DO YOUR WORK, TAKE CARE OF THINGS AT HOME, OR GET ALONG WITH OTHER PEOPLE: SOMEWHAT DIFFICULT

## 2025-03-27 NOTE — PROGRESS NOTES
[facility]                                               Progress Note    Patient Name: Ellyn Mcgee  Date: 3/27/2025         Service Type: Individual      Session Start Time: 1:32 p.m.  Session End Time: 2:32 p.m.     Session Length: 60 min.    Extended Session (53+ minutes): PROLONGED SERVICE IN THE OUTPATIENT SETTING REQUIRING DIRECT (FACE-TO-FACE) PATIENT CONTACT BEYOND THE USUAL SERVICE:    - Patient's presenting concerns require more intensive intervention than could be completed within the usual service  Interactive Complexity: Yes, visit entailed Interactive Complexity evidenced by:  -Use of play equipment or physical devices to overcome barriers to diagnostic or therapeutic interaction with a patient who is not fluent in the same language or who has not developed or lost expressive or receptive language skills to use or understand typical language  Crisis: No     Session #: 11 (with this therapist)    Attendees: Client    Service Modality:  In-Person Visit:    DATA  Extended Session (53+ minutes): PROLONGED SERVICE IN THE OUTPATIENT SETTING REQUIRING DIRECT (FACE-TO-FACE) PATIENT CONTACT BEYOND THE USUAL SERVICE:    - Patient's presenting concerns require more intensive intervention than could be completed within the usual service  Interactive Complexity: No  Crisis: No       Progress Since Last Session (Related to Symptoms / Goals / Homework):  Symptoms: increased PHQ and decreased TEE scores.     Homework: Partially completed      Episode of Care Goals: Minimal progress - ACTION (Actively working towards change); Intervened by reinforcing change plan / affirming steps taken     Current / Ongoing Stressors and Concerns:  Patient identified experiencing worsening depressive and ongoing significant anxiety symptoms related to current stressors and past trauma (current work and past relationship with father, respectively).  Patient opted to engage in Brainspotting reprocessing of  rigidity/feeling trapped target(s) during today's session.     Treatment Objective(s) Addressed in This Session:  Patient will engage in (re-)processing all past traumatic event/memory targets.     Intervention:  Brainspotting:  used Inside Window setup to reprocess with patient her rigidity/feeling trapped target(s) (incomplete).    Assessments completed prior to visit:  PHQ9:       10/8/2024     8:10 PM 10/21/2024     9:22 PM 10/29/2024     8:23 PM 1/30/2025    10:34 AM 2/4/2025    10:06 AM 2/6/2025    10:27 AM 3/27/2025     1:12 PM   PHQ-9 SCORE   PHQ-9 Total Score MyChart 9 (Mild depression) 11 (Moderate depression) 8 (Mild depression) 10 (Moderate depression) 5 (Mild depression) 6 (Mild depression) 11 (Moderate depression)   PHQ-9 Total Score 9 11 8  10  5  6  11        Patient-reported     GAD7:       7/9/2024     1:37 PM 8/7/2024    12:02 PM 9/18/2024     6:03 PM 10/6/2024    11:16 PM 10/21/2024     9:23 PM 1/30/2025    10:36 AM 3/27/2025     1:13 PM   TEE-7 SCORE   Total Score 12 (moderate anxiety) 8 (mild anxiety) 6 (mild anxiety) 9 (mild anxiety) 13 (moderate anxiety) 13 (moderate anxiety) 11 (moderate anxiety)   Total Score 12 8    8 6 9 13 13  11        Patient-reported     PROMIS 10-Global Health (all questions and answers displayed):       2/6/2023    11:06 AM 8/11/2023    11:06 AM 3/21/2024    11:35 AM 6/20/2024    12:21 AM 9/18/2024     6:05 PM 10/6/2024    11:18 PM 1/30/2025    10:37 AM   PROMIS 10   In general, would you say your health is: Fair Good Fair Fair Fair Fair Fair   In general, would you say your quality of life is: Fair Fair Fair Fair Fair Fair Fair   In general, how would you rate your physical health? Fair Fair Fair Fair Fair Good Fair   In general, how would you rate your mental health, including your mood and your ability to think? Fair Fair Poor Fair Good Good Good   In general, how would you rate your satisfaction with your social activities and relationships? Fair Fair Fair Poor  Fair Fair Fair   In general, please rate how well you carry out your usual social activities and roles Fair Good Good Good Good Good Very good   To what extent are you able to carry out your everyday physical activities such as walking, climbing stairs, carrying groceries, or moving a chair? Completely Mostly Completely Mostly Mostly Mostly Mostly   In the past 7 days, how often have you been bothered by emotional problems such as feeling anxious, depressed, or irritable? Sometimes Sometimes Always Often Often Often Often   In the past 7 days, how would you rate your fatigue on average? Moderate Severe Moderate Moderate Moderate Severe Moderate   In the past 7 days, how would you rate your pain on average, where 0 means no pain, and 10 means worst imaginable pain? 6 7 6 7 6 7 7   In general, would you say your health is: 2 3 2 2 2 2 2   In general, would you say your quality of life is: 2 2 2 2 2 2 2   In general, how would you rate your physical health? 2 2 2 2 2 3 2   In general, how would you rate your mental health, including your mood and your ability to think? 2 2 1 2 3 3 3   In general, how would you rate your satisfaction with your social activities and relationships? 2 2 2 1 2 2 2   In general, please rate how well you carry out your usual social activities and roles. (This includes activities at home, at work and in your community, and responsibilities as a parent, child, spouse, employee, friend, etc.) 2 3 3 3 3 3 4   To what extent are you able to carry out your everyday physical activities such as walking, climbing stairs, carrying groceries, or moving a chair? 5 4 5 4 4 4 4   In the past 7 days, how often have you been bothered by emotional problems such as feeling anxious, depressed, or irritable? 3 3 5 4 4 4 4   In the past 7 days, how would you rate your fatigue on average? 3 4 3 3 3 4 3   In the past 7 days, how would you rate your pain on average, where 0 means no pain, and 10 means worst  imaginable pain? 6 7 6 7 6 7 7   Global Mental Health Score 9    9 9 6 7 9 9    9 9    Global Physical Health Score 13    13 10 13 11 12 11    11 11    PROMIS TOTAL - SUBSCORES 22    22 19 19 18 21 20    20 20        Patient-reported     ASSESSMENT: Current Emotional / Mental Status (status of significant symptoms):   Risk status (Self / Other harm or suicidal ideation)   Patient denies current fears or concerns for personal safety.   Patient denies current or recent suicidal ideation or behaviors.    Patient denies current or recent homicidal ideation or behaviors.   Patient denies current or recent self injurious behavior or ideation.   Patient denies other safety concerns.   Patient reports there has been no change in risk factors since their last session.   Patient reports there has been no change in protective factors since their last session.   Recommended that patient call 911 or go to the local ED should there be a change in any of these risk factors     Appearance:   Appropriate    Eye Contact:   Good    Psychomotor Behavior: Normal    Attitude:   Cooperative; friendly, interested   Orientation:   All   Speech    Rate / Production: Normal/ Responsive Emotional Talkative    Volume:  Normal    Mood:    Anxious  Depressed  Normal Sad  Expansive Dysphoric   Affect:    Appropriate  Expansive  Subdued  Tearful Worrisome    Thought Content:  Clear  Rumination    Thought Form:  Coherent  Logical    Insight:    Good  and Intellectual Insight     Medication Review:   No changes to current psychiatric medication(s)     Medication Compliance:   Yes     Changes in Health Issues:   None     Chemical Use Review:   Substance Use: Chemical use reviewed, no active concerns identified ; no current alcohol use     Tobacco Use: No current tobacco use.      Diagnosis:  1. Recurrent major depressive disorder, in partial remission    2. TEE (generalized anxiety disorder)    3. Attention deficit hyperactivity disorder (ADHD),  unspecified ADHD type                  Note: There has been demonstrated improvement in functioning while patient has been engaged in psychotherapy/psychological service- if withdrawn the patient would deteriorate and/or relapse.     Collateral Reports Completed:  N/A..    PLAN: (Patient Tasks / Therapist Tasks / Other)    Patient agreed to continue to work on reprocessing her rigidity/feeling trapped target(s), focusing on her personal power/resource in her response, journaling/noting her takeaways.      Jonh Gotti, LMFT 3/27/25    ______________________________________________________________________                                                           [facility]    Individual Treatment Plan    Patient's Name: Ellyn Mcgee  YOB: 1975    Date of Creation: 2020  Date Treatment Plan Last Reviewed/Revised: 2025, next due 25.    DSM5 Diagnoses: 296.32 (F33.1) Major Depressive Disorder, Recurrent Episode, Moderate _ or 300.02 (F41.1) Generalized Anxiety Disorder (patient has previously been diagnosed with ADHD, hyperactive type)    Psychosocial / Contextual Factors: History of anxious attachment stemming from relationship with father (he  8 years ago); is currently pregnant (high-risk due to advanced maternal age); is in couples therapy with  due to frequent arguments and his emotional affair earlier in the year; financial stress; history of ADHD (hyperactive type).    PROMIS (reviewed every 90 days): PROMIS-10 Scores: 20 (10/6/24)    Referral / Collaboration:  EMDR    Anticipated number of sessions for this episode of care: 100  Anticipated frequency of sessions: Every 2-3 weeks  Anticipated duration of each session: 53+ minutes  Treatment plan will be reviewed in 90 days or when goals have been changed.           Measurable Treatment Goal(s) related to diagnosis / functional impairment(s):  Patient is asking to focus treatment on her  "relationship with her father and past trauma.    Goal 1: Patient will tell full story of past trauma related to her relationship with her father and will identify how past feelings are impacting current relationships.    Objective #A (Patient Action)    Patient will identify how past feelings are impacting current relationships.  Status: Continued - Date(s): 1/30/2025 (partially completed)    Intervention(s)  Therapist will role-play conflict management.    Objective #B  Patient will identify strengths and weaknesses within her family system of origin.  Status: Continued - Date(s): 1/30/2025 (partially completed)    Intervention(s)  Therapist will assign homework for patient to create list .      Goal 2: Patient will learn about resilience, trauma responses, and Javon Servin's \"the story I'm making up\" (cognitive strategy to manage anxious thoughts).    Objective #A (Patient Action)    Patient will learn about and identify personal trauma responses.    Status: Continued - Date(s): 1/30/2025     Intervention(s)  Therapist will provide educational materials on trauma responses .    Objective #B  Patient will use cognitive strategies identified in therapy to challenge anxious thoughts.  Status: Continued - Date(s): 1/30/2025     Intervention(s)  Therapist will teach about Javon Servin's power of vulnerability and \"the story I'm making up\" .      Goal 3: Patient will learn about protective factors that foster resilience and attachment styles and will identify how her attachment style drives her behavior.     Objective #A (Patient Action)    Status:  Partially completed: 1/30/2025    Patient will learn about protective factors and will identify her own.    Intervention(s)  Therapist will complete with patient in session.    Objective #B  Patient will identify how her attachment style drives her behavior.  Status: Completed - Date: 3/21/2024       Intervention(s)  Therapist will teach emotional regulation skills.      Goal " 4:  Patient will successfully process through past trauma defined as reporting 0 Subjective Units of Distress related to trauma on 0-10 scale and clear body scan.   I will know I've met my goal when I am less impacted by my past trauma experience.       Objective #A (Client Action)  Status: Continued - Date: 1/30/25      Patient will identify past traumatic events/memories which are causing current distress.     Intervention(s)  Therapist will take client's history and facilitate client's identification of targets for trauma-focused therapy.     Objective #B  Patient will complete needed assessment(s) to confirm readiness for trauma-focused therapy.    Status: Continued - Date: 1/30/25      Intervention(s)  Therapist will administer Dissociative Experiences Scale, Multidimensional Inventory of Dissociation as needed.     Objective #C  Status: Continued - Date: 1/30/25      Patient will engage in (re-)processing all past traumatic event/memory targets.     Intervention(s)   Therapist will complete trauma-focused therapy (re-)processing with client.     Patient agreed to the above plan.      Jonh Gotti, BARBARA  1/30/25

## 2025-04-03 ENCOUNTER — OFFICE VISIT (OUTPATIENT)
Dept: PSYCHOLOGY | Facility: CLINIC | Age: 50
End: 2025-04-03
Payer: COMMERCIAL

## 2025-04-03 DIAGNOSIS — F33.41 RECURRENT MAJOR DEPRESSIVE DISORDER, IN PARTIAL REMISSION: ICD-10-CM

## 2025-04-03 DIAGNOSIS — F41.1 GAD (GENERALIZED ANXIETY DISORDER): Primary | ICD-10-CM

## 2025-04-03 DIAGNOSIS — F90.9 ATTENTION DEFICIT HYPERACTIVITY DISORDER (ADHD), UNSPECIFIED ADHD TYPE: ICD-10-CM

## 2025-04-03 NOTE — PROGRESS NOTES
[facility]                                               Progress Note    Patient Name: Ellyn Mcgee  Date: 4/3/2025         Service Type: Individual      Session Start Time: 10:32 a.m.  Session End Time: 11:36 a.m.     Session Length: 64 min.    Extended Session (53+ minutes): PROLONGED SERVICE IN THE OUTPATIENT SETTING REQUIRING DIRECT (FACE-TO-FACE) PATIENT CONTACT BEYOND THE USUAL SERVICE:    - Patient's presenting concerns require more intensive intervention than could be completed within the usual service  Interactive Complexity: Yes, visit entailed Interactive Complexity evidenced by:  -Use of play equipment or physical devices to overcome barriers to diagnostic or therapeutic interaction with a patient who is not fluent in the same language or who has not developed or lost expressive or receptive language skills to use or understand typical language  Crisis: No     Session #: 12 (with this therapist)    Attendees: Client    Service Modality:  In-Person Visit:    DATA  Extended Session (53+ minutes): PROLONGED SERVICE IN THE OUTPATIENT SETTING REQUIRING DIRECT (FACE-TO-FACE) PATIENT CONTACT BEYOND THE USUAL SERVICE:    - Patient's presenting concerns require more intensive intervention than could be completed within the usual service  Interactive Complexity: No  Crisis: No       Progress Since Last Session (Related to Symptoms / Goals / Homework):  Symptoms: ongoing depression and anxiety.     Homework: Partially completed      Episode of Care Goals: Minimal progress - ACTION (Actively working towards change); Intervened by reinforcing change plan / affirming steps taken     Current / Ongoing Stressors and Concerns:  Patient identified experiencing ongoing significant depressive and anxiety symptoms related to current stressors and past trauma (current work and past relationship with father, respectively).  Patient opted to engage in Brainspotting reprocessing of relationship with  father/feeling unloved target during today's session.     Treatment Objective(s) Addressed in This Session:  Patient will engage in (re-)processing all past traumatic event/memory targets.     Intervention:  Brainspotting:  used Inside Window setup to reprocess with patient her relationship with father/feeling unloved target  (incomplete).    Assessments completed prior to visit:  PHQ9:       10/8/2024     8:10 PM 10/21/2024     9:22 PM 10/29/2024     8:23 PM 1/30/2025    10:34 AM 2/4/2025    10:06 AM 2/6/2025    10:27 AM 3/27/2025     1:12 PM   PHQ-9 SCORE   PHQ-9 Total Score MyChart 9 (Mild depression) 11 (Moderate depression) 8 (Mild depression) 10 (Moderate depression) 5 (Mild depression) 6 (Mild depression) 11 (Moderate depression)   PHQ-9 Total Score 9 11 8  10  5  6  11        Patient-reported     GAD7:       7/9/2024     1:37 PM 8/7/2024    12:02 PM 9/18/2024     6:03 PM 10/6/2024    11:16 PM 10/21/2024     9:23 PM 1/30/2025    10:36 AM 3/27/2025     1:13 PM   TEE-7 SCORE   Total Score 12 (moderate anxiety) 8 (mild anxiety) 6 (mild anxiety) 9 (mild anxiety) 13 (moderate anxiety) 13 (moderate anxiety) 11 (moderate anxiety)   Total Score 12 8    8 6 9 13 13  11        Patient-reported     PROMIS 10-Global Health (all questions and answers displayed):       2/6/2023    11:06 AM 8/11/2023    11:06 AM 3/21/2024    11:35 AM 6/20/2024    12:21 AM 9/18/2024     6:05 PM 10/6/2024    11:18 PM 1/30/2025    10:37 AM   PROMIS 10   In general, would you say your health is: Fair Good Fair Fair Fair Fair Fair   In general, would you say your quality of life is: Fair Fair Fair Fair Fair Fair Fair   In general, how would you rate your physical health? Fair Fair Fair Fair Fair Good Fair   In general, how would you rate your mental health, including your mood and your ability to think? Fair Fair Poor Fair Good Good Good   In general, how would you rate your satisfaction with your social activities and relationships? Fair Fair  Fair Poor Fair Fair Fair   In general, please rate how well you carry out your usual social activities and roles Fair Good Good Good Good Good Very good   To what extent are you able to carry out your everyday physical activities such as walking, climbing stairs, carrying groceries, or moving a chair? Completely Mostly Completely Mostly Mostly Mostly Mostly   In the past 7 days, how often have you been bothered by emotional problems such as feeling anxious, depressed, or irritable? Sometimes Sometimes Always Often Often Often Often   In the past 7 days, how would you rate your fatigue on average? Moderate Severe Moderate Moderate Moderate Severe Moderate   In the past 7 days, how would you rate your pain on average, where 0 means no pain, and 10 means worst imaginable pain? 6 7 6 7 6 7 7   In general, would you say your health is: 2 3 2 2 2 2 2   In general, would you say your quality of life is: 2 2 2 2 2 2 2   In general, how would you rate your physical health? 2 2 2 2 2 3 2   In general, how would you rate your mental health, including your mood and your ability to think? 2 2 1 2 3 3 3   In general, how would you rate your satisfaction with your social activities and relationships? 2 2 2 1 2 2 2   In general, please rate how well you carry out your usual social activities and roles. (This includes activities at home, at work and in your community, and responsibilities as a parent, child, spouse, employee, friend, etc.) 2 3 3 3 3 3 4   To what extent are you able to carry out your everyday physical activities such as walking, climbing stairs, carrying groceries, or moving a chair? 5 4 5 4 4 4 4   In the past 7 days, how often have you been bothered by emotional problems such as feeling anxious, depressed, or irritable? 3 3 5 4 4 4 4   In the past 7 days, how would you rate your fatigue on average? 3 4 3 3 3 4 3   In the past 7 days, how would you rate your pain on average, where 0 means no pain, and 10 means  worst imaginable pain? 6 7 6 7 6 7 7   Global Mental Health Score 9    9 9 6 7 9 9    9 9    Global Physical Health Score 13    13 10 13 11 12 11    11 11    PROMIS TOTAL - SUBSCORES 22    22 19 19 18 21 20    20 20        Patient-reported     ASSESSMENT: Current Emotional / Mental Status (status of significant symptoms):   Risk status (Self / Other harm or suicidal ideation)   Patient denies current fears or concerns for personal safety.   Patient denies current or recent suicidal ideation or behaviors.    Patient denies current or recent homicidal ideation or behaviors.   Patient denies current or recent self injurious behavior or ideation.   Patient denies other safety concerns.   Patient reports there has been no change in risk factors since their last session.   Patient reports there has been no change in protective factors since their last session.   Recommended that patient call 911 or go to the local ED should there be a change in any of these risk factors     Appearance:   Appropriate    Eye Contact:   Good    Psychomotor Behavior: Normal    Attitude:   Cooperative; friendly, interested   Orientation:   All   Speech    Rate / Production: Normal/ Responsive Emotional Talkative    Volume:  Normal    Mood:    Anxious  Depressed  Normal Sad  Expansive Dysphoric   Affect:    Appropriate  Expansive  Subdued  Tearful Worrisome    Thought Content:  Clear  Rumination    Thought Form:  Coherent  Logical    Insight:    Good      Medication Review:   No changes to current psychiatric medication(s)     Medication Compliance:   Yes     Changes in Health Issues:   None     Chemical Use Review:   Substance Use: Chemical use reviewed, no active concerns identified ; no current alcohol use     Tobacco Use: No current tobacco use.      Diagnosis:  1. TEE (generalized anxiety disorder)    2. Recurrent major depressive disorder, in partial remission    3. Attention deficit hyperactivity disorder (ADHD), unspecified ADHD type                   Note: There has been demonstrated improvement in functioning while patient has been engaged in psychotherapy/psychological service- if withdrawn the patient would deteriorate and/or relapse.     Collateral Reports Completed:  N/A..    PLAN: (Patient Tasks / Therapist Tasks / Other)    Patient agreed to continue to work on reprocessing her relationship with father/feeling unloved target, focusing on her personal power/resource in her response, journaling/noting her takeaways.      Jonh Gotti, LMFT 4/3/25    ______________________________________________________________________                                                           [facility]    Individual Treatment Plan    Patient's Name: Ellyn Mcgee  YOB: 1975    Date of Creation: 2020  Date Treatment Plan Last Reviewed/Revised: 2025, next due 25.    DSM5 Diagnoses: 296.32 (F33.1) Major Depressive Disorder, Recurrent Episode, Moderate _ or 300.02 (F41.1) Generalized Anxiety Disorder (patient has previously been diagnosed with ADHD, hyperactive type)    Psychosocial / Contextual Factors: History of anxious attachment stemming from relationship with father (he  8 years ago); is currently pregnant (high-risk due to advanced maternal age); is in couples therapy with  due to frequent arguments and his emotional affair earlier in the year; financial stress; history of ADHD (hyperactive type).    PROMIS (reviewed every 90 days): PROMIS-10 Scores: 20 (25)    Referral / Collaboration:  EMDR    Anticipated number of sessions for this episode of care: 100  Anticipated frequency of sessions: Every 2-3 weeks  Anticipated duration of each session: 53+ minutes  Treatment plan will be reviewed in 90 days or when goals have been changed.           Measurable Treatment Goal(s) related to diagnosis / functional impairment(s):  Patient is asking to focus treatment on her relationship with  "her father and past trauma.    Goal 1: Patient will tell full story of past trauma related to her relationship with her father and will identify how past feelings are impacting current relationships.    Objective #A (Patient Action)    Patient will identify how past feelings are impacting current relationships.  Status: Continued - Date(s): 1/30/2025 (partially completed)    Intervention(s)  Therapist will role-play conflict management.    Objective #B  Patient will identify strengths and weaknesses within her family system of origin.  Status: Continued - Date(s): 1/30/2025 (partially completed)    Intervention(s)  Therapist will assign homework for patient to create list .      Goal 2: Patient will learn about resilience, trauma responses, and Javon Servin's \"the story I'm making up\" (cognitive strategy to manage anxious thoughts).    Objective #A (Patient Action)    Patient will learn about and identify personal trauma responses.    Status: Continued - Date(s): 1/30/2025     Intervention(s)  Therapist will provide educational materials on trauma responses .    Objective #B  Patient will use cognitive strategies identified in therapy to challenge anxious thoughts.  Status: Continued - Date(s): 1/30/2025     Intervention(s)  Therapist will teach about Javon Servin's power of vulnerability and \"the story I'm making up\" .      Goal 3: Patient will learn about protective factors that foster resilience and attachment styles and will identify how her attachment style drives her behavior.     Objective #A (Patient Action)    Status:  Partially completed: 1/30/2025    Patient will learn about protective factors and will identify her own.    Intervention(s)  Therapist will complete with patient in session.    Objective #B  Patient will identify how her attachment style drives her behavior.  Status: Completed - Date: 3/21/2024       Intervention(s)  Therapist will teach emotional regulation skills.      Goal 4:  Patient will " successfully process through past trauma defined as reporting 0 Subjective Units of Distress related to trauma on 0-10 scale and clear body scan.   I will know I've met my goal when I am less impacted by my past trauma experience.       Objective #A (Client Action)  Status: Continued - Date: 1/30/25      Patient will identify past traumatic events/memories which are causing current distress.     Intervention(s)  Therapist will take client's history and facilitate client's identification of targets for trauma-focused therapy.     Objective #B  Patient will complete needed assessment(s) to confirm readiness for trauma-focused therapy.    Status: Continued - Date: 1/30/25      Intervention(s)  Therapist will administer Dissociative Experiences Scale, Multidimensional Inventory of Dissociation as needed.     Objective #C  Status: Continued - Date: 1/30/25      Patient will engage in (re-)processing all past traumatic event/memory targets.     Intervention(s)   Therapist will complete trauma-focused therapy (re-)processing with client.     Patient agreed to the above plan.      Jonh Gotti, BARBARA  1/30/25

## 2025-04-07 ENCOUNTER — MYC MEDICAL ADVICE (OUTPATIENT)
Dept: PSYCHIATRY | Facility: CLINIC | Age: 50
End: 2025-04-07
Payer: COMMERCIAL

## 2025-04-07 DIAGNOSIS — F41.1 GAD (GENERALIZED ANXIETY DISORDER): ICD-10-CM

## 2025-04-07 DIAGNOSIS — F33.41 RECURRENT MAJOR DEPRESSIVE DISORDER, IN PARTIAL REMISSION: ICD-10-CM

## 2025-04-08 RX ORDER — PAROXETINE HYDROCHLORIDE HEMIHYDRATE 25 MG/1
50 TABLET, FILM COATED, EXTENDED RELEASE ORAL EVERY MORNING
Qty: 2 TABLET | Refills: 0 | Status: SHIPPED | OUTPATIENT
Start: 2025-04-08

## 2025-04-08 NOTE — TELEPHONE ENCOUNTER
1) Called [facility]. Paroxetine was shipped on 4/7/25. It usually takes 3-5 business days to arrive. The pharmacy estimates the prescription will arrive to the patient on 4/10/25.     The patient would have to pay out of pocket for a short term supply      2) Notified the patient via [employer].     LUCY JONES RN on 4/8/2025 at 11:18 AM    
Patient

## 2025-04-17 ENCOUNTER — OFFICE VISIT (OUTPATIENT)
Dept: PSYCHOLOGY | Facility: CLINIC | Age: 50
End: 2025-04-17
Payer: COMMERCIAL

## 2025-04-17 DIAGNOSIS — F41.1 GAD (GENERALIZED ANXIETY DISORDER): Primary | ICD-10-CM

## 2025-04-17 DIAGNOSIS — F33.41 RECURRENT MAJOR DEPRESSIVE DISORDER, IN PARTIAL REMISSION: ICD-10-CM

## 2025-04-17 DIAGNOSIS — F90.9 ATTENTION DEFICIT HYPERACTIVITY DISORDER (ADHD), UNSPECIFIED ADHD TYPE: ICD-10-CM

## 2025-04-17 ASSESSMENT — ANXIETY QUESTIONNAIRES
5. BEING SO RESTLESS THAT IT IS HARD TO SIT STILL: NOT AT ALL
GAD7 TOTAL SCORE: 9
1. FEELING NERVOUS, ANXIOUS, OR ON EDGE: NEARLY EVERY DAY
2. NOT BEING ABLE TO STOP OR CONTROL WORRYING: SEVERAL DAYS
7. FEELING AFRAID AS IF SOMETHING AWFUL MIGHT HAPPEN: SEVERAL DAYS
GAD7 TOTAL SCORE: 9
6. BECOMING EASILY ANNOYED OR IRRITABLE: MORE THAN HALF THE DAYS
4. TROUBLE RELAXING: SEVERAL DAYS
8. IF YOU CHECKED OFF ANY PROBLEMS, HOW DIFFICULT HAVE THESE MADE IT FOR YOU TO DO YOUR WORK, TAKE CARE OF THINGS AT HOME, OR GET ALONG WITH OTHER PEOPLE?: SOMEWHAT DIFFICULT
GAD7 TOTAL SCORE: 9
7. FEELING AFRAID AS IF SOMETHING AWFUL MIGHT HAPPEN: SEVERAL DAYS
3. WORRYING TOO MUCH ABOUT DIFFERENT THINGS: SEVERAL DAYS
IF YOU CHECKED OFF ANY PROBLEMS ON THIS QUESTIONNAIRE, HOW DIFFICULT HAVE THESE PROBLEMS MADE IT FOR YOU TO DO YOUR WORK, TAKE CARE OF THINGS AT HOME, OR GET ALONG WITH OTHER PEOPLE: SOMEWHAT DIFFICULT

## 2025-04-17 ASSESSMENT — PATIENT HEALTH QUESTIONNAIRE - PHQ9
10. IF YOU CHECKED OFF ANY PROBLEMS, HOW DIFFICULT HAVE THESE PROBLEMS MADE IT FOR YOU TO DO YOUR WORK, TAKE CARE OF THINGS AT HOME, OR GET ALONG WITH OTHER PEOPLE: SOMEWHAT DIFFICULT
SUM OF ALL RESPONSES TO PHQ QUESTIONS 1-9: 10
SUM OF ALL RESPONSES TO PHQ QUESTIONS 1-9: 10

## 2025-04-17 NOTE — PROGRESS NOTES
[facility]                                               Progress Note    Patient Name: Ellyn Mcgee  Date: 4/17/2025         Service Type: Individual      Session Start Time: 10:32 a.m.  Session End Time: 11:36 a.m.     Session Length: 64 min.    Extended Session (53+ minutes): PROLONGED SERVICE IN THE OUTPATIENT SETTING REQUIRING DIRECT (FACE-TO-FACE) PATIENT CONTACT BEYOND THE USUAL SERVICE:    - Patient's presenting concerns require more intensive intervention than could be completed within the usual service  Interactive Complexity: Yes, visit entailed Interactive Complexity evidenced by:  -Use of play equipment or physical devices to overcome barriers to diagnostic or therapeutic interaction with a patient who is not fluent in the same language or who has not developed or lost expressive or receptive language skills to use or understand typical language  Crisis: No     Session #: 13 (with this therapist)    Attendees: Client    Service Modality:  In-Person Visit:    DATA  Extended Session (53+ minutes): PROLONGED SERVICE IN THE OUTPATIENT SETTING REQUIRING DIRECT (FACE-TO-FACE) PATIENT CONTACT BEYOND THE USUAL SERVICE:    - Patient's presenting concerns require more intensive intervention than could be completed within the usual service  Interactive Complexity: No  Crisis: No       Progress Since Last Session (Related to Symptoms / Goals / Homework):  Symptoms: Improving decreased PHQ and TEE scores.     Homework: Partially completed      Episode of Care Goals: Satisfactory progress - ACTION (Actively working towards change); Intervened by reinforcing change plan / affirming steps taken     Current / Ongoing Stressors and Concerns:  Patient identified experiencing ongoing though decreased depressive and anxiety symptoms related to current stressors and past trauma.  Patient opted to engage in Brainspotting reprocessing of uncertainty/anxiety regarding betrayal target during today's  session.     Treatment Objective(s) Addressed in This Session:  Patient will engage in (re-)processing all past traumatic event/memory targets.     Intervention:  Brainspotting:  used Inside Window setup to reprocess with patient her relationship with father/feeling unloved target  (incomplete).    Assessments completed prior to visit:  PHQ9:       10/21/2024     9:22 PM 10/29/2024     8:23 PM 1/30/2025    10:34 AM 2/4/2025    10:06 AM 2/6/2025    10:27 AM 3/27/2025     1:12 PM 4/17/2025    10:16 AM   PHQ-9 SCORE   PHQ-9 Total Score MyChart 11 (Moderate depression) 8 (Mild depression) 10 (Moderate depression) 5 (Mild depression) 6 (Mild depression) 11 (Moderate depression) 10 (Moderate depression)   PHQ-9 Total Score 11 8  10  5  6  11  10        Patient-reported     GAD7:       8/7/2024    12:02 PM 9/18/2024     6:03 PM 10/6/2024    11:16 PM 10/21/2024     9:23 PM 1/30/2025    10:36 AM 3/27/2025     1:13 PM 4/17/2025    10:17 AM   TEE-7 SCORE   Total Score 8 (mild anxiety) 6 (mild anxiety) 9 (mild anxiety) 13 (moderate anxiety) 13 (moderate anxiety) 11 (moderate anxiety) 9 (mild anxiety)   Total Score 8    8 6 9 13 13  11  9        Patient-reported     PROMIS 10-Global Health (all questions and answers displayed):       2/6/2023    11:06 AM 8/11/2023    11:06 AM 3/21/2024    11:35 AM 6/20/2024    12:21 AM 9/18/2024     6:05 PM 10/6/2024    11:18 PM 1/30/2025    10:37 AM   PROMIS 10   In general, would you say your health is: Fair Good Fair Fair Fair Fair Fair   In general, would you say your quality of life is: Fair Fair Fair Fair Fair Fair Fair   In general, how would you rate your physical health? Fair Fair Fair Fair Fair Good Fair   In general, how would you rate your mental health, including your mood and your ability to think? Fair Fair Poor Fair Good Good Good   In general, how would you rate your satisfaction with your social activities and relationships? Fair Fair Fair Poor Fair Fair Fair   In general,  please rate how well you carry out your usual social activities and roles Fair Good Good Good Good Good Very good   To what extent are you able to carry out your everyday physical activities such as walking, climbing stairs, carrying groceries, or moving a chair? Completely Mostly Completely Mostly Mostly Mostly Mostly   In the past 7 days, how often have you been bothered by emotional problems such as feeling anxious, depressed, or irritable? Sometimes Sometimes Always Often Often Often Often   In the past 7 days, how would you rate your fatigue on average? Moderate Severe Moderate Moderate Moderate Severe Moderate   In the past 7 days, how would you rate your pain on average, where 0 means no pain, and 10 means worst imaginable pain? 6 7 6 7 6 7 7   In general, would you say your health is: 2 3 2 2 2 2 2   In general, would you say your quality of life is: 2 2 2 2 2 2 2   In general, how would you rate your physical health? 2 2 2 2 2 3 2   In general, how would you rate your mental health, including your mood and your ability to think? 2 2 1 2 3 3 3   In general, how would you rate your satisfaction with your social activities and relationships? 2 2 2 1 2 2 2   In general, please rate how well you carry out your usual social activities and roles. (This includes activities at home, at work and in your community, and responsibilities as a parent, child, spouse, employee, friend, etc.) 2 3 3 3 3 3 4   To what extent are you able to carry out your everyday physical activities such as walking, climbing stairs, carrying groceries, or moving a chair? 5 4 5 4 4 4 4   In the past 7 days, how often have you been bothered by emotional problems such as feeling anxious, depressed, or irritable? 3 3 5 4 4 4 4   In the past 7 days, how would you rate your fatigue on average? 3 4 3 3 3 4 3   In the past 7 days, how would you rate your pain on average, where 0 means no pain, and 10 means worst imaginable pain? 6 7 6 7 6 7 7    Global Mental Health Score 9    9 9 6 7 9 9    9 9    Global Physical Health Score 13    13 10 13 11 12 11    11 11    PROMIS TOTAL - SUBSCORES 22    22 19 19 18 21 20    20 20        Patient-reported     ASSESSMENT: Current Emotional / Mental Status (status of significant symptoms):   Risk status (Self / Other harm or suicidal ideation)   Patient denies current fears or concerns for personal safety.   Patient denies current or recent suicidal ideation or behaviors.    Patient denies current or recent homicidal ideation or behaviors.   Patient denies current or recent self injurious behavior or ideation.   Patient denies other safety concerns.   Patient reports there has been no change in risk factors since their last session.   Patient reports there has been no change in protective factors since their last session.   Recommended that patient call 911 or go to the local ED should there be a change in any of these risk factors     Appearance:   Appropriate    Eye Contact:   Good    Psychomotor Behavior: Normal    Attitude:   Cooperative; friendly, interested   Orientation:   All   Speech    Rate / Production: Normal/ Responsive Emotional Talkative    Volume:  Normal    Mood:    Anxious  Depressed  Normal Sad  Expansive Dysphoric   Affect:    Appropriate  Expansive  Subdued  Worrisome    Thought Content:  Clear  Rumination    Thought Form:  Coherent  Logical    Insight:    Good  and Intellectual Insight     Medication Review:   No changes to current psychiatric medication(s)     Medication Compliance:   Yes     Changes in Health Issues:   None     Chemical Use Review:   Substance Use: Chemical use reviewed, no active concerns identified ; no current alcohol use     Tobacco Use: No current tobacco use.      Diagnosis:  1. TEE (generalized anxiety disorder)    2. Recurrent major depressive disorder, in partial remission    3. Attention deficit hyperactivity disorder (ADHD), unspecified ADHD type                  Note:  There has been demonstrated improvement in functioning while patient has been engaged in psychotherapy/psychological service- if withdrawn the patient would deteriorate and/or relapse.     Collateral Reports Completed:  N/A..    PLAN: (Patient Tasks / Therapist Tasks / Other)    Patient agreed to continue to work on reprocessing her uncertainty/anxiety regarding betrayal target, focusing on being assertive and her response options, journaling/noting her takeaways.      Jonh Jere Gotti, LMFT 25    ______________________________________________________________________                                                           [facility]    Individual Treatment Plan    Patient's Name: Ellyn Mcgee  YOB: 1975    Date of Creation: 2020  Date Treatment Plan Last Reviewed/Revised: 2025, next due 25.    DSM5 Diagnoses: 296.32 (F33.1) Major Depressive Disorder, Recurrent Episode, Moderate _ or 300.02 (F41.1) Generalized Anxiety Disorder (patient has previously been diagnosed with ADHD, hyperactive type)    Psychosocial / Contextual Factors: History of anxious attachment stemming from relationship with father (he  8 years ago); is currently pregnant (high-risk due to advanced maternal age); is in couples therapy with  due to frequent arguments and his emotional affair earlier in the year; financial stress; history of ADHD (hyperactive type).    PROMIS (reviewed every 90 days): PROMIS-10 Scores: 20 (25)    Referral / Collaboration:  EMDR    Anticipated number of sessions for this episode of care: 100  Anticipated frequency of sessions: Every 2-3 weeks  Anticipated duration of each session: 53+ minutes  Treatment plan will be reviewed in 90 days or when goals have been changed.           Measurable Treatment Goal(s) related to diagnosis / functional impairment(s):  Patient is asking to focus treatment on her relationship with her father and past  "trauma.    Goal 1: Patient will tell full story of past trauma related to her relationship with her father and will identify how past feelings are impacting current relationships.    Objective #A (Patient Action)    Patient will identify how past feelings are impacting current relationships.  Status: Continued - Date(s): 1/30/2025 (partially completed)    Intervention(s)  Therapist will role-play conflict management.    Objective #B  Patient will identify strengths and weaknesses within her family system of origin.  Status: Continued - Date(s): 1/30/2025 (partially completed)    Intervention(s)  Therapist will assign homework for patient to create list .      Goal 2: Patient will learn about resilience, trauma responses, and Javon Servin's \"the story I'm making up\" (cognitive strategy to manage anxious thoughts).    Objective #A (Patient Action)    Patient will learn about and identify personal trauma responses.    Status: Continued - Date(s): 1/30/2025     Intervention(s)  Therapist will provide educational materials on trauma responses .    Objective #B  Patient will use cognitive strategies identified in therapy to challenge anxious thoughts.  Status: Continued - Date(s): 1/30/2025     Intervention(s)  Therapist will teach about Javon Servin's power of vulnerability and \"the story I'm making up\" .      Goal 3: Patient will learn about protective factors that foster resilience and attachment styles and will identify how her attachment style drives her behavior.     Objective #A (Patient Action)    Status:  Partially completed: 1/30/2025    Patient will learn about protective factors and will identify her own.    Intervention(s)  Therapist will complete with patient in session.    Objective #B  Patient will identify how her attachment style drives her behavior.  Status: Completed - Date: 3/21/2024       Intervention(s)  Therapist will teach emotional regulation skills.      Goal 4:  Patient will successfully process " through past trauma defined as reporting 0 Subjective Units of Distress related to trauma on 0-10 scale and clear body scan.   I will know I've met my goal when I am less impacted by my past trauma experience.       Objective #A (Client Action)  Status: Continued - Date: 1/30/25      Patient will identify past traumatic events/memories which are causing current distress.     Intervention(s)  Therapist will take client's history and facilitate client's identification of targets for trauma-focused therapy.     Objective #B  Patient will complete needed assessment(s) to confirm readiness for trauma-focused therapy.    Status: Continued - Date: 1/30/25      Intervention(s)  Therapist will administer Dissociative Experiences Scale, Multidimensional Inventory of Dissociation as needed.     Objective #C  Status: Continued - Date: 1/30/25      Patient will engage in (re-)processing all past traumatic event/memory targets.     Intervention(s)   Therapist will complete trauma-focused therapy (re-)processing with client.     Patient agreed to the above plan.      Jonh Gotti, BARBARA  1/30/25

## 2025-04-18 ENCOUNTER — MYC MEDICAL ADVICE (OUTPATIENT)
Dept: FAMILY MEDICINE | Facility: CLINIC | Age: 50
End: 2025-04-18
Payer: COMMERCIAL

## 2025-04-18 DIAGNOSIS — M25.561 RIGHT KNEE PAIN, UNSPECIFIED CHRONICITY: Primary | ICD-10-CM

## 2025-04-19 ENCOUNTER — HEALTH MAINTENANCE LETTER (OUTPATIENT)
Age: 50
End: 2025-04-19

## 2025-04-21 ENCOUNTER — MYC MEDICAL ADVICE (OUTPATIENT)
Dept: PSYCHIATRY | Facility: CLINIC | Age: 50
End: 2025-04-21
Payer: COMMERCIAL

## 2025-04-21 NOTE — TELEPHONE ENCOUNTER
PN,  Please see below MyChart message and advise.  Appears previous referral from 10/2023, repended if approved  Thanks,  Hillary ERNANDEZ RN

## 2025-04-23 NOTE — TELEPHONE ENCOUNTER
DIAGNOSIS: Right knee pain, unspecified chronicity/ Rowena, Dorothy RODRÍGUEZ, DO / [employer] / no new img , last was on 10/25/23 / ( saw Dr. Praod on 10/25/23 bilateral knee pain - per patient this time different kind of pain )     APPOINTMENT DATE: 4.25.25   NOTES STATUS DETAILS   OFFICE NOTE from referring provider Internal 4.18.25  Rowena  FP   OFFICE NOTE from other specialist Internal 4.10.24 + more in  PT    10.25.23  Johan  Spts   MEDICATION LIST Internal    XRAYS (IMAGES & REPORTS) Internal 10.25.13  XR Knee Luciano

## 2025-04-25 ENCOUNTER — ANCILLARY PROCEDURE (OUTPATIENT)
Dept: GENERAL RADIOLOGY | Facility: CLINIC | Age: 50
End: 2025-04-25
Attending: FAMILY MEDICINE
Payer: COMMERCIAL

## 2025-04-25 ENCOUNTER — PRE VISIT (OUTPATIENT)
Dept: ORTHOPEDICS | Facility: CLINIC | Age: 50
End: 2025-04-25

## 2025-04-25 DIAGNOSIS — M25.561 RIGHT KNEE PAIN: ICD-10-CM

## 2025-04-25 PROCEDURE — 73562 X-RAY EXAM OF KNEE 3: CPT | Mod: RT | Performed by: RADIOLOGY

## 2025-05-01 ENCOUNTER — MYC MEDICAL ADVICE (OUTPATIENT)
Dept: PSYCHIATRY | Facility: CLINIC | Age: 50
End: 2025-05-01
Payer: COMMERCIAL

## 2025-05-01 DIAGNOSIS — F90.0 ATTENTION DEFICIT HYPERACTIVITY DISORDER (ADHD), PREDOMINANTLY INATTENTIVE TYPE: ICD-10-CM

## 2025-05-01 DIAGNOSIS — F41.1 GAD (GENERALIZED ANXIETY DISORDER): Primary | ICD-10-CM

## 2025-05-01 NOTE — TELEPHONE ENCOUNTER
Patient said the guanfacine was working well at first and has since stopped being effective. She is wondering about increasing the dose.     She has been struggling lately mostly with her child. She is losing patience a lot and gets to yelling.     Will the increase in guanfacine help or is there something else that would help?     Last visit: Continue Paxil/paroxetine CR 50 mg daily for anxiety and mood.  Continue lorazepam/Ativan 0.5-1 mg daily as needed for severe anxiety.   Continue Adderall XR 25 mg + Adderall XR 15 mg daily (for total dose 40 mg) as needed for ADHD symptoms.  Start guanfacine ER for ADHD, anxiety, irritability: take 1 mg at bedtime for 2 weeks then can increase by 1 mg every 2-3 weeks as tolerated to max dose 3 mg before next appt.      Tiffanie Kelly RN on 5/1/2025 at 3:05 PM

## 2025-05-05 RX ORDER — GUANFACINE 3 MG/1
3 TABLET, EXTENDED RELEASE ORAL AT BEDTIME
Qty: 90 TABLET | Refills: 0 | Status: SHIPPED | OUTPATIENT
Start: 2025-05-05

## 2025-05-12 ENCOUNTER — OFFICE VISIT (OUTPATIENT)
Dept: DERMATOLOGY | Facility: CLINIC | Age: 50
End: 2025-05-12
Attending: PHYSICIAN ASSISTANT
Payer: COMMERCIAL

## 2025-05-12 ENCOUNTER — TELEPHONE (OUTPATIENT)
Dept: FAMILY MEDICINE | Facility: CLINIC | Age: 50
End: 2025-05-12

## 2025-05-12 DIAGNOSIS — L81.4 SOLAR LENTIGO: ICD-10-CM

## 2025-05-12 DIAGNOSIS — L30.9 DERMATITIS OF LIP: ICD-10-CM

## 2025-05-12 DIAGNOSIS — Z12.83 SKIN CANCER SCREENING: ICD-10-CM

## 2025-05-12 DIAGNOSIS — L82.1 SEBORRHEIC KERATOSES: ICD-10-CM

## 2025-05-12 DIAGNOSIS — D18.01 CHERRY ANGIOMA: ICD-10-CM

## 2025-05-12 DIAGNOSIS — Z80.8 FAMILY HISTORY OF MELANOMA: ICD-10-CM

## 2025-05-12 DIAGNOSIS — D22.9 MULTIPLE BENIGN NEVI: Primary | ICD-10-CM

## 2025-05-12 PROCEDURE — 99213 OFFICE O/P EST LOW 20 MIN: CPT | Mod: 25 | Performed by: PHYSICIAN ASSISTANT

## 2025-05-12 PROCEDURE — 1126F AMNT PAIN NOTED NONE PRSNT: CPT | Performed by: PHYSICIAN ASSISTANT

## 2025-05-12 ASSESSMENT — PAIN SCALES - GENERAL: PAINLEVEL_OUTOF10: NO PAIN (0)

## 2025-05-12 NOTE — PATIENT INSTRUCTIONS

## 2025-05-12 NOTE — PROGRESS NOTES
[facility] Dermatology Note  Encounter Date: May 12, 2025  Office Visit       Dermatology Problem List:  1. FBSE 05/12/2025   1. Family history of melanoma (father)  2. Hx of nonscarring alopecia  3. Hx of rosacea, patient uses otc treatments  4. Ink spot lentigines - right shoulder, left lower back  5. 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 Inflamed seborrheic keratosis, right posterior neck, s/p cryotherapy 6/21/2021  7. Intertrigo  9. Irritant Contact dermatitis vs allergic contact vs liplicking dermatitis  - Referral to allergy clinic  - current tx: triamcinolone ointment, Vaseline  10. DF-right shoulder  - L posterior shoulder, s/p shave bx 05/06/2024  11. Angiokeratoma, upper back. S/p shave bx 05/06/2024  ____________________________________________    Assessment & Plan:    # Irritant Contact dermatitis vs allergic contact vs lip licking dermatitis.  - resolved on exam today.   - Recommend avoid lip products  - Recommend using plain Vaseline    # Skin cancer screening with multiple benign nevi, solar lentigines    - ABCDEs: Counseled ABCDEs of melanoma: Asymmetry, Border (irregularity), Color (not uniform, changes in color), Diameter (greater than 6 mm which is about the size of a pencil eraser), and Evolving (any changes in preexisting moles).  - Sun protection: Counseled SPF30+ sunscreen, UPF clothing, sun avoidance, tanning bed avoidance.    # Cherry angiomas and seborrheic keratoses,  Benign, reassurance given.     # Family history of melanoma (father)  - Continue photoprotection - recommend SPF 30 or higher with frequent reapplication  - Continue yearly skin exams  - Advised to monitor for changing, non-healing, bleeding, painful, changing, or otherwise symptomatic lesions    Procedures Performed:   N/A    Follow-up: 1 year(s) in-person,for skin exam    Staff and Scribe:     Scribe Disclosure:   Angelina GONZALEZ, am serving as a scribe; to document services  personally performed by Keke Arcos PA-C -based on data collection and the provider's statements to me.     Provider Disclosure:  I agree with above History, Review of Systems, Physical exam and Plan.  I have reviewed the content of the documentation and have edited it as needed. I have personally performed the services documented here and the documentation accurately represents those services and the decisions I have made.      Electronically signed by:  All risks, benefits and alternatives were discussed with patient.  Patient is in agreement and understands the assessment and plan.  All questions were answered.  Sun Screen Education was given.   Return to Clinic annually or sooner as needed.   Keke Arcos PA-C        _______________________________________    CC: Skin Check (FBSE - no areas of concern)    HPI:  Ms. Ellyn Mcgee is a(n) 49 year old female who presents today as a return patient for skin check. Last seen in dermatology by me on 05/06/2024, at which time she had 2 shave bx performed, with derm path dx these as an angiokeratoma on the upper back and a DF on the L posterior shoulder, she was also referred for patch testing due to possible irritant contact dermatitis on the lips, and started on TMC until the rash on the lips resolved.     The patient reports that she has no specific skin concerns today.  Notes she recently experienced one of her toes turning blue, she could feel this when walking on cold floors and if she hit her toe it felt like a burning sensation.     Patient is otherwise feeling well, without additional skin concerns.    Labs Reviewed:  Derm path 05/06/2024  Final Diagnosis   A. Upper back:  - Angiokeratoma - (see description)      B. Left posterior shoulder:  - Dermatofibroma - (see description)       Physical exam:  Vitals: There were no vitals taken for this visit.  GEN: This is a well developed, well-nourished female in no acute distress, in a pleasant mood.     SKIN: Total skin excluding the undergarment areas was performed. The exam included the head/face, neck, both arms, chest, back, abdomen, both legs, digits and/or nails.   - lips appeared normal without scaly plaques.   - freckling on sun exposed areas.   - There are dome shaped bright red papules on the trunk and extremities .   - Multiple regular brown pigmented macules and papules are identified on the trunk and extremities. .   - Scattered brown macules on sun exposed areas.  - Waxy stuck on papules and plaques on trunk and extremities.   - No other lesions of concern on areas examined.     Medications:  Current Outpatient Medications   Medication Sig Dispense Refill    amphetamine-dextroamphetamine (ADDERALL XR) 25 MG 24 hr capsule Take 1 capsule (25 mg) by mouth every morning. 90 capsule 0    amphetamine-dextroamphetamine (ADDERALL) 15 MG tablet Take 1 tablet (15 mg) by mouth daily as needed (ADHD). 30 tablet 0    diclofenac (VOLTAREN) 75 MG EC tablet Take 1 tablet (75 mg) by mouth 2 times daily. 20 tablet 0    guanFACINE HCl (INTUNIV) 3 MG TB24 24 hr tablet Take 1 tablet (3 mg) by mouth at bedtime. 90 tablet 0    ibuprofen (ADVIL/MOTRIN) 200 MG tablet Take 200-400 mg by mouth every 4 hours as needed for pain OTC      LORazepam (ATIVAN) 1 MG tablet Take 0.5-1 tablets (0.5-1 mg) by mouth daily as needed (anxiety and sleep). 20 tablet 0    PARoxetine (PAXIL-CR) 25 MG 24 hr tablet Take 2 tablets (50 mg) by mouth every morning. 2 tablet 0    metFORMIN (GLUCOPHAGE XR) 500 MG 24 hr tablet Take 1 tablet (500 mg) by mouth daily (with dinner). 90 tablet 1    Turmeric 500 MG CAPS        No current facility-administered medications for this visit.      Past Medical History:   Patient Active Problem List   Diagnosis    Hyperlipidemia LDL goal <130    Anxiety state    PCOS (polycystic ovarian syndrome)    External hemorrhoids    Attention deficit hyperactivity disorder (ADHD), predominantly inattentive type    TEE  (generalized anxiety disorder)    Cervical radiculopathy    Major depressive disorder, recurrent episode, moderate (H)    Patellofemoral arthralgia of both knees     Past Medical History:   Diagnosis Date    Abnormal Pap smear     Colposcopy    Adjustment disorder with mixed anxiety and depressed mood 04/04/2012    Anemia     In Past    Anxiety     Chlamydia trachomatis infection of other specified site 1993    Depression     Depressive disorder 1979    Not diagnosed until college...later diagnosed with TEE    Fertility problem     Gastroesophageal reflux disease     Lyme disease 09/08/2010    ?false positive vs positve CMV - resolved    Moderate dysplasia of cervix 1995    Other and unspecified adverse effect of drug, medicinal and biological substance     insulin resistent    Pneumonia     Varicosities     Wounds and injuries     fall down stairs, PT for back, pain meds        CC No referring provider defined for this encounter. on close of this encounter.

## 2025-05-12 NOTE — LETTER
5/12/2025       RE: Ellyn Mcgee  [address]  [facility] [address]     Dear Colleague,    Thank you for referring your patient, Ellyn Mcgee, to the [facility] [place] at [facility]. Please see a copy of my visit note below.    [facility] Dermatology Note  Encounter Date: May 12, 2025  Office Visit       Dermatology Problem List:  1. FBSE 05/12/2025   1. Family history of melanoma (father)  2. Hx of nonscarring alopecia  3. Hx of rosacea, patient uses otc treatments  4. Ink spot lentigines - right shoulder, left lower back  5. 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 Inflamed seborrheic keratosis, right posterior neck, s/p cryotherapy 6/21/2021  7. Intertrigo  9. Irritant Contact dermatitis vs allergic contact vs liplicking dermatitis  - Referral to allergy clinic  - current tx: triamcinolone ointment, Vaseline  10. DF-right shoulder  - L posterior shoulder, s/p shave bx 05/06/2024  11. Angiokeratoma, upper back. S/p shave bx 05/06/2024  ____________________________________________    Assessment & Plan:    # Irritant Contact dermatitis vs allergic contact vs lip licking dermatitis.  - resolved on exam today.   - Recommend avoid lip products  - Recommend using plain Vaseline    # Skin cancer screening with multiple benign nevi, solar lentigines    - ABCDEs: Counseled ABCDEs of melanoma: Asymmetry, Border (irregularity), Color (not uniform, changes in color), Diameter (greater than 6 mm which is about the size of a pencil eraser), and Evolving (any changes in preexisting moles).  - Sun protection: Counseled SPF30+ sunscreen, UPF clothing, sun avoidance, tanning bed avoidance.    # Cherry angiomas and seborrheic keratoses,  Benign, reassurance given.     # Family history of melanoma (father)  - Continue photoprotection - recommend SPF 30 or higher with frequent reapplication  - Continue  yearly skin exams  - Advised to monitor for changing, non-healing, bleeding, painful, changing, or otherwise symptomatic lesions    Procedures Performed:   N/A    Follow-up: 1 year(s) in-person,for skin exam    Staff and Scribe:     Scribe Disclosure:   I, Angelina Hernández, am serving as a scribe; to document services personally performed by Keke Arcos PA-C -based on data collection and the provider's statements to me.     Provider Disclosure:  I agree with above History, Review of Systems, Physical exam and Plan.  I have reviewed the content of the documentation and have edited it as needed. I have personally performed the services documented here and the documentation accurately represents those services and the decisions I have made.      Electronically signed by:  All risks, benefits and alternatives were discussed with patient.  Patient is in agreement and understands the assessment and plan.  All questions were answered.  Sun Screen Education was given.   Return to Clinic annually or sooner as needed.   Keke Arcos PA-C        _______________________________________    CC: Skin Check (FBSE - no areas of concern)    HPI:  Ms. Ellyn Mcgee is a(n) 49 year old female who presents today as a return patient for skin check. Last seen in dermatology by me on 05/06/2024, at which time she had 2 shave bx performed, with derm path dx these as an angiokeratoma on the upper back and a DF on the L posterior shoulder, she was also referred for patch testing due to possible irritant contact dermatitis on the lips, and started on TMC until the rash on the lips resolved.     The patient reports that she has no specific skin concerns today.  Notes she recently experienced one of her toes turning blue, she could feel this when walking on cold floors and if she hit her toe it felt like a burning sensation.     Patient is otherwise feeling well, without additional skin concerns.    Labs Reviewed:  Derm path  05/06/2024  Final Diagnosis   A. Upper back:  - Angiokeratoma - (see description)      B. Left posterior shoulder:  - Dermatofibroma - (see description)       Physical exam:  Vitals: There were no vitals taken for this visit.  GEN: This is a well developed, well-nourished female in no acute distress, in a pleasant mood.    SKIN: Total skin excluding the undergarment areas was performed. The exam included the head/face, neck, both arms, chest, back, abdomen, both legs, digits and/or nails.   - lips appeared normal without scaly plaques.   - freckling on sun exposed areas.   - There are dome shaped bright red papules on the trunk and extremities .   - Multiple regular brown pigmented macules and papules are identified on the trunk and extremities. .   - Scattered brown macules on sun exposed areas.  - Waxy stuck on papules and plaques on trunk and extremities.   - No other lesions of concern on areas examined.     Medications:  Current Outpatient Medications   Medication Sig Dispense Refill     amphetamine-dextroamphetamine (ADDERALL XR) 25 MG 24 hr capsule Take 1 capsule (25 mg) by mouth every morning. 90 capsule 0     amphetamine-dextroamphetamine (ADDERALL) 15 MG tablet Take 1 tablet (15 mg) by mouth daily as needed (ADHD). 30 tablet 0     diclofenac (VOLTAREN) 75 MG EC tablet Take 1 tablet (75 mg) by mouth 2 times daily. 20 tablet 0     guanFACINE HCl (INTUNIV) 3 MG TB24 24 hr tablet Take 1 tablet (3 mg) by mouth at bedtime. 90 tablet 0     ibuprofen (ADVIL/MOTRIN) 200 MG tablet Take 200-400 mg by mouth every 4 hours as needed for pain OTC       LORazepam (ATIVAN) 1 MG tablet Take 0.5-1 tablets (0.5-1 mg) by mouth daily as needed (anxiety and sleep). 20 tablet 0     PARoxetine (PAXIL-CR) 25 MG 24 hr tablet Take 2 tablets (50 mg) by mouth every morning. 2 tablet 0     metFORMIN (GLUCOPHAGE XR) 500 MG 24 hr tablet Take 1 tablet (500 mg) by mouth daily (with dinner). 90 tablet 1     Turmeric 500 MG CAPS        No  current facility-administered medications for this visit.      Past Medical History:   Patient Active Problem List   Diagnosis     Hyperlipidemia LDL goal <130     Anxiety state     PCOS (polycystic ovarian syndrome)     External hemorrhoids     Attention deficit hyperactivity disorder (ADHD), predominantly inattentive type     TEE (generalized anxiety disorder)     Cervical radiculopathy     Major depressive disorder, recurrent episode, moderate (H)     Patellofemoral arthralgia of both knees     Past Medical History:   Diagnosis Date     Abnormal Pap smear     Colposcopy     Adjustment disorder with mixed anxiety and depressed mood 04/04/2012     Anemia     In Past     Anxiety      Chlamydia trachomatis infection of other specified site 1993     Depression      Depressive disorder 1979    Not diagnosed until college...later diagnosed with TEE     Fertility problem      Gastroesophageal reflux disease      Lyme disease 09/08/2010    ?false positive vs positve CMV - resolved     Moderate dysplasia of cervix 1995     Other and unspecified adverse effect of drug, medicinal and biological substance     insulin resistent     Pneumonia      Varicosities      Wounds and injuries     fall down stairs, PT for back, pain meds        CC No referring provider defined for this encounter. on close of this encounter.     Again, thank you for allowing me to participate in the care of your patient.      Sincerely,    Keke Arcos PA-C

## 2025-05-12 NOTE — NURSING NOTE
Dermatology Rooming Note    Ellyn Mcgee's goals for this visit include:   Chief Complaint   Patient presents with    Skin Check     FBSE - no areas of concern     Summer Vigil CA

## 2025-05-12 NOTE — TELEPHONE ENCOUNTER
Panel Management Review      Patient has the following on her problem list: None      Composite cancer screening  Chart review shows that this patient is due/due soon for the following Mammogram  Summary:    Patient is due/failing the following:   MAMMOGRAM    Action needed:   Pt to schedule Mammogram. If completed outside [facility], respond with date and location of clinic where mammogram was completed.      Type of outreach:    Sent Trippin In message.    Questions for provider review:    None                                                                                                                                    Nishi Sosa RN on 5/12/2025     Chart routed to None .

## 2025-05-22 ENCOUNTER — TELEPHONE (OUTPATIENT)
Dept: DERMATOLOGY | Facility: CLINIC | Age: 50
End: 2025-05-22
Payer: COMMERCIAL

## 2025-05-22 NOTE — TELEPHONE ENCOUNTER
5/22 Left Voicemail (1st Attempt) and Sent Mychart (1st Attempt) for the patient to call back and schedule the following:    Appointment type: Return Dermatology  Provider: Josselyn  Return date: May 2026  Specialty phone number: [phone]  Additional appointment(s) needed: na  Additonal Notes: na

## 2025-05-29 ENCOUNTER — OFFICE VISIT (OUTPATIENT)
Dept: PSYCHOLOGY | Facility: CLINIC | Age: 50
End: 2025-05-29
Payer: COMMERCIAL

## 2025-05-29 DIAGNOSIS — F41.1 GAD (GENERALIZED ANXIETY DISORDER): Primary | ICD-10-CM

## 2025-05-29 DIAGNOSIS — F33.41 RECURRENT MAJOR DEPRESSIVE DISORDER, IN PARTIAL REMISSION: ICD-10-CM

## 2025-05-29 DIAGNOSIS — F90.9 ATTENTION DEFICIT HYPERACTIVITY DISORDER (ADHD), UNSPECIFIED ADHD TYPE: ICD-10-CM

## 2025-05-29 ASSESSMENT — ANXIETY QUESTIONNAIRES
GAD7 TOTAL SCORE: 8
1. FEELING NERVOUS, ANXIOUS, OR ON EDGE: NEARLY EVERY DAY
8. IF YOU CHECKED OFF ANY PROBLEMS, HOW DIFFICULT HAVE THESE MADE IT FOR YOU TO DO YOUR WORK, TAKE CARE OF THINGS AT HOME, OR GET ALONG WITH OTHER PEOPLE?: SOMEWHAT DIFFICULT
4. TROUBLE RELAXING: SEVERAL DAYS
GAD7 TOTAL SCORE: 8
3. WORRYING TOO MUCH ABOUT DIFFERENT THINGS: NOT AT ALL
2. NOT BEING ABLE TO STOP OR CONTROL WORRYING: SEVERAL DAYS
5. BEING SO RESTLESS THAT IT IS HARD TO SIT STILL: NOT AT ALL
7. FEELING AFRAID AS IF SOMETHING AWFUL MIGHT HAPPEN: SEVERAL DAYS
7. FEELING AFRAID AS IF SOMETHING AWFUL MIGHT HAPPEN: SEVERAL DAYS
6. BECOMING EASILY ANNOYED OR IRRITABLE: MORE THAN HALF THE DAYS
GAD7 TOTAL SCORE: 8
IF YOU CHECKED OFF ANY PROBLEMS ON THIS QUESTIONNAIRE, HOW DIFFICULT HAVE THESE PROBLEMS MADE IT FOR YOU TO DO YOUR WORK, TAKE CARE OF THINGS AT HOME, OR GET ALONG WITH OTHER PEOPLE: SOMEWHAT DIFFICULT

## 2025-05-29 NOTE — PROGRESS NOTES
[facility]                                               Progress Note    Patient Name: Ellyn Mcgee  Date: 5/19/2025         Service Type: Individual      Session Start Time: 1:32 p.m.  Session End Time: 2:24 p.m.     Session Length: 52 min.    Extended Session (53+ minutes): No  Interactive Complexity: No  Crisis: No     Session #: 14 (with this therapist)    Attendees: Client    Service Modality:  In-Person Visit:    DATA  Extended Session (53+ minutes): No  Interactive Complexity: No  Crisis: No       Progress Since Last Session (Related to Symptoms / Goals / Homework):  Symptoms: Improving decreased PHQ and TEE scores.     Homework: Partially completed      Episode of Care Goals: Satisfactory progress - ACTION (Actively working towards change); Intervened by reinforcing change plan / affirming steps taken     Current / Ongoing Stressors and Concerns:  Patient identified experiencing ongoing though decreased depressive and anxiety symptoms related to current stressors and past trauma.  Patient reported having resigned from her job, and experiencing financial stress as a result.  She reported regarding physical health struggles and being triggered by her son, and worried about caring for him over the summer.     Treatment Objective(s) Addressed in This Session:  Patient will use cognitive strategies identified in therapy to challenge anxious thoughts  Patient identified her desired continued therapy goals.     Intervention:  DBT:  reviewed with patient focusing on being effective in her responses to stressors, focusing on doing what works/what the situation calls for, and prioritizing self-care.    Assessments completed prior to visit:  PHQ9:       10/29/2024     8:23 PM 1/30/2025    10:34 AM 2/4/2025    10:06 AM 2/6/2025    10:27 AM 3/27/2025     1:12 PM 4/17/2025    10:16 AM 5/29/2025     8:37 AM   PHQ-9 SCORE   PHQ-9 Total Score [facility] 8 (Mild depression) 10 (Moderate depression) 5  (Mild depression) 6 (Mild depression) 11 (Moderate depression) 10 (Moderate depression) 7 (Mild depression)   PHQ-9 Total Score 8  10  5  6  11  10  7        Patient-reported     GAD7:       9/18/2024     6:03 PM 10/6/2024    11:16 PM 10/21/2024     9:23 PM 1/30/2025    10:36 AM 3/27/2025     1:13 PM 4/17/2025    10:17 AM 5/29/2025     8:39 AM   TEE-7 SCORE   Total Score 6 (mild anxiety) 9 (mild anxiety) 13 (moderate anxiety) 13 (moderate anxiety) 11 (moderate anxiety) 9 (mild anxiety) 8 (mild anxiety)   Total Score 6 9 13 13  11  9  8        Patient-reported     PROMIS 10-Global Health (all questions and answers displayed):       8/11/2023    11:06 AM 3/21/2024    11:35 AM 6/20/2024    12:21 AM 9/18/2024     6:05 PM 10/6/2024    11:18 PM 1/30/2025    10:37 AM 5/29/2025     8:41 AM   PROMIS 10   In general, would you say your health is: Good Fair Fair Fair Fair Fair Good   In general, would you say your quality of life is: Fair Fair Fair Fair Fair Fair Fair   In general, how would you rate your physical health? Fair Fair Fair Fair Good Fair Fair   In general, how would you rate your mental health, including your mood and your ability to think? Fair Poor Fair Good Good Good Good   In general, how would you rate your satisfaction with your social activities and relationships? Fair Fair Poor Fair Fair Fair Good   In general, please rate how well you carry out your usual social activities and roles Good Good Good Good Good Very good Good   To what extent are you able to carry out your everyday physical activities such as walking, climbing stairs, carrying groceries, or moving a chair? Mostly Completely Mostly Mostly Mostly Mostly Mostly   In the past 7 days, how often have you been bothered by emotional problems such as feeling anxious, depressed, or irritable? Sometimes Always Often Often Often Often Often   In the past 7 days, how would you rate your fatigue on average? Severe Moderate Moderate Moderate Severe  Moderate Moderate   In the past 7 days, how would you rate your pain on average, where 0 means no pain, and 10 means worst imaginable pain? 7 6 7 6 7 7 8   In general, would you say your health is: 3 2 2 2 2 2 3   In general, would you say your quality of life is: 2 2 2 2 2 2 2   In general, how would you rate your physical health? 2 2 2 2 3 2 2   In general, how would you rate your mental health, including your mood and your ability to think? 2 1 2 3 3 3 3   In general, how would you rate your satisfaction with your social activities and relationships? 2 2 1 2 2 2 3   In general, please rate how well you carry out your usual social activities and roles. (This includes activities at home, at work and in your community, and responsibilities as a parent, child, spouse, employee, friend, etc.) 3 3 3 3 3 4 3   To what extent are you able to carry out your everyday physical activities such as walking, climbing stairs, carrying groceries, or moving a chair? 4 5 4 4 4 4 4   In the past 7 days, how often have you been bothered by emotional problems such as feeling anxious, depressed, or irritable? 3 5 4 4 4 4 4   In the past 7 days, how would you rate your fatigue on average? 4 3 3 3 4 3 3   In the past 7 days, how would you rate your pain on average, where 0 means no pain, and 10 means worst imaginable pain? 7 6 7 6 7 7 8   Global Mental Health Score 9 6 7 9 9    9 9  10    Global Physical Health Score 10 13 11 12 11    11 11  11    PROMIS TOTAL - SUBSCORES 19 19 18 21 20    20 20  21        Patient-reported     ASSESSMENT: Current Emotional / Mental Status (status of significant symptoms):   Risk status (Self / Other harm or suicidal ideation)   Patient denies current fears or concerns for personal safety.   Patient denies current or recent suicidal ideation or behaviors.    Patient denies current or recent homicidal ideation or behaviors.   Patient denies current or recent self injurious behavior or ideation.   Patient  denies other safety concerns.   Patient reports there has been no change in risk factors since their last session.   Patient reports there has been no change in protective factors since their last session.   Recommended that patient call 911 or go to the local ED should there be a change in any of these risk factors     Appearance:   Appropriate    Eye Contact:   Good    Psychomotor Behavior: Normal    Attitude:   Cooperative; friendly, interested   Orientation:   All   Speech    Rate / Production: Normal/ Responsive Talkative    Volume:  Normal    Mood:    Anxious  Depressed  Normal Expansive   Affect:    Appropriate  Expansive  Subdued  Worrisome    Thought Content:  Clear  Rumination    Thought Form:  Coherent  Logical    Insight:    Good      Medication Review:   No changes to current psychiatric medication(s)     Medication Compliance:   Yes     Changes in Health Issues:   None     Chemical Use Review:   Substance Use: Chemical use reviewed, no active concerns identified ; no current alcohol use     Tobacco Use: No current tobacco use.      Diagnosis:  1. TEE (generalized anxiety disorder)    2. Recurrent major depressive disorder, in partial remission    3. Attention deficit hyperactivity disorder (ADHD), unspecified ADHD type                  Note: There has been demonstrated improvement in functioning while patient has been engaged in psychotherapy/psychological service- if withdrawn the patient would deteriorate and/or relapse.     Collateral Reports Completed:  N/A..    PLAN: (Patient Tasks / Therapist Tasks / Other)    Patient agreed to work on focusing on being effective in her responses to stressors, focusing on doing what works/what the situation calls for, and prioritizing self-care.      Jonh Gotti, LMFT 5/29/25    ______________________________________________________________________                                                           [facility]    Individual  Treatment Plan    Patient's Name: Ellyn Mcgee  YOB: 1975    Date of Creation: 2020  Date Treatment Plan Last Reviewed/Revised: 2025, next due 25.    DSM5 Diagnoses: 296.32 (F33.1) Major Depressive Disorder, Recurrent Episode, Moderate _ or 300.02 (F41.1) Generalized Anxiety Disorder (patient has previously been diagnosed with ADHD, hyperactive type)    Psychosocial / Contextual Factors: History of anxious attachment stemming from relationship with father (he  8 years ago); is currently pregnant (high-risk due to advanced maternal age); is in couples therapy with  due to frequent arguments and his emotional affair earlier in the year; financial stress; history of ADHD (hyperactive type).    PROMIS (reviewed every 90 days): PROMIS-10 Scores: 21 (25)    Referral / Collaboration:  EMDR    Anticipated number of sessions for this episode of care: 100  Anticipated frequency of sessions: Every 2-3 weeks  Anticipated duration of each session: 53+ minutes  Treatment plan will be reviewed in 90 days or when goals have been changed.           Measurable Treatment Goal(s) related to diagnosis / functional impairment(s):  Patient is asking to focus treatment on her relationship with her father and past trauma.    Goal 1: Patient will tell full story of past trauma related to her relationship with her father and will identify how past feelings are impacting current relationships.    Objective #A (Patient Action)    Patient will identify how past feelings are impacting current relationships.  Status: Continued - Date(s): 2025 (partially completed)    Intervention(s)  Therapist will role-play conflict management.    Objective #B  Patient will identify strengths and weaknesses within her family system of origin.  Status: Continued - Date(s): 2025 (partially completed)    Intervention(s)  Therapist will assign homework for patient to create list.      Goal 2: Patient will  "learn about resilience, trauma responses, and Robynne Gareth's \"the story I'm making up\" (cognitive strategy to manage anxious thoughts).    Objective #A (Patient Action)    Patient will learn about and identify personal trauma responses.    Status: Continued - Date(s): 5/29/2025     Intervention(s)  Therapist will provide educational materials on trauma responses.    Objective #B  Patient will use cognitive strategies identified in therapy to challenge anxious thoughts.  Status: Continued - Date(s): 5/29/2025     Intervention(s)  Therapist will teach about Javon Servin's power of vulnerability and \"the story I'm making up\".      Goal 3: Patient will learn about protective factors that foster resilience and attachment styles and will identify how her attachment style drives her behavior.     Objective #A (Patient Action)    Status: Partially completed: 5/29/2025    Patient will learn about protective factors and will identify her own.    Intervention(s)  Therapist will complete with patient in session.    Objective #B  Patient will identify how her attachment style drives her behavior.  Status: Completed - Date: 3/21/2024      Intervention(s)  Therapist will teach emotional regulation skills.      Goal 4:  Patient will successfully process through past trauma defined as reporting 0 Subjective Units of Distress related to trauma on 0-10 scale and clear body scan.   I will know I've met my goal when I am less impacted by my past trauma experience.       Objective #A (Client Action)  Status: Continued - Date: 5/29/25      Patient will identify past traumatic events/memories which are causing current distress.     Intervention(s)  Therapist will take client's history and facilitate client's identification of targets for trauma-focused therapy.     Objective #B  Patient will complete needed assessment(s) to confirm readiness for trauma-focused therapy.    Status: Continued - Date: 5/29/25      Intervention(s)  Therapist will " administer Dissociative Experiences Scale, Multidimensional Inventory of Dissociation as needed.     Objective #C  Status: Continued - Date: 5/29/25      Patient will engage in (re-)processing all past traumatic event/memory targets.     Intervention(s)   Therapist will complete trauma-focused therapy (re-)processing with client.     Patient agreed to the above plan.      Jonh Gotti, BARBARA  5/29/25

## 2025-06-03 ENCOUNTER — PATIENT OUTREACH (OUTPATIENT)
Dept: CARE COORDINATION | Facility: CLINIC | Age: 50
End: 2025-06-03
Payer: COMMERCIAL

## 2025-06-24 NOTE — PROGRESS NOTES
PLAN  Continue therapy per current plan of care.    Beginning/End Dates of Progress Note Reporting Period:  11/10/23 to 04/10/2024    Referring Provider:  Colby MARIE [facility]                                                                                   OUTPATIENT PHYSICAL THERAPY    PLAN OF TREATMENT FOR OUTPATIENT REHABILITATION   Patient's Last Name, First Name, Ellyn Zimmer YOB: 1975   Provider's Name   [facility]   Medical Record No.  [id number]     Onset Date: 11/10/22  Start of Care Date: 11/10/23     Medical Diagnosis:  B PFPS      PT Treatment Diagnosis:  B PFPS Plan of Treatment  Frequency/Duration: 1 x month/ 2 months    Certification date from 04/05/24 to 05/29/24         See note for plan of treatment details and functional goals     Alton Frazier, PT                         I CERTIFY THE NEED FOR THESE SERVICES FURNISHED UNDER        THIS PLAN OF TREATMENT AND WHILE UNDER MY CARE     (Physician attestation of this document indicates review and certification of the therapy plan).              Referring Provider:  Colby Prado    Initial Assessment  See Epic Evaluation- Start of Care Date: 11/10/23    Therapist Impression:   Continue current plan.  Focus on motor control for posture and squats.    Discussed extension exercises for low back discomfort throughout the day    NEXT: dry needling as needed, IR stretching, progress quadriceps stretching    PTRX: both    Subjective:  R knee is bothersome with kneeling/contact.  L knee is better, but still better than previously.    Objective:  Pain improved in L knee following sitting back more with squats.   Detail Level: Detailed Consent: The patient's consent was obtained including but not limited to risks of crusting, scabbing, blistering, scarring, darker or lighter pigmentary change, recurrence, incomplete removal and infection. Render Post-Care Instructions In Note?: no Number Of Freeze-Thaw Cycles: 1 freeze-thaw cycle Post-Care Instructions: I reviewed with the patient in detail post-care instructions. Patient is to wear sunprotection, and avoid picking at any of the treated lesions. Pt may apply Vaseline to crusted or scabbing areas. Application Tool (Optional): Liquid Nitrogen Sprayer Duration Of Freeze Thaw-Cycle (Seconds): 0 Aperture Size (Optional): A Show Aperture Variable?: Yes

## 2025-07-03 ENCOUNTER — MYC MEDICAL ADVICE (OUTPATIENT)
Dept: PSYCHIATRY | Facility: CLINIC | Age: 50
End: 2025-07-03
Payer: COMMERCIAL

## 2025-07-03 DIAGNOSIS — F41.1 GAD (GENERALIZED ANXIETY DISORDER): ICD-10-CM

## 2025-07-03 DIAGNOSIS — F33.41 RECURRENT MAJOR DEPRESSIVE DISORDER, IN PARTIAL REMISSION: ICD-10-CM

## 2025-07-03 DIAGNOSIS — F90.0 ATTENTION DEFICIT HYPERACTIVITY DISORDER (ADHD), PREDOMINANTLY INATTENTIVE TYPE: ICD-10-CM

## 2025-07-03 RX ORDER — PAROXETINE HYDROCHLORIDE HEMIHYDRATE 25 MG/1
50 TABLET, FILM COATED, EXTENDED RELEASE ORAL EVERY MORNING
Qty: 180 TABLET | Refills: 0 | Status: SHIPPED | OUTPATIENT
Start: 2025-07-03

## 2025-07-03 RX ORDER — LORAZEPAM 1 MG/1
.5-1 TABLET ORAL DAILY PRN
Qty: 20 TABLET | Refills: 0 | Status: SHIPPED | OUTPATIENT
Start: 2025-07-03

## 2025-07-03 RX ORDER — DEXTROAMPHETAMINE SACCHARATE, AMPHETAMINE ASPARTATE MONOHYDRATE, DEXTROAMPHETAMINE SULFATE AND AMPHETAMINE SULFATE 6.25; 6.25; 6.25; 6.25 MG/1; MG/1; MG/1; MG/1
25 CAPSULE, EXTENDED RELEASE ORAL EVERY MORNING
Qty: 90 CAPSULE | Refills: 0 | Status: SHIPPED | OUTPATIENT
Start: 2025-07-03

## 2025-07-03 NOTE — TELEPHONE ENCOUNTER
Date of Last Office Visit: 2/5/25  Date of Next Office Visit: None; routing for A to assist pt with scheduling.    No shows since last visit: No  More than one patient-initiated cancellation (with reschedule) since last seen in clinic? No    []Medication refilled per  Medication Refill in Ambulatory Care  policy.  []Scope of Practice: refill request processed by LPN/MA  [x]Medication unable to be refilled by RN due to criteria not met as indicated below:    []Eligibility: has not had a provider visit within last 6 months   []Supervision: no future appointment; < 7 days before next appointment   []Compliance: no shows; cancellations; lapse in therapy   []Verification: order discrepancy; may need modification...   [] > 30-day supply request   []Advanced refill request: > 7 days before refill date   [x]Controlled medication   []Medication not included in policy   []Review: new med; med adjusted <= 30 days; safety alert; requires lab monitoring...   []Other:      Medication(s) requested:     -  LORazepam (ATIVAN) 1 MG tablet   Date last ordered: 2/5/25  Qty: 20  Refills: 0  Appropriate for refill? Provider to review.      -  amphetamine-dextroamphetamine (ADDERALL XR) 25 MG 24 hr capsule   Date last ordered: 2/27/25  Qty: 90  Refills: 0  Appropriate for refill? Provider to review.      -  PARoxetine (PAXIL-CR) 25 MG 24 hr tablet   Date last ordered: 4/8/25  Qty: 2  Refills: 0  Appropriate for refill? Provider to review.        Any Controlled Substance(s)? Yes   [place]  checked? N/A      Requested medication(s) verified as identical to current order? No: 90 day supply request.      Any lapse in adherence to medication(s) greater than 5 days? Unknown RN has messaged the patient to disclose any lapse in therapy.      Additional action taken? routed encounter to provider for review.      Last visit treatment plan:   Treatment Plan:  Continue Paxil/paroxetine CR 50 mg daily for anxiety and mood.  Continue lorazepam/Ativan  0.5-1 mg daily as needed for severe anxiety.   Continue Adderall XR 25 mg + Adderall XR 15 mg daily (for total dose 40 mg) as needed for ADHD symptoms.  Start guanfacine ER for ADHD, anxiety, irritability: take 1 mg at bedtime for 2 weeks then can increase by 1 mg every 2-3 weeks as tolerated to max dose 3 mg before next appt.    Continue all other medications as reviewed per electronic medical record today.   Safety plan reviewed. To the Emergency Department as needed or call after hours crisis line at [phone] or [phone]. [place] Crisis Text Line. Text [place] to 230978 or Suicide LifeLine Chat: suicidepreventionInboundWriterline.org/chat  Continue therapy as planned.   Schedule an appointment with me in 6 weeks or sooner as needed.  Patient scheduled today.  Follow up with primary care provider as planned or for acute medical concerns.  Call the psychiatric nurse line with medication questions or concerns at [phone].  XZEREShart may be used to communicate with your provider, but this is not intended to be used for emergencies.      Francisco Elizalde RN on 7/3/2025 at 4:11 PM

## 2025-07-07 NOTE — PROGRESS NOTES
[facility] [place]    Behavioral Health Clinician Progress Note   Mental Health & Addiction Services      Tuesday July 08, 2025    Patient Name: Ellyn Mcgee       Service Type:  Individual   Service Location:  Connect Controlshart / Email (patient reached)   Visit Start Time: 211pm  Visit End Time:  232pm   Session Length: 16 - 37    Attendees: Client   Service Modality: Video Visit:      Provider verified identity through the following two step process.  Patient provided:  Patient is known previously to provider    Telemedicine Visit: The patient's condition can be safely assessed and treated via synchronous audio and visual telemedicine encounter.      Reason for Telemedicine Visit: Patient has requested telehealth visit    Originating Site (Patient Location): Patient's home    Distant Site (Provider Location): Provider Remote Setting- Home Office    Consent:  The patient/guardian has verbally consented to: the potential risks and benefits of telemedicine (video visit) versus in person care; bill my insurance or make self-payment for services provided; and responsibility for payment of non-covered services.     Patient would like the video invitation sent by:  My Chart    Mode of Communication:  Video Conference via Amwell    Distant Location (Provider):  Off-site    As the provider I attest to compliance with applicable laws and regulations related to telemedicine.   Visit number: 2    [facility] Visit Activities (Refresh list every visit): [facility] [employer]     [place] [facility] Assessment: 2/5/25 Keesha Conner MA [facility]  Treatment Plan Review Date: 7/8/25      DATA:    Extended Session (60+ minutes): No   Interactive Complexity: No   Crisis: No   [facility] Patient: No     Assessments completed:  The following assessments were completed by patient for this visit:  PHQ9:       1/30/2025    10:34 AM 2/4/2025    10:06 AM 2/6/2025    10:27 AM 3/27/2025     1:12 PM 4/17/2025    10:16 AM  2025     8:37 AM 2025     2:01 PM   PHQ-9 SCORE   PHQ-9 Total Score MyChart 10 (Moderate depression) 5 (Mild depression) 6 (Mild depression) 11 (Moderate depression) 10 (Moderate depression) 7 (Mild depression) 1 (Minimal depression)   PHQ-9 Total Score 10  5  6  11  10  7  1        Patient-reported    Proxy-reported     GAD7:       10/6/2024    11:16 PM 10/21/2024     9:23 PM 2025    10:36 AM 3/27/2025     1:13 PM 2025    10:17 AM 2025     8:39 AM 2025     2:04 PM   TEE-7 SCORE   Total Score 9 (mild anxiety) 13 (moderate anxiety) 13 (moderate anxiety) 11 (moderate anxiety) 9 (mild anxiety) 8 (mild anxiety) 10 (moderate anxiety)   Total Score 9 13 13  11  9  8  10        Patient-reported    Proxy-reported        Reason for Visit/Presenting Concern:  ADHD, Anxiety, Depression, and PTSD    Current Stressors / Issues:  MH update:  Doing okay.  Son is on waitlist for ASD eval, making day treatment recs.  High chaos at home.  Advocated for self and ask for support from partner.   Hasn't been able to get as many breaks from sons behaviors that are triggering the household.  Daughter head banging.   Stresses:  Ended up resigning from job.  Financial stress  Friends nephew  after being missing, worker friend stress  Appetite: n/a  Sleep: n/a  Outpatient Provider updates:   [facility]  SI/SIB/HI:  Denies previous suicide attempts.  Remote hx of passive ideation without planning or intent.  Denies current.  DIMA:  Social ETOH THC  Side effects/compliance:  Interventions:  [facility] engaged in discussion about neruodivergance in family and needs/supports  Most important: high psychosocial stressors    2/5  MH update: trauma sx, anxiety and panic increased  Stresses:  kiddos neurodiv, financial stressors, political and work stress  Appetite: n/a  Sleep: anxiety impacts  Outpatient Provider updates:   [facility]  SI/SIB/HI:  Denies previous suicide attempts.   Remote hx of passive ideation without planning or intent.  Denies current.  DIMA:  Social ETOH THC  Side effects/compliance:  Interventions:  [facility] engaged in completion of DA with psychoeducation and tx planning.  [facility] discussed TIP skills.   Most important:  Doing okay.  Possible menopause      Therapeutic Interventions:  Motivational Interviewing (MI): Validated patient's thoughts, feelings and experience. Expressed respect for patient's autonomy in decision making.     Response to treatment interventions:   Patient was receptive to interventions utilized.  Patient was engaged in the therapy process.       Progress on Treatment Objective(s) / Homework:   New Objective established this session - PREPARATION (Decided to change - considering how); Intervened by negotiating a change plan and determining options / strategies for behavior change, identifying triggers, exploring social supports, and working towards setting a date to begin behavior change     Medication Review:   Changes to psychiatric medications, see updated Medication List in EPIC.      Medication Compliance:   Yes     Chemical Use Review:  Substance Use: Chemical use reviewed, no active concerns identified      Tobacco Use: No current tobacco use.       Assessment: Current Emotional / Mental Status (status of significant symptoms):    Risk status (Self / Other harm or suicidal ideation)   Patient has had a history of suicidal ideation: hx of remote passive ideation without planning or intent.   Patient denies current fears or concerns for personal safety.   Patient denies current or recent suicidal ideation or behaviors.   Patient denies current or recent homicidal ideation or behaviors.   Patient denies current or recent self injurious behavior or ideation.   Patient denies other safety concerns.   Recommended that patient call 911 or go to the local ED should there be a change in any of these risk factors      ASSESSMENT:   Mental Status:      Appearance:   Appropriate    Eye Contact:   Good    Psychomotor Behavior: Normal    Attitude:   Cooperative    Orientation:   All   Speech Rate / Production: Normal    Volume:   Normal    Mood:    Normal   Affect:    Appropriate    Thought Content:  Clear    Thought Form:  Coherent  Logical    Insight:    Good          Diagnoses:      TEE (generalized anxiety disorder)  Attention deficit hyperactivity disorder (ADHD), predominantly inattentive type  Major depressive disorder, recurrent episode, moderate (H)  Trauma and stressor-related disorder    Collateral Reports Completed:   Communicated with: Dr Wang       Plan: (Homework, other):   Patient was provided No indications of CD issues  Patient was given information about behavioral services and encouraged to schedule a follow up appointment with the clinic [facility] as needed.         Keesha Conner [facility], [facility]     _____________________________________________________________________________________________________________________________________                                              Individual Treatment Plan    Patient's Name: Ellyn Mcgee   YOB: 1975  Date of Creation: 7/8/25  Date Treatment Plan Last Reviewed/Revised: 7/8/25    DSM5 Diagnoses:    TEE (generalized anxiety disorder)  Attention deficit hyperactivity disorder (ADHD), predominantly inattentive type  Major depressive disorder, recurrent episode, moderate (H)  Trauma and stressor-related disorder    Psychosocial / Contextual Factors: Trauma History, Occupational Issues, Interpersonal Concerns, and Caregiver  PROMIS (reviewed every 90 days):   The following assessments were completed by patient for this visit:  PROMIS 10-Global Health (only subscores and total score):       8/11/2023    11:06 AM 3/21/2024    11:35 AM 6/20/2024    12:21 AM 9/18/2024     6:05 PM 10/6/2024    11:18 PM 1/30/2025    10:37 AM 5/29/2025     8:41 AM   PROMIS-10 Scores Only   Global Mental Health Score 9 6 7  9 9    9 9  10    Global Physical Health Score 10 13 11 12 11    11 11  11    PROMIS TOTAL - SUBSCORES 19 19 18 21 20    20 20  21        Patient-reported        Referral / Collaboration:  Referral to another professional/service is not indicated at this time..    Anticipated number of session for this episode of care:  6-9 sessions  Anticipation frequency of session: Monthly  Anticipated Duration of each session: 16-37 minutes  Treatment plan will be reviewed in 90 days or when goals have been changed.       MeasurableTreatment Goal(s) related to diagnosis / functional impairment(s)  Goal 1: Patient will improve daily functioning.    I will know I've met my goal when I can reduce rumination and panic.      Status: New - Date: 7/8/25     Intervention(s)  [facility] will Motivational Interviewing (MI): Validate patient's thoughts, feelings and experience. Express respect for patient's autonomy in decision making.      Patient has reviewed and agreed to the above plan.     Written by  Keesha Conner [facility], [facility]

## 2025-07-08 ENCOUNTER — VIRTUAL VISIT (OUTPATIENT)
Dept: BEHAVIORAL HEALTH | Facility: CLINIC | Age: 50
End: 2025-07-08
Payer: COMMERCIAL

## 2025-07-08 ENCOUNTER — VIRTUAL VISIT (OUTPATIENT)
Dept: PSYCHIATRY | Facility: CLINIC | Age: 50
End: 2025-07-08
Payer: COMMERCIAL

## 2025-07-08 VITALS — BODY MASS INDEX: 31.21 KG/M2 | WEIGHT: 179 LBS

## 2025-07-08 DIAGNOSIS — F90.0 ATTENTION DEFICIT HYPERACTIVITY DISORDER (ADHD), PREDOMINANTLY INATTENTIVE TYPE: Primary | ICD-10-CM

## 2025-07-08 DIAGNOSIS — F41.1 GAD (GENERALIZED ANXIETY DISORDER): ICD-10-CM

## 2025-07-08 DIAGNOSIS — F90.0 ATTENTION DEFICIT HYPERACTIVITY DISORDER (ADHD), PREDOMINANTLY INATTENTIVE TYPE: ICD-10-CM

## 2025-07-08 DIAGNOSIS — F33.1 MAJOR DEPRESSIVE DISORDER, RECURRENT EPISODE, MODERATE (H): ICD-10-CM

## 2025-07-08 DIAGNOSIS — F43.9 TRAUMA AND STRESSOR-RELATED DISORDER: ICD-10-CM

## 2025-07-08 DIAGNOSIS — F33.41 RECURRENT MAJOR DEPRESSIVE DISORDER, IN PARTIAL REMISSION: ICD-10-CM

## 2025-07-08 DIAGNOSIS — G47.00 INSOMNIA, UNSPECIFIED TYPE: ICD-10-CM

## 2025-07-08 DIAGNOSIS — F41.1 GAD (GENERALIZED ANXIETY DISORDER): Primary | ICD-10-CM

## 2025-07-08 PROCEDURE — 98006 SYNCH AUDIO-VIDEO EST MOD 30: CPT | Performed by: PSYCHIATRY & NEUROLOGY

## 2025-07-08 PROCEDURE — 1125F AMNT PAIN NOTED PAIN PRSNT: CPT | Mod: 95 | Performed by: PSYCHIATRY & NEUROLOGY

## 2025-07-08 PROCEDURE — 90832 PSYTX W PT 30 MINUTES: CPT | Mod: 95 | Performed by: COUNSELOR

## 2025-07-08 RX ORDER — PAROXETINE HYDROCHLORIDE HEMIHYDRATE 25 MG/1
50 TABLET, FILM COATED, EXTENDED RELEASE ORAL EVERY MORNING
Qty: 180 TABLET | Refills: 3 | Status: SHIPPED | OUTPATIENT
Start: 2025-07-08

## 2025-07-08 RX ORDER — DEXTROAMPHETAMINE SACCHARATE, AMPHETAMINE ASPARTATE MONOHYDRATE, DEXTROAMPHETAMINE SULFATE AND AMPHETAMINE SULFATE 1.25; 1.25; 1.25; 1.25 MG/1; MG/1; MG/1; MG/1
5 CAPSULE, EXTENDED RELEASE ORAL DAILY
Qty: 90 CAPSULE | Refills: 0 | Status: SHIPPED | OUTPATIENT
Start: 2025-07-08

## 2025-07-08 ASSESSMENT — ANXIETY QUESTIONNAIRES
GAD7 TOTAL SCORE: 10
IF YOU CHECKED OFF ANY PROBLEMS ON THIS QUESTIONNAIRE, HOW DIFFICULT HAVE THESE PROBLEMS MADE IT FOR YOU TO DO YOUR WORK, TAKE CARE OF THINGS AT HOME, OR GET ALONG WITH OTHER PEOPLE: SOMEWHAT DIFFICULT
GAD7 TOTAL SCORE: 10
5. BEING SO RESTLESS THAT IT IS HARD TO SIT STILL: MORE THAN HALF THE DAYS
GAD7 TOTAL SCORE: 10
8. IF YOU CHECKED OFF ANY PROBLEMS, HOW DIFFICULT HAVE THESE MADE IT FOR YOU TO DO YOUR WORK, TAKE CARE OF THINGS AT HOME, OR GET ALONG WITH OTHER PEOPLE?: SOMEWHAT DIFFICULT
IF YOU CHECKED OFF ANY PROBLEMS ON THIS QUESTIONNAIRE, HOW DIFFICULT HAVE THESE PROBLEMS MADE IT FOR YOU TO DO YOUR WORK, TAKE CARE OF THINGS AT HOME, OR GET ALONG WITH OTHER PEOPLE: SOMEWHAT DIFFICULT
6. BECOMING EASILY ANNOYED OR IRRITABLE: SEVERAL DAYS
5. BEING SO RESTLESS THAT IT IS HARD TO SIT STILL: MORE THAN HALF THE DAYS
2. NOT BEING ABLE TO STOP OR CONTROL WORRYING: MORE THAN HALF THE DAYS
7. FEELING AFRAID AS IF SOMETHING AWFUL MIGHT HAPPEN: NOT AT ALL
3. WORRYING TOO MUCH ABOUT DIFFERENT THINGS: SEVERAL DAYS
8. IF YOU CHECKED OFF ANY PROBLEMS, HOW DIFFICULT HAVE THESE MADE IT FOR YOU TO DO YOUR WORK, TAKE CARE OF THINGS AT HOME, OR GET ALONG WITH OTHER PEOPLE?: SOMEWHAT DIFFICULT
6. BECOMING EASILY ANNOYED OR IRRITABLE: SEVERAL DAYS
GAD7 TOTAL SCORE: 10
7. FEELING AFRAID AS IF SOMETHING AWFUL MIGHT HAPPEN: NOT AT ALL
GAD7 TOTAL SCORE: 10
1. FEELING NERVOUS, ANXIOUS, OR ON EDGE: MORE THAN HALF THE DAYS
2. NOT BEING ABLE TO STOP OR CONTROL WORRYING: MORE THAN HALF THE DAYS
7. FEELING AFRAID AS IF SOMETHING AWFUL MIGHT HAPPEN: NOT AT ALL
4. TROUBLE RELAXING: MORE THAN HALF THE DAYS
4. TROUBLE RELAXING: MORE THAN HALF THE DAYS
GAD7 TOTAL SCORE: 10
7. FEELING AFRAID AS IF SOMETHING AWFUL MIGHT HAPPEN: NOT AT ALL
3. WORRYING TOO MUCH ABOUT DIFFERENT THINGS: SEVERAL DAYS
1. FEELING NERVOUS, ANXIOUS, OR ON EDGE: MORE THAN HALF THE DAYS

## 2025-07-08 ASSESSMENT — PATIENT HEALTH QUESTIONNAIRE - PHQ9
SUM OF ALL RESPONSES TO PHQ QUESTIONS 1-9: 1
10. IF YOU CHECKED OFF ANY PROBLEMS, HOW DIFFICULT HAVE THESE PROBLEMS MADE IT FOR YOU TO DO YOUR WORK, TAKE CARE OF THINGS AT HOME, OR GET ALONG WITH OTHER PEOPLE: NOT DIFFICULT AT ALL
SUM OF ALL RESPONSES TO PHQ QUESTIONS 1-9: 1

## 2025-07-08 ASSESSMENT — PAIN SCALES - GENERAL: PAINLEVEL_OUTOF10: MODERATE PAIN (6)

## 2025-07-08 NOTE — PROGRESS NOTES
"Telemedicine Visit: The patient's condition can be safely assessed and treated via synchronous audio and visual telemedicine encounter.      Reason for Telemedicine Visit: Patient has requested telehealth visit    Originating Site (Patient Location): Patient's home in [place]    Distant Location (provider location):  Off-site    Consent:  The patient/guardian has verbally consented to: the potential risks and benefits of telemedicine (video visit) versus in person care; bill my insurance or make self-payment for services provided; and responsibility for payment of non-covered services.     Mode of Communication:  Video Conference via [employer]    As the provider I attest to compliance with applicable laws and regulations related to telemedicine.        Outpatient Psychiatric Progress Note    Name: Ellyn Mcgee   : 1975                    Primary Care Provider: Dorothy Guaman DO   Therapist: Jonh Gotti LMFT         3/27/2025     1:12 PM 2025    10:16 AM 2025     8:37 AM   PHQ   PHQ-9 Total Score 11  10  7    Q9: Thoughts of better off dead/self-harm past 2 weeks Not at all Not at all Not at all       Patient-reported   TEE-7 scores:      3/27/2025     1:13 PM 2025    10:17 AM 2025     8:39 AM   TEE-7 SCORE   Total Score 11 (moderate anxiety) 9 (mild anxiety) 8 (mild anxiety)   Total Score 11  9  8        Patient-reported       Patient Identification:  Patient is a 50 year old,   White Not  or  female  who presents for return visit with me.   Patient attended the phone/video session alone. Patient prefers to be called: \"Ellyn\".    Interim History:  I last saw Ellyn Mcgee for outpatient psychiatry Return Visit on 2025. During that appointment, we:    Continue Paxil/paroxetine CR 50 mg daily for anxiety and mood.  Continue lorazepam/Ativan 0.5-1 mg daily as needed for severe anxiety.   Continue Adderall XR 25 mg + Adderall XR 15 mg daily (for " total dose 40 mg) as needed for ADHD symptoms.  Start guanfacine ER for ADHD, anxiety, irritability: take 1 mg at bedtime for 2 weeks then can increase by 1 mg every 2-3 weeks as tolerated to max dose 3 mg before next appt.      : Patient with high psychosocial stressors.  Had last work shift about 6 weeks ago. Now home.  Was very difficult to maintain work and home life.  Environment at work was becoming very difficult and worsening mental health symptoms.  Feels like the last week things calming down some -getting more into a routine.  Anxiety has been higher.  Taking medication as prescribed.  Did not tolerate guanfacine ER due to side effects and so discontinued the medication.  Finding Adderall XR very helpful.  Can noticed the days she does not take the medication.  Does seem slightly less effective now that she is using it more frequently compared to before.  No acute safety concerns.  No SI.  No problematic drug or alcohol use.    Per [facility], [facility], during today's team-based visit:  MH update:  Doing okay.  Son is on waitlist for ASD eval, making day treatment recs.  High chaos at home.  Advocated for self and ask for support from partner.   Hasn't been able to get as many breaks from sons behaviors that are triggering the household.  Daughter head banging.   Stresses:  Ended up resigning from job.  Financial stress  Friends nephew  after being missing, worker friend stress  Appetite: n/a  Sleep: n/a  Outpatient Provider updates:   Sheng Gotti brain spotting [facility]  SI/SIB/HI:  Denies previous suicide attempts.  Remote hx of passive ideation without planning or intent.  Denies current.  DIMA:  Social ETOH THC  Side effects/compliance:  Interventions:  [facility] engaged in discussion about neruodivergance in family and needs/supports  Most important: high psychosocial stressors    Psychiatric ROS:  Ellyn Mcgee reports mood has been: see HPI above  Anxiety has been: see HPI above  Sleep has been:  see HPI above   Isabel sxs: None  Psychosis sxs: None  ADHD/ADD sxs: see HPI above  PTSD sxs: None  PHQ9 and GAD7 scores were reviewed today if completed.   Medication side effects: Denies  Current stressors include: See HPI above  Coping mechanisms and supports include: Therapy, Family, Hobbies and Friends    Current medications include:   Current Outpatient Medications   Medication Sig Dispense Refill    amphetamine-dextroamphetamine (ADDERALL XR) 25 MG 24 hr capsule Take 1 capsule (25 mg) by mouth every morning. 90 capsule 0    amphetamine-dextroamphetamine (ADDERALL) 15 MG tablet Take 1 tablet (15 mg) by mouth daily as needed (ADHD). 30 tablet 0    diclofenac (VOLTAREN) 75 MG EC tablet Take 1 tablet (75 mg) by mouth 2 times daily. 20 tablet 0    ibuprofen (ADVIL/MOTRIN) 200 MG tablet Take 200-400 mg by mouth every 4 hours as needed for pain OTC      LORazepam (ATIVAN) 1 MG tablet Take 0.5-1 tablets (0.5-1 mg) by mouth daily as needed (anxiety and sleep). 20 tablet 0    metFORMIN (GLUCOPHAGE XR) 500 MG 24 hr tablet Take 1 tablet (500 mg) by mouth daily (with dinner). 90 tablet 1    PARoxetine (PAXIL-CR) 25 MG 24 hr tablet Take 2 tablets (50 mg) by mouth every morning. 180 tablet 0    Turmeric 500 MG CAPS        No current facility-administered medications for this visit.     [place]:   03/25/2025 02/05/2025 1 Dextroamp-Amphet Er 25 Mg Cap 90.00 90 Al [place]au [id number] Exp (4317) 0/0  [place]   03/25/2025 02/05/2025 1 Lorazepam 1 Mg Tablet 20.00 20 Al [place]au [id number] Exp (4317) 0/0 1.00 L[place]   12/04/2024 12/02/2024 1 Lorazepam 1 Mg Tablet 20.00 20 Al Bau [id number] Exp (4317) 0/0 1.00 [place]   11/27/2024 10/10/2024 1 Dextroamp-Amphet Er 25 Mg Cap 90.00 90 Al Bau [id number] Exp (4317) 0/0  [place]       Past Medical/Surgical History:  Past Medical History:   Diagnosis Date    Abnormal Pap smear     Colposcopy    Adjustment disorder with mixed anxiety and depressed mood  04/04/2012    Anemia     In Past    Anxiety     Chlamydia trachomatis infection of other specified site 1993    Depression     Depressive disorder 1979    Not diagnosed until college...later diagnosed with TEE    Fertility problem     Gastroesophageal reflux disease     Lyme disease 09/08/2010    ?false positive vs positve CMV - resolved    Moderate dysplasia of cervix 1995    Other and unspecified adverse effect of drug, medicinal and biological substance     insulin resistent    Pneumonia     Varicosities     Wounds and injuries     fall down stairs, PT for back, pain meds      has a past medical history of Abnormal Pap smear, Adjustment disorder with mixed anxiety and depressed mood (04/04/2012), Anemia, Anxiety, Chlamydia trachomatis infection of other specified site (1993), Depression, Depressive disorder (1979), Fertility problem, Gastroesophageal reflux disease, Lyme disease (09/08/2010), Moderate dysplasia of cervix (1995), Other and unspecified adverse effect of drug, medicinal and biological substance, Pneumonia, Varicosities, and Wounds and injuries.    She has no past medical history of Arrhythmia, Arthritis, Asthma, Asymptomatic human immunodeficiency virus (HIV) infection status (H), Breast disorder, Cancer (H), Cerebral infarction (H), Chronic kidney disease, Complication of anesthesia, Congestive heart failure (H), COPD (chronic obstructive pulmonary disease) (H), Diabetes (H), Diabetes mellitus (H), Difficulty walking, Heart attack (H), Heart disease, Herpes simplex without mention of complication, History of angina, History of blood transfusion, Hypertension, Irregular heart beat, Liver disease, Malignant hyperthermia, Pacemaker, Postpartum depression, Rh incompatibility, Seizures (H), Sickle cell anemia (H), Sleep apnea, Stented coronary artery, Systemic lupus erythematosus (H), Thyroid disease, or Uncomplicated asthma.    Social History:  Reviewed. No changes to social history except as noted in  HPI above.     Vital Signs:   None. This is phone/video visit.     Labs:  Most recent laboratory results reviewed and the pertinent results include:   TSH   Date Value Ref Range Status   05/14/2024 2.03 0.30 - 4.20 uIU/mL Final   03/03/2011 1.91 0.4 - 5.0 mU/L Final      Lab Results   Component Value Date    A1C 5.5 05/14/2024    A1C 5.3 08/01/2023    A1C 5.4 03/07/2023    A1C 5.3 04/30/2019    A1C 5.2 04/17/2018      Last Comprehensive Metabolic Panel:  Sodium   Date Value Ref Range Status   05/14/2024 140 135 - 145 mmol/L Final     Comment:     Reference intervals for this test were updated on 09/26/2023 to more accurately reflect our healthy population. There may be differences in the flagging of prior results with similar values performed with this method. Interpretation of those prior results can be made in the context of the updated reference intervals.    04/30/2019 140 133 - 144 mmol/L Final     Potassium   Date Value Ref Range Status   05/14/2024 4.2 3.4 - 5.3 mmol/L Final   01/12/2023 4.0 3.4 - 5.3 mmol/L Final   04/30/2019 3.7 3.4 - 5.3 mmol/L Final     Chloride   Date Value Ref Range Status   05/14/2024 105 98 - 107 mmol/L Final   01/12/2023 106 94 - 109 mmol/L Final   04/30/2019 106 94 - 109 mmol/L Final     Carbon Dioxide   Date Value Ref Range Status   04/30/2019 28 20 - 32 mmol/L Final     Carbon Dioxide (CO2)   Date Value Ref Range Status   05/14/2024 23 22 - 29 mmol/L Final   01/12/2023 27 20 - 32 mmol/L Final     Anion Gap   Date Value Ref Range Status   05/14/2024 12 7 - 15 mmol/L Final   01/12/2023 6 3 - 14 mmol/L Final   04/30/2019 6 3 - 14 mmol/L Final     Glucose   Date Value Ref Range Status   05/14/2024 98 70 - 99 mg/dL Final   01/12/2023 89 70 - 99 mg/dL Final   04/30/2019 89 70 - 99 mg/dL Final     Comment:     Fasting specimen     Urea Nitrogen   Date Value Ref Range Status   05/14/2024 15.7 6.0 - 20.0 mg/dL Final   01/12/2023 14 7 - 30 mg/dL Final   04/30/2019 9 7 - 30 mg/dL Final      Creatinine   Date Value Ref Range Status   05/14/2024 0.74 0.51 - 0.95 mg/dL Final   12/24/2020 0.40 (L) 0.52 - 1.04 mg/dL Final     GFR Estimate   Date Value Ref Range Status   05/14/2024 >90 >60 mL/min/1.73m2 Final   12/24/2020 >90 >60 mL/min/[1.73_m2] Final     Comment:     Non  GFR Calc  Starting 12/18/2018, serum creatinine based estimated GFR (eGFR) will be   calculated using the Chronic Kidney Disease Epidemiology Collaboration   (CKD-EPI) equation.       Calcium   Date Value Ref Range Status   05/14/2024 9.3 8.6 - 10.0 mg/dL Final   04/30/2019 9.2 8.5 - 10.1 mg/dL Final     Bilirubin Total   Date Value Ref Range Status   05/14/2024 0.3 <=1.2 mg/dL Final   04/30/2019 0.3 0.2 - 1.3 mg/dL Final     Alkaline Phosphatase   Date Value Ref Range Status   05/14/2024 59 40 - 150 U/L Final     Comment:     Reference intervals for this test were updated on 11/14/2023 to more accurately reflect our healthy population. There may be differences in the flagging of prior results with similar values performed with this method. Interpretation of those prior results can be made in the context of the updated reference intervals.   04/30/2019 54 40 - 150 U/L Final     ALT   Date Value Ref Range Status   05/14/2024 19 0 - 50 U/L Final     Comment:     Reference intervals for this test were updated on 6/12/2023 to more accurately reflect our healthy population. There may be differences in the flagging of prior results with similar values performed with this method. Interpretation of those prior results can be made in the context of the updated reference intervals.     12/24/2020 18 0 - 50 U/L Final     AST   Date Value Ref Range Status   05/14/2024 21 0 - 45 U/L Final     Comment:     Reference intervals for this test were updated on 6/12/2023 to more accurately reflect our healthy population. There may be differences in the flagging of prior results with similar values performed with this method.  "Interpretation of those prior results can be made in the context of the updated reference intervals.   12/24/2020 23 0 - 45 U/L Final     Recent Labs   Lab Test 05/14/24  1119 08/01/23  0902   CHOL 256* 234*   HDL 56 64   * 150*   TRIG 91 99      Lab Results   Component Value Date    WBC 8.2 04/17/2024    WBC 12.0 12/24/2020     Lab Results   Component Value Date    RBC 4.32 04/17/2024    RBC 3.86 12/24/2020     Lab Results   Component Value Date    HGB 12.5 04/17/2024    HGB 12.9 02/09/2021     Lab Results   Component Value Date    HCT 37.4 04/17/2024    HCT 34.4 12/24/2020     No components found for: \"MCT\"  Lab Results   Component Value Date    MCV 87 04/17/2024    MCV 89 12/24/2020     Lab Results   Component Value Date    MCH 28.9 04/17/2024    MCH 30.1 12/24/2020     Lab Results   Component Value Date    MCHC 33.4 04/17/2024    MCHC 33.7 12/24/2020     Lab Results   Component Value Date    RDW 12.3 04/17/2024    RDW 12.9 12/24/2020     Lab Results   Component Value Date     04/17/2024     12/24/2020      Review of Systems:  10 systems (general, cardiovascular, respiratory, eyes, ENT, endocrine, GI, , M/S, neurological) were reviewed. Most pertinent finding(s) is/are: denies fever, cough, headaches, shortness of breath, chest pain, N/V, constipation/diarrhea, trouble urinating, aches and pains. The remaining systems are all unremarkable.    Mental Status Examination (limited as this is by phone/video):  Appearance: Awake, alert, appears stated age, no acute distress, well-groomed  Attitude:  Cooperative, pleasant  Motor: No obvious abnormalities observed via video, not formally tested  Oriented to:  person, place, time, and situation  Attention Span and Concentration:  normal  Speech:  clear, coherent, regular rate, rhythm, and volume  Language: intact  Mood: anxious  Affect: Overall appropriate and mood congruent  Associations:  no loose associations  Thought Process:  logical, linear " and goal oriented  Thought Content:  no evidence of suicidal ideation or homicidal ideation, no evidence of psychotic thought, no auditory hallucinations present and no visual hallucinations present  Recent and Remote Memory:  Intact to interview. Not formally assessed. No amnesia.  Fund of Knowledge: appropriate  Insight:  good  Judgment:  intact, adequate for safety  Impulse Control:  intact    Suicide Risk Assessment:  Today Ellyn Mcgee reports no suicidal ideation. Based on all available evidence including the factors cited above, Ellyn Mcgee does not appear to be at imminent risk for self-harm, does not meet criteria for a 72-hr hold, and therefore remains appropriate for ongoing outpatient level of care.  A thorough assessment of risk factors related to suicide and self-harm have been reviewed and are noted above. The patient convincingly denies suicidality on several occasions. Local community safety resources reviewed for patient to use if needed. There was no deceit detected, and the patient presented in a manner that was believable.     DSM5 Diagnosis:  Major Depressive Disorder, Recurrent Episode, In Partial Remission  300.02 (F41.1) Generalized Anxiety Disorder   ADHD, Unspecified  Insomnia-unspecified    Medical comorbidities include:  Patient Active Problem List    Diagnosis Date Noted    Patellofemoral arthralgia of both knees 11/10/2023     Priority: Medium    Major depressive disorder, recurrent episode, moderate (H) 10/08/2020     Priority: Medium    TEE (generalized anxiety disorder) 10/10/2018     Priority: Medium    Cervical radiculopathy 04/16/2018     Priority: Medium    Attention deficit hyperactivity disorder (ADHD), predominantly inattentive type 10/04/2017     Priority: Medium     Patient is followed by Dr. Wang for ongoing prescription of stimulants.  All refills should be approved by this provider, or covering partner.        External hemorrhoids 04/25/2012     Priority: Medium     PCOS (polycystic ovarian syndrome) 02/22/2011     Priority: Medium    Hyperlipidemia LDL goal <130 09/05/2008     Priority: Medium    Anxiety state 09/05/2008     Priority: Medium     Infrequent prn use of lorazepam         Psychosocial & Contextual Factors: See HPI above    Assessment:  Per Intake Note with me 9/8/20:  Ellyn Mcgee is a 45-year-old female who is 23 weeks pregnant with a past psychiatric history including depression, anxiety, and ADHD who presents today for psychiatric evaluation.  Her depression and anxiety date back several years and she has also had postpartum depression/anxiety with her now 8-year-old (she also has a 14-year-old but did not experience postpartum mental health issues at that time).  She started sertraline during her second pregnancy and then ended up being maintained on Paxil-CR for several years.  She is also more recently diagnosed with ADHD and maintained on Concerta.  She weaned off both Paxil and Concerta when she found out she was pregnant and replaced these medications with her current regimen of Lexapro and Effexor-XR to decrease risk of fetal defects.  For the most part, she is feeling a little bit better on her current doses of Lexapro 10 mg and Effexor-XR 75 mg.  It is an unconventional combination as we discussed, but since she is doing quite well, I am hesitant to make many changes.  She feels as though her anxiety could be a little bit better controlled and so we will further increase Effexor-XR to 112.5 mg daily to be taken with her Lexapro 10 mg daily.  If she does a lot better, we can consider decreasing Lexapro to 5 mg daily. We discussed the risk of serotonin syndrome and she will continue to be vigilant for any signs or symptoms of this condition.  We did discuss the possibility of restarting a stimulant medication if necessary.  She does not feel as though her ADHD symptoms are too severe at this time.    4/20/21:   Today patient reports overall some  ongoing ups and downs regarding her symptoms since last visit.  She is thinking much of it might be hormonal and related to sleep deprivation.  She still has not yet had her menses return since having her baby.  She has had some feelings of panic.  Used to take propranolol in the past when she felt this way.  She would like to start this medication again.  Discussed risks and benefits while breast-feeding.  Limited risks for taking low-dose propranolol while breast-feeding although the infant does have some exposure through breast milk.  Recommended taking propranolol at least 3-4 hours prior to breast-feeding to decrease risks.  No other medication changes at this time.    12/15/2021:   Patient has overall been feeling quite stable mood wise.  Has some ups and downs that are more related to feeling overwhelmed and exhausted regarding responsibilities for her children, home life, and work.  Does not feel depressed.  Feels like medications are working well.  Propranolol has been helpful.  No medication changes today.  No safety concerns or SI.  No problematic drug or alcohol use.    6/14/2022:  Patient overall struggling lately with mood, anxiety, ADHD symptoms.  Stopped Lexapro since felt like it was making her symptoms worse.  We have considered for quite some time about starting her back on an ADHD medication.  I think it is worth a trial at this time.  Discussed risks and benefits of a stimulant medication while breast-feeding.  She will likely be weaning soon.  Still has not been getting great sleep due to cosleeping and nighttime feedings.  No acute safety concerns.  No SI.  No problematic drug or alcohol use.  If she has too much irritability on Adderall, we could consider either going back to Concerta or a trial with Vyvanse.    12/14/2022:  Akanksha overall struggling more with anxiety and panic related symptoms.  Stress has been higher.  Patient wondering about starting Paxil-CR again.  Has tolerated well in  the past.  We will add a low dose with venlafaxine.  We will also decrease venlafaxine some.  May continue cross taper/titration.  Lorazepam/Ativan provided for current acute symptoms and for any severe discomfort that may arise with this transition.  Therapy encouraged.  No acute safety concerns.  No SI.  No problematic drug or alcohol use.  Patient will be weaning her 2-year-old from breast-feeding.  We discussed risks and benefits of current medication regimen while breast-feeding.    1/5/2023:  Doing well on Paxil.  Symptoms improved quite quickly.  Tolerating well with no negative side effects.  Was able to decrease Effexor with ease as well.  We will continue tapering off Effexor and further optimizing Paxil.  Encouraged to continue in therapy.  No acute safety concerns.  No SI.  No problematic drug or alcohol use.    2/6/2023:  Patient overall doing relatively okay.  Symptoms of anxiety and depression have improved.  Irritability still lingering some.  Feeling a little emotional as well at times.  Some symptoms could be remaining from stopping venlafaxine.  We will wait increase Paxil and patient will observe her symptoms further/longer.  Patient can message in a few weeks if wants to increase Paxil.  No acute safety concerns.  No SI.  No problematic drug or alcohol use.    10/13/2023:  Patient overall doing relatively well.  No acute safety concerns.  No SI.  No problematic drug or alcohol use.  ADHD better controlled on Adderall.  Tolerating meds okay.  Follow up in 6 months.    3/13/2024:  Patient reports some struggles with task completion, motivation, organization.  Incidentally trialed Adderall XR 50 mg and found she was more productive and had further improvement of ADHD symptoms with decent tolerability.  We will increase Adderall XR to 40 mg daily to see if more helpful and well-tolerated.  Patient may also trial decreased dose of Paxil CR.  No acute safety concerns.  No SI.  Psychotherapy  encouraged.  No problematic drug or alcohol use.    6/13/2024:  Patient overall doing well.  Stable on Paxil CR 50 mg daily.  Feels increased Adderall XR well-tolerated and helpful for symptoms when taken.  No acute safety concerns.  No SI.  No problematic drug or alcohol use.  Medication regimen will be continued with no changes today.    10/09/2024:  Ongoing high psychosocial stressors.  Patient encouraged to use lorazepam when needed for severe anxiety and when significantly struggling with sleep.  No medication changes today.  Encouraged to continue in psychotherapy.  No problematic drug or alcohol use.  No acute safety concerns.    2/5/2025:  Pt did not do well on reduced Paxil-CR when trialed 37.5 mg on her own between visits. Back to 50 mg and feeling better but still struggling significantly with irritability and anxiety. Also some more recent trauma triggers. Continues in psychotherapy. Agreeable to trial with guanfacine ER to target additional anxiety, irritability, and overall system over-activity. No acute safety concerns. No acute suicidality. No problematic drug or alcohol use.      7/8/2025:  Patient overall doing okay.  Anxiety high at times due to ongoing high psychosocial stressors.  Thankfully things have been settling some the past 1 week or so.  Medications helpful.  It does seem Adderall XR could be a little bit stronger to control ADHD symptoms better.  Patient agreeable to increasing Adderall XR to 30 mg daily.  No longer taking guanfacine since did not tolerate side effects.  No acute safety concerns.  No SI.  No problematic drug or alcohol use.  Encouraged to continue in psychotherapy.    Medication side effects and alternatives were reviewed. Health promotion activities recommended and reviewed today. All questions addressed. Education and counseling completed regarding risks and benefits of medications and psychotherapy options. Recommend ongoing therapy for additional support.      Treatment Plan:  Continue Paxil/paroxetine CR 50 mg daily for anxiety and mood.  Continue lorazepam/Ativan 0.5-1 mg daily as needed for severe anxiety.   Increase Adderall XR to 30 mg daily for ADHD.   Continue Adderall immediate release 15 mg daily as needed for ADHD symptoms.  Continue all other medications as reviewed per electronic medical record today.   Safety plan reviewed. To the Emergency Department as needed or call after hours crisis line at [phone] or [phone]. [place] Crisis Text Line. Text [place] to 710315 or Suicide LifeLine Chat: [email or web address].meline.org/chat  Continue therapy as planned.   Schedule an appointment with me in 3 months or sooner as needed.  Patient scheduled today.  Follow up with primary care provider as planned or for acute medical concerns.  Call the psychiatric nurse line with medication questions or concerns at [phone].  [employer] may be used to communicate with your provider, but this is not intended to be used for emergencies.    Risks of stimulant medication include, but not limited to, decreased appetite, risk of tics (and that they may be lasting), trouble sleeping, cardiac risks such as increased heart rate and blood pressure, and rare risk of sudden cardiac death.  Also risk of addiction/tolerance/dependence.    Risks of benzodiazepine (Ativan, Xanax, Klonopin, Valium, etc) use including, but not limited to, sedation, tolerance, risk for addiction/dependence. Do not drink alcohol while taking benzodiazepines due to risk of trouble breathing and potential death. Do not drive or operate heavy machinery until it is known how the drug affects you. Discuss with physician or pharmacist before ever taking a benzodiazepine with a narcotic/opioid pain medication.     Administrative Billing:   Phone Call/Video Duration: 34 Minutes  Start: 2:34p  Stop: 3:08p    The longitudinal plan of care for the diagnosis(es)/condition(s) as documented were addressed during this  "visit. Due to the added complexity in care, I will continue to support Ellyn in the subsequent management and with ongoing continuity of care.    Patient Status:  Patient is a continuous/long-term care patient and refills will continue to come from this provider until otherwise noted.     Signed:   Sherlyn Wang DO  [facility]    This note was created with voice recognition software. Inadvertent grammatical errors, typographical errors, and \"sound-a-like\" substitutions may occur due to limitations of the software.  Read the note carefully and apply context when erroneous substitutions have occurred. Thank you.   "

## 2025-07-08 NOTE — NURSING NOTE
Is the patient currently in the state of [place]? YES    Current patient location: [address] [place] [id number]    Visit mode:Video    If the visit is dropped, the patient can be reconnected by: VIDEO VISIT: Text to cell phone:   Telephone Information:   Mobile [phone]       Will anyone else be joining the visit? No  (If patient encounters technical issues they should call [phone])    Are changes needed to the allergy or medication list? No    Are refills needed on medications prescribed by this physician? No    Rooming Documentation: Questionnaire(s) completed.    Reason for visit: RECHECK     ROXANA Richardson

## 2025-07-08 NOTE — PATIENT INSTRUCTIONS
Treatment Plan:  Continue Paxil/paroxetine CR 50 mg daily for anxiety and mood.  Continue lorazepam/Ativan 0.5-1 mg daily as needed for severe anxiety.   Increase Adderall XR to 30 mg daily for ADHD.   Continue Adderall immediate release 15 mg daily as needed for ADHD symptoms.  Continue all other medications as reviewed per electronic medical record today.   Safety plan reviewed. To the Emergency Department as needed or call after hours crisis line at [phone] or [phone]. [place] Crisis Text Line. Text [place] to 887336 or Suicide LifeLine Chat: suicidepreventionARTENCY.COMline.org/chat  Continue therapy as planned.   Schedule an appointment with me in 3 months or sooner as needed.  Patient scheduled today.  Follow up with primary care provider as planned or for acute medical concerns.  Call the psychiatric nurse line with medication questions or concerns at [phone].  [employer] may be used to communicate with your provider, but this is not intended to be used for emergencies.    Risks of stimulant medication include, but not limited to, decreased appetite, risk of tics (and that they may be lasting), trouble sleeping, cardiac risks such as increased heart rate and blood pressure, and rare risk of sudden cardiac death.  Also risk of addiction/tolerance/dependence.    Risks of benzodiazepine (Ativan, Xanax, Klonopin, Valium, etc) use including, but not limited to, sedation, tolerance, risk for addiction/dependence. Do not drink alcohol while taking benzodiazepines due to risk of trouble breathing and potential death. Do not drive or operate heavy machinery until it is known how the drug affects you. Discuss with physician or pharmacist before ever taking a benzodiazepine with a narcotic/opioid pain medication.     Patient Education   Collaborative Care Psychiatry Service  What to Expect  Here's what to expect from your Collaborative Care Psychiatry Service (CCPS).   About CCPS  CCPS means 2 people work together to  "help you get better. You'll meet with a behavioral health clinician and a psychiatric doctor. A behavioral health clinician helps people with mental health problems by talking with them. A psychiatric doctor helps people by giving them medicine.  How it works  At every visit, you'll see the behavioral health clinician (BHC) first. They'll talk with you about how you're doing and teach you how to feel better.   Then you'll see the psychiatric doctor. This doctor can help you deal with troubling thoughts and feelings by giving you medicine. They'll make sure you know the plan for your care.   CCPS usually takes 3 to 6 visits. If you need more visits, we may have you start seeing a different psychiatric doctor for ongoing care.  If you have any questions or concerns, we'll be glad to talk with you.  About visits  Be open  At your visits, please talk openly about your problems. It may feel hard, but it's the best way for us to help you.  Cancelling visits  If you can't come to your visit, please call us right away at [phone]. If you don't cancel at least 24 hours (1 full day) before your visit, that's \"late cancellation.\"  Being late to visits  Being very late is the same as not showing up. You will be a \"no show\" if:  Your appointment starts with a BHC, and you're more than 15 minutes late for a 30-minute (half hour) visit. This will also cancel your appointment with the psychiatric doctor.  Your appointment is with a psychiatric doctor only, and you're more than 15 minutes late for a 30-minute (half hour) visit.  Your appointment is with a psychiatric doctor only, and you're more than 30 minutes late for a 60-minute (full hour) visit.  If you cancel late or don't show up 2 times within 6 months, we may end your care.   Getting help between visits  If you need help between visits, you can call us Monday to Friday from 8 a.m. to 4:30 p.m. at [phone].  Emergency care  Call 911 or go to the nearest emergency " department if your life or someone else's life is in danger.  Call 988 anytime to reach the national Suicide and Crisis hotline.  Medicine refills  To refill your medicine, call your pharmacy. You can also call [facility]'s Behavioral [facility] at [phone], Monday to Friday, 8 a.m. to 4:30 p.m. It can take 1 to 3 business days to get a refill.   Forms, letters, and tests  You may have papers to fill out, like FMLA, short-term disability, and workability. We can help you with these forms at your visits, but you must have an appointment. You may need more than 1 visit for this, to be in an intensive therapy program, or both.  Before we can give you medicine for ADHD, we may refer you to get tested for it or confirm it another way.  We may not be able to give you an emotional support animal letter.  We don't do mental health checks ordered by the court.   We don't do mental health testing, but we can refer you to get tested.   Thank you for choosing us for your care.  For informational purposes only. Not to replace the advice of your health care provider. Copyright   2022 [facility]. All rights reserved. [employer] 571641 - Rev 11/24.

## 2025-08-04 SDOH — HEALTH STABILITY: PHYSICAL HEALTH: ON AVERAGE, HOW MANY MINUTES DO YOU ENGAGE IN EXERCISE AT THIS LEVEL?: PATIENT DECLINED

## 2025-08-04 ASSESSMENT — ANXIETY QUESTIONNAIRES
6. BECOMING EASILY ANNOYED OR IRRITABLE: MORE THAN HALF THE DAYS
3. WORRYING TOO MUCH ABOUT DIFFERENT THINGS: SEVERAL DAYS
IF YOU CHECKED OFF ANY PROBLEMS ON THIS QUESTIONNAIRE, HOW DIFFICULT HAVE THESE PROBLEMS MADE IT FOR YOU TO DO YOUR WORK, TAKE CARE OF THINGS AT HOME, OR GET ALONG WITH OTHER PEOPLE: SOMEWHAT DIFFICULT
4. TROUBLE RELAXING: MORE THAN HALF THE DAYS
2. NOT BEING ABLE TO STOP OR CONTROL WORRYING: MORE THAN HALF THE DAYS
GAD7 TOTAL SCORE: 11
7. FEELING AFRAID AS IF SOMETHING AWFUL MIGHT HAPPEN: NOT AT ALL
1. FEELING NERVOUS, ANXIOUS, OR ON EDGE: NEARLY EVERY DAY
5. BEING SO RESTLESS THAT IT IS HARD TO SIT STILL: SEVERAL DAYS
GAD7 TOTAL SCORE: 11
GAD7 TOTAL SCORE: 11
7. FEELING AFRAID AS IF SOMETHING AWFUL MIGHT HAPPEN: NOT AT ALL
8. IF YOU CHECKED OFF ANY PROBLEMS, HOW DIFFICULT HAVE THESE MADE IT FOR YOU TO DO YOUR WORK, TAKE CARE OF THINGS AT HOME, OR GET ALONG WITH OTHER PEOPLE?: SOMEWHAT DIFFICULT

## 2025-08-04 ASSESSMENT — SOCIAL DETERMINANTS OF HEALTH (SDOH): HOW OFTEN DO YOU GET TOGETHER WITH FRIENDS OR RELATIVES?: PATIENT DECLINED

## 2025-08-05 ENCOUNTER — OFFICE VISIT (OUTPATIENT)
Dept: FAMILY MEDICINE | Facility: CLINIC | Age: 50
End: 2025-08-05
Attending: FAMILY MEDICINE
Payer: COMMERCIAL

## 2025-08-05 VITALS
TEMPERATURE: 97.4 F | HEART RATE: 89 BPM | OXYGEN SATURATION: 100 % | WEIGHT: 174.8 LBS | BODY MASS INDEX: 29.84 KG/M2 | DIASTOLIC BLOOD PRESSURE: 79 MMHG | RESPIRATION RATE: 20 BRPM | HEIGHT: 64 IN | SYSTOLIC BLOOD PRESSURE: 120 MMHG

## 2025-08-05 DIAGNOSIS — Z13.6 SCREENING FOR CARDIOVASCULAR CONDITION: ICD-10-CM

## 2025-08-05 DIAGNOSIS — Z12.31 VISIT FOR SCREENING MAMMOGRAM: ICD-10-CM

## 2025-08-05 DIAGNOSIS — E78.5 HYPERLIPIDEMIA LDL GOAL <130: ICD-10-CM

## 2025-08-05 DIAGNOSIS — Z12.4 SCREENING FOR CERVICAL CANCER: ICD-10-CM

## 2025-08-05 DIAGNOSIS — R10.2 PELVIC PAIN IN FEMALE: ICD-10-CM

## 2025-08-05 DIAGNOSIS — Z00.00 ROUTINE GENERAL MEDICAL EXAMINATION AT A HEALTH CARE FACILITY: Primary | ICD-10-CM

## 2025-08-05 LAB
EST. AVERAGE GLUCOSE BLD GHB EST-MCNC: 105 MG/DL
HBA1C MFR BLD: 5.3 % (ref 0–5.6)

## 2025-08-05 PROCEDURE — 99213 OFFICE O/P EST LOW 20 MIN: CPT | Mod: 25 | Performed by: FAMILY MEDICINE

## 2025-08-05 PROCEDURE — 90471 IMMUNIZATION ADMIN: CPT | Performed by: FAMILY MEDICINE

## 2025-08-05 PROCEDURE — 90472 IMMUNIZATION ADMIN EACH ADD: CPT | Performed by: FAMILY MEDICINE

## 2025-08-05 PROCEDURE — 80053 COMPREHEN METABOLIC PANEL: CPT | Performed by: FAMILY MEDICINE

## 2025-08-05 PROCEDURE — 90677 PCV20 VACCINE IM: CPT | Performed by: FAMILY MEDICINE

## 2025-08-05 PROCEDURE — 1126F AMNT PAIN NOTED NONE PRSNT: CPT | Performed by: FAMILY MEDICINE

## 2025-08-05 PROCEDURE — 36415 COLL VENOUS BLD VENIPUNCTURE: CPT | Performed by: FAMILY MEDICINE

## 2025-08-05 PROCEDURE — 83036 HEMOGLOBIN GLYCOSYLATED A1C: CPT | Performed by: FAMILY MEDICINE

## 2025-08-05 PROCEDURE — 90746 HEPB VACCINE 3 DOSE ADULT IM: CPT | Performed by: FAMILY MEDICINE

## 2025-08-05 PROCEDURE — 3078F DIAST BP <80 MM HG: CPT | Performed by: FAMILY MEDICINE

## 2025-08-05 PROCEDURE — 3074F SYST BP LT 130 MM HG: CPT | Performed by: FAMILY MEDICINE

## 2025-08-05 PROCEDURE — 99396 PREV VISIT EST AGE 40-64: CPT | Mod: 25 | Performed by: FAMILY MEDICINE

## 2025-08-05 PROCEDURE — 80061 LIPID PANEL: CPT | Performed by: FAMILY MEDICINE

## 2025-08-05 PROCEDURE — 87624 HPV HI-RISK TYP POOLED RSLT: CPT | Performed by: FAMILY MEDICINE

## 2025-08-05 ASSESSMENT — PAIN SCALES - GENERAL: PAINLEVEL_OUTOF10: NO PAIN (0)

## 2025-08-06 ENCOUNTER — PATIENT OUTREACH (OUTPATIENT)
Dept: CARE COORDINATION | Facility: CLINIC | Age: 50
End: 2025-08-06
Payer: COMMERCIAL

## 2025-08-06 LAB
ALBUMIN SERPL BCG-MCNC: 4.6 G/DL (ref 3.5–5.2)
ALP SERPL-CCNC: 61 U/L (ref 40–150)
ALT SERPL W P-5'-P-CCNC: 26 U/L (ref 0–50)
ANION GAP SERPL CALCULATED.3IONS-SCNC: 12 MMOL/L (ref 7–15)
AST SERPL W P-5'-P-CCNC: 31 U/L (ref 0–45)
BILIRUB SERPL-MCNC: 0.2 MG/DL
BUN SERPL-MCNC: 13.9 MG/DL (ref 6–20)
CALCIUM SERPL-MCNC: 9.6 MG/DL (ref 8.8–10.4)
CHLORIDE SERPL-SCNC: 102 MMOL/L (ref 98–107)
CHOLEST SERPL-MCNC: 284 MG/DL
CREAT SERPL-MCNC: 0.76 MG/DL (ref 0.51–0.95)
EGFRCR SERPLBLD CKD-EPI 2021: >90 ML/MIN/1.73M2
FASTING STATUS PATIENT QL REPORTED: NO
FASTING STATUS PATIENT QL REPORTED: NO
GLUCOSE SERPL-MCNC: 90 MG/DL (ref 70–99)
HCO3 SERPL-SCNC: 24 MMOL/L (ref 22–29)
HDLC SERPL-MCNC: 57 MG/DL
LDLC SERPL CALC-MCNC: 205 MG/DL
NONHDLC SERPL-MCNC: 227 MG/DL
POTASSIUM SERPL-SCNC: 4.3 MMOL/L (ref 3.4–5.3)
PROT SERPL-MCNC: 7.2 G/DL (ref 6.4–8.3)
SODIUM SERPL-SCNC: 138 MMOL/L (ref 135–145)
TRIGL SERPL-MCNC: 110 MG/DL

## 2025-08-07 LAB
HPV HR 12 DNA CVX QL NAA+PROBE: NEGATIVE
HPV16 DNA CVX QL NAA+PROBE: NEGATIVE
HPV18 DNA CVX QL NAA+PROBE: NEGATIVE
HUMAN PAPILLOMA VIRUS FINAL DIAGNOSIS: NORMAL

## 2025-08-11 LAB
BKR AP ASSOCIATED HPV REPORT: NORMAL
BKR LAB AP GYN ADEQUACY: NORMAL
BKR LAB AP GYN INTERPRETATION: NORMAL
BKR LAB AP LMP: NORMAL
BKR LAB AP PREVIOUS ABNORMAL: NORMAL
PATH REPORT.COMMENTS IMP SPEC: NORMAL
PATH REPORT.COMMENTS IMP SPEC: NORMAL
PATH REPORT.RELEVANT HX SPEC: NORMAL

## 2025-08-12 PROBLEM — Z98.890 S/P LEEP OF CERVIX: Status: ACTIVE | Noted: 2025-08-12

## 2025-08-27 ENCOUNTER — MYC MEDICAL ADVICE (OUTPATIENT)
Dept: FAMILY MEDICINE | Facility: CLINIC | Age: 50
End: 2025-08-27
Payer: COMMERCIAL

## 2025-09-02 ENCOUNTER — MYC MEDICAL ADVICE (OUTPATIENT)
Dept: FAMILY MEDICINE | Facility: CLINIC | Age: 50
End: 2025-09-02
Payer: COMMERCIAL